# Patient Record
Sex: FEMALE | Race: ASIAN | NOT HISPANIC OR LATINO | Employment: OTHER | URBAN - METROPOLITAN AREA
[De-identification: names, ages, dates, MRNs, and addresses within clinical notes are randomized per-mention and may not be internally consistent; named-entity substitution may affect disease eponyms.]

---

## 2020-01-06 ENCOUNTER — OFFICE VISIT (OUTPATIENT)
Dept: URGENT CARE | Facility: URGENT CARE | Age: 85
End: 2020-01-06
Payer: COMMERCIAL

## 2020-01-06 ENCOUNTER — ANCILLARY PROCEDURE (OUTPATIENT)
Dept: GENERAL RADIOLOGY | Facility: CLINIC | Age: 85
End: 2020-01-06
Attending: PHYSICIAN ASSISTANT
Payer: COMMERCIAL

## 2020-01-06 VITALS
SYSTOLIC BLOOD PRESSURE: 110 MMHG | DIASTOLIC BLOOD PRESSURE: 60 MMHG | OXYGEN SATURATION: 99 % | TEMPERATURE: 97.9 F | HEART RATE: 59 BPM

## 2020-01-06 DIAGNOSIS — R07.81 RIB PAIN ON LEFT SIDE: ICD-10-CM

## 2020-01-06 DIAGNOSIS — R07.81 RIB PAIN ON LEFT SIDE: Primary | ICD-10-CM

## 2020-01-06 PROCEDURE — 99203 OFFICE O/P NEW LOW 30 MIN: CPT | Performed by: PHYSICIAN ASSISTANT

## 2020-01-06 PROCEDURE — 71111 X-RAY EXAM RIBS/CHEST4/> VWS: CPT

## 2020-01-07 NOTE — PROGRESS NOTES
Trip bathroom    SUBJECTIVE:  Chief Complaint   Patient presents with     Urgent Care     Fall     Patient tripped inside her home last night. Pain in left knee, ribs, and nose.                                                                                                     Kimberley Dennis is a 84 year old female presents with a chief complaint of left sided rib  pain, tenderness and hurts to take a deep breath  Denies SOB or chest pain . Tripped last night and fell on the left side.  Also hit her left knee but no pain and walking fine.  Did not hit head and no LOC .  The injury occurred 1 day ago.   The injury happened while at home. How: fall   Pain exacerbated by deep breath and  Twisting torso.  Relieved by rest.  She treated it initially with Tylenol. This is the first time this type of injury has occurred to this patient.     Past Medical History:   Diagnosis Date     Other and unspecified hyperlipidemia      Unspecified cardiovascular disease 1999    stent placed     Current Outpatient Medications   Medication Sig Dispense Refill     ASPIRIN 81 MG OR TABS 1 tab po QD (Once per day) 100 3     LIPITOR 10 MG OR TABS 1 tab PO QD (Once per day) 30 5     METOPROLOL TARTRATE 50 MG OR TABS 1/2 tab qd 60 5     dextromethorphan (DELSYM) 30 MG/5ML liquid Take 5 mLs (30 mg) by mouth 2 times daily (Patient not taking: Reported on 1/6/2020) 89 mL 0     IRON (FERROUS GLUCONATE) 325 MG OR TABS 1 TABLET DAILY 30 0     NIASPAN 500 MG OR TBCR 1 CAPSULE AT BEDTIME 30 0     Social History     Tobacco Use     Smoking status: Never Smoker     Smokeless tobacco: Never Used   Substance Use Topics     Alcohol use: No       ROS:  Review of systems negative except as stated above.    EXAM:   /60   Pulse 59   Temp 97.9  F (36.6  C)   SpO2 99%   Gen: healthy,alert,no distress  Extremity: left side of ribs with tenderness to palpation but no crepitus noted.  No deformity noted.  No bruising noted   There is not compromise to  the distal circulation.  Pulses are +2 and CRT is brisk  GENERAL APPEARANCE: healthy, alert and no distress  NECK: supple, non-tender to palpation, FROM   CHEST: clear to auscultation  CV: regular rate and rhythm  EXTREMITIES: peripheral pulses normal  SKIN: no suspicious lesions or rashes  NEURO: Normal strength and tone, sensory exam grossly normal, mentation intact and speech normal    X-RAY was done. No rib fracture per my read      assessment/plan:  (R07.81) Rib pain on left side  (primary encounter diagnosis)  Comment:   Plan: : XR Ribs & Chest Left G/E 3 Views          No fracture of ribs noted.  Likely contusion with exam otherwise normal and no red flag signs OTC med for sx relief and ice area.  Follow-up if SOB or chest pain

## 2020-01-07 NOTE — NURSING NOTE
Chief Complaint   Patient presents with     Urgent Care     Fall     Patient tripped inside her home last night. Pain in left knee, ribs, and nose.                                                                                                     S AMARILIS YATES

## 2020-01-13 ENCOUNTER — OFFICE VISIT (OUTPATIENT)
Dept: URGENT CARE | Facility: URGENT CARE | Age: 85
End: 2020-01-13
Payer: COMMERCIAL

## 2020-01-13 ENCOUNTER — ANCILLARY PROCEDURE (OUTPATIENT)
Dept: GENERAL RADIOLOGY | Facility: CLINIC | Age: 85
End: 2020-01-13
Attending: FAMILY MEDICINE
Payer: COMMERCIAL

## 2020-01-13 VITALS
SYSTOLIC BLOOD PRESSURE: 108 MMHG | DIASTOLIC BLOOD PRESSURE: 56 MMHG | WEIGHT: 153 LBS | TEMPERATURE: 98.1 F | HEART RATE: 60 BPM | BODY MASS INDEX: 28.91 KG/M2 | RESPIRATION RATE: 15 BRPM | OXYGEN SATURATION: 98 %
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
## 2020-01-13 PROCEDURE — 71101 X-RAY EXAM UNILAT RIBS/CHEST: CPT | Mod: LT

## 2020-01-13 PROCEDURE — 99214 OFFICE O/P EST MOD 30 MIN: CPT | Performed by: FAMILY MEDICINE

## 2020-01-13 RX ORDER — NABUMETONE 500 MG/1
TABLET, FILM COATED ORAL
Qty: 14 TABLET | Refills: 1 | Status: SHIPPED | OUTPATIENT
Start: 2020-01-13 | End: 2021-05-03

## 2020-01-13 NOTE — PROGRESS NOTES
SUBJECTIVE:   Kimberley Dennis is a 84 year old female presenting with a chief complaint of   Chief Complaint   Patient presents with     Urgent Care     Fall     painful lump on left side near rib cage- just noticed yesterday- Pt fell 8 days ago and had normal xrays- daughter thinks it may be related to the fall     She was seen a week ago and fell still continuing has some pain.  More on the left side ribs.  No shortness of breath pain when she gets up from sitting.    Previous x-ray showed no evidence of fractures.  She is an established patient of [facility].        Review of Systems   Respiratory:        Chest pain after fall happened a week ago negative x-rays 1 week ago   All other systems reviewed and are negative.      Past Medical History:   Diagnosis Date     Other and unspecified hyperlipidemia      Unspecified cardiovascular disease 1999    stent placed     Family History   Problem Relation Age of Onset     Family History Negative Unknown      Current Outpatient Medications   Medication Sig Dispense Refill     ASPIRIN 81 MG OR TABS 1 tab po QD (Once per day) 100 3     dextromethorphan (DELSYM) 30 MG/5ML liquid Take 5 mLs (30 mg) by mouth 2 times daily 89 mL 0     IRON (FERROUS GLUCONATE) 325 MG OR TABS 1 TABLET DAILY 30 0     LIPITOR 10 MG OR TABS 1 tab PO QD (Once per day) 30 5     METOPROLOL TARTRATE 50 MG OR TABS 1/2 tab qd 60 5     NIASPAN 500 MG OR TBCR 1 CAPSULE AT BEDTIME 30 0     Social History     Tobacco Use     Smoking status: Never Smoker     Smokeless tobacco: Never Used   Substance Use Topics     Alcohol use: No       OBJECTIVE  /56   Pulse 60   Temp 98.1  F (36.7  C) (Tympanic)   Resp 15   Wt 69.4 kg (153 lb)   SpO2 98%   BMI 28.91 kg/m      Physical Exam  Vitals signs and nursing note reviewed.   Constitutional:       Appearance: Normal appearance.   HENT:      Head: Normocephalic.      Left Ear: Tympanic membrane normal.      Nose: Nose normal.      Mouth/Throat:      Mouth:  Mucous membranes are moist.   Eyes:      Pupils: Pupils are equal, round, and reactive to light.   Neck:      Musculoskeletal: Normal range of motion.   Cardiovascular:      Rate and Rhythm: Normal rate.   Pulmonary:      Effort: Pulmonary effort is normal.      Breath sounds: Normal breath sounds.   Abdominal:      General: Abdomen is flat.   Musculoskeletal: Normal range of motion.      Comments: Pain to palpation left side approximately the line of the axilla bottom rib pain   Skin:     General: Skin is warm.   Neurological:      General: No focal deficit present.      Mental Status: She is alert and oriented to person, place, and time.   Psychiatric:         Mood and Affect: Mood normal.         Labs:  No results found for this or any previous visit (from the past 24 hour(s)).    X-Ray was done, my findings are: There is no obvious evidence of a fracture no evidence of pneumothorax    ASSESSMENT:      ICD-10-CM    1. Fall, subsequent encounter W19.XXXD XR Ribs & Chest Lt 3v        Medical Decision Making:    Differential Diagnosis:  1.  Pneumothorax, fracture, contusion    Serious Comorbid Conditions:  Adult:  None    PLAN:  1.  They may need to take the Naprosyn because I understand she is allergic to ibuprofen.  With ibuprofen she had swelling around her mouth not with Naprosyn.  Gave them Relafen and they will decide whether they should use that or not  2.  Tylenol 3 1 at night for 2 weeks  3.  Return to clinic in 2 weeks for follow-up evaluation re-auscultation of the lungs possibly re-x-ray although I doubt it if she still having significant pain I would do a CT scan of the chest if she has pain that is decreasing this is probably more than likely normal at this point    Binder to try to give her some comfort support while not making her highly more susceptible to pneumonia4.       Followup:    If not improving or if condition worsens, follow up with your Primary Care Provider

## 2020-02-27 ENCOUNTER — OFFICE VISIT (OUTPATIENT)
Dept: ORTHOPEDICS | Facility: CLINIC | Age: 85
End: 2020-02-27
Payer: COMMERCIAL

## 2020-02-27 ENCOUNTER — ANCILLARY PROCEDURE (OUTPATIENT)
Dept: GENERAL RADIOLOGY | Facility: CLINIC | Age: 85
End: 2020-02-27
Attending: ORTHOPAEDIC SURGERY
Payer: COMMERCIAL

## 2020-02-27 VITALS
WEIGHT: 156 LBS | BODY MASS INDEX: 29.45 KG/M2 | DIASTOLIC BLOOD PRESSURE: 74 MMHG | SYSTOLIC BLOOD PRESSURE: 142 MMHG | HEIGHT: 61 IN

## 2020-02-27 DIAGNOSIS — M79.645 BILATERAL THUMB PAIN: Primary | ICD-10-CM

## 2020-02-27 DIAGNOSIS — M79.645 BILATERAL THUMB PAIN: ICD-10-CM

## 2020-02-27 DIAGNOSIS — M18.11 PRIMARY OSTEOARTHRITIS OF FIRST CARPOMETACARPAL JOINT OF RIGHT HAND: ICD-10-CM

## 2020-02-27 DIAGNOSIS — M79.644 BILATERAL THUMB PAIN: ICD-10-CM

## 2020-02-27 DIAGNOSIS — M79.644 BILATERAL THUMB PAIN: Primary | ICD-10-CM

## 2020-02-27 PROCEDURE — 73130 X-RAY EXAM OF HAND: CPT | Mod: RT | Performed by: ORTHOPAEDIC SURGERY

## 2020-02-27 PROCEDURE — 99203 OFFICE O/P NEW LOW 30 MIN: CPT | Mod: 25 | Performed by: ORTHOPAEDIC SURGERY

## 2020-02-27 PROCEDURE — 20600 DRAIN/INJ JOINT/BURSA W/O US: CPT | Mod: RT | Performed by: ORTHOPAEDIC SURGERY

## 2020-02-27 RX ORDER — TESTOSTERONE CYPIONATE 200 MG/ML
1 INJECTION INTRAMUSCULAR
Status: DISCONTINUED | OUTPATIENT
Start: 2020-02-27 | End: 2020-07-02

## 2020-02-27 RX ORDER — LIDOCAINE HYDROCHLORIDE 10 MG/ML
1 INJECTION, SOLUTION INFILTRATION; PERINEURAL
Status: DISCONTINUED | OUTPATIENT
Start: 2020-02-27 | End: 2020-07-02

## 2020-02-27 RX ADMIN — TESTOSTERONE CYPIONATE 1 ML: 200 INJECTION INTRAMUSCULAR at 16:45

## 2020-02-27 RX ADMIN — LIDOCAINE HYDROCHLORIDE 1 ML: 10 INJECTION, SOLUTION INFILTRATION; PERINEURAL at 16:45

## 2020-02-27 ASSESSMENT — MIFFLIN-ST. JEOR: SCORE: 1092.05

## 2020-02-27 NOTE — PROGRESS NOTES
HISTORY OF PRESENT ILLNESS:    Kimberley Dennis is a 84 year old female who is seen as self referral for bilateral thumb pain. Pain has been present for approximately 1 week, with no trauma or injury noted. Patient is accompanied by her daughter, and son-in-law who interpret for her. She is right hand dominant.     Present symptoms: Pain located in the CMC joints of bilateral hands and into the thumbs at the metacarpophalangeal joints, and interphalangeal joints. She notes difficulties with gripping, grasping, and holding due to pain. She has increased pain with pressure to the thenar region of the palm. She denies numbness, tingling, and swelling to the thumbs.   Current pain level: 5/10, Worst pain level: 5/10  Treatments tried to this point: none  Orthopedic PMH: no previous upper extremity orthopedic injury     Past Medical History:   Diagnosis Date     Other and unspecified hyperlipidemia      Unspecified cardiovascular disease 1999    stent placed       No past surgical history on file.    Family History   Problem Relation Age of Onset     Family History Negative Unknown        Social History     Socioeconomic History     Marital status:      Spouse name: Not on file     Number of children: Not on file     Years of education: Not on file     Highest education level: Not on file   Occupational History     Not on file   Social Needs     Financial resource strain: Not on file     Food insecurity:     Worry: Not on file     Inability: Not on file     Transportation needs:     Medical: Not on file     Non-medical: Not on file   Tobacco Use     Smoking status: Never Smoker     Smokeless tobacco: Never Used   Substance and Sexual Activity     Alcohol use: No     Drug use: No     Sexual activity: Not Currently   Lifestyle     Physical activity:     Days per week: Not on file     Minutes per session: Not on file     Stress: Not on file   Relationships     Social connections:     Talks on phone: Not on file      Gets together: Not on file     Attends Gnosticist service: Not on file     Active member of club or organization: Not on file     Attends meetings of clubs or organizations: Not on file     Relationship status: Not on file     Intimate partner violence:     Fear of current or ex partner: Not on file     Emotionally abused: Not on file     Physically abused: Not on file     Forced sexual activity: Not on file   Other Topics Concern     Not on file   Social History Narrative     Not on file       Current Outpatient Medications   Medication Sig Dispense Refill     ASPIRIN 81 MG OR TABS 1 tab po QD (Once per day) 100 3     IRON (FERROUS GLUCONATE) 325 MG OR TABS 1 TABLET DAILY 30 0     LIPITOR 10 MG OR TABS 1 tab PO QD (Once per day) 30 5     METOPROLOL TARTRATE 50 MG OR TABS 1/2 tab qd 60 5     NIASPAN 500 MG OR TBCR 1 CAPSULE AT BEDTIME 30 0     dextromethorphan (DELSYM) 30 MG/5ML liquid Take 5 mLs (30 mg) by mouth 2 times daily (Patient not taking: Reported on 2/27/2020) 89 mL 0     nabumetone (RELAFEN) 500 MG tablet 1 po qhs (Patient not taking: Reported on 2/27/2020) 14 tablet 1       Allergies   Allergen Reactions     Ibuprofen        REVIEW OF SYSTEMS:  CONSTITUTIONAL:  NEGATIVE for fever, chills, change in weight  INTEGUMENTARY/SKIN:  NEGATIVE for worrisome rashes, moles or lesions  EYES:  NEGATIVE for vision changes or irritation  ENT/MOUTH:  NEGATIVE for ear, mouth and throat problems  RESP:  NEGATIVE for significant cough or SOB  BREAST:  NEGATIVE for masses, tenderness or discharge  CV:  Hypertension   GI:  NEGATIVE for nausea, abdominal pain, heartburn, or change in bowel habits  :  Negative   MUSCULOSKELETAL:  See HPI above  NEURO:  NEGATIVE for weakness, dizziness or paresthesias  ENDOCRINE:  NEGATIVE for temperature intolerance, skin/hair changes  HEME/ALLERGY/IMMUNE:  NEGATIVE for bleeding problems  PSYCHIATRIC:  NEGATIVE for changes in mood or affect      PHYSICAL EXAM:  BP (!) 142/74 (BP Location:  "Right arm, Patient Position: Chair, Cuff Size: Adult Regular)   Ht 1.537 m (5' 0.5\")   Wt 70.8 kg (156 lb)   BMI 29.97 kg/m    Body mass index is 29.97 kg/m .   GENERAL APPEARANCE: healthy, alert and no distress   HEENT: No apparent thyroid megaly. Clear sclera with normal ocular movement  RESPIRATORY: No labored breathing  SKIN: no suspicious lesions or rashes  NEURO: Normal strength and tone, mentation intact and speech normal  VASCULAR: Good pulses, and capillary refill   LYMPH: no lymphadenopathy   PSYCH:  mentation appears normal and affect normal/bright    MUSCULOSKELETAL:    Very healthy-appearing elderly woman who is not in acute distress  Overall no significant deformities other than prominence at the thumb CMC joints, bilateral  She has crepitus with a circumduction maneuver  Pain at the thumb CMC joints, bilateral with circumduction maneuver as well   strength is otherwise well maintained  Pinching strength is slightly weak, bilateral  No catching or locking with a finger movement  No significant deformities of other digits other than thumb CMC joints  Sensation is grossly intact  Sensation is intact       ASSESSMENT:  Advanced bilateral thumb CMC joint DJD, right slightly more symptomatic    PLAN:    We visualized the images of the MRI scan.  Findings are thoroughly explained to her and family members.  The treatment options of further observation, splinting, cortisone injection and surgical intervention were explained.  She did fairly well from previous cortisone injection to her knees in the past.  Even though she understands that cord injection may not provide adequate pain relief, she is interested in trying cortisone injection to the right thumb CMC joint at this point.  She tolerated the injection well.  If she notices improvement, we can consider cortisone injection to the left thumb CMC joint in a month or so.  What to expect from the cortisone injection, the fact that it may not improve " her pain at all as well as the instruction for not using the right hand vigorously for the next 3 to 4 days were emphasized.    Imaging Interpretation:   Advanced thumb CMC joints, bilateral      Hand / Upper Extremity Injection/Arthrocentesis: R thumb CMC  Date/Time: 2/27/2020 4:45 PM  Performed by: Kelby Cintron MD  Authorized by: Kelby Cintron MD     Indications:  Joint swelling  Needle Size:  27 G  Guidance: landmark    Approach:  Dorsal  Condition: osteoarthritis    Location:  Thumb  Site:  R thumb CMC    Medications:  1 mL dexamethasone 120 MG/30ML; 1 mL lidocaine 1 %  Outcome:  Tolerated well, no immediate complications  Procedure discussed: discussed risks, benefits, and alternatives    Consent Given by:  Patient  Timeout: timeout called immediately prior to procedure    Prep: patient was prepped and draped in usual sterile fashion            Kelby Cintron MD  Department of Orthopedic Surgery        Disclaimer: This note consists of symbols derived from keyboarding, dictation and/or voice recognition software. As a result, there may be errors in the script that have gone undetected. Please consider this when interpreting information found in this chart.

## 2020-02-27 NOTE — PATIENT INSTRUCTIONS
1. Bilateral thumb pain    2. Osteoarthritis of carpometacarpal (CMC) joint of right thumb      Steroid injection of the right hand was performed today in clinic    - Would not soak in a hot tub, bath or swimming pool for 48 hours  - Ok to shower  - Ice today and only do your normal amounts of activity  - The lidocaine (what is giving you pain relief right now) will likely stop working in 1-2 hours.  You will then have pain again, similar to before you received the injection. The corticosteroid will not start working until approximately 1-2 weeks from now.    In a small percentage of people, cortisone can cause flushing/redness in the face. This usually lasts for 1-3 days and resolves. Cool compress and Ibuprofen/Tylenol can help if this happens.  Can repeat the injection anytime after 4 months    Follow up for repeat injection when pain returns.

## 2020-02-27 NOTE — LETTER
2/27/2020         RE: Kimberley Dennis  [address] [address] [id number]        Dear Colleague,    Thank you for referring your patient, Kimberley Dennis, to the [facility]. Please see a copy of my visit note below.    HISTORY OF PRESENT ILLNESS:    Kimberley Dennis is a 84 year old female who is seen as self referral for bilateral thumb pain. Pain has been present for approximately 1 week, with no trauma or injury noted. Patient is accompanied by her daughter, and son-in-law who interpret for her. She is right hand dominant.     Present symptoms: Pain located in the CMC joints of bilateral hands and into the thumbs at the metacarpophalangeal joints, and interphalangeal joints. She notes difficulties with gripping, grasping, and holding due to pain. She has increased pain with pressure to the thenar region of the palm. She denies numbness, tingling, and swelling to the thumbs.   Current pain level: 5/10, Worst pain level: 5/10  Treatments tried to this point: none  Orthopedic PMH: no previous upper extremity orthopedic injury     Past Medical History:   Diagnosis Date     Other and unspecified hyperlipidemia      Unspecified cardiovascular disease 1999    stent placed       No past surgical history on file.    Family History   Problem Relation Age of Onset     Family History Negative Unknown        Social History     Socioeconomic History     Marital status:      Spouse name: Not on file     Number of children: Not on file     Years of education: Not on file     Highest education level: Not on file   Occupational History     Not on file   Social Needs     Financial resource strain: Not on file     Food insecurity:     Worry: Not on file     Inability: Not on file     Transportation needs:     Medical: Not on file     Non-medical: Not on file   Tobacco Use     Smoking status: Never Smoker     Smokeless tobacco: Never Used   Substance and Sexual Activity     Alcohol use: No      Drug use: No     Sexual activity: Not Currently   Lifestyle     Physical activity:     Days per week: Not on file     Minutes per session: Not on file     Stress: Not on file   Relationships     Social connections:     Talks on phone: Not on file     Gets together: Not on file     Attends Protestant service: Not on file     Active member of club or organization: Not on file     Attends meetings of clubs or organizations: Not on file     Relationship status: Not on file     Intimate partner violence:     Fear of current or ex partner: Not on file     Emotionally abused: Not on file     Physically abused: Not on file     Forced sexual activity: Not on file   Other Topics Concern     Not on file   Social History Narrative     Not on file       Current Outpatient Medications   Medication Sig Dispense Refill     ASPIRIN 81 MG OR TABS 1 tab po QD (Once per day) 100 3     IRON (FERROUS GLUCONATE) 325 MG OR TABS 1 TABLET DAILY 30 0     LIPITOR 10 MG OR TABS 1 tab PO QD (Once per day) 30 5     METOPROLOL TARTRATE 50 MG OR TABS 1/2 tab qd 60 5     NIASPAN 500 MG OR TBCR 1 CAPSULE AT BEDTIME 30 0     dextromethorphan (DELSYM) 30 MG/5ML liquid Take 5 mLs (30 mg) by mouth 2 times daily (Patient not taking: Reported on 2/27/2020) 89 mL 0     nabumetone (RELAFEN) 500 MG tablet 1 po qhs (Patient not taking: Reported on 2/27/2020) 14 tablet 1       Allergies   Allergen Reactions     Ibuprofen        REVIEW OF SYSTEMS:  CONSTITUTIONAL:  NEGATIVE for fever, chills, change in weight  INTEGUMENTARY/SKIN:  NEGATIVE for worrisome rashes, moles or lesions  EYES:  NEGATIVE for vision changes or irritation  ENT/MOUTH:  NEGATIVE for ear, mouth and throat problems  RESP:  NEGATIVE for significant cough or SOB  BREAST:  NEGATIVE for masses, tenderness or discharge  CV:  Hypertension   GI:  NEGATIVE for nausea, abdominal pain, heartburn, or change in bowel habits  :  Negative   MUSCULOSKELETAL:  See HPI above  NEURO:  NEGATIVE for weakness,  "dizziness or paresthesias  ENDOCRINE:  NEGATIVE for temperature intolerance, skin/hair changes  HEME/ALLERGY/IMMUNE:  NEGATIVE for bleeding problems  PSYCHIATRIC:  NEGATIVE for changes in mood or affect      PHYSICAL EXAM:  BP (!) 142/74 (BP Location: Right arm, Patient Position: Chair, Cuff Size: Adult Regular)   Ht 1.537 m (5' 0.5\")   Wt 70.8 kg (156 lb)   BMI 29.97 kg/m     Body mass index is 29.97 kg/m .   GENERAL APPEARANCE: healthy, alert and no distress   HEENT: No apparent thyroid megaly. Clear sclera with normal ocular movement  RESPIRATORY: No labored breathing  SKIN: no suspicious lesions or rashes  NEURO: Normal strength and tone, mentation intact and speech normal  VASCULAR: Good pulses, and capillary refill   LYMPH: no lymphadenopathy   PSYCH:  mentation appears normal and affect normal/bright    MUSCULOSKELETAL:    Very healthy-appearing elderly woman who is not in acute distress  Overall no significant deformities other than prominence at the thumb CMC joints, bilateral  She has crepitus with a circumduction maneuver  Pain at the thumb CMC joints, bilateral with circumduction maneuver as well   strength is otherwise well maintained  Pinching strength is slightly weak, bilateral  No catching or locking with a finger movement  No significant deformities of other digits other than thumb CMC joints  Sensation is grossly intact  Sensation is intact       ASSESSMENT:  Advanced bilateral thumb CMC joint DJD, right slightly more symptomatic    PLAN:    We visualized the images of the MRI scan.  Findings are thoroughly explained to her and family members.  The treatment options of further observation, splinting, cortisone injection and surgical intervention were explained.  She did fairly well from previous cortisone injection to her knees in the past.  Even though she understands that cord injection may not provide adequate pain relief, she is interested in trying cortisone injection to the right " thumb CMC joint at this point.  She tolerated the injection well.  If she notices improvement, we can consider cortisone injection to the left thumb CMC joint in a month or so.  What to expect from the cortisone injection, the fact that it may not improve her pain at all as well as the instruction for not using the right hand vigorously for the next 3 to 4 days were emphasized.    Imaging Interpretation:   Advanced thumb CMC joints, bilateral      Hand / Upper Extremity Injection/Arthrocentesis: R thumb CMC  Date/Time: 2/27/2020 4:45 PM  Performed by: Kelby Cintron MD  Authorized by: Kelby Cintron MD     Indications:  Joint swelling  Needle Size:  27 G  Guidance: landmark    Approach:  Dorsal  Condition: osteoarthritis    Location:  Thumb  Site:  R thumb CMC    Medications:  1 mL dexamethasone 120 MG/30ML; 1 mL lidocaine 1 %  Outcome:  Tolerated well, no immediate complications  Procedure discussed: discussed risks, benefits, and alternatives    Consent Given by:  Patient  Timeout: timeout called immediately prior to procedure    Prep: patient was prepped and draped in usual sterile fashion            Kelby Cintron MD  Department of Orthopedic Surgery        Disclaimer: This note consists of symbols derived from keyboarding, dictation and/or voice recognition software. As a result, there may be errors in the script that have gone undetected. Please consider this when interpreting information found in this chart.      Again, thank you for allowing me to participate in the care of your patient.        Sincerely,        Kelby Cintron MD

## 2020-03-07 ENCOUNTER — NURSE TRIAGE (OUTPATIENT)
Dept: NURSING | Facility: CLINIC | Age: 85
End: 2020-03-07

## 2020-03-08 NOTE — TELEPHONE ENCOUNTER
"    Additional Information    Negative: [1] Choking or struggling to breathe now AND [2] lasts > 60 seconds    Negative: Can't cough, speak, or make any noise now (i.e., stops breathing)    Negative: Has passed out or is limp.    Negative: Bluish (or gray) lips or face now    Negative: Sounds like a life-threatening emergency to the triager    Negative: [1] Recovered from choking AND [2] may have swallowed a FB (foreign body)    Negative: Shortness of breath after choking spell    Negative: [1] Patient cleared the FB spontaneously BUT [2] difficulty swallowing or gagging persist    Negative: Coughed up blood  (Exception:  blood-streaked sputum and once only)    Negative: [1] Patient cleared the FB spontaneously BUT [2] continues to have coughing, hoarseness, or wheezing > 30 minutes    Negative: Symptoms of FB stuck in esophagus (e.g., continued pain in throat or chest, FB sensation, gagging)    Negative: [1] Patient required Heimlich maneuver to remove solid FB AND [2] now has no symptoms    Negative: Coughing or other airway symptoms return    Negative: [1] Choking has occurred 3 or more times in the last year AND [2] the cause is not known    Recovered from choking    Answer Assessment - Initial Assessment Questions  1. SUBSTANCE: \"What did the patient choke on?\"       Salo jackson  2. SIZE: If the object was solid, ask: \"How big was it?\"       2 inches by 1/4 inch  3. ONSET: \"When did it begin?\" (In minutes)       15 minutes ago  4. RESPIRATORY DISTRESS: \"Describe the patient's breathing.\", \"Is he/she able to talk?\"      She can talk    Protocols used: CHOKING - INHALED FOREIGN BODY-A-AH      "

## 2020-07-01 ENCOUNTER — TELEPHONE (OUTPATIENT)
Dept: ORTHOPEDICS | Facility: CLINIC | Age: 85
End: 2020-07-01

## 2020-07-01 NOTE — TELEPHONE ENCOUNTER
No consent to communicate on file.   Informed Quinton Cintron would not be able to speak to him regarding his mother's condition without consent on file.  Writer recommended scheduling a Virtual Visit with his mother and he can attend appointment to ask any questions he may have. Quinton resides in [place], but scheduled a Telephone Visit for patient, and will coordinate with his sister who lives with patient for the appointment.     Quinton provided updated cell phone number to reach patient at for appointment to allow conference call.     Suzy Barker, ATC

## 2020-07-01 NOTE — TELEPHONE ENCOUNTER
Reason for call:  PERNELL from Quinton, RE: his Mom saw Dr Cintron about surgery for a thumb.   Quinton would like to speak with Dr Cintron about the pros and cons. Would like to know how he can do this.  Please call back.    Phone number to reach patient:  Other phone number:  [phone]*    Best Time:  any    Can we leave a detailed message on this number?  NO

## 2020-07-02 ENCOUNTER — VIRTUAL VISIT (OUTPATIENT)
Dept: ORTHOPEDICS | Facility: CLINIC | Age: 85
End: 2020-07-02

## 2020-07-02 VITALS — HEIGHT: 60 IN | BODY MASS INDEX: 31.41 KG/M2 | WEIGHT: 160 LBS

## 2020-07-02 DIAGNOSIS — M18.0 PRIMARY OSTEOARTHRITIS OF BOTH FIRST CARPOMETACARPAL JOINTS: Primary | ICD-10-CM

## 2020-07-02 PROCEDURE — 99213 OFFICE O/P EST LOW 20 MIN: CPT | Mod: 95 | Performed by: ORTHOPAEDIC SURGERY

## 2020-07-02 ASSESSMENT — MIFFLIN-ST. JEOR: SCORE: 1097.26

## 2020-07-02 NOTE — LETTER
"    7/2/2020         RE: Kimberley Dennis  [address] [facility] [address]        Dear Colleague,    Thank you for referring your patient, Kimberley Dennis, to the [facility] ORTHOPEDIC SURGERY. Please see a copy of my visit note below.    Kimberley Dennis is a 85 year old female who is being evaluated via a billable telephone visit.      The patient has been notified of following:     \"This telephone visit will be conducted via a call between you and your physician/provider. We have found that certain health care needs can be provided without the need for a physical exam.  This service lets us provide the care you need with a short phone conversation.  If a prescription is necessary we can send it directly to your pharmacy.  If lab work is needed we can place an order for that and you can then stop by our lab to have the test done at a later time.    Telephone visits are billed at different rates depending on your insurance coverage. During this emergency period, for some insurers they may be billed the same as an in-person visit.  Please reach out to your insurance provider with any questions.    If during the course of the call the physician/provider feels a telephone visit is not appropriate, you will not be charged for this service.\"    Patient has given verbal consent for Telephone visit?  Yes    What phone number would you like to be contacted at? [phone]    How would you like to obtain your AVS? Mail a copy     HISTORY OF PRESENT ILLNESS:    Kimberley Dennis is a 85 year old female who is seen in follow up for bilateral thumb pain.  Patient reports increased pain of the left > right, she is right hand dominant. Patient is accompanied by her daughter and son on today's call.  Patient was last evaluated on 2/27/20 and obtained a right thumb CMC joint injection with no relief .    She continues to use her hands with activities.   Current pain level: 6/10  Treatments tried to this point: " right thumb cmc corticosteroid injection, no other new treatments since last office visit.   Past Medical History: Unchanged from the visit of 2/27/20. Please refer to that note.    REVIEW OF SYSTEMS:  CONSTITUTIONAL:  NEGATIVE for fever, chills, change in weight  INTEGUMENTARY/SKIN:  NEGATIVE for worrisome rashes, moles or lesions  EYES:  NEGATIVE for vision changes or irritation  ENT/MOUTH:  NEGATIVE for ear, mouth and throat problems  RESP:  NEGATIVE for significant cough or SOB  BREAST:  NEGATIVE for masses, tenderness or discharge  CV:  Hypertension   GI:  NEGATIVE for nausea, abdominal pain, heartburn, or change in bowel habits  :  Negative   MUSCULOSKELETAL:  See HPI above  NEURO:  NEGATIVE for weakness, dizziness or paresthesias  ENDOCRINE:  NEGATIVE for temperature intolerance, skin/hair changes  HEME/ALLERGY/IMMUNE:  NEGATIVE for bleeding problems  PSYCHIATRIC:  NEGATIVE for changes in mood or affect    PHYSICAL EXAM:  Ht 1.524 m (5')   Wt 72.6 kg (160 lb)   BMI 31.25 kg/m    Body mass index is 31.25 kg/m .   GENERAL APPEARANCE: healthy, alert and no distress   SKIN: no suspicious lesions or rashes  NEURO: Normal strength and tone, mentation intact and speech normal  VASCULAR:  good pulses, and cappillary refill   LYMPH: no lymphadenopathy   PSYCH:  mentation appears normal and affect normal/bright    MSK:  Telephone visit only    IMAGING INTERPRETATION: None today     ASSESSMENT:  Bilateral thumb CMC joint arthritis, left more symptomatic    PLAN:  Along with the patient, her daughter and son were on the phone to go over the situation.  The cortisone injection that she was given was not very helpful.  She continues to have difficulty of doing things such as buttoning and eating with her hand.  We had a long discussion as to the details of the situation, surgical intervention in terms of the nature of the surgery and potential complications as well as overall healing.  We mentioned about 6 to 8 weeks  of recovery time and about 2 weeks of immobilization following the operation.  Small percentage of time that surgery is not successful for known as well as unknown causes.  All the questions were answered.  The family decided to think it through and get back to us with their decision.           Kelby Cintron MD  Dept. Orthopedic Surgery  [facility]       Disclaimer: This note consists of symbols derived from keyboarding, dictation and/or voice recognition software. As a result, there may be errors in the script that have gone undetected. Please consider this when interpreting information found in this chart.      Phone call duration: 14 minutes            Again, thank you for allowing me to participate in the care of your patient.        Sincerely,        Kelby Cintron MD

## 2020-07-02 NOTE — PROGRESS NOTES
"Kimberley Dennis is a 85 year old female who is being evaluated via a billable telephone visit.      The patient has been notified of following:     \"This telephone visit will be conducted via a call between you and your physician/provider. We have found that certain health care needs can be provided without the need for a physical exam.  This service lets us provide the care you need with a short phone conversation.  If a prescription is necessary we can send it directly to your pharmacy.  If lab work is needed we can place an order for that and you can then stop by our lab to have the test done at a later time.    Telephone visits are billed at different rates depending on your insurance coverage. During this emergency period, for some insurers they may be billed the same as an in-person visit.  Please reach out to your insurance provider with any questions.    If during the course of the call the physician/provider feels a telephone visit is not appropriate, you will not be charged for this service.\"    Patient has given verbal consent for Telephone visit?  Yes    What phone number would you like to be contacted at? [phone]    How would you like to obtain your AVS? Mail a copy     HISTORY OF PRESENT ILLNESS:    Kimberley Dennis is a 85 year old female who is seen in follow up for bilateral thumb pain.  Patient reports increased pain of the left > right, she is right hand dominant. Patient is accompanied by her daughter and son on today's call.  Patient was last evaluated on 2/27/20 and obtained a right thumb CMC joint injection with no relief .    She continues to use her hands with activities.   Current pain level: 6/10  Treatments tried to this point: right thumb cmc corticosteroid injection, no other new treatments since last office visit.   Past Medical History: Unchanged from the visit of 2/27/20. Please refer to that note.    REVIEW OF SYSTEMS:  CONSTITUTIONAL:  NEGATIVE for fever, chills, change " in weight  INTEGUMENTARY/SKIN:  NEGATIVE for worrisome rashes, moles or lesions  EYES:  NEGATIVE for vision changes or irritation  ENT/MOUTH:  NEGATIVE for ear, mouth and throat problems  RESP:  NEGATIVE for significant cough or SOB  BREAST:  NEGATIVE for masses, tenderness or discharge  CV:  Hypertension   GI:  NEGATIVE for nausea, abdominal pain, heartburn, or change in bowel habits  :  Negative   MUSCULOSKELETAL:  See HPI above  NEURO:  NEGATIVE for weakness, dizziness or paresthesias  ENDOCRINE:  NEGATIVE for temperature intolerance, skin/hair changes  HEME/ALLERGY/IMMUNE:  NEGATIVE for bleeding problems  PSYCHIATRIC:  NEGATIVE for changes in mood or affect    PHYSICAL EXAM:  Ht 1.524 m (5')   Wt 72.6 kg (160 lb)   BMI 31.25 kg/m    Body mass index is 31.25 kg/m .   GENERAL APPEARANCE: healthy, alert and no distress   SKIN: no suspicious lesions or rashes  NEURO: Normal strength and tone, mentation intact and speech normal  VASCULAR:  good pulses, and cappillary refill   LYMPH: no lymphadenopathy   PSYCH:  mentation appears normal and affect normal/bright    MSK:  Telephone visit only    IMAGING INTERPRETATION: None today     ASSESSMENT:  Bilateral thumb CMC joint arthritis, left more symptomatic    PLAN:  Along with the patient, her daughter and son were on the phone to go over the situation.  The cortisone injection that she was given was not very helpful.  She continues to have difficulty of doing things such as buttoning and eating with her hand.  We had a long discussion as to the details of the situation, surgical intervention in terms of the nature of the surgery and potential complications as well as overall healing.  We mentioned about 6 to 8 weeks of recovery time and about 2 weeks of immobilization following the operation.  Small percentage of time that surgery is not successful for known as well as unknown causes.  All the questions were answered.  The family decided to think it through and get  back to us with their decision.           Kelby Cintron MD  Dept. Orthopedic Surgery  [facility]       Disclaimer: This note consists of symbols derived from keyboarding, dictation and/or voice recognition software. As a result, there may be errors in the script that have gone undetected. Please consider this when interpreting information found in this chart.      Phone call duration: 14 minutes

## 2020-07-06 ENCOUNTER — TELEPHONE (OUTPATIENT)
Dept: PEDIATRICS | Facility: CLINIC | Age: 85
End: 2020-07-06

## 2020-07-06 NOTE — TELEPHONE ENCOUNTER
Patient was called in response to message from Dr. Garcia. Daughter answered and said she does not know if they want her seen now. She said they would call back if they want to make an appointment.

## 2020-07-10 ENCOUNTER — APPOINTMENT (OUTPATIENT)
Dept: CT IMAGING | Facility: CLINIC | Age: 85
End: 2020-07-10
Attending: EMERGENCY MEDICINE

## 2020-07-10 ENCOUNTER — HOSPITAL ENCOUNTER (EMERGENCY)
Facility: CLINIC | Age: 85
Discharge: HOME | End: 2020-07-10
Attending: EMERGENCY MEDICINE | Admitting: EMERGENCY MEDICINE

## 2020-07-10 VITALS
HEART RATE: 69 BPM | OXYGEN SATURATION: 100 % | SYSTOLIC BLOOD PRESSURE: 151 MMHG | DIASTOLIC BLOOD PRESSURE: 74 MMHG | TEMPERATURE: 95.8 F | RESPIRATION RATE: 18 BRPM
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
## 2020-07-10 DIAGNOSIS — I10 ELEVATED BLOOD PRESSURE READING WITH DIAGNOSIS OF HYPERTENSION: ICD-10-CM

## 2020-07-10 PROCEDURE — 96374 THER/PROPH/DIAG INJ IV PUSH: CPT

## 2020-07-10 PROCEDURE — 93005 ELECTROCARDIOGRAM TRACING: CPT

## 2020-07-10 PROCEDURE — 25000128 H RX IP 250 OP 636: Performed by: EMERGENCY MEDICINE

## 2020-07-10 PROCEDURE — 99284 EMERGENCY DEPT VISIT MOD MDM: CPT | Mod: 25

## 2020-07-10 PROCEDURE — 70450 CT HEAD/BRAIN W/O DYE: CPT

## 2020-07-10 RX ORDER — HYDRALAZINE HYDROCHLORIDE 20 MG/ML
5 INJECTION INTRAMUSCULAR; INTRAVENOUS ONCE
Status: COMPLETED | OUTPATIENT
Start: 2020-07-10 | End: 2020-07-10

## 2020-07-10 RX ADMIN — HYDRALAZINE HYDROCHLORIDE 5 MG: 20 INJECTION INTRAMUSCULAR; INTRAVENOUS at 19:20

## 2020-07-10 ASSESSMENT — ENCOUNTER SYMPTOMS
NECK PAIN: 0
DIZZINESS: 1
ABDOMINAL PAIN: 0
HEADACHES: 1
LIGHT-HEADEDNESS: 0

## 2020-07-10 NOTE — ED TRIAGE NOTES
Pt arrives to ED via EMS for fall. Pt was sitting on edge of bed when she said it felt like she was sliding. She fell to floor and became dizzy. Pt hit her nose on the dresser. Pt has no complaints of pain but is hypertensive (last /87), other vitals stable. . EKG normal. Pt takes aspirin, metoprolol, lipitor. Primary language Tamil. ABCs intact. Pt A&OX4.

## 2020-07-10 NOTE — ED PROVIDER NOTES
History     Chief Complaint:  Fall      The history is limited by a language barrier.      Kimberley Dennis is a 85 year old female with a history of hyperlipidemia and cardiovascular disease who presents to the emergency department via EMS for evaluation of a fall. Per EMS report, the patient, who lives at home with her granddaughter, was sitting on the edge of her bed when the bed started sliding, which caused her to fall to the floor. When she fell, she hit her nose on her dresser on the way down, and felt very dizzy when she tried getting up so family called EMS. When EMS arrived, there did not seem to be loss of consciousness and she was able to ambulate well. She was hypertensive at 197/87. She is not on blood thinners.     Here, the patient reports that she was sitting on her bed, about to stand up, when she fell over. She was asymptomatic before the fall, but became dizzy after she hit her head. She now has a mild headache but is otherwise asymptomatic. She denies lightheadedness, chest pain, abdominal pain, neck pain, or persisting dizziness.     Allergies:  Ibuprofen      Medications:    Aspirin 81 mg   Lipitor  Metoprolol    Past Medical History:    Hyperlipidemia  Cardiovascular disease     Past Surgical History:    History reviewed. No pertinent past surgical history.    Family History:    History reviewed. No pertinent family history.    Social History:  Tobacco Use: Never  Alcohol Use: No  PCP: Joanna Garcia  Marital Status:        Review of Systems   Cardiovascular: Negative for chest pain.   Gastrointestinal: Negative for abdominal pain.   Musculoskeletal: Negative for neck pain.   Neurological: Positive for dizziness (resolved) and headaches. Negative for light-headedness.   All other systems reviewed and are negative.      Physical Exam     Patient Vitals for the past 24 hrs:   BP Temp Temp src Pulse Heart Rate Resp SpO2   07/10/20 1945 (!) 151/74 -- -- 69 -- -- 100 %   07/10/20  1930 -- -- -- -- -- -- 99 %   07/10/20 1920 (!) 190/119 -- -- -- -- -- --   07/10/20 1845 -- -- -- -- -- -- 100 %   07/10/20 1819 (!) 192/78 95.8  F (35.4  C) Temporal 59 59 18 99 %     Physical Exam    General: Alert, no acute distress; nontoxic appearing  Neuro:  PERRL. Gait stable, no focal deficits; GCS 15; 5/5 BUE/BLE; SILT BUE/BLE  HEENT:  Moist mucous membranes. Conjunctiva normal. TMs clear bilaterally  CV:  RRR, no m/r/g, skin warm and well perfused  Pulm:  CTAB, no wheezes/ronchi/rales.  No acute distress, breathing comfortably  GI:  Soft, nontender, nondistended.  No rebound or guarding.  Normal bowel sounds  MSK:  Moving all extremities.  No focal areas of edema, erythema, or tenderness; no cervical midline tenderness  Skin:  WWP, no rashes, no lower extremity edema, skin color normal, no diaphoresis    Emergency Department Course   ECG:  @ 1908  Indication: Fall  Vent. Rate 63 bpm. IL interval 150 ms. QRS duration 74 ms. QT/QTc 418/427 ms. P-R-T axis 65 23 17.   Normal sinus rhythm. Normal ECG.   Read by Dr. Khan.    Imaging:  Head CT without contrast:  IMPRESSION:  1.  No acute intracranial process.  2.  Mild chronic small vessel ischemic disease and generalized brain parenchymal volume loss.  Report per radiology.     Interventions:  1920 Apresoline 5 mg IV    Emergency Department Course:  1810 Nursing notes and vitals reviewed. I performed an exam of the patient as documented above.     1830    I spoke with patient's granddaughter whom she lives with ([place] - [phone]) and updated her with the plan.    IV inserted. Medicine administered as documented above.     EKG was done, interpretation as above.    The patient was sent for a head CT while in the emergency department, findings above.     1935 I rechecked the patient and discussed the results of her workup thus far. She passed a road test.     1940 I called the patient's granddaughter and updated her on the findings and plan.     Findings  and plan explained to the Patient and family. Patient discharged home with instructions regarding supportive care, medications, and reasons to return. The importance of close follow-up was reviewed.     I personally reviewed and imaging results with the Patient and family and answered all related questions prior to discharge.     Impression & Plan    Medical Decision Making:  Kimberley Dennis is a 85 year old female who presents for evaluation of a fall.  This was clearly a mechanical fall, not syncope.  There were no prodromal symptoms and her exam is unremarkable with nonfocal neurologic exam - I doubt stroke, cardiac arrhythmia (her EKG shows no signs of dysrhythmia or acute ischemia) or other serious etiology.  I would not do workup for syncope, ACS, PE at this point.  She complained of dizziness and slight headache after the fall so a head CT was obtained. Fortunately, this was negative for any acute pathology. I had the ED tech ambulate the patient and she did well. Dizziness and headache had resolved.  With reasonable clinical certainty, I feel she is safe to discharge home.   Patient and family were instructed to follow up with her primary care in the upcoming days regarding her high blood pressure noted here today. One dose of hydralazine was given.  Reasons to return to the ER were discussed with the patient and her granddaughter over the phone - all of their questions were answered prior to discharge.     Diagnosis:    ICD-10-CM    1. Accidental fall from bed, initial encounter  W06.XXXA    2. Elevated blood pressure reading with diagnosis of hypertension  I10        Disposition:  Discharged to home with family    Scribe Disclosure:  I, Edison Williamson, am serving as a scribe on 7/10/2020 at 6:10 PM to personally document services performed by Dr. Khan, Escobar Israel MD based on my observations and the provider's statements to me.     Edison Williamson  7/10/2020   [facility] EMERGENCY  [facility], Escobar Israel MD  07/10/20 1951

## 2020-07-10 NOTE — ED NOTES
Bed: ED21  Expected date: 7/10/20  Expected time: 5:55 PM  Means of arrival:   Comments:  [id number]

## 2020-07-10 NOTE — ED AVS SNAPSHOT
[facility] Emergency Department  [address]  [facility] [address]  Phone:  [phone]  Fax:  [phone]                                    Kimberley Dennis   MRN: [id number]    Department:  [facility] Emergency Department   Date of Visit:  7/10/2020           After Visit Summary Signature Page    I have received my discharge instructions, and my questions have been answered. I have discussed any challenges I see with this plan with the nurse or doctor.    ..........................................................................................................................................  Patient/Patient Representative Signature      ..........................................................................................................................................  Patient Representative Print Name and Relationship to Patient    ..................................................               ................................................  Date                                   Time    ..........................................................................................................................................  Reviewed by Signature/Title    ...................................................              ..............................................  Date                                               Time          22EPIC Rev 08/18

## 2020-07-13 LAB — INTERPRETATION ECG - MUSE: NORMAL

## 2020-07-17 ENCOUNTER — OFFICE VISIT (OUTPATIENT)
Dept: INTERNAL MEDICINE | Facility: CLINIC | Age: 85
End: 2020-07-17

## 2020-07-17 VITALS
TEMPERATURE: 98.3 F | HEIGHT: 61 IN | WEIGHT: 160 LBS | DIASTOLIC BLOOD PRESSURE: 82 MMHG | RESPIRATION RATE: 20 BRPM | OXYGEN SATURATION: 100 % | HEART RATE: 72 BPM | SYSTOLIC BLOOD PRESSURE: 152 MMHG | BODY MASS INDEX: 30.21 KG/M2

## 2020-07-17 DIAGNOSIS — Z09 HOSPITAL DISCHARGE FOLLOW-UP: Primary | ICD-10-CM

## 2020-07-17 DIAGNOSIS — I10 ESSENTIAL HYPERTENSION: ICD-10-CM
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
## 2020-07-17 PROCEDURE — 99213 OFFICE O/P EST LOW 20 MIN: CPT | Performed by: INTERNAL MEDICINE

## 2020-07-17 ASSESSMENT — ENCOUNTER SYMPTOMS
FATIGUE: 0
SHORTNESS OF BREATH: 0
PALPITATIONS: 0
DIZZINESS: 1

## 2020-07-17 ASSESSMENT — MIFFLIN-ST. JEOR: SCORE: 1105.2

## 2020-07-17 NOTE — PATIENT INSTRUCTIONS
Check BP, heart rate and call in 4-5 days  Check where to send medications? [facility]  There is 10$ for 90 days prescription in both pharmacies without insurance.

## 2020-07-17 NOTE — PROGRESS NOTES
Joanna Dennis is a 85 year old female who presents to clinic today for the following health issues:    HPI     ED/UC Followup:    Facility:  [facility] ED  Date of visit: 07/10/2020  Reason for visit: Fall  Current Status: Improved     Patient had a fall when she tried to get up suddenly on 7/10/2020.  Blood pressure was elevated in the ER.  Labs are within normal limit.  EKG showed normal sinus rhythm with normal intervals.  No known coronary artery disease.  Had mild episode of dizziness and tried over-the-counter meclizine and that improved her symptoms.  Denies any other cardiac symptoms with blood pressures mildly elevated around 150 systolic.  Takes 25 mg of metoprolol.    Patient Active Problem List   Diagnosis     CARDIOVASCULAR SCREENING; LDL GOAL LESS THAN 130     History reviewed. No pertinent surgical history.    Social History     Tobacco Use     Smoking status: Never Smoker     Smokeless tobacco: Never Used   Substance Use Topics     Alcohol use: No     Family History   Problem Relation Age of Onset     Family History Negative Other          Current Outpatient Medications   Medication Sig Dispense Refill     ASPIRIN 81 MG OR TABS 1 tab po QD (Once per day) 100 3     IRON (FERROUS GLUCONATE) 325 MG OR TABS 1 TABLET DAILY 30 0     LIPITOR 10 MG OR TABS 1 tab PO QD (Once per day) 30 5     METOPROLOL TARTRATE 50 MG OR TABS 1/2 tab qd 60 5     NIASPAN 500 MG OR TBCR 1 CAPSULE AT BEDTIME 30 0     dextromethorphan (DELSYM) 30 MG/5ML liquid Take 5 mLs (30 mg) by mouth 2 times daily (Patient not taking: Reported on 2/27/2020) 89 mL 0     nabumetone (RELAFEN) 500 MG tablet 1 po qhs (Patient not taking: Reported on 2/27/2020) 14 tablet 1     Allergies   Allergen Reactions     Ibuprofen        Reviewed and updated as needed this visit by Provider  Allergies  Meds       Review of Systems   Constitutional: Negative for fatigue.   Respiratory: Negative for shortness of breath.   "  Cardiovascular: Negative for chest pain and palpitations.   Neurological: Positive for dizziness. Negative for syncope.          Objective    BP (!) 152/82 (BP Location: Right arm, Patient Position: Sitting, Cuff Size: Adult Regular)   Pulse 72   Temp 98.3  F (36.8  C) (Oral)   Resp 20   Ht 1.537 m (5' 0.5\")   Wt 72.6 kg (160 lb)   SpO2 100%   BMI 30.73 kg/m    Body mass index is 30.73 kg/m .  Physical Exam  Vitals signs reviewed.   Cardiovascular:      Rate and Rhythm: Normal rate and regular rhythm.      Heart sounds: No murmur.   Pulmonary:      Effort: Pulmonary effort is normal.      Breath sounds: Normal breath sounds. No wheezing.   Neurological:      General: No focal deficit present.      Mental Status: She is alert.   Psychiatric:         Mood and Affect: Mood normal.        Assessment & Plan     Kimberley was seen today for er f/u.    Diagnoses and all orders for this visit:    Hospital discharge follow-up    Essential hypertension    Fall, sequela    Advised the family to check blood pressure every day and call in 3 to 4 days to adjust the blood pressure medication.    Mikel Hussein MD  [facility]    "

## 2020-07-20 ENCOUNTER — TELEPHONE (OUTPATIENT)
Dept: INTERNAL MEDICINE | Facility: CLINIC | Age: 85
End: 2020-07-20

## 2020-07-20 DIAGNOSIS — I10 ESSENTIAL HYPERTENSION: Primary | ICD-10-CM

## 2020-07-20 DIAGNOSIS — E78.5 HYPERLIPIDEMIA LDL GOAL <130: ICD-10-CM

## 2020-07-20 RX ORDER — METOPROLOL SUCCINATE 50 MG/1
50 TABLET, EXTENDED RELEASE ORAL DAILY
Qty: 90 TABLET | Refills: 0 | Status: SHIPPED | OUTPATIENT
Start: 2020-07-20 | End: 2020-10-21

## 2020-07-20 RX ORDER — ATORVASTATIN CALCIUM 10 MG/1
10 TABLET, FILM COATED ORAL DAILY
Qty: 90 TABLET | Refills: 0 | Status: SHIPPED | OUTPATIENT
Start: 2020-07-20 | End: 2020-10-21

## 2020-07-20 NOTE — TELEPHONE ENCOUNTER
I am giving it is extended release so it will work for 24 hours.  Will sign for Lipitor.    Mikel Hussein MD

## 2020-07-20 NOTE — TELEPHONE ENCOUNTER
Patient calling with blood pressure readings  7/18/2020  8:45 am 168/86  9:00 pm  162/80    7/19/2020  10:45 am 154/85  7:40 pm    155/83    7/20/20  9:15 am 167/87    Patient uses [facility] in [facility] to call and lm [phone] or [phone]

## 2020-07-20 NOTE — TELEPHONE ENCOUNTER
"Please see message below.    Last office visit 7-17-20.  Per dictation from office visit, states patient takes \"Metoprolol 25mg\".    Please advise, thanks.  "

## 2020-07-20 NOTE — TELEPHONE ENCOUNTER
I asked her granddaughter to check heart rate also.  Please ask if they have how her heart rate has been doing.    Mikel Hussein MD

## 2020-07-20 NOTE — TELEPHONE ENCOUNTER
Spoke with daughter.    7/18 am heart rate was 62 and in the pm was 66  7/19 am was 65 and pm was 62  7/20 am was 70    Please advise, thanks.

## 2020-07-20 NOTE — TELEPHONE ENCOUNTER
Patient's daughter informed of provider's message below.    1.  States patient is currently taking Metoprolol 25mg BID.    2.  Also, daughter thought Lipitor 10mg daily was going to be sent to the pharmacy but has not yet.  Please e-scribe prescription (medication pended).    Please advise, thanks.

## 2020-07-20 NOTE — TELEPHONE ENCOUNTER
Increase metoprolol to 50mg daily has a new prescription to the pharmacy.  Advised to continue heart rate and blood pressure and call next week.    Mikel Hussein MD

## 2020-07-21 RX ORDER — METOPROLOL SUCCINATE 25 MG/1
25 TABLET, EXTENDED RELEASE ORAL DAILY
Qty: 90 TABLET | Refills: 0 | Status: SHIPPED | OUTPATIENT
Start: 2020-07-21 | End: 2020-10-21

## 2020-07-21 NOTE — TELEPHONE ENCOUNTER
I think I misunderstood.  If she was taking 25 mg metoprolol twice a day, she would need at least 75 mg extended release.  I will send another 25 mg extended release so she can take 50 and 25 mg together which will add up to 75 mg metoprolol extended release.  They can continue to check her heart rate and blood pressure and call in 1 week.    Mikel Hussein MD

## 2020-07-21 NOTE — TELEPHONE ENCOUNTER
Call received from daughter with question about Metoprolol. Daughter does not understand why the dose was changed from 25 mg twice daily to 50 mg extended release once daily. States she thought Dr. Hussein was going to increase the dose but feels this is the same dose only given once daily instead of twice daily. Please clarify.

## 2020-07-22 NOTE — TELEPHONE ENCOUNTER
Call to Dtr and advised. Answered all questions. Advised to sign up for MyChart or schedule VV if they want to discuss further with the provider directly. Advised that it is difficult for provider to call them directly during the day.

## 2020-07-22 NOTE — TELEPHONE ENCOUNTER
Daughter Marya calling to ask why medication was changed from METOPROLOL TARTRATE to  metoprolol succinate and she also wants to know if both doses should be taken at the same time. Call her back at [phone]

## 2020-07-29 ENCOUNTER — TELEPHONE (OUTPATIENT)
Dept: INTERNAL MEDICINE | Facility: CLINIC | Age: 85
End: 2020-07-29

## 2020-07-29 DIAGNOSIS — I10 ESSENTIAL HYPERTENSION: Primary | ICD-10-CM

## 2020-07-29 RX ORDER — AMLODIPINE BESYLATE 2.5 MG/1
2.5 TABLET ORAL DAILY
Qty: 30 TABLET | Refills: 0 | Status: SHIPPED | OUTPATIENT
Start: 2020-07-29 | End: 2020-08-26

## 2020-07-29 RX ORDER — AMLODIPINE BESYLATE 2.5 MG/1
2.5 TABLET ORAL DAILY
Qty: 30 TABLET | Refills: 0 | Status: SHIPPED | OUTPATIENT
Start: 2020-07-29 | End: 2020-07-29

## 2020-07-29 NOTE — TELEPHONE ENCOUNTER
Daughter calls wit some new JOHNSON readings since taking RX.      07/24 am 176/91 pulse 64  07/24 pm 154/80 pulse 58  07/25 am 172/84 pulse 66  07/25 pm 166/85  Pulse 69  07/26 am 161/80 pulse 61  07/26 pm 139/75 pulse 60  07/27 am 153/81 pulse 58  07/27 pm 168/87 pulse 59    Please advise.

## 2020-07-29 NOTE — TELEPHONE ENCOUNTER
BP is running high, will add amlodipine 2.5mg once daily along with metoprolol.  Side effect include possible ankle edema.   Sent for 30 days to pharmacy.   Call with 1 week BP numbers.    Mikel Hussein MD

## 2020-08-11 NOTE — TELEPHONE ENCOUNTER
Most of the blood pressure numbers looks good.  Continue to check it periodically.  Will for recheck in the morning when she wakes up or at any time if she feels any symptoms including headache, chest pain, shortness of breath, dizziness.    Mikel Hussein MD

## 2020-08-11 NOTE — TELEPHONE ENCOUNTER
Daughter is calling back to report blood pressure readings after adding amlodipine 2.5mg.    8/1/20 am 138/71 pulse was 63 ,  night 137/73 and pulse was 60    8/2/20 162/84 pulse was 62, night 139/73 and pulse was 62    8/3 140/75 pulse 62 and night 143/82 pulse 62    8/4/20 153/80 pulse was 63, night 136/73 pulse 61    8/5/20 126/73 pulse 58,  night 144/76 pulse 64    8/6/20  137/69 pulse 60, night 141/74 pulse 64    Daughter is asking if there is a certain time of the day the blood pressure should be checked.

## 2020-08-12 NOTE — TELEPHONE ENCOUNTER
Call to Dtr and advised. She states she gets headaches sometimes but not related to her BP.     She will call back if needed.

## 2020-08-25 DIAGNOSIS — I10 ESSENTIAL HYPERTENSION: ICD-10-CM

## 2020-08-26 ENCOUNTER — TELEPHONE (OUTPATIENT)
Dept: INTERNAL MEDICINE | Facility: CLINIC | Age: 85
End: 2020-08-26

## 2020-08-26 RX ORDER — AMLODIPINE BESYLATE 2.5 MG/1
2.5 TABLET ORAL DAILY
Qty: 30 TABLET | Refills: 0 | Status: SHIPPED | OUTPATIENT
Start: 2020-08-26 | End: 2020-09-28 | Stop reason: SINTOL

## 2020-08-26 NOTE — TELEPHONE ENCOUNTER
Daughter calls asking if any changes should be made to the dosage of Amlodipine and Metoprolol.  Daughter is also asking is she will continue on both.  Please address today if possible because she would like to  RX as requested.

## 2020-08-26 NOTE — TELEPHONE ENCOUNTER
As long BP is running less than 140/90 consistently, she can continue amlodipine and metoprolol.    Mikel Hussein MD

## 2020-08-26 NOTE — TELEPHONE ENCOUNTER
"  Last OV on 7/17/20    Requested Prescriptions   Pending Prescriptions Disp Refills     amLODIPine (NORVASC) 2.5 MG tablet 30 tablet 0     Sig: Take 1 tablet (2.5 mg) by mouth daily       Calcium Channel Blockers Protocol  Failed - 8/25/2020  3:50 PM        Failed - Blood pressure under 140/90 in past 12 months     BP Readings from Last 3 Encounters:   07/17/20 (!) 152/82   07/10/20 (!) 151/74   02/27/20 (!) 142/74                 Failed - Normal serum creatinine on file in past 12 months     No lab results found.    Ok to refill medication if creatinine is low          Passed - Recent (12 mo) or future (30 days) visit within the authorizing provider's specialty     Patient has had an office visit with the authorizing provider or a provider within the authorizing providers department within the previous 12 mos or has a future within next 30 days. See \"Patient Info\" tab in inbasket, or \"Choose Columns\" in Meds & Orders section of the refill encounter.              Passed - Medication is active on med list        Passed - Patient is age 18 or older        Passed - No active pregnancy on record        Passed - No positive pregnancy test in past 12 months             "

## 2020-08-26 NOTE — TELEPHONE ENCOUNTER
See message below.     Please advise.     BP Readings from Last 3 Encounters:   07/17/20 (!) 152/82   07/10/20 (!) 151/74   02/27/20 (!) 142/74

## 2020-09-26 ENCOUNTER — APPOINTMENT (OUTPATIENT)
Dept: CT IMAGING | Facility: CLINIC | Age: 85
End: 2020-09-26
Attending: EMERGENCY MEDICINE

## 2020-09-26 ENCOUNTER — OFFICE VISIT (OUTPATIENT)
Dept: URGENT CARE | Facility: URGENT CARE | Age: 85
End: 2020-09-26

## 2020-09-26 ENCOUNTER — HOSPITAL ENCOUNTER (EMERGENCY)
Facility: CLINIC | Age: 85
Discharge: HOME | End: 2020-09-27
Attending: EMERGENCY MEDICINE | Admitting: EMERGENCY MEDICINE

## 2020-09-26 VITALS
SYSTOLIC BLOOD PRESSURE: 130 MMHG | HEART RATE: 52 BPM | TEMPERATURE: 97.2 F | OXYGEN SATURATION: 100 % | DIASTOLIC BLOOD PRESSURE: 60 MMHG

## 2020-09-26 DIAGNOSIS — G44.209 ACUTE NON INTRACTABLE TENSION-TYPE HEADACHE: ICD-10-CM

## 2020-09-26 DIAGNOSIS — R91.8 PULMONARY NODULES: ICD-10-CM

## 2020-09-26 DIAGNOSIS — R51.9 TEMPORAL HEADACHE: Primary | ICD-10-CM

## 2020-09-26 LAB
ANION GAP SERPL CALCULATED.3IONS-SCNC: 11 MMOL/L (ref 3–14)
ANION GAP SERPL CALCULATED.3IONS-SCNC: 2 MMOL/L (ref 3–14)
BASOPHILS # BLD AUTO: 0.1 10E9/L (ref 0–0.2)
BASOPHILS NFR BLD AUTO: 0.6 %
BUN SERPL-MCNC: 15 MG/DL (ref 7–30)
BUN SERPL-MCNC: 15 MG/DL (ref 7–30)
CALCIUM SERPL-MCNC: 9 MG/DL (ref 8.5–10.1)
CALCIUM SERPL-MCNC: 9.8 MG/DL (ref 8.5–10.1)
CHLORIDE SERPL-SCNC: 104 MMOL/L (ref 94–109)
CHLORIDE SERPL-SCNC: 98 MMOL/L (ref 94–109)
CO2 SERPL-SCNC: 29 MMOL/L (ref 20–32)
CO2 SERPL-SCNC: 29 MMOL/L (ref 20–32)
CREAT SERPL-MCNC: 0.79 MG/DL (ref 0.52–1.04)
CREAT SERPL-MCNC: 1 MG/DL (ref 0.52–1.04)
CRP SERPL-MCNC: <2.9 MG/L (ref 0–8)
DIFFERENTIAL METHOD BLD: ABNORMAL
EOSINOPHIL # BLD AUTO: 0.2 10E9/L (ref 0–0.7)
EOSINOPHIL NFR BLD AUTO: 3 %
ERYTHROCYTE [DISTWIDTH] IN BLOOD BY AUTOMATED COUNT: 13.5 % (ref 10–15)
ERYTHROCYTE [DISTWIDTH] IN BLOOD BY AUTOMATED COUNT: 13.7 % (ref 10–15)
ERYTHROCYTE [SEDIMENTATION RATE] IN BLOOD BY WESTERGREN METHOD: 35 MM/H (ref 0–30)
ERYTHROCYTE [SEDIMENTATION RATE] IN BLOOD BY WESTERGREN METHOD: 42 MM/H (ref 0–30)
GFR SERPL CREATININE-BSD FRML MDRD: 50 ML/MIN/{1.73_M2}
GFR SERPL CREATININE-BSD FRML MDRD: 69 ML/MIN/{1.73_M2}
GLUCOSE SERPL-MCNC: 97 MG/DL (ref 70–99)
GLUCOSE SERPL-MCNC: 98 MG/DL (ref 70–99)
HCT VFR BLD AUTO: 40.2 % (ref 35–47)
HCT VFR BLD AUTO: 42 % (ref 35–47)
HGB BLD-MCNC: 13 G/DL (ref 11.7–15.7)
HGB BLD-MCNC: 13.2 G/DL (ref 11.7–15.7)
IMM GRANULOCYTES # BLD: 0 10E9/L (ref 0–0.4)
IMM GRANULOCYTES NFR BLD: 0.3 %
LYMPHOCYTES # BLD AUTO: 2.4 10E9/L (ref 0.8–5.3)
LYMPHOCYTES NFR BLD AUTO: 30.9 %
MCH RBC QN AUTO: 30.3 PG (ref 26.5–33)
MCH RBC QN AUTO: 30.6 PG (ref 26.5–33)
MCHC RBC AUTO-ENTMCNC: 31.4 G/DL (ref 31.5–36.5)
MCHC RBC AUTO-ENTMCNC: 32.3 G/DL (ref 31.5–36.5)
MCV RBC AUTO: 94 FL (ref 78–100)
MCV RBC AUTO: 97 FL (ref 78–100)
MONOCYTES # BLD AUTO: 0.9 10E9/L (ref 0–1.3)
MONOCYTES NFR BLD AUTO: 10.8 %
NEUTROPHILS # BLD AUTO: 4.3 10E9/L (ref 1.6–8.3)
NEUTROPHILS NFR BLD AUTO: 54.4 %
NRBC # BLD AUTO: 0 10*3/UL
NRBC BLD AUTO-RTO: 0 /100
PLATELET # BLD AUTO: 221 10E9/L (ref 150–450)
PLATELET # BLD AUTO: 226 10E9/L (ref 150–450)
POTASSIUM SERPL-SCNC: 4.7 MMOL/L (ref 3.4–5.3)
POTASSIUM SERPL-SCNC: 4.8 MMOL/L (ref 3.4–5.3)
RBC # BLD AUTO: 4.29 10E12/L (ref 3.8–5.2)
RBC # BLD AUTO: 4.32 10E12/L (ref 3.8–5.2)
SODIUM SERPL-SCNC: 135 MMOL/L (ref 133–144)
SODIUM SERPL-SCNC: 138 MMOL/L (ref 133–144)
WBC # BLD AUTO: 7.9 10E9/L (ref 4–11)
WBC # BLD AUTO: 7.9 10E9/L (ref 4–11)

## 2020-09-26 PROCEDURE — 25000128 H RX IP 250 OP 636: Performed by: EMERGENCY MEDICINE

## 2020-09-26 PROCEDURE — 70496 CT ANGIOGRAPHY HEAD: CPT

## 2020-09-26 PROCEDURE — 99285 EMERGENCY DEPT VISIT HI MDM: CPT | Mod: 25

## 2020-09-26 PROCEDURE — 25000132 ZZH RX MED GY IP 250 OP 250 PS 637: Performed by: EMERGENCY MEDICINE

## 2020-09-26 PROCEDURE — 80048 BASIC METABOLIC PNL TOTAL CA: CPT | Performed by: EMERGENCY MEDICINE

## 2020-09-26 PROCEDURE — 99214 OFFICE O/P EST MOD 30 MIN: CPT | Performed by: FAMILY MEDICINE

## 2020-09-26 PROCEDURE — 25000125 ZZHC RX 250: Performed by: EMERGENCY MEDICINE

## 2020-09-26 PROCEDURE — 96375 TX/PRO/DX INJ NEW DRUG ADDON: CPT

## 2020-09-26 PROCEDURE — 96376 TX/PRO/DX INJ SAME DRUG ADON: CPT

## 2020-09-26 PROCEDURE — 80048 BASIC METABOLIC PNL TOTAL CA: CPT | Performed by: FAMILY MEDICINE

## 2020-09-26 PROCEDURE — 96365 THER/PROPH/DIAG IV INF INIT: CPT | Mod: 59

## 2020-09-26 PROCEDURE — 86140 C-REACTIVE PROTEIN: CPT | Performed by: EMERGENCY MEDICINE

## 2020-09-26 PROCEDURE — 85027 COMPLETE CBC AUTOMATED: CPT | Performed by: FAMILY MEDICINE

## 2020-09-26 PROCEDURE — 36415 COLL VENOUS BLD VENIPUNCTURE: CPT | Performed by: FAMILY MEDICINE

## 2020-09-26 PROCEDURE — 70450 CT HEAD/BRAIN W/O DYE: CPT

## 2020-09-26 PROCEDURE — 96361 HYDRATE IV INFUSION ADD-ON: CPT

## 2020-09-26 PROCEDURE — 85652 RBC SED RATE AUTOMATED: CPT | Performed by: FAMILY MEDICINE

## 2020-09-26 PROCEDURE — 85025 COMPLETE CBC W/AUTO DIFF WBC: CPT | Performed by: EMERGENCY MEDICINE

## 2020-09-26 PROCEDURE — 85652 RBC SED RATE AUTOMATED: CPT | Performed by: EMERGENCY MEDICINE

## 2020-09-26 RX ORDER — ACETAMINOPHEN 325 MG/1
975 TABLET ORAL ONCE
Status: COMPLETED | OUTPATIENT
Start: 2020-09-26 | End: 2020-09-26

## 2020-09-26 RX ORDER — DIPHENHYDRAMINE HYDROCHLORIDE 50 MG/ML
25 INJECTION INTRAMUSCULAR; INTRAVENOUS ONCE
Status: DISCONTINUED | OUTPATIENT
Start: 2020-09-26 | End: 2020-09-27 | Stop reason: HOSPADM

## 2020-09-26 RX ORDER — IOPAMIDOL 755 MG/ML
500 INJECTION, SOLUTION INTRAVASCULAR ONCE
Status: COMPLETED | OUTPATIENT
Start: 2020-09-26 | End: 2020-09-26

## 2020-09-26 RX ORDER — METOCLOPRAMIDE HYDROCHLORIDE 5 MG/ML
5 INJECTION INTRAMUSCULAR; INTRAVENOUS ONCE
Status: DISCONTINUED | OUTPATIENT
Start: 2020-09-26 | End: 2020-09-27 | Stop reason: HOSPADM

## 2020-09-26 RX ADMIN — IOPAMIDOL 70 ML: 755 INJECTION, SOLUTION INTRAVENOUS at 22:32

## 2020-09-26 RX ADMIN — ACETAMINOPHEN 975 MG: 325 TABLET, FILM COATED ORAL at 23:27

## 2020-09-26 RX ADMIN — SODIUM CHLORIDE 80 ML: 9 INJECTION, SOLUTION INTRAVENOUS at 22:33

## 2020-09-26 NOTE — ED AVS SNAPSHOT
[facility] Emergency Department  [address]  [facility] [address]  Phone:  [phone]  Fax:  [phone]                                    Kimberley Dennis   MRN: [id number]    Department:  [facility] Emergency Department   Date of Visit:  9/26/2020           After Visit Summary Signature Page    I have received my discharge instructions, and my questions have been answered. I have discussed any challenges I see with this plan with the nurse or doctor.    ..........................................................................................................................................  Patient/Patient Representative Signature      ..........................................................................................................................................  Patient Representative Print Name and Relationship to Patient    ..................................................               ................................................  Date                                   Time    ..........................................................................................................................................  Reviewed by Signature/Title    ...................................................              ..............................................  Date                                               Time          22EPIC Rev 08/18

## 2020-09-26 NOTE — PROGRESS NOTES
Subjective     Kimberley Dennis is a 85 year old female who presents to clinic today for the following health issues:    HPI       Headache  Onset/Duration: 1 week  Description  Location: bilateral in the temporal area   Character: throbbing pain  Frequency:  Constant   Duration:  1 week  Wake with headaches: no  Able to do daily activities when headache present: YES  Intensity:  5/10  Progression of Symptoms:  same  Accompanying signs and symptoms:  Stiff neck: no  Neck or upper back pain: no  Sinus or URI symptoms no   Fever: no  Nausea or vomiting: no  Dizziness: no  Numbness/tingling: no  Weakness: no  Visual changes: none  History  Head trauma: no  Family history of migraines: no  Daily pain medication use: no  Previous tests for headaches: no  Neurologist evaluation: no  Precipitating or Alleviating factors (light/sound/sleep/caffeine): none   Therapies tried and outcome: Tylenol    Outcome - not effective        Review of Systems   Constitutional, HEENT, cardiovascular, pulmonary, GI, , musculoskeletal, neuro, skin, endocrine and psych systems are negative, except as otherwise noted.      Objective    /60   Pulse 52   Temp 97.2  F (36.2  C) (Tympanic)   SpO2 100%   There is no height or weight on file to calculate BMI.  Physical Exam   GENERAL: alert, no distress and frail  EYES: Eyes grossly normal to inspection, PERRL and conjunctivae and sclerae normal  HENT: mild temporal tenderness bilaterally, atraumatic, ear canals and TM's normal, nose and mouth without ulcers or lesions, oropharynx clear and oral mucous membranes moist,  NECK: no adenopathy, no asymmetry, masses, or scars and thyroid normal to palpation  RESP: lungs clear to auscultation - no rales, rhonchi or wheezes  CV: regular rate and rhythm, normal S1 S2, no S3 or S4, no murmur, click or rub, no peripheral edema and peripheral pulses strong  ABDOMEN: soft, nontender, no hepatosplenomegaly, no masses and bowel sounds normal  MS: no  gross musculoskeletal defects noted, no edema  NEURO: Normal strength and tone, sensory exam grossly normal, mentation intact, speech normal, cranial nerves 2-12 intact and DTR's normal and symmetric bilaterally   PSYCH: mentation appears normal, affect normal/bright    Results for orders placed or performed in visit on 09/26/20   CBC with platelets     Status: None   Result Value Ref Range    WBC 7.9 4.0 - 11.0 10e9/L    RBC Count 4.29 3.8 - 5.2 10e12/L    Hemoglobin 13.0 11.7 - 15.7 g/dL    Hematocrit 40.2 35.0 - 47.0 %    MCV 94 78 - 100 fl    MCH 30.3 26.5 - 33.0 pg    MCHC 32.3 31.5 - 36.5 g/dL    RDW 13.7 10.0 - 15.0 %    Platelet Count 226 150 - 450 10e9/L   Basic metabolic panel     Status: Abnormal   Result Value Ref Range    Sodium 138 133 - 144 mmol/L    Potassium 4.7 3.4 - 5.3 mmol/L    Chloride 98 94 - 109 mmol/L    Carbon Dioxide 29 20 - 32 mmol/L    Anion Gap 11 3 - 14 mmol/L    Glucose 98 70 - 99 mg/dL    Urea Nitrogen 15 7 - 30 mg/dL    Creatinine 1.00 0.52 - 1.04 mg/dL    GFR Estimate 50 (L) >60 mL/min/[1.73_m2]    GFR Estimate If Black 58 (L) >60 mL/min/[1.73_m2]    Calcium 9.8 8.5 - 10.1 mg/dL   ESR: Erythrocyte sedimentation rate     Status: Abnormal   Result Value Ref Range    Sed Rate 35 (H) 0 - 30 mm/h         Assessment & Plan     Temporal headache  --Differentials discussed in detail including but not limited to tension headache, temporal arteritis, intracranial pathology, glaucoma and metabolic etiology.  Physical exam unremarkable apart from mild temporal tenderness.  CBC and ESR came back unremarkable, BMP showed mild renal impairment.  Further management options discussed, shared decision made for ER transfer considering age and new onset temporal headache.  Patient/son in law understand and in agreement with above plan. All questions answered.   - Basic metabolic panel  - ESR: Erythrocyte sedimentation rate        Royal Hoskins MD  [facility]

## 2020-09-27 VITALS
OXYGEN SATURATION: 100 % | HEART RATE: 60 BPM | SYSTOLIC BLOOD PRESSURE: 136 MMHG | RESPIRATION RATE: 20 BRPM | DIASTOLIC BLOOD PRESSURE: 58 MMHG | TEMPERATURE: 98.5 F

## 2020-09-27 PROCEDURE — 25000128 H RX IP 250 OP 636: Performed by: EMERGENCY MEDICINE

## 2020-09-27 PROCEDURE — 25800030 ZZH RX IP 258 OP 636: Performed by: EMERGENCY MEDICINE

## 2020-09-27 RX ORDER — DEXAMETHASONE SODIUM PHOSPHATE 10 MG/ML
10 INJECTION, SOLUTION INTRAMUSCULAR; INTRAVENOUS ONCE
Status: COMPLETED | OUTPATIENT
Start: 2020-09-27 | End: 2020-09-27

## 2020-09-27 RX ORDER — SODIUM CHLORIDE 9 MG/ML
INJECTION, SOLUTION INTRAVENOUS CONTINUOUS
Status: DISCONTINUED | OUTPATIENT
Start: 2020-09-27 | End: 2020-09-27 | Stop reason: HOSPADM

## 2020-09-27 RX ORDER — MAGNESIUM SULFATE HEPTAHYDRATE 40 MG/ML
2 INJECTION, SOLUTION INTRAVENOUS ONCE
Status: COMPLETED | OUTPATIENT
Start: 2020-09-27 | End: 2020-09-27

## 2020-09-27 RX ORDER — DIPHENHYDRAMINE HYDROCHLORIDE 50 MG/ML
25 INJECTION INTRAMUSCULAR; INTRAVENOUS ONCE
Status: COMPLETED | OUTPATIENT
Start: 2020-09-27 | End: 2020-09-27

## 2020-09-27 RX ORDER — METOCLOPRAMIDE HYDROCHLORIDE 5 MG/ML
5 INJECTION INTRAMUSCULAR; INTRAVENOUS ONCE
Status: COMPLETED | OUTPATIENT
Start: 2020-09-27 | End: 2020-09-27

## 2020-09-27 RX ADMIN — METOCLOPRAMIDE HYDROCHLORIDE 5 MG: 5 INJECTION INTRAMUSCULAR; INTRAVENOUS at 00:40

## 2020-09-27 RX ADMIN — SODIUM CHLORIDE 1000 ML: 9 INJECTION, SOLUTION INTRAVENOUS at 00:36

## 2020-09-27 RX ADMIN — DIPHENHYDRAMINE HYDROCHLORIDE 25 MG: 50 INJECTION, SOLUTION INTRAMUSCULAR; INTRAVENOUS at 00:42

## 2020-09-27 RX ADMIN — DEXAMETHASONE SODIUM PHOSPHATE 10 MG: 10 INJECTION, SOLUTION INTRAMUSCULAR; INTRAVENOUS at 01:24

## 2020-09-27 RX ADMIN — MAGNESIUM SULFATE IN WATER 2 G: 40 INJECTION, SOLUTION INTRAVENOUS at 01:32

## 2020-09-27 NOTE — ED PROVIDER NOTES
History   Chief Complaint:  Headache    HPI   Kimberley Dennis is a 85 year old female with history of hyperlipidemia who presents from clinic for evaluation of a 5/10 in severity headache, located in her bilateral temporal area, that has been constant for the past week.  Patient reports that when it started she felt like the headache was 8 or 9 out of 10.  She is unclear how this started.  She does not recall if it was thunderclap or not.  She states it is improved over the course of the week to 5 or 6 out of 10.  She is taking Tylenol twice a day which has not helped the headache.  She sometimes will get a headache when she does not have a bowel movement but usually Tylenol takes it away.  She states she feels the pain by both of her temporal areas.  She denies any vision changes, weakness, numbness, tingling, chest pain, shortness of breath, fevers or other acute changes.  She has not had any recent fall or trauma.    Laboratory Results 9/26/2020  CBC: AWNL (WBC 7.9, HGB 13.0, )  BMP: GFR 58 (L) o/w WNL (Creatinine 1.00)   Erythrocyte sedimentation rate auto: 35 (H)    Allergies:  No Known Drug Allergies     Medications:   Norvasc  Aspirin 81 mg   Lipitor   Toprol  Relafen   Niaspan    Past Medical History:    Hyperlipidemia  Cardiovascular disease      Past Surgical History:    History reviewed. No pertinent past surgical history.     Family History:    The patient denies any relevant family medical history.     Social History:  The patient was accompanied to the ED by son in law.  Smoking Status: Never Smoker  Smokeless Tobacco: Never Used  Alcohol Use: No  Drug Use: No  PCP: Mikel Hussein     Review of Systems   All other systems reviewed and are negative.    Physical Exam     Patient Vitals for the past 24 hrs:   BP Temp Temp src Pulse Resp SpO2   09/27/20 0000 -- -- -- -- -- 100 %   09/26/20 2345 -- -- -- -- -- 100 %   09/26/20 2330 -- -- -- -- -- 99 %   09/26/20 2315 (!) 149/82 -- -- -- -- --    09/26/20 2145 (!) 158/90 -- -- 59 -- 98 %   09/26/20 2130 (!) 149/82 -- -- 61 -- 98 %   09/26/20 2115 -- -- -- -- -- 97 %   09/26/20 1931 (!) 153/96 98.5  F (36.9  C) Oral 61 20 99 %     Physical Exam  General: Resting on the bed.  Appears well.   Head: No obvious trauma to head.  Ears, Nose, Throat:  External ears normal.  Nose normal.  No pharyngeal erythema, swelling or exudate.  Midline uvula.  no tenderness to sinus percussion.  Non tender temporal artery area bilaterally.    Eyes:  Conjunctivae clear.  Pupils are equal, round, and reactive.    Neck: Normal range of motion.  Neck supple.  no nuchal rigidity   CV: Regular rate and rhythm.  No murmurs.      Respiratory: Effort normal and breath sounds normal.  No wheezing or crackles.   Gastrointestinal: Soft.  No distension. There is no tenderness.   Neuro: Alert. Moving all extremities appropriately.  Normal speech.  CN II-XII grossly intact, no pronator drift  Gross muscle strength intact of the proximal and distal bilateral upper and lower extremities.  Sensation intact to light touch in all 4 extremities.   Skin: Skin is warm and dry.  No rash noted.   .    Emergency Department Course   Imaging:  Radiology findings were communicated with the patient who voiced understanding of the findings.    CT Head w/o Contrast  IMPRESSION:  1.  No CT evidence for acute intracranial process.  2.  Brain atrophy and presumed chronic microvascular ischemic changes as above.  Reading per radiology.      CTA Head Neck with Contrast  IMPRESSION:      HEAD CTA:   1.  No stenosis, occlusion or aneurysm.     NECK CTA:  1.  No significant stenosis, occlusion or dissection.  2.  Scattered pulmonary nodules measuring up to 4 mm within the right upper lobe. Recommend nonemergent follow-up chest CT for complete assessment of the lungs and pulmonary nodules.  Reading per radiology.     Laboratory:  Laboratory findings were communicated with the patient and family who voiced  understanding of the findings.    CBC: AWNL (WBC 7.9, HGB 13.2, )  BMP: Anion gap 2 (L) o/w WNL (Creatinine 0.79)   CRP inflammation: <2.9    Erythrocyte sedimentation rate auto: 42 (H)    Interventions:  Medications   metoclopramide (REGLAN) injection 5 mg ( Intravenous Incomplete 9/26/20 2044)   diphenhydrAMINE (BENADRYL) injection 25 mg (25 mg Intravenous Incomplete 9/26/20 2044)   0.9% sodium chloride BOLUS (1,000 mLs Intravenous New Bag 9/27/20 0036)     Followed by   sodium chloride 0.9% infusion (has no administration in time range)   magnesium sulfate 2 g in water intermittent infusion (has no administration in time range)   dexamethasone PF (DECADRON) injection 10 mg (has no administration in time range)   acetaminophen (TYLENOL) tablet 975 mg (975 mg Oral Given 9/26/20 2327)   Sodium Chloride 0.9 % Bag 500mL for CT Scan Flush Use (80 mLs Intravenous Given 9/26/20 2233)   iopamidol (ISOVUE-370) solution 500 mL (70 mLs Intravenous Given 9/26/20 2232)   metoclopramide (REGLAN) injection 5 mg (5 mg Intravenous Given 9/27/20 0040)   diphenhydrAMINE (BENADRYL) injection 25 mg (25 mg Intravenous Given 9/27/20 0042)        Emergency Department Course:  Past medical records, nursing notes, and vitals reviewed.  The patient was sent for a CT Head w/o Contrast while in the emergency department, results above.    IV was inserted and blood was drawn for laboratory testing, results above.     (2010)   I performed an exam of the patient as documented above. History obtained from patient.     (0015)   I rechecked the patient and discussed results and plan of care.     Findings and plan explained to the Patient. Patient discharged home with instructions regarding supportive care, medications, and reasons to return. The importance of close follow-up was reviewed. I personally reviewed the laboratory and imaging results with the Patient and answered all related questions prior to discharge.     Impression & Plan    Medical Decision Makin-year-old female presents with headache.  She reports is bilateral temporal headache.  Vital signs are reassuring.  Broad differentials pursued include not limited to tension headache, migraine, subarachnoid, cranial hemorrhage, tumor, mass, dissection, temporal arteritis, meningitis or encephalitis, etc.  CBC shows no leukocytosis or anemia.  BMP shows no acute electrolyte, metabolic or renal dysfunction.  CRP is negative and ESR is only minimally elevated at 42.  I did consider a temporal arteritis but this seems to be very unlikely given patient endorses no significant vision changes, has bilateral temporalpressure although no reproducible temporal pain over the temporal artery, ESR is minimally elevated, CRP is negative.  Additionally we did check patient's vision and she does have a decline in her right versus her left eye but she notes no change from her baseline vision.  She has no one-sided tenderness over her temporal artery.  There is no significant history that would suggest temporal arteritis at this time.  Patient has no eye pain, symmetric reactive pupils, no indication of acute ocular process.  Given her age did obtain a head CT and CTA.  CT head shows no acute intra-hemorrhage, mass or infarct.  Clinically no evidence of acute stroke on examination.  No neck stiffness, no meningitis or encephalitis signs or symptoms, no meningismus, I do not suspect meningitis and do not think that LP is indicated.  With reassuring CTA I have low suspicion for aneurysm, stenosis or dissection.  Usually with reassuring CTA I do not think that LP would be indicated for possible subarachnoid.  CTA being negative this seems to be unlikely.  Additionally considered dural venous thrombosis but able to visualize cells on the CT scan, no evidence of acute thrombosis, no vision changes indicate dural venous thrombosis or additional studies at this time.  Attempted pain control with above  medications.  Patient clinically looks well.  I did discuss with her her pulmonary nodules and encouraged her to follow-up with her outpatient provider regarding this.  At this point plan to discharge with close follow-up with primary care doctor and neurology.  Pain control as indicated.  Patient signed out to my partner pending pain control.  Assuming her pain is controlled she will discharge home.      Diagnosis:    ICD-10-CM    1. Acute non intractable tension-type headache  G44.209      Disposition:  Discharged to home.    Discharge Medications:  New Prescriptions    No medications on file       Scribe Disclosure:  I, Jose Blandon, am serving as a scribe at 7:43 PM on 9/26/2020 to document services personally performed by Marilyn Atkins MD based on my observations and the provider's statements to me.  September 26, 2020   [facility] EMERGENCY DEPARTMENT        Marilyn Atkins MD  09/27/20 0124

## 2020-09-27 NOTE — DISCHARGE INSTRUCTIONS
Please follow up with your PCP in 1-2 days for a recheck.  Return if worsening headache, vision changes, fever >101, neck stiffness, intractable nausea or vomiting or other acute changes.      On your CT scan we did see pulmonary nodules which your primary care doctor should follow.  You likely will need a chest CT in the future.    Discharge Instructions  Headache    You were seen today for a headache. Headaches may be caused by many different things such as muscle tension, sinus inflammation, anxiety and stress, having too little sleep, too much alcohol, some medical conditions or injury. You may have a migraine, which is caused by changes in the blood vessels in your head.  At this time your provider does not find that your headache is a sign of anything dangerous or life-threatening.  However, sometimes the signs of serious illness do not show up right away.      Generally, every Emergency Department visit should have a follow-up clinic visit with either a primary or a specialty clinic/provider. Please follow-up as instructed by your emergency provider today.    Return to the Emergency Department if:  You get a new fever of 100.4 F or higher.  Your headache gets much worse.  You get a stiff neck with your headache.  You get a new headache that is significantly different or worse than headaches you have had before.  You are vomiting (throwing up) and cannot keep food or water down.  You have blurry or double vision or other problems with your eyes.  You have a new weakness on one side of your body.  You have difficulty with balance which is new.  You or your family thinks you are confused.  You have a seizure.    What can I do to help myself?  Pain medications - You may take a pain medication such as Tylenol  (acetaminophen), Advil , Motrin  (ibuprofen) or Aleve  (naproxen).  Take a pain reliever as soon as you notice symptoms.  Starting medications as soon as you start to have symptoms may lessen the amount of  pain you have.  Relaxing in a quiet, dark room may help.  Get enough sleep and eat meals regularly.  You may need to watch for certain foods or other things which may trigger your headaches.  Keeping a journal of your headaches and possible triggers may help you and your primary provider to identify things which you should avoid which may be causing your headaches.  If you were given a prescription for medicine here today, be sure to read all of the information (including the package insert) that comes with your prescription.  This will include important information about the medicine, its side effects, and any warnings that you need to know about.  The pharmacist who fills the prescription can provide more information and answer questions you may have about the medicine.  If you have questions or concerns that the pharmacist cannot address, please call or return to the Emergency Department.   Remember that you can always come back to the Emergency Department if you are not able to see your regular provider in the amount of time listed above, if you get any new symptoms, or if there is anything that worries you.    Discharge Instructions  Incidental Findings    An incidental finding is something unexpected that was found while you were being treated and is felt to not be related to the reason that you came to the Emergency Department.  While this finding is not an emergency, you need to follow up with your primary provider (or occasionally a specialist) to determine if anything should be done about it.    These findings can come from:  Checking your vital signs (example: high blood pressure).  Taking your history (example: unexplained weight loss).  The physical exam (example: a heart murmur).  Laboratory study (example: anemia or low blood count).  X-rays/ultrasound/CT or other imaging (example: an unexplained mass).    Generally, every Emergency Department visit should have a follow-up clinic visit with either a  primary or a specialty clinic/provider. Please follow-up as instructed by your emergency provider today.    Return to the Emergency Department if:  Your condition worsens.  You develop unexpected pain.  You now develop new symptoms or have new concerns.  If you were given a prescription for medicine here today, be sure to read all of the information (including the package insert) that comes with your prescription.  This will include important information about the medicine, its side effects, and any warnings that you need to know about.  The pharmacist who fills the prescription can provide more information and answer questions you may have about the medicine.  If you have questions or concerns that the pharmacist cannot address, please call or return to the Emergency Department.   Remember that you can always come back to the Emergency Department if you are not able to see your regular provider in the amount of time listed above, if you get any new symptoms, or if there is anything that worries you.

## 2020-09-27 NOTE — ED TRIAGE NOTES
Pt arrives with son in law with complaints of week long constant headache without a hx of headaches, she was brought to  and told to come to ED for CT scan and neurology consult per son in law. Pt denies NVD, dizziness, vision changes, fever. Primary language is Tamil, ABCs intact, A/O x4.

## 2020-09-27 NOTE — ED NOTES
Called pt's son-in-law requesting he come back to assist with translating. Pt speaks Tamil which is a language not in a language our translation services offer. Son-in-law agreed to come in around 2025 to help. MD/RN notified.

## 2020-09-28 ENCOUNTER — VIRTUAL VISIT (OUTPATIENT)
Dept: INTERNAL MEDICINE | Facility: CLINIC | Age: 85
End: 2020-09-28

## 2020-09-28 ENCOUNTER — TELEPHONE (OUTPATIENT)
Dept: INTERNAL MEDICINE | Facility: CLINIC | Age: 85
End: 2020-09-28

## 2020-09-28 DIAGNOSIS — I10 ESSENTIAL HYPERTENSION: Primary | ICD-10-CM

## 2020-09-28 DIAGNOSIS — R51.9 ACUTE NONINTRACTABLE HEADACHE, UNSPECIFIED HEADACHE TYPE: ICD-10-CM

## 2020-09-28 DIAGNOSIS — E87.1 LOW SODIUM LEVELS: ICD-10-CM

## 2020-09-28 PROCEDURE — 99214 OFFICE O/P EST MOD 30 MIN: CPT | Mod: 95 | Performed by: INTERNAL MEDICINE

## 2020-09-28 RX ORDER — HYDROCHLOROTHIAZIDE 12.5 MG/1
12.5 TABLET ORAL DAILY
Qty: 90 TABLET | Refills: 0 | Status: SHIPPED | OUTPATIENT
Start: 2020-09-28 | End: 2020-11-12 | Stop reason: SINTOL

## 2020-09-28 ASSESSMENT — ENCOUNTER SYMPTOMS
CHILLS: 0
FACIAL ASYMMETRY: 0
FEVER: 0
DIZZINESS: 0
HEADACHES: 1

## 2020-09-28 NOTE — PATIENT INSTRUCTIONS
Patient Education     Established High Blood Pressure    High blood pressure (hypertension) is a chronic disease. Often, healthcare providers don t know what causes it. But it can be caused by certain health conditions and medicines.  If you have high blood pressure, you may not have any symptoms. If you do have symptoms, they may include headache, dizziness, changes in your vision, chest pain, and shortness of breath. But even without symptoms, high blood pressure that s not treated raises your risk for heart attack, heart failure, and stroke. High blood pressure is a serious health risk and shouldn t be ignored.  Blood pressure measurements are given as 2 numbers. Systolic blood pressure is the upper number. This is the pressure when the heart contracts. Diastolic blood pressure is the lower number. This is the pressure when the heart relaxes between beats. You will see your blood pressure readings written together. For example, a person with a systolic pressure of 118 and a diastolic pressure of 78 will have 118/78 written in the medical record.  Blood pressure is categorized as normal, elevated, or stage 1 or stage 2 high blood pressure:    Normal blood pressure is systolic of less than 120 and diastolic of less than 80 (120/80)    Elevated blood pressure is systolic of 120 to 129 and diastolic less than 80    Stage 1 high blood pressure is systolic is 130 to 139 or diastolic between 80 to 89    Stage 2 high blood pressure is when systolic is 140 or higher or the diastolic is 90 or higher  Home care  If you have high blood pressure, follow these home care guidelines to help lower your blood pressure. If you are taking medicines for high blood pressure, these methods may reduce or end your need for medicines in the future.    Start a weight-loss program if you are overweight.    Cut back on how much salt you get in your diet. Here s how to do this:  ? Don t eat foods that have a lot of salt. These include olives,  pickles, smoked meats, and salted potato chips.  ? Don t add salt to your food at the table.  ? Use only small amounts of salt when cooking.    Start an exercise program. Talk with your healthcare provider about the type of exercise program that would be best for you. It doesn't have to be hard. Even brisk walking for 20 minutes 3 times a week is a good form of exercise.    Don t take medicines that stimulate the heart. This includes many over-the-counter cold and sinus decongestant pills and sprays, as well as diet pills. Check the warnings about high blood pressure on the label. Before buying any over-the-counter medicines or supplements, always ask the pharmacist about the product's potential interaction with your high blood pressure and your high blood pressure medicines.    Stimulants such as amphetamine or cocaine could be deadly for someone with high blood pressure. Never take these.    Limit how much caffeine you get in your diet. Switch to caffeine-free products.    Stop smoking. If you are a long-time smoker, this can be hard. Talk to your healthcare provider about medicines and nicotine replacement options to help you. Also, enroll in a stop-smoking program to make it more likely that you will quit for good.    Learn how to handle stress. This is an important part of any program to lower blood pressure. Learn about relaxation methods like meditation, yoga, or biofeedback.    If your provider prescribed medicines, take them exactly as directed. Missing doses may cause your blood pressure get out of control.    If you miss a dose or doses, check with your healthcare provider or pharmacist about what to do.    Consider buying an automatic blood pressure machine to check your blood pressure at home. Ask your provider for a recommendation. You can get one of these at most pharmacies.     The American Heart Association recommends the following guidelines for home blood pressure monitoring:    Don't smoke or  drink coffee for 30 minutes before taking your blood pressure.    Go to the bathroom before the test.    Relax for 5 minutes before taking the measurement.    Sit with your back supported (don't sit on a couch or soft chair); keep your feet on the floor uncrossed. Place your arm on a solid flat surface (like a table) with the upper part of the arm at heart level. Place the middle of the cuff directly above the bend of the elbow. Check the monitor's instruction manual for an illustration.    Take multiple readings. When you measure, take 2 to 3 readings one minute apart and record all of the results.    Take your blood pressure at the same time every day, or as your healthcare provider recommends.    Record the date, time, and blood pressure reading.    Take the record with you to your next medical appointment. If your blood pressure monitor has a built-in memory, simply take the monitor with you to your next appointment.    Call your provider if you have several high readings. Don't be frightened by a single high blood pressure reading, but if you get several high readings, check in with your healthcare provider.    Note: When blood pressure reaches a systolic (top number) of 180 or higher OR diastolic (bottom number) of 110 or higher, seek emergency medical treatment.  Follow-up care  You will need to see your healthcare provider regularly. This is to check your blood pressure and to make changes to your medicines. Make a follow-up appointment as directed. Bring the record of your home blood pressure readings to the appointment.  When to seek medical advice  Call your healthcare provider right away if any of these occur:    Blood pressure reaches a systolic (upper number) of 180 or higher OR a diastolic (bottom number) of 110 or higher    Chest pain or shortness of breath    Severe headache    Throbbing or rushing sound in the ears    Nosebleed    Sudden severe pain in your belly (abdomen)    Extreme drowsiness,  confusion, or fainting    Dizziness or spinning sensation (vertigo)    Weakness of an arm or leg or one side of the face    You have problems speaking or seeing   Date Last Reviewed: 12/1/2016 2000-2019 [employer]. [address] [place] [address], [place] [address]. All rights reserved. This information is not intended as a substitute for professional medical care. Always follow your healthcare professional's instructions.

## 2020-09-28 NOTE — PROGRESS NOTES
"Kimberley Dennis is a 85 year old female who is being evaluated via a billable video visit.      The patient has been notified of following:     \"This video visit will be conducted via a call between you and your physician/provider. We have found that certain health care needs can be provided without the need for an in-person physical exam.  This service lets us provide the care you need with a video conversation.  If a prescription is necessary we can send it directly to your pharmacy.  If lab work is needed we can place an order for that and you can then stop by our lab to have the test done at a later time.    Video visits are billed at different rates depending on your insurance coverage.  Please reach out to your insurance provider with any questions.    If during the course of the call the physician/provider feels a video visit is not appropriate, you will not be charged for this service.\"    Patient has given verbal consent for Video visit? Yes  How would you like to obtain your AVS? Mail a copy  If you are dropped from the video visit, the video invite should be resent to: Text to cell phone: [phone]  Will anyone else be joining your video visit? No    Subjective     Kimberley Dennis is a 85 year old female who presents today via video visit for the following health issues:    HPI    Video Start Time: 3:01 PM    She went to ER for headache. Known hypertensive.  BP was mildly elevated for 1 week. Took 2 tylenol/day and no relief.  ER she has stroke workup. CT head and CTA head and neck were negative for acute process, no stenosis, occlusion or aneurysm.  She received IV meds and headache improved.  Now she started having headache. Her daughter mentioned she has taken amlodipine in [place] and had side effects in the past.    Review of Systems   Constitutional: Negative for chills and fever.   Eyes: Negative for visual disturbance.   Neurological: Positive for headaches. Negative for dizziness, syncope " "and facial asymmetry.         Objective     Vitals:  No vitals were obtained today due to virtual visit.    Physical Exam   \"GENERAL: Healthy, alert and no distress\",\"EYES: Eyes grossly normal to inspection.  No discharge or erythema, or obvious scleral/conjunctival abnormalities.\",\"RESP: No audible wheeze, cough, or visible cyanosis.  No visible retractions or increased work of breathing.  \",\"SKIN: Visible skin clear. No significant rash, abnormal pigmentation or lesions.\",\"NEURO: Cranial nerves grossly intact.  Mentation and speech appropriate for age.\",\"PSYCH: Mentation appears normal, affect normal/bright, judgement and insight intact, normal speech and appearance well-groomed.\"    Assessment & Plan     Diagnoses and all orders for this visit:    Essential hypertension  -     hydrochlorothiazide (HYDRODIURIL) 12.5 MG tablet; Take 1 tablet (12.5 mg) by mouth daily  -     **Basic metabolic panel FUTURE anytime; Future    Acute nonintractable headache, unspecified headache type  -     **Basic metabolic panel FUTURE anytime; Future    Stop amlodipine  Start hydrochlorothiazide for BP along with metoprolol 75mg  BMP in 2 weeks.  Call with BP log in 1 week.  Take Excedrin for headache prn as she is allergic to NSAID'S.    Return if symptoms worsen or fail to improve.    Mikel Hussein MD  [facility]      Video-Visit Details    Type of service:  Video Visit    Video End Time: 3:23 PM    Originating Location (pt. Location): Home    Distant Location (provider location): HOME    Platform used for Video Visit: Benedict[employer]        "

## 2020-09-28 NOTE — TELEPHONE ENCOUNTER
Patient's daughter is calling because she doesn't understand why her mother had to start a new medication today. She wants to know why the metoprolol just couldn't have been increased.

## 2020-09-29 NOTE — TELEPHONE ENCOUNTER
Metoprolol can lower heart heart rate, already with previous BP/HR numbers her HR is  around 60's. If it lower than 50, she can have dizziness and could pass out. That's why metoprolol dose was not increased.    Mikel Hussein MD

## 2020-10-09 ENCOUNTER — TELEPHONE (OUTPATIENT)
Dept: INTERNAL MEDICINE | Facility: CLINIC | Age: 85
End: 2020-10-09

## 2020-10-09 DIAGNOSIS — R51.9 ACUTE NONINTRACTABLE HEADACHE, UNSPECIFIED HEADACHE TYPE: Primary | ICD-10-CM

## 2020-10-09 RX ORDER — CHLORZOXAZONE 500 MG/1
500 TABLET ORAL 4 TIMES DAILY PRN
Status: CANCELLED | OUTPATIENT
Start: 2020-10-09

## 2020-10-09 RX ORDER — CHLORZOXAZONE 500 MG/1
500 TABLET ORAL 2 TIMES DAILY PRN
Qty: 30 TABLET | Refills: 1 | Status: SHIPPED | OUTPATIENT
Start: 2020-10-09 | End: 2021-04-30

## 2020-10-09 NOTE — TELEPHONE ENCOUNTER
Call to pts daughter. She states her brother is a Doctor in [place]. Gave pt this mediation.   Chlorzoxazone Parafon 500 mg for muscle spasms. She states this helps and Mariam has this by prescription.     She is asking if Dr Hussein can prescribe.   No call back needed if OK

## 2020-10-09 NOTE — TELEPHONE ENCOUNTER
I would refer the patient to neurology for headache.  Referral placed.  Hip pain can happen with her age due to arthritic changes and weather changes.  Advise gentle stretching of the joints and she could take Tylenol or over-the-counter arthritis medicine for pain.    Blood pressure numbers looks good except for 1 reading.    Mikel Hussein MD

## 2020-10-09 NOTE — TELEPHONE ENCOUNTER
Patient's daughter is calling to report the blood pressure since the medication change.  10/1/20  141/73 pulse 61  10/2/20   161/86 p 66  10/3/20  132/71 p 71  10/4/20  126/69 p 82  10/5/20  155/77 p 66  10/6/20  140/74 p 68  10/7/20  127/73 p 65    daughter says her mom still complains of a headache and now also hip pain. She is wondering if her mom can get a rx for pain.

## 2020-10-15 DIAGNOSIS — R51.9 ACUTE NONINTRACTABLE HEADACHE, UNSPECIFIED HEADACHE TYPE: ICD-10-CM

## 2020-10-15 DIAGNOSIS — I10 ESSENTIAL HYPERTENSION: ICD-10-CM

## 2020-10-15 PROCEDURE — 80048 BASIC METABOLIC PNL TOTAL CA: CPT | Performed by: INTERNAL MEDICINE

## 2020-10-15 PROCEDURE — 36415 COLL VENOUS BLD VENIPUNCTURE: CPT | Performed by: INTERNAL MEDICINE

## 2020-10-16 LAB
ANION GAP SERPL CALCULATED.3IONS-SCNC: 7 MMOL/L (ref 3–14)
BUN SERPL-MCNC: 13 MG/DL (ref 7–30)
CALCIUM SERPL-MCNC: 9.2 MG/DL (ref 8.5–10.1)
CHLORIDE SERPL-SCNC: 96 MMOL/L (ref 94–109)
CO2 SERPL-SCNC: 26 MMOL/L (ref 20–32)
CREAT SERPL-MCNC: 0.76 MG/DL (ref 0.52–1.04)
GFR SERPL CREATININE-BSD FRML MDRD: 72 ML/MIN/{1.73_M2}
GLUCOSE SERPL-MCNC: 81 MG/DL (ref 70–99)
POTASSIUM SERPL-SCNC: 4.2 MMOL/L (ref 3.4–5.3)
SODIUM SERPL-SCNC: 129 MMOL/L (ref 133–144)

## 2020-10-19 ENCOUNTER — TELEPHONE (OUTPATIENT)
Dept: INTERNAL MEDICINE | Facility: CLINIC | Age: 85
End: 2020-10-19

## 2020-10-20 DIAGNOSIS — I10 ESSENTIAL HYPERTENSION: ICD-10-CM

## 2020-10-20 DIAGNOSIS — E78.5 HYPERLIPIDEMIA LDL GOAL <130: ICD-10-CM

## 2020-10-20 NOTE — TELEPHONE ENCOUNTER
Results are now under lab results tab.   Call to Dtr and advised. She agrees with this.       Mikel Hussein MD   10/19/2020  3:34 PM      Potassium looks good, sodium is in lower range, sometimes it can lower due to the hydrochlorothiazide. Will recheck in 2-3 weeks again to make sure its not lowering further.

## 2020-10-21 RX ORDER — METOPROLOL SUCCINATE 50 MG/1
50 TABLET, EXTENDED RELEASE ORAL DAILY
Qty: 90 TABLET | Refills: 1 | Status: SHIPPED | OUTPATIENT
Start: 2020-10-21 | End: 2021-04-20

## 2020-10-21 RX ORDER — METOPROLOL SUCCINATE 25 MG/1
25 TABLET, EXTENDED RELEASE ORAL DAILY
Qty: 90 TABLET | Refills: 1 | Status: SHIPPED | OUTPATIENT
Start: 2020-10-21 | End: 2021-04-20

## 2020-10-21 RX ORDER — ATORVASTATIN CALCIUM 10 MG/1
10 TABLET, FILM COATED ORAL DAILY
Qty: 90 TABLET | Refills: 1 | Status: SHIPPED | OUTPATIENT
Start: 2020-10-21 | End: 2021-04-20

## 2020-10-21 NOTE — TELEPHONE ENCOUNTER
"Pt out of medication.   Last VV on 9/28/20     Refill request for Metoprolol 50 mg and 25 mg and Atorvastatin     Requested Prescriptions   Pending Prescriptions Disp Refills     metoprolol succinate ER (TOPROL-XL) 25 MG 24 hr tablet 90 tablet 0     Sig: Take 1 tablet (25 mg) by mouth daily       Beta-Blockers Protocol Passed - 10/21/2020  9:24 AM        Passed - Blood pressure under 140/90 in past 12 months     BP Readings from Last 3 Encounters:   09/27/20 136/58   09/26/20 130/60   07/17/20 (!) 152/82                 Passed - Patient is age 6 or older        Passed - Recent (12 mo) or future (30 days) visit within the authorizing provider's specialty     Patient has had an office visit with the authorizing provider or a provider within the authorizing providers department within the previous 12 mos or has a future within next 30 days. See \"Patient Info\" tab in inbasket, or \"Choose Columns\" in Meds & Orders section of the refill encounter.              Passed - Medication is active on med list           metoprolol succinate ER (TOPROL-XL) 50 MG 24 hr tablet 90 tablet 0     Sig: Take 1 tablet (50 mg) by mouth daily       Beta-Blockers Protocol Passed - 10/21/2020  9:24 AM        Passed - Blood pressure under 140/90 in past 12 months     BP Readings from Last 3 Encounters:   09/27/20 136/58   09/26/20 130/60   07/17/20 (!) 152/82                 Passed - Patient is age 6 or older        Passed - Recent (12 mo) or future (30 days) visit within the authorizing provider's specialty     Patient has had an office visit with the authorizing provider or a provider within the authorizing providers department within the previous 12 mos or has a future within next 30 days. See \"Patient Info\" tab in inbasket, or \"Choose Columns\" in Meds & Orders section of the refill encounter.              Passed - Medication is active on med list           atorvastatin (LIPITOR) 10 MG tablet 90 tablet 0     Sig: Take 1 tablet (10 mg) by " "mouth daily       Statins Protocol Failed - 10/21/2020  9:24 AM        Failed - LDL on file in past 12 months     No lab results found.          Passed - No abnormal creatine kinase in past 12 months     No lab results found.             Passed - Recent (12 mo) or future (30 days) visit within the authorizing provider's specialty     Patient has had an office visit with the authorizing provider or a provider within the authorizing providers department within the previous 12 mos or has a future within next 30 days. See \"Patient Info\" tab in inbasket, or \"Choose Columns\" in Meds & Orders section of the refill encounter.              Passed - Medication is active on med list        Passed - Patient is age 18 or older        Passed - No active pregnancy on record        Passed - No positive pregnancy test in past 12 months             "

## 2020-11-04 ENCOUNTER — TELEPHONE (OUTPATIENT)
Dept: INTERNAL MEDICINE | Facility: CLINIC | Age: 85
End: 2020-11-04

## 2020-11-04 NOTE — TELEPHONE ENCOUNTER
Xanax is usually given for anxiety.  It should not be used for sleep especially at her age as it can put her at risk for fall, confusion.  Has she tried melatonin or Tylenol PM or Aleve PM at night to see if that helps with the sleep.    Mikel Hussein MD

## 2020-11-04 NOTE — TELEPHONE ENCOUNTER
Daughter is calling to request an rx for xanax to help with sleep. Daughter says her mom usually gets this when she is living in [place] but because of covid her mom has not been able to return to [place] at this time.

## 2020-11-11 DIAGNOSIS — I10 ESSENTIAL HYPERTENSION: ICD-10-CM

## 2020-11-11 DIAGNOSIS — E87.1 LOW SODIUM LEVELS: ICD-10-CM

## 2020-11-11 PROCEDURE — 80048 BASIC METABOLIC PNL TOTAL CA: CPT | Performed by: INTERNAL MEDICINE

## 2020-11-12 ENCOUNTER — VIRTUAL VISIT (OUTPATIENT)
Dept: INTERNAL MEDICINE | Facility: CLINIC | Age: 85
End: 2020-11-12
Payer: COMMERCIAL

## 2020-11-12 ENCOUNTER — TELEPHONE (OUTPATIENT)
Dept: INTERNAL MEDICINE | Facility: CLINIC | Age: 85
End: 2020-11-12

## 2020-11-12 DIAGNOSIS — I10 ESSENTIAL HYPERTENSION: Primary | ICD-10-CM

## 2020-11-12 DIAGNOSIS — G47.00 INSOMNIA, UNSPECIFIED TYPE: Primary | ICD-10-CM

## 2020-11-12 DIAGNOSIS — R73.9 HIGH BLOOD SUGAR: ICD-10-CM

## 2020-11-12 LAB
ANION GAP SERPL CALCULATED.3IONS-SCNC: 4 MMOL/L (ref 3–14)
BUN SERPL-MCNC: 17 MG/DL (ref 7–30)
CALCIUM SERPL-MCNC: 9.1 MG/DL (ref 8.5–10.1)
CHLORIDE SERPL-SCNC: 93 MMOL/L (ref 94–109)
CO2 SERPL-SCNC: 31 MMOL/L (ref 20–32)
CREAT SERPL-MCNC: 0.74 MG/DL (ref 0.52–1.04)
GFR SERPL CREATININE-BSD FRML MDRD: 74 ML/MIN/{1.73_M2}
GLUCOSE SERPL-MCNC: 130 MG/DL (ref 70–99)
POTASSIUM SERPL-SCNC: 4.3 MMOL/L (ref 3.4–5.3)
SODIUM SERPL-SCNC: 128 MMOL/L (ref 133–144)

## 2020-11-12 PROCEDURE — 99213 OFFICE O/P EST LOW 20 MIN: CPT | Mod: 95 | Performed by: INTERNAL MEDICINE

## 2020-11-12 RX ORDER — ALPRAZOLAM 0.5 MG
0.5 TABLET ORAL DAILY PRN
Qty: 90 TABLET | Refills: 0 | Status: SHIPPED | OUTPATIENT
Start: 2020-11-12 | End: 2021-03-17

## 2020-11-12 RX ORDER — LISINOPRIL 5 MG/1
5 TABLET ORAL DAILY
Qty: 90 TABLET | Refills: 0 | Status: SHIPPED | OUTPATIENT
Start: 2020-11-12 | End: 2021-02-08

## 2020-11-12 ASSESSMENT — ENCOUNTER SYMPTOMS
NAUSEA: 1
SLEEP DISTURBANCE: 1

## 2020-11-12 NOTE — TELEPHONE ENCOUNTER
Patient's daughter is calling because they forgot to tell Dr Hussein at their visit today about the blood pressure readings. One was 162/76 and another one 170/85.

## 2020-11-12 NOTE — PROGRESS NOTES
"Kimberley Dennis is a 85 year old female who is being evaluated via a billable video visit.      The patient has been notified of following:     \"This video visit will be conducted via a call between you and your physician/provider. We have found that certain health care needs can be provided without the need for an in-person physical exam.  This service lets us provide the care you need with a video conversation.  If a prescription is necessary we can send it directly to your pharmacy.  If lab work is needed we can place an order for that and you can then stop by our lab to have the test done at a later time.    Video visits are billed at different rates depending on your insurance coverage.  Please reach out to your insurance provider with any questions.    If during the course of the call the physician/provider feels a video visit is not appropriate, you will not be charged for this service.\"    Patient has given verbal consent for Video visit? Yes  How would you like to obtain your AVS? MyChart  If you are dropped from the video visit, the video invite should be resent to: Text to cell phone: [phone]  Will anyone else be joining your video visit? Yes: daughterMarya. How would they like to receive their invitation? Text to cell phone: [phone]    Subjective     Kimberley Dennis is a 85 year old female who presents today via video visit for the following health issues: fatigue and insomnia.    HPI      Video Start Time: 2:14 PM    She had trouble with sleep. She was on xanax 0.5mg.  Off xanax for 3 days as she ran out of prescription. Usually xanax helps with sleep. She tries to take 1/2 or 1/4 to save the script.  She took melatonin and that didn't help. She looks restless and uncomfortable      Review of Systems   Gastrointestinal: Positive for nausea.   Psychiatric/Behavioral: Positive for sleep disturbance.         Objective     Vitals:  No vitals were obtained today due to virtual " "visit.    Physical Exam   \"GENERAL: Healthy, alert and no distress\",\"EYES: Eyes grossly normal to inspection.  No discharge or erythema, or obvious scleral/conjunctival abnormalities.\",\"RESP: No audible wheeze, cough, or visible cyanosis.  No visible retractions or increased work of breathing.  \",\"SKIN: Visible skin clear. No significant rash, abnormal pigmentation or lesions.\",\"NEURO: Cranial nerves grossly intact.  Mentation and speech appropriate for age.\",\"PSYCH: Mentation appears normal, affect normal/bright, judgement and insight intact, normal speech and appearance well-groomed.\"    Assessment & Plan     Kimberley was seen today for fatigue and insomnia.    Diagnoses and all orders for this visit:    Insomnia, unspecified type  -     ALPRAZolam (XANAX) 0.5 MG tablet; Take 1 tablet (0.5 mg) by mouth daily as needed for anxiety or sleep    Discussed with daughter and patient about not using xanax regularly for sleep. Looks like she has been using it for more than 1 year and extremely anxious about not sleeping.  Xanax refilled. Advised to try half tab and take 1 if needed.    Mikel Hussein MD  [facility]      Video-Visit Details    Type of service:  Video Visit    Video End Time:2:30 PM    Originating Location (pt. Location): Home    Distant Location (provider location):  HOME    Platform used for Video Visit: Benedicto          "

## 2020-11-12 NOTE — TELEPHONE ENCOUNTER
Lets continue to monitor after staring sleeping pill. If its high even with xanax, please call and let me know.    Mikel Hussein MD

## 2020-11-13 NOTE — TELEPHONE ENCOUNTER
Call to Rupa and advised. She will see if her anxiety and BP is better once she calms down with the alprazolam.

## 2021-02-01 ENCOUNTER — TELEPHONE (OUTPATIENT)
Dept: INTERNAL MEDICINE | Facility: CLINIC | Age: 86
End: 2021-02-01

## 2021-02-01 NOTE — TELEPHONE ENCOUNTER
Pt called back to report different call back number. Please call [phone]. OK to leave a detailed message.

## 2021-02-01 NOTE — TELEPHONE ENCOUNTER
Patient wants to know if its safe for her to get the COVID vaccine when they have more to give. Please advise. Ok to call and anand [phone]

## 2021-02-01 NOTE — TELEPHONE ENCOUNTER
Left detailed message advising unless a patient has had a severe reaction to a vaccine in the past recommendation is for patients to receive the vaccine.

## 2021-02-08 DIAGNOSIS — I10 ESSENTIAL HYPERTENSION: ICD-10-CM

## 2021-02-08 RX ORDER — LISINOPRIL 5 MG/1
5 TABLET ORAL DAILY
Qty: 90 TABLET | Refills: 2 | Status: SHIPPED | OUTPATIENT
Start: 2021-02-08 | End: 2021-07-27

## 2021-02-08 NOTE — TELEPHONE ENCOUNTER
"Requested Prescriptions   Pending Prescriptions Disp Refills     lisinopril (ZESTRIL) 5 MG tablet 90 tablet 0     Sig: Take 1 tablet (5 mg) by mouth daily       ACE Inhibitors (Including Combos) Protocol Passed - 2/8/2021  2:13 PM        Passed - Blood pressure under 140/90 in past 12 months     BP Readings from Last 3 Encounters:   09/27/20 136/58   09/26/20 130/60   07/17/20 (!) 152/82                 Passed - Recent (12 mo) or future (30 days) visit within the authorizing provider's specialty     Patient has had an office visit with the authorizing provider or a provider within the authorizing providers department within the previous 12 mos or has a future within next 30 days. See \"Patient Info\" tab in inbasket, or \"Choose Columns\" in Meds & Orders section of the refill encounter.              Passed - Medication is active on med list        Passed - Patient is age 18 or older        Passed - No active pregnancy on record        Passed - Normal serum creatinine on file in past 12 months     Recent Labs   Lab Test 11/11/20  1624   CR 0.74       Ok to refill medication if creatinine is low          Passed - Normal serum potassium on file in past 12 months     Recent Labs   Lab Test 11/11/20  1624   POTASSIUM 4.3             Passed - No positive pregnancy test within past 12 months             "

## 2021-02-19 ENCOUNTER — IMMUNIZATION (OUTPATIENT)
Dept: NURSING | Facility: CLINIC | Age: 86
End: 2021-02-19
Payer: COMMERCIAL

## 2021-02-19 PROCEDURE — 0001A PR COVID VAC PFIZER DIL RECON 30 MCG/0.3 ML IM: CPT

## 2021-02-19 PROCEDURE — 91300 PR COVID VAC PFIZER DIL RECON 30 MCG/0.3 ML IM: CPT

## 2021-03-12 ENCOUNTER — IMMUNIZATION (OUTPATIENT)
Dept: NURSING | Facility: CLINIC | Age: 86
End: 2021-03-12
Attending: INTERNAL MEDICINE
Payer: COMMERCIAL

## 2021-03-12 PROCEDURE — 91300 PR COVID VAC PFIZER DIL RECON 30 MCG/0.3 ML IM: CPT

## 2021-03-12 PROCEDURE — 0002A PR COVID VAC PFIZER DIL RECON 30 MCG/0.3 ML IM: CPT

## 2021-03-17 DIAGNOSIS — G47.00 INSOMNIA, UNSPECIFIED TYPE: ICD-10-CM

## 2021-03-17 RX ORDER — ALPRAZOLAM 0.5 MG
0.5 TABLET ORAL
Qty: 90 TABLET | Refills: 0 | Status: SHIPPED | OUTPATIENT
Start: 2021-03-17 | End: 2021-07-22

## 2021-03-17 NOTE — TELEPHONE ENCOUNTER
Controlled Substance Refill Request for xanax  Problem List Complete:  No     PROVIDER TO CONSIDER COMPLETION OF PROBLEM LIST AND OVERVIEW/CONTROLLED SUBSTANCE AGREEMENT    Last Written Prescription Date:  11/12/20  Last Fill Quantity: 90,   # refills: 0    THE MOST RECENT OFFICE VISIT MUST BE WITHIN THE PAST 3 MONTHS. AT LEAST ONE FACE TO FACE VISIT MUST OCCUR EVERY 6 MONTHS. ADDITIONAL VISITS CAN BE VIRTUAL.  (THIS STATEMENT SHOULD BE DELETED.)    Last Office Visit with [facility] primary care provider: 11/12/20 virtual Asya    Future Office visit:     Controlled substance agreement:   Encounter-Level CSA:    There are no encounter-level csa.     Patient-Level CSA:    There are no patient-level csa.         Last Urine Drug Screen: No results found for: CDAUT, No results found for: COMDAT, No results found for: THC13, PCP13, COC13, MAMP13, OPI13, AMP13, BZO13, TCA13, MTD13, BAR13, OXY13, PPX13, BUP13     Processing:  Rx to be electronically transmitted to pharmacy by provider      [email or web address]       checked in past 3 months?  No, route to RN

## 2021-03-17 NOTE — TELEPHONE ENCOUNTER
Pending Prescriptions:                       Disp   Refills    ALPRAZolam (XANAX) 0.5 MG tablet           90 tab*0        Sig: Take 1 tablet (0.5 mg) by mouth daily as needed for           anxiety or sleep    Not reviewed - provider has not provided access to this RN

## 2021-03-18 RX ORDER — ALPRAZOLAM 0.5 MG
TABLET ORAL
Qty: 90 TABLET | Refills: 0 | OUTPATIENT
Start: 2021-03-18

## 2021-04-19 DIAGNOSIS — E78.5 HYPERLIPIDEMIA LDL GOAL <130: ICD-10-CM

## 2021-04-19 DIAGNOSIS — I10 ESSENTIAL HYPERTENSION: ICD-10-CM

## 2021-04-19 RX ORDER — METOPROLOL SUCCINATE 50 MG/1
50 TABLET, EXTENDED RELEASE ORAL DAILY
Qty: 90 TABLET | Refills: 1 | Status: CANCELLED | OUTPATIENT
Start: 2021-04-19

## 2021-04-19 RX ORDER — METOPROLOL SUCCINATE 25 MG/1
25 TABLET, EXTENDED RELEASE ORAL DAILY
Qty: 90 TABLET | Refills: 1 | Status: CANCELLED | OUTPATIENT
Start: 2021-04-19

## 2021-04-19 RX ORDER — ATORVASTATIN CALCIUM 10 MG/1
10 TABLET, FILM COATED ORAL DAILY
Qty: 90 TABLET | Refills: 1 | Status: CANCELLED | OUTPATIENT
Start: 2021-04-19

## 2021-04-30 ENCOUNTER — ANCILLARY PROCEDURE (OUTPATIENT)
Dept: GENERAL RADIOLOGY | Facility: CLINIC | Age: 86
End: 2021-04-30
Attending: INTERNAL MEDICINE
Payer: COMMERCIAL

## 2021-04-30 ENCOUNTER — OFFICE VISIT (OUTPATIENT)
Dept: INTERNAL MEDICINE | Facility: CLINIC | Age: 86
End: 2021-04-30
Payer: COMMERCIAL

## 2021-04-30 VITALS
TEMPERATURE: 98.2 F | BODY MASS INDEX: 30.02 KG/M2 | HEIGHT: 61 IN | SYSTOLIC BLOOD PRESSURE: 142 MMHG | RESPIRATION RATE: 14 BRPM | OXYGEN SATURATION: 100 % | HEART RATE: 88 BPM | WEIGHT: 159 LBS | DIASTOLIC BLOOD PRESSURE: 72 MMHG

## 2021-04-30 DIAGNOSIS — G89.29 CHRONIC MIDLINE LOW BACK PAIN WITHOUT SCIATICA: Primary | ICD-10-CM

## 2021-04-30 DIAGNOSIS — G89.29 CHRONIC MIDLINE LOW BACK PAIN WITHOUT SCIATICA: ICD-10-CM

## 2021-04-30 DIAGNOSIS — M54.50 CHRONIC MIDLINE LOW BACK PAIN WITHOUT SCIATICA: ICD-10-CM

## 2021-04-30 DIAGNOSIS — M54.50 CHRONIC MIDLINE LOW BACK PAIN WITHOUT SCIATICA: Primary | ICD-10-CM

## 2021-04-30 DIAGNOSIS — S32.010S COMPRESSION FRACTURE OF L1 VERTEBRA, SEQUELA: ICD-10-CM

## 2021-04-30 DIAGNOSIS — I10 ESSENTIAL HYPERTENSION: ICD-10-CM

## 2021-04-30 LAB
ALBUMIN SERPL-MCNC: 3.3 G/DL (ref 3.4–5)
ALP SERPL-CCNC: 70 U/L (ref 40–150)
ALT SERPL W P-5'-P-CCNC: 27 U/L (ref 0–50)
ANION GAP SERPL CALCULATED.3IONS-SCNC: 3 MMOL/L (ref 3–14)
AST SERPL W P-5'-P-CCNC: 27 U/L (ref 0–45)
BILIRUB SERPL-MCNC: 1.1 MG/DL (ref 0.2–1.3)
BUN SERPL-MCNC: 18 MG/DL (ref 7–30)
CALCIUM SERPL-MCNC: 9.3 MG/DL (ref 8.5–10.1)
CHLORIDE SERPL-SCNC: 102 MMOL/L (ref 94–109)
CO2 SERPL-SCNC: 30 MMOL/L (ref 20–32)
CREAT SERPL-MCNC: 0.77 MG/DL (ref 0.52–1.04)
GFR SERPL CREATININE-BSD FRML MDRD: 70 ML/MIN/{1.73_M2}
GLUCOSE SERPL-MCNC: 89 MG/DL (ref 70–99)
POTASSIUM SERPL-SCNC: 4.4 MMOL/L (ref 3.4–5.3)
PROT SERPL-MCNC: 7.8 G/DL (ref 6.8–8.8)
SODIUM SERPL-SCNC: 135 MMOL/L (ref 133–144)

## 2021-04-30 PROCEDURE — 36415 COLL VENOUS BLD VENIPUNCTURE: CPT | Performed by: INTERNAL MEDICINE

## 2021-04-30 PROCEDURE — 72100 X-RAY EXAM L-S SPINE 2/3 VWS: CPT | Performed by: RADIOLOGY

## 2021-04-30 PROCEDURE — 99213 OFFICE O/P EST LOW 20 MIN: CPT | Performed by: INTERNAL MEDICINE

## 2021-04-30 PROCEDURE — 80053 COMPREHEN METABOLIC PANEL: CPT | Performed by: INTERNAL MEDICINE

## 2021-04-30 RX ORDER — CYCLOBENZAPRINE HCL 5 MG
5 TABLET ORAL 2 TIMES DAILY PRN
Qty: 60 TABLET | Refills: 1 | Status: ON HOLD | OUTPATIENT
Start: 2021-04-30 | End: 2021-07-25

## 2021-04-30 ASSESSMENT — ENCOUNTER SYMPTOMS
NUMBNESS: 0
BACK PAIN: 1
PARESTHESIAS: 0

## 2021-04-30 ASSESSMENT — MIFFLIN-ST. JEOR: SCORE: 1095.66

## 2021-04-30 NOTE — PROGRESS NOTES
"    Assessment & Plan   Problem List Items Addressed This Visit     None      Visit Diagnoses     Chronic midline low back pain without sciatica    -  Primary    Relevant Medications    cyclobenzaprine (FLEXERIL) 5 MG tablet    diclofenac (VOLTAREN) 50 MG EC tablet    Other Relevant Orders    [facility] REFERR[facility]    XR Lumbar Spine 2/3 Views    Essential hypertension             Will do x-ray of the lower back and give diclofenac and Flexeril.  Side effects of medicine discussed.  Advised to call if she develops side effects diclofenac.  Also physical therapy referral placed for low back pain.  We will do labs to check sodium and kidney function again for hypertension.    No follow-ups on file.    Mikel Hussein MD  [facility] TIGRE Dennis is a 86 year old who presents for the following health issues  accompanied by her son-in-law: back pain    HPI   C/o chronic back pain for long time. Gradually worsening for 1 month. She has pain in mid back and has difficulty bending and getting up from bed. Denies radiation of pain, numbness, tingling.  Patient has side effects to ibuprofen including swelling of the lips.  Does not have side effects to other anti-inflammatory.    Known hypertensive on metoprolol and lisinopril.  Used to be on hydrochlorothiazide which was discontinued due to hyponatremia.  Blood pressure looks good today.  Due for labs.    Review of Systems   Musculoskeletal: Positive for back pain.   Neurological: Negative for numbness and paresthesias.          Objective    BP (!) 142/72   Pulse 88   Temp 98.2  F (36.8  C) (Oral)   Resp 14   Ht 1.537 m (5' 0.5\")   Wt 72.1 kg (159 lb)   SpO2 100%   Breastfeeding No   BMI 30.54 kg/m    Body mass index is 30.54 kg/m .  Physical Exam  Vitals signs reviewed.   Constitutional:       Appearance: She is obese.   Cardiovascular:      Rate and Rhythm: Normal rate.      Heart sounds: No murmur.   Pulmonary:      Effort: " Pulmonary effort is normal.      Breath sounds: No wheezing.   Musculoskeletal:      Comments: Low back: No tenderness on palpating spinal or paraspinal region.  Discomfort noted around L3-L4 region in the spine and left lower back region.  Patient could not sit in the exam table so SLR was not done.

## 2021-04-30 NOTE — PATIENT INSTRUCTIONS
Patient Education     Relieving Back Pain  Back pain is a common problem. You can strain back muscles by lifting too much weight or just by moving the wrong way. Back strain can be uncomfortable, even painful. And it can take weeks or months to improve. To help yourself feel better and prevent future back strains, try these tips.  Important: Don't give aspirin to children or teens without first discussing it with your child's healthcare provider.  Ice    Ice reduces muscle pain and swelling. It helps most during the first 24 to 48 hours after an injury.    Wrap an ice pack or a bag of frozen peas in a thin towel. Never put ice directly on your skin.    Place the ice where your back hurts the most.    Don t ice for more than 20 minutes at a time.    You can use ice several times a day.  Medicines  Over-the-counter pain relievers include acetaminophen and anti-inflammatory medicines, which includes aspirin, naproxen, or ibuprofen. They can help ease discomfort. Some also reduce swelling.    Tell your healthcare provider about any medicines you are already taking.    Take medicines only as directed.  Manipulation and massage  Having manipulation by an osteopathic doctor or chiropractor may be helpful. Getting a massage also may help.   Heat  After the first 48 hours, heat can relax sore muscles and improve blood flow.    Try a warm bath or shower. Or use a heating pad set on low. To prevent a burn, keep a cloth between you and the heating pad.    Don t use a heating pad for more than 15 minutes at a time. Never sleep on a heating pad.  [employer] last reviewed this educational content on 6/1/2018 2000-2021 [employer]. All rights reserved. This information is not intended as a substitute for professional medical care. Always follow your healthcare professional's instructions.

## 2021-04-30 NOTE — LETTER
May 3, 2021      Jeannie Chu  [address]  [place] [address]        Dear Jeannie,    Sodium improved. Stable kidney function       Sincerely,      Mikel Hussein MD        Results for orders placed or performed in visit on 04/30/21   XR Lumbar Spine 2/3 Views     Status: None    Narrative    LUMBAR SPINE TWO - THREE VIEWS  4/30/2021 3:53 PM     HISTORY: Chronic midline low back pain without sciatica.    COMPARISON: None.       Impression    IMPRESSION:   1. Age-indeterminate anterior compression deformity of the L1  vertebral body. Acute fracture is not excluded. This may be better  assessed with CT or MRI.  2. Indeterminate lesion in the L2 vertebral body. This may represent a  venous vascular malformation, however other less benign etiologies are  not excluded. Again, this would be better assessed with CT or MRI.  3. Marked degenerative endplate changes and loss of disc height at  L2-L3. Mild to moderate degenerative endplate changes and loss of disc  height at the other levels.  4. Deformity of the left ischium favored to be due to chronic  fracture. Clinical correlation is recommended.    GURJIT LONDON MD   Results for orders placed or performed in visit on 04/30/21   **Comprehensive metabolic panel FUTURE anytime     Status: Abnormal   Result Value Ref Range    Sodium 135 133 - 144 mmol/L    Potassium 4.4 3.4 - 5.3 mmol/L    Chloride 102 94 - 109 mmol/L    Carbon Dioxide 30 20 - 32 mmol/L    Anion Gap 3 3 - 14 mmol/L    Glucose 89 70 - 99 mg/dL    Urea Nitrogen 18 7 - 30 mg/dL    Creatinine 0.77 0.52 - 1.04 mg/dL    GFR Estimate 70 >60 mL/min/[1.73_m2]    GFR Estimate If Black 82 >60 mL/min/[1.73_m2]    Calcium 9.3 8.5 - 10.1 mg/dL    Bilirubin Total 1.1 0.2 - 1.3 mg/dL    Albumin 3.3 (L) 3.4 - 5.0 g/dL    Protein Total 7.8 6.8 - 8.8 g/dL    Alkaline Phosphatase 70 40 - 150 U/L    ALT 27 0 - 50 U/L    AST 27 0 - 45 U/L

## 2021-05-03 ENCOUNTER — TELEPHONE (OUTPATIENT)
Dept: INTERNAL MEDICINE | Facility: CLINIC | Age: 86
End: 2021-05-03

## 2021-05-03 NOTE — TELEPHONE ENCOUNTER
Xochilt from [facility] calling stating patient has an allergy to Ibuprofen and patient was prescribed diclofenac, which is contraindicated with an allergy to ibuprofen.    Patient told pharmacy that her throat closes with ibuprofen.  Allergy in patient chart is not specifying any severity.  Please advise, thanks.

## 2021-05-26 ENCOUNTER — OFFICE VISIT (OUTPATIENT)
Dept: INTERNAL MEDICINE | Facility: CLINIC | Age: 86
End: 2021-05-26
Payer: COMMERCIAL

## 2021-05-26 VITALS
TEMPERATURE: 97.9 F | OXYGEN SATURATION: 98 % | RESPIRATION RATE: 14 BRPM | HEIGHT: 60 IN | WEIGHT: 159 LBS | DIASTOLIC BLOOD PRESSURE: 70 MMHG | HEART RATE: 84 BPM | BODY MASS INDEX: 31.22 KG/M2 | SYSTOLIC BLOOD PRESSURE: 132 MMHG

## 2021-05-26 DIAGNOSIS — S32.010S COMPRESSION FRACTURE OF L1 VERTEBRA, SEQUELA: ICD-10-CM

## 2021-05-26 DIAGNOSIS — I10 ESSENTIAL HYPERTENSION: ICD-10-CM

## 2021-05-26 DIAGNOSIS — Z13.6 ENCOUNTER FOR LIPID SCREENING FOR CARDIOVASCULAR DISEASE: Primary | ICD-10-CM

## 2021-05-26 DIAGNOSIS — Z13.220 ENCOUNTER FOR LIPID SCREENING FOR CARDIOVASCULAR DISEASE: Primary | ICD-10-CM

## 2021-05-26 DIAGNOSIS — E03.9 ACQUIRED HYPOTHYROIDISM: ICD-10-CM

## 2021-05-26 DIAGNOSIS — I25.10 CORONARY ARTERY DISEASE INVOLVING NATIVE CORONARY ARTERY OF NATIVE HEART WITHOUT ANGINA PECTORIS: ICD-10-CM

## 2021-05-26 LAB
BASOPHILS # BLD AUTO: 0 10E9/L (ref 0–0.2)
BASOPHILS NFR BLD AUTO: 0.3 %
CHOLEST SERPL-MCNC: 135 MG/DL
DIFFERENTIAL METHOD BLD: NORMAL
EOSINOPHIL # BLD AUTO: 0.3 10E9/L (ref 0–0.7)
EOSINOPHIL NFR BLD AUTO: 3 %
ERYTHROCYTE [DISTWIDTH] IN BLOOD BY AUTOMATED COUNT: 13.7 % (ref 10–15)
HCT VFR BLD AUTO: 40.3 % (ref 35–47)
HDLC SERPL-MCNC: 64 MG/DL
HGB BLD-MCNC: 12.9 G/DL (ref 11.7–15.7)
LDLC SERPL CALC-MCNC: 49 MG/DL
LYMPHOCYTES # BLD AUTO: 2.7 10E9/L (ref 0.8–5.3)
LYMPHOCYTES NFR BLD AUTO: 27.5 %
MCH RBC QN AUTO: 30 PG (ref 26.5–33)
MCHC RBC AUTO-ENTMCNC: 32 G/DL (ref 31.5–36.5)
MCV RBC AUTO: 94 FL (ref 78–100)
MONOCYTES # BLD AUTO: 1.3 10E9/L (ref 0–1.3)
MONOCYTES NFR BLD AUTO: 12.9 %
NEUTROPHILS # BLD AUTO: 5.6 10E9/L (ref 1.6–8.3)
NEUTROPHILS NFR BLD AUTO: 56.3 %
NONHDLC SERPL-MCNC: 71 MG/DL
PLATELET # BLD AUTO: 237 10E9/L (ref 150–450)
RBC # BLD AUTO: 4.3 10E12/L (ref 3.8–5.2)
T4 FREE SERPL-MCNC: 0.99 NG/DL (ref 0.76–1.46)
TRIGL SERPL-MCNC: 112 MG/DL
TSH SERPL DL<=0.005 MIU/L-ACNC: 12.04 MU/L (ref 0.4–4)
WBC # BLD AUTO: 10 10E9/L (ref 4–11)

## 2021-05-26 PROCEDURE — 85025 COMPLETE CBC W/AUTO DIFF WBC: CPT | Performed by: INTERNAL MEDICINE

## 2021-05-26 PROCEDURE — 84443 ASSAY THYROID STIM HORMONE: CPT | Performed by: INTERNAL MEDICINE

## 2021-05-26 PROCEDURE — 84439 ASSAY OF FREE THYROXINE: CPT | Performed by: INTERNAL MEDICINE

## 2021-05-26 PROCEDURE — 99214 OFFICE O/P EST MOD 30 MIN: CPT | Performed by: INTERNAL MEDICINE

## 2021-05-26 PROCEDURE — 80061 LIPID PANEL: CPT | Performed by: INTERNAL MEDICINE

## 2021-05-26 PROCEDURE — 36415 COLL VENOUS BLD VENIPUNCTURE: CPT | Performed by: INTERNAL MEDICINE

## 2021-05-26 ASSESSMENT — ACTIVITIES OF DAILY LIVING (ADL)
CURRENT_FUNCTION: TRANSPORTATION REQUIRES ASSISTANCE
CURRENT_FUNCTION: HOUSEWORK REQUIRES ASSISTANCE
CURRENT_FUNCTION: SHOPPING REQUIRES ASSISTANCE
CURRENT_FUNCTION: BATHING REQUIRES ASSISTANCE
CURRENT_FUNCTION: LAUNDRY REQUIRES ASSISTANCE
CURRENT_FUNCTION: PREPARING MEALS REQUIRES ASSISTANCE

## 2021-05-26 ASSESSMENT — ENCOUNTER SYMPTOMS
DYSURIA: 0
HEARTBURN: 0
DIARRHEA: 0
NERVOUS/ANXIOUS: 0
HEMATURIA: 0
SHORTNESS OF BREATH: 0
COUGH: 0
HEADACHES: 0
DIZZINESS: 0
EYE PAIN: 0
PARESTHESIAS: 0
NAUSEA: 0
CHILLS: 0
SORE THROAT: 0
ARTHRALGIAS: 0
FREQUENCY: 0
HEMATOCHEZIA: 0
ABDOMINAL PAIN: 0
MYALGIAS: 1
CONSTIPATION: 0
BREAST MASS: 0
FEVER: 0
JOINT SWELLING: 0
PALPITATIONS: 0

## 2021-05-26 ASSESSMENT — MIFFLIN-ST. JEOR: SCORE: 1087.72

## 2021-05-26 NOTE — PROGRESS NOTES
Assessment & Plan   Problem List Items Addressed This Visit        Circulatory    Coronary artery disease involving native coronary artery of native heart without angina pectoris    Essential hypertension    Relevant Orders    TSH with free T4 reflex (Completed)    Lipid panel reflex to direct LDL Fasting (Completed)    CBC with platelets and differential (Completed)       Musculoskeletal and Integumentary    Compression fracture of L1 vertebra, sequela      Other Visit Diagnoses     Encounter for lipid screening for cardiovascular disease    -  Primary    Relevant Orders    Lipid panel reflex to direct LDL Fasting (Completed)         Advised to MRI hold off physical therapy till MRI is done.  Explained about cardiology referral if she needs a stress test as patient is asymptomatic currently.  Labs ordered.    No follow-ups on file.    Mikel Hussein MD  [facility] TIGRE Dennis is a 86 year old who presents for the following health issues  accompanied by her granddaughter: facial discoloration and granddaughter is asking if the patient should have another stress test. Her sister passed away 2 weeks ago.    HPI   She has h/o CAD, s/p stent in 1999. She has stress test in 2014 and was negative.  Currently denies chest pain, shortness with, palpitation.  She had LBP and advised MRI based on x ray finding.  Patient family didn't see the letter and MRI is not done.   She did mention about a fall in 2 years ago. Unsure if its fracture.  Interested in doing MRI.  Discussed about x-ray findings with the patient.  Also had some hyperpigmentation of the face following eating outside in the past.  Its not getting better.  Denies worsening of the hyperpigmentation, itching.    Review of Systems   Constitutional: Negative for chills and fever.   HENT: Positive for hearing loss. Negative for congestion, ear pain and sore throat.    Eyes: Negative for pain and visual disturbance.    Respiratory: Negative for cough and shortness of breath.    Cardiovascular: Negative for chest pain, palpitations and peripheral edema.   Gastrointestinal: Negative for abdominal pain, constipation, diarrhea, heartburn, hematochezia and nausea.   Breasts:  Negative for tenderness, breast mass and discharge.   Genitourinary: Negative for dysuria, frequency, genital sores, hematuria, pelvic pain, urgency, vaginal bleeding and vaginal discharge.   Musculoskeletal: Positive for myalgias. Negative for arthralgias and joint swelling.   Skin: Negative for rash.   Neurological: Negative for dizziness, headaches and paresthesias.   Psychiatric/Behavioral: Negative for mood changes. The patient is not nervous/anxious.           Objective    /70   Pulse 84   Temp 97.9  F (36.6  C) (Oral)   Resp 14   Ht 1.524 m (5')   Wt 72.1 kg (159 lb)   LMP  (LMP Unknown)   SpO2 98%   Breastfeeding No   BMI 31.05 kg/m    Body mass index is 31.05 kg/m .  Physical Exam  Vitals signs reviewed.   HENT:      Head: Normocephalic and atraumatic.      Comments: Hyperpigmentation noted in the cheek area.    Cardiovascular:      Rate and Rhythm: Normal rate and regular rhythm.      Heart sounds: No murmur.   Pulmonary:      Effort: Pulmonary effort is normal.      Breath sounds: Normal breath sounds. No wheezing.   Musculoskeletal:      Comments: Patient had noted is on the spinal region.  Mild discomfort noted in the paraspinal region around L2 the L3  area.   Neurological:      Mental Status: She is alert.   Psychiatric:         Mood and Affect: Mood normal.

## 2021-05-26 NOTE — PATIENT INSTRUCTIONS
Mri ordered, call [phone] for Mri appointment  Please let me know if she needs cardiology referral for stress test.        
normal...

## 2021-05-26 NOTE — LETTER
May 27, 2021      Jeannie Chu  [address] [address]        Dear Jeannie,      Cholesterol, hemoglobin are within acceptable limits.  Thyroid is elevated.  Would benefit with thyroid medication.     25 mcg levothyroxine sent to the pharmacy.  Repeat TSH in 6 weeks.     Sincerely,      Mikel Hussein MD        Results for orders placed or performed in visit on 05/26/21   TSH with free T4 reflex     Status: Abnormal   Result Value Ref Range    TSH 12.04 (H) 0.40 - 4.00 mU/L   Lipid panel reflex to direct LDL Fasting     Status: None   Result Value Ref Range    Cholesterol 135 <200 mg/dL    Triglycerides 112 <150 mg/dL    HDL Cholesterol 64 >49 mg/dL    LDL Cholesterol Calculated 49 <100 mg/dL    Non HDL Cholesterol 71 <130 mg/dL   CBC with platelets and differential     Status: None   Result Value Ref Range    WBC 10.0 4.0 - 11.0 10e9/L    RBC Count 4.30 3.8 - 5.2 10e12/L    Hemoglobin 12.9 11.7 - 15.7 g/dL    Hematocrit 40.3 35.0 - 47.0 %    MCV 94 78 - 100 fl    MCH 30.0 26.5 - 33.0 pg    MCHC 32.0 31.5 - 36.5 g/dL    RDW 13.7 10.0 - 15.0 %    Platelet Count 237 150 - 450 10e9/L    % Neutrophils 56.3 %    % Lymphocytes 27.5 %    % Monocytes 12.9 %    % Eosinophils 3.0 %    % Basophils 0.3 %    Absolute Neutrophil 5.6 1.6 - 8.3 10e9/L    Absolute Lymphocytes 2.7 0.8 - 5.3 10e9/L    Absolute Monocytes 1.3 0.0 - 1.3 10e9/L    Absolute Eosinophils 0.3 0.0 - 0.7 10e9/L    Absolute Basophils 0.0 0.0 - 0.2 10e9/L    Diff Method Automated Method    T4 free     Status: None   Result Value Ref Range    T4 Free 0.99 0.76 - 1.46 ng/dL

## 2021-05-27 PROBLEM — E03.9 ACQUIRED HYPOTHYROIDISM: Status: ACTIVE | Noted: 2021-05-27

## 2021-05-27 RX ORDER — LEVOTHYROXINE SODIUM 25 UG/1
25 TABLET ORAL DAILY
Qty: 30 TABLET | Refills: 1 | Status: SHIPPED | OUTPATIENT
Start: 2021-05-27 | End: 2021-06-02

## 2021-06-01 ENCOUNTER — TELEPHONE (OUTPATIENT)
Dept: INTERNAL MEDICINE | Facility: CLINIC | Age: 86
End: 2021-06-01

## 2021-06-01 DIAGNOSIS — E03.9 ACQUIRED HYPOTHYROIDISM: ICD-10-CM

## 2021-06-01 NOTE — TELEPHONE ENCOUNTER
Patient calling  levothyroxine (SYNTHROID/LEVOTHROID) 25 MCG was sent to the wrong pharmacy. Please send to [facility] in [place]

## 2021-06-02 RX ORDER — LEVOTHYROXINE SODIUM 25 UG/1
25 TABLET ORAL DAILY
Qty: 30 TABLET | Refills: 1 | Status: SHIPPED | OUTPATIENT
Start: 2021-06-02 | End: 2021-07-27

## 2021-06-05 ENCOUNTER — HEALTH MAINTENANCE LETTER (OUTPATIENT)
Age: 86
End: 2021-06-05

## 2021-06-18 ENCOUNTER — HOSPITAL ENCOUNTER (OUTPATIENT)
Dept: MRI IMAGING | Facility: CLINIC | Age: 86
Discharge: HOME | End: 2021-06-18
Attending: INTERNAL MEDICINE | Admitting: INTERNAL MEDICINE
Payer: COMMERCIAL

## 2021-06-18 DIAGNOSIS — S32.010S COMPRESSION FRACTURE OF L1 VERTEBRA, SEQUELA: ICD-10-CM

## 2021-06-18 PROCEDURE — 72148 MRI LUMBAR SPINE W/O DYE: CPT

## 2021-06-21 DIAGNOSIS — S32.010S COMPRESSION FRACTURE OF L1 VERTEBRA, SEQUELA: ICD-10-CM

## 2021-06-21 DIAGNOSIS — M48.061 SPINAL STENOSIS OF LUMBAR REGION, UNSPECIFIED WHETHER NEUROGENIC CLAUDICATION PRESENT: Primary | ICD-10-CM

## 2021-06-28 ENCOUNTER — OFFICE VISIT (OUTPATIENT)
Dept: NEUROSURGERY | Facility: CLINIC | Age: 86
End: 2021-06-28
Attending: INTERNAL MEDICINE
Payer: COMMERCIAL

## 2021-06-28 VITALS
TEMPERATURE: 97.8 F | SYSTOLIC BLOOD PRESSURE: 166 MMHG | BODY MASS INDEX: 31.22 KG/M2 | WEIGHT: 159 LBS | DIASTOLIC BLOOD PRESSURE: 79 MMHG | HEART RATE: 62 BPM | OXYGEN SATURATION: 100 % | HEIGHT: 60 IN

## 2021-06-28 DIAGNOSIS — G44.201 ACUTE INTRACTABLE TENSION-TYPE HEADACHE: Primary | ICD-10-CM

## 2021-06-28 DIAGNOSIS — S32.010S COMPRESSION FRACTURE OF L1 VERTEBRA, SEQUELA: ICD-10-CM

## 2021-06-28 DIAGNOSIS — M48.061 SPINAL STENOSIS OF LUMBAR REGION, UNSPECIFIED WHETHER NEUROGENIC CLAUDICATION PRESENT: ICD-10-CM

## 2021-06-28 PROCEDURE — G0463 HOSPITAL OUTPT CLINIC VISIT: HCPCS

## 2021-06-28 PROCEDURE — 99203 OFFICE O/P NEW LOW 30 MIN: CPT | Performed by: PHYSICIAN ASSISTANT

## 2021-06-28 ASSESSMENT — PAIN SCALES - GENERAL: PAINLEVEL: SEVERE PAIN (7)

## 2021-06-28 ASSESSMENT — MIFFLIN-ST. JEOR: SCORE: 1087.72

## 2021-06-28 NOTE — PATIENT INSTRUCTIONS
-Call and schedule head CT   -Call our office for to review results     Call [facility] radiology scheduling for your procedure:  For scheduling in the [place] ([place], [facility], and [place]) call [phone] or [phone]      For scheduling at [facility] ([facility], [facility] and [facility]), call [phone] or [phone]      For scheduling in the South ([facility], [facility]) call [phone]  or  [phone]    -----------------------------------------------------------------------------------------    -Physical Therapy referral placed. They will contact you to schedule appt  -Pain management referral for comprehensive services  -Please call our office with persistent or worsening symptoms     Mary JOHNSON [facility]  [address] [address]    Tel [phone]  Pager [phone]

## 2021-06-28 NOTE — PROGRESS NOTES
NEUROSURGERY CLINIC CONSULT NOTE     DATE OF VISIT: 6/28/2021     SUBJECTIVE:     Jeannie Chu is a pleasant 86 year old female who presents to the clinic today for consultation on low back pain. Pertinent medical history consists of a fall in September, 2020 with ongoing back pain since.   Today, she reports a 9-month history of symptoms. She describes constant, aching pain that initiates in the low lumbar region and has no radiation. This pain is not accompanied by paresthesia, numbness and/or perceived weakness in the same distribution. Prolonged walking, standing and bending aggravate the symptoms, while alleviation is obtained by sitting down or resting. Fall in September, 2020 was when pain started. There are no bowel or bladder changes. She denies saddle anesthesia. She denies changes in gait, instability, or falling episodes.  She has not participated in conservative therapies.    She also complained of headache x 5 days. Headache is new. Headache located frontal and bitemporal. New glasses 1-2 months ago. Headache is not associated with vision changes, weakness, paresthesias, vomiting, increased lethargy. Grandson admits that she has never had a headache like this in her entire life.       Current Outpatient Medications:      ASPIRIN 81 MG OR TABS, 1 tab po QD (Once per day), Disp: 100, Rfl: 3     atorvastatin (LIPITOR) 10 MG tablet, Take 1 tablet by mouth once daily, Disp: 90 tablet, Rfl: 0     IRON (FERROUS GLUCONATE) 325 MG OR TABS, 1 TABLET DAILY, Disp: 30, Rfl: 0     levothyroxine (SYNTHROID/LEVOTHROID) 25 MCG tablet, Take 1 tablet (25 mcg) by mouth daily, Disp: 30 tablet, Rfl: 1     LIPITOR 10 MG OR TABS, 1 tab PO QD (Once per day), Disp: 30, Rfl: 5     lisinopril (ZESTRIL) 5 MG tablet, Take 1 tablet (5 mg) by mouth daily, Disp: 90 tablet, Rfl: 2     metoprolol succinate ER (TOPROL-XL) 25 MG 24 hr tablet, Take 1 tablet by mouth once daily, Disp: 90 tablet, Rfl: 0     metoprolol succinate ER  (TOPROL-XL) 50 MG 24 hr tablet, Take 1 tablet by mouth once daily, Disp: 90 tablet, Rfl: 0     NIASPAN 500 MG OR TBCR, 1 CAPSULE AT BEDTIME, Disp: 30, Rfl: 0     cyclobenzaprine (FLEXERIL) 5 MG tablet, Take 1 tablet (5 mg) by mouth 2 times daily as needed for muscle spasms (Patient not taking: Reported on 5/26/2021), Disp: 60 tablet, Rfl: 1     Allergies   Allergen Reactions     Ibuprofen         Past Medical History:   Diagnosis Date     Acquired hypothyroidism 5/27/2021     Coronary artery disease involving native coronary artery of native heart without angina pectoris 5/26/2021     Other and unspecified hyperlipidemia      Unspecified cardiovascular disease 1999    stent placed        ROS: 10 point review of symptoms are negative other than the symptoms noted above in the HPI.     Family History has been reviewed with the patient, there are no pertinent findings to presenting concern.     No past surgical history on file.     Social History     Tobacco Use     Smoking status: Never Smoker     Smokeless tobacco: Never Used   Substance Use Topics     Alcohol use: No     Drug use: No        OBJECTIVE:   BP (!) 166/79 (BP Location: Right arm, Patient Position: Sitting, Cuff Size: Adult Large)   Pulse 62   Temp 97.8  F (36.6  C) (Oral)   Ht 5' (1.524 m)   Wt 159 lb (72.1 kg)   LMP  (LMP Unknown)   SpO2 100%   BMI 31.05 kg/m     Body mass index is 31.05 kg/m .     Imaging:   Lumbar Spine MRI reviewed myself as well as with patient and grandson and explained in detail.     Full radiological report in chart. Imaging was reviewed with with patient today.     Exam:   CN II-XII grossly intact, alert and appropriate with conversation and following commands.   Gait is non-antalgic.    UE muscle strength  Right  Left    Deltoid  5/5  5/5    Biceps  5/5  5/5    Triceps  5/5  5/5    Hand intrinsics  5/5  5/5    Hand grasp  5/5  5/5    Zheng signs  neg  neg      Lumbar spine is tender to palpation L2-5 spinous processes  and paraspinal  Intact sensation throughout lower extremities.     LE muscle strength  Right  Left    Iliopsoas (hip flexion)  5/5  5/5    Quad (knee extension)  5/5  5/5    Hamstring (knee flexion)  5/5  5/5    Gastrocnemius (PF)  5/5  5/5    Tibialis Ant. (DF)  5/5  5/5    EHL  5/5  5/5      Negative for clonus.   Calves are soft and non-tender bilaterally.     ASSESSMENT/PLAN:     Jeannie Chu is a pleasant 86 year old female who presents to the clinic today for consultation on low back pain. Pertinent medical history consists of a fall in September, 2020 with ongoing back pain since. On exam, she is noted to have appropriate strength and sensaion. She has not attempted conservative management.    Reviewed imaging in detail with patient and grandson. Patients main complaint is low back pain. She has no active symptoms of radiculopathy, weakness. No surgical intervention indicated at this time.    Based on her physical exam, imaging review, and past treatments, we feel that it would be in her best interest to try a conservative approach by participating in a physical therapy program as well as comprehensive services through pain management for possible injections.     In terms of new frontal and bitemporal headache, I have placed an order for a head CT to be obtained. I will call patient regarding these results once CT is complete. Advised to call to schedule this head CT and present to Emergency Room sooner should headache worsen or new symptoms arise.     PT and Pain Management referrals placed. These teams will contact patient to schedule appointments.     In the event that patient's symptoms worsen or change we would like to see her sooner. We also discussed signs of a worsening problem that she should seek being evaluated.        Respectfully,     Mary Lee PA-C  [facility]  [address]  [address] [place] [id number]    Tel [phone]  Pager  [phone]    Exam, imaging, and plan reviewed by Williams Romeo PA-C who was in agreement with plans as well.

## 2021-06-28 NOTE — LETTER
6/28/2021         RE: Jeannie Chu  [address]  [place] [address]        Dear Colleague,    Thank you for referring your patient, Jeannie Chu, to the [facility] NEUROSURGERY [facility]INIC [place]. Please see a copy of my visit note below.    NEUROSURGERY CLINIC CONSULT NOTE     DATE OF VISIT: 6/28/2021     SUBJECTIVE:     Jeannie Chu is a pleasant 86 year old female who presents to the clinic today for consultation on low back pain. Pertinent medical history consists of a fall in September, 2020 with ongoing back pain since.   Today, she reports a 9-month history of symptoms. She describes constant, aching pain that initiates in the low lumbar region and has no radiation. This pain is not accompanied by paresthesia, numbness and/or perceived weakness in the same distribution. Prolonged walking, standing and bending aggravate the symptoms, while alleviation is obtained by sitting down or resting. Fall in September, 2020 was when pain started. There are no bowel or bladder changes. She denies saddle anesthesia. She denies changes in gait, instability, or falling episodes.  She has not participated in conservative therapies.    She also complained of headache x 5 days. Headache is new. Headache located frontal and bitemporal. New glasses 1-2 months ago. Headache is not associated with vision changes, weakness, paresthesias, vomiting, increased lethargy. Grandson admits that she has never had a headache like this in her entire life.       Current Outpatient Medications:      ASPIRIN 81 MG OR TABS, 1 tab po QD (Once per day), Disp: 100, Rfl: 3     atorvastatin (LIPITOR) 10 MG tablet, Take 1 tablet by mouth once daily, Disp: 90 tablet, Rfl: 0     IRON (FERROUS GLUCONATE) 325 MG OR TABS, 1 TABLET DAILY, Disp: 30, Rfl: 0     levothyroxine (SYNTHROID/LEVOTHROID) 25 MCG tablet, Take 1 tablet (25 mcg) by mouth daily, Disp: 30 tablet, Rfl: 1     LIPITOR 10 MG OR TABS, 1 tab PO QD (Once  per day), Disp: 30, Rfl: 5     lisinopril (ZESTRIL) 5 MG tablet, Take 1 tablet (5 mg) by mouth daily, Disp: 90 tablet, Rfl: 2     metoprolol succinate ER (TOPROL-XL) 25 MG 24 hr tablet, Take 1 tablet by mouth once daily, Disp: 90 tablet, Rfl: 0     metoprolol succinate ER (TOPROL-XL) 50 MG 24 hr tablet, Take 1 tablet by mouth once daily, Disp: 90 tablet, Rfl: 0     NIASPAN 500 MG OR TBCR, 1 CAPSULE AT BEDTIME, Disp: 30, Rfl: 0     cyclobenzaprine (FLEXERIL) 5 MG tablet, Take 1 tablet (5 mg) by mouth 2 times daily as needed for muscle spasms (Patient not taking: Reported on 5/26/2021), Disp: 60 tablet, Rfl: 1     Allergies   Allergen Reactions     Ibuprofen         Past Medical History:   Diagnosis Date     Acquired hypothyroidism 5/27/2021     Coronary artery disease involving native coronary artery of native heart without angina pectoris 5/26/2021     Other and unspecified hyperlipidemia      Unspecified cardiovascular disease 1999    stent placed        ROS: 10 point review of symptoms are negative other than the symptoms noted above in the HPI.     Family History has been reviewed with the patient, there are no pertinent findings to presenting concern.     No past surgical history on file.     Social History     Tobacco Use     Smoking status: Never Smoker     Smokeless tobacco: Never Used   Substance Use Topics     Alcohol use: No     Drug use: No        OBJECTIVE:   BP (!) 166/79 (BP Location: Right arm, Patient Position: Sitting, Cuff Size: Adult Large)   Pulse 62   Temp 97.8  F (36.6  C) (Oral)   Ht 5' (1.524 m)   Wt 159 lb (72.1 kg)   LMP  (LMP Unknown)   SpO2 100%   BMI 31.05 kg/m     Body mass index is 31.05 kg/m .     Imaging:   Lumbar Spine MRI reviewed myself as well as with patient and grandson and explained in detail.     Full radiological report in chart. Imaging was reviewed with with patient today.     Exam:   CN II-XII grossly intact, alert and appropriate with conversation and following  commands.   Gait is non-antalgic.    UE muscle strength  Right  Left    Deltoid  5/5  5/5    Biceps  5/5  5/5    Triceps  5/5  5/5    Hand intrinsics  5/5  5/5    Hand grasp  5/5  5/5    Zheng signs  neg  neg      Lumbar spine is tender to palpation L2-5 spinous processes and paraspinal  Intact sensation throughout lower extremities.     LE muscle strength  Right  Left    Iliopsoas (hip flexion)  5/5  5/5    Quad (knee extension)  5/5  5/5    Hamstring (knee flexion)  5/5  5/5    Gastrocnemius (PF)  5/5  5/5    Tibialis Ant. (DF)  5/5  5/5    EHL  5/5  5/5      Negative for clonus.   Calves are soft and non-tender bilaterally.     ASSESSMENT/PLAN:     Jeannie Chu is a pleasant 86 year old female who presents to the clinic today for consultation on low back pain. Pertinent medical history consists of a fall in September, 2020 with ongoing back pain since. On exam, she is noted to have appropriate strength and sensaion. She has not attempted conservative management.    Reviewed imaging in detail with patient and grandson. Patients main complaint is low back pain. She has no active symptoms of radiculopathy, weakness. No surgical intervention indicated at this time.    Based on her physical exam, imaging review, and past treatments, we feel that it would be in her best interest to try a conservative approach by participating in a physical therapy program as well as comprehensive services through pain management for possible injections.     In terms of new frontal and bitemporal headache, I have placed an order for a head CT to be obtained. I will call patient regarding these results once CT is complete. Advised to call to schedule this head CT and present to Emergency Room sooner should headache worsen or new symptoms arise.     PT and Pain Management referrals placed. These teams will contact patient to schedule appointments.     In the event that patient's symptoms worsen or change we would like to see her  sooner. We also discussed signs of a worsening problem that she should seek being evaluated.        Respectfully,     Mary Lee PA-C  [facility] Neurosurgery  [address] [address]    Tel [phone]  Pager [phone]    Exam, imaging, and plan reviewed by Williams Romeo PA-C who was in agreement with plans as well.         Again, thank you for allowing me to participate in the care of your patient.        Sincerely,        Williams Guillen PA-C

## 2021-06-28 NOTE — NURSING NOTE
Jeannie Chu is a 86 year old female who presents for:  Chief Complaint   Patient presents with     Neurologic Problem     Lumbar spinal stenosis, L1 compression fracture        Initial Vitals:  BP (!) 166/79 (BP Location: Right arm, Patient Position: Sitting, Cuff Size: Adult Large)   Pulse 62   Temp 97.8  F (36.6  C) (Oral)   Ht 5' (1.524 m)   Wt 159 lb (72.1 kg)   LMP  (LMP Unknown)   SpO2 100%   BMI 31.05 kg/m   Estimated body mass index is 31.05 kg/m  as calculated from the following:    Height as of this encounter: 5' (1.524 m).    Weight as of this encounter: 159 lb (72.1 kg).. Body surface area is 1.75 meters squared. BP completed using cuff size: large  Severe Pain (7)    Nursing Comments: see chief complaint    Justen Red, CMA

## 2021-07-13 ENCOUNTER — HOSPITAL ENCOUNTER (OUTPATIENT)
Dept: CT IMAGING | Facility: CLINIC | Age: 86
Discharge: HOME | End: 2021-07-13
Attending: PHYSICIAN ASSISTANT | Admitting: PHYSICIAN ASSISTANT
Payer: COMMERCIAL

## 2021-07-13 DIAGNOSIS — G44.201 ACUTE INTRACTABLE TENSION-TYPE HEADACHE: ICD-10-CM

## 2021-07-13 PROCEDURE — 70450 CT HEAD/BRAIN W/O DYE: CPT

## 2021-07-14 ENCOUNTER — TELEPHONE (OUTPATIENT)
Dept: NEUROSURGERY | Facility: CLINIC | Age: 86
End: 2021-07-14

## 2021-07-14 NOTE — TELEPHONE ENCOUNTER
Per Mary Lee, PA head CT is negative for any intracranial abnormalities. Called pt and reviewed results.

## 2021-07-16 ENCOUNTER — TELEPHONE (OUTPATIENT)
Dept: NEUROSURGERY | Facility: CLINIC | Age: 86
End: 2021-07-16

## 2021-07-16 ENCOUNTER — THERAPY VISIT (OUTPATIENT)
Dept: PHYSICAL THERAPY | Facility: CLINIC | Age: 86
End: 2021-07-16
Payer: COMMERCIAL

## 2021-07-16 DIAGNOSIS — M48.061 SPINAL STENOSIS OF LUMBAR REGION, UNSPECIFIED WHETHER NEUROGENIC CLAUDICATION PRESENT: ICD-10-CM

## 2021-07-16 DIAGNOSIS — S32.010S COMPRESSION FRACTURE OF L1 VERTEBRA, SEQUELA: ICD-10-CM

## 2021-07-16 DIAGNOSIS — M54.50 LEFT-SIDED LOW BACK PAIN WITHOUT SCIATICA: Primary | ICD-10-CM

## 2021-07-16 PROCEDURE — 97110 THERAPEUTIC EXERCISES: CPT | Mod: GP | Performed by: PHYSICAL THERAPIST

## 2021-07-16 PROCEDURE — 97162 PT EVAL MOD COMPLEX 30 MIN: CPT | Mod: GP | Performed by: PHYSICAL THERAPIST

## 2021-07-16 NOTE — PROGRESS NOTES
Physical Therapy Initial Evaluation  Subjective:  The history is provided by the patient. No  was used.   Patient Health History  Jeannie Chu being seen for L low back and hip pain.     Problem began: 5/1/2021.   Problem occurred: Pt fell sometime over last winter and at that time she had an MRI which showed a compression fracture along with degenerative chagnes of her lumbar spine.  Pt had pain for a long time but it really worsened since the fall.    Pain is reported as 7/10 on pain scale.  General health as reported by patient is good.  Pertinent medical history includes: high blood pressure and overweight.                Current occupation is none.                     Therapist Generated HPI Evaluation  Problem details: L side pain, not on the R.  .         Type of problem:  Lumbar.          Patient reports pain:  SI joint left and lumbar spine left.  Pain is described as aching and is constant.  Pain radiates to:  No radiation. Pain is the same all the time.  Since onset symptoms are unchanged.  Exacerbated by: sitting too long, standing.  Relieved by: massage/cream.  Special tests included:  MRI.    Barriers include:  None as reported by patient.                        Objective:    Gait:    Gait Type:  Antalgic               Physical Exam      [facility] Lumbar Evaluation    Posture:  Sitting: poor  Standing: poor  Lordosis: Reduced  Lateral Shift: no  Correction of Posture: no effect    Movement Loss:  Flexion (Flex): min  Extension (EXT): major  Side Glide R (SG R): mod  Side Glide L (SG L): mod  Test Movements:    EIS:   Repeat EIS: During: produces  After: no worse  Mechanical Response: no effect            Conclusion: other  Principle of Treatment:          Other: x testing extension first                                       ROS    Assessment/Plan:    Patient is a 86 year old female with lumbar complaints.    Patient has the following significant findings with corresponding  treatment plan.                Diagnosis 1:  L low back and hip pain      Pain -  hot/cold therapy, manual therapy, self management, education, directional preference exercise and home program  Decreased ROM/flexibility - manual therapy and therapeutic exercise  Decreased strength - therapeutic exercise and therapeutic activities  Impaired gait - gait training  Impaired muscle performance - neuro re-education  Decreased function - therapeutic activities  Impaired posture - neuro re-education    Therapy Evaluation Codes:   1) History comprised of:   Personal factors that impact the plan of care:      Age, Language and Time since onset of symptoms.    Comorbidity factors that impact the plan of care are:      compression fracture, chronic.     Medications impacting care: None.  2) Examination of Body Systems comprised of:   Body structures and functions that impact the plan of care:      Hip and Lumbar spine.   Activity limitations that impact the plan of care are:      Bending, Sitting, Squatting/kneeling, Stairs, Standing and Walking.  3) Clinical presentation characteristics are:   Evolving/Changing.  4) Decision-Making    Moderate complexity using standardized patient assessment instrument and/or measureable assessment of functional outcome.  Cumulative Therapy Evaluation is: Moderate complexity.    Previous and current functional limitations:  (See Goal Flow Sheet for this information)    Short term and Long term goals: (See Goal Flow Sheet for this information)     Communication ability:  Patient appears to be able to clearly communicate and understand verbal and written communication and follow directions correctly.  Treatment Explanation - The following has been discussed with the patient:   RX ordered/plan of care  Anticipated outcomes  Possible risks and side effects  This patient would benefit from PT intervention to resume normal activities.   Rehab potential is fair.    Frequency:  1 X week, once  daily  Duration:  for 6 weeks  Discharge Plan:  Achieve all LTG.  Independent in home treatment program.  Reach maximal therapeutic benefit.    Please refer to the daily flowsheet for treatment today, total treatment time and time spent performing 1:1 timed codes.

## 2021-07-19 ENCOUNTER — TELEPHONE (OUTPATIENT)
Dept: NEUROSURGERY | Facility: CLINIC | Age: 86
End: 2021-07-19

## 2021-07-19 ENCOUNTER — TELEPHONE (OUTPATIENT)
Dept: PALLIATIVE MEDICINE | Facility: CLINIC | Age: 86
End: 2021-07-19

## 2021-07-19 DIAGNOSIS — E78.5 HYPERLIPIDEMIA LDL GOAL <130: ICD-10-CM

## 2021-07-19 DIAGNOSIS — I10 ESSENTIAL HYPERTENSION: ICD-10-CM

## 2021-07-19 DIAGNOSIS — G47.00 INSOMNIA, UNSPECIFIED TYPE: ICD-10-CM

## 2021-07-19 NOTE — TELEPHONE ENCOUNTER
Reviewed Mary Lee PA-C, recommendations and pain management referral. Daughter given # to call for appointment.

## 2021-07-19 NOTE — TELEPHONE ENCOUNTER
Attempted to reach out to patient's daughter at telephone number provided, no answer. Left voice message for patient to call clinic back to further discuss.     Also informed family that they may want to reach out to pain management as ordered by Mary Lee PA-C since head CT is negative for any intracranial abnormalities.

## 2021-07-19 NOTE — TELEPHONE ENCOUNTER

## 2021-07-19 NOTE — LETTER
July 19, 2021    Jeannie Chu  [address]  [place] [address]    Dear Federica Dennis to the [facility] at the [facility]. We are located at [address], [address], [place] [address]. Your appointment has been scheduled on Wednesday July 28, 2021 at 3:30 PM with Mary Ann Khan MD .    At your first visit, you will meet your team of caregivers who will help you to develop pain management strategies that will last a lifetime. You will meet with our support staff to review your insurance information and collect your co-payment if required by your insurance company. You will meet with a medical pain specialist and care coordinator who will assess your pain and develop a plan of care for your successful pain rehabilitation. You should expect to spend approximately 1 hour at your first visit with us. Usually, patients work with us for a period of 6-12 months, and eventually return to their primary doctor once their pain management has stabilized.      To help us make your visit go as smoothly as possible, please bring the following items with you on your visit:     Completed Pain Questionnaire enclosed in this packet.  If you do not bring the completed questionnaire, we may have to reschedule your appointment.    List of any medicines that you are currently taking or have been prescribed    Pertinent NON-[place] medical information such as medical records or tests results (X-rays, or laboratory tests)    Your health insurance card    Financial resources to cover your co-payment or balance due at the time of service (cash, personal check, Visa, and MasterCard are acceptable methods of payment)     Due to the high demand for new patient evaluations, you must notify the scheduling department 48 hours (2 days) in advance if you are not able to keep this appointment.  Failure to  do so could affect your ability to reschedule with our clinic. Please do not assume that you will receive any prescription medications at your first visit.    Please call [phone] with any questions regarding your appointment. We look forward to meeting you and working to address your health care needs.     Sincerely,    [facility]

## 2021-07-19 NOTE — TELEPHONE ENCOUNTER
Reason for call: Pt's daughter Marya called to see if her mother can get anything for the headaches she has been experiencing due to the pain from her back. Please call her back to discuss options for treatment at [phone]. Thank you.

## 2021-07-20 RX ORDER — ATORVASTATIN CALCIUM 10 MG/1
TABLET, FILM COATED ORAL
Qty: 90 TABLET | Refills: 2 | Status: SHIPPED | OUTPATIENT
Start: 2021-07-20 | End: 2021-11-29

## 2021-07-20 RX ORDER — METOPROLOL SUCCINATE 50 MG/1
TABLET, EXTENDED RELEASE ORAL
Qty: 90 TABLET | Refills: 0 | Status: ON HOLD | OUTPATIENT
Start: 2021-07-20 | End: 2021-07-26

## 2021-07-20 RX ORDER — METOPROLOL SUCCINATE 25 MG/1
TABLET, EXTENDED RELEASE ORAL
Qty: 90 TABLET | Refills: 0 | Status: ON HOLD | OUTPATIENT
Start: 2021-07-20 | End: 2021-07-26

## 2021-07-20 NOTE — TELEPHONE ENCOUNTER
Atorvastatin:   Prescription approved per [facility] Refill Protocol.     Alprazolam:  Routing refill request to provider for review/approval because:  Drug not on the [facility] refill protocol   Drug not active on patient's medication list    Metoprolol Succinate 25 mg and 50 mg:  Routing refill request to provider for review/approval because:  BP elevated    Routed to covering provider - Dr. Patterson, to review.

## 2021-07-20 NOTE — TELEPHONE ENCOUNTER
It appears the alprazolam was accidentally discontinued.  It does not appear that a controlled substance agreement was ever completed.  Generally we are not going to use this medication on an 86-year-old.  I will do 1 refill, patient will need to establish with a new provider, review the risks of this medication with a new provider and discuss alternatives versus continuing and do a controlled substance agreement.

## 2021-07-21 ENCOUNTER — TELEPHONE (OUTPATIENT)
Dept: INTERNAL MEDICINE | Facility: CLINIC | Age: 86
End: 2021-07-21

## 2021-07-21 NOTE — TELEPHONE ENCOUNTER
Called and son in law answered the phone and stated his wife is taking care of this and she will call the clinic back.  Please schedule her for next available for Dr. Hilario.

## 2021-07-21 NOTE — TELEPHONE ENCOUNTER
Reason for call:  Other   Patient called regarding (reason for call): call back  Additional comments: Patient would like to be seen by Dr. Hilario. Patient was referred by Dr. Mikel Hussein       Phone number to reach patient:  Home number on file [phone] (home)    Best Time:  anytime    Can we leave a detailed message on this number?  YES    Travel screening: Not Applicable

## 2021-07-21 NOTE — TELEPHONE ENCOUNTER
Patients daughter Marya calling. Message given. Please fill 1 month. Patient will book appt with Dr Hilario

## 2021-07-22 RX ORDER — ALPRAZOLAM 0.5 MG
TABLET ORAL
Qty: 30 TABLET | Refills: 0 | Status: SHIPPED | OUTPATIENT
Start: 2021-07-22 | End: 2021-07-27

## 2021-07-23 NOTE — TELEPHONE ENCOUNTER
Next 5 appointments (look out 90 days)    Jul 27, 2021  1:00 PM  Office Visit with Diana Hilario MD  [facility] ([facility] ) [address]  [facility] [id number] [phone]

## 2021-07-24 ENCOUNTER — HOSPITAL ENCOUNTER (INPATIENT)
Facility: CLINIC | Age: 86
LOS: 2 days | Discharge: HOME | End: 2021-07-26
Attending: EMERGENCY MEDICINE | Admitting: INTERNAL MEDICINE
Payer: COMMERCIAL

## 2021-07-24 ENCOUNTER — APPOINTMENT (OUTPATIENT)
Dept: GENERAL RADIOLOGY | Facility: CLINIC | Age: 86
End: 2021-07-24
Attending: EMERGENCY MEDICINE
Payer: COMMERCIAL

## 2021-07-24 DIAGNOSIS — I48.0 PAROXYSMAL ATRIAL FIBRILLATION (H): Primary | ICD-10-CM

## 2021-07-24 DIAGNOSIS — I48.92 ATRIAL FLUTTER WITH RAPID VENTRICULAR RESPONSE (H): ICD-10-CM

## 2021-07-24 LAB
ALBUMIN SERPL-MCNC: 3.2 G/DL (ref 3.4–5)
ALP SERPL-CCNC: 64 U/L (ref 40–150)
ALT SERPL W P-5'-P-CCNC: 21 U/L (ref 0–50)
ANION GAP SERPL CALCULATED.3IONS-SCNC: 3 MMOL/L (ref 3–14)
AST SERPL W P-5'-P-CCNC: 23 U/L (ref 0–45)
BASOPHILS # BLD AUTO: 0.1 10E3/UL (ref 0–0.2)
BASOPHILS NFR BLD AUTO: 1 %
BILIRUB SERPL-MCNC: 0.6 MG/DL (ref 0.2–1.3)
BUN SERPL-MCNC: 20 MG/DL (ref 7–30)
CALCIUM SERPL-MCNC: 9.5 MG/DL (ref 8.5–10.1)
CHLORIDE BLD-SCNC: 102 MMOL/L (ref 94–109)
CO2 SERPL-SCNC: 27 MMOL/L (ref 20–32)
CREAT BLD-MCNC: 0.8 MG/DL (ref 0.5–1)
CREAT SERPL-MCNC: 0.79 MG/DL (ref 0.52–1.04)
EOSINOPHIL # BLD AUTO: 0.2 10E3/UL (ref 0–0.7)
EOSINOPHIL NFR BLD AUTO: 2 %
ERYTHROCYTE [DISTWIDTH] IN BLOOD BY AUTOMATED COUNT: 13.4 % (ref 10–15)
GFR SERPL CREATININE-BSD FRML MDRD: 68 ML/MIN/1.73M2
GFR SERPL CREATININE-BSD FRML MDRD: >60 ML/MIN/1.73M2
GLUCOSE BLD-MCNC: 97 MG/DL (ref 70–99)
HCT VFR BLD AUTO: 42.8 % (ref 35–47)
HGB BLD-MCNC: 13.6 G/DL (ref 11.7–15.7)
IMM GRANULOCYTES # BLD: 0 10E3/UL
IMM GRANULOCYTES NFR BLD: 0 %
LYMPHOCYTES # BLD AUTO: 2.9 10E3/UL (ref 0.8–5.3)
LYMPHOCYTES NFR BLD AUTO: 26 %
MCH RBC QN AUTO: 30.7 PG (ref 26.5–33)
MCHC RBC AUTO-ENTMCNC: 31.8 G/DL (ref 31.5–36.5)
MCV RBC AUTO: 97 FL (ref 78–100)
MONOCYTES # BLD AUTO: 1.4 10E3/UL (ref 0–1.3)
MONOCYTES NFR BLD AUTO: 12 %
NEUTROPHILS # BLD AUTO: 6.9 10E3/UL (ref 1.6–8.3)
NEUTROPHILS NFR BLD AUTO: 59 %
NRBC # BLD AUTO: 0 10E3/UL
NRBC BLD AUTO-RTO: 0 /100
PLATELET # BLD AUTO: 227 10E3/UL (ref 150–450)
POTASSIUM BLD-SCNC: 4.7 MMOL/L (ref 3.4–5.3)
PROT SERPL-MCNC: 7.7 G/DL (ref 6.8–8.8)
RBC # BLD AUTO: 4.43 10E6/UL (ref 3.8–5.2)
SARS-COV-2 RNA RESP QL NAA+PROBE: NEGATIVE
SODIUM SERPL-SCNC: 132 MMOL/L (ref 133–144)
T4 FREE SERPL-MCNC: 0.92 NG/DL (ref 0.76–1.46)
TROPONIN I SERPL-MCNC: 0.02 UG/L (ref 0–0.04)
TSH SERPL DL<=0.005 MIU/L-ACNC: 6.34 MU/L (ref 0.4–4)
WBC # BLD AUTO: 11.5 10E3/UL (ref 4–11)

## 2021-07-24 PROCEDURE — 82565 ASSAY OF CREATININE: CPT

## 2021-07-24 PROCEDURE — 96376 TX/PRO/DX INJ SAME DRUG ADON: CPT

## 2021-07-24 PROCEDURE — 250N000009 HC RX 250: Performed by: EMERGENCY MEDICINE

## 2021-07-24 PROCEDURE — 80053 COMPREHEN METABOLIC PANEL: CPT | Performed by: EMERGENCY MEDICINE

## 2021-07-24 PROCEDURE — 99223 1ST HOSP IP/OBS HIGH 75: CPT | Mod: AI | Performed by: INTERNAL MEDICINE

## 2021-07-24 PROCEDURE — 71046 X-RAY EXAM CHEST 2 VIEWS: CPT

## 2021-07-24 PROCEDURE — 84439 ASSAY OF FREE THYROXINE: CPT | Performed by: EMERGENCY MEDICINE

## 2021-07-24 PROCEDURE — 84484 ASSAY OF TROPONIN QUANT: CPT | Performed by: EMERGENCY MEDICINE

## 2021-07-24 PROCEDURE — 84443 ASSAY THYROID STIM HORMONE: CPT | Performed by: EMERGENCY MEDICINE

## 2021-07-24 PROCEDURE — 93005 ELECTROCARDIOGRAM TRACING: CPT

## 2021-07-24 PROCEDURE — 120N000013 HC R&B IMCU

## 2021-07-24 PROCEDURE — 96365 THER/PROPH/DIAG IV INF INIT: CPT

## 2021-07-24 PROCEDURE — 96366 THER/PROPH/DIAG IV INF ADDON: CPT

## 2021-07-24 PROCEDURE — 99285 EMERGENCY DEPT VISIT HI MDM: CPT | Mod: 25

## 2021-07-24 PROCEDURE — 87635 SARS-COV-2 COVID-19 AMP PRB: CPT | Performed by: EMERGENCY MEDICINE

## 2021-07-24 PROCEDURE — 85025 COMPLETE CBC W/AUTO DIFF WBC: CPT | Performed by: EMERGENCY MEDICINE

## 2021-07-24 PROCEDURE — C9803 HOPD COVID-19 SPEC COLLECT: HCPCS

## 2021-07-24 PROCEDURE — 36415 COLL VENOUS BLD VENIPUNCTURE: CPT | Performed by: EMERGENCY MEDICINE

## 2021-07-24 RX ORDER — DILTIAZEM HYDROCHLORIDE 5 MG/ML
10 INJECTION INTRAVENOUS ONCE
Status: COMPLETED | OUTPATIENT
Start: 2021-07-24 | End: 2021-07-24

## 2021-07-24 RX ORDER — DILTIAZEM HCL IN NACL,ISO-OSM 125 MG/125
5-15 PLASTIC BAG, INJECTION (ML) INTRAVENOUS CONTINUOUS
Status: DISCONTINUED | OUTPATIENT
Start: 2021-07-24 | End: 2021-07-25

## 2021-07-24 RX ADMIN — DILTIAZEM HYDROCHLORIDE 10 MG: 5 INJECTION, SOLUTION INTRAVENOUS at 22:08

## 2021-07-24 RX ADMIN — Medication 10 MG/HR: at 23:25

## 2021-07-24 RX ADMIN — Medication 5 MG/HR: at 22:34

## 2021-07-24 ASSESSMENT — ENCOUNTER SYMPTOMS
VOMITING: 0
COUGH: 0
WEAKNESS: 1
SHORTNESS OF BREATH: 1
MYALGIAS: 0
DIARRHEA: 0
FEVER: 0
FATIGUE: 1
NAUSEA: 0
PALPITATIONS: 0

## 2021-07-25 ENCOUNTER — APPOINTMENT (OUTPATIENT)
Dept: CARDIOLOGY | Facility: CLINIC | Age: 86
End: 2021-07-25
Attending: INTERNAL MEDICINE
Payer: COMMERCIAL

## 2021-07-25 LAB
ANION GAP SERPL CALCULATED.3IONS-SCNC: 3 MMOL/L (ref 3–14)
BUN SERPL-MCNC: 20 MG/DL (ref 7–30)
CALCIUM SERPL-MCNC: 8.8 MG/DL (ref 8.5–10.1)
CHLORIDE BLD-SCNC: 104 MMOL/L (ref 94–109)
CO2 SERPL-SCNC: 29 MMOL/L (ref 20–32)
CREAT SERPL-MCNC: 0.8 MG/DL (ref 0.52–1.04)
ERYTHROCYTE [DISTWIDTH] IN BLOOD BY AUTOMATED COUNT: 13.6 % (ref 10–15)
GFR SERPL CREATININE-BSD FRML MDRD: 67 ML/MIN/1.73M2
GLUCOSE BLD-MCNC: 85 MG/DL (ref 70–99)
GLUCOSE BLDC GLUCOMTR-MCNC: 105 MG/DL (ref 70–99)
HCT VFR BLD AUTO: 41.3 % (ref 35–47)
HGB BLD-MCNC: 13.4 G/DL (ref 11.7–15.7)
LVEF ECHO: NORMAL
MCH RBC QN AUTO: 30.9 PG (ref 26.5–33)
MCHC RBC AUTO-ENTMCNC: 32.4 G/DL (ref 31.5–36.5)
MCV RBC AUTO: 95 FL (ref 78–100)
PLATELET # BLD AUTO: 212 10E3/UL (ref 150–450)
POTASSIUM BLD-SCNC: 4.1 MMOL/L (ref 3.4–5.3)
RBC # BLD AUTO: 4.34 10E6/UL (ref 3.8–5.2)
SODIUM SERPL-SCNC: 136 MMOL/L (ref 133–144)
WBC # BLD AUTO: 10.1 10E3/UL (ref 4–11)

## 2021-07-25 PROCEDURE — 255N000002 HC RX 255 OP 636: Performed by: INTERNAL MEDICINE

## 2021-07-25 PROCEDURE — 85027 COMPLETE CBC AUTOMATED: CPT | Performed by: INTERNAL MEDICINE

## 2021-07-25 PROCEDURE — 250N000013 HC RX MED GY IP 250 OP 250 PS 637: Performed by: INTERNAL MEDICINE

## 2021-07-25 PROCEDURE — 93306 TTE W/DOPPLER COMPLETE: CPT | Mod: 26 | Performed by: INTERNAL MEDICINE

## 2021-07-25 PROCEDURE — 36415 COLL VENOUS BLD VENIPUNCTURE: CPT | Performed by: INTERNAL MEDICINE

## 2021-07-25 PROCEDURE — 120N000013 HC R&B IMCU

## 2021-07-25 PROCEDURE — 99233 SBSQ HOSP IP/OBS HIGH 50: CPT | Performed by: INTERNAL MEDICINE

## 2021-07-25 PROCEDURE — 99222 1ST HOSP IP/OBS MODERATE 55: CPT | Mod: 25 | Performed by: INTERNAL MEDICINE

## 2021-07-25 PROCEDURE — 999N000208 ECHOCARDIOGRAM COMPLETE

## 2021-07-25 PROCEDURE — 80048 BASIC METABOLIC PNL TOTAL CA: CPT | Performed by: INTERNAL MEDICINE

## 2021-07-25 PROCEDURE — 120N000001 HC R&B MED SURG/OB

## 2021-07-25 RX ORDER — LIDOCAINE 40 MG/G
CREAM TOPICAL
Status: DISCONTINUED | OUTPATIENT
Start: 2021-07-25 | End: 2021-07-25

## 2021-07-25 RX ORDER — LEVOTHYROXINE SODIUM 25 UG/1
25 TABLET ORAL DAILY
Status: DISCONTINUED | OUTPATIENT
Start: 2021-07-25 | End: 2021-07-26 | Stop reason: HOSPADM

## 2021-07-25 RX ORDER — LISINOPRIL 5 MG/1
5 TABLET ORAL DAILY
Status: DISCONTINUED | OUTPATIENT
Start: 2021-07-25 | End: 2021-07-26 | Stop reason: HOSPADM

## 2021-07-25 RX ORDER — LIDOCAINE 40 MG/G
CREAM TOPICAL
Status: DISCONTINUED | OUTPATIENT
Start: 2021-07-25 | End: 2021-07-26 | Stop reason: HOSPADM

## 2021-07-25 RX ORDER — ATORVASTATIN CALCIUM 10 MG/1
10 TABLET, FILM COATED ORAL DAILY
Status: DISCONTINUED | OUTPATIENT
Start: 2021-07-25 | End: 2021-07-26 | Stop reason: HOSPADM

## 2021-07-25 RX ORDER — AMOXICILLIN 250 MG
1 CAPSULE ORAL 2 TIMES DAILY PRN
Status: DISCONTINUED | OUTPATIENT
Start: 2021-07-25 | End: 2021-07-26 | Stop reason: HOSPADM

## 2021-07-25 RX ORDER — DILTIAZEM HYDROCHLORIDE 120 MG/1
120 CAPSULE, COATED, EXTENDED RELEASE ORAL DAILY
Status: DISCONTINUED | OUTPATIENT
Start: 2021-07-25 | End: 2021-07-25

## 2021-07-25 RX ORDER — AMOXICILLIN 250 MG
2 CAPSULE ORAL 2 TIMES DAILY PRN
Status: DISCONTINUED | OUTPATIENT
Start: 2021-07-25 | End: 2021-07-26 | Stop reason: HOSPADM

## 2021-07-25 RX ORDER — NITROGLYCERIN 0.4 MG/1
0.4 TABLET SUBLINGUAL EVERY 5 MIN PRN
Status: DISCONTINUED | OUTPATIENT
Start: 2021-07-25 | End: 2021-07-26 | Stop reason: HOSPADM

## 2021-07-25 RX ORDER — ONDANSETRON 4 MG/1
4 TABLET, ORALLY DISINTEGRATING ORAL EVERY 6 HOURS PRN
Status: DISCONTINUED | OUTPATIENT
Start: 2021-07-25 | End: 2021-07-26 | Stop reason: HOSPADM

## 2021-07-25 RX ORDER — DILTIAZEM HYDROCHLORIDE 120 MG/1
120 CAPSULE, COATED, EXTENDED RELEASE ORAL DAILY
Status: DISCONTINUED | OUTPATIENT
Start: 2021-07-25 | End: 2021-07-26 | Stop reason: HOSPADM

## 2021-07-25 RX ORDER — ONDANSETRON 2 MG/ML
4 INJECTION INTRAMUSCULAR; INTRAVENOUS EVERY 6 HOURS PRN
Status: DISCONTINUED | OUTPATIENT
Start: 2021-07-25 | End: 2021-07-26 | Stop reason: HOSPADM

## 2021-07-25 RX ORDER — DILTIAZEM HCL IN NACL,ISO-OSM 125 MG/125
5-15 PLASTIC BAG, INJECTION (ML) INTRAVENOUS CONTINUOUS
Status: DISCONTINUED | OUTPATIENT
Start: 2021-07-25 | End: 2021-07-25

## 2021-07-25 RX ADMIN — ATORVASTATIN CALCIUM 10 MG: 10 TABLET, FILM COATED ORAL at 09:46

## 2021-07-25 RX ADMIN — HUMAN ALBUMIN MICROSPHERES AND PERFLUTREN 3 ML: 10; .22 INJECTION, SOLUTION INTRAVENOUS at 08:54

## 2021-07-25 RX ADMIN — LEVOTHYROXINE SODIUM 25 MCG: 0.03 TABLET ORAL at 09:47

## 2021-07-25 RX ADMIN — APIXABAN 5 MG: 5 TABLET, FILM COATED ORAL at 20:33

## 2021-07-25 RX ADMIN — LISINOPRIL 5 MG: 5 TABLET ORAL at 11:58

## 2021-07-25 RX ADMIN — Medication 1 MG: at 00:45

## 2021-07-25 RX ADMIN — Medication 1 MG: at 21:17

## 2021-07-25 RX ADMIN — DILTIAZEM HYDROCHLORIDE 120 MG: 120 CAPSULE, COATED, EXTENDED RELEASE ORAL at 15:00

## 2021-07-25 ASSESSMENT — ACTIVITIES OF DAILY LIVING (ADL)
ADLS_ACUITY_SCORE: 14
ADLS_ACUITY_SCORE: 15
ADLS_ACUITY_SCORE: 14
ADLS_ACUITY_SCORE: 14

## 2021-07-25 ASSESSMENT — MIFFLIN-ST. JEOR: SCORE: 1097.24

## 2021-07-25 NOTE — ED NOTES
[facility]  ED Nurse Handoff Report    Jeannie Chu is a 86 year old female   ED Chief complaint: Palpitations  . ED Diagnosis:   Final diagnoses:   Atrial flutter with rapid ventricular response (H)     Allergies:   Allergies   Allergen Reactions     Ibuprofen        Code Status: Full Code  Activity level - Baseline/Home:  Independent. Activity Level - Current:   Independent. Lift room needed: No. Bariatric: No   Needed: Yes   Isolation: No. Infection: Not Applicable.     Vital Signs:   Vitals:    07/24/21 2130 07/24/21 2145 07/24/21 2200 07/24/21 2215   BP: (!) 163/108 (!) 134/114  (!) 139/119   Pulse: (!) 144 (!) 135 (!) 141 113   Resp: 20 20 22 29   Temp:       TempSrc:       SpO2: 100% 100% 100% 100%       Cardiac Rhythm:  ,   Cardiac  Cardiac Rhythm: Atrial fibrillation  Pain level:    Patient confused: No. Patient Falls Risk: No.   Elimination Status: Has voided   Patient Report - Initial Complaint: Palpitations. Focused Assessment: HPI   Jeannie Chu is a 86 year old female with a history of hypothyroidism, CAD, hypertension, and hyperlipidemia who presents to the ED for evaluation of fatigue and weakness. Per daughter,      Daughter and son in law, 1700 went into room, patient felt weak and fatigued, lied down downstairs, gave her some sugar and vitamins, 1930 checked BP and BS, no shortness of breath, only now from walking to the ED, no chest pain, no fever, cough, bleeding, NVD, no palpitations, no regular BP check, no myalgia or swelling, no hx of atrial fibrillation, stent in heart 22 years ago    Tests Performed: EKG, blood work, chest x-ray. Abnormal Results:   Labs Ordered and Resulted from Time of ED Arrival Up to the Time of Departure from the ED   COMPREHENSIVE METABOLIC PANEL - Abnormal; Notable for the following components:       Result Value    Sodium 132 (*)     Albumin 3.2 (*)     All other components within normal limits   CBC WITH PLATELETS AND  DIFFERENTIAL - Abnormal; Notable for the following components:    WBC Count 11.5 (*)     Absolute Monocytes 1.4 (*)     All other components within normal limits   TSH WITH FREE T4 REFLEX - Abnormal; Notable for the following components:    TSH 6.34 (*)     All other components within normal limits   TROPONIN I - Normal   ISTAT CREATININE POCT - Normal   T4 FREE - Normal   SARS-COV2 (COVID-19) VIRUS RT-PCR   ISTAT CREATININE NURSING POCT   COVID-19 VIRUS (CORONAVIRUS) BY PCR    Narrative:     The following orders were created for panel order Asymptomatic COVID-19 Virus (Coronavirus) by PCR Nasopharyngeal.  Procedure                               Abnormality         Status                     ---------                               -----------         ------                     SARS-COV2 (COVID-19) Vir...[[id number]]                                                   Please view results for these tests on the individual orders.   CBC WITH PLATELETS & DIFFERENTIAL    Narrative:     The following orders were created for panel order CBC + differential.  Procedure                               Abnormality         Status                     ---------                               -----------         ------                     CBC with platelets and d...[[id number]]  Abnormal            Final result                 Please view results for these tests on the individual orders.     Chest XR,  PA & LAT   Final Result   IMPRESSION: Heart is magnified on this AP view, likely at upper limits of normal in size. Pulmonary vessels are normal. Heavy atherosclerotic calcifications of aortic arch unchanged. Lungs clear.      .   Treatments provided: see emar  Family Comments: family at bedside, very supportive, patients daughter has been translating  OBS brochure/video discussed/provided to patient:  N/A  ED Medications:   Medications   diltiazem (CARDIZEM) 125 mg in sodium chloride 0.7 % 125 mL infusion (5 mg/hr Intravenous New Bag  7/24/21 2234)   diltiazem (CARDIZEM) injection 10 mg (10 mg Intravenous Given 7/24/21 2208)     Drips infusing:  Yes  For the majority of the shift, the patient's behavior Green. Interventions performed were none.    Sepsis treatment initiated: No     Patient tested for COVID 19 prior to admission: YES    ED Nurse Name/Phone Number: Saulo Flor RN,   10:53 PM    RECEIVING UNIT ED HANDOFF REVIEW    Above ED Nurse Handoff Report was reviewed: Yes  Reviewed by: Sada Sal RN on July 24, 2021 at 11:21 PM

## 2021-07-25 NOTE — PROGRESS NOTES
[facility]  Hospitalist Progress Note  Ari Ramos MD, MD 07/25/2021  (Text Page)  Reason for Stay (Diagnosis): A. fib with RVR         Assessment and Plan:      Summary of Stay: Jeannie Chu is a 86 year old female with known history of CAD, hypertension on maintenance metoprolol and lisinopril, dyslipidemia, hypothyroidism admitted on 7/24/2021 with increasing fatigue, generalized weakness and found with the following:    Problem List:   1. A. fib with RVR  2. Hypertension  3. Dyslipidemia  4. Hypothyroidism  5. Hyponatremia-resolve  6. History of CAD    Continue inpatient care.  Remained at risk for clinical deterioration.  Earlier was receiving diltiazem infusion.  -She converted to NSR, earlier also had a asymptomatic 4-second cardiac pause  -Pending echocardiogram  -On home metoprolol and ACE inhibitors for home regimen  -We will await further recommendations and orders from cardiology service  -Reiterated and discussed with the patient and family regarding her [id number].  -They like to further discuss options with cardiology service as well regarding long-term anticoagulation  -Home regimen of levothyroxine, statins were resumed  -On low-dose aspirin at baseline for history of CAD      DVT Prophylaxis: Pneumatic Compression Devices, will await further decisions regarding oral anticoagulation for stroke prophylaxis with history of A. fib with RVRFull Code  Code Status: Full Code  Discharge Dispo: Hopeful for home discharge  Estimated Disch Date / # of Days until Disch: Anticipating at least another 24 to 36 hours of inpatient care        Interval History (Subjective):      I assume service care today.  Seen and examined.  Chart reviewed.  Case discussed with nursing service.  Multiple family members present at bedside and provided with updates.  I found our patient laying comfortably in bed and tolerating her breakfast tray.  She was off diltiazem drip.  She is feeling much  better with no complaints of shortness of breath, chest pain, palpitations.  No reported dizziness or lightheadedness.  Earlier she converted to normal sinus rhythm but also exhibited a 4.9-second pause.  Stable hemodynamics and demonstrated no hypotension.  Currently afebrile.  Not requiring any oxygen support.              Physical Exam:      Last Vital Signs:  BP (!) 161/78 (BP Location: Right arm)   Pulse 58   Temp 97.7  F (36.5  C) (Oral)   Resp 20   Ht 1.524 m (5')   Wt 73.1 kg (161 lb 1.6 oz)   LMP  (LMP Unknown)   SpO2 100%   BMI 31.46 kg/m      No intake/output data recorded.  Wt Readings from Last 1 Encounters:   07/25/21 73.1 kg (161 lb 1.6 oz)     Vitals:    07/25/21 0007   Weight: 73.1 kg (161 lb 1.6 oz)       Constitutional: Awake, alert, cooperative, no apparent distress   Respiratory: Clear to auscultation bilaterally, no crackles or wheezing   Cardiovascular: Regular rate and rhythm, normal S1 and S2, and no JVD, trace nonpitting pedal edema   Abdomen: Normal bowel sounds, soft, non-distended, non-tender   Skin: No rashes, no cyanosis, dry to touch   Neuro: Alert and oriented x3, no weakness, spontaneous and coherent speech   Extremities: No edema, normal range of motion   Other(s): Euthymic mood, not agitated       All other systems: Negative          Medications:      All current medications were reviewed with changes reflected in problem list.         Data:      All new lab and imaging data was reviewed.   Labs:  No results for input(s): CULT in the last 168 hours.  Recent Labs   Lab 07/25/21  0714 07/24/21  2138   WBC 10.1 11.5*   HGB 13.4 13.6   HCT 41.3 42.8   MCV 95 97    227     Recent Labs   Lab 07/25/21  0714 07/25/21  0025 07/24/21 2138     --  132*   POTASSIUM 4.1  --  4.7   CHLORIDE 104  --  102   CO2 29  --  27   ANIONGAP 3  --  3   GLC 85 105* 97   BUN 20  --  20   CR 0.80  --  0.79  0.8   GFRESTIMATED 67  --  >60  68   LYLE 8.8  --  9.5   PROTTOTAL  --   --   7.7   ALBUMIN  --   --  3.2*   BILITOTAL  --   --  0.6   ALKPHOS  --   --  64   AST  --   --  23   ALT  --   --  21     No results for input(s): SED, CRP in the last 168 hours.  Recent Labs   Lab 21  0714 21  0025 21   GLC 85 105* 97     No results for input(s): INR in the last 168 hours.  Recent Labs   Lab 21   TROPONIN 0.018     No results for input(s): COLOR, APPEARANCE, URINEGLC, URINEBILI, URINEKETONE, SG, UBLD, URINEPH, PROTEIN, UROBILINOGEN, NITRITE, LEUKEST, RBCU, WBCU in the last 168 hours.  No results for input(s): URINEKETONE in the last 168 hours.    Invalid input(s):  KETONEURINE   Imaging:   Results for orders placed or performed during the hospital encounter of 21   Chest XR,  PA & LAT    Narrative    EXAM: XR CHEST 2 VW  LOCATION: [facility]  DATE/TIME: 2021 10:32 PM    INDICATION: new a-fib  COMPARISON: 2020      Impression    IMPRESSION: Heart is magnified on this AP view, likely at upper limits of normal in size. Pulmonary vessels are normal. Heavy atherosclerotic calcifications of aortic arch unchanged. Lungs clear.   Echocardiogram Complete     Value    LVEF  55-60%    Narrative    [id number]  [id number]  [id number]  [id number]^INGRIS^SAVANNA     [facility]  Echocardiography Laboratory  [address] [place] [address]     Name: CORA WAY  MRN: [id number]  : 1935  Study Date: 2021 08:30 AM  Age: 86 yrs  Gender: Female  Patient Location: [facility]  Reason For Study: Atrial Fibrillation  Ordering Physician: SAVANNA AMADO  Referring Physician: Mikel Hussein MD  Performed By: Nancy Cruz RDCS     BSA: 1.7 m2  Height: 60 in  Weight: 161 lb  HR: 93  BP: 126/66 mmHg  ______________________________________________________________________________  Procedure  Complete Portable Echo Adult. [employer] (NDC #[id number]) given  intravenously.  ______________________________________________________________________________  Interpretation Summary     Technically challenging study due to patient body habitus, even with the use  of contrast imaging.     Left ventricular systolic function is normal. The visual ejection fraction is  55-60%.  Endocardial borders are not optimally visualized, however no clear wall motion  abnormalities are seen.  Right ventricular systolic function is borderline reduced.  No significant valvular abnormalities on limited visualization.     There are no prior studies available for comparison.  ______________________________________________________________________________  Left Ventricle  The left ventricle is normal in size. There is normal left ventricular wall  thickness. Left ventricular systolic function is normal. The visual ejection  fraction is 55-60%. Diastolic function not assessed due to arrhythmia.  Regional wall motion abnormalities cannot be excluded due to limited  visualization.     Right Ventricle  The right ventricle is grossly normal size. The right ventricular systolic  function is borderline reduced.     Atria  The left atrium is moderately dilated. The right atrium is mildly dilated.     Mitral Valve  There is moderate mitral annular calcification.     Tricuspid Valve  The tricuspid valve is not well visualized, but is grossly normal. There is  trace tricuspid regurgitation. Right ventricular systolic pressure could not  be approximated due to inadequate tricuspid regurgitation.     Aortic Valve  The aortic valve is not well visualized. Valve morphology is not well  visualized, however on Doppler interrogation it is probably functioning  normally.     Pulmonic Valve  The pulmonic valve is not well visualized. The pulmonic valve is not well  seen, but is grossly normal.     Vessels  The aortic root is normal size. Normal size ascending aorta. The inferior vena  cava was normal in size with  preserved respiratory variability.     Pericardium  There is no pericardial effusion.     Rhythm  The rhythm was atrial fibrillation.  ______________________________________________________________________________  MMode/2D Measurements & Calculations  IVSd: 0.98 cm     LVIDd: 3.2 cm  LVIDs: 1.3 cm  LVPWd: 1.0 cm  FS: 58.5 %  LV mass(C)d: 91.0 grams  LV mass(C)dI: 53.4 grams/m2  Ao root diam: 2.6 cm  LA dimension: 3.6 cm  asc Aorta Diam: 2.8 cm  LA/Ao: 1.4  LVOT diam: 1.7 cm  LVOT area: 2.3 cm2  LA Volume (BP): 71.0 ml  LA Volume Index (BP): 41.8 ml/m2  RWT: 0.65     Doppler Measurements & Calculations  MV E max danielle: 105.8 cm/sec  MV max P.7 mmHg  MV mean PG: 3.3 mmHg  MV V2 VTI: 16.1 cm  MV P1/2t max danielle: 113.1 cm/sec  MV P1/2t: 29.0 msec  MVA(P1/2t): 7.6 cm2  MV dec slope: 1143 cm/sec2  MV dec time: 0.17 sec  AI P1/2t: 349.0 msec  E/E' avg: 15.4  Lateral E/e': 13.3  Medial E/e': 17.4     ______________________________________________________________________________  Report approved by: Radha Fang 2021 10:01 AM

## 2021-07-25 NOTE — H&P
[facility]    History and Physical  Hospitalist       Date of Admission:  7/24/2021    Assessment & Plan   Jeannie Chu is a 86 year old female who presents with fatigue, mild generalized weakness.    This is a 86-year-old lady who was brought into the emergency room by her daughter and son-in-law who were here in the emergency room.  They also translated her history for me as patient is mainly Tamil speaking.    Her past medical history is significant for coronary artery disease (stent placement about 20 years ago), hypertension, hypothyroidism, dyslipidemia. She has no previous history of atrial fibrillation.    In the afternoon, she notified her daughter that she was feeling rather weak and fatigued.  At around 7pm the daughter checked her blood pressure and heart rate.  At that time, her heart rate was elevated in the range of 120.  Blood pressure was normal.  For that reason they brought her to the emergency room.    Upon further questioning, she admits that she has been feeling rather weak and fatigued since this morning.  She denies any palpitations.  Denies any chest pain or shortness of breath.  In the emergency room she walked to the bathroom and did not have any trouble walking.  No dizziness/chest pain/shortness of breath with that much of activity.    EKG revealed atrial fibrillation with RVR.  She was given 10 mg diltiazem bolus followed by diltiazem infusion.  Currently her blood pressure is stable.  Heart rate is still anywhere between 90-1 20 atrial fibrillation.  Otherwise she denies any symptoms.  Medicine team was consulted for admission and further care.    Problem List:    Atrial fibrillation with rapid ventricular response.  -New onset of A. fib.  -Admission to medicine service.  [facility] care.  -Diltiazem infusion for rate control.  -Echocardiogram in the morning.  Cardiology consultation.  -Her [id number] score was calculated to be 5 so she will benefit from  anticoagulation.  I briefly discussed the options of anticoagulation with the family.  They are looking forward to have further discussion with the cardiologist in the morning.  -Pharmacy liaison consult to discuss coverage for apixaban and rivaroxaban.    Hypertension  -Hold antihypertensives (metoprolol XL and lisinopril) for now to make room for diltiazem.    Dyslipidemia  -Continue atorvastatin.  Her last LDL was 49 and HDL of 64 in May.    Hypothyroidism  -Continue levothyroxine.  TSH 6.34 which was 12 in May when she was started on levothyroxine.    Mild hyponatremia  -Sodium usually runs a little bit on the lower side.  Monitor.    History of coronary artery disease  -Per family, last stress test was about 4 to 5 years ago and was normal.  No ischemic symptoms.  On low-dose aspirin at baseline.    Plan discussed at length with the patient and patient's family.      DVT Prophylaxis: Pneumatic Compression Devices  Code Status: Full Code    Félix Carrion MD    Primary Care Physician   Mikel Hussein    Chief Complaint   Fatigue       History of Present Illness   Jeannie Chu is a 86 year old female presented with fatigue       Past Medical History    I have reviewed this patient's medical history and updated it with pertinent information if needed.   Past Medical History:   Diagnosis Date     Acquired hypothyroidism 5/27/2021     Coronary artery disease involving native coronary artery of native heart without angina pectoris 5/26/2021     Other and unspecified hyperlipidemia      Unspecified cardiovascular disease 1999    stent placed       Past Surgical History   I have reviewed this patient's surgical history and updated it with pertinent information if needed.  No past surgical history on file.    Prior to Admission Medications   Prior to Admission Medications   Prescriptions Last Dose Informant Patient Reported? Taking?   ALPRAZolam (XANAX) 0.5 MG tablet   No No   Sig: TAKE 1 TABLET BY MOUTH NIGHTLY  AS NEEDED FOR ANXIETY OR  SLEEP   ASPIRIN 81 MG OR TABS   Yes No   Si tab po QD (Once per day)   IRON (FERROUS GLUCONATE) 325 MG OR TABS   Yes No   Si TABLET DAILY   LIPITOR 10 MG OR TABS   Yes No   Si tab PO QD (Once per day)   NIASPAN 500 MG OR TBCR   Yes No   Si CAPSULE AT BEDTIME   atorvastatin (LIPITOR) 10 MG tablet   No No   Sig: Take 1 tablet by mouth once daily   cyclobenzaprine (FLEXERIL) 5 MG tablet   No No   Sig: Take 1 tablet (5 mg) by mouth 2 times daily as needed for muscle spasms   Patient not taking: Reported on 2021   levothyroxine (SYNTHROID/LEVOTHROID) 25 MCG tablet   No No   Sig: Take 1 tablet (25 mcg) by mouth daily   lisinopril (ZESTRIL) 5 MG tablet   No No   Sig: Take 1 tablet (5 mg) by mouth daily   metoprolol succinate ER (TOPROL-XL) 25 MG 24 hr tablet   No No   Sig: TAKE 1 TABLET BY MOUTH ONCE DAILY WITH 50 MG TABLET   metoprolol succinate ER (TOPROL-XL) 50 MG 24 hr tablet   No No   Sig: TAKE 1 TABLET BY MOUTH ONCE DAILY WITH 25 MG TABLET      Facility-Administered Medications: None     Allergies   Allergies   Allergen Reactions     Ibuprofen        Social History   I have reviewed this patient's social history and updated it with pertinent information if needed. Jeannie Chu  reports that she has never smoked. She has never used smokeless tobacco. She reports that she does not drink alcohol and does not use drugs.    Family History   I have reviewed this patient's family history and updated it with pertinent information if needed.   Family History   Problem Relation Age of Onset     Family History Negative Other        Review of Systems   The 10 point Review of Systems is negative other than noted in the HPI or here.     Physical Exam   Temp: 97.5  F (36.4  C) Temp src: Temporal BP: (!) 139/119 Pulse: 113   Resp: 29 SpO2: 100 %      Vital Signs with Ranges  Temp:  [97.5  F (36.4  C)] 97.5  F (36.4  C)  Pulse:  [113-144] 113  Resp:  [16-29] 29  BP:  (134-163)/(108-136) 139/119  SpO2:  [99 %-100 %] 100 %  0 lbs 0 oz    Constitutional: Awake, alert, cooperative, no apparent distress.  Eyes: Conjunctiva and pupils examined and normal.  HEENT: Moist mucous membranes, normal dentition.  Respiratory: Clear to auscultation bilaterally, no crackles or wheezing.  Cardiovascular: Irregular rate and rhythm, normal S1 and S2, and no murmur noted.  GI: Soft, non-distended, non-tender, normal bowel sounds.  Skin: No rashes, no cyanosis, trace edema.  Musculoskeletal: No joint swelling, erythema or tenderness.  Neurologic: Cranial nerves 2-12 intact, normal strength and sensation.  Psychiatric: Alert, oriented to person, place and time, no obvious anxiety or depression.    Data     Recent Labs   Lab 07/24/21  2138   WBC 11.5*   HGB 13.6   MCV 97      *   POTASSIUM 4.7   CHLORIDE 102   CO2 27   BUN 20   CR 0.79  0.8   ANIONGAP 3   LYLE 9.5   GLC 97   ALBUMIN 3.2*   PROTTOTAL 7.7   BILITOTAL 0.6   ALKPHOS 64   ALT 21   AST 23   TROPONIN 0.018       Recent Results (from the past 24 hour(s))   Chest XR,  PA & LAT    Narrative    EXAM: XR CHEST 2 VW  LOCATION: [facility]  DATE/TIME: 7/24/2021 10:32 PM    INDICATION: new a-fib  COMPARISON: 1/13/2020      Impression    IMPRESSION: Heart is magnified on this AP view, likely at upper limits of normal in size. Pulmonary vessels are normal. Heavy atherosclerotic calcifications of aortic arch unchanged. Lungs clear.

## 2021-07-25 NOTE — PROVIDER NOTIFICATION
Notified by tele tech that pt. Had a 2.3 sec. Pause. Pt. Not symptomatic with pause. Pt. On Dilt gtt at 10mg/hr, rate decreased to 5mg/hr. Tele: A.fib with HR 80's to 110's. MD paged regarding pause of 2.3 sec.

## 2021-07-25 NOTE — PLAN OF CARE
Pt. A&Ox4, [place], Tamil speaking but able to speak some English. Tele: A.Fib HR 70's to 100's, pt. Continues on diltiazem drip at 5mg/hr. Pause up to to 2.7 sec. Plan for Echo in AM and Cardiology consult. Pt. Denies any other needs at this time. Will continue with POC.

## 2021-07-25 NOTE — CONSULTS
Cardiology Consultation:    Jeannie Chu MRN#: [id number]   YOB: 1935 Age: 86 year old     Date of Admission: 7/24/2021  Consult indication: atrial fibrillation          Assessment and Plan / Recommendations:      # Paroxysmal atrial fibrillation, [id number], converted spontaneously to normal sinus rhythm 7/25/2021.  New diagnosis, duration unknown chronicity unknown.  Asymptomatic.  # HTN. BP elevated.   # HL.  # CAD s/p PCI (remote history, proximal LAD 20 years ago).  Patient denies symptoms concerning for angina.  # Overweight/obesity    TTE 7/25/2021 demonstrated normal biventricular function, no significant valvular abnormalities.    -Will change metoprolol to diltiazem 120 mg daily  -Resume lisinopril 5 mg daily  -Resume atorvastatin 10 mg daily  -Discussed anticoagulation for the primary prevention of stroke, risk/benefits discussed at length.  Pharmacy liaison consult placed.  Recommend starting on apixaban, however if another alternative DOAC is more affordable, would recommend going with the more affordable option, after discussion with the family and patient.  -Hold home aspirin since starting DOAC  -Can discharge home today from a cardiac perspective, would recommend cardiac monitor (Holter monitor 48 hours)  -Follow-up with cardiology MEREDITH in 2 weeks, and with me in 4 to 6 weeks, or sooner as needed    Thank you for allowing our team to participate in the care of Jeannie Chu. Please do not hesitate to page me with any questions or concerns.    Germán Shin MD, [facility]  Cardiology  Text Page   July 25, 2021    Voice recognition software utilized. Although reviewed after completion, some word and grammatical errors may be present.    Total time spent on this encounter: 45 minutes, providing care in this encounter including, but not limited to, reviewing prior medical records, laboratory data, imaging studies, diagnostic studies, procedure notes, formulating  an assessment and plan, recommendations.         History of Present Illness:     I had the opportunity to see patient Jeannie Chu at [facility] for a cardiology consultation.     As you know, patient is a pleasant 86-year-old female with a past medical history significant for hypertension, hyperlipidemia, remote history of CAD status post PCI (approximately 20 years ago), who presents for further evaluation and management of fatigue and weakness, found to be in atrial fibrillation with RVR.    Patient does not speak English, however her daughter is present, the patient declined a licensed .  The daughter and son-in-law served as interpreters.    Patient had been in her usual state of health until recently when she began feeling weak and fatigued.  Her daughter checked a blood pressure last night and heart rate is elevated to the 120 bpm range.  In the emergency department initial blood pressure was 141/100 18 mmHg, heart rate 132 bpm.  An ECG was done which demonstrated atrial fibrillation at around 137 bpm.  Initial labs are notable for potassium 4.7, creatinine 0.79, troponin normal, TSH 6.34 but with normal free T4 0.92.  WBC 11.5.    She was started on a diltiazem GTT, and admitted to General Medicine for further evaluation and management.    Earlier this morning she had a 4.7-second pause after which she spontaneously converted to normal sinus rhythm.  She is asymptomatic of this episode.    She denies any exertional chest pain or chest discomfort, no worsening decline in exertional capacity recently.  She does have several years of intermittent chest wall discomfort, worse with palpation of her left breast, however this discomfort is not exertional in nature.    TTE 7/25/2021 demonstrated normal biventricular function, no significant valvular abnormalities.         Past Medical History:   I have reviewed this patient's past medical history    Past Medical History:   Diagnosis Date     Acquired  hypothyroidism 5/27/2021     Coronary artery disease involving native coronary artery of native heart without angina pectoris 5/26/2021     Other and unspecified hyperlipidemia      Unspecified cardiovascular disease 1999    stent placed             Past Surgical History:   I have reviewed this patient's past surgical history   No past surgical history on file.          Social History:   I have reviewed this patient's social history  Social History     Tobacco Use     Smoking status: Never Smoker     Smokeless tobacco: Never Used   Substance Use Topics     Alcohol use: No             Family History:   I have reviewed this patient's family history  Family History   Problem Relation Age of Onset     Family History Negative Other              Allergies:   I have reviewed this patient's allergy history  Allergies   Allergen Reactions     Ibuprofen              Medications reviewed:   Prior to admission medications:  Prior to Admission medications    Medication Sig Start Date End Date Taking? Authorizing Provider   ALPRAZolam (XANAX) 0.5 MG tablet TAKE 1 TABLET BY MOUTH NIGHTLY AS NEEDED FOR ANXIETY OR  SLEEP 7/22/21   Ailyn Patterson MD   ASPIRIN 81 MG OR TABS 1 tab po QD (Once per day) 1/16/06   Joanna Garcia MD   atorvastatin (LIPITOR) 10 MG tablet Take 1 tablet by mouth once daily 7/20/21   Mikel Hussein MD   cyclobenzaprine (FLEXERIL) 5 MG tablet Take 1 tablet (5 mg) by mouth 2 times daily as needed for muscle spasms  Patient not taking: Reported on 5/26/2021 4/30/21   Mikel Hussein MD   IRON (FERROUS GLUCONATE) 325 MG OR TABS 1 TABLET DAILY 1/16/06   Joanna Garcia MD   levothyroxine (SYNTHROID/LEVOTHROID) 25 MCG tablet Take 1 tablet (25 mcg) by mouth daily 6/2/21   Mikel Hussein MD   LIPITOR 10 MG OR TABS 1 tab PO QD (Once per day) 1/16/06   Joanna Garcia MD   lisinopril (ZESTRIL) 5 MG tablet Take 1 tablet (5 mg) by mouth daily 2/8/21   Mikel Hussein MD   metoprolol succinate  ER (TOPROL-XL) 25 MG 24 hr tablet TAKE 1 TABLET BY MOUTH ONCE DAILY WITH 50 MG TABLET 7/20/21   Ailyn Patterson MD   metoprolol succinate ER (TOPROL-XL) 50 MG 24 hr tablet TAKE 1 TABLET BY MOUTH ONCE DAILY WITH 25 MG TABLET 7/20/21   Ailyn Patterson MD   NIASPAN 500 MG OR TBCR 1 CAPSULE AT BEDTIME 1/16/06   Joanna Garcia MD      Current medications:  Current Facility-Administered Medications Ordered in Epic   Medication Dose Route Frequency Last Rate Last Admin     atorvastatin (LIPITOR) tablet 10 mg  10 mg Oral Daily   10 mg at 07/25/21 0946     diltiazem ER COATED BEADS (CARDIZEM CD/CARTIA XT) 24 hr capsule 120 mg  120 mg Oral Daily         levothyroxine (SYNTHROID/LEVOTHROID) tablet 25 mcg  25 mcg Oral Daily   25 mcg at 07/25/21 0947     lidocaine (LMX4) cream   Topical Q1H PRN         lidocaine 1 % 0.1-1 mL  0.1-1 mL Other Q1H PRN         lisinopril (ZESTRIL) tablet 5 mg  5 mg Oral Daily         melatonin tablet 1 mg  1 mg Oral At Bedtime PRN   1 mg at 07/25/21 0045     nitroGLYcerin (NITROSTAT) sublingual tablet 0.4 mg  0.4 mg Sublingual Q5 Min PRN         ondansetron (ZOFRAN-ODT) ODT tab 4 mg  4 mg Oral Q6H PRN        Or     ondansetron (ZOFRAN) injection 4 mg  4 mg Intravenous Q6H PRN         senna-docusate (SENOKOT-S/PERICOLACE) 8.6-50 MG per tablet 1 tablet  1 tablet Oral BID PRN        Or     senna-docusate (SENOKOT-S/PERICOLACE) 8.6-50 MG per tablet 2 tablet  2 tablet Oral BID PRN         sodium chloride (PF) 0.9% PF flush 3 mL  3 mL Intracatheter Q8H         sodium chloride (PF) 0.9% PF flush 3 mL  3 mL Intracatheter q1 min prn         sodium chloride (PF) 0.9% PF flush 3 mL  3 mL Intracatheter Q8H         sodium chloride (PF) 0.9% PF flush 3 mL  3 mL Intracatheter q1 min prn         No current [facility]-ordered outpatient medications on file.             Review of Systems:   A complete review of systems was performed and was negative except as mentioned in the HPI.          Physical Exam:   Vital signs  were personally reviewed:  Temperatures:  Current - Temp: 97.6  F (36.4  C); Max - Temp  Av.7  F (36.5  C)  Min: 97.5  F (36.4  C)  Max: 98.3  F (36.8  C)  Respiration range: Resp  Av.3  Min: 16  Max: 30  Pulse range: Pulse  Av.9  Min: 74  Max: 144  Blood pressure range: Systolic (24hrs), Av , Min:113 , Max:163   ; Diastolic (24hrs), Av, Min:66, Max:136    Pulse oximetry range: SpO2  Av.7 %  Min: 98 %  Max: 100 %  No intake or output data in the 24 hours ending 21 0808  161 lbs 1.6 oz  Body mass index is 31.46 kg/m .   Body surface area is 1.76 meters squared.    Constitutional: appears stated age, in no apparent distress, appears to be well nourished  Eyes: sclera anicteric, conjunctiva normal, no lesions on eyelids or lashes  ENT: normocephalic, without obvious abnormality, atraumatic, external ears without lesions,   Pulmonary: clear to auscultation bilaterally, no wheezes, no rales, no increased work of breathing  Cardiovascular: JVP normal, regular rate, regular rhythm, distant heart sounds but no obvious murmurs, no lower extremity edema  Gastrointestinal: abdominal exam benign, non-tender, no rigidity, no guarding  Neurologic: awake, alert, face symmetrical, moves all extremities  Skin: no abnormal rashes or lesions on limited exam, nails normal without discoloration or clubbing, no jaundice  Psychiatric: affect is normal, answers questions appropriately, oriented to self and place         Laboratory tests:   Laboratory tests personally reviewed:   CMP  Recent Labs   Lab 21  0714 21  0025 21  2138     --  132*   POTASSIUM 4.1  --  4.7   CHLORIDE 104  --  102   CO2 29  --  27   ANIONGAP 3  --  3   GLC 85 105* 97   BUN 20  --  20   CR 0.80  --  0.79  0.8   GFRESTIMATED 67  --  >60  68   LYLE 8.8  --  9.5   PROTTOTAL  --   --  7.7   ALBUMIN  --   --  3.2*   BILITOTAL  --   --  0.6   ALKPHOS  --   --  64   AST  --   --  23   ALT  --   --  21      CBC  Recent Labs   Lab 07/25/21  0714 07/24/21  2138   WBC 10.1 11.5*   RBC 4.34 4.43   HGB 13.4 13.6   HCT 41.3 42.8   MCV 95 97   MCH 30.9 30.7   MCHC 32.4 31.8   RDW 13.6 13.4    227     INRNo lab results found in last 7 days.  Lab Results   Component Value Date    TROPONIN 0.018 07/24/2021     Recent Labs   Lab Test 05/26/21  1129   CHOL 135   HDL 64   LDL 49   TRIG 112     No results found for: A1C  TSH   Date Value Ref Range Status   07/24/2021 6.34 (H) 0.40 - 4.00 mU/L Final   05/26/2021 12.04 (H) 0.40 - 4.00 mU/L Final

## 2021-07-25 NOTE — PHARMACY-ADMISSION MEDICATION HISTORY
Admission medication history interview status for this patient is complete. See [facility] admission navigator for allergy information, prior to admission medications and immunization status.     Medication history interview done, indicate source(s): Patient and one of the patient's family members who assisted in translation  Medication history resources (including written lists, pill bottles, clinic record): Adele and Care Everywhere  Pharmacy: [facility] in [place]    Changes made to PTA medication list:  Added: None  Changed: None  Reported as Not Taking: None  Removed: cyclobenzaprine and niaspan *    Actions taken by pharmacist (provider contacted, etc):None     Additional medication history information:None    Medication reconciliation/reorder completed by provider prior to medication history?  Yes     Prior to Admission medications    Medication Sig Last Dose Taking? Auth Provider   ALPRAZolam (XANAX) 0.5 MG tablet TAKE 1 TABLET BY MOUTH NIGHTLY AS NEEDED FOR ANXIETY OR  SLEEP Past Week at Unknown time Yes Ailyn Patterson MD   ASPIRIN 81 MG OR TABS 1 tab po QD (Once per day) 7/24/2021 at AM Yes Joanna Garcia MD   atorvastatin (LIPITOR) 10 MG tablet Take 1 tablet by mouth once daily 7/24/2021 at AM Yes Mikel Hussein MD   IRON (FERROUS GLUCONATE) 325 MG OR TABS Take 1 tablet by mouth daily  7/24/2021 at AM Yes Joanna Garcia MD   levothyroxine (SYNTHROID/LEVOTHROID) 25 MCG tablet Take 1 tablet (25 mcg) by mouth daily 7/24/2021 at AM Yes Mikel Hussein MD   lisinopril (ZESTRIL) 5 MG tablet Take 1 tablet (5 mg) by mouth daily 7/24/2021 at AM Yes Mikel Hussein MD   metoprolol succinate ER (TOPROL-XL) 25 MG 24 hr tablet TAKE 1 TABLET BY MOUTH ONCE DAILY WITH 50 MG TABLET 7/24/2021 at AM Yes Ailyn Patterson MD   metoprolol succinate ER (TOPROL-XL) 50 MG 24 hr tablet TAKE 1 TABLET BY MOUTH ONCE DAILY WITH 25 MG TABLET 7/24/2021 at AM Yes Ailyn Patterson MD

## 2021-07-25 NOTE — ED TRIAGE NOTES
SOB and fatigue tonight, symptoms started around 1735.  HR of 128 at home.  Pulse irregular in triage.

## 2021-07-25 NOTE — ED PROVIDER NOTES
History   Chief Complaint:  Fatigue     HPI   Jeannie Chu is a 86 year old female with a history of hypothyroidism, CAD, hypertension, and hyperlipidemia who presents to the ED for evaluation of shortness of breath, fatigue, and generalized weakness. Per daughter, the patient was resting in her room when she called her in around 1700 due to feeling fatigued and weak. Daughter subsequently helped her downstairs where she was given some vitamins/sugar and continued to rest. Around 1930, her vitals were checked. She was found to be tachycardic with a heart rate of 128, prompting the ED visit. While walking from her car to the ED, she also became short of breath. She otherwise denies fever, cough, chest pain, palpitations, nausea/emesis, diarrhea, myalgias, or leg swelling. Daughter states that she does not regularly check the patient's blood pressure at home. She does not have a history of atrial fibrillation but did have a stent placed about 22 years ago.     Review of Systems   Constitutional: Positive for fatigue. Negative for fever.   Respiratory: Positive for shortness of breath. Negative for cough.    Cardiovascular: Negative for chest pain, palpitations and leg swelling.   Gastrointestinal: Negative for diarrhea, nausea and vomiting.   Musculoskeletal: Negative for myalgias.   Neurological: Positive for weakness.   All other systems reviewed and are negative.    Allergies:  Ibuprofen    Medications:  Alprazolam   Aspirin 81 MG   Atorvastatin   Levothyroxine   Lipitor   Lisinopril   Metoprolol succinate ER   Niaspan     Past Medical History:    Acquired hypothyroidism  Hyperthyroidism  CAD  Angina pectoris   Hyperlipidemia   Hypertension     Past Surgical History:    Cardiac stent placement    Social History:  The patient presented with daughter and son-in-law.    Physical Exam     Patient Vitals for the past 24 hrs:   BP Temp Temp src Pulse Resp SpO2   07/24/21 2300 124/83 -- -- 94 30 100 %   07/24/21  2215 (!) 139/119 -- -- 113 29 100 %   07/24/21 2200 -- -- -- (!) 141 22 100 %   07/24/21 2145 (!) 134/114 -- -- (!) 135 20 100 %   07/24/21 2130 (!) 163/108 -- -- (!) 144 20 100 %   07/24/21 2115 (!) 149/136 -- -- -- -- --   07/24/21 2113 (!) 141/118 97.5  F (36.4  C) Temporal (!) 132 16 99 %       Physical Exam  Gen: alert  HEENT: PERRL, oropharynx clear  Neck: normal ROM  CV:  2+ distal pulses in all 4 extremities, tachycardic, irregularly irregular rhythm  Pulm: breath sounds equal, lungs clear  Abd: Soft, nontender  Back: no evidence of injury  MSK: no lower extremity edema, no calf tenderness  Skin: no rash  Neuro: alert, appropriate conversation and interaction      Emergency Department Course   ECG  ECG taken at 2125, ECG read at 2136  Atrial flutter with variable AV block   Abnormal ECG    Rate 137 bpm. VA interval * ms. QRS duration 64 ms. QT/QTc 294/443 ms. P-R-T axes * 21 57.     Imaging:  Chest XR, PA & LAT:  Heart is magnified on this AP view, likely at upper limits of normal in size. Pulmonary vessels are normal. Heavy atherosclerotic calcifications of aortic arch unchanged. Lungs clear.      Laboratory:  CMP:  (L), albumin 3.2 (L), o/w WNL (Creatinine 0.79)       CBC: WBC 11.5 (H), HGB 13.6,      Troponin (Collected 2138): 0.018    Creatinine POCT: Creatinine 0.8, GFR estimate >60    TSH with free T4 reflex: 6.34 (H)     T4 free: 0.92    SARS-COV2 (COVID-19) Virus RT-PCR: negative    Asymptomatic COVID19 Virus PCR by nasopharyngeal swab: negative    Emergency Department Course:    Reviewed:  2140 I reviewed nursing notes, vitals, past medical history.    Assessments:  2145 I obtained history and examined the patient as noted above.   2228 I rechecked the patient and discussed results/plan of care.    Consults:   2226 I spoke with Dr. Carrion of the hospitalist service regarding the patient's presentation, findings, and plan of care.    Interventions:  2208 Diltiazem 10 mg IV  2234 Diltiazem  5 mg/hr IV   2325 Diltiazem 10 mg/her IV    Disposition:  The patient was admitted to the hospital under the care of Dr. Carrion.     Impression & Plan   Medical Decision Making:  Jeannie Chu is a 86 year old female who presents for shortness of breath and generalized weakness Evaluation today showed atrial flutter. No clear provocative process as she has no infectious symptoms. The patient's rate was well-controlled with diltiazem. No overt evidence of ischemia. The patient's thyroid test did show elevated TSH however normal for T4. Otherwise mild hyponatremia but no other obvious provacative process. The patient will require admission for rate control per radiology consultation. Discussed with Dr. Carrion will admit to cardiology bed.     Critical Care Time: none    Covid-19  Jeannie Chu was evaluated during a global COVID-19 pandemic, which necessitated consideration that the patient might be at risk for infection with the SARS-CoV-2 virus that causes COVID-19.   Applicable protocols for evaluation were followed during the patient's care.   COVID-19 was considered as part of the patient's evaluation. The plan for testing is:  a test was obtained during this visit.    Diagnosis:    ICD-10-CM    1. Atrial flutter with rapid ventricular response (H)  I48.92        Scribe Disclosure:  I, Anne Lake, am serving as a scribe at 9:45 PM on 7/24/2021 to document services personally performed by Danna Dozier MD based on my observations and the provider's statements to me.            Danna Dozier MD  07/25/21 0151

## 2021-07-25 NOTE — PLAN OF CARE
10:05 Pt had a 4.9 second pause this morning, then converted to SR/SB.  Rates in the upper 50's.  Hospital MD and cardiologist updated.    12:55 Text page sent to MD per pt/family request to inquire about discharge plan.      13:00  MD said the plan is to transition to oral medication this afternoon, monitor overnight.  Discharge tomorrow with event monitor.    14:25 Text page sent to MD: Family requesting to change diet to regular.   14:30  MD gave order to change diet to regular.    14:45 No c/o pain.  Alert and oriented.  Knows some English, but is Tamil-speaking.  Daughter at bedside.  Ambulates with assist of 1, gait belt and walker.  Lungs clear.  Tele:  Was a-fib rvr/cvr early then converted to SR/SB later in the morning.  Regular diet.  Good appetite. Voiding without difficulty.  PIV saline locked.  Cardiology following. Echo done today.  EF 55-60%. Discharge home tomorrow with event monitor.

## 2021-07-26 ENCOUNTER — APPOINTMENT (OUTPATIENT)
Dept: CARDIOLOGY | Facility: CLINIC | Age: 86
End: 2021-07-26
Attending: INTERNAL MEDICINE
Payer: COMMERCIAL

## 2021-07-26 VITALS
OXYGEN SATURATION: 100 % | SYSTOLIC BLOOD PRESSURE: 123 MMHG | TEMPERATURE: 97.9 F | HEART RATE: 67 BPM | RESPIRATION RATE: 18 BRPM | DIASTOLIC BLOOD PRESSURE: 94 MMHG | HEIGHT: 60 IN | BODY MASS INDEX: 31.63 KG/M2 | WEIGHT: 161.1 LBS

## 2021-07-26 LAB
ATRIAL RATE - MUSE: 312 BPM
DIASTOLIC BLOOD PRESSURE - MUSE: NORMAL MMHG
INTERPRETATION ECG - MUSE: NORMAL
P AXIS - MUSE: NORMAL DEGREES
PR INTERVAL - MUSE: NORMAL MS
QRS DURATION - MUSE: 64 MS
QT - MUSE: 294 MS
QTC - MUSE: 443 MS
R AXIS - MUSE: 21 DEGREES
SYSTOLIC BLOOD PRESSURE - MUSE: NORMAL MMHG
T AXIS - MUSE: 57 DEGREES
VENTRICULAR RATE- MUSE: 137 BPM

## 2021-07-26 PROCEDURE — 250N000013 HC RX MED GY IP 250 OP 250 PS 637: Performed by: INTERNAL MEDICINE

## 2021-07-26 PROCEDURE — 93225 XTRNL ECG REC<48 HRS REC: CPT

## 2021-07-26 PROCEDURE — 93227 XTRNL ECG REC<48 HR R&I: CPT | Performed by: INTERNAL MEDICINE

## 2021-07-26 PROCEDURE — 99239 HOSP IP/OBS DSCHRG MGMT >30: CPT | Performed by: INTERNAL MEDICINE

## 2021-07-26 RX ORDER — ACETAMINOPHEN 325 MG/1
650 TABLET ORAL EVERY 6 HOURS PRN
Status: DISCONTINUED | OUTPATIENT
Start: 2021-07-26 | End: 2021-07-26 | Stop reason: HOSPADM

## 2021-07-26 RX ORDER — DILTIAZEM HYDROCHLORIDE 120 MG/1
120 CAPSULE, COATED, EXTENDED RELEASE ORAL DAILY
Qty: 30 CAPSULE | Refills: 0 | Status: SHIPPED | OUTPATIENT
Start: 2021-07-27 | End: 2021-08-25

## 2021-07-26 RX ADMIN — ACETAMINOPHEN 650 MG: 325 TABLET, FILM COATED ORAL at 03:38

## 2021-07-26 RX ADMIN — DILTIAZEM HYDROCHLORIDE 120 MG: 120 CAPSULE, COATED, EXTENDED RELEASE ORAL at 08:34

## 2021-07-26 RX ADMIN — APIXABAN 5 MG: 5 TABLET, FILM COATED ORAL at 08:32

## 2021-07-26 RX ADMIN — LISINOPRIL 5 MG: 5 TABLET ORAL at 08:35

## 2021-07-26 RX ADMIN — LEVOTHYROXINE SODIUM 25 MCG: 0.03 TABLET ORAL at 08:34

## 2021-07-26 RX ADMIN — ATORVASTATIN CALCIUM 10 MG: 10 TABLET, FILM COATED ORAL at 08:34

## 2021-07-26 ASSESSMENT — ACTIVITIES OF DAILY LIVING (ADL)
ADLS_ACUITY_SCORE: 16

## 2021-07-26 NOTE — PROGRESS NOTES
Pt is in stable condition, vss, no co pain/cp/sob.  Pt dc home today.  All dc education reviewed with pt/family in regards to: diet, activity, safety, s/s to report, medications and rx, follow up appointments/care, holter monitor, etc.  Questions were answered and pt/family verbalized understanding,  ipad at bedside.  All belongings are packed up to be sent with pt, will dc via wc by staff to main entrance, private transport by son-in-law once pt is finished eating lunch (per her request).

## 2021-07-26 NOTE — PLAN OF CARE
Tele: SR  Vitals: /57 (BP Location: Left arm)   Pulse 66   Temp 97.5  F (36.4  C) (Oral)   Resp 16   Ht 1.524 m (5')   Wt 73.1 kg (161 lb 1.6 oz)   LMP  (LMP Unknown)   SpO2 100%   BMI 31.46 kg/m    Pain: Reports some pain in feet, PRN tylenol ordered and given   Neuro: A&Ox4  Cardiac: Denies CP. HR remains regular. EF 55-60%.  Resp: LS clear. Denies SOB.  GI/: Voiding  Skin: Intact  Mobility: Up w/ x1 assist - refusing walker overnight  Plan: Plan to discharge w/ holter monitor today.

## 2021-07-26 NOTE — DISCHARGE INSTRUCTIONS
Follow-up with cardiology MEREDITH in 2 weeks, and with Dr Shin in 4 to 6 weeks, or sooner as needed.

## 2021-07-26 NOTE — PHARMACY - DISCHARGE MEDICATION RECONCILIATION AND EDUCATION
Jeannie Chu     1935      female    [id number]                                [id number]    Allergy: Ibuprofen    RX: Discharge Medication Consult by Pharmacist  EDUCATION:   Discharge Medication List   Jeannie Chu   Home Medication Instructions DOROTHY:[id number]    Printed on:07/26/21 1314   Medication Information                      ALPRAZolam (XANAX) 0.5 MG tablet  TAKE 1 TABLET BY MOUTH NIGHTLY AS NEEDED FOR ANXIETY OR  SLEEP             atorvastatin (LIPITOR) 10 MG tablet  Take 1 tablet by mouth once daily             diltiazem ER COATED BEADS (CARDIZEM CD/CARTIA XT) 120 MG 24 hr capsule  Take 1 capsule (120 mg) by mouth daily             IRON (FERROUS GLUCONATE) 325 MG OR TABS  Take 1 tablet by mouth daily              levothyroxine (SYNTHROID/LEVOTHROID) 25 MCG tablet  Take 1 tablet (25 mcg) by mouth daily             lisinopril (ZESTRIL) 5 MG tablet  Take 1 tablet (5 mg) by mouth daily             rivaroxaban ANTICOAGULANT (XARELTO) 20 MG TABS tablet  Take 1 tablet (20 mg) by mouth daily (with dinner)                 Patient was informed to STOP taking the following HOME medications:   Aspirin, metoprolol    Patient was informed to start taking the following NEW medications:   Diltiazem, rivaroxaban    Patient was educated on the following for each discharge medication:  Rationale for therapy  Duration of treatment  Dosing and or monitoring drug levels  Common side effects  Importance of compliance  Drug/food interactions  Missed doses  Self monitoring parameters    OUTCOMES:    Patient unable to express complete understanding  Patient's family member (son-in-law) verbalized understanding    Left written materials and instructions (Clinical notes from EPIC) - Guide to Rivaroxaban Therapy.  IMPORTANT FOLLOW UP NOTES:   Follow-up with cardiology in 2 weeks and with Dr Shin in 4 to 6 weeks, or sooner as needed.

## 2021-07-26 NOTE — CONSULTS
Patient has [facility] Medical Assistance.    Xarelto:  $25/mo.   Eliquis:  NOT COVERED.     Johntoven (warfarin): $3.70/mo.      -GEORGE Horne, Pharmacy Technician/Liaison, Discharge Pharmacy, [phone]

## 2021-07-26 NOTE — DISCHARGE SUMMARY
[facility]  Discharge Summary  Name: Jeannie Chu    MRN: [id number]  YOB: 1935    Age: 86 year old  Date of Discharge:  7/26/2021  Date of Admission: 7/24/2021  Primary Care Provider: Mikel Hsusein  Discharge Physician:  Ari Ramos MD  Discharging Service:  Hospitalist      Discharge Diagnosis:  Atrial fibrillation with RVR  Paroxysmal A. Fib  Hypertension  Prior CAD with remote PCI  Dyslipidemia  Obesity with BMI of 31       Other Diagnosis:  Past Medical History:   Diagnosis Date     Acquired hypothyroidism 5/27/2021     Coronary artery disease involving native coronary artery of native heart without angina pectoris 5/26/2021     Other and unspecified hyperlipidemia      Unspecified cardiovascular disease 1999    stent placed          Discharge Disposition:  Discharged to home     Allergies:  Allergies   Allergen Reactions     Ibuprofen         Discharge Medications:   Current Discharge Medication List      START taking these medications    Details   diltiazem ER COATED BEADS (CARDIZEM CD/CARTIA XT) 120 MG 24 hr capsule Take 1 capsule (120 mg) by mouth daily  Qty: 30 capsule, Refills: 0    Associated Diagnoses: Paroxysmal atrial fibrillation (H)      rivaroxaban ANTICOAGULANT (XARELTO) 20 MG TABS tablet Take 1 tablet (20 mg) by mouth daily (with dinner)  Qty: 30 tablet, Refills: 0    Associated Diagnoses: Paroxysmal atrial fibrillation (H)         CONTINUE these medications which have NOT CHANGED    Details   ALPRAZolam (XANAX) 0.5 MG tablet TAKE 1 TABLET BY MOUTH NIGHTLY AS NEEDED FOR ANXIETY OR  SLEEP  Qty: 30 tablet, Refills: 0    Associated Diagnoses: Insomnia, unspecified type      atorvastatin (LIPITOR) 10 MG tablet Take 1 tablet by mouth once daily  Qty: 90 tablet, Refills: 2    Associated Diagnoses: Hyperlipidemia LDL goal <130      IRON (FERROUS GLUCONATE) 325 MG OR TABS Take 1 tablet by mouth daily   Qty: 30, Refills: 0      levothyroxine (SYNTHROID/LEVOTHROID)  25 MCG tablet Take 1 tablet (25 mcg) by mouth daily  Qty: 30 tablet, Refills: 1    Associated Diagnoses: Acquired hypothyroidism      lisinopril (ZESTRIL) 5 MG tablet Take 1 tablet (5 mg) by mouth daily  Qty: 90 tablet, Refills: 2    Associated Diagnoses: Essential hypertension         STOP taking these medications       ASPIRIN 81 MG OR TABS Comments:   Reason for Stopping:         metoprolol succinate ER (TOPROL-XL) 25 MG 24 hr tablet Comments:   Reason for Stopping:         metoprolol succinate ER (TOPROL-XL) 50 MG 24 hr tablet Comments:   Reason for Stopping: Switch to diltiazem               Condition on Discharge:  Discharge condition: Stable   Discharge vitals: Blood pressure (!) 151/78, pulse 68, temperature 97.8  F (36.6  C), temperature source Oral, resp. rate 16, height 1.524 m (5'), weight 73.1 kg (161 lb 1.6 oz), SpO2 100 %, not currently breastfeeding.   Code status on discharge: Full Code     History of Present Illness:  See detailed admission note for full details.        Significant Physical Exam Findings Day of Discharge:  HEENT; Atraumatic, normocephalic, pinkish conjuctiva, pupils bilateral reactive   Skin: warm and moist, no rashes  Lungs: equal chest expansion, clear to auscultation, no wheezes, no stridor, no crackles,   Heart: normal rate, normal rhythm, no rubs or gallops.   Abdomen: normal bowel sounds, no tenderness, no peritoneal signs, no guarding  Extremities: no deformities, no edema   Neuro; follow commands, alert and oriented x3, spontaneous speech, coherent, moves all extremities spontaneously  Psych; no hallucination, euthymic mood, not agitated        Procedures other than Imaging:  None     Imaging:  Results for orders placed or performed during the hospital encounter of 07/24/21   Chest XR,  PA & LAT    Narrative    EXAM: XR CHEST 2 VW  LOCATION: [facility]  DATE/TIME: 7/24/2021 10:32 PM    INDICATION: new a-fib  COMPARISON: 1/13/2020      Impression     IMPRESSION: Heart is magnified on this AP view, likely at upper limits of normal in size. Pulmonary vessels are normal. Heavy atherosclerotic calcifications of aortic arch unchanged. Lungs clear.   Echocardiogram Complete     Value    LVEF  55-60%    [facility]    [id number]  [id number]  [id number]^INGRIS^SAVANNA     [facility]  Echocardiography Laboratory  [address] [place] [address]     Name: CORA WAY  MRN: [id number]  : 1935  Study Date: 2021 08:30 AM  Age: 86 yrs  Gender: Female  Patient Location: [facility]  Reason For Study: Atrial Fibrillation  Ordering Physician: SAVANNA AMADO  Referring Physician: Mikel Hussein MD  Performed By: Nancy Cruz RDCS     BSA: 1.7 m2  Height: 60 in  Weight: 161 lb  HR: 93  BP: 126/66 mmHg  ______________________________________________________________________________  Procedure  Complete Portable Echo Adult. Optison (NDC #[id number]) given intravenously.  ______________________________________________________________________________  Interpretation Summary     Technically challenging study due to patient body habitus, even with the use  of contrast imaging.     Left ventricular systolic function is normal. The visual ejection fraction is  55-60%.  Endocardial borders are not optimally visualized, however no clear wall motion  abnormalities are seen.  Right ventricular systolic function is borderline reduced.  No significant valvular abnormalities on limited visualization.     There are no prior studies available for comparison.  ______________________________________________________________________________  Left Ventricle  The left ventricle is normal in size. There is normal left ventricular wall  thickness. Left ventricular systolic function is normal. The visual ejection  fraction is 55-60%. Diastolic function not assessed due to arrhythmia.  Regional wall motion abnormalities cannot be excluded due to  limited  visualization.     Right Ventricle  The right ventricle is grossly normal size. The right ventricular systolic  function is borderline reduced.     Atria  The left atrium is moderately dilated. The right atrium is mildly dilated.     Mitral Valve  There is moderate mitral annular calcification.     Tricuspid Valve  The tricuspid valve is not well visualized, but is grossly normal. There is  trace tricuspid regurgitation. Right ventricular systolic pressure could not  be approximated due to inadequate tricuspid regurgitation.     Aortic Valve  The aortic valve is not well visualized. Valve morphology is not well  visualized, however on Doppler interrogation it is probably functioning  normally.     Pulmonic Valve  The pulmonic valve is not well visualized. The pulmonic valve is not well  seen, but is grossly normal.     Vessels  The aortic root is normal size. Normal size ascending aorta. The inferior vena  cava was normal in size with preserved respiratory variability.     Pericardium  There is no pericardial effusion.     Rhythm  The rhythm was atrial fibrillation.  ______________________________________________________________________________  MMode/2D Measurements & Calculations  IVSd: 0.98 cm     LVIDd: 3.2 cm  LVIDs: 1.3 cm  LVPWd: 1.0 cm  FS: 58.5 %  LV mass(C)d: 91.0 grams  LV mass(C)dI: 53.4 grams/m2  Ao root diam: 2.6 cm  LA dimension: 3.6 cm  asc Aorta Diam: 2.8 cm  LA/Ao: 1.4  LVOT diam: 1.7 cm  LVOT area: 2.3 cm2  LA Volume (BP): 71.0 ml  LA Volume Index (BP): 41.8 ml/m2  RWT: 0.65     Doppler Measurements & Calculations  MV E max danielle: 105.8 cm/sec  MV max P.7 mmHg  MV mean PG: 3.3 mmHg  MV V2 VTI: 16.1 cm  MV P1/2t max danielle: 113.1 cm/sec  MV P1/2t: 29.0 msec  MVA(P1/2t): 7.6 cm2  MV dec slope: 1143 cm/sec2  MV dec time: 0.17 sec  AI P1/2t: 349.0 msec  E/E' avg: 15.4  Lateral E/e': 13.3  Medial E/e': 17.4      ______________________________________________________________________________  Report approved by: Radha Fang 07/25/2021 10:01 AM                Consultations:  Consultation during this admission received from cardiology.     Recent Lab Results:  Recent Labs   Lab 07/25/21 0714 07/24/21 2138   WBC 10.1 11.5*   HGB 13.4 13.6   HCT 41.3 42.8   MCV 95 97    227     No results for input(s): CULT in the last 168 hours.  Recent Labs   Lab 07/25/21 0714 07/25/21 0025 07/24/21 2138     --  132*   POTASSIUM 4.1  --  4.7   CHLORIDE 104  --  102   CO2 29  --  27   ANIONGAP 3  --  3   GLC 85 105* 97   BUN 20  --  20   CR 0.80  --  0.79  0.8   GFRESTIMATED 67  --  >60  68   LYLE 8.8  --  9.5   PROTTOTAL  --   --  7.7   ALBUMIN  --   --  3.2*   BILITOTAL  --   --  0.6   ALKPHOS  --   --  64   AST  --   --  23   ALT  --   --  21     Recent Labs   Lab 07/25/21 0714 07/25/21 0025 07/24/21 2138   GLC 85 105* 97     No results for input(s): LACT in the last 168 hours.  Recent Labs   Lab 07/24/21 2138   TROPONIN 0.018     No results for input(s): COLOR, APPEARANCE, URINEGLC, URINEBILI, URINEKETONE, SG, UBLD, URINEPH, PROTEIN, UROBILINOGEN, NITRITE, LEUKEST, RBCU, WBCU in the last 168 hours.       Pending Results:    Unresulted Labs Ordered in the Past 30 Days of this Admission     No orders found from 6/24/2021 to 7/25/2021.           Discharge Instructions and Follow-Up:   Discharge diet: Orders Placed This Encounter      Regular Diet Adult      Diet     Discharge activity: Activity as tolerated   Discharge follow-up: 1-2 weeks with PCP  Follow-up with cardiology as scheduled  You are going home with Holter monitoring  Monitor for any bleeding tendencies as you are recently started on long-term anticoagulation with NOAC for stroke prophylaxis given history of paroxysmal atrial fibrillation   Outpatient therapy: None    Other instructions: None      Hospital Course:  No significant reported events  overnight.  No further recurrence of A. fib with RVR.  She converted to NSR earlier.  Initially was requiring IMC hospitalization with initiation of diltiazem continuous infusion.  Subsequently and successfully converted back to oral negative inotropic agents currently tolerating diltiazem which was introduced in substitution of her home regimen of metoprolol.  Her regular aspirin was discontinued and initiated with NOAC currently on Xarelto.  Appreciate pharmacy liaison evaluation as the is the cheapest option for NOAC.  Earlier Eliquis was discontinued as the is not covered by her insurance.  I appreciate input from cardiology service who provided optimization of her care here.  Recommended to go home with cardiac monitoring which orders were in place.  We will proceed with planned home discharge today.     Total time spent in face to face contact with the patient and coordinating discharge was:  > 30 Minutes.

## 2021-07-27 ENCOUNTER — OFFICE VISIT (OUTPATIENT)
Dept: INTERNAL MEDICINE | Facility: CLINIC | Age: 86
End: 2021-07-27
Payer: COMMERCIAL

## 2021-07-27 ENCOUNTER — TELEPHONE (OUTPATIENT)
Dept: INTERNAL MEDICINE | Facility: CLINIC | Age: 86
End: 2021-07-27

## 2021-07-27 ENCOUNTER — PATIENT OUTREACH (OUTPATIENT)
Dept: CARE COORDINATION | Facility: CLINIC | Age: 86
End: 2021-07-27

## 2021-07-27 ENCOUNTER — TELEPHONE (OUTPATIENT)
Dept: CARDIOLOGY | Facility: CLINIC | Age: 86
End: 2021-07-27

## 2021-07-27 VITALS
OXYGEN SATURATION: 94 % | RESPIRATION RATE: 14 BRPM | WEIGHT: 160.5 LBS | BODY MASS INDEX: 31.51 KG/M2 | HEIGHT: 60 IN | DIASTOLIC BLOOD PRESSURE: 88 MMHG | HEART RATE: 95 BPM | TEMPERATURE: 98.2 F | SYSTOLIC BLOOD PRESSURE: 132 MMHG

## 2021-07-27 DIAGNOSIS — I10 ESSENTIAL HYPERTENSION: Primary | ICD-10-CM

## 2021-07-27 DIAGNOSIS — Z71.89 OTHER SPECIFIED COUNSELING: ICD-10-CM

## 2021-07-27 DIAGNOSIS — I48.0 PAROXYSMAL ATRIAL FIBRILLATION (H): ICD-10-CM

## 2021-07-27 DIAGNOSIS — E03.9 ACQUIRED HYPOTHYROIDISM: ICD-10-CM

## 2021-07-27 DIAGNOSIS — I25.10 CORONARY ARTERY DISEASE INVOLVING NATIVE CORONARY ARTERY OF NATIVE HEART WITHOUT ANGINA PECTORIS: ICD-10-CM

## 2021-07-27 PROCEDURE — 99495 TRANSJ CARE MGMT MOD F2F 14D: CPT | Performed by: INTERNAL MEDICINE

## 2021-07-27 RX ORDER — LEVOTHYROXINE SODIUM 25 UG/1
25 TABLET ORAL DAILY
Qty: 60 TABLET | Refills: 0 | Status: SHIPPED | OUTPATIENT
Start: 2021-07-27 | End: 2021-08-30

## 2021-07-27 RX ORDER — LOSARTAN POTASSIUM 25 MG/1
25 TABLET ORAL DAILY
Qty: 90 TABLET | Refills: 0 | Status: SHIPPED | OUTPATIENT
Start: 2021-07-27 | End: 2021-08-11

## 2021-07-27 ASSESSMENT — ENCOUNTER SYMPTOMS
MYALGIAS: 1
EYE PAIN: 0
HEMATURIA: 0
NERVOUS/ANXIOUS: 0
COUGH: 1
NAUSEA: 0
DIZZINESS: 0
SORE THROAT: 0
HEMATOCHEZIA: 0
HEARTBURN: 0
WEAKNESS: 0
ARTHRALGIAS: 0
PARESTHESIAS: 0
SHORTNESS OF BREATH: 0
PALPITATIONS: 0
DYSURIA: 0
BREAST MASS: 0
FEVER: 0
ABDOMINAL PAIN: 0
HEADACHES: 1
CONSTIPATION: 0
FREQUENCY: 0
CHILLS: 0
DIARRHEA: 0
JOINT SWELLING: 0

## 2021-07-27 ASSESSMENT — ACTIVITIES OF DAILY LIVING (ADL)
CURRENT_FUNCTION: PREPARING MEALS REQUIRES ASSISTANCE
CURRENT_FUNCTION: TRANSPORTATION REQUIRES ASSISTANCE
CURRENT_FUNCTION: BATHING REQUIRES ASSISTANCE
CURRENT_FUNCTION: HOUSEWORK REQUIRES ASSISTANCE
CURRENT_FUNCTION: SHOPPING REQUIRES ASSISTANCE

## 2021-07-27 ASSESSMENT — MIFFLIN-ST. JEOR: SCORE: 1094.52

## 2021-07-27 NOTE — NURSING NOTE
/88   Pulse 95   Temp 98.2  F (36.8  C) (Oral)   Resp 14   Ht 1.524 m (5')   Wt 72.8 kg (160 lb 8 oz)   LMP  (LMP Unknown)   SpO2 94%   BMI 31.35 kg/m    Patient in for hospital follow up.  Nohemy Slois, AMARILIS

## 2021-07-27 NOTE — PROGRESS NOTES
Clinic Care Coordination Contact    Background: Care Coordination referral placed from [facility] discharge report for reason of patient meeting criteria for a TCM outreach call by [facility] team.    Assessment: Upon chart review, [facility] Team member will cancel/close the referral for TCM outreach due to reason below:     Patient has a follow up appointment with an appropriate provider today for hospital discharge.     Plan: Care Coordination referral for TCM outreach canceled.    Janny Berman MA  [facility], [facility]

## 2021-07-27 NOTE — TELEPHONE ENCOUNTER
Patient was evaluated by cardiology while inpatient for new onset of PAF-spontaneously converted to NSR. PMH: HTN, HLD, obesity, CAD with PJ to pLAD 20 yrs ago. TTE 7/25/2021 demonstrated normal biventricular function, no significant valvular abnormalities. PTA Metoprolol changed to Cardizem. Pt also started on Xarelto and PTA ASA discontinued. Discharged wearing a 48 hr Holter Monitor. Writer attempted to call patient to discuss any post hospital d/c questions she may have, review medication changes, and confirm f/u appts, but she is not home and phone answered by her son in law, where pt resides. He denied any questions regarding new medications or changes to PTA medications. Pt reportedly has not had any SOB, chest pain, or light headedness. RN confirmed with son in law that patient needs to be scheduled for a cardiology MEREDITH and cardiologist OV's as ordered and recommended. Scheduling phone number provided. Advised to call clinic with any cardiac related questions or concerns prior to this meredith't and he verbalized understanding and agreed with plan. MIRELA Mosqueda RN.

## 2021-07-27 NOTE — PROGRESS NOTES
"    Assessment & Plan     (I10) Essential hypertension  (primary encounter diagnosis)  Plan:  On lisinopril , has dry cough , advised to stop lisinopril and will change to losartan (COZAAR) 25 MG tablet as directed. explained clearly about the medication,insructions and side effects. F/u in 3 months,       Explained diff causes of cough including PND, GERD,Meds, lisinopril changed to losartan , monitor.        (I48.0) Paroxysmal atrial fibrillation (H)  Plan: currently regular HR, has Holter monitor on , also on xarelto, seeing cardiologist soon .     (E03.9) Acquired hypothyroidism  Plan:recent TSH has improved but still little high , T4 normal, will continue  levothyroxine (SYNTHROID/LEVOTHROID) 25 MCG tablet at this time and rpt TSh/T4 in 3 months,          (I25.10) Coronary artery disease involving native coronary artery of native heart without angina pectoris  Plan: on aspirin, lipitor, making appt with cardiologist       Review of the result(s) of each unique test - reviewed lab results from 07/25/21  Prescription drug management        BMI:   Estimated body mass index is 31.35 kg/m  as calculated from the following:    Height as of this encounter: 1.524 m (5').    Weight as of this encounter: 72.8 kg (160 lb 8 oz).   Weight management plan: Discussed healthy diet and exercise guidelines      Return in about 3 months (around 10/27/2021).    Diana Hilario MD  [facility] CenterCHRISTOPHER Dennis is a 86 year old who presents for the following health issues  accompanied by her Grand daughter :    Healthy Habits:     In general, how would you rate your overall health?  Good    Frequency of exercise:  None    Do you usually eat at least 4 servings of fruit and vegetables a day, include whole grains    & fiber and avoid regularly eating high fat or \"junk\" foods?  Yes    Taking medications regularly:  Yes    Medication side effects:  None    Ability to successfully perform " activities of daily living:  Transportation requires assistance, shopping requires assistance, preparing meals requires assistance, housework requires assistance and bathing requires assistance    Home Safety:  Lack of grab bars in the bathroom    Hearing Impairment:  Need to ask people to speak up or repeat themselves    In the past 6 months, have you been bothered by leaking of urine?  No    In general, how would you rate your overall mental or emotional health?  Good      PHQ-2 Total Score: 0    Additional concerns today:  Yes         Hospital Follow-up Visit:    Hospital/Nursing Home/IP Rehab Facility: [facility]  Date of Admission: 7/24/21  Date of Discharge: 7/26/21  Reason(s) for Admission: Atrial fibrillation with RVR, Paroxysmal A. Fib, Hypertension  Prior CAD with remote PCI      Was your hospitalization related to COVID-19? No   Problems taking medications regularly:  None  Medication changes since discharge: Cardizem, Xarelto  Problems adhering to non-medication therapy:  None    Summary of hospitalization:  [facility] discharge summary reviewed  Diagnostic Tests/Treatments reviewed.  Follow up needed: with cardiologist   Other Healthcare Providers Involved in Patient s Care:         Specialist appointment - cardiology   Update since discharge: stable.   Post Discharge Medication Reconciliation: discharge medications reconciled and changed, per note/orders.  Plan of care communicated with patient and family        Brief hospital summary   Patient is a  86-year-old female with a past medical history significant for hypertension, hyperlipidemia, remote history of CAD status post PCI (approximately 20 years ago), who presented to ER for  evaluation and management of fatigue and weakness, found to be in atrial fibrillation with RVR.   An ECG was done which demonstrated atrial fibrillation at around 137 bpm.  Initial labs are notable for potassium 4.7, creatinine 0.79,  troponin normal, TSH 6.34 but with normal free T4 0.92.  WBC 11.5. She was started on a diltiazem GTT, and admitted to General Medicine for further evaluation and management.  During hospitalization patient had  a 4.7-second pause after which she spontaneously converted to normal sinus rhythm.    Patient was discharged on diltiazem, Xarelto and also discharged on Holter monitor. Metoprolol was discontinued in hospital .    Patient c/o cough since 3 months, mainly dry cough, feels like tickle in the throat, no fever/chills, no sputum production.  Patient has been on lisinopril .     Pt is seeing pain clinic tomorrow for back issues     Hypertension Follow-up      Do you check your blood pressure regularly outside of the clinic? Yes     Are you following a low salt diet? Yes    Are your blood pressures ever more than 140 on the top number (systolic) OR more   than 90 on the bottom number (diastolic), for example 140/90? No    Hypothyroidism Follow-up      Since last visit, patient describes the following symptoms: Weight stable, no hair loss, no skin changes, no constipation, no loose stools, last T4 normal and TSH 6.34, improved from before( was 12.04)  , on levoxyl 25 mcg daily      Vascular Disease-H/o CAD- on lipitor    How often do you take nitroglycerin? Never    Do you take an aspirin every day? Yes         Past Medical History:   Diagnosis Date     Acquired hypothyroidism 5/27/2021     Coronary artery disease involving native coronary artery of native heart without angina pectoris 5/26/2021     Other and unspecified hyperlipidemia      Unspecified cardiovascular disease 1999    stent placed       Current Outpatient Medications   Medication Sig Dispense Refill     atorvastatin (LIPITOR) 10 MG tablet Take 1 tablet by mouth once daily 90 tablet 2     diltiazem ER COATED BEADS (CARDIZEM CD/CARTIA XT) 120 MG 24 hr capsule Take 1 capsule (120 mg) by mouth daily 30 capsule 0     IRON (FERROUS GLUCONATE) 325 MG OR  TABS Take 1 tablet by mouth daily  30 0     levothyroxine (SYNTHROID/LEVOTHROID) 25 MCG tablet Take 1 tablet (25 mcg) by mouth daily 60 tablet 0     losartan (COZAAR) 25 MG tablet Take 1 tablet (25 mg) by mouth daily 90 tablet 0     rivaroxaban ANTICOAGULANT (XARELTO) 20 MG TABS tablet Take 1 tablet (20 mg) by mouth daily (with dinner) 30 tablet 0       Review of Systems   Constitutional: Negative for chills and fever.   HENT: Negative for congestion, ear pain, hearing loss and sore throat.    Eyes: Negative for pain and visual disturbance.   Respiratory: Positive for cough. Negative for shortness of breath.    Cardiovascular: Negative for chest pain, palpitations and peripheral edema.   Gastrointestinal: Negative for abdominal pain, constipation, diarrhea, heartburn, hematochezia and nausea.   Breasts:  Negative for tenderness, breast mass and discharge.   Genitourinary: Negative for dysuria, frequency, genital sores, hematuria, pelvic pain, urgency, vaginal bleeding and vaginal discharge.   Musculoskeletal: Positive for myalgias. Negative for arthralgias and joint swelling.   Skin: Negative for rash.   Neurological: Negative for dizziness, weakness and paresthesias.   Psychiatric/Behavioral: Negative for mood changes. The patient is not nervous/anxious.       CONSTITUTIONAL: NEGATIVE for fever, chills, change in weight  RESP: NEGATIVE for significant cough or SOB  CV: NEGATIVE for chest pain, palpitations or peripheral edema  GI: NEGATIVE for nausea, abdominal pain, heartburn, or change in bowel habits  MUSCULOSKELETAL: NEGATIVE for significant arthralgias or myalgia  NEURO: NEGATIVE for weakness, dizziness or paresthesias  PSYCHIATRIC: NEGATIVE for changes in mood or affect      Objective    /88   Pulse 95   Temp 98.2  F (36.8  C) (Oral)   Resp 14   Ht 1.524 m (5')   Wt 72.8 kg (160 lb 8 oz)   LMP  (LMP Unknown)   SpO2 94%   BMI 31.35 kg/m    Body mass index is 31.35 kg/m .  Physical Exam   GENERAL:    alert and no distress  RESP: lungs clear to auscultation - no rales, rhonchi or wheezes  CV: regular rate and rhythm at this time ,    MS:   no edema, no calf tenderness

## 2021-07-27 NOTE — TELEPHONE ENCOUNTER
IP F/U    Date: 7/26/21  Diagnosis: a-fib  Is patient active in care coordination? No  Was patient in TCU? No    Next 5 appointments (look out 90 days)    Jul 27, 2021  1:00 PM  PHYSICAL with Diana Hilario MD  [facility] ([facility] [address] [place]  [facility] [id number] [phone]        Pt has appt today.

## 2021-07-28 ENCOUNTER — OFFICE VISIT (OUTPATIENT)
Dept: PALLIATIVE MEDICINE | Facility: CLINIC | Age: 86
End: 2021-07-28
Payer: COMMERCIAL

## 2021-07-28 ENCOUNTER — MYC MEDICAL ADVICE (OUTPATIENT)
Dept: INTERNAL MEDICINE | Facility: CLINIC | Age: 86
End: 2021-07-28

## 2021-07-28 ENCOUNTER — MYC MEDICAL ADVICE (OUTPATIENT)
Dept: PALLIATIVE MEDICINE | Facility: CLINIC | Age: 86
End: 2021-07-28

## 2021-07-28 VITALS — DIASTOLIC BLOOD PRESSURE: 83 MMHG | OXYGEN SATURATION: 98 % | SYSTOLIC BLOOD PRESSURE: 141 MMHG | HEART RATE: 86 BPM

## 2021-07-28 DIAGNOSIS — M47.816 SPONDYLOSIS OF LUMBAR REGION WITHOUT MYELOPATHY OR RADICULOPATHY: Primary | ICD-10-CM

## 2021-07-28 DIAGNOSIS — K14.6 TONGUE SORE: Primary | ICD-10-CM

## 2021-07-28 DIAGNOSIS — S32.010S COMPRESSION FRACTURE OF L1 VERTEBRA, SEQUELA: ICD-10-CM

## 2021-07-28 DIAGNOSIS — M48.061 SPINAL STENOSIS OF LUMBAR REGION, UNSPECIFIED WHETHER NEUROGENIC CLAUDICATION PRESENT: ICD-10-CM

## 2021-07-28 LAB
ATRIAL RATE - MUSE: 57 BPM
DIASTOLIC BLOOD PRESSURE - MUSE: NORMAL MMHG
INTERPRETATION ECG - MUSE: NORMAL
P AXIS - MUSE: 106 DEGREES
PR INTERVAL - MUSE: 154 MS
QRS DURATION - MUSE: 72 MS
QT - MUSE: 450 MS
QTC - MUSE: 438 MS
R AXIS - MUSE: 160 DEGREES
SYSTOLIC BLOOD PRESSURE - MUSE: NORMAL MMHG
T AXIS - MUSE: 133 DEGREES
VENTRICULAR RATE- MUSE: 57 BPM

## 2021-07-28 PROCEDURE — 99205 OFFICE O/P NEW HI 60 MIN: CPT | Performed by: PHYSICAL MEDICINE & REHABILITATION

## 2021-07-28 ASSESSMENT — ENCOUNTER SYMPTOMS
JOINT SWELLING: 0
HEARTBURN: 0
PARESTHESIAS: 0
DIZZINESS: 0
CONSTIPATION: 0
ARTHRALGIAS: 0
NERVOUS/ANXIOUS: 0
FREQUENCY: 0
NAUSEA: 0
BREAST MASS: 0
MYALGIAS: 1
ABDOMINAL PAIN: 0
CHILLS: 0
COUGH: 1
PALPITATIONS: 0
WEAKNESS: 0
DIARRHEA: 0
FEVER: 0
HEMATOCHEZIA: 0
SHORTNESS OF BREATH: 0
HEMATURIA: 0
EYE PAIN: 0
SORE THROAT: 0
DYSURIA: 0

## 2021-07-28 ASSESSMENT — ACTIVITIES OF DAILY LIVING (ADL)
CURRENT_FUNCTION: BATHING REQUIRES ASSISTANCE
CURRENT_FUNCTION: PREPARING MEALS REQUIRES ASSISTANCE
CURRENT_FUNCTION: TRANSPORTATION REQUIRES ASSISTANCE
CURRENT_FUNCTION: HOUSEWORK REQUIRES ASSISTANCE
CURRENT_FUNCTION: SHOPPING REQUIRES ASSISTANCE

## 2021-07-28 ASSESSMENT — PAIN SCALES - GENERAL: PAINLEVEL: SEVERE PAIN (6)

## 2021-07-28 NOTE — PATIENT INSTRUCTIONS
1. Recommend trying physical therapy. Let me know if you want to try water therapy or therapy through our pain clinic.   2. You can try a TENS unit to see if it provides some benefit. You can get this online. TENS 7000 Digital TENS Unit With Accessories   3. You can try some other over the counter topical medications in addition to what you have tried. You can try voltaren 1% gel, arnica gel. Icy/hot, etc.   4. Try ice or heat over the painful area to see if it provides some benefit.   5. Consider addition of Cymbalta for chronic musculoskeletal pain. Will discuss at next visit. We can also discuss potential injections options at that visit if needed.   6. Follow up with me in 4-6 weeks.     Mary Ann Khan MD  [facility]     ----------------------------------------------------------------  Clinic Number:  [phone]     Call with any questions about your care and for scheduling assistance.     Calls are returned Monday through Friday between 8 AM and 4:30 PM. We usually get back to you within 2 business days depending on the issue/request.    If we are prescribing your medications:    For opioid medication refills, call the clinic or send a [employer] message 7 days in advance.  Please include:    Name of requested medication    Name of the pharmacy.    For non-opioid medications, call your pharmacy directly to request a refill. Please allow 3-4 days to be processed.     Per [place] State Law:    All controlled substance prescriptions must be filled within 30 days of being written.      For those controlled substances allowing refills, pickup must occur within 30 days of last fill.      We believe regular attendance is key to your success in our program!      Any time you are unable to keep your appointment we ask that you call us at least 24 hours in advance to cancel.This will allow us to offer the appointment time to another patient.     Multiple missed appointments may lead to dismissal from the  clinic.

## 2021-07-28 NOTE — PROGRESS NOTES
[facility] Consultation    Date of visit: 7/28/2021    Assessment:  Jeannie Chu is a 86 year old female with a past medical history significant for back pain, CAD, HTN, paroxysmal atrial fibrillation who presents with complaints of:    1. Chronic back pain: Pain is located in left low back and is axial in nature. Etiology likely multifactorial secondary to lumbar spondylosis with facet mediated pain and overlying myofascial pain. She has severe central canal stenosis but no radiation of symptoms into the lower extremities.     Plan:  The following recommendations were given to the patient. Diagnosis, treatment options, risks, benefits, and alternatives were discussed, and all questions were answered. The patient expressed understanding of the plan for management.     I am recommending a multidisciplinary treatment plan to help this patient better manage her pain.  This includes:     1. Therapies:  Recommend restarting PT. Discussed both pool therapy and chronic pain PT. She will consider both options and let me know which one she would like to do.   2. Clinical Health Pain Psychologist: Therapy can help reduce physical and psychosocial triggers or reinforcers of pain by adapting thoughts, feelings and behaviors to reduce symptoms and increase quality of life.  Evidence indicates that the practice of relaxation, meditation, and mindfulness techniques can significantly affect pain levels and overall well being. Not at this time.   3. Self Care Recommendations:   1. Discussed using a TENS unit to see if it provides benefit.  2. Try heat over the painful area   4. Diagnostic Studies: None  5. Medication Management:    1. Try OTC topical medications   2.  Consider Cymbalta for chronic MSK pain. Will discuss at next visit.   6. Further procedures recommended: None at this time.   7. Follow up: 4-6 weeks    Reason for consultation:    Primary Care Provider is  Mikel Hussein.  Pain medications are being prescribed by NA.    Please see the [facility] health questionnaire which the patient completed and reviewed with me in detail.    Jeannie Chu is a 86 year old female with a history of back pain, CAD, HTN, paroxysmal atrial fibrillation who I was asked to see in consultation by Mary Lee  for evaluation of low back pain.    Review of [place] Prescription Monitoring Program (): Today I have also reviewed the patient's history of controlled substance use, as provided by [place] licensed pharmacies and prescriber dispensers.     Review of Electronic Chart: Today I have also reviewed available medical information in the patient's medical record at [facility] ([facility] including relevant provider notes, laboratory work, and imaging.     Jeannie Chu has not been seen at a pain clinic in the past.      Chief Complaint:    Chief Complaint   Patient presents with     Pain       Pain history:  Jeannie Chu is a 86 year old female who presents for initial evaluation of chief pain complaint of low back pain. Today, she reports a 9-month history of symptoms. She describes constant, aching pain that initiates in the low lumbar region and has no radiation. This pain is not accompanied by paresthesia, numbness and/or perceived weakness in the same distribution. Prolonged walking, standing and bending aggravate the symptoms, while alleviation is obtained by sitting down or resting. She had a falll in September, 2020 was when pain started. There are no bowel or bladder changes. She denies saddle anesthesia. She denies changes in gait, instability, or falling episodes.  She has not participated in conservative therapies.    She saw NSG. No surgical intervetnion. Started PT.     Red Flags: The patient denies bowel or bladder incontinence, parasthesias, weakness, saddle anesthesia, unintentional weight loss, or fever/chills/sweats. No weakness,  no troubel with bowel or bladder    Medications:       Current pain medications:  Bengay - [facility]  Salon pas - [facility]  Tylenol - [facility] (unsure of dosing)         Other relevant medications:  Xarelto    Current calculated MME: 0         Previous pain medications:  Aleve - [facility]    Past Pain Treatments:  PT: Yes - went to 1 visit. Too painful to continue exercises.   Pain psychologist: No  Relaxation techniques/biofeedback: No  TENs Unit: No  Injections: No  - right knee injection - H  Self-care:   No  Surgeries related to pain: No  Alternative Therapies:    Chiropractic: No    Acupuncture: No  Other: none    Diagnostic tests:  MRI of Lumbar Spine was completed on 6/18/2021 was reviewed with the patient and shows:    FINDINGS:   Nomenclature is based on 5 lumbar type vertebral bodies.      L1 vertebral body mild chronic compression deformity. L1 superior endplate prominent Schmorl's node with adjacent bone marrow edema could reflect acute Schmorl's node. Left L5 vertebral body superior endplate mild chronic compression deformity. Remaining   vertebral heights are maintained.     L2 vertebral body demonstrates a large osseous hemangioma. No extraosseous extension. Scattered small osseous hemangiomas are present. No concerning bone marrow lesions.     Normal distal spinal cord and cauda equina with conus medullaris at L2-L3. Sacral spinal canal demonstrates several small cysts. Unremarkable visualized bony pelvis.     Mild lumbar dextroscoliosis. Multilevel degenerative disc disease manifested by disc desiccation, Schmorl's nodes, and endplate osteophytosis. L2-L3 mild degenerative type I Modic changes. L3-L4, L4-L5, and L5-S1 mild degenerative type II Modic changes.   Multilevel facet arthropathy. Multiple prominent facet joint effusions. Some of the spinous processes demonstrate mild irregularity could suggest mild Baastrup's disease. No interspinous bursitis.     T12-L1 mild grade 1 anterolisthesis measuring 2 mm. L2-L3 mild  grade 1 retrolisthesis measuring 2 mm. L4-L5 slight grade 1 anterolisthesis measuring 1 mm.     T12-L1: Mild bulge with bilateral foraminal extension. Small superimposed posterior disc extrusion extending mildly above and below the disc margin.   No significant thecal sac stenosis.  No significant left foraminal stenosis.  No significant right   foraminal stenosis.     L1-L2: Bulge with bilateral foraminal extension. Central posterior disc extrusion extending mildly above and below the disc margin.   Mild thecal sac stenosis.  No significant left foraminal stenosis.  No significant right foraminal stenosis.     L2-L3: Bulge with bilateral foraminal extension. Small superimposed posterior disc extrusion extending mildly above and below the disc margin.   Mild to moderate thecal sac stenosis.  Moderate left foraminal stenosis.  Mild to moderate right foraminal   stenosis.      L3-L4: Bulge with bilateral foraminal extension. Small superimposed posterior disc extrusion extending mildly above and below the disc margin. Facet hypertrophy.   Severe thecal sac stenosis measuring 6 mm AP dimension.  Mild to moderate left foraminal   stenosis.  Mild right foraminal stenosis.     L4-L5: Bulge with bilateral foraminal extension. Small superimposed posterior disc extrusion extending mildly above and below the disc margin. Facet hypertrophy.   Severe thecal sac stenosis measuring 6 mm AP dimension.  Mild to moderate left foraminal   stenosis.  Mild right foraminal stenosis.     L5-S1: Bulge with bilateral extension. Facet hypertrophy.   No significant thecal sac stenosis.  No significant left foraminal stenosis.  No significant right foraminal stenosis.       ADDITIONAL FINDINGS:  Left kidney demonstrates several small cysts. Right kidney subcentimeter small cysts. No specific follow-up recommended.                                                                      IMPRESSION:  1.  L1 vertebral body mild chronic compression  deformity.   2.  L1 superior endplate prominent Schmorl's node with adjacent bone marrow edema could reflect acute Schmorl's node.   3.  Left L5 vertebral body superior endplate mild chronic compression deformity.    4.  L2 vertebral body demonstrates a large osseous hemangioma. No extraosseous extension.  5.  Multilevel spondylosis described above.  6.  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  7.  No severe high-grade neural foraminal stenosis.    EMG/Testing:  NA    Labs:   Creatinine 0.80  LFTs wnl    CSA and UDS due - NA    Past Medical History:  Past Medical History:   Diagnosis Date     Acquired hypothyroidism 5/27/2021     Coronary artery disease involving native coronary artery of native heart without angina pectoris 5/26/2021     Other and unspecified hyperlipidemia      Unspecified cardiovascular disease 1999    stent placed       Past Surgical History:  No past surgical history on file.    Medications:  Current Outpatient Medications   Medication Sig Dispense Refill     atorvastatin (LIPITOR) 10 MG tablet Take 1 tablet by mouth once daily 90 tablet 2     diltiazem ER COATED BEADS (CARDIZEM CD/CARTIA XT) 120 MG 24 hr capsule Take 1 capsule (120 mg) by mouth daily 30 capsule 0     IRON (FERROUS GLUCONATE) 325 MG OR TABS Take 1 tablet by mouth daily  30 0     levothyroxine (SYNTHROID/LEVOTHROID) 25 MCG tablet Take 1 tablet (25 mcg) by mouth daily 60 tablet 0     losartan (COZAAR) 25 MG tablet Take 1 tablet (25 mg) by mouth daily 90 tablet 0     rivaroxaban ANTICOAGULANT (XARELTO) 20 MG TABS tablet Take 1 tablet (20 mg) by mouth daily (with dinner) 30 tablet 0     Allergies:     Allergies   Allergen Reactions     Ibuprofen        Family history:  Family History   Problem Relation Age of Onset     Family History Negative Other      Social History:  Home situation: Lives in [facility]. Came here in summer 2019 and due to pandemic and is now a permanent resident.   Occupation/Schooling:   Tobacco  use: none  Drug use: none  Alcohol use: none    Physical Exam:  Vitals:    07/28/21 1532   BP: (!) 141/83   Pulse: 86   SpO2: 98%     Exam:  Constitutional: Well developed, well nourished, appears stated age.  HEENT: Head atraumatic, normocephalic. Eyes without conjunctival injection or jaundice. Neck supple. No obvious neck masses.  Respiratory: Breathing unlabored on room air   Skin: No rash, lesions of exposed skin.   Psychiatric/mental status: Alert, without lethargy or stupor. Speech fluent. Appropriate affect. Mood normal. Able to follow commands without difficulty.     Musculoskeletal exam:  Gait/Station/Posture: Stands with forward flexed posture. Gait slow.   Normal bulk and tone.      Lumbar spine:  Range of motion with mild restriction in flexion and extension. There is some increased pain with lumbar extension/rotation to the left.   Myofascial tenderness:  Left lumbar paraspinals    Neurologic exam:  CN:  Cranial nerves 2-12 are grossly intact  Motor Strength:  5/5 symmetric LE strength    Reflexes:     Patella L4:  R:  2/4 L: 2/4   Achilles S1:  R:  1/4 L: 1/4    Sensory:  (lower extremities):   Light touch: normal    Allodynia: absent    Hyperalgesia: absent     Mary Ann Khan MD  [facility]       BILLING TIME DOCUMENTATION:   The total TIME spent on this patient on the date of the encounter/appointment was 60 minutes.      TOTAL TIME includes:   Time spent preparing to see the patient (reviewing records and tests)  Time spent face to face (or over the phone) with the patient   Time spent documenting clinical information in Epic

## 2021-07-29 NOTE — TELEPHONE ENCOUNTER
RN please sign the Rx. No pharmacy was chosen  If the tongue lesion persists she needs to se ENT

## 2021-07-29 NOTE — TELEPHONE ENCOUNTER
Routing to provider for aquatic therapy orders. Once completed please route back     Mychart below from pt    Recommend trying physical therapy. Let me know if you want to try water therapy or therapy through our pain clinic.

## 2021-07-29 NOTE — TELEPHONE ENCOUNTER
Order placed for aquatic therapy. Will fax external referral to [facility] in [place].    Will route encounter to [facility] pool to fax referral.     Mary Ann Khan MD  [facility]

## 2021-07-30 NOTE — TELEPHONE ENCOUNTER
varinode message sent to patient:  Good  Morning Dr Erin Dennis did review your [employer] message. She did place orders for aquatic therapy.  The orders have been sent to [facility] in [place].  Please give them a call to set up your appointments.  Have a great wonderful day.    Take Care,    Teresa Prado RN  Care Coordinator  [facility]

## 2021-07-30 NOTE — TELEPHONE ENCOUNTER
Orders faxed to [facility].    Naty Manzano [facility]   Pain Management [place]

## 2021-08-04 DIAGNOSIS — E03.9 ACQUIRED HYPOTHYROIDISM: ICD-10-CM

## 2021-08-05 RX ORDER — LEVOTHYROXINE SODIUM 25 UG/1
TABLET ORAL
Qty: 30 TABLET | Refills: 0 | OUTPATIENT
Start: 2021-08-05

## 2021-08-11 ENCOUNTER — OFFICE VISIT (OUTPATIENT)
Dept: CARDIOLOGY | Facility: CLINIC | Age: 86
End: 2021-08-11
Payer: COMMERCIAL

## 2021-08-11 VITALS
DIASTOLIC BLOOD PRESSURE: 85 MMHG | WEIGHT: 160 LBS | BODY MASS INDEX: 31.41 KG/M2 | HEART RATE: 80 BPM | SYSTOLIC BLOOD PRESSURE: 139 MMHG | HEIGHT: 60 IN

## 2021-08-11 DIAGNOSIS — I10 ESSENTIAL HYPERTENSION: ICD-10-CM

## 2021-08-11 DIAGNOSIS — I48.0 PAROXYSMAL ATRIAL FIBRILLATION (H): ICD-10-CM

## 2021-08-11 PROCEDURE — 99203 OFFICE O/P NEW LOW 30 MIN: CPT | Performed by: NURSE PRACTITIONER

## 2021-08-11 RX ORDER — TRIAMCINOLONE ACETONIDE 0.1 %
PASTE (GRAM) DENTAL 2 TIMES DAILY
Qty: 5 G | Refills: 0 | Status: SHIPPED | OUTPATIENT
Start: 2021-08-11 | End: 2021-08-30

## 2021-08-11 RX ORDER — LOSARTAN POTASSIUM 50 MG/1
50 TABLET ORAL DAILY
Qty: 90 TABLET | Refills: 3 | Status: SHIPPED | OUTPATIENT
Start: 2021-08-11 | End: 2021-11-29

## 2021-08-11 RX ORDER — NIACIN 500 MG
TABLET ORAL
COMMUNITY
End: 2023-07-13

## 2021-08-11 ASSESSMENT — MIFFLIN-ST. JEOR: SCORE: 1092.26

## 2021-08-11 NOTE — PATIENT INSTRUCTIONS
Thank you for your visit with the [facility] today.    Today's plan:   1. Increase the dose of losartan from 25 mg to 50 mg once daily for better blood pressure control.  2. Continue the remainder of your current medications without changes.  3. ExecMobilea Mobile, Fitbit, or an Apple Watch have good heart rate monitoring options. The Apple Watch and HealthLok Mobile device can also do a small EKG reading to sense if the rhythm is irregular or back in atrial fibrillation.  4. Follow-up as planned with Dr. Shin in about 1 month. Let's recheck labs before the visit to keep an eye on your kidney function and electrolytes.    If you have questions or concerns, please do not hesitate to call my nurse, Nini, at [phone].     Scheduling phone number: [phone]    It was a pleasure seeing you today!     LORA Rubio, CNP  Nurse Practitioner  [facility]  August 11, 2021  ________________________________________________________

## 2021-08-11 NOTE — PROGRESS NOTES
"  Cardiology Clinic Progress Note    Service Date: August 11, 2021    Primary Cardiologist: Dr. Shin      Reason for Visit: Hospital follow up    HPI:   I had the pleasure of meeting Ms. Jeannie Chu in the clinic today. She is a very pleasant 86 year old female with a past medical history notable for hypertension, hyperlipidemia, remote history of CAD status post PCI (approximately 20 years ago). She recently presented to [facility] on 7/24/21 for further evaluation and management of fatigue and weakness and was found to be in atrial fibrillation with RVR. She was seen by Dr. Shin in Cardiology consultation.  Dr. Shin recommended changing metoprolol to diltiazem 120 mg daily.  She was resumed on lisinopril 5 mg daily as well as atorvastatin 10 mg daily.  She was started on anticoagulation with Xarelto 20 mg daily and her prior to admission aspirin was discontinued since she was starting on the DOAC.    A 48-hour Holter monitor was recommended at discharge to evaluate for recurrence of atrial fibrillation and adequacy of rate control.  The Holter monitor demonstrated predominantly sinus rhythm with occasional PACs and no recurrent episodes of A.Fib.    Today, she presents to the clinic accompanied by her granddaughter in follow up of of her recent hospitalization. The patient's primary language is Tamil, which [place] a dialect [place] in [place]. The patient's granddaughter helped interpret today.  The patient tells me that she has felt generally well since she has been home in the hospital.  Her presenting symptoms with atrial fibrillation were fatigue and general malaise or feeling \"off.\"  This has not recurred.  She has not had issues with chest pain, palpitations, shortness of breath, or exertional symptoms.      Ms. Chu tells me that she would like to try to lose some weight but she has struggled with chronic back pain that has limited her mobility and ability to get " regular exercise.  The granddaughter notes that they have been looking into getting a home blood pressure monitor and are considering buying a wearable monitor so that the patient can check her heart rates and rhythm since she seems to be minimally symptomatic with onset of atrial fibrillation. We discussed several options for monitoring this including the HunterOn mobile or newer generation Apple watch for EKG monitoring versus something like a fit bit for heart rate monitoring.    ASSESSMENT AND PLAN:  1. Paroxysmal atrial fibrillation  - [id number] score of 5.  Anticoagulated on Xarelto 20 mg once daily with dinner.  She has been tolerating this well without concerns for significant bleeding, hematuria, or dark stools.  - Converted spontaneously to normal sinus rhythm 7/25/2021 during her hospital stay. New diagnosis at that time with duration unknown chronicity unclear, but seems to be paroxysmal in nature. She was minimally symptomatic with the episode.  - 48-hour Holter monitor and follow-up demonstrated predominantly sinus rhythm with no recurrence of A.Fib.  - TTE during her recent hospital stay on 7/25/2021 showed normal LV systolic function with EF 55-60%.    2.  Hypertension  - Blood pressure remains mildly elevated in the clinic today.  She was recently switched from low-dose lisinopril to losartan 25 mg daily due to concern for chronic dry cough as a potential side effect of ACE inhibitor.  - Recommend she increase the dose of losartan from 25 mg to 50 mg once daily for better blood pressure control.  We will repeat a BMP in about 2 to 4 weeks.    3.  Hyperlipidemia  - Treated on atorvastatin 10 mg daily with an LDL cholesterol 49 in May 2021.    4.  Coronary artery disease status post PCI of the proximal LAD remotely around 20 years ago  - Patient denies symptoms concerning for angina.    4. Obesity, BMI 31    Thank you for the opportunity to participate in this pleasant patient's care. She will see   Ho in follow up in about 1 month as previously planned with a repeat BMP beforehand. We would be happy to see her sooner if needed for any concerns in the meantime.     25 total minutes was spent today including chart review, precharting, history and exam, post visit documentation, and reviewing studies as outlined above.     LORA Rubio, CNP   Nurse Practitioner  [facility]  Pager: [phone]  [facility]  (8am - 5pm, M-F)    Orders this Visit:  No orders of the defined types were placed in this encounter.    Orders Placed This Encounter   Medications     niacin 500 MG tablet     Sig: Take by mouth daily (with breakfast)     UNABLE TO FIND     Sig: areds   Vitamins for eyes 2 a day     There are no discontinued medications.  Encounter Diagnosis   Name Primary?     Paroxysmal atrial fibrillation (H)        CURRENT MEDICATIONS:  Current Outpatient Medications   Medication Sig Dispense Refill     atorvastatin (LIPITOR) 10 MG tablet Take 1 tablet by mouth once daily 90 tablet 2     diltiazem ER COATED BEADS (CARDIZEM CD/CARTIA XT) 120 MG 24 hr capsule Take 1 capsule (120 mg) by mouth daily 30 capsule 0     IRON (FERROUS GLUCONATE) 325 MG OR TABS Take 1 tablet by mouth daily  30 0     levothyroxine (SYNTHROID/LEVOTHROID) 25 MCG tablet Take 1 tablet (25 mcg) by mouth daily 60 tablet 0     losartan (COZAAR) 25 MG tablet Take 1 tablet (25 mg) by mouth daily 90 tablet 0     niacin 500 MG tablet Take by mouth daily (with breakfast)       rivaroxaban ANTICOAGULANT (XARELTO) 20 MG TABS tablet Take 1 tablet (20 mg) by mouth daily (with dinner) 30 tablet 0     UNABLE TO FIND areds   Vitamins for eyes 2 a day       triamcinolone (KENALOG) 0.1 % paste Take by mouth 2 times daily (Patient not taking: Reported on 8/11/2021) 5 g 0     ALLERGIES  Allergies   Allergen Reactions     Ibuprofen      PAST MEDICAL, SURGICAL, FAMILY HISTORY:  History was reviewed and updated as needed, see medical record.    SOCIAL  HISTORY:  Social History     Socioeconomic History     Marital status:      Spouse name: Not on file     Number of children: Not on file     Years of education: Not on file     Highest education level: Not on file   Occupational History     Not on file   Tobacco Use     Smoking status: Never Smoker     Smokeless tobacco: Never Used   Substance and Sexual Activity     Alcohol use: No     Drug use: No     Sexual activity: Not Currently   Other Topics Concern     Not on file   Social History Narrative     Not on file     Social Determinants of Health     Financial Resource Strain:      Difficulty of Paying Living Expenses:    Food Insecurity:      Worried About Running Out of Food in the Last Year:      Ran Out of Food in the Last Year:    Transportation Needs:      Lack of Transportation (Medical):      Lack of Transportation (Non-Medical):    Physical Activity:      Days of Exercise per Week:      Minutes of Exercise per Session:    Stress:      Feeling of Stress :    Social Connections:      Frequency of Communication with Friends and Family:      Frequency of Social Gatherings with Friends and Family:      Attends [employer] Services:      Active Member of Clubs or Organizations:      Attends Club or Organization Meetings:      Marital Status:    Intimate Partner Violence:      Fear of Current or Ex-Partner:      Emotionally Abused:      Physically Abused:      Sexually Abused:      Review of Systems:  Skin:  Negative     Eyes:  Positive for glasses  ENT:  Negative    Respiratory:  Positive for cough  Cardiovascular:  Negative    Gastroenterology: Negative    Genitourinary:  not assessed    Musculoskeletal:  Positive for back pain  Neurologic:  Positive for headaches  Psychiatric:  Positive for sleep disturbances  Heme/Lymph/Imm:  Positive for allergies  Endocrine:  Positive for thyroid disorder     Physical Exam:  Vitals: /85   Pulse 80   Ht 1.524 m (5')   Wt 72.6 kg (160 lb)   LMP  (LMP Unknown)    BMI 31.25 kg/m     Wt Readings from Last 4 Encounters:   08/11/21 72.6 kg (160 lb)   07/27/21 72.8 kg (160 lb 8 oz)   07/25/21 73.1 kg (161 lb 1.6 oz)   06/28/21 72.1 kg (159 lb)     CONSTITUTIONAL: Appears her stated age, well nourished, and in no acute distress.  HEENT: Pupils equal, round. Sclerae nonicteric.    NECK: Supple, no masses or JVD appreciated.   C/V: Regular rate and rhythm, normal S1 and S2, no S3 or S4, no murmur, rub or gallop.   RESP: Respirations are unlabored. Lungs are clear to auscultation bilaterally without wheezing, rales, or rhonchi.  EXTREM: No clubbing, cyanosis, or lower extremity edema bilaterally.   NEURO: Alert and oriented, cooperative. Gait steady. No gross focal deficits.   PSYCH: Affect appropriate. Mentation normal. Responds to questions appropriately.  SKIN: Warm and dry. No apparent rashes or bruising.     Recent Lab Results:  LIPID RESULTS:  Lab Results   Component Value Date    CHOL 135 05/26/2021    HDL 64 05/26/2021    LDL 49 05/26/2021    TRIG 112 05/26/2021     LIVER ENZYME RESULTS:  Lab Results   Component Value Date    AST 23 07/24/2021    AST 27 04/30/2021    ALT 21 07/24/2021    ALT 27 04/30/2021     CBC RESULTS:  Lab Results   Component Value Date    WBC 10.1 07/25/2021    WBC 10.0 05/26/2021    RBC 4.34 07/25/2021    RBC 4.30 05/26/2021    HGB 13.4 07/25/2021    HGB 12.9 05/26/2021    HCT 41.3 07/25/2021    HCT 40.3 05/26/2021    MCV 95 07/25/2021    MCV 94 05/26/2021    MCH 30.9 07/25/2021    MCH 30.0 05/26/2021    MCHC 32.4 07/25/2021    MCHC 32.0 05/26/2021    RDW 13.6 07/25/2021    RDW 13.7 05/26/2021     07/25/2021     05/26/2021     BMP RESULTS:  Lab Results   Component Value Date     07/25/2021     04/30/2021    POTASSIUM 4.1 07/25/2021    POTASSIUM 4.4 04/30/2021    CHLORIDE 104 07/25/2021    CHLORIDE 102 04/30/2021    CO2 29 07/25/2021    CO2 30 04/30/2021    ANIONGAP 3 07/25/2021    ANIONGAP 3 04/30/2021    GLC 85 07/25/2021     GLC 89 04/30/2021    BUN 20 07/25/2021    BUN 18 04/30/2021    CR 0.80 07/25/2021    CR 0.77 04/30/2021    GFRESTIMATED 67 07/25/2021    GFRESTIMATED >60 07/24/2021    GFRESTIMATED 70 04/30/2021    GFRESTBLACK 82 04/30/2021    LYLE 8.8 07/25/2021    LYLE 9.3 04/30/2021      A1C RESULTS:  No results found for: A1C  INR RESULTS:  No results found for: INR    CC  Germán Shin MD  [id number], [address]  [place] [address]    This note was completed in part using Dragon voice recognition software. Although reviewed after completion, some word and grammatical errors may occur.

## 2021-08-11 NOTE — TELEPHONE ENCOUNTER
Kenalog paste rx signed and patient advised of need to see ENT if tongue lesion persists.  DANIELLA Fang R.N.

## 2021-08-11 NOTE — LETTER
"8/11/2021    Diana Hilario MD  [address] [facility] [address]    RE: Jeannie Chu       Dear Colleague,    I had the pleasure of seeing Jeannie Chu in the [facility].      Cardiology Clinic Progress Note    Service Date: August 11, 2021    Primary Cardiologist: Dr. Shin      Reason for Visit: Hospital follow up    HPI:   I had the pleasure of meeting Ms. Jeannie Chu in the clinic today. She is a very pleasant 86 year old female with a past medical history notable for hypertension, hyperlipidemia, remote history of CAD status post PCI (approximately 20 years ago). She recently presented to [facility] on 7/24/21 for further evaluation and management of fatigue and weakness and was found to be in atrial fibrillation with RVR. She was seen by Dr. Shin in Cardiology consultation.  Dr. Shin recommended changing metoprolol to diltiazem 120 mg daily.  She was resumed on lisinopril 5 mg daily as well as atorvastatin 10 mg daily.  She was started on anticoagulation with Xarelto 20 mg daily and her prior to admission aspirin was discontinued since she was starting on the DOAC.    A 48-hour Holter monitor was recommended at discharge to evaluate for recurrence of atrial fibrillation and adequacy of rate control.  The Holter monitor demonstrated predominantly sinus rhythm with occasional PACs and no recurrent episodes of A.Fib.    Today, she presents to the clinic accompanied by her granddaughter in follow up of of her recent hospitalization. The patient's primary language is Tamil, which [place]. The patient's granddaughter helped interpret today.  The patient tells me that she has felt generally well since she has been home in the hospital.  Her presenting symptoms with atrial fibrillation were fatigue and general malaise or feeling \"off.\"  This has not recurred.  She has " not had issues with chest pain, palpitations, shortness of breath, or exertional symptoms.      Ms. Chu tells me that she would like to try to lose some weight but she has struggled with chronic back pain that has limited her mobility and ability to get regular exercise.  The granddaughter notes that they have been looking into getting a home blood pressure monitor and are considering buying a wearable monitor so that the patient can check her heart rates and rhythm since she seems to be minimally symptomatic with onset of atrial fibrillation. We discussed several options for monitoring this including the HackSurfer mobile or newer generation Apple watch for EKG monitoring versus something like a fit bit for heart rate monitoring.    ASSESSMENT AND PLAN:  1. Paroxysmal atrial fibrillation  - [id number] score of 5.  Anticoagulated on Xarelto 20 mg once daily with dinner.  She has been tolerating this well without concerns for significant bleeding, hematuria, or dark stools.  - Converted spontaneously to normal sinus rhythm 7/25/2021 during her hospital stay. New diagnosis at that time with duration unknown chronicity unclear, but seems to be paroxysmal in nature. She was minimally symptomatic with the episode.  - 48-hour Holter monitor and follow-up demonstrated predominantly sinus rhythm with no recurrence of A.Fib.  - TTE during her recent hospital stay on 7/25/2021 showed normal LV systolic function with EF 55-60%.    2.  Hypertension  - Blood pressure remains mildly elevated in the clinic today.  She was recently switched from low-dose lisinopril to losartan 25 mg daily due to concern for chronic dry cough as a potential side effect of ACE inhibitor.  - Recommend she increase the dose of losartan from 25 mg to 50 mg once daily for better blood pressure control.  We will repeat a BMP in about 2 to 4 weeks.    3.  Hyperlipidemia  - Treated on atorvastatin 10 mg daily with an LDL cholesterol 49 in May 2021.    4.   Coronary artery disease status post PCI of the proximal LAD remotely around 20 years ago  - Patient denies symptoms concerning for angina.    4. Obesity, BMI 31    Thank you for the opportunity to participate in this pleasant patient's care. She will see Dr. Shin in follow up in about 1 month as previously planned with a repeat BMP beforehand. We would be happy to see her sooner if needed for any concerns in the meantime.     25 total minutes was spent today including chart review, precharting, history and exam, post visit documentation, and reviewing studies as outlined above.     LORA Rubio, CNP   Nurse Practitioner  [facility]  Pager: [phone]  Text Page  (8am - 5pm, M-F)    Orders this Visit:  No orders of the defined types were placed in this encounter.    Orders Placed This Encounter   Medications     niacin 500 MG tablet     Sig: Take by mouth daily (with breakfast)     UNABLE TO FIND     Sig: areds   Vitamins for eyes 2 a day     There are no discontinued medications.  Encounter Diagnosis   Name Primary?     Paroxysmal atrial fibrillation (H)        CURRENT MEDICATIONS:  Current Outpatient Medications   Medication Sig Dispense Refill     atorvastatin (LIPITOR) 10 MG tablet Take 1 tablet by mouth once daily 90 tablet 2     diltiazem ER COATED BEADS (CARDIZEM CD/CARTIA XT) 120 MG 24 hr capsule Take 1 capsule (120 mg) by mouth daily 30 capsule 0     IRON (FERROUS GLUCONATE) 325 MG OR TABS Take 1 tablet by mouth daily  30 0     levothyroxine (SYNTHROID/LEVOTHROID) 25 MCG tablet Take 1 tablet (25 mcg) by mouth daily 60 tablet 0     losartan (COZAAR) 25 MG tablet Take 1 tablet (25 mg) by mouth daily 90 tablet 0     niacin 500 MG tablet Take by mouth daily (with breakfast)       rivaroxaban ANTICOAGULANT (XARELTO) 20 MG TABS tablet Take 1 tablet (20 mg) by mouth daily (with dinner) 30 tablet 0     UNABLE TO FIND areds   Vitamins for eyes 2 a day       triamcinolone (KENALOG) 0.1 %  paste Take by mouth 2 times daily (Patient not taking: Reported on 8/11/2021) 5 g 0     ALLERGIES  Allergies   Allergen Reactions     Ibuprofen      PAST MEDICAL, SURGICAL, FAMILY HISTORY:  History was reviewed and updated as needed, see medical record.    SOCIAL HISTORY:  Social History     Socioeconomic History     Marital status:      Spouse name: Not on file     Number of children: Not on file     Years of education: Not on file     Highest education level: Not on file   Occupational History     Not on file   Tobacco Use     Smoking status: Never Smoker     Smokeless tobacco: Never Used   Substance and Sexual Activity     Alcohol use: No     Drug use: No     Sexual activity: Not Currently   Other Topics Concern     Not on file   Social History Narrative     Not on file     Social Determinants of Health     Financial Resource Strain:      Difficulty of Paying Living Expenses:    Food Insecurity:      Worried About Running Out of Food in the Last Year:      Ran Out of Food in the Last Year:    Transportation Needs:      Lack of Transportation (Medical):      Lack of Transportation (Non-Medical):    Physical Activity:      Days of Exercise per Week:      Minutes of Exercise per Session:    Stress:      Feeling of Stress :    Social Connections:      Frequency of Communication with Friends and Family:      Frequency of Social Gatherings with Friends and Family:      Attends Yarsani Services:      Active Member of Clubs or Organizations:      Attends Club or Organization Meetings:      Marital Status:    Intimate Partner Violence:      Fear of Current or Ex-Partner:      Emotionally Abused:      Physically Abused:      Sexually Abused:      Review of Systems:  Skin:  Negative     Eyes:  Positive for glasses  ENT:  Negative    Respiratory:  Positive for cough  Cardiovascular:  Negative    Gastroenterology: Negative    Genitourinary:  not assessed    Musculoskeletal:  Positive for back pain  Neurologic:   Positive for headaches  Psychiatric:  Positive for sleep disturbances  Heme/Lymph/Imm:  Positive for allergies  Endocrine:  Positive for thyroid disorder     Physical Exam:  Vitals: /85   Pulse 80   Ht 1.524 m (5')   Wt 72.6 kg (160 lb)   LMP  (LMP Unknown)   BMI 31.25 kg/m     Wt Readings from Last 4 Encounters:   08/11/21 72.6 kg (160 lb)   07/27/21 72.8 kg (160 lb 8 oz)   07/25/21 73.1 kg (161 lb 1.6 oz)   06/28/21 72.1 kg (159 lb)     CONSTITUTIONAL: Appears her stated age, well nourished, and in no acute distress.  HEENT: Pupils equal, round. Sclerae nonicteric.    NECK: Supple, no masses or JVD appreciated.   C/V: Regular rate and rhythm, normal S1 and S2, no S3 or S4, no murmur, rub or gallop.   RESP: Respirations are unlabored. Lungs are clear to auscultation bilaterally without wheezing, rales, or rhonchi.  EXTREM: No clubbing, cyanosis, or lower extremity edema bilaterally.   NEURO: Alert and oriented, cooperative. Gait steady. No gross focal deficits.   PSYCH: Affect appropriate. Mentation normal. Responds to questions appropriately.  SKIN: Warm and dry. No apparent rashes or bruising.     Recent Lab Results:  LIPID RESULTS:  Lab Results   Component Value Date    CHOL 135 05/26/2021    HDL 64 05/26/2021    LDL 49 05/26/2021    TRIG 112 05/26/2021     LIVER ENZYME RESULTS:  Lab Results   Component Value Date    AST 23 07/24/2021    AST 27 04/30/2021    ALT 21 07/24/2021    ALT 27 04/30/2021     CBC RESULTS:  Lab Results   Component Value Date    WBC 10.1 07/25/2021    WBC 10.0 05/26/2021    RBC 4.34 07/25/2021    RBC 4.30 05/26/2021    HGB 13.4 07/25/2021    HGB 12.9 05/26/2021    HCT 41.3 07/25/2021    HCT 40.3 05/26/2021    MCV 95 07/25/2021    MCV 94 05/26/2021    MCH 30.9 07/25/2021    MCH 30.0 05/26/2021    MCHC 32.4 07/25/2021    MCHC 32.0 05/26/2021    RDW 13.6 07/25/2021    RDW 13.7 05/26/2021     07/25/2021     05/26/2021     BMP RESULTS:  Lab Results   Component Value  Date     07/25/2021     04/30/2021    POTASSIUM 4.1 07/25/2021    POTASSIUM 4.4 04/30/2021    CHLORIDE 104 07/25/2021    CHLORIDE 102 04/30/2021    CO2 29 07/25/2021    CO2 30 04/30/2021    ANIONGAP 3 07/25/2021    ANIONGAP 3 04/30/2021    GLC 85 07/25/2021    GLC 89 04/30/2021    BUN 20 07/25/2021    BUN 18 04/30/2021    CR 0.80 07/25/2021    CR 0.77 04/30/2021    GFRESTIMATED 67 07/25/2021    GFRESTIMATED >60 07/24/2021    GFRESTIMATED 70 04/30/2021    GFRESTBLACK 82 04/30/2021    LYLE 8.8 07/25/2021    LYLE 9.3 04/30/2021      A1C RESULTS:  No results found for: A1C  INR RESULTS:  No results found for: INR    CC  Germán Shin MD  [address]  [place] [address]    This note was completed in part using Dragon voice recognition software. Although reviewed after completion, some word and grammatical errors may occur.      Thank you for allowing me to participate in the care of your patient.      Sincerely,     Gabriel Pool NP     [facility]  cc:   Germán Shin MD  [address]  [place] [address]

## 2021-08-24 DIAGNOSIS — I48.0 PAROXYSMAL ATRIAL FIBRILLATION (H): ICD-10-CM

## 2021-08-24 NOTE — TELEPHONE ENCOUNTER
"Requested Prescriptions   Pending Prescriptions Disp Refills     diltiazem ER COATED BEADS (CARDIZEM CD/CARTIA XT) 120 MG 24 hr capsule  Last Written Prescription Date:  07/27/21  Last Fill Quantity: 30,  # refills: 0   Last office visit: 7/27/2021 with prescribing provider:  07/27/21   Future Office Visit:   Next 5 appointments (look out 90 days)    Sep 15, 2021  1:45 PM  Return Visit with Germán Shin MD  [facility] [place] ([facility] ) [address] [place] [address]  [address]  [facility] [address]  [phone]                30 capsule 0     Sig: Take 1 capsule (120 mg) by mouth daily       Calcium Channel Blockers Protocol  Passed - 8/24/2021  8:43 AM        Passed - Blood pressure under 140/90 in past 12 months     BP Readings from Last 3 Encounters:   08/11/21 139/85   07/28/21 (!) 141/83   07/27/21 132/88                 Passed - Normal ALT in past 12 months     Recent Labs   Lab Test 07/24/21  2138   ALT 21             Passed - Recent (12 mo) or future (30 days) visit within the authorizing provider's specialty     Patient has had an office visit with the authorizing provider or a provider within the authorizing providers department within the previous 12 mos or has a future within next 30 days. See \"Patient Info\" tab in inbasket, or \"Choose Columns\" in Meds & Orders section of the refill encounter.              Passed - Medication is active on med list        Passed - Patient is age 18 or older        Passed - No active pregnancy on record        Passed - Normal serum creatinine on file in past 12 months     Recent Labs   Lab Test 07/25/21  0714   CR 0.80       Ok to refill medication if creatinine is low          Passed - No positive pregnancy test in past 12 months           rivaroxaban ANTICOAGULANT (XARELTO) 20 MG TABS tablet  Last Written Prescription Date:  07/26/21  Last Fill Quantity: 30,  # refills: 0   Last office visit: 7/27/2021 with prescribing provider:  " 07/27/21   Future Office Visit:   Next 5 appointments (look out 90 days)    Sep 15, 2021  1:45 PM  Return Visit with Germán Shin MD  [facility] [place] ([facility] ) [address] [place] [address]  [address]  [place] [address]  [phone]                30 tablet 0     Sig: Take 1 tablet (20 mg) by mouth daily (with dinner)       Direct Oral Anticoagulant Agents Failed - 8/24/2021  8:43 AM        Failed - Creatinine Clearance greater than 50 ml/min on file in past 3 mos     No lab results found.          Passed - Normal Platelets on file in past 12 months     Recent Labs   Lab Test 07/25/21  0714                  Passed - Medication is active on med list        Passed - Serum creatinine less than or equal to 1.4 on file in past 3 mos     Recent Labs   Lab Test 07/25/21  0714   CR 0.80       Ok to refill medication if creatinine is low          Passed - Patient is 18 years of age or older        Passed - No active pregnancy on record        Passed - No positive pregnancy test within past 12 months        Passed - Recent (6 mo) or future (30 days) visit within the authorizing provider's specialty

## 2021-08-25 RX ORDER — DILTIAZEM HYDROCHLORIDE 120 MG/1
120 CAPSULE, COATED, EXTENDED RELEASE ORAL DAILY
Qty: 90 CAPSULE | Refills: 0 | Status: SHIPPED | OUTPATIENT
Start: 2021-08-25 | End: 2021-11-29

## 2021-08-25 NOTE — TELEPHONE ENCOUNTER
Pending Prescriptions:                       Disp   Refills    rivaroxaban ANTICOAGULANT (XARELTO) 20 MG *30 tab*0        Sig: Take 1 tablet (20 mg) by mouth daily (with dinner)    Signed Prescriptions:                        Disp   Refills    diltiazem ER COATED BEADS (CARDIZEM CD/CAR*90 cap*0        Sig: Take 1 capsule (120 mg) by mouth daily  Authorizing Provider: ANTWAN RAMIREZ  Routing refill request to provider for review/approval because:  Fails protocol

## 2021-08-25 NOTE — TELEPHONE ENCOUNTER
Pending Prescriptions:                       Disp   Refills    diltiazem ER COATED BEADS (CARDIZEM CD/CAR*30 cap*0        Sig: Take 1 capsule (120 mg) by mouth daily    rivaroxaban ANTICOAGULANT (XARELTO) 20 MG *30 tab*0        Sig: Take 1 tablet (20 mg) by mouth daily (with dinner)    Routing refill request to provider for review/approval because:  Fails protocol    Last ov 7/26/2021

## 2021-08-26 DIAGNOSIS — I48.0 PAROXYSMAL ATRIAL FIBRILLATION (H): ICD-10-CM

## 2021-08-26 NOTE — TELEPHONE ENCOUNTER
Medication Refilled: Xarelto   Last office visit: 7/26/2021 with Juan Carlos Pool CNP   Last Labs/EKG: NA   Next office visit: 9/15/2021 with Dr. Shin   Pharmacy sent to: Georges Tyler RN

## 2021-08-27 DIAGNOSIS — K14.6 TONGUE SORE: ICD-10-CM

## 2021-08-27 DIAGNOSIS — E03.9 ACQUIRED HYPOTHYROIDISM: ICD-10-CM

## 2021-08-27 NOTE — LETTER
[place] [address]  [facility] [address]  Phone: [phone]        August 31, 2021      Jeannie Chu                                                                                                                                [address] [facility] [address]            Dear Ms. Chu,    We are concerned about your health care.  We recently provided you with a medication refill for Levothyroxine.    We ask that you come to your clinic for non-fasting thyroid labs in October 2021 for medication monitoring.        Your prescription: Has been refilled for 2 months so you may have time for the above noted follow-up.      Thank you,      Internal Medicine Staff

## 2021-08-27 NOTE — TELEPHONE ENCOUNTER
Patient's daughter Marya calls back stating pharmacy told her Alprazolam was cancelled by Dr Hilario on 7/27/21 and she is wanting to know why. Call her back to advise at [phone] (home)

## 2021-08-30 RX ORDER — LEVOTHYROXINE SODIUM 25 UG/1
25 TABLET ORAL DAILY
Qty: 60 TABLET | Refills: 0 | Status: SHIPPED | OUTPATIENT
Start: 2021-08-30 | End: 2021-11-05

## 2021-08-30 RX ORDER — TRIAMCINOLONE ACETONIDE 0.1 %
PASTE (GRAM) DENTAL 2 TIMES DAILY
Qty: 5 G | Refills: 0 | Status: ON HOLD | OUTPATIENT
Start: 2021-08-30 | End: 2022-02-15

## 2021-08-30 NOTE — TELEPHONE ENCOUNTER
Patient's daughter calling again, she states she needs to  medications (3) today due to transportation issues.

## 2021-08-30 NOTE — TELEPHONE ENCOUNTER
Routing refill request to provider for review/approval because:  Drug not on the [facility] refill protocol   Labs out of range:    TSH   Date Value Ref Range Status   07/24/2021 6.34 (H) 0.40 - 4.00 mU/L Final   05/26/2021 12.04 (H) 0.40 - 4.00 mU/L Final     See message below regarding alprazolam    Mary Anders RN, BSN

## 2021-08-31 DIAGNOSIS — G47.00 INSOMNIA, UNSPECIFIED TYPE: ICD-10-CM

## 2021-08-31 RX ORDER — ALPRAZOLAM 0.5 MG
TABLET ORAL
Qty: 30 TABLET | Refills: 0 | Status: CANCELLED | OUTPATIENT
Start: 2021-08-31

## 2021-08-31 NOTE — TELEPHONE ENCOUNTER
Daughter Marya called in regarding restarting alprazolam. Per chart it was discontinued 7/27/21 'patient not taking'   Per Marya, patient needs for sleep. They have tried melatonin report 'it does not work' and patient is having difficulty sleeping.     Routing high priority -daughter was under the impression that this request was being resolved with another issue. Daughter's transportation for picking up prescription will not be available for several weeks after today. 08/31/21    Pended orders for details and provider signature if appropriate.      Carolina John RN on 8/31/2021 at 3:46 PM

## 2021-08-31 NOTE — TELEPHONE ENCOUNTER
This medication is a benzodiazepine and should not be used in the elderly for insomnia,has  risk of falls/confusion in the elderly.  And also this medication has a risk of addiction.  If she wants something for sleep we can do prescription trazodone.  Please inform patient

## 2021-09-01 ENCOUNTER — MYC MEDICAL ADVICE (OUTPATIENT)
Dept: INTERNAL MEDICINE | Facility: CLINIC | Age: 86
End: 2021-09-01

## 2021-09-01 DIAGNOSIS — G47.00 INSOMNIA, UNSPECIFIED TYPE: Primary | ICD-10-CM

## 2021-09-01 NOTE — TELEPHONE ENCOUNTER
Spoke with Marya and she has to check her brother first and get back to us if they decide to try that medication.    Mary Anders RN, BSN

## 2021-09-07 NOTE — TELEPHONE ENCOUNTER
Patient's daughter calls back, they would like to have the patient take trazodone. Please send RX to [facility] pharmacy in [place] today if possible.

## 2021-09-08 RX ORDER — TRAZODONE HYDROCHLORIDE 50 MG/1
25-50 TABLET, FILM COATED ORAL
Qty: 60 TABLET | Refills: 0 | Status: ON HOLD | OUTPATIENT
Start: 2021-09-08 | End: 2022-02-15

## 2021-09-25 ENCOUNTER — HEALTH MAINTENANCE LETTER (OUTPATIENT)
Age: 86
End: 2021-09-25

## 2021-10-05 ENCOUNTER — TELEPHONE (OUTPATIENT)
Dept: CARDIOLOGY | Facility: CLINIC | Age: 86
End: 2021-10-05

## 2021-10-05 NOTE — TELEPHONE ENCOUNTER
----- Message from Batsheva Bianchi sent at 10/1/2021  8:52 AM CDT -----  Regarding: ARCHIE Snyder,    This pt daughter and son called wants pt to get in with Ho he is n/a at this time - the daughter was upset no avail would like to talk to Ho to get pt in.    Base on our conversation daughter did not intel if pt has symptoms - just said they do not want MEREDITH and only wants to see Ho only soonest please call the Cell # to contact with pt kids.    Thank you,  Chetan   _______________________________________    Call placed to pt's daughter ( Shivani) to review request for sooner appointment with Dr. Shin and review if pt having sxs. No answer - LVM / ACB 10/05/21 12:00 PM     Call placed to pt's daughter Marya - no answer / LVM / ACB 10/06/21 12:12 PM     Call back received from Marya pt's daughter. Per report mother is stable and not expressing any sxs. Per the report the daughter was instructed to follow up with Dr. Shin 1 month post [facility] and there are no available openings until December. Pt's daughter voiced concerns over waiting this long to be seen as her meds were adjusted at the time of the last OV. Will review with scheduling to seek options. Pt placed on Dr. Shin wait list as well.   GEORGE Tyler RN, BSN. 10/07/21 9:59 AM

## 2021-10-12 PROBLEM — M54.50 LEFT-SIDED LOW BACK PAIN WITHOUT SCIATICA: Status: RESOLVED | Noted: 2021-07-16 | Resolved: 2021-10-12

## 2021-10-25 ENCOUNTER — LAB (OUTPATIENT)
Dept: LAB | Facility: CLINIC | Age: 86
End: 2021-10-25
Payer: COMMERCIAL

## 2021-10-25 DIAGNOSIS — E03.9 ACQUIRED HYPOTHYROIDISM: ICD-10-CM

## 2021-10-25 PROCEDURE — 36415 COLL VENOUS BLD VENIPUNCTURE: CPT

## 2021-10-25 PROCEDURE — 84443 ASSAY THYROID STIM HORMONE: CPT

## 2021-10-25 PROCEDURE — 84439 ASSAY OF FREE THYROXINE: CPT

## 2021-10-26 LAB
T4 FREE SERPL-MCNC: 1.03 NG/DL (ref 0.76–1.46)
TSH SERPL DL<=0.005 MIU/L-ACNC: 5.88 MU/L (ref 0.4–4)

## 2021-10-28 ENCOUNTER — TELEPHONE (OUTPATIENT)
Dept: PALLIATIVE MEDICINE | Facility: CLINIC | Age: 86
End: 2021-10-28
Payer: COMMERCIAL

## 2021-10-28 NOTE — TELEPHONE ENCOUNTER
Called pt. Called phone, rang with no answer and no voicemail     She is due for follow up as last seen July, was to follow up in 4-6 weeks at that time.       Nohemy BARBOUR, RN Care Coordinator  [facility]  Pain [facility]

## 2021-10-28 NOTE — TELEPHONE ENCOUNTER
Patient called and stated she is almost done with pool therapy. She is wondering what are the next steps          Myesha Bryant    [facility]

## 2021-11-02 NOTE — TELEPHONE ENCOUNTER
Called-no answer, no VM    Nohemy GALLON, RN Care Coordinator  [facility]  Pain [facility]

## 2021-11-04 NOTE — TELEPHONE ENCOUNTER
Called and was advised that patient is not available until tomorrow evening. Advised we are closed at 430 and to please let pt know her provider office was returning her call    Nohemy BARBOUR, RN Care Coordinator  [facility]  Pain [facility]

## 2021-11-27 DIAGNOSIS — I48.0 PAROXYSMAL ATRIAL FIBRILLATION (H): ICD-10-CM

## 2021-11-29 ENCOUNTER — TELEPHONE (OUTPATIENT)
Dept: INTERNAL MEDICINE | Facility: CLINIC | Age: 86
End: 2021-11-29

## 2021-11-29 ENCOUNTER — OFFICE VISIT (OUTPATIENT)
Dept: CARDIOLOGY | Facility: CLINIC | Age: 86
End: 2021-11-29
Payer: COMMERCIAL

## 2021-11-29 VITALS
HEIGHT: 60 IN | SYSTOLIC BLOOD PRESSURE: 166 MMHG | WEIGHT: 159.9 LBS | OXYGEN SATURATION: 97 % | DIASTOLIC BLOOD PRESSURE: 80 MMHG | BODY MASS INDEX: 31.39 KG/M2 | HEART RATE: 64 BPM

## 2021-11-29 DIAGNOSIS — I48.0 PAROXYSMAL ATRIAL FIBRILLATION (H): ICD-10-CM

## 2021-11-29 DIAGNOSIS — E78.5 HYPERLIPIDEMIA LDL GOAL <130: ICD-10-CM

## 2021-11-29 DIAGNOSIS — I10 ESSENTIAL HYPERTENSION: ICD-10-CM

## 2021-11-29 PROCEDURE — 99214 OFFICE O/P EST MOD 30 MIN: CPT | Performed by: INTERNAL MEDICINE

## 2021-11-29 RX ORDER — DILTIAZEM HYDROCHLORIDE 120 MG/1
120 CAPSULE, COATED, EXTENDED RELEASE ORAL DAILY
Qty: 90 CAPSULE | Refills: 3 | Status: ON HOLD | OUTPATIENT
Start: 2021-11-29 | End: 2022-03-02

## 2021-11-29 RX ORDER — ATORVASTATIN CALCIUM 10 MG/1
10 TABLET, FILM COATED ORAL DAILY
Qty: 90 TABLET | Refills: 3 | Status: SHIPPED | OUTPATIENT
Start: 2021-11-29 | End: 2022-11-10

## 2021-11-29 RX ORDER — DILTIAZEM HYDROCHLORIDE 120 MG/1
CAPSULE, COATED, EXTENDED RELEASE ORAL
Qty: 90 CAPSULE | Refills: 1 | Status: SHIPPED | OUTPATIENT
Start: 2021-11-29 | End: 2021-11-29

## 2021-11-29 RX ORDER — LOSARTAN POTASSIUM 50 MG/1
50 TABLET ORAL DAILY
Qty: 90 TABLET | Refills: 3 | Status: ON HOLD | OUTPATIENT
Start: 2021-11-29 | End: 2022-02-16

## 2021-11-29 ASSESSMENT — MIFFLIN-ST. JEOR: SCORE: 1091.8

## 2021-11-29 NOTE — LETTER
11/29/2021    Diana Hilario MD  [address] [facility] [address]    RE: Jeannie Chu       Dear Colleague,    I had the pleasure of seeing Jeannie Chu in the [facility].      Cardiology Clinic Progress Note:    November 29, 2021   Patient Name: Jeannie Chu  Patient MRN: [id number]     Consult indication: atrial fibrillation    HPI:    I had the opportunity to see patient Jeannie Chu in cardiology clinic for a follow up visit. Patient is followed by our colleague Diana Hilario MD with Primary Care.     As you know, patient is a pleasant 86-year-old female with a past medical history significant for hypertension, hyperlipidemia, remote history of CAD status post PCI (approximately 20 years ago), recently diagnosed atrial fibrillation, who presents for routine follow up.     I had initially met patient in hospital for a consultation on 7/25/2021.  Patient had been experiencing fatigue and weakness, a blood pressure check at home demonstrated an elevated heart rate in the 120 bpm range.  Patient was brought to the ED, and was found to be in atrial fibrillation with RVR.  Labs at that time were notable for normal troponin.  TTE 7/25/2021 demonstrated normal biventricular function, no significant valvular abnormalities.  Patient was started on a diltiazem gtt., and converted spontaneously to normal sinus rhythm.  She was started on rivaroxaban, oral diltiazem, and discharged home.    Since then, she was seen by my colleague Juan Carlos Pool NP, at that time blood pressure is elevated, and so was recommended to increase losartan 25 mg daily to 50 mg daily, however this was not done.    Overall, patient reports that she has been doing well.  She denies any chest pain, chest pressure, abnormal shortness of breath.  Holter monitor 7/26/2021 demonstrated normal sinus rhythm, no significant bradycardia, less than 1%  PACs, less than 1% PVCs, longest run of paroxysmal SVT was 10 beats at 137 bpm, fastest run was 4 beats at 182 bpm, asymptomatic.    Patient continues to do well, denies any palpitations, dizziness, lightheadedness, chest pain, chest pressure.  She is accompanied today by her son.  Patient plans to return home to [facility], in about 6 months or so.    Assessment and Plan/Recommendations:    # Paroxysmal atrial fibrillation, [id number], converted spontaneously to normal sinus rhythm 7/25/2021.  Asymptomatic.  # HTN. BP elevated.  /80 mmHg.  # HL.  # CAD s/p PCI (remote history, proximal LAD 20 years ago).  Patient denies symptoms concerning for angina.  # Overweight/obesity    Overall patient is in stable cardiovascular health without symptoms concerning for symptomatic paroxysmal SVT, recurrent atrial fibrillation, angina, or heart failure.    -Increase losartan 25 mg daily to 50 mg daily  -Continue atorvastatin 10 mg nightly, has been on niacin reasonable to continue this  -Continue diltiazem 120 mg daily  -Continue rivaroxaban  -Repeat Holter monitor in 1 to 2 months  -Follow-up prior to returning home to [facility] per patient request    Thank you for allowing our team to participate in the care of Jeannie Chu.  Please do not hesitate to call or page me with any questions or concerns.    Sincerely,     Germán Shin MD, [facility]  Cardiology  Text Page   November 29, 2021    Voice recognition software utilized. Although reviewed after completion, some word and grammatical errors may be present.    Total time spent on this encounter: 35 minutes, providing care in this encounter including, but not limited to, reviewing prior medical records, laboratory data, imaging studies, diagnostic studies, procedure notes, formulating an assessment and plan, recommendations.    Past Medical History:     Past Medical History:   Diagnosis Date     Acquired hypothyroidism 5/27/2021     Coronary  artery disease involving native coronary artery of native heart without angina pectoris 5/26/2021     Other and unspecified hyperlipidemia      Unspecified cardiovascular disease 1999    stent placed        Past Surgical History:   History reviewed. No pertinent surgical history.    Medications (outpatient):  Current Outpatient Medications   Medication Sig Dispense Refill     atorvastatin (LIPITOR) 10 MG tablet Take 1 tablet by mouth once daily 90 tablet 2     diltiazem ER COATED BEADS (CARDIZEM CD/CARTIA XT) 120 MG 24 hr capsule Take 1 capsule by mouth once daily 90 capsule 1     IRON (FERROUS GLUCONATE) 325 MG OR TABS Take 1 tablet by mouth daily  30 0     levothyroxine (SYNTHROID/LEVOTHROID) 25 MCG tablet 1 tab daily and take 1 extra tab once a week. 68 tablet 0     losartan (COZAAR) 50 MG tablet Take 1 tablet (50 mg) by mouth daily (Patient taking differently: Take 25 mg by mouth daily ) 90 tablet 3     Multiple Vitamins-Minerals (ICAPS AREDS FORMULA PO)        niacin 500 MG tablet Take by mouth daily (with breakfast)       rivaroxaban ANTICOAGULANT (XARELTO) 20 MG TABS tablet Take 1 tablet (20 mg) by mouth daily (with dinner) 90 tablet 3     traZODone (DESYREL) 50 MG tablet Take 0.5-1 tablets (25-50 mg) by mouth nightly as needed for sleep 60 tablet 0     triamcinolone (KENALOG) 0.1 % paste Take by mouth 2 times daily 5 g 0       Allergies:  Allergies   Allergen Reactions     Ibuprofen        Social History:   History   Drug Use No      History   Smoking Status     Never Smoker   Smokeless Tobacco     Never Used     Social History    Substance and Sexual Activity      Alcohol use: No       Family History:  Family History   Problem Relation Age of Onset     Family History Negative Other        Review of Systems:   A complete review of systems was negative except as mentioned in the History of Present Illness.     Objective & Physical Exam:  BP (!) 166/80 (BP Location: Right arm, Patient Position: Sitting, Cuff  Size: Adult Large)   Pulse 64   Ht 1.524 m (5')   Wt 72.5 kg (159 lb 14.4 oz)   LMP  (LMP Unknown)   SpO2 97%   Breastfeeding No   BMI 31.23 kg/m    Wt Readings from Last 2 Encounters:   11/29/21 72.5 kg (159 lb 14.4 oz)   08/11/21 72.6 kg (160 lb)     Body mass index is 31.23 kg/m .   Body surface area is 1.75 meters squared.    Constitutional: appears stated age, in no apparent distress, appears to be well nourished  Eyes: sclera anicteric, conjunctiva normal  ENT: normocephalic, without obvious abnormality, atraumatic  Pulmonary: clear to auscultation bilaterally, no wheezes, no rales, no increased work of breathing  Cardiovascular: JVP normal, regular rate, regular rhythm, normal S1 and S2, no S3, S4, no murmur appreciated, no lower extremity edema  Gastrointestinal: abdominal exam benign  Neurologic: awake, alert, face symmetrical, moves all extremities  Skin: no jaundice  Psychiatric: affect is normal, answers questions appropriately, oriented to self and place    Data reviewed:  Lab Results   Component Value Date    WBC 10.1 07/25/2021    WBC 10.0 05/26/2021    RBC 4.34 07/25/2021    RBC 4.30 05/26/2021    HGB 13.4 07/25/2021    HGB 12.9 05/26/2021    HCT 41.3 07/25/2021    HCT 40.3 05/26/2021    MCV 95 07/25/2021    MCV 94 05/26/2021    MCH 30.9 07/25/2021    MCH 30.0 05/26/2021    MCHC 32.4 07/25/2021    MCHC 32.0 05/26/2021    RDW 13.6 07/25/2021    RDW 13.7 05/26/2021     07/25/2021     05/26/2021     Sodium   Date Value Ref Range Status   07/25/2021 136 133 - 144 mmol/L Final   04/30/2021 135 133 - 144 mmol/L Final     Potassium   Date Value Ref Range Status   07/25/2021 4.1 3.4 - 5.3 mmol/L Final   04/30/2021 4.4 3.4 - 5.3 mmol/L Final     Chloride   Date Value Ref Range Status   07/25/2021 104 94 - 109 mmol/L Final   04/30/2021 102 94 - 109 mmol/L Final     Carbon Dioxide   Date Value Ref Range Status   04/30/2021 30 20 - 32 mmol/L Final     Carbon Dioxide (CO2)   Date Value Ref  Range Status   07/25/2021 29 20 - 32 mmol/L Final     Anion Gap   Date Value Ref Range Status   07/25/2021 3 3 - 14 mmol/L Final   04/30/2021 3 3 - 14 mmol/L Final     Glucose   Date Value Ref Range Status   07/25/2021 85 70 - 99 mg/dL Final   04/30/2021 89 70 - 99 mg/dL Final     Urea Nitrogen   Date Value Ref Range Status   07/25/2021 20 7 - 30 mg/dL Final   04/30/2021 18 7 - 30 mg/dL Final     Creatinine   Date Value Ref Range Status   07/25/2021 0.80 0.52 - 1.04 mg/dL Final   04/30/2021 0.77 0.52 - 1.04 mg/dL Final     GFR Estimate   Date Value Ref Range Status   07/25/2021 67 >60 mL/min/1.73m2 Final     Comment:     As of July 11, 2021, eGFR is calculated by the CKD-EPI creatinine equation, without race adjustment. eGFR can be influenced by muscle mass, exercise, and diet. The reported eGFR is an estimation only and is only applicable if the renal function is stable.   04/30/2021 70 >60 mL/min/[1.73_m2] Final     Comment:     Non  GFR Calc  Starting 12/18/2018, serum creatinine based estimated GFR (eGFR) will be   calculated using the Chronic Kidney Disease Epidemiology Collaboration   (CKD-EPI) equation.       GFR, ESTIMATED POCT   Date Value Ref Range Status   07/24/2021 >60 >60 mL/min/1.73m2 Final     Calcium   Date Value Ref Range Status   07/25/2021 8.8 8.5 - 10.1 mg/dL Final   04/30/2021 9.3 8.5 - 10.1 mg/dL Final     Bilirubin Total   Date Value Ref Range Status   07/24/2021 0.6 0.2 - 1.3 mg/dL Final   04/30/2021 1.1 0.2 - 1.3 mg/dL Final     Alkaline Phosphatase   Date Value Ref Range Status   07/24/2021 64 40 - 150 U/L Final   04/30/2021 70 40 - 150 U/L Final     ALT   Date Value Ref Range Status   07/24/2021 21 0 - 50 U/L Final   04/30/2021 27 0 - 50 U/L Final     AST   Date Value Ref Range Status   07/24/2021 23 0 - 45 U/L Final   04/30/2021 27 0 - 45 U/L Final     Recent Labs   Lab Test 05/26/21  1129   CHOL 135   HDL 64   LDL 49   TRIG 112      No results found for: A1C     Recent  Results (from the past 4320 hour(s))   Echocardiogram Complete   Result Value    LVEF  55-60%    [facility]    [id number]  [facility]  [id number]  [id number]^INGRIS^SAVANNA     [facility]  Echocardiography Laboratory  [address] [place] [address]     Name: CORA WAY  MRN: [id number]  : 1935  Study Date: 2021 08:30 AM  Age: 86 yrs  Gender: Female  Patient Location: [facility]  Reason For Study: Atrial Fibrillation  Ordering Physician: SAVANNA AMADO  Referring Physician: Mikel Hussein MD  Performed By: Nancy Cruz RDCS     BSA: 1.7 m2  Height: 60 in  Weight: 161 lb  HR: 93  BP: 126/66 mmHg  ______________________________________________________________________________  Procedure  Complete Portable Echo Adult. [employer] (NDC #1693-6429) given intravenously.  ______________________________________________________________________________  Interpretation Summary     Technically challenging study due to patient body habitus, even with the use  of contrast imaging.     Left ventricular systolic function is normal. The visual ejection fraction is  55-60%.  Endocardial borders are not optimally visualized, however no clear wall motion  abnormalities are seen.  Right ventricular systolic function is borderline reduced.  No significant valvular abnormalities on limited visualization.     There are no prior studies available for comparison.  ______________________________________________________________________________  Left Ventricle  The left ventricle is normal in size. There is normal left ventricular wall  thickness. Left ventricular systolic function is normal. The visual ejection  fraction is 55-60%. Diastolic function not assessed due to arrhythmia.  Regional wall motion abnormalities cannot be excluded due to limited  visualization.     Right Ventricle  The right ventricle is grossly normal size. The right ventricular systolic  function is borderline reduced.     Atria  The left  atrium is moderately dilated. The right atrium is mildly dilated.     Mitral Valve  There is moderate mitral annular calcification.     Tricuspid Valve  The tricuspid valve is not well visualized, but is grossly normal. There is  trace tricuspid regurgitation. Right ventricular systolic pressure could not  be approximated due to inadequate tricuspid regurgitation.     Aortic Valve  The aortic valve is not well visualized. Valve morphology is not well  visualized, however on Doppler interrogation it is probably functioning  normally.     Pulmonic Valve  The pulmonic valve is not well visualized. The pulmonic valve is not well  seen, but is grossly normal.     Vessels  The aortic root is normal size. Normal size ascending aorta. The inferior vena  cava was normal in size with preserved respiratory variability.     Pericardium  There is no pericardial effusion.     Rhythm  The rhythm was atrial fibrillation.  ______________________________________________________________________________  MMode/2D Measurements & Calculations  IVSd: 0.98 cm     LVIDd: 3.2 cm  LVIDs: 1.3 cm  LVPWd: 1.0 cm  FS: 58.5 %  LV mass(C)d: 91.0 grams  LV mass(C)dI: 53.4 grams/m2  Ao root diam: 2.6 cm  LA dimension: 3.6 cm  asc Aorta Diam: 2.8 cm  LA/Ao: 1.4  LVOT diam: 1.7 cm  LVOT area: 2.3 cm2  LA Volume (BP): 71.0 ml  LA Volume Index (BP): 41.8 ml/m2  RWT: 0.65     Doppler Measurements & Calculations  MV E max danielle: 105.8 cm/sec  MV max P.7 mmHg  MV mean PG: 3.3 mmHg  MV V2 VTI: 16.1 cm  MV P1/2t max danielle: 113.1 cm/sec  MV P1/2t: 29.0 msec  MVA(P1/2t): 7.6 cm2  MV dec slope: 1143 cm/sec2  MV dec time: 0.17 sec  AI P1/2t: 349.0 msec  E/E' avg: 15.4  Lateral E/e': 13.3  Medial E/e': 17.4     ______________________________________________________________________________  Report approved by: Radha Fang 2021 10:01            Germán Shin MD   [facility]

## 2021-11-29 NOTE — PATIENT INSTRUCTIONS
November 29, 2021    Thank you for allowing our Cardiology team to participate in your care.     Please note the following changes to your heart treatment plan:     Medication changes:   - increase losartan 25 mg daily to 50 mg daily   - check BP once a day, at around 4-5 pm, goal BP approximately 120/70 mmHg    Tests to be done:  - repeat Holter monitor   - FASTING cholesterol labs in March    Follow up:  - Follow up in March, or sooner as needed.      Please contact our team at [phone] or [phone] for any questions or concerns.   For scheduling, please call [phone].  If you are having a medical emergency, please call [phone].     Sincerely,    Germán Shin MD, FACC  Cardiology    [facility] and [facility] - [facility] [place]

## 2021-11-29 NOTE — TELEPHONE ENCOUNTER
Diltiazem prescription approved per [facility] Refill Protocol. DANIELLA Fang R.N.

## 2021-11-29 NOTE — PROGRESS NOTES
Cardiology Clinic Progress Note:    November 29, 2021   Patient Name: Jeannie Chu  Patient MRN: [id number]     Consult indication: atrial fibrillation    HPI:    I had the opportunity to see patient Jeannie Chu in cardiology clinic for a follow up visit. Patient is followed by our colleague Diana Hilario MD with Primary Care.     As you know, patient is a pleasant 86-year-old female with a past medical history significant for hypertension, hyperlipidemia, remote history of CAD status post PCI (approximately 20 years ago), recently diagnosed atrial fibrillation, who presents for routine follow up.     I had initially met patient in hospital for a consultation on 7/25/2021.  Patient had been experiencing fatigue and weakness, a blood pressure check at home demonstrated an elevated heart rate in the 120 bpm range.  Patient was brought to the ED, and was found to be in atrial fibrillation with RVR.  Labs at that time were notable for normal troponin.  TTE 7/25/2021 demonstrated normal biventricular function, no significant valvular abnormalities.  Patient was started on a diltiazem gtt., and converted spontaneously to normal sinus rhythm.  She was started on rivaroxaban, oral diltiazem, and discharged home.    Since then, she was seen by my colleague Juan Carlos Pool NP, at that time blood pressure is elevated, and so was recommended to increase losartan 25 mg daily to 50 mg daily, however this was not done.    Overall, patient reports that she has been doing well.  She denies any chest pain, chest pressure, abnormal shortness of breath.  Holter monitor 7/26/2021 demonstrated normal sinus rhythm, no significant bradycardia, less than 1% PACs, less than 1% PVCs, longest run of paroxysmal SVT was 10 beats at 137 bpm, fastest run was 4 beats at 182 bpm, asymptomatic.    Patient continues to do well, denies any palpitations, dizziness, lightheadedness, chest pain, chest pressure.  She is accompanied  today by her son.  Patient plans to return home to [facility], [facility], in about 6 months or so.    Assessment and Plan/Recommendations:    # Paroxysmal atrial fibrillation, [id number], converted spontaneously to normal sinus rhythm 7/25/2021.  Asymptomatic.  # HTN. BP elevated.  /80 mmHg.  # HL.  # CAD s/p PCI (remote history, proximal LAD 20 years ago).  Patient denies symptoms concerning for angina.  # Overweight/obesity    Overall patient is in stable cardiovascular health without symptoms concerning for symptomatic paroxysmal SVT, recurrent atrial fibrillation, angina, or heart failure.    -Increase losartan 25 mg daily to 50 mg daily  -Continue atorvastatin 10 mg nightly, has been on niacin reasonable to continue this  -Continue diltiazem 120 mg daily  -Continue rivaroxaban  -Repeat Holter monitor in 1 to 2 months  -Follow-up prior to returning home to [facility] per patient request    Thank you for allowing our team to participate in the care of Jeannie Chu.  Please do not hesitate to call or page me with any questions or concerns.    Sincerely,     Germán Shin MD, [facility]  Cardiology  Text Page   November 29, 2021    Voice recognition software utilized. Although reviewed after completion, some word and grammatical errors may be present.    Total time spent on this encounter: 35 minutes, providing care in this encounter including, but not limited to, reviewing prior medical records, laboratory data, imaging studies, diagnostic studies, procedure notes, formulating an assessment and plan, recommendations.    Past Medical History:     Past Medical History:   Diagnosis Date     Acquired hypothyroidism 5/27/2021     Coronary artery disease involving native coronary artery of native heart without angina pectoris 5/26/2021     Other and unspecified hyperlipidemia      Unspecified cardiovascular disease 1999    stent placed        Past Surgical History:   History reviewed. No pertinent  surgical history.    Medications (outpatient):  Current Outpatient Medications   Medication Sig Dispense Refill     atorvastatin (LIPITOR) 10 MG tablet Take 1 tablet by mouth once daily 90 tablet 2     diltiazem ER COATED BEADS (CARDIZEM CD/CARTIA XT) 120 MG 24 hr capsule Take 1 capsule by mouth once daily 90 capsule 1     IRON (FERROUS GLUCONATE) 325 MG OR TABS Take 1 tablet by mouth daily  30 0     levothyroxine (SYNTHROID/LEVOTHROID) 25 MCG tablet 1 tab daily and take 1 extra tab once a week. 68 tablet 0     losartan (COZAAR) 50 MG tablet Take 1 tablet (50 mg) by mouth daily (Patient taking differently: Take 25 mg by mouth daily ) 90 tablet 3     Multiple Vitamins-Minerals (ICAPS AREDS FORMULA PO)        niacin 500 MG tablet Take by mouth daily (with breakfast)       rivaroxaban ANTICOAGULANT (XARELTO) 20 MG TABS tablet Take 1 tablet (20 mg) by mouth daily (with dinner) 90 tablet 3     traZODone (DESYREL) 50 MG tablet Take 0.5-1 tablets (25-50 mg) by mouth nightly as needed for sleep 60 tablet 0     triamcinolone (KENALOG) 0.1 % paste Take by mouth 2 times daily 5 g 0       Allergies:  Allergies   Allergen Reactions     Ibuprofen        Social History:   History   Drug Use No      History   Smoking Status     Never Smoker   Smokeless Tobacco     Never Used     Social History    Substance and Sexual Activity      Alcohol use: No       Family History:  Family History   Problem Relation Age of Onset     Family History Negative Other        Review of Systems:   A complete review of systems was negative except as mentioned in the History of Present Illness.     Objective & Physical Exam:  BP (!) 166/80 (BP Location: Right arm, Patient Position: Sitting, Cuff Size: Adult Large)   Pulse 64   Ht 1.524 m (5')   Wt 72.5 kg (159 lb 14.4 oz)   LMP  (LMP Unknown)   SpO2 97%   Breastfeeding No   BMI 31.23 kg/m    Wt Readings from Last 2 Encounters:   11/29/21 72.5 kg (159 lb 14.4 oz)   08/11/21 72.6 kg (160 lb)     Body  mass index is 31.23 kg/m .   Body surface area is 1.75 meters squared.    Constitutional: appears stated age, in no apparent distress, appears to be well nourished  Eyes: sclera anicteric, conjunctiva normal  ENT: normocephalic, without obvious abnormality, atraumatic  Pulmonary: clear to auscultation bilaterally, no wheezes, no rales, no increased work of breathing  Cardiovascular: JVP normal, regular rate, regular rhythm, normal S1 and S2, no S3, S4, no murmur appreciated, no lower extremity edema  Gastrointestinal: abdominal exam benign  Neurologic: awake, alert, face symmetrical, moves all extremities  Skin: no jaundice  Psychiatric: affect is normal, answers questions appropriately, oriented to self and place    Data reviewed:  Lab Results   Component Value Date    WBC 10.1 07/25/2021    WBC 10.0 05/26/2021    RBC 4.34 07/25/2021    RBC 4.30 05/26/2021    HGB 13.4 07/25/2021    HGB 12.9 05/26/2021    HCT 41.3 07/25/2021    HCT 40.3 05/26/2021    MCV 95 07/25/2021    MCV 94 05/26/2021    MCH 30.9 07/25/2021    MCH 30.0 05/26/2021    MCHC 32.4 07/25/2021    MCHC 32.0 05/26/2021    RDW 13.6 07/25/2021    RDW 13.7 05/26/2021     07/25/2021     05/26/2021     Sodium   Date Value Ref Range Status   07/25/2021 136 133 - 144 mmol/L Final   04/30/2021 135 133 - 144 mmol/L Final     Potassium   Date Value Ref Range Status   07/25/2021 4.1 3.4 - 5.3 mmol/L Final   04/30/2021 4.4 3.4 - 5.3 mmol/L Final     Chloride   Date Value Ref Range Status   07/25/2021 104 94 - 109 mmol/L Final   04/30/2021 102 94 - 109 mmol/L Final     Carbon Dioxide   Date Value Ref Range Status   04/30/2021 30 20 - 32 mmol/L Final     Carbon Dioxide (CO2)   Date Value Ref Range Status   07/25/2021 29 20 - 32 mmol/L Final     Anion Gap   Date Value Ref Range Status   07/25/2021 3 3 - 14 mmol/L Final   04/30/2021 3 3 - 14 mmol/L Final     Glucose   Date Value Ref Range Status   07/25/2021 85 70 - 99 mg/dL Final   04/30/2021 89 70 - 99  mg/dL Final     Urea Nitrogen   Date Value Ref Range Status   07/25/2021 20 7 - 30 mg/dL Final   04/30/2021 18 7 - 30 mg/dL Final     Creatinine   Date Value Ref Range Status   07/25/2021 0.80 0.52 - 1.04 mg/dL Final   04/30/2021 0.77 0.52 - 1.04 mg/dL Final     GFR Estimate   Date Value Ref Range Status   07/25/2021 67 >60 mL/min/1.73m2 Final     Comment:     As of July 11, 2021, eGFR is calculated by the CKD-EPI creatinine equation, without race adjustment. eGFR can be influenced by muscle mass, exercise, and diet. The reported eGFR is an estimation only and is only applicable if the renal function is stable.   04/30/2021 70 >60 mL/min/[1.73_m2] Final     Comment:     Non  GFR Calc  Starting 12/18/2018, serum creatinine based estimated GFR (eGFR) will be   calculated using the Chronic Kidney Disease Epidemiology Collaboration   (CKD-EPI) equation.       GFR, ESTIMATED POCT   Date Value Ref Range Status   07/24/2021 >60 >60 mL/min/1.73m2 Final     Calcium   Date Value Ref Range Status   07/25/2021 8.8 8.5 - 10.1 mg/dL Final   04/30/2021 9.3 8.5 - 10.1 mg/dL Final     Bilirubin Total   Date Value Ref Range Status   07/24/2021 0.6 0.2 - 1.3 mg/dL Final   04/30/2021 1.1 0.2 - 1.3 mg/dL Final     Alkaline Phosphatase   Date Value Ref Range Status   07/24/2021 64 40 - 150 U/L Final   04/30/2021 70 40 - 150 U/L Final     ALT   Date Value Ref Range Status   07/24/2021 21 0 - 50 U/L Final   04/30/2021 27 0 - 50 U/L Final     AST   Date Value Ref Range Status   07/24/2021 23 0 - 45 U/L Final   04/30/2021 27 0 - 45 U/L Final     Recent Labs   Lab Test 05/26/21  1129   CHOL 135   HDL 64   LDL 49   TRIG 112      No results found for: A1C     Recent Results (from the past 4320 hour(s))   Echocardiogram Complete   Result Value    LVEF  55-60%    Narrative    [id number]  [id number]  [id number]  [id number]^INGRIS^SAVANNA     [facility]  Echocardiography Laboratory  [address] [place] [address]      Name: CORA WAY  MRN: [id number]  : 1935  Study Date: 2021 08:30 AM  Age: 86 yrs  Gender: Female  Patient Location: [facility]  Reason For Study: Atrial Fibrillation  Ordering Physician: SAVANNA AMADO  Referring Physician: Mikel Hussein MD  Performed By: Nancy Cruz RDCS     BSA: 1.7 m2  Height: 60 in  Weight: 161 lb  HR: 93  BP: 126/66 mmHg  ______________________________________________________________________________  Procedure  Complete Portable Echo Adult. Optison (NDC #0415-4123) given intravenously.  ______________________________________________________________________________  Interpretation Summary     Technically challenging study due to patient body habitus, even with the use  of contrast imaging.     Left ventricular systolic function is normal. The visual ejection fraction is  55-60%.  Endocardial borders are not optimally visualized, however no clear wall motion  abnormalities are seen.  Right ventricular systolic function is borderline reduced.  No significant valvular abnormalities on limited visualization.     There are no prior studies available for comparison.  ______________________________________________________________________________  Left Ventricle  The left ventricle is normal in size. There is normal left ventricular wall  thickness. Left ventricular systolic function is normal. The visual ejection  fraction is 55-60%. Diastolic function not assessed due to arrhythmia.  Regional wall motion abnormalities cannot be excluded due to limited  visualization.     Right Ventricle  The right ventricle is grossly normal size. The right ventricular systolic  function is borderline reduced.     Atria  The left atrium is moderately dilated. The right atrium is mildly dilated.     Mitral Valve  There is moderate mitral annular calcification.     Tricuspid Valve  The tricuspid valve is not well visualized, but is grossly normal. There is  trace tricuspid regurgitation.  Right ventricular systolic pressure could not  be approximated due to inadequate tricuspid regurgitation.     Aortic Valve  The aortic valve is not well visualized. Valve morphology is not well  visualized, however on Doppler interrogation it is probably functioning  normally.     Pulmonic Valve  The pulmonic valve is not well visualized. The pulmonic valve is not well  seen, but is grossly normal.     Vessels  The aortic root is normal size. Normal size ascending aorta. The inferior vena  cava was normal in size with preserved respiratory variability.     Pericardium  There is no pericardial effusion.     Rhythm  The rhythm was atrial fibrillation.  ______________________________________________________________________________  MMode/2D Measurements & Calculations  IVSd: 0.98 cm     LVIDd: 3.2 cm  LVIDs: 1.3 cm  LVPWd: 1.0 cm  FS: 58.5 %  LV mass(C)d: 91.0 grams  LV mass(C)dI: 53.4 grams/m2  Ao root diam: 2.6 cm  LA dimension: 3.6 cm  asc Aorta Diam: 2.8 cm  LA/Ao: 1.4  LVOT diam: 1.7 cm  LVOT area: 2.3 cm2  LA Volume (BP): 71.0 ml  LA Volume Index (BP): 41.8 ml/m2  RWT: 0.65     Doppler Measurements & Calculations  MV E max danielle: 105.8 cm/sec  MV max P.7 mmHg  MV mean PG: 3.3 mmHg  MV V2 VTI: 16.1 cm  MV P1/2t max danielle: 113.1 cm/sec  MV P1/2t: 29.0 msec  MVA(P1/2t): 7.6 cm2  MV dec slope: 1143 cm/sec2  MV dec time: 0.17 sec  AI P1/2t: 349.0 msec  E/E' avg: 15.4  Lateral E/e': 13.3  Medial E/e': 17.4     ______________________________________________________________________________  Report approved by: Radha Fang 2021 10:01 AM

## 2021-11-29 NOTE — TELEPHONE ENCOUNTER
Patient totally out, did not take today.  Please fill as soon as possible to the [facility] in [place]

## 2021-12-08 DIAGNOSIS — E03.9 ACQUIRED HYPOTHYROIDISM: ICD-10-CM

## 2021-12-08 NOTE — LETTER
[facility] [address], [address]  [place], [place]  [address]                                            TEL:[phone]  FAX:[phone]      Jeannie Chu  [address] [facility] [address]      December 29, 2021    Dear Jeannie,    We are concerned about your health care.  We recently provided you with a medication refill.  Many medications require routine follow-up with your provider.      At this time we ask that: You schedule a routine office visit and labs with your physician to follow your thyroid    Your prescription: Has been refilled for 1 month so you may have time for the above noted follow-up.      Thank you,      Lance Hilario M.D.

## 2021-12-08 NOTE — LETTER
[place] [address], [address]  [place] [address]  [phone]        December 24, 2021    Jeannie Chu  [facility] [facility] [address]            Dear Ms. Jeannie Chu:  We have received a refill request for your Levoxyl, however, you were advised to schedule a lab appointment so we know what dosage is best for you. Please schedule a lab appointment then we will refill your medication. If you have any further questions or problems, please contact our office.    Sincerely,        Lance Hilario M.D.

## 2021-12-13 NOTE — TELEPHONE ENCOUNTER
Last TSH was abnormal and patient was advised to repeat TSH in 8 weeks no appointment made, please advise lab only appointment and will refill Levoxyl after the lab results

## 2021-12-13 NOTE — TELEPHONE ENCOUNTER
Routing refill request to provider for review/approval because:  Labs out of range:  TSH  TSH   Date Value Ref Range Status   10/25/2021 5.88 (H) 0.40 - 4.00 mU/L Final   05/26/2021 12.04 (H) 0.40 - 4.00 mU/L Final       Nohemy Lau RN  [facility]

## 2021-12-27 RX ORDER — LEVOTHYROXINE SODIUM 25 UG/1
TABLET ORAL
Qty: 30 TABLET | Refills: 0 | Status: SHIPPED | OUTPATIENT
Start: 2021-12-27 | End: 2022-02-21

## 2021-12-27 NOTE — TELEPHONE ENCOUNTER
Routing refill request to provider for review/approval because:  Unable to reach patient.  Letter mailed.  Can this medication be denied and encounter closed?  Please advise, thanks.    Pending Prescriptions:                       Disp   Refills    levothyroxine (SYNTHROID/LEVOTHROID) 25 MC*60 tab*0        Sig: Take 1 tablet by mouth once daily

## 2022-02-09 DIAGNOSIS — E03.9 ACQUIRED HYPOTHYROIDISM: ICD-10-CM

## 2022-02-09 NOTE — TELEPHONE ENCOUNTER
Pending Prescriptions:                       Disp   Refills    levothyroxine (SYNTHROID/LEVOTHROID) 25 MC*30 tab*0        Sig: Take 1 tablet by mouth once daily    Routing refill request to provider for review/approval because:  Myesha given x1 and patient did not follow up, please advise  Labs out of range:  TSH      Jeny BERKOWITZ RN   [facility]

## 2022-02-14 ENCOUNTER — HOSPITAL ENCOUNTER (OUTPATIENT)
Facility: CLINIC | Age: 87
Setting detail: OBSERVATION
Discharge: HOME | End: 2022-02-16
Attending: EMERGENCY MEDICINE | Admitting: INTERNAL MEDICINE
Payer: COMMERCIAL

## 2022-02-14 DIAGNOSIS — I48.92 ATRIAL FLUTTER WITH RAPID VENTRICULAR RESPONSE (H): Primary | ICD-10-CM

## 2022-02-14 DIAGNOSIS — I48.92 ATRIAL FLUTTER, UNSPECIFIED TYPE (H): ICD-10-CM

## 2022-02-14 LAB
ANION GAP SERPL CALCULATED.3IONS-SCNC: 4 MMOL/L (ref 3–14)
BASOPHILS # BLD AUTO: 0.1 10E3/UL (ref 0–0.2)
BASOPHILS NFR BLD AUTO: 0 %
BUN SERPL-MCNC: 25 MG/DL (ref 7–30)
CALCIUM SERPL-MCNC: 9.3 MG/DL (ref 8.5–10.1)
CHLORIDE BLD-SCNC: 103 MMOL/L (ref 94–109)
CO2 SERPL-SCNC: 28 MMOL/L (ref 20–32)
CREAT SERPL-MCNC: 0.96 MG/DL (ref 0.52–1.04)
EOSINOPHIL # BLD AUTO: 0.2 10E3/UL (ref 0–0.7)
EOSINOPHIL NFR BLD AUTO: 2 %
ERYTHROCYTE [DISTWIDTH] IN BLOOD BY AUTOMATED COUNT: 13.6 % (ref 10–15)
GFR SERPL CREATININE-BSD FRML MDRD: 58 ML/MIN/1.73M2
GLUCOSE BLD-MCNC: 127 MG/DL (ref 70–99)
HCT VFR BLD AUTO: 42.6 % (ref 35–47)
HGB BLD-MCNC: 13.6 G/DL (ref 11.7–15.7)
HOLD SPECIMEN: NORMAL
HOLD SPECIMEN: NORMAL
IMM GRANULOCYTES # BLD: 0 10E3/UL
IMM GRANULOCYTES NFR BLD: 0 %
LYMPHOCYTES # BLD AUTO: 2.3 10E3/UL (ref 0.8–5.3)
LYMPHOCYTES NFR BLD AUTO: 20 %
MCH RBC QN AUTO: 31.1 PG (ref 26.5–33)
MCHC RBC AUTO-ENTMCNC: 31.9 G/DL (ref 31.5–36.5)
MCV RBC AUTO: 97 FL (ref 78–100)
MONOCYTES # BLD AUTO: 1.2 10E3/UL (ref 0–1.3)
MONOCYTES NFR BLD AUTO: 11 %
NEUTROPHILS # BLD AUTO: 7.9 10E3/UL (ref 1.6–8.3)
NEUTROPHILS NFR BLD AUTO: 67 %
NRBC # BLD AUTO: 0 10E3/UL
NRBC BLD AUTO-RTO: 0 /100
PLATELET # BLD AUTO: 250 10E3/UL (ref 150–450)
POTASSIUM BLD-SCNC: 4.3 MMOL/L (ref 3.4–5.3)
RBC # BLD AUTO: 4.38 10E6/UL (ref 3.8–5.2)
SARS-COV-2 RNA RESP QL NAA+PROBE: NEGATIVE
SODIUM SERPL-SCNC: 135 MMOL/L (ref 133–144)
TROPONIN I SERPL HS-MCNC: 18 NG/L
WBC # BLD AUTO: 11.7 10E3/UL (ref 4–11)

## 2022-02-14 PROCEDURE — 87635 SARS-COV-2 COVID-19 AMP PRB: CPT | Performed by: EMERGENCY MEDICINE

## 2022-02-14 PROCEDURE — G0378 HOSPITAL OBSERVATION PER HR: HCPCS

## 2022-02-14 PROCEDURE — 36415 COLL VENOUS BLD VENIPUNCTURE: CPT | Performed by: EMERGENCY MEDICINE

## 2022-02-14 PROCEDURE — 250N000009 HC RX 250: Performed by: EMERGENCY MEDICINE

## 2022-02-14 PROCEDURE — 82310 ASSAY OF CALCIUM: CPT | Performed by: EMERGENCY MEDICINE

## 2022-02-14 PROCEDURE — C9803 HOPD COVID-19 SPEC COLLECT: HCPCS

## 2022-02-14 PROCEDURE — 93005 ELECTROCARDIOGRAM TRACING: CPT

## 2022-02-14 PROCEDURE — 99220 PR INITIAL OBSERVATION CARE,LEVEL III: CPT | Mod: AI | Performed by: INTERNAL MEDICINE

## 2022-02-14 PROCEDURE — 84484 ASSAY OF TROPONIN QUANT: CPT | Performed by: EMERGENCY MEDICINE

## 2022-02-14 PROCEDURE — 96365 THER/PROPH/DIAG IV INF INIT: CPT

## 2022-02-14 PROCEDURE — 96374 THER/PROPH/DIAG INJ IV PUSH: CPT

## 2022-02-14 PROCEDURE — 96376 TX/PRO/DX INJ SAME DRUG ADON: CPT

## 2022-02-14 PROCEDURE — 85041 AUTOMATED RBC COUNT: CPT | Performed by: EMERGENCY MEDICINE

## 2022-02-14 PROCEDURE — 99285 EMERGENCY DEPT VISIT HI MDM: CPT | Mod: 25

## 2022-02-14 RX ORDER — DILTIAZEM HYDROCHLORIDE 5 MG/ML
25 INJECTION INTRAVENOUS ONCE
Status: COMPLETED | OUTPATIENT
Start: 2022-02-14 | End: 2022-02-14

## 2022-02-14 RX ORDER — DILTIAZEM HCL IN NACL,ISO-OSM 125 MG/125
5-15 PLASTIC BAG, INJECTION (ML) INTRAVENOUS CONTINUOUS
Status: DISCONTINUED | OUTPATIENT
Start: 2022-02-14 | End: 2022-02-15

## 2022-02-14 RX ADMIN — Medication 5 MG/HR: at 22:39

## 2022-02-14 RX ADMIN — DILTIAZEM HYDROCHLORIDE 25 MG: 5 INJECTION, SOLUTION INTRAVENOUS at 21:06

## 2022-02-14 ASSESSMENT — ENCOUNTER SYMPTOMS
FATIGUE: 1
PALPITATIONS: 0
WEAKNESS: 1

## 2022-02-14 NOTE — TELEPHONE ENCOUNTER
Notified patient's son-in-law, Mario, per consent.  He will relay message and have patient schedule lab appointment.

## 2022-02-15 LAB
ALBUMIN UR-MCNC: NEGATIVE MG/DL
ANION GAP SERPL CALCULATED.3IONS-SCNC: 7 MMOL/L (ref 3–14)
APPEARANCE UR: CLEAR
ATRIAL RATE - MUSE: 322 BPM
BILIRUB UR QL STRIP: NEGATIVE
BUN SERPL-MCNC: 19 MG/DL (ref 7–30)
CALCIUM SERPL-MCNC: 9 MG/DL (ref 8.5–10.1)
CHLORIDE BLD-SCNC: 108 MMOL/L (ref 94–109)
CO2 SERPL-SCNC: 22 MMOL/L (ref 20–32)
COLOR UR AUTO: NORMAL
CREAT SERPL-MCNC: 0.8 MG/DL (ref 0.52–1.04)
DIASTOLIC BLOOD PRESSURE - MUSE: NORMAL MMHG
ERYTHROCYTE [DISTWIDTH] IN BLOOD BY AUTOMATED COUNT: 13.8 % (ref 10–15)
GFR SERPL CREATININE-BSD FRML MDRD: 72 ML/MIN/1.73M2
GLUCOSE BLD-MCNC: 92 MG/DL (ref 70–99)
GLUCOSE UR STRIP-MCNC: NEGATIVE MG/DL
HCT VFR BLD AUTO: 40.9 % (ref 35–47)
HGB BLD-MCNC: 13 G/DL (ref 11.7–15.7)
HGB UR QL STRIP: NEGATIVE
INTERPRETATION ECG - MUSE: NORMAL
KETONES UR STRIP-MCNC: NEGATIVE MG/DL
LEUKOCYTE ESTERASE UR QL STRIP: NEGATIVE
MAGNESIUM SERPL-MCNC: 1.9 MG/DL (ref 1.6–2.3)
MCH RBC QN AUTO: 30.8 PG (ref 26.5–33)
MCHC RBC AUTO-ENTMCNC: 31.8 G/DL (ref 31.5–36.5)
MCV RBC AUTO: 97 FL (ref 78–100)
NITRATE UR QL: NEGATIVE
P AXIS - MUSE: NORMAL DEGREES
PH UR STRIP: 6.5 [PH] (ref 5–7)
PLATELET # BLD AUTO: 231 10E3/UL (ref 150–450)
POTASSIUM BLD-SCNC: 3.8 MMOL/L (ref 3.4–5.3)
PR INTERVAL - MUSE: NORMAL MS
QRS DURATION - MUSE: 66 MS
QT - MUSE: 280 MS
QTC - MUSE: 438 MS
R AXIS - MUSE: 22 DEGREES
RBC # BLD AUTO: 4.22 10E6/UL (ref 3.8–5.2)
RBC URINE: <1 /HPF
SODIUM SERPL-SCNC: 137 MMOL/L (ref 133–144)
SP GR UR STRIP: 1.01 (ref 1–1.03)
SYSTOLIC BLOOD PRESSURE - MUSE: NORMAL MMHG
T AXIS - MUSE: 69 DEGREES
T4 FREE SERPL-MCNC: 1.26 NG/DL (ref 0.76–1.46)
TSH SERPL DL<=0.005 MIU/L-ACNC: 6.06 MU/L (ref 0.4–4)
UROBILINOGEN UR STRIP-MCNC: NORMAL MG/DL
VENTRICULAR RATE- MUSE: 147 BPM
WBC # BLD AUTO: 10.3 10E3/UL (ref 4–11)
WBC URINE: 1 /HPF

## 2022-02-15 PROCEDURE — 80048 BASIC METABOLIC PNL TOTAL CA: CPT | Performed by: INTERNAL MEDICINE

## 2022-02-15 PROCEDURE — 96366 THER/PROPH/DIAG IV INF ADDON: CPT

## 2022-02-15 PROCEDURE — 36415 COLL VENOUS BLD VENIPUNCTURE: CPT | Performed by: INTERNAL MEDICINE

## 2022-02-15 PROCEDURE — 250N000009 HC RX 250: Performed by: INTERNAL MEDICINE

## 2022-02-15 PROCEDURE — 83735 ASSAY OF MAGNESIUM: CPT | Performed by: INTERNAL MEDICINE

## 2022-02-15 PROCEDURE — 250N000013 HC RX MED GY IP 250 OP 250 PS 637: Performed by: INTERNAL MEDICINE

## 2022-02-15 PROCEDURE — 84443 ASSAY THYROID STIM HORMONE: CPT | Performed by: INTERNAL MEDICINE

## 2022-02-15 PROCEDURE — 99232 SBSQ HOSP IP/OBS MODERATE 35: CPT | Performed by: INTERNAL MEDICINE

## 2022-02-15 PROCEDURE — 258N000003 HC RX IP 258 OP 636: Performed by: INTERNAL MEDICINE

## 2022-02-15 PROCEDURE — G0378 HOSPITAL OBSERVATION PER HR: HCPCS

## 2022-02-15 PROCEDURE — 85014 HEMATOCRIT: CPT | Performed by: INTERNAL MEDICINE

## 2022-02-15 PROCEDURE — 81001 URINALYSIS AUTO W/SCOPE: CPT | Performed by: INTERNAL MEDICINE

## 2022-02-15 PROCEDURE — 96361 HYDRATE IV INFUSION ADD-ON: CPT

## 2022-02-15 PROCEDURE — 84439 ASSAY OF FREE THYROXINE: CPT | Performed by: INTERNAL MEDICINE

## 2022-02-15 PROCEDURE — 99204 OFFICE O/P NEW MOD 45 MIN: CPT | Mod: 25 | Performed by: INTERNAL MEDICINE

## 2022-02-15 RX ORDER — LEVOTHYROXINE SODIUM 25 UG/1
25 TABLET ORAL EVERY EVENING
Status: DISCONTINUED | OUTPATIENT
Start: 2022-02-15 | End: 2022-02-16 | Stop reason: HOSPADM

## 2022-02-15 RX ORDER — ONDANSETRON 4 MG/1
4 TABLET, ORALLY DISINTEGRATING ORAL EVERY 6 HOURS PRN
Status: DISCONTINUED | OUTPATIENT
Start: 2022-02-15 | End: 2022-02-16 | Stop reason: HOSPADM

## 2022-02-15 RX ORDER — PREDNISOLONE ACETATE 10 MG/ML
1 SUSPENSION/ DROPS OPHTHALMIC
COMMUNITY
End: 2022-02-27

## 2022-02-15 RX ORDER — SODIUM CHLORIDE, SODIUM LACTATE, POTASSIUM CHLORIDE, CALCIUM CHLORIDE 600; 310; 30; 20 MG/100ML; MG/100ML; MG/100ML; MG/100ML
INJECTION, SOLUTION INTRAVENOUS CONTINUOUS
Status: DISCONTINUED | OUTPATIENT
Start: 2022-02-15 | End: 2022-02-15

## 2022-02-15 RX ORDER — PREDNISOLONE ACETATE 10 MG/ML
1 SUSPENSION/ DROPS OPHTHALMIC 3 TIMES DAILY
Status: DISCONTINUED | OUTPATIENT
Start: 2022-02-15 | End: 2022-02-16 | Stop reason: HOSPADM

## 2022-02-15 RX ORDER — ONDANSETRON 2 MG/ML
4 INJECTION INTRAMUSCULAR; INTRAVENOUS EVERY 6 HOURS PRN
Status: DISCONTINUED | OUTPATIENT
Start: 2022-02-15 | End: 2022-02-16 | Stop reason: HOSPADM

## 2022-02-15 RX ORDER — SODIUM CHLORIDE 9 MG/ML
INJECTION, SOLUTION INTRAVENOUS CONTINUOUS
Status: DISCONTINUED | OUTPATIENT
Start: 2022-02-15 | End: 2022-02-15

## 2022-02-15 RX ORDER — DILTIAZEM HYDROCHLORIDE 120 MG/1
120 CAPSULE, COATED, EXTENDED RELEASE ORAL DAILY
Status: DISCONTINUED | OUTPATIENT
Start: 2022-02-15 | End: 2022-02-16 | Stop reason: HOSPADM

## 2022-02-15 RX ORDER — NITROGLYCERIN 0.4 MG/1
0.4 TABLET SUBLINGUAL EVERY 5 MIN PRN
Status: DISCONTINUED | OUTPATIENT
Start: 2022-02-15 | End: 2022-02-16 | Stop reason: HOSPADM

## 2022-02-15 RX ORDER — ATORVASTATIN CALCIUM 10 MG/1
10 TABLET, FILM COATED ORAL DAILY
Status: DISCONTINUED | OUTPATIENT
Start: 2022-02-15 | End: 2022-02-16 | Stop reason: HOSPADM

## 2022-02-15 RX ORDER — ACETAMINOPHEN 325 MG/1
650 TABLET ORAL EVERY 6 HOURS PRN
Status: DISCONTINUED | OUTPATIENT
Start: 2022-02-15 | End: 2022-02-16 | Stop reason: HOSPADM

## 2022-02-15 RX ORDER — ACETAMINOPHEN 650 MG/1
650 SUPPOSITORY RECTAL EVERY 6 HOURS PRN
Status: DISCONTINUED | OUTPATIENT
Start: 2022-02-15 | End: 2022-02-16 | Stop reason: HOSPADM

## 2022-02-15 RX ORDER — BISACODYL 10 MG
10 SUPPOSITORY, RECTAL RECTAL DAILY PRN
Status: DISCONTINUED | OUTPATIENT
Start: 2022-02-15 | End: 2022-02-16 | Stop reason: HOSPADM

## 2022-02-15 RX ORDER — LIDOCAINE 40 MG/G
CREAM TOPICAL
Status: DISCONTINUED | OUTPATIENT
Start: 2022-02-15 | End: 2022-02-16 | Stop reason: HOSPADM

## 2022-02-15 RX ORDER — AMOXICILLIN 250 MG
2 CAPSULE ORAL 2 TIMES DAILY PRN
Status: DISCONTINUED | OUTPATIENT
Start: 2022-02-15 | End: 2022-02-16 | Stop reason: HOSPADM

## 2022-02-15 RX ORDER — AMOXICILLIN 250 MG
1 CAPSULE ORAL 2 TIMES DAILY PRN
Status: DISCONTINUED | OUTPATIENT
Start: 2022-02-15 | End: 2022-02-16 | Stop reason: HOSPADM

## 2022-02-15 RX ADMIN — Medication 15 MG/HR: at 09:02

## 2022-02-15 RX ADMIN — ACETAMINOPHEN 650 MG: 325 TABLET, FILM COATED ORAL at 23:59

## 2022-02-15 RX ADMIN — DILTIAZEM HYDROCHLORIDE 120 MG: 120 CAPSULE, COATED, EXTENDED RELEASE ORAL at 12:23

## 2022-02-15 RX ADMIN — DIGOXIN 62.5 MCG: 125 TABLET ORAL at 14:34

## 2022-02-15 RX ADMIN — Medication 1 MG: at 20:33

## 2022-02-15 RX ADMIN — ATORVASTATIN CALCIUM 10 MG: 10 TABLET, FILM COATED ORAL at 12:23

## 2022-02-15 RX ADMIN — LEVOTHYROXINE SODIUM 25 MCG: 0.03 TABLET ORAL at 20:23

## 2022-02-15 RX ADMIN — RIVAROXABAN 20 MG: 20 TABLET, FILM COATED ORAL at 16:58

## 2022-02-15 RX ADMIN — ACETAMINOPHEN 650 MG: 325 TABLET, FILM COATED ORAL at 14:54

## 2022-02-15 RX ADMIN — PREDNISOLONE ACETATE 1 DROP: 10 SUSPENSION/ DROPS OPHTHALMIC at 17:00

## 2022-02-15 RX ADMIN — SODIUM CHLORIDE: 9 INJECTION, SOLUTION INTRAVENOUS at 02:49

## 2022-02-15 RX ADMIN — PREDNISOLONE ACETATE 1 DROP: 10 SUSPENSION/ DROPS OPHTHALMIC at 12:23

## 2022-02-15 RX ADMIN — Medication 10 MG/HR: at 10:20

## 2022-02-15 ASSESSMENT — MIFFLIN-ST. JEOR: SCORE: 1104.95

## 2022-02-15 NOTE — PHARMACY-ADMISSION MEDICATION HISTORY
Admission medication history interview status for this patient is complete. See [facility] admission navigator for allergy information, prior to admission medications and immunization status.     Medication history interview done, indicate source(s): Patient and Family ( pt's daughter-Marya is taking care of pt's home medications)  Medication history resources (including written lists, pill bottles, clinic record):[employer]  Pharmacy: [facility] [address] [place] [address]    Changes made to PTA medication list:  Added: Prednisolone 1% eye suspension.  Changed: N/A  Reported as Not Taking: trazodone and triamcinolone 0.1% paste.  Removed:  trazodone and triamcinolone 0.1% paste.    Actions taken by pharmacist (provider contacted, etc): Med Dispense Reports and Clinic Note     Additional medication history information: For the prednisolone 1% eye suspension: Pt's family (Mario: son-in-law) indicated that pt's doctor instruct pt to instill 1 drop into the right eye TID for 2 days, BID for 2 days, once daily for 2 days. Pt is currently on TID regimen. Pt only able to took 1 drop into the right eye once on Feb. 14th. 2022 prior to admission.     Medication reconciliation/reorder completed by provider prior to medication history?  Y   (Y/N)       Prior to Admission medications    Medication Sig Last Dose Taking? Auth Provider   atorvastatin (LIPITOR) 10 MG tablet Take 1 tablet (10 mg) by mouth daily 2/14/2022 at am Yes Germán Shin MD   diltiazem ER COATED BEADS (CARDIZEM CD/CARTIA XT) 120 MG 24 hr capsule Take 1 capsule (120 mg) by mouth daily 2/14/2022 at am Yes Germán Shin MD   IRON (FERROUS GLUCONATE) 325 MG OR TABS Take 1 tablet by mouth daily  2/14/2022 at am Yes Joanna Garcia MD   levothyroxine (SYNTHROID/LEVOTHROID) 25 MCG tablet Take 1 tablet by mouth once daily 2/14/2022 at pm Yes Diana Hilario MD   losartan (COZAAR) 50 MG tablet Take 1 tablet (50 mg) by mouth daily 2/14/2022 at am Yes Germán Shin MD    Multiple Vitamins-Minerals (ICAPS AREDS FORMULA PO)  2/14/2022 at pm Yes Reported, Patient   niacin 500 MG tablet Take by mouth daily (with breakfast) 2/14/2022 at pm Yes Reported, Patient   prednisoLONE acetate (PRED FORTE) 1 % ophthalmic suspension Place 1 drop into the right eye Pt's family reports that the doctor want her to instill 1 drop into the right eye 3 times daily for 2 days, 2 time daily for 2 days, once daily for 2 days. Currently on TID regimen. 2/14/2022 at am (only able to take it once yesterday) Yes Unknown, Entered By History   rivaroxaban ANTICOAGULANT (XARELTO) 20 MG TABS tablet Take 1 tablet (20 mg) by mouth daily (with dinner) 2/14/2022 at pm Yes Germán Shin MD

## 2022-02-15 NOTE — PLAN OF CARE
Pt A/O x 4, VSS, pt denies pain, headache, dizziness, N/V & SOB. Pt up SBA w/gait belt. Lung sounds clear, RA. Tele: Afib, CVR (frequent pauses overnight). Cards consulted. U/A negative. EKG completed. Will continue with plan of care.    PRIMARY DIAGNOSIS: GENERALIZED WEAKNESS    OUTPATIENT/OBSERVATION GOALS TO BE MET BEFORE DISCHARGE  1. Orthostatic performed: Yes:                    Sitting Orthostatic BP: 115/56         Standing Orthostatic BP: 112/62     2. Tolerating PO medications: Yes    3. Return to near baseline physical activity: Yes    4. Cleared for discharge by consultants (if involved): Yes    Discharge Planner Nurse   Safe discharge environment identified: Yes  Barriers to discharge: No       Entered by: Michela Vanessa 02/15/2022 12:37 PM     Please review provider order for any additional goals.   Nurse to notify provider when observation goals have been met and patient is ready for discharge.

## 2022-02-15 NOTE — PLAN OF CARE
[facility] - Pt A/O x 4, VSS, pt denies pain, headache, dizziness, N/V & SOB. Pt up SBA w/gait belt. Lung sounds clear, RA. Tele: Afib, CVR (frequent pauses overnight). Dilt gtt @ 15 mL/hr. Cards consulted. Will continue with plan of care.    Primary language is Tomal.    PRIMARY DIAGNOSIS: GENERALIZED WEAKNESS    OUTPATIENT/OBSERVATION GOALS TO BE MET BEFORE DISCHARGE  1. Orthostatic performed: No    2. Tolerating PO medications: Yes    3. Return to near baseline physical activity: No    4. Cleared for discharge by consultants (if involved): No    Discharge Planner Nurse   Safe discharge environment identified: Yes  Barriers to discharge: No       Entered by: Michela Vanessa 02/15/2022 10:14 AM     Please review provider order for any additional goals.   Nurse to notify provider when observation goals have been met and patient is ready for discharge.

## 2022-02-15 NOTE — H&P
[facility]  History and Physical   Hospitalist Service    Shahbaz Ryan MD    Jeannie Chu MRN# [id number]   YOB: 1935 Age: 86 year old      Date of Admission:  2/14/2022           Assessment and Plan:   Summary:  Jeannie Chu is an 86-year-old female with history of coronary artery disease with previous stenting, dyslipidemia, paroxysmal atrial fibrillation, chronic anticoagulation with warfarin, hypertension, hypothyroidism, and previous L1 compression fracture.  She was brought to the [facility] emergency department today (2/14/22) by her family for evaluation of fatigue, weakness, and fast heart rate.  They have been checking her blood pressure and heart rate daily for the last couple of weeks.  Her heart rate was 111 today.  She felt fatigued and weak so they brought her to the emergency department.  She denied chest pain or palpitations.  Emergency department evaluation showed atrial flutter with heart rates of 1 20-1 54.  Other vital signs were unremarkable.  Laboratory evaluation showed normal troponin of 18.  White blood cells were 11.7 and CBC was otherwise unremarkable.  Basic metabolic panel was unremarkable.  EKG showed atrial flutter with rate of 147 and variable rate.  Most recent echo was on 7/5/2021 and showed ejection fraction 55 to 60%.  Cardioversion was offered in the emergency department but the patient and family declined.  I was asked to admit her to observation with atrial flutter with rapid ventricular response.    Problem list:    Atrial flutter with rapid ventricular response  History of paroxysmal atrial fibrillation  Chronic anticoagulation on Xarelto  -Admit to observation  -Monitor on telemetry  -Continue diltiazem drip that was started in the emergency department, titrate to rate under 110  -Consult cardiology  -N.p.o. for consideration of cardioversion  -Lactated Ringer's at 75 mL/h  -AM basic metabolic panel and TSH  reflex  -Continue prior to admission Xarelto  -Hold prior to admission oral diltiazem for now    Coronary artery disease  Hypertension  Hypercholesterolemia  Hypothyroidism  -Assess prior to admission medications once reconciled    CODE STATUS: Full code  DVT prophylaxis: Prior to admission Xarelto  Disposition: Admit to observation on telemetry           Code Status:   Full Code         Primary Care Physician:   Diana Hilario [phone]         Chief Complaint:   Weakness and vomiting; rapid heart rate    History is obtained from patient, Dr. Scherer, and the medical record         History of Present Illness:   Jeannie Chu is an 86-year-old female with history of coronary artery disease with previous stenting, dyslipidemia, paroxysmal atrial fibrillation, chronic anticoagulation with warfarin, hypertension, hypothyroidism, and previous L1 compression fracture.  She was brought to the [facility] emergency department today (2/14/22) by her family for evaluation of fatigue, weakness, and fast heart rate.  They have been checking her blood pressure and heart rate daily for the last couple of weeks.  Her heart rate was 111 today.  She felt fatigued and weak so they brought her to the emergency department.  She denied chest pain or palpitations.  Emergency department evaluation showed atrial flutter with heart rates of 1 20-1 54.  Other vital signs were unremarkable.  Laboratory evaluation showed normal troponin of 18.  White blood cells were 11.7 and CBC was otherwise unremarkable.  Basic metabolic panel was unremarkable.  EKG showed atrial flutter with rate of 147 and variable rate.  Most recent echo was on 7/5/2021 and showed ejection fraction 55 to 60%.  Cardioversion was offered in the emergency department but the patient and family declined.  I was asked to admit her to observation with atrial flutter with rapid ventricular response.             Past Medical History:     Patient  Active Problem List   Diagnosis     CARDIOVASCULAR SCREENING; LDL GOAL LESS THAN 130     Coronary artery disease involving native coronary artery of native heart without angina pectoris     Essential hypertension     Compression fracture of L1 vertebra, sequela     Acquired hypothyroidism     Atrial flutter with rapid ventricular response (H)     Paroxysmal atrial fibrillation (H)     Atrial flutter, unspecified type (H)      Past Medical History:   Diagnosis Date     Acquired hypothyroidism 5/27/2021     Coronary artery disease involving native coronary artery of native heart without angina pectoris 5/26/2021     Other and unspecified hyperlipidemia      Unspecified cardiovascular disease 1999    stent placed             Past Surgical History:   No past surgical history on file.         Home Medications:     Prior to Admission medications    Medication Sig Last Dose Taking? Auth Provider   atorvastatin (LIPITOR) 10 MG tablet Take 1 tablet (10 mg) by mouth daily   Germán Shin MD   diltiazem ER COATED BEADS (CARDIZEM CD/CARTIA XT) 120 MG 24 hr capsule Take 1 capsule (120 mg) by mouth daily   Germán Shin MD   IRON (FERROUS GLUCONATE) 325 MG OR TABS Take 1 tablet by mouth daily    Joanna Garcia MD   levothyroxine (SYNTHROID/LEVOTHROID) 25 MCG tablet Take 1 tablet by mouth once daily   Diana Hilario MD   losartan (COZAAR) 50 MG tablet Take 1 tablet (50 mg) by mouth daily   Germán Shin MD   Multiple Vitamins-Minerals (ICAPS AREDS FORMULA PO)    Reported, Patient   niacin 500 MG tablet Take by mouth daily (with breakfast)   Reported, Patient   rivaroxaban ANTICOAGULANT (XARELTO) 20 MG TABS tablet Take 1 tablet (20 mg) by mouth daily (with dinner)   Germán Shin MD   traZODone (DESYREL) 50 MG tablet Take 0.5-1 tablets (25-50 mg) by mouth nightly as needed for sleep   Diana Hilario MD   triamcinolone (KENALOG) 0.1 % paste Take by mouth 2 times daily   Diana Hilario MD             Allergies:     Allergies   Allergen Reactions     Ibuprofen             Social History:     Social History     Tobacco Use     Smoking status: Never Smoker     Smokeless tobacco: Never Used   Substance Use Topics     Alcohol use: No             Family History:   Patient denied significant family medical history           Review of Systems:   The 10 point Review of Systems is negative other than as noted in the HPI.           Physical Exam:   Blood pressure 107/63, pulse (!) 130, temperature 97.8  F (36.6  C), resp. rate 17, SpO2 100 %, not currently breastfeeding.  0 lbs 0 oz      GENERAL: Pleasant and cooperative. No acute distress.  EYES: Pupils equal and round. No scleral erythema or icterus.  ENT: External ears are normal without deformity. Posterior oropharynx is without erythem, swelling, or exudate.  NECK: Supple. No masses or swelling. No tenderness. Thyroid is normal without mass or tenderness.  CHEST: Clear to auscultation. Normal breath sounds. No retractions.   CV: Rapid rate and irregular rhythm. No JVD. Pulses normal.  ABDOMEN: Bowel sounds present. No tenderness. No masses or hernia.  EXTREMETIES: No clubbing, cyanosis, or ischemia.  SKIN: Warm and dry to touch. No wounds or rashes.  NEUROLOGIC: Strength and sensation are normal. Deep tendon reflexes are normal. Cranial nerves are normal.             Data:   All new lab and imaging data was reviewed.     Results for orders placed or performed during the hospital encounter of 02/14/22 (from the past 24 hour(s))   EKG 12-lead, tracing only   Result Value Ref Range    Systolic Blood Pressure  mmHg    Diastolic Blood Pressure  mmHg    Ventricular Rate 147 BPM    Atrial Rate 322 BPM    MS Interval  ms    QRS Duration 66 ms     ms    QTc 438 ms    P Axis  degrees    R AXIS 22 degrees    T Axis 69 degrees    Interpretation ECG       Atrial flutter with variable A-V block  Abnormal ECG  When compared with ECG of 25-JUL-2021 10:23,  Atrial flutter has  replaced Sinus rhythm  Vent. rate has increased BY  90 BPM  QRS axis Shifted left     [place] Draw    Narrative    The following orders were created for panel order [place] Draw.  Procedure                               Abnormality         Status                     ---------                               -----------         ------                     Extra Blue Top Tube[[id number]]                              Final result               Extra Red Top Tube[[id number]]                               Final result                 Please view results for these tests on the individual orders.   CBC + differential    Narrative    The following orders were created for panel order CBC + differential.  Procedure                               Abnormality         Status                     ---------                               -----------         ------                     CBC with platelets and d...[[id number]]  Abnormal            Final result                 Please view results for these tests on the individual orders.   Basic metabolic panel (BMP)   Result Value Ref Range    Sodium 135 133 - 144 mmol/L    Potassium 4.3 3.4 - 5.3 mmol/L    Chloride 103 94 - 109 mmol/L    Carbon Dioxide (CO2) 28 20 - 32 mmol/L    Anion Gap 4 3 - 14 mmol/L    Urea Nitrogen 25 7 - 30 mg/dL    Creatinine 0.96 0.52 - 1.04 mg/dL    Calcium 9.3 8.5 - 10.1 mg/dL    Glucose 127 (H) 70 - 99 mg/dL    GFR Estimate 58 (L) >60 mL/min/1.73m2   Troponin I (now)   Result Value Ref Range    Troponin I High Sensitivity 18 <54 ng/L   Extra Blue Top Tube   Result Value Ref Range    Hold Specimen JIC    Extra Red Top Tube   Result Value Ref Range    Hold Specimen JIC    CBC with platelets and differential   Result Value Ref Range    WBC Count 11.7 (H) 4.0 - 11.0 10e3/uL    RBC Count 4.38 3.80 - 5.20 10e6/uL    Hemoglobin 13.6 11.7 - 15.7 g/dL    Hematocrit 42.6 35.0 - 47.0 %    MCV 97 78 - 100 fL    MCH 31.1 26.5 - 33.0 pg    MCHC 31.9 31.5 - 36.5 g/dL    RDW 13.6  10.0 - 15.0 %    Platelet Count 250 150 - 450 10e3/uL    % Neutrophils 67 %    % Lymphocytes 20 %    % Monocytes 11 %    % Eosinophils 2 %    % Basophils 0 %    % Immature Granulocytes 0 %    NRBCs per 100 WBC 0 <1 /100    Absolute Neutrophils 7.9 1.6 - 8.3 10e3/uL    Absolute Lymphocytes 2.3 0.8 - 5.3 10e3/uL    Absolute Monocytes 1.2 0.0 - 1.3 10e3/uL    Absolute Eosinophils 0.2 0.0 - 0.7 10e3/uL    Absolute Basophils 0.1 0.0 - 0.2 10e3/uL    Absolute Immature Granulocytes 0.0 <=0.4 10e3/uL    Absolute NRBCs 0.0 10e3/uL   Asymptomatic COVID-19 Virus (Coronavirus) by PCR Nasopharyngeal    Specimen: Nasopharyngeal; Swab   Result Value Ref Range    SARS CoV2 PCR Negative Negative    Narrative    Testing was performed using the denzel  SARS-CoV-2 & Influenza A/B Assay on the denzel  [employer]  System.  This test should be ordered for the detection of SARS-COV-2 in individuals who meet SARS-CoV-2 clinical and/or epidemiological criteria. Test performance is unknown in asymptomatic patients.  This test is for in vitro diagnostic use under the FDA EUA for laboratories certified under CLIA to perform moderate and/or high complexity testing. This test has not been FDA cleared or approved.  A negative test does not rule out the presence of PCR inhibitors in the specimen or target RNA in concentration below the limit of detection for the assay. The possibility of a false negative should be considered if the patient's recent exposure or clinical presentation suggests COVID-19.  [facility] are certified under the Clinical Laboratory Improvement Amendments of 1988 (CLIA-88) as qualified to perform moderate and/or high complexity laboratory testing.

## 2022-02-15 NOTE — PLAN OF CARE
IMC status. VSS on RA. Tele A fib/flutter CVR, HR 70s-100s. Pt A/O x 4, able to make needs known. Up assist of 1 w/ GB. PIV in left arm, dilt gtt infusing @ 15 mL/hr. NS infusing @ 75 mL/hr. Denies pain, CP, N/V. NPO @ 0000 for cards consult. Will continue with plan of care.

## 2022-02-15 NOTE — CONSULTS
[facility]    Cardiology Consultation     Date of Admission:  2/14/2022    Primary Care Physician   Diana Hilario     Consult Date: 02/15/2022    REASON FOR CONSULTATION:  Atrial fibrillation with rapid ventricular response.    REFERRING PHYSICIAN:  Hospitalist service.    IMPRESSION:    1.  Paroxysmal atrial fibrillation with rapid ventricular response, [id number] score of 5 and chronically on Xarelto.  2.  Remote history of coronary artery disease with LAD stenting.  Preserved left ventricular systolic function.  3.  Hypothyroidism, on thyroxine replacement therapy.  Mildly elevated TSH.  4.  Hypertension.  5.  Dyslipidemia.    Brief episodes of rapid ventricular response are noted in this lady.  I suspect her atrial fibrillation may have changed from paroxysmal to persistent, as this would be a natural progression with age.  However, she denies palpitations and she is not aware of the rapid heart rates.  This is not entirely surprising, as these episodes tend to be short lasting.    Currently, heart rate is 51, and I think we can stop the diltiazem drip.  Her blood pressure is also on the low side as well.  As such, I would be hesitant to increase the dose of her oral diltiazem at home.  I would like to recommend a low dose of digoxin, especially as these heart rate episode increases occurred at rest.    HISTORY OF PRESENT ILLNESS:  This is a very pleasant 86-year-old lady from [place] who speaks Tamil.  Her English is very adequate and I am able to obtain a history from her.  I also reviewed excellent medical records for my colleague, Dr. Germán Shin.  This lady was admitted briefly last year with atrial fibrillation, fast ventricular response.  She is chronically on Xarelto and slow release diltiazem at 120 mg daily.  She monitors her heart rate closely and she tells me that she was brought in because heart rate was 101.  I asked her several times whether she felt the heart  rate and she denies it.  She also denies chest pain, shortness of breath, palpitations or heart failure symptoms.  She also has no dizzy spells or syncopal episodes.    I did not detect any atrial flutter on rhythm monitoring, but review of her monitoring strips does show episodes of rapid ventricular response with heart rates in the 150s.  However, these episodes are short lasting.      Past Medical History   I have reviewed this patient's medical history and updated it with pertinent information if needed.   Past Medical History:   Diagnosis Date     Acquired hypothyroidism 5/27/2021     Coronary artery disease involving native coronary artery of native heart without angina pectoris 5/26/2021     Other and unspecified hyperlipidemia      Unspecified cardiovascular disease 1999    stent placed       Past Surgical History   I have reviewed this patient's surgical history and updated it with pertinent information if needed.  No past surgical history on file.    Prior to Admission Medications   Prior to Admission Medications   Prescriptions Last Dose Informant Patient Reported? Taking?   IRON (FERROUS GLUCONATE) 325 MG OR TABS 2/14/2022 at am  Yes Yes   Sig: Take 1 tablet by mouth daily    Multiple Vitamins-Minerals (ICAPS AREDS FORMULA PO) 2/14/2022 at pm  Yes Yes   atorvastatin (LIPITOR) 10 MG tablet 2/14/2022 at am  No Yes   Sig: Take 1 tablet (10 mg) by mouth daily   diltiazem ER COATED BEADS (CARDIZEM CD/CARTIA XT) 120 MG 24 hr capsule 2/14/2022 at am  No Yes   Sig: Take 1 capsule (120 mg) by mouth daily   levothyroxine (SYNTHROID/LEVOTHROID) 25 MCG tablet 2/14/2022 at pm  No Yes   Sig: Take 1 tablet by mouth once daily   losartan (COZAAR) 50 MG tablet 2/14/2022 at am  No Yes   Sig: Take 1 tablet (50 mg) by mouth daily   niacin 500 MG tablet 2/14/2022 at pm  Yes Yes   Sig: Take by mouth daily (with breakfast)   prednisoLONE acetate (PRED FORTE) 1 % ophthalmic suspension 2/14/2022 at am (only able to take it once  yesterday)  Yes Yes   Sig: Place 1 drop into the right eye Pt's family reports that the doctor want her to instill 1 drop into the right eye 3 times daily for 2 days, 2 time daily for 2 days, once daily for 2 days. Currently on TID regimen.   rivaroxaban ANTICOAGULANT (XARELTO) 20 MG TABS tablet 2/14/2022 at pm  No Yes   Sig: Take 1 tablet (20 mg) by mouth daily (with dinner)      Facility-Administered Medications: None     Allergies   Allergies   Allergen Reactions     Ibuprofen        Social History   I have reviewed this patient's social history and updated it with pertinent information if needed. Jeannie Chu  reports that she has never smoked. She has never used smokeless tobacco. She reports that she does not drink alcohol and does not use drugs.    Family History   I have reviewed this patient's family history and updated it with pertinent information if needed.   Family History   Problem Relation Age of Onset     Family History Negative Other        Review of Systems   The 10 point Review of Systems is negative other than noted in the HPI or here.     Physical Exam   Temp: 97.6  F (36.4  C) Temp src: Oral BP: 102/44 Pulse: 58   Resp: 16 SpO2: 100 % O2 Device: None (Room air)    Vital Signs with Ranges  Temp:  [97.4  F (36.3  C)-98  F (36.7  C)] 97.6  F (36.4  C)  Pulse:  [] 58  Resp:  [12-21] 16  BP: ()/(44-97) 102/44  SpO2:  [98 %-100 %] 100 %  155 lbs 12.8 oz      GENERAL:  A pleasant lady, in absolutely no distress.  She looks younger than her stated age.    VITAL SIGNS:  Heart rate currently is 58. Blood pressure 102/44.  She is afebrile.  HEENT:  Unremarkable.  Mucous membranes appear normal.  SKIN:  She has no clubbing, no peripheral central cyanosis.    NECK:  No raised BELKYS and no thyromegaly.  CARDIAC:  Cardiac apex is not palpable.  Heart sounds are unremarkable.  CHEST:  Symmetrical expansion without the use of accessory muscles.  LUNGS:  Breath sounds are normal.  ABDOMEN:  Soft  and nontender.  No hepatosplenomegaly.  The abdominal aorta is not palpable.  EXTREMITIES:  She has no significant peripheral edema.  Foot pulses are preserved.  CENTRAL NERVOUS SYSTEM:  Grossly intact.  PSYCHIATRIC:  Mood appears normal.    LABORATORY INVESTIGATIONS:  CBC and electrolytes within normal limits.  TSH elevated at 6.06.  Normal free T4.    LABORATORY DATA:  EKG shows atrial fibrillation.  Earlier EKG shows atrial fibrillation with rapid ventricular response.  I do not think the rhythm is atrial flutter.      Data   Results for orders placed or performed during the hospital encounter of 02/14/22 (from the past 24 hour(s))   EKG 12-lead, tracing only   Result Value Ref Range    Systolic Blood Pressure  mmHg    Diastolic Blood Pressure  mmHg    Ventricular Rate 147 BPM    Atrial Rate 322 BPM    KS Interval  ms    QRS Duration 66 ms     ms    QTc 438 ms    P Axis  degrees    R AXIS 22 degrees    T Axis 69 degrees    Interpretation ECG       Atrial flutter with variable A-V block  Abnormal ECG  When compared with ECG of 25-JUL-2021 10:23,  Atrial flutter has replaced Sinus rhythm  Vent. rate has increased BY  90 BPM  QRS axis Shifted left  Confirmed by - EMERGENCY ROOM, PHYSICIAN ([id number]),  SUZIE PAUL ([id number]) on 2/15/2022 7:45:27 AM     Sharps Draw    Narrative    The following orders were created for panel order [facility]ps Draw.  Procedure                               Abnormality         Status                     ---------                               -----------         ------                     Extra Blue Top Tube[[id number]]                              Final result               Extra Red Top Tube[[id number]]                               Final result                 Please view results for these tests on the individual orders.   CBC + differential    Narrative    The following orders were created for panel order CBC + differential.  Procedure                               Abnormality          Status                     ---------                               -----------         ------                     CBC with platelets and d...[[id number]]  Abnormal            Final result                 Please view results for these tests on the individual orders.   Basic metabolic panel (BMP)   Result Value Ref Range    Sodium 135 133 - 144 mmol/L    Potassium 4.3 3.4 - 5.3 mmol/L    Chloride 103 94 - 109 mmol/L    Carbon Dioxide (CO2) 28 20 - 32 mmol/L    Anion Gap 4 3 - 14 mmol/L    Urea Nitrogen 25 7 - 30 mg/dL    Creatinine 0.96 0.52 - 1.04 mg/dL    Calcium 9.3 8.5 - 10.1 mg/dL    Glucose 127 (H) 70 - 99 mg/dL    GFR Estimate 58 (L) >60 mL/min/1.73m2   Troponin I (now)   Result Value Ref Range    Troponin I High Sensitivity 18 <54 ng/L   Extra Blue Top Tube   Result Value Ref Range    Hold Specimen JIC    Extra Red Top Tube   Result Value Ref Range    Hold Specimen JIC    CBC with platelets and differential   Result Value Ref Range    WBC Count 11.7 (H) 4.0 - 11.0 10e3/uL    RBC Count 4.38 3.80 - 5.20 10e6/uL    Hemoglobin 13.6 11.7 - 15.7 g/dL    Hematocrit 42.6 35.0 - 47.0 %    MCV 97 78 - 100 fL    MCH 31.1 26.5 - 33.0 pg    MCHC 31.9 31.5 - 36.5 g/dL    RDW 13.6 10.0 - 15.0 %    Platelet Count 250 150 - 450 10e3/uL    % Neutrophils 67 %    % Lymphocytes 20 %    % Monocytes 11 %    % Eosinophils 2 %    % Basophils 0 %    % Immature Granulocytes 0 %    NRBCs per 100 WBC 0 <1 /100    Absolute Neutrophils 7.9 1.6 - 8.3 10e3/uL    Absolute Lymphocytes 2.3 0.8 - 5.3 10e3/uL    Absolute Monocytes 1.2 0.0 - 1.3 10e3/uL    Absolute Eosinophils 0.2 0.0 - 0.7 10e3/uL    Absolute Basophils 0.1 0.0 - 0.2 10e3/uL    Absolute Immature Granulocytes 0.0 <=0.4 10e3/uL    Absolute NRBCs 0.0 10e3/uL   Asymptomatic COVID-19 Virus (Coronavirus) by PCR Nasopharyngeal    Specimen: Nasopharyngeal; Swab   Result Value Ref Range    SARS CoV2 PCR Negative Negative    Narrative    Testing was performed using the denzel  SARS-CoV-2 &  Influenza A/B Assay on the denzel  [facility]  System.  This test should be ordered for the detection of SARS-COV-2 in individuals who meet SARS-CoV-2 clinical and/or epidemiological criteria. Test performance is unknown in asymptomatic patients.  This test is for in vitro diagnostic use under the FDA EUA for laboratories certified under CLIA to perform moderate and/or high complexity testing. This test has not been FDA cleared or approved.  A negative test does not rule out the presence of PCR inhibitors in the specimen or target RNA in concentration below the limit of detection for the assay. The possibility of a false negative should be considered if the patient's recent exposure or clinical presentation suggests COVID-19.  [facility] are certified under the Clinical Laboratory Improvement Amendments of 1988 (CLIA-88) as qualified to perform moderate and/or high complexity laboratory testing.   TSH with free T4 reflex   Result Value Ref Range    TSH 6.06 (H) 0.40 - 4.00 mU/L   T4 free   Result Value Ref Range    Free T4 1.26 0.76 - 1.46 ng/dL   CBC with platelets   Result Value Ref Range    WBC Count 10.3 4.0 - 11.0 10e3/uL    RBC Count 4.22 3.80 - 5.20 10e6/uL    Hemoglobin 13.0 11.7 - 15.7 g/dL    Hematocrit 40.9 35.0 - 47.0 %    MCV 97 78 - 100 fL    MCH 30.8 26.5 - 33.0 pg    MCHC 31.8 31.5 - 36.5 g/dL    RDW 13.8 10.0 - 15.0 %    Platelet Count 231 150 - 450 10e3/uL   Basic metabolic panel   Result Value Ref Range    Sodium 137 133 - 144 mmol/L    Potassium 3.8 3.4 - 5.3 mmol/L    Chloride 108 94 - 109 mmol/L    Carbon Dioxide (CO2) 22 20 - 32 mmol/L    Anion Gap 7 3 - 14 mmol/L    Urea Nitrogen 19 7 - 30 mg/dL    Creatinine 0.80 0.52 - 1.04 mg/dL    Calcium 9.0 8.5 - 10.1 mg/dL    Glucose 92 70 - 99 mg/dL    GFR Estimate 72 >60 mL/min/1.73m2   Magnesium   Result Value Ref Range    Magnesium 1.9 1.6 - 2.3 mg/dL   UA with Microscopic reflex to Culture    Specimen: Urine, Midstream   Result Value  Ref Range    Color Urine Light Yellow Colorless, Straw, Light Yellow, Yellow    Appearance Urine Clear Clear    Glucose Urine Negative Negative mg/dL    Bilirubin Urine Negative Negative    Ketones Urine Negative Negative mg/dL    Specific Gravity Urine 1.013 1.003 - 1.035    Blood Urine Negative Negative    pH Urine 6.5 5.0 - 7.0    Protein Albumin Urine Negative Negative mg/dL    Urobilinogen Urine Normal Normal, 2.0 mg/dL    Nitrite Urine Negative Negative    Leukocyte Esterase Urine Negative Negative    RBC Urine <1 <=2 /HPF    WBC Urine 1 <=5 /HPF    Narrative    Urine Culture not indicated           Faizan Ackerman MD, [facility]        D: 02/15/2022   T: 02/15/2022   MT: RADHA    Name:     CORA WAY  MRN:      [id number]        Account:      [id number]   :      1935           Consult Date: 02/15/2022     Document: [id number]

## 2022-02-15 NOTE — PROVIDER NOTIFICATION
MD paged: Pt on dilt gtt. Could we get IMC orders? Thanks!    MD paged: Pt has orders for LR infusion w/ dilt gtt. Only has 1 PIV, dilt already infusing. Multiple attempts to get 2nd IV. Could we change the fluids to NS or something else compatible w/ dilt? Thanks!

## 2022-02-15 NOTE — ED NOTES
During a home bp check, daughter noted varying pulse rates.  Pt denies chest pain or sob.  C/o recent increase in fatigue and weakness.

## 2022-02-15 NOTE — ED NOTES
[facility]  ED Nurse Handoff Report    Jeannie Chu is a 86 year old female   ED Chief complaint: Palpitations  . ED Diagnosis:   Final diagnoses:   Atrial flutter, unspecified type (H)     Allergies:   Allergies   Allergen Reactions     Ibuprofen        Code Status: Full Code  Activity level - Baseline/Home:  Independent. Activity Level - Current:   Stand by Assist. Lift room needed: No. Bariatric: No   Needed: No   Isolation: No. Infection: Not Applicable.     Vital Signs:   Vitals:    02/14/22 2222 02/14/22 2245 02/14/22 2300 02/14/22 2315   BP: 109/87 (!) 131/93 106/87 (!) 119/94   Pulse: (!) 129 (!) 168 (!) 141 (!) 130   Resp: 19 17 18 20   Temp:       SpO2: 100% 100% 100% 100%       Cardiac Rhythm:  ,      Pain level:    Patient confused: No. Patient Falls Risk: No.   Elimination Status: Has voided   Patient Report - Initial Complaint: tachycardia, weakness, fatigue. Focused Assessment: ekg showing atrial fib with RVR.   Tests Performed: labs, ekg. Abnormal Results:   Labs Ordered and Resulted from Time of ED Arrival to Time of ED Departure   BASIC METABOLIC PANEL - Abnormal       Result Value    Sodium 135      Potassium 4.3      Chloride 103      Carbon Dioxide (CO2) 28      Anion Gap 4      Urea Nitrogen 25      Creatinine 0.96      Calcium 9.3      Glucose 127 (*)     GFR Estimate 58 (*)    CBC WITH PLATELETS AND DIFFERENTIAL - Abnormal    WBC Count 11.7 (*)     RBC Count 4.38      Hemoglobin 13.6      Hematocrit 42.6      MCV 97      MCH 31.1      MCHC 31.9      RDW 13.6      Platelet Count 250      % Neutrophils 67      % Lymphocytes 20      % Monocytes 11      % Eosinophils 2      % Basophils 0      % Immature Granulocytes 0      NRBCs per 100 WBC 0      Absolute Neutrophils 7.9      Absolute Lymphocytes 2.3      Absolute Monocytes 1.2      Absolute Eosinophils 0.2      Absolute Basophils 0.1      Absolute Immature Granulocytes 0.0      Absolute NRBCs 0.0     TROPONIN I -  Normal    Troponin I High Sensitivity 18     COVID-19 VIRUS (CORONAVIRUS) BY PCR - Normal    SARS CoV2 PCR Negative       .   Treatments provided: ekg, labs, diltiazem gtt  Family Comments: FAMILY AND PT ADAMENT THAT PT  NOT BE CARDIOVERTED... multiple calls and concern  OBS brochure/video discussed/provided to patient:  N/A  ED Medications:   Medications   diltiazem (CARDIZEM) 125 mg in sodium chloride 0.7 % 125 mL infusion (5 mg/hr Intravenous New Bag 2/14/22 2239)   diltiazem (CARDIZEM) injection 25 mg (25 mg Intravenous Given 2/14/22 2106)     Drips infusing:  Yes, diltiazem  For the majority of the shift, the patient's behavior Green. Interventions performed were .    Sepsis treatment initiated: No     Patient tested for COVID 19 prior to admission: NO    ED Nurse Name/Phone Number: Beth Siddiqui RN,   11:30 PM    RECEIVING UNIT ED HANDOFF REVIEW    Above ED Nurse Handoff Report was reviewed: Yes  Reviewed by: Stephany Hogan RN on February 14, 2022 at 11:56 PM

## 2022-02-15 NOTE — ED NOTES
Remaining 10 mg diltiazem IVP given.  Pt tolerated well.  Monitor remains with atrial fib with rate 120's-130's.  Continues to deny chest pain or sob.  Daughter at bedside.

## 2022-02-15 NOTE — PROGRESS NOTES
[facility]  Hospitalist Progress Note  Hood Dowd MD 02/15/22    Reason for Stay (Diagnosis): afib w/ rvr         Assessment and Plan:      Summary of Stay: Jeannie Chu is a 86 year old female with history of CAD s/p remote LAD stent, paroxysmal A. fib on Xarelto, HTN, HLD, and hypothyroidism who was admitted on 2/14/2022 after presenting with vague symptoms of fatigue and noted to have tachycardia with heart rates just above 100 at home.  She presented with initial EKG that showed possible atrial flutter, although on reflection this is more like A. fib.  Intermittent tachycardia up to the 140s in the ER so she was placed on IV diltiazem drip and admitted.  Initial lab work including CBC and BMP unremarkable.  TSH slightly elevated, but free T4 normal.  Repeat EKG shows A. fib with actual bradycardia on diltiazem which was discontinued by cardiology the morning due to soft blood pressure.  Restarted on her oral diltiazem and cardiology recommended low-dose oral digoxin due to soft blood pressure which has been started.  Monitor on telemetry overnight.    Problem List/Assessment and Plan:   Paroxysmal afib with RVR: Presenting with A. fib with RVR intermittent burst heart rate up to 140-160.  She is on diltiazem  mg daily and when previously discharged on Holter monitor she remained in NSR last year.  [id number], she is on Xarelto and has been taking this.  Coming in with 10 days of fatigue that may be secondary to being in A. fib more as her other work-up has been negative for other etiology.  Her TSH is slightly elevated, but free T4 is at goal on her current levothyroxine replacement.  There is no sign of infection.  Electrolytes are normal.  Initial concern that she may be in atrial flutter based on EKG, but on my review looks more like A. fib.  Repeating EKG now that her heart is better controlled on diltiazem drip and this also looks like A. fib.  -Cardiology consulted, given  soft blood pressure on lower dose diltiazem, low-dose digoxin was recommended.  I started 62.5 mg daily for now  -Diltiazem drip turned off, she had bradycardia on higher doses and hypotension.  I am restarting her 120 mg your diltiazem now  -Actually converted to NSR late morning, continue telemetry overnight and monitor effect of new digoxin  -Replace potassium and magnesium if low    Fatigue, improved: Reports may be 10 days of intermittent fatigue.  She says her family was more concerned about this, but she says she is fine.  She denies any fevers, chills, cough, nausea, vomiting, diarrhea, abdominal pain, dysuria.  Denies any recent infections.  She is noted to be in A. fib with intermittent RVR so possibly her fatigue could be from loss of atrial kick or tachycardia.  She says she feels completely fine now that she is in NSR.  She does not have any fevers here.  She initially had slightly elevated WBC 11.7, that is now 10.3 without intervention.  Urinalysis was obtained that shows 1 WBC and no sign of infection.  COVID-19 PCR was negative.  No respiratory symptoms so no chest x-ray obtained.  Her electrolytes are within normal limits.  Her TSH is slightly elevated, but free T4 is at goal on her current levothyroxine replacement.    Hypothyroidism: Continue 25 mcg levothyroxine daily.  TSH slightly elevated, but free T4 at goal.    CAD, HTN, HLD: Remote history LAD stent.  Preserved EF on most recent echocardiogram.  Blood pressure soft on diltiazem infusion.  -Continue her atorvastatin 10 mg daily, p.o. diltiazem, Xarelto    DVT Prophylaxis: Xarelto  Code Status: Full Code  FEN: Cardiac diet, stop IV fluids  Discharge Dispo: Anticipate discharge home tomorrow if rate controlled with new digoxin/p.o. diltiazem    Entire encounter completed with assistance of professional  via iPad due to language barrier    I updated her son in law by phone today with patient's permission, daughter was still at  "work.        Interval History (Subjective):      Assumed care today.  Soft blood pressure and slight bradycardia on high diltiazem dose infusion.  This was stopped today by cardiology.  I have restarted her oral diltiazem.  They have recommended starting low-dose digoxin due to softer blood pressure.  She says she feels fine and denies any fatigue.  Denies any cough, shortness of breath, fevers, chills, nausea, vomit, diarrhea, abdominal pain, chest pain, palpitations, shortness of breath.  Converted to NSR on telemetry late morning.                    Physical Exam:      Last Vital Signs:  /44 (BP Location: Right arm)   Pulse 58   Temp 97.6  F (36.4  C) (Oral)   Resp 16   Ht 1.575 m (5' 2\")   Wt 70.7 kg (155 lb 12.8 oz)   LMP  (LMP Unknown)   SpO2 100%   BMI 28.50 kg/m        Intake/Output Summary (Last 24 hours) at 2/15/2022 1533  Last data filed at 2/15/2022 1217  Gross per 24 hour   Intake 400 ml   Output 950 ml   Net -550 ml       Constitutional: Awake, NAD   Eyes: sclera white   HEENT: atraumatic, MMM  Respiratory: no respiratory distress, lungs cta bilaterally, no crackles or wheeze  Cardiovascular: RRR, no appreciable murmur  GI: non-tender, not distended, bowel sounds present  Skin: no rash or lesions, acyanotic  Musculoskeletal/extremities: atraumatic, no major deformities. No edema  Neurologic: Alert, answering questions appropriately , moves all extremities  Psychiatric: calm, cooperative          Medications:      All current medications were reviewed with changes reflected in problem list.         Data:      All new lab and imaging data was reviewed.   Labs:  Recent Labs   Lab 02/15/22  0819 02/14/22  1958    135   POTASSIUM 3.8 4.3   CHLORIDE 108 103   CO2 22 28   ANIONGAP 7 4   GLC 92 127*   BUN 19 25   CR 0.80 0.96   GFRESTIMATED 72 58*   LYLE 9.0 9.3     Recent Labs   Lab 02/15/22  0819 02/14/22  1958   WBC 10.3 11.7*   HGB 13.0 13.6   HCT 40.9 42.6   MCV 97 97   PLT " 231 250     Recent Labs   Lab 02/15/22  0708   TSH 6.06*   Free T4 1.26    Recent Labs   Lab 02/15/22  0921   COLOR Light Yellow   APPEARANCE Clear   URINEGLC Negative   URINEBILI Negative   URINEKETONE Negative   SG 1.013   UBLD Negative   URINEPH 6.5   PROTEIN Negative   NITRITE Negative   LEUKEST Negative   RBCU <1   WBCU 1      Imaging:   None obtained      Hood Dowd MD

## 2022-02-15 NOTE — ED PROVIDER NOTES
History   Chief Complaint:  Palpitations       The history is provided by a relative.      Jeannie Chu is a 86 year old female on Xarelto with history of paroxysmal atrial fibrillation who presents with fatigue and weakness. The patients daughter provides that the patient had been admitted around a year ago for atrial fibrillation and was started on Xarelto. They had been told by their Cardiologist to monitor her blood pressure daily for an upcoming procedure. Today, the patient had been feeling increasingly fatigued and generally weak. Her blood pressure was normal but they found her heart rate to be 111. When they rechecked, it was 101, prompting them to come to the ED. No history of blood clots. The patient did not complain of any chest pain or palpitations today.  No leg swelling.  No recent fever.  She denies any other symptoms at this time.    Review of Systems   Constitutional: Positive for fatigue.   Cardiovascular: Negative for chest pain and palpitations.   Neurological: Positive for weakness.   All other systems reviewed and are negative.      Allergies:  Ibuprofen    Medications:  Lipitor  Cardizem CD/Cartia XT  Ferrous gluconate  Levothyroxine  Cozaar  Xarelto  Desyrel    Past Medical History:     Hypothyroidism  CAD  Hyperlipidemia  Hypertension  Compression fracture of L1 vertebra  Atrial flutter with RVR  Paroxysmal atrial fibrillation      Past Surgical History:    The patient denies past surgical history.      Family History:    The patient denies past family history.     Social History:  Patient came from home.  Patient is accompanied in the ED.    Physical Exam     Patient Vitals for the past 24 hrs:   BP Temp Pulse Resp SpO2   02/14/22 2222 109/87 -- (!) 129 19 100 %   02/14/22 2200 132/87 -- 114 15 99 %   02/14/22 2145 121/85 -- (!) 130 15 100 %   02/14/22 2130 (!) 107/91 -- 114 18 100 %   02/14/22 2115 95/52 -- (!) 124 18 100 %   02/14/22 2100 103/87 -- (!) 133 18 100 %   02/14/22 2045  114/84 -- (!) 154 19 100 %   02/14/22 2030 113/77 -- (!) 146 16 100 %   02/14/22 2015 114/83 -- (!) 135 18 100 %   02/14/22 2004 112/78 -- (!) 156 18 99 %   02/14/22 2000 112/78 -- (!) 156 21 --   02/14/22 1937 (!) 122/97 97.8  F (36.6  C) (!) 121 18 100 %       Physical Exam  General: Well appearing, nontoxic. Resting comfortably  Head:  Scalp, face, and head appear normal  Eyes:  Pupils are equal, round    Conjunctivae non-injected and sclerae white  ENT:    The external nose is normal    Pinnae are normal  Neck:  Normal range of motion    There is no rigidity noted    Trachea is in the midline  CV:  Rate tachycardic, rhythm irregularly irregular     Normal S1/S2, no S3/S4    No murmur or rub. Radial pulses 2+ bilaterally.  Resp:  Lungs are clear and equal bilaterally  There is no tachypnea    No increased work of breathing    No rales, wheezing, or rhonchi  GI:  Abdomen is soft, no rigidity or guarding    No distension, or mass    No tenderness or rebound tenderness   MS:  Normal muscular tone    Symmetric motor strength    No lower extremity edema. No calf swelling or tenderness.   Skin:  No rash or acute skin lesions noted  Neuro: Awake and alert  Speech is normal and fluent  Moves all extremities spontaneously  Psych:  Normal affect. Appropriate interactions.      Emergency Department Course     ECG  ECG obtained at 1949, ECG read at 1958  Atrial flutter with variable AV block  Abnormal ECG   Rate 147 bpm. NE interval * ms. QRS duration 66 ms. QT/QTc 280/438 ms. P-R-T axes * 22 69.     Laboratory:    Labs Ordered and Resulted from Time of ED Arrival to Time of ED Departure   BASIC METABOLIC PANEL - Abnormal       Result Value    Sodium 135      Potassium 4.3      Chloride 103      Carbon Dioxide (CO2) 28      Anion Gap 4      Urea Nitrogen 25      Creatinine 0.96      Calcium 9.3      Glucose 127 (*)     GFR Estimate 58 (*)    CBC WITH PLATELETS AND DIFFERENTIAL - Abnormal    WBC Count 11.7 (*)     RBC Count  4.38      Hemoglobin 13.6      Hematocrit 42.6      MCV 97      MCH 31.1      MCHC 31.9      RDW 13.6      Platelet Count 250      % Neutrophils 67      % Lymphocytes 20      % Monocytes 11      % Eosinophils 2      % Basophils 0      % Immature Granulocytes 0      NRBCs per 100 WBC 0      Absolute Neutrophils 7.9      Absolute Lymphocytes 2.3      Absolute Monocytes 1.2      Absolute Eosinophils 0.2      Absolute Basophils 0.1      Absolute Immature Granulocytes 0.0      Absolute NRBCs 0.0     TROPONIN I - Normal    Troponin I High Sensitivity 18     COVID-19 VIRUS (CORONAVIRUS) BY PCR      Emergency Department Course:       Reviewed:  I reviewed nursing notes, vitals, past medical history and Care Everywhere    Assessments/Consults:    ED Course as of 02/14/22 2242 Mon Feb 14, 2022 2012 I obtained history and examined the patient as noted above.   2209 I consulted with Dr. Ryan of the hospitalist service and discussed patient admission. They accepted care of the patient.     Interventions:  2106 Cardizem 25 mg IV    Disposition:  The patient was admitted to the hospital under the care of Dr. Ryan.     Impression & Plan       Medical Decision Making:  Jeannie Chu is a 86 year old female with a history of paroxysmal atrial fibrillation who presents with recurrent episode of tachycardia, fatigue and lightheadedness.  On my evaluation she is well-appearing, hemodynamically stable and afebrile.  EKG reveals atrial flutter or coarse atrial fibrillation with variable conduction and fast ventricular response.  No ischemic symptoms to suggest acute coronary syndrome.  CBC and BMP are otherwise reassuring.  Troponin is normal.  I discussed treatment options with the patient and her family including cardioversion as she is anticoagulated on Xarelto versus treatment with diltiazem.  The patient and the patient's family wished to defer cardioversion until input from cardiology could be obtained.  At this point  they wish to pursue medical management.  Patient was treated with diltiazem bolus followed by infusion with improvement in her rates.  The patient remained otherwise hemodynamically stable.  Given recurrent A. fib/flutter with rapid ventricular response patient be admitted to the hospital for ongoing monitoring evaluation and treatment.  The case was discussed with the hospitalist and the patient was admitted in stable condition.    Diagnosis:    ICD-10-CM    1. Atrial flutter, unspecified type (H)  I48.92        Scribe Disclosure:  Adriel ALMANZA, am serving as a scribe at 7:52 PM on 2/14/2022 to document services personally performed by Darnell Scherer MD based on my observations and the provider's statements to me.        Darnell Scherer MD  02/14/22 [id number]

## 2022-02-15 NOTE — PLAN OF CARE
"PRIMARY DIAGNOSIS: AFIB RVR  OUTPATIENT/OBSERVATION GOALS TO BE MET BEFORE DISCHARGE:  ADLs back to baseline: Yes    Activity and level of assistance: Up with standby assistance.    Pain status: Improved with use of alternative comfort measures i.e.: ice    Return to near baseline physical activity: Yes     Discharge Planner Nurse   Safe discharge environment identified: Yes  Barriers to discharge: Yes       Entered by: Diamante Cintron 02/15/2022 5:58 PM     Please review provider order for any additional goals.   Nurse to notify provider when observation goals have been met and patient is ready for discharge.    /62 (BP Location: Right arm)   Pulse 64   Temp 98  F (36.7  C) (Oral)   Resp 16   Ht 1.575 m (5' 2\")   Wt 70.7 kg (155 lb 12.8 oz)   LMP  (LMP Unknown)   SpO2 100%   BMI 28.50 kg/m      NEURO:A/Ox4, Tamil speaking, pt understands and speaks some english  PAIN:C/O right shoulder pain relieved with ice   TELE:SR  IV:SL LA  RESPIRATORY:LS- clear, denies SOB  GI:+BS, denies nausea  :Voids appropriately  SKIN:Intact  ACTIVITY:Ax1 and gait belt  DIET:Cardiac   PLAN: Continue PO Diltiazem and PO Digoxin, discharge plan possibly tomorrow, continue POC.   "

## 2022-02-15 NOTE — PROGRESS NOTES
Care Management Discharge Note    Discharge Date: 02/16/2022       Discharge Disposition:  home    Additional Information:  Pt identified as a Service Bundle #3. No needs or assessment needed at this time. Please consult CM/SW  if discharge needs should arise.    [facility] ordered to follow for needs post discharge.            Crys Helton RN BSN CM  Inpatient Care Coordination  [facility]  [phone]

## 2022-02-15 NOTE — ED TRIAGE NOTES
Pt arrives with family due to tachycardia found on monitor. Recent Afib intermittent. Pt pulse difficult to read in triage. Irregular rhythm per palpation. Pt denies sx at this time. ABC in tact. A/OX4

## 2022-02-16 ENCOUNTER — APPOINTMENT (OUTPATIENT)
Dept: CARDIOLOGY | Facility: CLINIC | Age: 87
End: 2022-02-16
Attending: NURSE PRACTITIONER
Payer: COMMERCIAL

## 2022-02-16 VITALS
OXYGEN SATURATION: 100 % | BODY MASS INDEX: 29.21 KG/M2 | SYSTOLIC BLOOD PRESSURE: 146 MMHG | DIASTOLIC BLOOD PRESSURE: 62 MMHG | RESPIRATION RATE: 18 BRPM | WEIGHT: 158.7 LBS | TEMPERATURE: 98.1 F | HEART RATE: 66 BPM | HEIGHT: 62 IN

## 2022-02-16 LAB
ANION GAP SERPL CALCULATED.3IONS-SCNC: 4 MMOL/L (ref 3–14)
ATRIAL RATE - MUSE: 89 BPM
BUN SERPL-MCNC: 13 MG/DL (ref 7–30)
CALCIUM SERPL-MCNC: 8.5 MG/DL (ref 8.5–10.1)
CHLORIDE BLD-SCNC: 102 MMOL/L (ref 94–109)
CO2 SERPL-SCNC: 28 MMOL/L (ref 20–32)
CREAT SERPL-MCNC: 0.76 MG/DL (ref 0.52–1.04)
DIASTOLIC BLOOD PRESSURE - MUSE: NORMAL MMHG
ERYTHROCYTE [DISTWIDTH] IN BLOOD BY AUTOMATED COUNT: 13.4 % (ref 10–15)
GFR SERPL CREATININE-BSD FRML MDRD: 76 ML/MIN/1.73M2
GLUCOSE BLD-MCNC: 79 MG/DL (ref 70–99)
HCT VFR BLD AUTO: 36.7 % (ref 35–47)
HGB BLD-MCNC: 11.7 G/DL (ref 11.7–15.7)
INTERPRETATION ECG - MUSE: NORMAL
MAGNESIUM SERPL-MCNC: 1.9 MG/DL (ref 1.6–2.3)
MCH RBC QN AUTO: 31 PG (ref 26.5–33)
MCHC RBC AUTO-ENTMCNC: 31.9 G/DL (ref 31.5–36.5)
MCV RBC AUTO: 97 FL (ref 78–100)
P AXIS - MUSE: NORMAL DEGREES
PLATELET # BLD AUTO: 219 10E3/UL (ref 150–450)
POTASSIUM BLD-SCNC: 4.3 MMOL/L (ref 3.4–5.3)
PR INTERVAL - MUSE: NORMAL MS
QRS DURATION - MUSE: 72 MS
QT - MUSE: 406 MS
QTC - MUSE: 425 MS
R AXIS - MUSE: 29 DEGREES
RBC # BLD AUTO: 3.77 10E6/UL (ref 3.8–5.2)
SODIUM SERPL-SCNC: 134 MMOL/L (ref 133–144)
SYSTOLIC BLOOD PRESSURE - MUSE: NORMAL MMHG
T AXIS - MUSE: 48 DEGREES
VENTRICULAR RATE- MUSE: 66 BPM
WBC # BLD AUTO: 7.3 10E3/UL (ref 4–11)

## 2022-02-16 PROCEDURE — 99217 PR OBSERVATION CARE DISCHARGE: CPT | Performed by: INTERNAL MEDICINE

## 2022-02-16 PROCEDURE — 80048 BASIC METABOLIC PNL TOTAL CA: CPT | Performed by: INTERNAL MEDICINE

## 2022-02-16 PROCEDURE — 85027 COMPLETE CBC AUTOMATED: CPT | Performed by: INTERNAL MEDICINE

## 2022-02-16 PROCEDURE — G0378 HOSPITAL OBSERVATION PER HR: HCPCS

## 2022-02-16 PROCEDURE — 36415 COLL VENOUS BLD VENIPUNCTURE: CPT | Performed by: INTERNAL MEDICINE

## 2022-02-16 PROCEDURE — 83735 ASSAY OF MAGNESIUM: CPT | Performed by: INTERNAL MEDICINE

## 2022-02-16 PROCEDURE — 93227 XTRNL ECG REC<48 HR R&I: CPT | Performed by: INTERNAL MEDICINE

## 2022-02-16 PROCEDURE — 93225 XTRNL ECG REC<48 HRS REC: CPT

## 2022-02-16 PROCEDURE — 250N000013 HC RX MED GY IP 250 OP 250 PS 637: Performed by: INTERNAL MEDICINE

## 2022-02-16 PROCEDURE — 99214 OFFICE O/P EST MOD 30 MIN: CPT | Mod: 25 | Performed by: INTERNAL MEDICINE

## 2022-02-16 RX ORDER — NITROGLYCERIN 0.4 MG/1
TABLET SUBLINGUAL
Qty: 30 TABLET | Refills: 0 | Status: SHIPPED | OUTPATIENT
Start: 2022-02-16 | End: 2023-07-05

## 2022-02-16 RX ORDER — DIGOXIN 0.06 MG/1
62.5 TABLET ORAL DAILY
Qty: 30 TABLET | Refills: 0 | Status: SHIPPED | OUTPATIENT
Start: 2022-02-17 | End: 2022-02-18

## 2022-02-16 RX ADMIN — PREDNISOLONE ACETATE 1 DROP: 10 SUSPENSION/ DROPS OPHTHALMIC at 08:41

## 2022-02-16 RX ADMIN — DIGOXIN 62.5 MCG: 125 TABLET ORAL at 08:41

## 2022-02-16 RX ADMIN — DILTIAZEM HYDROCHLORIDE 120 MG: 120 CAPSULE, COATED, EXTENDED RELEASE ORAL at 08:41

## 2022-02-16 RX ADMIN — ATORVASTATIN CALCIUM 10 MG: 10 TABLET, FILM COATED ORAL at 08:41

## 2022-02-16 ASSESSMENT — MIFFLIN-ST. JEOR: SCORE: 1118.11

## 2022-02-16 NOTE — PROGRESS NOTES
"Cardiology Progress Note  Elina Berman APRN, CNP     Follows with Dr Shin       Assessment and Plan:     Admit (2/14/22) with fatigue/weakness/elevated heart rates.   Evidence of rapid Atrial Fibrillation.   Declined cardioversion in ED    PMH:  Coronary artery disease, paroxysmal atrial fibrillation, hyperlipidemia, hypertension, hypothyroidism    Recurrent Paroxysmal Atrial Fibrillation w/ RVR  -first noted last year, likely having more persistent episodes  -spontaneous conversion to SR, remains in SR overnight  -intolerant to higher doses of Diltiazem (bradycardia)  Digoxin started   -chronic Xarelto  (CHADS2-Vasc score: 5)  -Home today Holter to be placed on at discharge  -follow up as planned with Dr Shin (3/16/22) and labs    Coronary Artery Disease:  -s/p LAD stenting > 20 yrs ago  -NL troponin / no CP / no ST changes--> doubt ACS  -LVEF 55% (7/2021)    Hypertension:  -borderline elevated  -previously was on Losartan, but not listed on admit?    Hyperlipidemia:  -on statin               Interval History:     Tamil speaking, understanding most English   Denies CP, palpitations, orthopnea.  C/o back pain (prior compression fracture)                Medications:       atorvastatin  10 mg Oral Daily     digoxin  62.5 mcg Oral Daily     diltiazem ER COATED BEADS  120 mg Oral Daily     levothyroxine  25 mcg Oral QPM     prednisoLONE acetate  1 drop Right Eye TID     rivaroxaban ANTICOAGULANT  20 mg Oral Daily with supper     sodium chloride (PF)  3 mL Intracatheter Q8H            Physical Exam:   Blood pressure (!) 146/62, pulse 66, temperature 98.1  F (36.7  C), temperature source Oral, resp. rate 18, height 1.575 m (5' 2\"), weight 72 kg (158 lb 11.2 oz), SpO2 100 %, not currently breastfeeding.  Wt Readings from Last 3 Encounters:   02/16/22 72 kg (158 lb 11.2 oz)   11/29/21 72.5 kg (159 lb 14.4 oz)   08/11/21 72.6 kg (160 lb)     I/O last 3 completed shifts:  In: 883 [P.O.:880; I.V.:3]  Out: 350 [Urine:350]    CONST:  " Alert and oriented  NAD  LUNGS:  CTA bilaterally  CARDIO:  RRR, S1, S2 no murmurs  ABD:  Soft  +BS obese  EXT:  No edema  1+ DP bilaterally           Data:   TELE:  SR    CBC  Recent Labs   Lab 02/16/22  0714 02/15/22  0819   WBC 7.3 10.3   HGB 11.7 13.0    231       BMP  Recent Labs   Lab 02/16/22  0714 02/15/22  0819 02/14/22 1958    137 135   POTASSIUM 4.3 3.8 4.3   CHLORIDE 102 108 103   LYLE 8.5 9.0 9.3   CO2 28 22 28   BUN 13 19 25   CR 0.76 0.80 0.96   GLC 79 92 127*     Recent Labs   Lab Test 05/26/21  1129   CHOL 135   HDL 64   LDL 49   TRIG 112       TROP  Lab Results   Component Value Date    TROPONIN 0.018 07/24/2021       BNP  No results for input(s): NTBNPI, NTBNP in the last 168 hours.

## 2022-02-16 NOTE — PLAN OF CARE
"BP (!) 140/72 (BP Location: Right arm, Patient Position: Sitting)   Pulse 76   Temp 97.5  F (36.4  C) (Oral)   Resp 18   Ht 1.575 m (5' 2\")   Wt 70.7 kg (155 lb 12.8 oz)   LMP  (LMP Unknown)   SpO2 100%   BMI 28.50 kg/m     PRIMARY DIAGNOSIS: \"GENERIC\" NURSING  OUTPATIENT/OBSERVATION GOALS TO BE MET BEFORE DISCHARGE:  ADLs back to baseline: Yes    Activity and level of assistance: Up with standby assistance.    Pain status: Improved with use of alternative comfort measures i.e.: heat and positioning    Return to near baseline physical activity: Yes     Discharge Planner Nurse   Safe discharge environment identified: Yes  Barriers to discharge: No       Entered by: Kristie Santana 02/16/2022 1:55 AM     Please review provider order for any additional goals.   Nurse to notify provider when observation goals have been met and patient is ready for discharge.  "

## 2022-02-16 NOTE — DISCHARGE SUMMARY
[facility]    Discharge Summary  Hospitalist    Date of Admission:  2/14/2022  Date of Discharge:  2/16/2022  Discharging Provider: Ese Avendano MD    Discharge Diagnoses   A. fib with RVR    History of Present Illness   Review admission history and physical.    Hospital Course   Jeannie Chu was admitted on 2/14/2022.  The following problems were addressed during her hospitalization:    Active Problems:    Atrial flutter, unspecified type (H)    Summary of Stay: Jeannie Chu is a 86 year old female with history of CAD s/p remote LAD stent, paroxysmal A. fib on Xarelto, HTN, HLD, and hypothyroidism who was admitted on 2/14/2022 after presenting with vague symptoms of fatigue and noted to have tachycardia with heart rates just above 100 at home.  She presented with initial EKG that showed possible atrial flutter, although on reflection this is more like A. fib.  Intermittent tachycardia up to the 140s in the ER so she was placed on IV diltiazem drip and admitted.  Initial lab work including CBC and BMP unremarkable.  TSH slightly elevated, but free T4 normal.  Repeat EKG shows A. fib with actual bradycardia on diltiazem which was discontinued by cardiology the morning due to soft blood pressure.  Restarted on her oral diltiazem and cardiology recommended low-dose oral digoxin due to soft blood pressure which has been started.    Monitored on telemetry overnight, patient to go home on Holter.     Problem List/Assessment and Plan:   Paroxysmal afib with RVR: Presenting with A. fib with RVR intermittent burst heart rate up to 140-160.  She is on diltiazem  mg daily and when previously discharged on Holter monitor she remained in NSR last year.  [id number] 5, she is on Xarelto and has been taking this.  Coming in with 10 days of fatigue that may be secondary to being in A. fib more as her other work-up has been negative for other etiology.  Her TSH is slightly elevated, but  free T4 is at goal on her current levothyroxine replacement.  There is no sign of infection.  Electrolytes are normal.  Initial concern that she may be in atrial flutter based on EKG, but on my review looks more like A. fib.  Repeating EKG now that her heart is better controlled on diltiazem drip and this also looks like A. fib.Cardiology consulted, given soft blood pressure on lower dose diltiazem, low-dose digoxin was recommended.  started 62.5 mg daily.   restarted 120 mg your diltiazem now, discharge on Holter. converted to NSR prior discharge .   Fatigue, improved: Reports may be 10 days of intermittent fatigue.  She says her family was more concerned about this, but she says she is fine.  She denies any fevers, chills, cough, nausea, vomiting, diarrhea, abdominal pain, dysuria.  Denies any recent infections.  She is noted to be in A. fib with intermittent RVR so possibly her fatigue could be from loss of atrial kick or tachycardia.  She says she feels completely fine now that she is in NSR.  She does not have any fevers here.  She initially had slightly elevated WBC 11.7, that is now 10.3 without intervention.  Urinalysis was obtained that shows 1 WBC and no sign of infection.  COVID-19 PCR was negative.  No respiratory symptoms so no chest x-ray obtained.  Her electrolytes are within normal limits.  Her TSH is slightly elevated, but free T4 is at goal on her current levothyroxine replacement.     Hypothyroidism: Continue 25 mcg levothyroxine daily.  TSH slightly elevated, but free T4 at goal.     CAD, HTN, HLD: Remote history LAD stent.  Preserved EF on most recent echocardiogram.   Continue her atorvastatin 10 mg daily, p.o. diltiazem, Xarelto.      Ese Avendano MD    Significant Results and Procedures       Pending Results     Unresulted Labs Ordered in the Past 30 Days of this Admission     No orders found from 1/15/2022 to 2/15/2022.          Code Status   Full Code       Primary Care Physician    Diana Hilario    Physical Exam   Temp: 98.1  F (36.7  C) Temp src: Oral BP: (!) 146/62 Pulse: 66   Resp: 18 SpO2: 100 % O2 Device: None (Room air)    Vitals:    02/15/22 0038 02/16/22 0436   Weight: 70.7 kg (155 lb 12.8 oz) 72 kg (158 lb 11.2 oz)     Vital Signs with Ranges  Temp:  [97.5  F (36.4  C)-98.7  F (37.1  C)] 98.1  F (36.7  C)  Pulse:  [58-76] 66  Resp:  [16-18] 18  BP: (102-146)/(44-72) 146/62  SpO2:  [98 %-100 %] 100 %  I/O last 3 completed shifts:  In: 883 [P.O.:880; I.V.:3]  Out: 350 [Urine:350]    The patient was examined on the day of discharge.    Discharge Disposition   Discharged to home  Condition at discharge: Stable    Consultations This Hospital Stay   CARDIOLOGY IP CONSULT    Time Spent on this Encounter     I, Ese Avendano MD, personally saw the patient today and spent greater than 30 minutes discharging this patient.  Discharge Orders      Care Coordination Referral      Reason for your hospital stay    Atrial fibrillation  with rapid ventricular rate     Follow-up and recommended labs and tests     Follow up with primary care provider, Diana Hilario, within 7 days for hospital follow- up.  The following labs/tests are recommended: basic metabolic panel in 1 week to evaluate renal function .     Activity    Your activity upon discharge: activity as tolerated     Discharge Instructions    Follow up with your cardiologist in 2-3 weeks, use nitroglycerin for chest pain but it can cause low blood pressures, after the third dose if no improvement of symptoms please call 911 or come to emergency room asap.     Diet    Follow this diet upon discharge: Orders Placed This Encounter      Low Saturated Fat Na <2400 mg     Discharge Medications   Current Discharge Medication List      START taking these medications    Details   digoxin (LANOXIN) 62.5 MCG tablet Take 1 tablet (62.5 mcg) by mouth daily  Qty: 30 tablet, Refills: 0    Associated Diagnoses: Atrial flutter with  rapid ventricular response (H)      nitroGLYcerin (NITROSTAT) 0.4 MG sublingual tablet For chest pain place 1 tablet under the tongue every 5 minutes for 3 doses. If symptoms persist 5 minutes after 1st dose call 911.  Qty: 30 tablet, Refills: 0    Associated Diagnoses: Atrial flutter with rapid ventricular response (H)         CONTINUE these medications which have NOT CHANGED    Details   atorvastatin (LIPITOR) 10 MG tablet Take 1 tablet (10 mg) by mouth daily  Qty: 90 tablet, Refills: 3    Associated Diagnoses: Hyperlipidemia LDL goal <130      diltiazem ER COATED BEADS (CARDIZEM CD/CARTIA XT) 120 MG 24 hr capsule Take 1 capsule (120 mg) by mouth daily  Qty: 90 capsule, Refills: 3    Associated Diagnoses: Paroxysmal atrial fibrillation (H)      IRON (FERROUS GLUCONATE) 325 MG OR TABS Take 1 tablet by mouth daily   Qty: 30, Refills: 0      levothyroxine (SYNTHROID/LEVOTHROID) 25 MCG tablet Take 1 tablet by mouth once daily  Qty: 30 tablet, Refills: 0    Comments: Needs appointment and labs for further refills  Associated Diagnoses: Acquired hypothyroidism      Multiple Vitamins-Minerals (ICAPS AREDS FORMULA PO)       niacin 500 MG tablet Take by mouth daily (with breakfast)      prednisoLONE acetate (PRED FORTE) 1 % ophthalmic suspension Place 1 drop into the right eye Pt's family reports that the doctor want her to instill 1 drop into the right eye 3 times daily for 2 days, 2 time daily for 2 days, once daily for 2 days. Currently on TID regimen.      rivaroxaban ANTICOAGULANT (XARELTO) 20 MG TABS tablet Take 1 tablet (20 mg) by mouth daily (with dinner)  Qty: 90 tablet, Refills: 3    Associated Diagnoses: Paroxysmal atrial fibrillation (H)         STOP taking these medications       losartan (COZAAR) 50 MG tablet Comments:   Reason for Stopping:             Allergies   Allergies   Allergen Reactions     Ibuprofen      Data   Most Recent 3 CBC's:Recent Labs   Lab Test 02/16/22  0714 02/15/22  0819 02/14/22  1958    WBC 7.3 10.3 11.7*   HGB 11.7 13.0 13.6   MCV 97 97 97    231 250      Most Recent 3 BMP's:  Recent Labs   Lab Test 22  0714 02/15/22  0819 228    137 135   POTASSIUM 4.3 3.8 4.3   CHLORIDE 102 108 103   CO2 28 22 28   BUN 13 19 25   CR 0.76 0.80 0.96   ANIONGAP 4 7 4   LYLE 8.5 9.0 9.3   GLC 79 92 127*     Most Recent 2 LFT's:  Recent Labs   Lab Test 21  1600   AST 23 27   ALT 21 27   ALKPHOS 64 70   BILITOTAL 0.6 1.1     Most Recent INR's and Anticoagulation Dosing History:  Anticoagulation Dose History    There is no flowsheet data to display.       Most Recent 3 Troponin's:  Recent Labs   Lab Test 21   TROPONIN 0.018     Most Recent Cholesterol Panel:  Recent Labs   Lab Test 21  1129   CHOL 135   LDL 49   HDL 64   TRIG 112     Most Recent 6 Bacteria Isolates From Any Culture (See EPIC Reports for Culture Details):No lab results found.  Most Recent TSH, T4 and A1c Labs:  Recent Labs   Lab Test 02/15/22  0708   TSH 6.06*   T4 1.26     Results for orders placed or performed during the hospital encounter of 21   Chest XR,  PA & LAT    Narrative    EXAM: XR CHEST 2 VW  LOCATION: [facility]  DATE/TIME: 2021 10:32 PM    INDICATION: new a-fib  COMPARISON: 2020      Impression    IMPRESSION: Heart is magnified on this AP view, likely at upper limits of normal in size. Pulmonary vessels are normal. Heavy atherosclerotic calcifications of aortic arch unchanged. Lungs clear.   Echocardiogram Complete     Value    LVEF  55-60%    Narrative    [id number]  [id number]  [id number]  [id number]^INGRIS^[facility]  Echocardiography Laboratory  [address] [place] [address]     Name: CORA WAY  MRN: [id number]  : 1935  Study Date: 2021 08:30 AM  Age: 86 yrs  Gender: Female  Patient [facility]  Reason For Study: Atrial Fibrillation  Ordering Physician: INGRIS  SAVANNA  Referring Physician: Mikel Hussein MD  Performed By: Nancy Cruz [facility]     BSA: 1.7 m2  Height: 60 in  Weight: 161 lb  HR: 93  BP: 126/66 mmHg  ______________________________________________________________________________  Procedure  Complete Portable Echo Adult. Optison (NDC #[id number]) given intravenously.  ______________________________________________________________________________  Interpretation Summary     Technically challenging study due to patient body habitus, even with the use  of contrast imaging.     Left ventricular systolic function is normal. The visual ejection fraction is  55-60%.  Endocardial borders are not optimally visualized, however no clear wall motion  abnormalities are seen.  Right ventricular systolic function is borderline reduced.  No significant valvular abnormalities on limited visualization.     There are no prior studies available for comparison.  ______________________________________________________________________________  Left Ventricle  The left ventricle is normal in size. There is normal left ventricular wall  thickness. Left ventricular systolic function is normal. The visual ejection  fraction is 55-60%. Diastolic function not assessed due to arrhythmia.  Regional wall motion abnormalities cannot be excluded due to limited  visualization.     Right Ventricle  The right ventricle is grossly normal size. The right ventricular systolic  function is borderline reduced.     Atria  The left atrium is moderately dilated. The right atrium is mildly dilated.     Mitral Valve  There is moderate mitral annular calcification.     Tricuspid Valve  The tricuspid valve is not well visualized, but is grossly normal. There is  trace tricuspid regurgitation. Right ventricular systolic pressure could not  be approximated due to inadequate tricuspid regurgitation.     Aortic Valve  The aortic valve is not well visualized. Valve morphology is not well  visualized, however on  Doppler interrogation it is probably functioning  normally.     Pulmonic Valve  The pulmonic valve is not well visualized. The pulmonic valve is not well  seen, but is grossly normal.     Vessels  The aortic root is normal size. Normal size ascending aorta. The inferior vena  cava was normal in size with preserved respiratory variability.     Pericardium  There is no pericardial effusion.     Rhythm  The rhythm was atrial fibrillation.  ______________________________________________________________________________  MMode/2D Measurements & Calculations  IVSd: 0.98 cm     LVIDd: 3.2 cm  LVIDs: 1.3 cm  LVPWd: 1.0 cm  FS: 58.5 %  LV mass(C)d: 91.0 grams  LV mass(C)dI: 53.4 grams/m2  Ao root diam: 2.6 cm  LA dimension: 3.6 cm  asc Aorta Diam: 2.8 cm  LA/Ao: 1.4  LVOT diam: 1.7 cm  LVOT area: 2.3 cm2  LA Volume (BP): 71.0 ml  LA Volume Index (BP): 41.8 ml/m2  RWT: 0.65     Doppler Measurements & Calculations  MV E max danielle: 105.8 cm/sec  MV max P.7 mmHg  MV mean PG: 3.3 mmHg  MV V2 VTI: 16.1 cm  MV P1/2t max danielle: 113.1 cm/sec  MV P1/2t: 29.0 msec  MVA(P1/2t): 7.6 cm2  MV dec slope: 1143 cm/sec2  MV dec time: 0.17 sec  AI P1/2t: 349.0 msec  E/E' avg: 15.4  Lateral E/e': 13.3  Medial E/e': 17.4     ______________________________________________________________________________  Report approved by: Radha Fang 2021 10:01 AM

## 2022-02-16 NOTE — PLAN OF CARE
Discharged patient home at 1200. Son in law to provide transport. AVS, education, and medications reviewed with patient. AVS sent home for family to review. IV removed, belongings packed. Telemetry removed and returned to hub. Patient escorted to front entrance via wheelchair.

## 2022-02-16 NOTE — PLAN OF CARE
"PRIMARY DIAGNOSIS: \"GENERIC\" NURSING  OUTPATIENT/OBSERVATION GOALS TO BE MET BEFORE DISCHARGE:  1. ADLs back to baseline: Yes    2. Activity and level of assistance: Up with standby assistance.    3. Pain status: Improved-controlled with oral pain medications.    4. Return to near baseline physical activity: Yes     Discharge Planner Nurse   Safe discharge environment identified: Yes  Barriers to discharge: No       Entered by: Kristie Santana 02/16/2022 6:41 AM     Please review provider order for any additional goals.   Nurse to notify provider when observation goals have been met and patient is ready for discharge.     A/O. VSS. SBA. RA, LS clear. Tele NSR. On oral diltz and digoxin. Patient c/o R shoulder pain, prn tylenol given with relief. Patient slept well remaining of the night and reported no CP/SOB. Plan to discharge this morning. Safety bed alarm on and call light within reach.     BP (!) 141/60 (BP Location: Right arm, Patient Position: Left side)   Pulse 73   Temp 98.1  F (36.7  C) (Oral)   Resp 18   Ht 1.575 m (5' 2\")   Wt 72 kg (158 lb 11.2 oz)   LMP  (LMP Unknown)   SpO2 99%   BMI 29.03 kg/m                       "

## 2022-02-16 NOTE — PLAN OF CARE
"PRIMARY DIAGNOSIS: Afib RVR   OUTPATIENT/OBSERVATION GOALS TO BE MET BEFORE DISCHARGE:  ADLs back to baseline: Yes    Activity and level of assistance: Up with standby assistance.    Pain status: Improved with use of alternative comfort measures i.e.: ice and heat    Return to near baseline physical activity: Yes     Discharge Planner Nurse   Safe discharge environment identified: Yes  Barriers to discharge: Yes       Entered by: Diamante Cintron 02/15/2022 9:54 PM     Please review provider order for any additional goals.   Nurse to notify provider when observation goals have been met and patient is ready for discharge.    /63 (BP Location: Right arm, Patient Position: Supine)   Pulse 64   Temp 98.7  F (37.1  C) (Oral)   Resp 16   Ht 1.575 m (5' 2\")   Wt 70.7 kg (155 lb 12.8 oz)   LMP  (LMP Unknown)   SpO2 100%   BMI 28.50 kg/m    NEURO:A/Ox4, Tamil speaking, pt understands and speaks some english  PAIN:C/O right shoulder pain relieved with ice and heat  TELE:SR  IV:SL LA  RESPIRATORY:LS- clear, denies SOB  GI:+BS, denies nausea, small watery incontinent BM this evening  :Voids appropriately  SKIN:Intact  ACTIVITY:Ax1 and gait belt  DIET:Cardiac   PLAN: Continue PO Diltiazem and PO Digoxin, discharge plan possibly tomorrow, continue POC.   "

## 2022-02-17 ENCOUNTER — PATIENT OUTREACH (OUTPATIENT)
Dept: CARE COORDINATION | Facility: CLINIC | Age: 87
End: 2022-02-17
Payer: COMMERCIAL

## 2022-02-17 ENCOUNTER — PATIENT OUTREACH (OUTPATIENT)
Dept: NURSING | Facility: CLINIC | Age: 87
End: 2022-02-17
Payer: COMMERCIAL

## 2022-02-17 ENCOUNTER — TELEPHONE (OUTPATIENT)
Dept: CARDIOLOGY | Facility: CLINIC | Age: 87
End: 2022-02-17

## 2022-02-17 DIAGNOSIS — I48.0 PAROXYSMAL ATRIAL FIBRILLATION (H): ICD-10-CM

## 2022-02-17 DIAGNOSIS — I48.92 ATRIAL FLUTTER WITH RAPID VENTRICULAR RESPONSE (H): Primary | ICD-10-CM

## 2022-02-17 NOTE — TELEPHONE ENCOUNTER
Calls received form call center:           Call placed to pt's daughter Marya to review recent questions / concerns:   Per daughter (CTC on file) pt was recently hospitalized and she has some questions:   Pt previously scheduled to have 2 week heart monitor placed 3/2/2022. Pt has monitor in place which was placed 2/15/2022 inpatient but is only ordered for 3 days. Pt's daughter wondering f this will be sufficient or if they should still plan to have monitor placed 3/2/2022.     - Pt was discharged with prescription for lanoxin 62.5mg daily. Per the pharmacy the medication is not covered by insurance and is costly. Additionally the medication would need to be cut in half and the tablets are too small to accomodate this dose. Family and pharmacy are both requesting an alternate medication.     Hospital admit 2/14/22 - 2/816/22 for atrial fib / flutter RVR  - consulted by Dr. Ackerman 2/15: pt to return to PTA diltiazem without titration as HR in sinus 50's. BP soft during admission with plan to add low dose digoxin. Plan to place Holter at discharge to assess burden / rates. Plan to follow up with Dr. Shin 3/16/22 as previously scheduled.     - additional concern regarding losartan - pt on losartan 50mg daily prior to admission. This was stopped at discharge. Pt's daughter unclear if this should be restarted.     Routing to provider for review. Pt was scheduled next available [facility] at [place] for discharge follow up as she has several concerns but next opening not until 3/3/2022. Will route to primary provider to review recent concerns regarding inability to afford digoxin and recommendations for repeat monitor.   GEORGE Tyler RN, BSN.

## 2022-02-17 NOTE — TELEPHONE ENCOUNTER
[facility] Call Center    Phone Message    May a detailed message be left on voicemail: yes     Reason for Call: Medication Question or concern regarding medication   Prescription Clarification  Name of Medication: lanoxin 62.5 MCG was prescribed by Dr Ese Avendano     Pharmacy: [id number] - [place], [place] - [address]     What on the order needs clarification? This medication is discontinued and the pharmacist cannot order it and it is not covered under INS and it very costly.  Pharmacist spoke to Hospital and they suggested for the pt to cut them in half and pharmacist said they are very small and not able to be cut in half             Action Taken: Message routed to:  [facility] cardiology    Travel Screening: Not Applicable

## 2022-02-17 NOTE — LETTER
[facility] CARE COORDINATION  [facility]  February 17, 2022    Jeannie Chu  [address] [address]      Dear Jeannie,    I am a clinic community health worker who works with Diana Hilario MD at the [facility]. I wanted to introduce myself and provide you with my contact information for you to be able to call me with any questions or concerns. Below is a description of clinic care coordination and how I can further assist you.      The clinic care coordination team is made up of a registered nurse,  and community health worker who understand the health care system. The goal of clinic care coordination is to help you manage your health and improve access to the health care system in the most efficient manner. The team can assist you in meeting your health care goals by providing education, coordinating services, strengthening the communication among your providers and supporting you with any resource needs.    Please feel free to contact me at [phone] with any questions or concerns. We are focused on providing you with the highest-quality healthcare experience possible and that all starts with you.     Sincerely,     BREE Godinez. Public Health  Community Health Worker  [facility]  Clinic Care Coordination  [phone]

## 2022-02-17 NOTE — TELEPHONE ENCOUNTER
[facility] Call Center    Phone Message    May a detailed message be left on voicemail: yes     Reason for Call: Other: Pt daughter Marya called to discuss Holtor monitor for her Mother. Please call her at [phone]. Thank-you     Action Taken: Message routed to:  [facility] Cardiology Adult [facility]    Travel Screening: Not Applicable

## 2022-02-17 NOTE — PROGRESS NOTES
Clinic Care Coordination Contact  [facility]       Clinical Data: Care Coordinator Outreach  Outreach attempted x 1. Briefly spoke with the patient's son in-law, however now was not a good time to talk. Requested a call back later this afternoon.     Plan: Care Coordinator will try to reach patient again this afternoon 2/17/2022 per family request.       BREE Godinez. Public Health  Community Health Worker  [facility]  Clinic Care Coordination  [phone]

## 2022-02-17 NOTE — PROGRESS NOTES
Clinic Care Coordination Contact  [facility]: Post-Discharge Note  SITUATION                                                      Admission:    Admission Date: 02/14/22   Reason for Admission: Weakness and vomiting; rapid heart rate  Discharge:   Discharge Date: 02/16/22  Discharge Diagnosis: A. fib with RVR    BACKGROUND                                                      Per hospital discharge summary and inpatient provider notes:  Jeannie Chu is an 86-year-old female with history of coronary artery disease with previous stenting, dyslipidemia, paroxysmal atrial fibrillation, chronic anticoagulation with warfarin, hypertension, hypothyroidism, and previous L1 compression fracture.  She was brought to the [facility] emergency department today (2/14/22) by her family for evaluation of fatigue, weakness, and fast heart rate.  They have been checking her blood pressure and heart rate daily for the last couple of weeks.  Her heart rate was 111 today.  She felt fatigued and weak so they brought her to the emergency department.  She denied chest pain or palpitations.  Emergency department evaluation showed atrial flutter with heart rates of 1 20-1 54.  Other vital signs were unremarkable.  Laboratory evaluation showed normal troponin of 18.  White blood cells were 11.7 and CBC was otherwise unremarkable.  Basic metabolic panel was unremarkable.  EKG showed atrial flutter with rate of 147 and variable rate.  Most recent echo was on 7/5/2021 and showed ejection fraction 55 to 60%.  Cardioversion was offered in the emergency department but the patient and family declined.  I was asked to admit her to observation with atrial flutter with rapid ventricular response.    ASSESSMENT      Enrollment  Primary Care Care Coordination Status: Declined    Discharge Assessment  How are your symptoms? (Red Flag symptoms escalate to triage hotline per guidelines): Improved  Do you feel your condition is stable  enough to be safe at home until your provider visit?: Yes  Does the patient have their discharge instructions? : Yes  Does the patient have questions regarding their discharge instructions? : No  Were you started on any new medications or were there changes to any of your previous medications? : Yes  Does the patient have all of their medications?: Yes  Do you have questions regarding any of your medications? : No  Do you have all of your needed medical supplies or equipment (DME)?  (i.e. oxygen tank, CPAP, cane, etc.): Yes  Discharge follow-up appointment scheduled within 14 calendar days? : No  Is patient agreeable to assistance with scheduling? : Yes (CHW transfered patient's daughter to scheduling after the phone call.)              Care Management   Community Health Worker Initial Outreach    CHW Initial Information Gathering:  Referral Source: IP Handoff  Preferred Hospital: [facility]  [phone]  Current living arrangement:: I live in a private home with family  Community Resources: None  Informal Support system:: Children  No PCP office visit in Past Year: No  Transportation means:: Family, Regular car       Patient accepts CC: No, Patient's daughter declined Care Coordination. Patient will be sent Care Coordination introduction letter for future reference.   2-17, CHW:    CHW was able to connect with patient's daughter (marya, CTC).    Marya stated that they assist the patient with everything and that the patient lives with her and her .     CHW encouraged them to reach out with any questions or concerns in the future; Marya agreed.     CHW transferred Marya to scheduling line to make ED follow-up appointment.     PLAN                                                      Outpatient Plan:     Future Appointments   Date Time Provider Department Center   3/2/2022  2:00 PM CC HOLTER PROC RM [facility]   3/16/2022 10:45 AM EA LAB EAL[facility] EA   3/16/2022 11:45 AM Germán Shin MD  [facility]   3/23/2022  2:30 PM Diana Hilario MD [facility]         For any urgent concerns, please contact our 24 hour nurse triage line: [phone] ([phone])         BREE Godinez. Public Health  Community Health Worker  [facility]  Clinic Care Coordination  [phone]

## 2022-02-18 RX ORDER — DIGOXIN 125 MCG
125 TABLET ORAL DAILY
Qty: 30 TABLET | Refills: 0 | Status: ON HOLD | OUTPATIENT
Start: 2022-02-18 | End: 2022-03-02

## 2022-02-18 RX ORDER — DIGOXIN 0.06 MG/1
62.5 TABLET ORAL DAILY
Qty: 30 TABLET | Refills: 0 | Status: SHIPPED | OUTPATIENT
Start: 2022-02-18 | End: 2022-02-18 | Stop reason: ALTCHOICE

## 2022-02-18 RX ORDER — DIGOXIN 125 MCG
125 TABLET ORAL DAILY
Qty: 30 TABLET | Refills: 0 | Status: SHIPPED | OUTPATIENT
Start: 2022-02-18 | End: 2022-02-18

## 2022-02-18 NOTE — TELEPHONE ENCOUNTER
Call reviewed from pt;s daughter Marya regarding digoxin 6.25 mcg tablets. Per daughter report they were not discharged with a supply of the medication. Pt has not taken the medication since 2/16/2022 day of discharge.     Call placed to [facility] pharmacy to review - per pharmacist the 6.25mcg dose is no longer available and there is none in stock at surrounding facilities or from the supplier. Per pharmacist 12.5mcg tablets are available but they are too small to cut in half. Dose is not available. Dose change requested by pharmacy.     Prior message regarding the Holter monitor addressed by Dr. Shin:   Germán Shin MD  You; Gabriel Pool, NP 2 hours ago (10:00 AM)     AH    Unfortunate that she experienced that. In the past, she has had issues with hypertension, and her Holter monitor from 7/2021 showed avg HR 78 bpm.     However when she was inpatient, Dr Ackerman noted lower BPs and lower HRs. We'll need to see what her avg HR is on the HOlter monitor which she is currently wearing before recommending changes. Also please have her check her BPs once a day at around 4pm and keep a log.     When she sees Juan Carlos in a week or so, if avg HR is normal, and BPs allow, recommend stopping the digoxin due to cost, and increasing diltiazem 120mg daily to 180mg. Otherwise if BPs are soft, then recommend switching to metoprolol succinate 50mg daily and uptitrating as needed, since metoprolol would have a lower effect on BP. Would recommend a repeat 48h Holter 1 week after these changes. Thanks!    Message text        Routing call to Dr. Ackerman to review digoxin dose and/or need for continuation as pt has not taken in 2 days, Dr. Shin unavailable for review. Pt is returning current holter monitor today. Will await results of current holter monitor to advise on need for repeat monitor placement.   GEORGE Tyler RN, BSN.

## 2022-02-18 NOTE — TELEPHONE ENCOUNTER
Ip, Faizan Braswell MD  You 13 minutes ago (1:15 PM)     BI    Use 125 mcg daily instead.  Thanks.    Message text        Call placed to pt's daughter zaki to review recommendation. BP check while on phone 134/77, HR 76. Family will assist with continued monitoring and will call if SBP <100 - on call number provided. Orders placed per provider.   GEORGE Tyler RN, BSN. 02/18/22 1:31 PM

## 2022-02-21 RX ORDER — LEVOTHYROXINE SODIUM 25 UG/1
TABLET ORAL
Qty: 34 TABLET | Refills: 1 | Status: ON HOLD | OUTPATIENT
Start: 2022-02-21 | End: 2022-03-02

## 2022-02-22 NOTE — TELEPHONE ENCOUNTER
Patient's daughter called back. She said her mom has been taking the extra pill once a week already but she is not sure for how long. She will check with her mom and call us back to let us know.

## 2022-02-22 NOTE — TELEPHONE ENCOUNTER
Attempted to  Call home # no answer no vice mail .ORTIZ Okeefe LPN  Left message on mobil # to call back ORTIZ Okeefe LPN

## 2022-02-22 NOTE — TELEPHONE ENCOUNTER
Abnormal TSH ( thyroid under treated) . continue levoxyl  25 mcg 1 tab daily and take 1 extra tab once a week, rpt TSH in 6 weeks.

## 2022-02-27 ENCOUNTER — HOSPITAL ENCOUNTER (INPATIENT)
Facility: CLINIC | Age: 87
LOS: 3 days | Discharge: HOME | End: 2022-03-02
Attending: EMERGENCY MEDICINE | Admitting: INTERNAL MEDICINE
Payer: COMMERCIAL

## 2022-02-27 DIAGNOSIS — R11.0 NAUSEA: ICD-10-CM

## 2022-02-27 DIAGNOSIS — I48.92 ATRIAL FLUTTER WITH RAPID VENTRICULAR RESPONSE (H): ICD-10-CM

## 2022-02-27 DIAGNOSIS — E03.9 ACQUIRED HYPOTHYROIDISM: Primary | ICD-10-CM

## 2022-02-27 DIAGNOSIS — I48.91 RAPID ATRIAL FIBRILLATION (H): ICD-10-CM

## 2022-02-27 DIAGNOSIS — I10 ESSENTIAL HYPERTENSION: ICD-10-CM

## 2022-02-27 LAB
ALBUMIN UR-MCNC: NEGATIVE MG/DL
ANION GAP SERPL CALCULATED.3IONS-SCNC: 6 MMOL/L (ref 3–14)
APPEARANCE UR: CLEAR
BASOPHILS # BLD AUTO: 0 10E3/UL (ref 0–0.2)
BASOPHILS NFR BLD AUTO: 0 %
BILIRUB UR QL STRIP: NEGATIVE
BUN SERPL-MCNC: 22 MG/DL (ref 7–30)
CALCIUM SERPL-MCNC: 8.9 MG/DL (ref 8.5–10.1)
CHLORIDE BLD-SCNC: 104 MMOL/L (ref 94–109)
CO2 SERPL-SCNC: 26 MMOL/L (ref 20–32)
COLOR UR AUTO: ABNORMAL
CREAT SERPL-MCNC: 0.82 MG/DL (ref 0.52–1.04)
EOSINOPHIL # BLD AUTO: 0.1 10E3/UL (ref 0–0.7)
EOSINOPHIL NFR BLD AUTO: 1 %
ERYTHROCYTE [DISTWIDTH] IN BLOOD BY AUTOMATED COUNT: 13.2 % (ref 10–15)
GFR SERPL CREATININE-BSD FRML MDRD: 70 ML/MIN/1.73M2
GLUCOSE BLD-MCNC: 95 MG/DL (ref 70–99)
GLUCOSE UR STRIP-MCNC: NEGATIVE MG/DL
HCT VFR BLD AUTO: 41 % (ref 35–47)
HGB BLD-MCNC: 13.1 G/DL (ref 11.7–15.7)
HGB UR QL STRIP: NEGATIVE
HOLD SPECIMEN: NORMAL
IMM GRANULOCYTES # BLD: 0 10E3/UL
IMM GRANULOCYTES NFR BLD: 0 %
KETONES UR STRIP-MCNC: NEGATIVE MG/DL
LEUKOCYTE ESTERASE UR QL STRIP: NEGATIVE
LYMPHOCYTES # BLD AUTO: 2.3 10E3/UL (ref 0.8–5.3)
LYMPHOCYTES NFR BLD AUTO: 20 %
MCH RBC QN AUTO: 31.1 PG (ref 26.5–33)
MCHC RBC AUTO-ENTMCNC: 32 G/DL (ref 31.5–36.5)
MCV RBC AUTO: 97 FL (ref 78–100)
MONOCYTES # BLD AUTO: 1.3 10E3/UL (ref 0–1.3)
MONOCYTES NFR BLD AUTO: 12 %
MUCOUS THREADS #/AREA URNS LPF: PRESENT /LPF
NEUTROPHILS # BLD AUTO: 7.4 10E3/UL (ref 1.6–8.3)
NEUTROPHILS NFR BLD AUTO: 67 %
NITRATE UR QL: NEGATIVE
NRBC # BLD AUTO: 0 10E3/UL
NRBC BLD AUTO-RTO: 0 /100
PH UR STRIP: 6.5 [PH] (ref 5–7)
PLATELET # BLD AUTO: 243 10E3/UL (ref 150–450)
POTASSIUM BLD-SCNC: 4.5 MMOL/L (ref 3.4–5.3)
RBC # BLD AUTO: 4.21 10E6/UL (ref 3.8–5.2)
RBC URINE: <1 /HPF
SARS-COV-2 RNA RESP QL NAA+PROBE: NEGATIVE
SODIUM SERPL-SCNC: 136 MMOL/L (ref 133–144)
SP GR UR STRIP: 1.01 (ref 1–1.03)
SQUAMOUS EPITHELIAL: <1 /HPF
UROBILINOGEN UR STRIP-MCNC: NORMAL MG/DL
WBC # BLD AUTO: 11.2 10E3/UL (ref 4–11)
WBC URINE: <1 /HPF

## 2022-02-27 PROCEDURE — 96365 THER/PROPH/DIAG IV INF INIT: CPT

## 2022-02-27 PROCEDURE — 200N000001 HC R&B ICU

## 2022-02-27 PROCEDURE — 96361 HYDRATE IV INFUSION ADD-ON: CPT

## 2022-02-27 PROCEDURE — 85025 COMPLETE CBC W/AUTO DIFF WBC: CPT | Performed by: EMERGENCY MEDICINE

## 2022-02-27 PROCEDURE — 250N000009 HC RX 250: Performed by: EMERGENCY MEDICINE

## 2022-02-27 PROCEDURE — C9803 HOPD COVID-19 SPEC COLLECT: HCPCS

## 2022-02-27 PROCEDURE — 36415 COLL VENOUS BLD VENIPUNCTURE: CPT | Performed by: EMERGENCY MEDICINE

## 2022-02-27 PROCEDURE — 96366 THER/PROPH/DIAG IV INF ADDON: CPT

## 2022-02-27 PROCEDURE — 93005 ELECTROCARDIOGRAM TRACING: CPT

## 2022-02-27 PROCEDURE — 99285 EMERGENCY DEPT VISIT HI MDM: CPT | Mod: 25

## 2022-02-27 PROCEDURE — 250N000013 HC RX MED GY IP 250 OP 250 PS 637: Performed by: INTERNAL MEDICINE

## 2022-02-27 PROCEDURE — 258N000003 HC RX IP 258 OP 636: Performed by: EMERGENCY MEDICINE

## 2022-02-27 PROCEDURE — 81001 URINALYSIS AUTO W/SCOPE: CPT | Performed by: EMERGENCY MEDICINE

## 2022-02-27 PROCEDURE — 99223 1ST HOSP IP/OBS HIGH 75: CPT | Mod: AI | Performed by: INTERNAL MEDICINE

## 2022-02-27 PROCEDURE — 80048 BASIC METABOLIC PNL TOTAL CA: CPT | Performed by: EMERGENCY MEDICINE

## 2022-02-27 PROCEDURE — 87635 SARS-COV-2 COVID-19 AMP PRB: CPT | Performed by: EMERGENCY MEDICINE

## 2022-02-27 RX ORDER — DOXYCYCLINE 100 MG/1
100 CAPSULE ORAL 2 TIMES DAILY
Status: ON HOLD | COMMUNITY
Start: 2022-02-18 | End: 2022-03-05

## 2022-02-27 RX ORDER — LIDOCAINE 40 MG/G
CREAM TOPICAL
Status: DISCONTINUED | OUTPATIENT
Start: 2022-02-27 | End: 2022-03-01

## 2022-02-27 RX ORDER — DILTIAZEM HYDROCHLORIDE 5 MG/ML
10 INJECTION INTRAVENOUS ONCE
Status: DISCONTINUED | OUTPATIENT
Start: 2022-02-27 | End: 2022-02-27

## 2022-02-27 RX ORDER — LIDOCAINE 40 MG/G
CREAM TOPICAL
Status: DISCONTINUED | OUTPATIENT
Start: 2022-02-27 | End: 2022-03-02 | Stop reason: HOSPADM

## 2022-02-27 RX ORDER — DILTIAZEM HCL IN NACL,ISO-OSM 125 MG/125
5-15 PLASTIC BAG, INJECTION (ML) INTRAVENOUS CONTINUOUS
Status: DISCONTINUED | OUTPATIENT
Start: 2022-02-27 | End: 2022-02-27

## 2022-02-27 RX ORDER — ONDANSETRON 2 MG/ML
4 INJECTION INTRAMUSCULAR; INTRAVENOUS EVERY 6 HOURS PRN
Status: DISCONTINUED | OUTPATIENT
Start: 2022-02-27 | End: 2022-03-02 | Stop reason: HOSPADM

## 2022-02-27 RX ORDER — ONDANSETRON 4 MG/1
4 TABLET, ORALLY DISINTEGRATING ORAL EVERY 6 HOURS PRN
Status: DISCONTINUED | OUTPATIENT
Start: 2022-02-27 | End: 2022-03-02 | Stop reason: HOSPADM

## 2022-02-27 RX ORDER — ACETAMINOPHEN 650 MG/1
650 SUPPOSITORY RECTAL EVERY 6 HOURS PRN
Status: DISCONTINUED | OUTPATIENT
Start: 2022-02-27 | End: 2022-03-02 | Stop reason: HOSPADM

## 2022-02-27 RX ORDER — LEVOTHYROXINE SODIUM 25 UG/1
25 TABLET ORAL DAILY
Status: DISCONTINUED | OUTPATIENT
Start: 2022-02-28 | End: 2022-03-02 | Stop reason: HOSPADM

## 2022-02-27 RX ORDER — NITROGLYCERIN 0.4 MG/1
0.4 TABLET SUBLINGUAL EVERY 5 MIN PRN
Status: DISCONTINUED | OUTPATIENT
Start: 2022-02-27 | End: 2022-03-01

## 2022-02-27 RX ORDER — ATORVASTATIN CALCIUM 10 MG/1
10 TABLET, FILM COATED ORAL DAILY
Status: DISCONTINUED | OUTPATIENT
Start: 2022-02-27 | End: 2022-03-02 | Stop reason: HOSPADM

## 2022-02-27 RX ORDER — POLYETHYLENE GLYCOL 3350 17 G/17G
17 POWDER, FOR SOLUTION ORAL DAILY PRN
Status: DISCONTINUED | OUTPATIENT
Start: 2022-02-27 | End: 2022-03-02 | Stop reason: HOSPADM

## 2022-02-27 RX ORDER — AMOXICILLIN 250 MG
1 CAPSULE ORAL 2 TIMES DAILY PRN
Status: DISCONTINUED | OUTPATIENT
Start: 2022-02-27 | End: 2022-03-02 | Stop reason: HOSPADM

## 2022-02-27 RX ORDER — AMOXICILLIN 250 MG
2 CAPSULE ORAL 2 TIMES DAILY PRN
Status: DISCONTINUED | OUTPATIENT
Start: 2022-02-27 | End: 2022-03-02 | Stop reason: HOSPADM

## 2022-02-27 RX ORDER — ACETAMINOPHEN 325 MG/1
650 TABLET ORAL EVERY 6 HOURS PRN
Status: DISCONTINUED | OUTPATIENT
Start: 2022-02-27 | End: 2022-03-02 | Stop reason: HOSPADM

## 2022-02-27 RX ORDER — BIOTIN 10 MG
2 TABLET ORAL DAILY
COMMUNITY
End: 2022-03-11

## 2022-02-27 RX ADMIN — SODIUM CHLORIDE 500 ML: 9 INJECTION, SOLUTION INTRAVENOUS at 12:25

## 2022-02-27 RX ADMIN — DILTIAZEM HYDROCHLORIDE 5 MG/HR: 5 INJECTION INTRAVENOUS at 13:05

## 2022-02-27 RX ADMIN — DILTIAZEM HYDROCHLORIDE 10 MG/HR: 5 INJECTION INTRAVENOUS at 18:54

## 2022-02-27 RX ADMIN — Medication 1 MG: at 22:28

## 2022-02-27 RX ADMIN — RIVAROXABAN 20 MG: 20 TABLET, FILM COATED ORAL at 19:29

## 2022-02-27 ASSESSMENT — ACTIVITIES OF DAILY LIVING (ADL)
ADLS_ACUITY_SCORE: 5
VISION_MANAGEMENT: AT BEDSIDE
ADLS_ACUITY_SCORE: 7
TOILETING_ISSUES: NO
CONCENTRATING,_REMEMBERING_OR_MAKING_DECISIONS_DIFFICULTY: NO
ADLS_ACUITY_SCORE: 12
WALKING_OR_CLIMBING_STAIRS_DIFFICULTY: NO
ADLS_ACUITY_SCORE: 12
DOING_ERRANDS_INDEPENDENTLY_DIFFICULTY: NO
ADLS_ACUITY_SCORE: 7
ADLS_ACUITY_SCORE: 7
DRESSING/BATHING_DIFFICULTY: NO
ADLS_ACUITY_SCORE: 7
WEAR_GLASSES_OR_BLIND: YES
ADLS_ACUITY_SCORE: 12
DIFFICULTY_EATING/SWALLOWING: NO
ADLS_ACUITY_SCORE: 12
FALL_HISTORY_WITHIN_LAST_SIX_MONTHS: NO
ADLS_ACUITY_SCORE: 12

## 2022-02-27 ASSESSMENT — ENCOUNTER SYMPTOMS
FATIGUE: 1
DIZZINESS: 1
VOMITING: 0
WEAKNESS: 1
DIARRHEA: 0
FEVER: 0
COUGH: 0

## 2022-02-27 NOTE — ED TRIAGE NOTES
Patient presents to the ED reporting feeling dizzy and weak this morning since getting up around 0800. Family reports recent hospital admission with many new changes to her medications.

## 2022-02-27 NOTE — PHARMACY-ADMISSION MEDICATION HISTORY
Admission medication history interview status for this patient is complete. See [facility] admission navigator for allergy information, prior to admission medications and immunization status.     Medication history interview done, indicate source(s): Patient's daughter, Marya [phone]  Medication history resources (including written lists, pill bottles, clinic record): medication list, care everywhere  Pharmacy: [facility]    Changes made to PTA medication list:  Added: doxycycline, multivitamin  Changed: added directions for AREDS Icaps, added day of week for additional levothyroxine dose (Wednesday)  Reported as Not Taking: nothing  Removed: prednisolone    Actions taken by pharmacist (provider contacted, etc):None     Additional medication history information: Patient's daughter Marya reported that the patient finished her prednisolone prescription. She started doxycycline on 2/18 and took 1 dose today.     Medication reconciliation/reorder completed by provider prior to medication history?  Y   (Y/N)     Prior to Admission medications    Medication Sig Last Dose Taking? Auth Provider   atorvastatin (LIPITOR) 10 MG tablet Take 1 tablet (10 mg) by mouth daily 2/26/2022 at pm Yes Gerámn Shin MD   digoxin (LANOXIN) 125 MCG tablet Take 1 tablet (125 mcg) by mouth daily 2/27/2022 at am Yes Faizan Ackerman MD   diltiazem ER COATED BEADS (CARDIZEM CD/CARTIA XT) 120 MG 24 hr capsule Take 1 capsule (120 mg) by mouth daily 2/27/2022 at am Yes Germán Shin MD   doxycycline hyclate (VIBRAMYCIN) 100 MG capsule Take 100 mg by mouth 2 times daily For 14 days 2/27/2022 at 1x Yes Unknown, Entered By History   IRON (FERROUS GLUCONATE) 325 MG OR TABS Take 1 tablet by mouth daily  2/27/2022 at am Yes Joanna Garcia MD   levothyroxine (SYNTHROID/LEVOTHROID) 25 MCG tablet Take 1 tablet by mouth once daily and take one extra tab once a week,  Patient taking differently: Take 1 tablet by mouth once daily and take one extra tab  once a week (Wednesdays = 50 mcg) 2/27/2022 at am Yes Diana Hilario MD   Multiple Vitamins-Minerals (ICAPS AREDS FORMULA PO) Take 2 tablets by mouth daily  2/27/2022 at am Yes Reported, Patient   Multiple Vitamins-Minerals (MULTIVITAMIN ADULT) CHEW Take 2 chew tab by mouth daily 2/27/2022 at am Yes Unknown, Entered By History   niacin 500 MG tablet Take by mouth daily (with breakfast) 2/27/2022 at am Yes Reported, Patient   nitroGLYcerin (NITROSTAT) 0.4 MG sublingual tablet For chest pain place 1 tablet under the tongue every 5 minutes for 3 doses. If symptoms persist 5 minutes after 1st dose call 911.  Yes Ese Avendano MD   rivaroxaban ANTICOAGULANT (XARELTO) 20 MG TABS tablet Take 1 tablet (20 mg) by mouth daily (with dinner) 2/26/2022 at pm Yes Germán Shin MD

## 2022-02-27 NOTE — ED NOTES
[facility]  ED Nurse Handoff Report    Kimberley Dennis is a 86 year old female   ED Chief complaint: Dizziness  . ED Diagnosis:   Final diagnoses:   Rapid atrial fibrillation (H)     Allergies:   Allergies   Allergen Reactions     Ibuprofen        Code Status: Full Code  Activity level - Baseline/Home:  Independent. Activity Level - Current:   Assist X 1. Lift room needed: No. Bariatric: No   Needed: No   Isolation: Yes. Infection: Not Applicable.     Vital Signs:   Vitals:    02/27/22 1215 02/27/22 1245 02/27/22 1300 02/27/22 1315   BP: 93/77 94/65 111/88 123/80   Pulse: (!) 133 115  (!) 133   Resp: 18 15 27 20   Temp:       SpO2: 100% 100%  100%       Cardiac Rhythm:  ,   Cardiac  Cardiac Rhythm: Atrial fibrillation  Pain level:    Patient confused: No. Patient Falls Risk: Yes.   Elimination Status: Has voided   Patient Report - Initial Complaint: Dizziness . Focused Assessment:  Kimberley Dennis is a 86 year old female with history of Xarelto who presents with dizziness. Patient was recently admitted to the hospital 2/15/22 with atrial flutter, and was discharged 2/16/22 started on Lanoxin and Nitrostat, discontinued on Cozaar, and all other medications unchanged. In the time since, the patient has been taking these medications as prescribed. She returns to the ED today because she woke up this morning feeling weak, fatigued, and dizzy. She took her blood pressure and heart rate at home, which appeared, for the most part, normal. Patient denies any chest pain. Per daughter, no fever, cough, vomiting, or diarrhea.      Review of Systems   Constitutional: Positive for fatigue. Negative for fever.   Respiratory: Negative for cough.    Cardiovascular: Negative for chest pain.   Gastrointestinal: Negative for diarrhea and vomiting.   Neurological: Positive for dizziness and weakness.   All other systems reviewed and are negative.  Tests Performed: EKG, Labs, Imaging . Abnormal Results:    Labs Ordered and Resulted from Time of ED Arrival to Time of ED Departure   ROUTINE UA WITH MICROSCOPIC REFLEX TO CULTURE - Abnormal       Result Value    Color Urine Straw      Appearance Urine Clear      Glucose Urine Negative      Bilirubin Urine Negative      Ketones Urine Negative      Specific Gravity Urine 1.007      Blood Urine Negative      pH Urine 6.5      Protein Albumin Urine Negative      Urobilinogen Urine Normal      Nitrite Urine Negative      Leukocyte Esterase Urine Negative      Mucus Urine Present (*)     RBC Urine <1      WBC Urine <1      Squamous Epithelials Urine <1     CBC WITH PLATELETS AND DIFFERENTIAL - Abnormal    WBC Count 11.2 (*)     RBC Count 4.21      Hemoglobin 13.1      Hematocrit 41.0      MCV 97      MCH 31.1      MCHC 32.0      RDW 13.2      Platelet Count 243      % Neutrophils 67      % Lymphocytes 20      % Monocytes 12      % Eosinophils 1      % Basophils 0      % Immature Granulocytes 0      NRBCs per 100 WBC 0      Absolute Neutrophils 7.4      Absolute Lymphocytes 2.3      Absolute Monocytes 1.3      Absolute Eosinophils 0.1      Absolute Basophils 0.0      Absolute Immature Granulocytes 0.0      Absolute NRBCs 0.0     BASIC METABOLIC PANEL     No orders to display     .   Treatments provided: See MAR   Family Comments: N/A  OBS brochure/video discussed/provided to patient:  No  ED Medications:   Medications   diltiazem (CARDIZEM) 125 mg in sodium chloride 0.9 % 125 mL infusion (5 mg/hr Intravenous New Bag 2/27/22 1305)   0.9% sodium chloride BOLUS (500 mLs Intravenous New Bag 2/27/22 1225)     Drips infusing:  Yes  For the majority of the shift, the patient's behavior Green. Interventions performed were N/A.    Sepsis treatment initiated: No     Patient tested for COVID 19 prior to admission: YES    ED Nurse Name/Phone Number: Amira Guerrero RN,   1:25 PM    RECEIVING UNIT ED HANDOFF REVIEW    Above ED Nurse Handoff Report was reviewed: Yes  Reviewed by: Laura  GREER Will on February 27, 2022 at 6:17 PM

## 2022-02-27 NOTE — ED PROVIDER NOTES
History     Chief Complaint:  Dizziness    The history is provided by the patient and a relative (daughter).      Kimberley Dennis is a 86 year old female with history of Xarelto who presents with dizziness. Patient was recently admitted to the hospital 2/15/22 with atrial flutter, and was discharged 2/16/22 started on Lanoxin and Nitrostat, discontinued on Cozaar, and all other medications unchanged. In the time since, the patient has been taking these medications as prescribed. She returns to the ED today because she woke up this morning feeling weak, fatigued, and dizzy. She took her blood pressure and heart rate at home, which appeared, for the most part, normal. Patient denies any chest pain. Per daughter, no fever, cough, vomiting, or diarrhea.     Review of Systems   Constitutional: Positive for fatigue. Negative for fever.   Respiratory: Negative for cough.    Cardiovascular: Negative for chest pain.   Gastrointestinal: Negative for diarrhea and vomiting.   Neurological: Positive for dizziness and weakness.   All other systems reviewed and are negative.    Allergies:  Ibuprofen    Medications:    Lipitor  Lanoxin  Cardizem  Ferrous gluconate  Synthroid  Nitrostat  Xarelto     Past Medical History:    Acquired hypothyroidism  CAD  Hyperlipidemia  Atrial flutter  Atrial fibrillation with RVR  Hypertension     Social History:  The patient presents to the ED with her daughter.     Physical Exam     Patient Vitals for the past 24 hrs:   BP Temp Pulse Resp SpO2   02/27/22 1315 123/80 -- (!) 133 20 100 %   02/27/22 1300 111/88 -- -- 27 --   02/27/22 1245 94/65 -- 115 15 100 %   02/27/22 1215 93/77 -- (!) 133 18 100 %   02/27/22 1205 100/86 -- (!) 131 -- 100 %   02/27/22 1200 (!) 129/97 -- (!) 137 -- 100 %   02/27/22 1144 132/84 98.1  F (36.7  C) 104 18 100 %       Physical Exam  Gen: well appearing, in no acute distress. Lying in bed comfortably.   Oral : Mucous membranes moist,   Nose: No rhinorhea  Ears:  External near normal, without drainage  Eyes: periorbital tissues and sclera normal   Neck: supple, no abnormal swelling  Lungs:  no resp distress, speaks full sentences  CV: Tachycardic, irregularly irregular.   Abd: soft, nontender, nondistended, no rebound/guarding  Ext: no lower extremity edema  Skin: warm, dry, well perfused, no rashes/bruising/lesions on exposed skin  Neuro: alert, no gross motor or sensory deficits,   Psych: pleasant mood, normal affect    Emergency Department Course   ECG:  ECG taken at 1156, ECG read at 1204  Atrial fibrillation with rapid ventricular response. Abnormal ECG.    Rate 134 bpm. SD interval * ms. QRS duration 70 ms. QT/QTc 302/450 ms. P-R-T axes * 28 71.     Laboratory:  Labs Ordered and Resulted from Time of ED Arrival to Time of ED Departure   ROUTINE UA WITH MICROSCOPIC REFLEX TO CULTURE - Abnormal       Result Value    Color Urine Straw      Appearance Urine Clear      Glucose Urine Negative      Bilirubin Urine Negative      Ketones Urine Negative      Specific Gravity Urine 1.007      Blood Urine Negative      pH Urine 6.5      Protein Albumin Urine Negative      Urobilinogen Urine Normal      Nitrite Urine Negative      Leukocyte Esterase Urine Negative      Mucus Urine Present (*)     RBC Urine <1      WBC Urine <1      Squamous Epithelials Urine <1     CBC WITH PLATELETS AND DIFFERENTIAL - Abnormal    WBC Count 11.2 (*)     RBC Count 4.21      Hemoglobin 13.1      Hematocrit 41.0      MCV 97      MCH 31.1      MCHC 32.0      RDW 13.2      Platelet Count 243      % Neutrophils 67      % Lymphocytes 20      % Monocytes 12      % Eosinophils 1      % Basophils 0      % Immature Granulocytes 0      NRBCs per 100 WBC 0      Absolute Neutrophils 7.4      Absolute Lymphocytes 2.3      Absolute Monocytes 1.3      Absolute Eosinophils 0.1      Absolute Basophils 0.0      Absolute Immature Granulocytes 0.0      Absolute NRBCs 0.0     BASIC METABOLIC PANEL     Emergency  Department Course:       Reviewed:  I reviewed nursing notes, vitals, past history and care everywhere    Assessments:  1205 I obtained history and examined the patient as noted above.   1210 I rechecked the patient and discussed cardioversion. Family still prefers to defer this treatment for the time being.   1315 I rechecked the patient and explained findings. Prepared for admission.      Consults:   1309 I spoke with Dr. Rachel Hernandez, hospitalist, regarding the patient, who agreed to admit the patient.        Interventions:  1225  mL IV  1305 Cardizem 5 mg/hr IV      Disposition:  The patient was admitted to the hospital under the care of Dr. Hernandez.    Impression & Plan         Medical Decision Making:  Kimberley Dennis is an 86 year old female who presents to the ED with rapid atrial fibrillation. She woke up this morning feeling weak, dizzy, and light headed. She was previously evaluated in the hospital only about a week ago, and was started on digoxin to help keep her in normal sinus rhythm. She was discharged from the hospital in normal sinus rhythm, but probably went back into atrial fibrillation overnight or earlier this morning. She denies any fevers or systemic signs of infection. In discussing management options with the patient and her family, the family really does not want her to be cardioverted. They have some concerns with putting her to sleep given her advanced age, and so we will be more conservative. Her blood pressure is kind of soft, but I think she can tolerate a small fluid bolus, and we will start some diltiazem infusion. We will plan on going low and slow with the infusion, until we get some better rate control. She did tolerate that previous admission well, and I believe did spontaneously cardiovert while on it. I contacted the hospitalist, who is in agreement for inpatient management.     Critical Care time:  none    Diagnosis:    ICD-10-CM    1. Rapid atrial fibrillation (H)   I48.91      Scribe Disclosure:  I, Waylon Monroe, am serving as a scribe at 11:58 AM on 2/27/2022 to document services personally performed by Rfuino Ahumada MD based on my observations and the provider's statements to me.      Rufino Ahumada MD  02/27/22 8090

## 2022-02-27 NOTE — H&P
"[facility]    History and Physical - Hospitalist Service       Date of Admission:  2/27/2022    Assessment & Plan      Kimberley Dennis is a 86 year old female with longstanding history of persistent A. fib maintained on DOAC, diltiazem and digoxin recently, CAD with prior remote LAD stenting, hypertension, dyslipidemia, hypothyroidism and was recently discharged in the hospital 2 weeks prior to this presentation for similar issue of atrial fibrillation with RVR and was admitted on 2/27/2022.     Problem list:    1.  Recurrent atrial fibrillation with RVR  2.  History of hypertension  3.  CAD with prior LAD stenting  4.  Hypothyroidism  5.  Dyslipidemia    Admit as inpatient.  At risk for clinical deterioration she will be requiring close monitoring and care under cardiac telemetry monitoring bed.  Continue diltiazem infusion with parameters ordered.  Resume her digoxin and oral diltiazem.  Titrate and discontinue infusion as able.  I requested a reconsult from cardiology service.  She was seen on her last hospitalization and her medications was optimized at that time.  Family is also inquiring about possibility of referral to EP specialist regarding recurrent issue of A. fib with RVR and limitations with additional pharmacotherapy given issue with soft blood pressure levels  Continue her DOAC  Resume her hypothyroid medications.  On statins.  May have regular diet.     Diet:  Regular  DVT Prophylaxis: DOAC  Ramos Catheter: Not present  Central Lines: None  Cardiac Monitoring: None  Code Status:  Full code as expressed by the patient and discussed by family as well.    Clinically Significant Risk Factors Present on Admission              # Coagulation Defect: home medication list includes an anticoagulant medication    # Overweight: Estimated body mass index is 29.03 kg/m  as calculated from the following:    Height as of 2/15/22: 1.575 m (5' 2\").    Weight as of 2/16/22: 72 kg (158 lb 11.2 " oz).      Disposition Plan   Expected Discharge:  anticipating at least 2 inpatient days  Anticipated discharge location:   Likely home     The patient's care was discussed with the Patient and Patient's Family.    Ari Ramos MD, MD  Hospitalist Service  [facility]  Securely message with the Vocera Web Console (learn more here)  Text page via [facility] Paging/Directory         ______________________________________________________________________    Chief Complaint   Dizziness, weakness, elevated heart rate at home    History is obtained from the patient review of electronic medical records and discussion with emergency room physician    History of Present Illness   Kimberley Dennis is a 86 year old female with history of chronic atrial fibrillation and was recently discharged from the hospital 10 days ago for A. fib with RVR and was subsequently optimized with her medications and was placed on digoxin and continued on her diltiazem.  Notable limitations with soft blood pressure levels at that time.  She responded well during the hospitalization and was on her usual state of health at home until earlier this morning she started to complains of dizziness with accompanying elevated heart rate at home that prompted seeking medical attention in the emergency room today.  Accompanying symptoms were dizziness, increasing generalized weakness but no complaints of fevers, chills, nausea, vomiting, bleeding tendencies, diarrhea, abdominal pain, hematuria, urinary symptomatology, fall event, syncope, seizure-like activity nor mental status changes.  Upon presentation she was found with atrial fibrillation with RVR, soft blood pressures and started on diltiazem infusion.  Ms. Dennis mentioned that she also took her morning medications earlier today.  Currently she is tolerating diltiazem infusion with decreasing RVR and maintaining soft blood pressure levels.          Review of Systems    The 10  point Review of Systems is negative other than noted in the HPI or here.     Past Medical History    I have reviewed this patient's medical history and updated it with pertinent information if needed.   Past Medical History:   Diagnosis Date     Acquired hypothyroidism 5/27/2021     Coronary artery disease involving native coronary artery of native heart without angina pectoris 5/26/2021     Other and unspecified hyperlipidemia      Unspecified cardiovascular disease 1999    stent placed       Past Surgical History   I have reviewed this patient's surgical history and updated it with pertinent information if needed.  No past surgical history on file.    Social History   I have reviewed this patient's social history and updated it with pertinent information if needed.  Social History     Tobacco Use     Smoking status: Never Smoker     Smokeless tobacco: Never Used   Substance Use Topics     Alcohol use: No     Drug use: No       Family History   I have reviewed this patient's family history and updated it with pertinent information if needed.  Family History   Problem Relation Age of Onset     Family History Negative Other        Prior to Admission Medications   Prior to Admission Medications   Prescriptions Last Dose Informant Patient Reported? Taking?   IRON (FERROUS GLUCONATE) 325 MG OR TABS 2/27/2022 at am  Yes Yes   Sig: Take 1 tablet by mouth daily    Multiple Vitamins-Minerals (ICAPS AREDS FORMULA PO) 2/27/2022 at am  Yes Yes   Sig: Take 2 tablets by mouth daily    Multiple Vitamins-Minerals (MULTIVITAMIN ADULT) CHEW 2/27/2022 at am  Yes Yes   Sig: Take 2 chew tab by mouth daily   atorvastatin (LIPITOR) 10 MG tablet 2/26/2022 at pm  No Yes   Sig: Take 1 tablet (10 mg) by mouth daily   digoxin (LANOXIN) 125 MCG tablet 2/27/2022 at am  No Yes   Sig: Take 1 tablet (125 mcg) by mouth daily   diltiazem ER COATED BEADS (CARDIZEM CD/CARTIA XT) 120 MG 24 hr capsule 2/27/2022 at am  No Yes   Sig: Take 1 capsule (120  mg) by mouth daily   doxycycline hyclate (VIBRAMYCIN) 100 MG capsule 2/27/2022 at 1x  Yes Yes   Sig: Take 100 mg by mouth 2 times daily For 14 days   levothyroxine (SYNTHROID/LEVOTHROID) 25 MCG tablet 2/27/2022 at am  No Yes   Sig: Take 1 tablet by mouth once daily and take one extra tab once a week,   Patient taking differently: Take 1 tablet by mouth once daily and take one extra tab once a week (Wednesdays = 50 mcg)   niacin 500 MG tablet 2/27/2022 at am  Yes Yes   Sig: Take by mouth daily (with breakfast)   nitroGLYcerin (NITROSTAT) 0.4 MG sublingual tablet   No Yes   Sig: For chest pain place 1 tablet under the tongue every 5 minutes for 3 doses. If symptoms persist 5 minutes after 1st dose call 911.   rivaroxaban ANTICOAGULANT (XARELTO) 20 MG TABS tablet 2/26/2022 at pm  No Yes   Sig: Take 1 tablet (20 mg) by mouth daily (with dinner)      Facility-Administered Medications: None     Allergies   Allergies   Allergen Reactions     Ibuprofen        Physical Exam   Vital Signs: Temp: 98.1  F (36.7  C)   BP: (!) 127/91 Pulse: 94   Resp: 18 SpO2: 100 % O2 Device: None (Room air)    Weight: 0 lbs 0 oz    HEENT; Atraumatic, normocephalic, pinkish conjuctiva, pupils bilateral reactive   Skin: warm and moist, no rashes  Lymphatics: no cervical or axillary lymphandenopathy  Lungs: equal chest expansion, clear to auscultation, no wheezes, no stridor, no crackles,   Heart: Irregularly irregular rhythm, tachycardic rate 108 beats per minute, no pedal edema, no JVD,  Abdomen: normal bowel sounds, no tenderness, no peritoneal signs, no guarding  Extremities: no deformities, no edema   Neuro; follow commands, alert and oriented x3, spontaneous speech, coherent, moves all extremities spontaneously  Psych; no hallucination, euthymic mood, not agitated        Data   Data reviewed today: I reviewed all medications, new labs and imaging results over the last 24 hours. I personally reviewed the EKG tracing showing Atrial  fibrillation with RVR.    Recent Labs   Lab 02/27/22  1314 02/27/22  1205   WBC  --  11.2*   HGB  --  13.1   MCV  --  97   PLT  --  243     --    POTASSIUM 4.5  --    CHLORIDE 104  --    CO2 26  --    BUN 22  --    CR 0.82  --    ANIONGAP 6  --    LYLE 8.9  --    GLC 95  --      Most Recent 3 CBC's:Recent Labs   Lab Test 02/27/22  1205 02/16/22  0714 02/15/22  0819   WBC 11.2* 7.3 10.3   HGB 13.1 11.7 13.0   MCV 97 97 97    219 231     Most Recent 3 BMP's:Recent Labs   Lab Test 02/27/22  1314 02/16/22  0714 02/15/22  0819    134 137   POTASSIUM 4.5 4.3 3.8   CHLORIDE 104 102 108   CO2 26 28 22   BUN 22 13 19   CR 0.82 0.76 0.80   ANIONGAP 6 4 7   LYLE 8.9 8.5 9.0   GLC 95 79 92     Most Recent 3 INR's:No lab results found.  Most Recent 3 BNP's:No lab results found.  Most Recent D-dimer:No lab results found.  Most Recent TSH and T4:Recent Labs   Lab Test 02/15/22  0708   TSH 6.06*   T4 1.26     Most Recent 6 glucoses:Recent Labs   Lab Test 02/27/22  1314 02/16/22  0714 02/15/22  0819 02/14/22  1958 07/25/21  0714 07/25/21  0025   GLC 95 79 92 127* 85 105*     Most Recent Urinalysis:Recent Labs   Lab Test 02/27/22  1305   COLOR Straw   APPEARANCE Clear   URINEGLC Negative   URINEBILI Negative   URINEKETONE Negative   SG 1.007   UBLD Negative   URINEPH 6.5   PROTEIN Negative   NITRITE Negative   LEUKEST Negative   RBCU <1   WBCU <1     Most Recent ESR & CRP:Recent Labs   Lab Test 09/26/20  2040   SED 42*   CRP <2.9     No results found for this or any previous visit (from the past 24 hour(s)).

## 2022-02-28 ENCOUNTER — APPOINTMENT (OUTPATIENT)
Dept: GENERAL RADIOLOGY | Facility: CLINIC | Age: 87
End: 2022-02-28
Attending: INTERNAL MEDICINE
Payer: COMMERCIAL

## 2022-02-28 LAB
ALBUMIN SERPL-MCNC: 2.6 G/DL (ref 3.4–5)
ALP SERPL-CCNC: 57 U/L (ref 40–150)
ALT SERPL W P-5'-P-CCNC: 18 U/L (ref 0–50)
ANION GAP SERPL CALCULATED.3IONS-SCNC: 3 MMOL/L (ref 3–14)
AST SERPL W P-5'-P-CCNC: 25 U/L (ref 0–45)
ATRIAL RATE - MUSE: 100 BPM
BILIRUB DIRECT SERPL-MCNC: 0.2 MG/DL (ref 0–0.2)
BILIRUB SERPL-MCNC: 0.9 MG/DL (ref 0.2–1.3)
BUN SERPL-MCNC: 19 MG/DL (ref 7–30)
CALCIUM SERPL-MCNC: 8.3 MG/DL (ref 8.5–10.1)
CHLORIDE BLD-SCNC: 108 MMOL/L (ref 94–109)
CO2 SERPL-SCNC: 29 MMOL/L (ref 20–32)
CREAT SERPL-MCNC: 0.88 MG/DL (ref 0.52–1.04)
DIASTOLIC BLOOD PRESSURE - MUSE: NORMAL MMHG
GFR SERPL CREATININE-BSD FRML MDRD: 64 ML/MIN/1.73M2
GLUCOSE BLD-MCNC: 78 MG/DL (ref 70–99)
INTERPRETATION ECG - MUSE: NORMAL
MAGNESIUM SERPL-MCNC: 2 MG/DL (ref 1.6–2.3)
P AXIS - MUSE: NORMAL DEGREES
POTASSIUM BLD-SCNC: 4.3 MMOL/L (ref 3.4–5.3)
PR INTERVAL - MUSE: NORMAL MS
PROT SERPL-MCNC: 7.1 G/DL (ref 6.8–8.8)
QRS DURATION - MUSE: 70 MS
QT - MUSE: 302 MS
QTC - MUSE: 450 MS
R AXIS - MUSE: 28 DEGREES
SODIUM SERPL-SCNC: 140 MMOL/L (ref 133–144)
SYSTOLIC BLOOD PRESSURE - MUSE: NORMAL MMHG
T AXIS - MUSE: 71 DEGREES
T4 FREE SERPL-MCNC: 1.19 NG/DL (ref 0.76–1.46)
TSH SERPL DL<=0.005 MIU/L-ACNC: 9.61 MU/L (ref 0.4–4)
VENTRICULAR RATE- MUSE: 134 BPM

## 2022-02-28 PROCEDURE — 36415 COLL VENOUS BLD VENIPUNCTURE: CPT | Performed by: INTERNAL MEDICINE

## 2022-02-28 PROCEDURE — 250N000013 HC RX MED GY IP 250 OP 250 PS 637: Performed by: INTERNAL MEDICINE

## 2022-02-28 PROCEDURE — 80053 COMPREHEN METABOLIC PANEL: CPT | Performed by: INTERNAL MEDICINE

## 2022-02-28 PROCEDURE — 250N000011 HC RX IP 250 OP 636: Performed by: INTERNAL MEDICINE

## 2022-02-28 PROCEDURE — 84443 ASSAY THYROID STIM HORMONE: CPT | Performed by: INTERNAL MEDICINE

## 2022-02-28 PROCEDURE — 99233 SBSQ HOSP IP/OBS HIGH 50: CPT | Performed by: INTERNAL MEDICINE

## 2022-02-28 PROCEDURE — 99222 1ST HOSP IP/OBS MODERATE 55: CPT | Performed by: INTERNAL MEDICINE

## 2022-02-28 PROCEDURE — 258N000003 HC RX IP 258 OP 636: Performed by: INTERNAL MEDICINE

## 2022-02-28 PROCEDURE — 83735 ASSAY OF MAGNESIUM: CPT | Performed by: STUDENT IN AN ORGANIZED HEALTH CARE EDUCATION/TRAINING PROGRAM

## 2022-02-28 PROCEDURE — 84439 ASSAY OF FREE THYROXINE: CPT | Performed by: INTERNAL MEDICINE

## 2022-02-28 PROCEDURE — 200N000001 HC R&B ICU

## 2022-02-28 PROCEDURE — 82248 BILIRUBIN DIRECT: CPT | Performed by: INTERNAL MEDICINE

## 2022-02-28 PROCEDURE — 71045 X-RAY EXAM CHEST 1 VIEW: CPT

## 2022-02-28 RX ORDER — DILTIAZEM HYDROCHLORIDE 120 MG/1
120 CAPSULE, COATED, EXTENDED RELEASE ORAL DAILY
Status: DISCONTINUED | OUTPATIENT
Start: 2022-02-28 | End: 2022-02-28

## 2022-02-28 RX ORDER — MULTIPLE VITAMINS W/ MINERALS TAB 9MG-400MCG
1 TAB ORAL DAILY
Status: DISCONTINUED | OUTPATIENT
Start: 2022-02-28 | End: 2022-03-02 | Stop reason: HOSPADM

## 2022-02-28 RX ORDER — DILTIAZEM HYDROCHLORIDE 30 MG/1
60 TABLET, FILM COATED ORAL ONCE
Status: COMPLETED | OUTPATIENT
Start: 2022-02-28 | End: 2022-02-28

## 2022-02-28 RX ORDER — HYDROXYZINE HYDROCHLORIDE 25 MG/1
25 TABLET, FILM COATED ORAL 3 TIMES DAILY PRN
Status: DISCONTINUED | OUTPATIENT
Start: 2022-02-28 | End: 2022-03-02 | Stop reason: HOSPADM

## 2022-02-28 RX ORDER — LIDOCAINE 40 MG/G
CREAM TOPICAL
Status: DISCONTINUED | OUTPATIENT
Start: 2022-02-28 | End: 2022-03-01

## 2022-02-28 RX ORDER — DILTIAZEM HYDROCHLORIDE 180 MG/1
180 CAPSULE, COATED, EXTENDED RELEASE ORAL DAILY
Status: DISCONTINUED | OUTPATIENT
Start: 2022-03-01 | End: 2022-03-02 | Stop reason: HOSPADM

## 2022-02-28 RX ORDER — NIACIN 500 MG
500 TABLET ORAL
Status: DISCONTINUED | OUTPATIENT
Start: 2022-02-28 | End: 2022-03-02 | Stop reason: HOSPADM

## 2022-02-28 RX ORDER — DOXYCYCLINE 100 MG/1
100 CAPSULE ORAL 2 TIMES DAILY
Status: DISCONTINUED | OUTPATIENT
Start: 2022-02-28 | End: 2022-03-02 | Stop reason: HOSPADM

## 2022-02-28 RX ORDER — HYDRALAZINE HYDROCHLORIDE 20 MG/ML
5 INJECTION INTRAMUSCULAR; INTRAVENOUS EVERY 4 HOURS PRN
Status: DISCONTINUED | OUTPATIENT
Start: 2022-02-28 | End: 2022-03-02 | Stop reason: HOSPADM

## 2022-02-28 RX ADMIN — HYDRALAZINE HYDROCHLORIDE 5 MG: 20 INJECTION INTRAMUSCULAR; INTRAVENOUS at 23:54

## 2022-02-28 RX ADMIN — DILTIAZEM HYDROCHLORIDE 60 MG: 30 TABLET, FILM COATED ORAL at 16:31

## 2022-02-28 RX ADMIN — DOXYCYCLINE HYCLATE 100 MG: 100 CAPSULE ORAL at 11:45

## 2022-02-28 RX ADMIN — DILTIAZEM HYDROCHLORIDE 120 MG: 120 CAPSULE, COATED, EXTENDED RELEASE ORAL at 10:39

## 2022-02-28 RX ADMIN — DOXYCYCLINE HYCLATE 100 MG: 100 CAPSULE ORAL at 20:28

## 2022-02-28 RX ADMIN — RIVAROXABAN 20 MG: 20 TABLET, FILM COATED ORAL at 16:31

## 2022-02-28 RX ADMIN — LEVOTHYROXINE SODIUM 25 MCG: 0.03 TABLET ORAL at 10:39

## 2022-02-28 RX ADMIN — Medication 500 MG: at 13:10

## 2022-02-28 RX ADMIN — AMIODARONE HYDROCHLORIDE 1 MG/MIN: 50 INJECTION, SOLUTION INTRAVENOUS at 09:21

## 2022-02-28 RX ADMIN — MULTIPLE VITAMINS W/ MINERALS TAB 1 TABLET: TAB at 11:45

## 2022-02-28 RX ADMIN — AMIODARONE HYDROCHLORIDE 150 MG: 1.5 INJECTION, SOLUTION INTRAVENOUS at 08:56

## 2022-02-28 RX ADMIN — ATORVASTATIN CALCIUM 10 MG: 10 TABLET, FILM COATED ORAL at 20:28

## 2022-02-28 ASSESSMENT — ACTIVITIES OF DAILY LIVING (ADL)
ADLS_ACUITY_SCORE: 7
ADLS_ACUITY_SCORE: 9
ADLS_ACUITY_SCORE: 7
ADLS_ACUITY_SCORE: 9
ADLS_ACUITY_SCORE: 7
ADLS_ACUITY_SCORE: 9

## 2022-02-28 NOTE — PROVIDER NOTIFICATION
MD notified: pt is requesting to take doxycycline tablet she has for her eyes? Looks like it is on her PTA list, can we reorder for her? Thanks!  Couple of different PTA meds ordered for patient.     MD notified: Pt c/o generalized itching about 1 hr after I gave her Niacin. I know she takes this at home too but she thinks the itching is from some medicine we are giving her. We tried lotion which helped a little but she is wondering about maybe benedryl or something b/c the itching is still there. Thanks!  Atarax ordered for patient.

## 2022-02-28 NOTE — PROGRESS NOTES
Cardiology consult dictated.  87-year-old patient with recurrent symptomatic paroxysmal atrial fibrillation.  Rate control strategy has failed.  Starting patient on intravenous amiodarone for 24 hours to load her and then switching to oral loading protocol.  We will stop intravenous diltiazem and restart oral diltiazem.  We will stop digoxin and continue with Xarelto.  Discussed plan with patient and with patient's daughter and son-in-law who agree with plan.  Will require baseline chest x-ray, baseline thyroid function tests, baseline hepatic function and will require on discharge baseline pulmonary function test which should be repeated in 3 months time as well as the chest x-ray being repeated in 3 months time.  Thyroid function test and liver function test will also needed to be repeated in 3 months time.  We will follow.

## 2022-02-28 NOTE — CONSULTS
"CLINICAL NUTRITION SERVICES  -  ASSESSMENT NOTE      RECOMMENDATIONS FOR MD/PROVIDER TO ORDER:   None     Recommendations Ordered by Registered Dietitian (RD):   Gelatein Plus for patient to try     Future/Additional Recommendations:   Adjust supplement pending PO intake/patient preference     Malnutrition:   Unable to determine, needs NFPE         REASON FOR ASSESSMENT  Kimberley Dennis is a 87 year old female seen by Registered Dietitian for Provider Order - family concerns for malnutrition    Patient presents with h/o HTN, CAD, hypothyroidism, DLD      NUTRITION HISTORY  - Information obtained from patient and chart  - Patient is on a Regular diet at home  - Typical food/fluid intake in [place] was fruit and 1 cup of milk, in the  milk and biscuits for breakfast, patient usually has cereal for lunch around  2-3pm and then eats rice, veggies, and sometimes chicken for supper.  - Diet history patient stated she is eating about the same amount of food as she did in [place] which isn't very much  - Supplements none. Patient stated she had not been taking the supplements listed on her discharge orders from last time.    NKFA      CURRENT NUTRITION ORDERS  Diet Order:     Regular       Current Intake/Tolerance:  Patient is eating 100% of meals per nursing records and received 1157kcals and 27g protein so far today.      NUTRITION FOCUSED PHYSICAL ASSESSMENT FOR DIAGNOSING MALNUTRITION)  No:  patient deferred exam for today                Observed:    No nutrition-related physical findings observed    Obtained from Chart/Interdisciplinary Team:  Dry skin noted    ANTHROPOMETRICS  Height: 5' 2\"  Weight: 156 lbs 14.4 oz  Body mass index is 28.7 kg/m .  Weight Status:  Overweight BMI 25-29.9  IBW: 50.1kg  % IBW: 142%  Weight History:   Wt Readings from Last 30 Encounters:   02/28/22 71.2 kg (156 lb 14.4 oz)   02/16/22 72 kg (158 lb 11.2 oz)   11/29/21 72.5 kg (159 lb 14.4 oz)   08/11/21 72.6 kg (160 lb)   07/27/21 72.8 kg " (160 lb 8 oz)   07/25/21 73.1 kg (161 lb 1.6 oz)   06/28/21 72.1 kg (159 lb)   05/26/21 72.1 kg (159 lb)   04/30/21 72.1 kg (159 lb)   07/17/20 72.6 kg (160 lb)   07/02/20 72.6 kg (160 lb)   02/27/20 70.8 kg (156 lb)   01/13/20 69.4 kg (153 lb)   05/28/15 73 kg (161 lb)   05/20/15 73.9 kg (163 lb)   02/01/06 66.2 kg (146 lb)   01/18/06 67.1 kg (148 lb)   01/16/06 67.1 kg (148 lb)     UBW: 158.8lbs (64kg) stated per patient     stated goal weight 65kg: patient has been intentionally trying to lose weight    LABS  Labs reviewed: alb 2.6    MEDICATIONS  Medications reviewed: vibramycin, synthroid/levothroid, thera-vit-m, niacin       ASSESSED NUTRITION NEEDS PER APPROVED PRACTICE GUIDELINES:    Dosing Weight 71.2 kg (156 lb 14.4 oz) and ABW 55.4kg    Estimated Energy Needs: 1333 kcals (Telephone St Jeor and activity factor 1.2)  Justification: maintenance  Estimated Protein Needs: 55-66 grams protein (1-1.2 g pro/Kg)  Justification: preservation of lean body mass  Estimated Fluid Needs: 6625-8981  mL (25-30 mL/kg)  Justification: maintenance    MALNUTRITION:  % Weight Loss:  Weight loss does not meet criteria for malnutrition as it has been intentional  % Intake:  No decreased intake noted  Subcutaneous Fat Loss:  Unable to assess  Muscle Loss:  Unable to assess  Fluid Retention:  Unable to assess    Malnutrition Diagnosis: Unable to determine due to needs NFPE    NUTRITION DIAGNOSIS:  Inadequate protein intake related to food choices as evidenced by eating 68% estimated protein needs      NUTRITION INTERVENTIONS  Recommendations / Nutrition Prescription  Encourage intake of protein containing foods  .      Implementation  Nutrition education: Per Provider order if indicated   Medical Food Supplement  .      Nutrition Goals  Meet estimated protein needs  .      MONITORING AND EVALUATION:  Progress towards goals will be monitored and evaluated per protocol and Practice Guidelines, Diet Order, Food intake, Liquid meal  replacement or supplement, Weight, Biochemical data and Nutrition-focused physical findings

## 2022-02-28 NOTE — PROVIDER NOTIFICATION
ARCHIE paged to admitting that patient had x3 2.1 sec pauses within 2 min. Patient asymptomatic.     Laura BUTTERFIELD Will on 2/27/2022 at 11:03 PM

## 2022-02-28 NOTE — PROGRESS NOTES
Cross Cover    Patient has also had three 2.1 second pauses, asymptomatic.  Had been on Diltiazem drip earlier for RVR but this was discontinued when he converted to NSR and had significant pause.  Now just on his PTA Digoxin.  Continue to monitor.    Jeny Tran MD

## 2022-02-28 NOTE — PLAN OF CARE
Goal Outcome Evaluation:    Overall Patient Progress: no change    VSS ex pt HTN and bradycardic. A&Ox4. Understands english. Denies pain. LS clear. Tele SB. No CP or SOB reported. Transitioned to amiodarone drip today, plan for PO tomorrow. HR stable (60s), in SR. Up A1. Fair appetite today. Nutrition consulted and added supplements. Pt c/o itching today (MD updated). Frequent lotion applied to back/legs. Cardiology following. Discharge TBD.

## 2022-02-28 NOTE — PROVIDER NOTIFICATION
Web based page at 2100 to hospitalist: Patient converted to NSR but also had 6.2 second pause, HR did dip into 40s and tele tech states appears to be junctional rhythm, /64. Dilt gtt stopped, please advise.

## 2022-02-28 NOTE — PLAN OF CARE
Temp: 98.6  F (37  C) Temp src: Oral BP: 106/64 Pulse: (!) 47   Resp: 18 SpO2: 98 % O2 Device: None (Room air)      Patient A&Ox4, no complaints of pain. Patient admitted for afib RVR, patient had recent admission for same thing earlier this month. Came up from ED on 10mg/hr cardizem gtt. Patient had 2.0 sec pause with no symptoms, followed by a 6.2 sec pause at appox 2100 then convert to what appeared to be SR/SB with junctional beats patient symtpomatic. Dilt gtt stopped. MD notified, ECG obtained (see results-look at waveform, interpretation appears incorrect). HR running in the 50s. Patient currently resting in bed. Being closely monitored on tele. Will continue to monitor.     Laura Day on 2/27/2022 at 10:12 PM

## 2022-02-28 NOTE — PROGRESS NOTES
Patient alert and oriented. Denies pain. Lung sounds clear. Bowel sounds audible. Voiding well. Tele: SB/SR. /66 (BP Location: Right arm)   Pulse 62   Temp 98.7  F (37.1  C) (Oral)   Resp 18   LMP  (LMP Unknown)   SpO2 100%   Will continue to monitor per plan of care.

## 2022-02-28 NOTE — CONSULTS
Consult Date: 02/28/2022    REFERRING PHYSICIAN:  Hospitalist Service.    INDICATION FOR CARDIAC CONSULTATION:  Recurrent symptomatic paroxysmal atrial fibrillation.    Dear Doctor:     It is my pleasure to see your patient, Kimberley Dennis, who is an 87-year-old patient previously seen by my partner, Dr. Germán Shin and Dr. Faizan Ackerman most recently.  This is a patient with a past history of paroxysmal symptomatic atrial fibrillation.  Her main symptoms were fatigue and weakness when she goes into atrial fibrillation.  The patient was discharged very recently only on 02/16/2022, which was 12 days ago.  A rate control strategy was employed in this patient where she was discharged on digoxin and long-acting diltiazem 120 mg per day.  She was also anticoagulated with rivaroxaban to prevent stroke.  The patient now presents with similar findings of fatigue, tiredness.  She was so weak that she could not brush her teeth.  She called her son in [facility], who told her to go to the Emergency Room immediately.  Her 12-lead electrocardiogram on arrival to the Emergency Room revealed that the patient was in atrial fibrillation with rapid ventricular response with a ventricular rate of 134 beats per minute.  The patient was started on intravenous diltiazem drip, and then she spontaneously converted back to sinus rhythm.  She remains in sinus rhythm now with a heart rate in the 60s.  So clearly, the rate control strategy has not worked in this patient.  She feels well at present.  Her blood pressure is well controlled, although when she came into the emergency room with a more rapid heartbeat, her heart rate at one stage was 89 systolic, indicating some hemodynamic compromise when she is going this fast.  Her systolic BP has now risen into the 130s  in sinus rhythm.  She did not complain of any chest pains or chest pressure associated with this.  Her laboratory investigations show that her potassium was normal, magnesium was not  checked, but we do know 12 days ago that her magnesium was normal, and we also note that her T4 was normal, though her TSH was borderline raised at 6.06, with upper limits of normal being 4.00.    PAST MEDICAL HISTORY:  Paroxysmal symptomatic atrial fibrillation, prior history of coronary artery disease with stenting of the left anterior descending artery.  Hypothyroidism.  Hyperlipidemia.    PAST SURGICAL HISTORY:  No surgical history noted in the past.    FAMILY HISTORY:  No family history of atrial fibrillation charted.    SOCIAL HISTORY:  She is visiting from [place], her daughter lives here.  She does not smoke and she does not drink alcohol.    MEDICATIONS PRIOR TO ADMISSION:  1.  Atorvastatin 10 mg per day.  2.  Diltiazem long-acting 120 mg per day.  3.  Iron 1 tablet by mouth per day.  4.  Levothyroxine 25 mcg per day.  5.  Losartan 50 mg by mouth per day.  6.  Niacin 500 mg with breakfast.  7.  Rivaroxaban 20 mg per day.  8.  Trazodone 0.5-1 mg by mouth at night for sleep.   9.  Kenalog 0.1% paste two times daily.    ALLERGIES:  SHE IS INTOLERANT TO IBUPROFEN.    REVIEW OF SYSTEMS:    CONSTITUTIONAL:  Tiredness.  EYES:  Negative.  ENT:  Negative.  CARDIOVASCULAR:  As above.  RESPIRATORY:  Nil.  GASTROINTESTINAL:  Nil.  GENITOURINARY:  Nil.  MUSCULOSKELETAL:  Nil.  NEUROLOGICAL:  Nil.  PSYCHIATRIC:  Nil.  ENDOCRINE:  Treated hypothyroidism.   HEMATOLOGIC/LYMPHATIC:  Nil.   ALLERGY/IMMUNOLOGY:  As above.    PHYSICAL EXAMINATION:    GENERAL:  She is a very pleasant patient who is in no apparent distress.  VITAL SIGNS:  Her blood pressure is 132/66, her pulse is 62 beats per minute, sinus rhythm on the telemetry monitor.  HEAD, EYES, NECK, NOSE AND THROAT:  Unremarkable.  Her jugular venous pulse does not appear to be raised.  Her carotids are normal with no bruits.  [place] beat was not displaced.   HEART:  Heart sounds 1 and 2, normal.  No significant murmurs noted.  CHEST:  Clear to percussion and auscultation  with no added sounds.  ABDOMEN:  Unremarkable with no organomegaly.  No masses or bruits.  EXTREMITIES:  Pedal pulses are 2+.  No peripheral edema noted.  NEUROLOGIC:  She moves all four limbs appropriately with no obvious sensory or motor loss, and she is fully orientated to time, place and person with a pleasant affect.    LABORATORY INVESTIGATIONS:  Sodium is 136, potassium is 4.5, BUN is 22, creatinine is 0.82, GFR 70, glucose is 95.  White cell count 11.2, hemoglobin 13.1, platelet count 243.    Prior echocardiogram was performed last summer after initial presentation, and this showed that the ejection fraction was 55-60%.  The left atrium was moderately dilated with mildly dilated right atrium, moderate mitral annular calcification noted.  Trace tricuspid regurgitation.    IMPRESSION:  1.  Recurrent symptomatic paroxysmal atrial fibrillation.  It is clear that a rate-controlling strategy is not working fot this patient.  She presented with atrial fibrillation with rapid ventricular response on diltiazem and digoxin.  Fortunately, the patient has returned to sinus rhythm.  I think it will be important at this stage to maintain sinus rhythm.  Factors to be taken into account are this patient has a history of coronary artery disease with prior stenting of the left anterior descending artery.  2.  Normotensive but her blood pressure is beginning to rise, and she normally is on antihypertensive medications such as losartan and diltiazem.    PLAN:  I think it will be important to try to maintain sinus rhythm since rate controlling medications have been unsuccessful and higher doses of controlling medications in the past could not be used because of her blood pressure, and indeed when she came into the emergency room in rapid atrial fibrillation, systolic blood pressures were recorded in the high 80s at one stage.  In this patient who is elderly and who has a history of coronary artery disease, I believe that  amiodarone is the medication that would probably keep her in sinus rhythm best and be safest overall; however, this medication can be associated with significant side effects, though at the doses used in atrial fibrillation, which is the 200 mg dose, the incidence of side effects were in the single digits usually around the 5-6% randall for this medication.  I did discuss the use of this medication with the patient, but I also called her daughter and her Son-in-law was also on the call.  I did explain that this medication can be associated with lung, liver and thyroid toxicities, and that is important that the thyroid and liver function be checked twice a year, using blood testing and that lung function should be checked once a year using pulmonary function testing, and chest x-ray should also be performed once a year to assess for lung toxicity.  The patient will need a baseline pulmonary function test on discharge as well.  The patient will also need a baseline chest x-ray before discharge.  We will check a baseline hepatic function and baseline thyroid function also today.  We will load the patient for 24 hours with intravenous amiodarone to try and get the levels up and then switch her to oral amiodarone tomorrow, which will be 400 mg twice a day for eight days, then 400 mg per day for three weeks, then 200 mg per day.  In three months' time, she should get repeat pulmonary function tests, thyroid function tests, liver function tests and chest x-ray.  Thereafter, then it will be the twice a year thyroid and liver function tests and once a year pulmonary function tests and chest x-ray.  She should also have eye tests once a year, although the effects of amiodarone are usually very infrequent and usually not serious.  Very rarely, an amiodarone-induced retinopathy can occur.    It has been my pleasure to be involved the care of this nice patient.    This was a 60-minute visit, of which greater than 50% was involved  in counseling and education.    Sandro Berman MD, FACC        D: 2022   T: 2022   MT: OMEGA    Name:     RAYNA SHEPHERD  MRN:      [id number]        Account:      [id number]   :      1935           Consult Date: 2022     Document: [id number]

## 2022-02-28 NOTE — PROGRESS NOTES
[facility]    Medicine Progress Note - Hospitalist Service    Date of Admission:  2/27/2022    Assessment & Plan               Kimberley Dennis is a 86 year old female with longstanding history of persistent A. fib maintained on DOAC, diltiazem and digoxin recently, CAD with prior remote LAD stenting, hypertension, dyslipidemia, hypothyroidism and was recently discharged in the hospital 2 weeks prior to this presentation for similar issue of atrial fibrillation with RVR and was admitted on 2/27/2022.     Problem list:    1.  Recurrent atrial fibrillation with RVR  2.  History of hypertension  3.  CAD with prior LAD stenting  4.  Hypothyroidism  5.  Dyslipidemia    Continue inpatient care.  She remain at risk for clinical deterioration and will be requiring close monitoring and care under [facility] status  -Appreciate prompt input from cardiology service.  Diltiazem infusion discontinued.  Resume back home oral regimen of diltiazem and started on amiodarone  -Cardiology had an extensive discussion with patient and family regarding indication for amiodarone, known benefits and its side effects and the need for close monitoring even at outpatient  -Baseline chest x-ray ordered earlier.  Hepatic panel, pulmonary function tests can be done upon discharge.  -Digoxin discontinued  -Continued on DOAC  -I resume patient's home regimen of doxycycline as per her request.  Resume her hypothyroid medications.  On statins.  May have regular diet.       Diet: Combination Diet Regular Diet Adult    DVT Prophylaxis: DOAC  Ramos Catheter: Not present  Central Lines: None  Cardiac Monitoring: ACTIVE order. Indication: Tachyarrhythmias, acute (48 hours)  Code Status: Full Code      Disposition Plan   Expected Discharge: 03/01/2022     Anticipated discharge location:  Awaiting care coordination huddle  Delays:            The patient's care was discussed with the Bedside Nurse and Patient.    Ari Ramos MD,  "MD  Hospitalist Service  [facility]  Securely message with the [employer] Web Console (learn more here)  Text page via [employer] Paging/Directory         Clinically Significant Risk Factors Present on Admission              # Coagulation Defect: home medication list includes an anticoagulant medication    # Overweight: Estimated body mass index is 28.7 kg/m  as calculated from the following:    Height as of this encounter: 1.575 m (5' 2\").    Weight as of this encounter: 71.2 kg (156 lb 14.4 oz).      ______________________________________________________________________    Interval History   Continuing care today.  Seen and examined.  Chart reviewed.  Case discussed with nursing service.  She is feeling better with no complaints of any palpitations.  Tolerated the her oral diet mid 100% intake and no reported nausea or vomiting or abdominal pain.  Not requiring any oxygen support.  Afebrile.  Stable hemodynamics.  Converted back to NSR.    Data reviewed today: I reviewed all medications, new labs and imaging results over the last 24 hours. I personally reviewed the EKG tracing showing NSR.    Physical Exam   Vital Signs: Temp: 97.2  F (36.2  C) Temp src: Oral BP: (!) 161/74 Pulse: 69   Resp: 18 SpO2: 98 % O2 Device: None (Room air)    Weight: 156 lbs 14.4 oz  HEENT; Atraumatic, normocephalic, pinkish conjuctiva, pupils bilateral reactive   Skin: warm and moist, no rashes  Lymphatics: no cervical or axillary lymphandenopathy  Lungs: equal chest expansion, clear to auscultation, no wheezes, no stridor, no crackles,   Heart: normal rate, normal rhythm, no rubs or gallops.   Abdomen: normal bowel sounds, no tenderness, no peritoneal signs, no guarding  Extremities: no deformities, no edema   Neuro; follow commands, alert and oriented x3, spontaneous speech, coherent, moves all extremities spontaneously  Psych; no hallucination, euthymic mood, not agitated        Data   Recent Labs   Lab 02/28/22  0846 " 02/28/22  0639 02/27/22  1314 02/27/22  1205   WBC  --   --   --  11.2*   HGB  --   --   --  13.1   MCV  --   --   --  97   PLT  --   --   --  243   NA  --  140 136  --    POTASSIUM  --  4.3 4.5  --    CHLORIDE  --  108 104  --    CO2  --  29 26  --    BUN  --  19 22  --    CR  --  0.88 0.82  --    ANIONGAP  --  3 6  --    LYLE  --  8.3* 8.9  --    GLC  --  78 95  --    ALBUMIN 2.6*  --   --   --    PROTTOTAL 7.1  --   --   --    BILITOTAL 0.9  --   --   --    ALKPHOS 57  --   --   --    ALT 18  --   --   --    AST 25  --   --   --      Recent Results (from the past 24 hour(s))   XR Chest Port 1 View    Narrative    CHEST ONE VIEW  2/28/2022 9:48 AM     HISTORY: Baseline prior to amiodarone initiation.    COMPARISON: 7/24/2021.       Impression    IMPRESSION: No significant fibrotic changes. No infiltrates.      Medications     amiodarone 1 mg/min (02/28/22 0921)     amiodarone       - MEDICATION INSTRUCTIONS -         atorvastatin  10 mg Oral Daily     diltiazem ER COATED BEADS  120 mg Oral Daily     doxycycline hyclate  100 mg Oral BID     levothyroxine  25 mcg Oral Daily     multivitamin w/minerals  1 tablet Oral Daily     niacin  500 mg Oral Daily with breakfast     rivaroxaban ANTICOAGULANT  20 mg Oral Daily with supper     sodium chloride (PF)  3 mL Intracatheter Q8H     sodium chloride (PF)  3 mL Intracatheter Q8H     sodium chloride (PF)  3 mL Intracatheter Q8H

## 2022-03-01 ENCOUNTER — EXTERNAL ORDER RESULTS (OUTPATIENT)
Dept: INTERNAL MEDICINE | Facility: CLINIC | Age: 87
End: 2022-03-01
Payer: COMMERCIAL

## 2022-03-01 ENCOUNTER — TELEPHONE (OUTPATIENT)
Dept: INTERNAL MEDICINE | Facility: CLINIC | Age: 87
End: 2022-03-01
Payer: COMMERCIAL

## 2022-03-01 LAB
ANION GAP SERPL CALCULATED.3IONS-SCNC: 6 MMOL/L (ref 3–14)
ATRIAL RATE - MUSE: 110 BPM
BUN SERPL-MCNC: 12 MG/DL (ref 7–30)
CALCIUM SERPL-MCNC: 8.3 MG/DL (ref 8.5–10.1)
CHLORIDE BLD-SCNC: 105 MMOL/L (ref 94–109)
CO2 SERPL-SCNC: 26 MMOL/L (ref 20–32)
CREAT SERPL-MCNC: 0.76 MG/DL (ref 0.52–1.04)
DIASTOLIC BLOOD PRESSURE - MUSE: NORMAL MMHG
GFR SERPL CREATININE-BSD FRML MDRD: 76 ML/MIN/1.73M2
GLUCOSE BLD-MCNC: 92 MG/DL (ref 70–99)
INTERPRETATION ECG - MUSE: NORMAL
MAGNESIUM SERPL-MCNC: 1.9 MG/DL (ref 1.6–2.3)
P AXIS - MUSE: 61 DEGREES
POTASSIUM BLD-SCNC: 3.7 MMOL/L (ref 3.4–5.3)
PR INTERVAL - MUSE: 100 MS
QRS DURATION - MUSE: 70 MS
QT - MUSE: 394 MS
QTC - MUSE: 369 MS
R AXIS - MUSE: 44 DEGREES
SODIUM SERPL-SCNC: 137 MMOL/L (ref 133–144)
SYSTOLIC BLOOD PRESSURE - MUSE: NORMAL MMHG
T AXIS - MUSE: 49 DEGREES
VENTRICULAR RATE- MUSE: 53 BPM

## 2022-03-01 PROCEDURE — 250N000011 HC RX IP 250 OP 636: Performed by: INTERNAL MEDICINE

## 2022-03-01 PROCEDURE — 83735 ASSAY OF MAGNESIUM: CPT | Performed by: INTERNAL MEDICINE

## 2022-03-01 PROCEDURE — 250N000013 HC RX MED GY IP 250 OP 250 PS 637: Performed by: NURSE PRACTITIONER

## 2022-03-01 PROCEDURE — 99232 SBSQ HOSP IP/OBS MODERATE 35: CPT | Performed by: INTERNAL MEDICINE

## 2022-03-01 PROCEDURE — 250N000013 HC RX MED GY IP 250 OP 250 PS 637: Performed by: INTERNAL MEDICINE

## 2022-03-01 PROCEDURE — 200N000001 HC R&B ICU

## 2022-03-01 PROCEDURE — 36415 COLL VENOUS BLD VENIPUNCTURE: CPT | Performed by: INTERNAL MEDICINE

## 2022-03-01 PROCEDURE — 80048 BASIC METABOLIC PNL TOTAL CA: CPT | Performed by: INTERNAL MEDICINE

## 2022-03-01 PROCEDURE — 258N000003 HC RX IP 258 OP 636: Performed by: INTERNAL MEDICINE

## 2022-03-01 RX ORDER — AMIODARONE HYDROCHLORIDE 200 MG/1
400 TABLET ORAL DAILY
Status: DISCONTINUED | OUTPATIENT
Start: 2022-03-09 | End: 2022-03-02 | Stop reason: HOSPADM

## 2022-03-01 RX ORDER — LOSARTAN POTASSIUM 25 MG/1
25 TABLET ORAL DAILY
Status: DISCONTINUED | OUTPATIENT
Start: 2022-03-01 | End: 2022-03-02 | Stop reason: HOSPADM

## 2022-03-01 RX ORDER — AMIODARONE HYDROCHLORIDE 200 MG/1
400 TABLET ORAL 2 TIMES DAILY
Status: DISCONTINUED | OUTPATIENT
Start: 2022-03-01 | End: 2022-03-02 | Stop reason: HOSPADM

## 2022-03-01 RX ORDER — AMIODARONE HYDROCHLORIDE 200 MG/1
200 TABLET ORAL DAILY
Status: DISCONTINUED | OUTPATIENT
Start: 2022-03-30 | End: 2022-03-02 | Stop reason: HOSPADM

## 2022-03-01 RX ADMIN — Medication 500 MG: at 09:25

## 2022-03-01 RX ADMIN — MULTIPLE VITAMINS W/ MINERALS TAB 1 TABLET: TAB at 09:25

## 2022-03-01 RX ADMIN — DOXYCYCLINE HYCLATE 100 MG: 100 CAPSULE ORAL at 09:25

## 2022-03-01 RX ADMIN — AMIODARONE HYDROCHLORIDE 0.5 MG/MIN: 50 INJECTION, SOLUTION INTRAVENOUS at 06:56

## 2022-03-01 RX ADMIN — AMIODARONE HYDROCHLORIDE 400 MG: 200 TABLET ORAL at 20:38

## 2022-03-01 RX ADMIN — ONDANSETRON 4 MG: 4 TABLET, ORALLY DISINTEGRATING ORAL at 22:35

## 2022-03-01 RX ADMIN — LOSARTAN POTASSIUM 25 MG: 25 TABLET, FILM COATED ORAL at 10:47

## 2022-03-01 RX ADMIN — AMIODARONE HYDROCHLORIDE 400 MG: 200 TABLET ORAL at 10:47

## 2022-03-01 RX ADMIN — Medication 1 MG: at 22:35

## 2022-03-01 RX ADMIN — ACETAMINOPHEN 650 MG: 325 TABLET, FILM COATED ORAL at 02:06

## 2022-03-01 RX ADMIN — LEVOTHYROXINE SODIUM 25 MCG: 0.03 TABLET ORAL at 09:25

## 2022-03-01 RX ADMIN — RIVAROXABAN 20 MG: 20 TABLET, FILM COATED ORAL at 17:45

## 2022-03-01 RX ADMIN — ATORVASTATIN CALCIUM 10 MG: 10 TABLET, FILM COATED ORAL at 20:38

## 2022-03-01 RX ADMIN — DILTIAZEM HYDROCHLORIDE 180 MG: 180 CAPSULE, COATED, EXTENDED RELEASE ORAL at 09:25

## 2022-03-01 RX ADMIN — DOXYCYCLINE HYCLATE 100 MG: 100 CAPSULE ORAL at 20:38

## 2022-03-01 ASSESSMENT — ACTIVITIES OF DAILY LIVING (ADL)
ADLS_ACUITY_SCORE: 9
ADLS_ACUITY_SCORE: 11
ADLS_ACUITY_SCORE: 9
ADLS_ACUITY_SCORE: 9
ADLS_ACUITY_SCORE: 11
ADLS_ACUITY_SCORE: 9
ADLS_ACUITY_SCORE: 9
ADLS_ACUITY_SCORE: 11
ADLS_ACUITY_SCORE: 9
ADLS_ACUITY_SCORE: 9
ADLS_ACUITY_SCORE: 11
ADLS_ACUITY_SCORE: 11
ADLS_ACUITY_SCORE: 9
ADLS_ACUITY_SCORE: 11
ADLS_ACUITY_SCORE: 9
ADLS_ACUITY_SCORE: 11
ADLS_ACUITY_SCORE: 11
ADLS_ACUITY_SCORE: 9
ADLS_ACUITY_SCORE: 11
ADLS_ACUITY_SCORE: 11
ADLS_ACUITY_SCORE: 9

## 2022-03-01 NOTE — PROGRESS NOTES
[facility]    Medicine Progress Note - Hospitalist Service    Date of Admission:  2/27/2022    Assessment & Plan               Kimberley Dennis is a 86 year old female with longstanding history of persistent A. fib maintained on DOAC, diltiazem and digoxin recently, CAD with prior remote LAD stenting, hypertension, dyslipidemia, hypothyroidism and was recently discharged in the hospital 2 weeks prior to this presentation for similar issue of atrial fibrillation with RVR and was admitted on 2/27/2022.     Problem list:    1.  Recurrent atrial fibrillation with RVR  2.  History of hypertension  3.  CAD with prior LAD stenting  4.  Hypothyroidism  5.  Dyslipidemia    Continue inpatient care.  She remain at risk for clinical deterioration and will be requiring close monitoring and care under cardiac telemetry monitoring  Discontinue IMC status once off amiodarone infusion  -Amiodarone be converted to oral route  -Now with elevated blood pressure levels.  On diltiazem and resume home regimen of losartan    -Cardiology had an extensive discussion with patient and family regarding indication for amiodarone, known benefits and its side effects and the need for close monitoring even at outpatient  -Baseline chest x-ray ordered earlier.  Hepatic panel, pulmonary function tests can be done upon discharge.  -Digoxin discontinued  -Continued on DOAC  -I resume patient's home regimen of doxycycline as per her request.  Resume her hypothyroid medications.  On statins.  May have regular diet.       Diet: Combination Diet Regular Diet Adult  Snacks/Supplements Adult: Gelatein Plus; With Meals    DVT Prophylaxis: DOAC  Ramos Catheter: Not present  Central Lines: None  Cardiac Monitoring: ACTIVE order. Indication: Tachyarrhythmias, acute (48 hours)  Code Status: Full Code      Disposition Plan   Expected Discharge: 03/02/2022     Anticipated discharge location:  Awaiting care coordination huddle  Delays:      "       The patient's care was discussed with the Bedside Nurse and Patient.    Ari Ramos MD, MD  Hospitalist Service  [facility]  Securely message with the [employer] Web Console (learn more here)  Text page via [employer] Paging/Directory         Clinically Significant Risk Factors Present on Admission              # Overweight: Estimated body mass index is 28.7 kg/m  as calculated from the following:    Height as of this encounter: 1.575 m (5' 2\").    Weight as of this encounter: 71.2 kg (156 lb 14.4 oz).      ______________________________________________________________________    Interval History   Continuing care today.  Seen and examined.  Chart reviewed.  Case discussed with nursing service.    As always Ms. Dennis remained in good spirits.  She denies any ongoing chest pain, palpitations nor lightheadedness.  Some pruritus on her scalp area and shoulder area but no rashes.  No bleeding tendencies.  No mental status changes.    .  Not requiring any oxygen support.  Afebrile.  Stable hemodynamics.  Converted back to NSR.    Data reviewed today: I reviewed all medications, new labs and imaging results over the last 24 hours. I personally reviewed the EKG tracing showing NSR.    Physical Exam   Vital Signs: Temp: 98  F (36.7  C) Temp src: Oral BP: (!) 157/63 Pulse: 64   Resp: 18 SpO2: 100 % O2 Device: None (Room air)    Weight: 156 lbs 14.4 oz  HEENT; Atraumatic, normocephalic, pinkish conjuctiva, pupils bilateral reactive   Skin: warm and moist, no rashes  Lymphatics: no cervical or axillary lymphandenopathy  Lungs: equal chest expansion, clear to auscultation, no wheezes, no stridor, no crackles,   Heart: normal rate, normal rhythm, no rubs or gallops.   Abdomen: normal bowel sounds, no tenderness, no peritoneal signs, no guarding  Extremities: no deformities, no edema   Neuro; follow commands, alert and oriented x3, spontaneous speech, coherent, moves all extremities " spontaneously  Psych; no hallucination, euthymic mood, not agitated        Data   Recent Labs   Lab 03/01/22  0656 02/28/22  0846 02/28/22  0639 02/27/22  1314 02/27/22  1205   WBC  --   --   --   --  11.2*   HGB  --   --   --   --  13.1   MCV  --   --   --   --  97   PLT  --   --   --   --  243     --  140 136  --    POTASSIUM 3.7  --  4.3 4.5  --    CHLORIDE 105  --  108 104  --    CO2 26  --  29 26  --    BUN 12  --  19 22  --    CR 0.76  --  0.88 0.82  --    ANIONGAP 6  --  3 6  --    LYLE 8.3*  --  8.3* 8.9  --    GLC 92  --  78 95  --    ALBUMIN  --  2.6*  --   --   --    PROTTOTAL  --  7.1  --   --   --    BILITOTAL  --  0.9  --   --   --    ALKPHOS  --  57  --   --   --    ALT  --  18  --   --   --    AST  --  25  --   --   --      No results found for this or any previous visit (from the past 24 hour(s)).  Medications     - MEDICATION INSTRUCTIONS -         [START ON 3/30/2022] amiodarone  200 mg Oral Daily     amiodarone  400 mg Oral BID     [START ON 3/9/2022] amiodarone  400 mg Oral Daily     atorvastatin  10 mg Oral Daily     diltiazem ER COATED BEADS  180 mg Oral Daily     doxycycline hyclate  100 mg Oral BID     levothyroxine  25 mcg Oral Daily     losartan  25 mg Oral Daily     multivitamin w/minerals  1 tablet Oral Daily     niacin  500 mg Oral Daily with breakfast     rivaroxaban ANTICOAGULANT  20 mg Oral Daily with supper     sodium chloride (PF)  3 mL Intracatheter Q8H     sodium chloride (PF)  3 mL Intracatheter Q8H     sodium chloride (PF)  3 mL Intracatheter Q8H

## 2022-03-01 NOTE — PROGRESS NOTES
"Cardiology Progress Note  Elina Berman APRN, CNP     Follows with Dr. Shin, Juan Carlos Pool NP       Assessment and Plan:     Admit (2/27/2/2) with weakness, dizziness  Evidence of rapid Atrial Fibrillation.  Recent hospitalization for similar situation and was treated for rate control at discharge last week.    PMH:  Paroxysmal Atrial Fibrillation, coronary artery disease, hypertension, hyperlipidemia, hypothyroidism, obesity     Symptomatic, Recurrent Paroxysmal Atrial Fibrillation with Rapid Rates  -recurrent issue  -s/p spontaneous conversion to SR yesterday, remains in SR overnight  -rhythm control strategy:  Amiodarone started.  Will plan to transition to PO Amiodarone 400 mg BID for 8 days, then 400 mg daily for 3 wks, then 200 mg daily thereafter  -chronic Xarelto  (CHADS2-Vasc score: 5)  -follow up planned with Dr Shin (3/16/22)  Will need follow up PFT    Hypertension:  -BP still elevated  -Diltiazem ER 180mg only  Was previously on Losartan  -will add back Losartan 25 mg  Repeat BMP in 2 wks    Coronary Artery Disease:  -s/p LAD stenting > 20 yrs ago  -no CP  -LVEF 55 (7/2021)    Hyperlipidemia:  -on low dose statin  -LDL 49    Hypothyroidism:  -TSH up to 9  -levothyroxine dose to be adjusted    The total time for the visit today was >35 minutes which includes patient visit, reviewing of records, discussion, and placing of orders of the coordination of cardiovascular care as described.  The level of medical decision making during this visit was of moderate/high complexity.  Thank you for allowing me to participate in their care.               Interval History:     Tamil speaking, Exam done with daughter on phone who assists with translation  Denies any CP, SOB, palpitations  Has yet to walk santos  Feels \"cold\"                Medications:       atorvastatin  10 mg Oral Daily     diltiazem ER COATED BEADS  180 mg Oral Daily     doxycycline hyclate  100 mg Oral BID     levothyroxine  25 mcg Oral Daily     multivitamin " "w/minerals  1 tablet Oral Daily     niacin  500 mg Oral Daily with breakfast     rivaroxaban ANTICOAGULANT  20 mg Oral Daily with supper     sodium chloride (PF)  3 mL Intracatheter Q8H     sodium chloride (PF)  3 mL Intracatheter Q8H     sodium chloride (PF)  3 mL Intracatheter Q8H            Physical Exam:   Blood pressure (!) 161/74, pulse 71, temperature 97.4  F (36.3  C), temperature source Oral, resp. rate 18, height 1.575 m (5' 2\"), weight 71.2 kg (156 lb 14.4 oz), SpO2 100 %, not currently breastfeeding.  Wt Readings from Last 3 Encounters:   02/28/22 71.2 kg (156 lb 14.4 oz)   02/16/22 72 kg (158 lb 11.2 oz)   11/29/21 72.5 kg (159 lb 14.4 oz)     I/O last 3 completed shifts:  In: 690 [P.O.:690]  Out: 450 [Urine:450]    CONST:  Alert and oriented  NAD  LUNGS:  CTA bilaterally  CARDIO:  RRR, S1, S2  ABD:  Obese  +BS  EXT:  No edema  1+ DP bilaterally           Data:   TELE:  SR    CBC  Recent Labs   Lab 02/27/22  1205   WBC 11.2*   HGB 13.1          BMP  Recent Labs   Lab 03/01/22  0656 02/28/22  0639 02/27/22  1314    140 136   POTASSIUM 3.7 4.3 4.5   CHLORIDE 105 108 104   LYLE 8.3* 8.3* 8.9   CO2 26 29 26   BUN 12 19 22   CR 0.76 0.88 0.82   GLC 92 78 95     Recent Labs   Lab Test 05/26/21  1129   CHOL 135   HDL 64   LDL 49   TRIG 112       TROP  Lab Results   Component Value Date    TROPONIN 0.018 07/24/2021       BNP  No results for input(s): NTBNPI, NTBNP in the last 168 hours.        "

## 2022-03-01 NOTE — TELEPHONE ENCOUNTER
Pt follows up with cardiologist and has appointment with the cardiologist,  forwarded the Holter monitor results to cardiology

## 2022-03-02 VITALS
WEIGHT: 157.8 LBS | OXYGEN SATURATION: 100 % | HEART RATE: 67 BPM | HEIGHT: 62 IN | TEMPERATURE: 97.9 F | RESPIRATION RATE: 20 BRPM | BODY MASS INDEX: 29.04 KG/M2 | DIASTOLIC BLOOD PRESSURE: 57 MMHG | SYSTOLIC BLOOD PRESSURE: 131 MMHG

## 2022-03-02 PROCEDURE — 250N000011 HC RX IP 250 OP 636: Performed by: INTERNAL MEDICINE

## 2022-03-02 PROCEDURE — 99239 HOSP IP/OBS DSCHRG MGMT >30: CPT | Performed by: INTERNAL MEDICINE

## 2022-03-02 PROCEDURE — 250N000013 HC RX MED GY IP 250 OP 250 PS 637: Performed by: INTERNAL MEDICINE

## 2022-03-02 PROCEDURE — 250N000013 HC RX MED GY IP 250 OP 250 PS 637: Performed by: NURSE PRACTITIONER

## 2022-03-02 RX ORDER — DILTIAZEM HYDROCHLORIDE 180 MG/1
180 CAPSULE, COATED, EXTENDED RELEASE ORAL DAILY
Qty: 30 CAPSULE | Refills: 0 | Status: ON HOLD | OUTPATIENT
Start: 2022-03-02 | End: 2022-03-05

## 2022-03-02 RX ORDER — PROCHLORPERAZINE MALEATE 5 MG
5 TABLET ORAL EVERY 6 HOURS PRN
Status: DISCONTINUED | OUTPATIENT
Start: 2022-03-02 | End: 2022-03-02 | Stop reason: HOSPADM

## 2022-03-02 RX ORDER — PROCHLORPERAZINE 25 MG
12.5 SUPPOSITORY, RECTAL RECTAL EVERY 12 HOURS PRN
Status: DISCONTINUED | OUTPATIENT
Start: 2022-03-02 | End: 2022-03-02 | Stop reason: HOSPADM

## 2022-03-02 RX ORDER — ONDANSETRON 4 MG/1
4 TABLET, ORALLY DISINTEGRATING ORAL EVERY 8 HOURS PRN
Qty: 15 TABLET | Refills: 0 | Status: SHIPPED | OUTPATIENT
Start: 2022-03-02 | End: 2023-04-03

## 2022-03-02 RX ORDER — LOSARTAN POTASSIUM 25 MG/1
25 TABLET ORAL DAILY
Qty: 30 TABLET | Refills: 0 | Status: ON HOLD | OUTPATIENT
Start: 2022-03-02 | End: 2022-03-05

## 2022-03-02 RX ORDER — AMIODARONE HYDROCHLORIDE 400 MG/1
400 TABLET ORAL DAILY
Qty: 21 TABLET | Refills: 0 | Status: ON HOLD | OUTPATIENT
Start: 2022-03-09 | End: 2022-03-05

## 2022-03-02 RX ORDER — AMIODARONE HYDROCHLORIDE 400 MG/1
400 TABLET ORAL 2 TIMES DAILY
Qty: 14 TABLET | Refills: 0 | Status: ON HOLD | OUTPATIENT
Start: 2022-03-02 | End: 2022-03-05

## 2022-03-02 RX ORDER — AMIODARONE HYDROCHLORIDE 200 MG/1
200 TABLET ORAL DAILY
Qty: 30 TABLET | Refills: 0 | Status: SHIPPED | OUTPATIENT
Start: 2022-03-30 | End: 2022-06-07

## 2022-03-02 RX ORDER — LEVOTHYROXINE SODIUM 25 UG/1
37.5 TABLET ORAL DAILY
Qty: 60 TABLET | Refills: 0 | Status: SHIPPED | OUTPATIENT
Start: 2022-03-02 | End: 2022-03-03

## 2022-03-02 RX ADMIN — MULTIPLE VITAMINS W/ MINERALS TAB 1 TABLET: TAB at 08:41

## 2022-03-02 RX ADMIN — DILTIAZEM HYDROCHLORIDE 180 MG: 180 CAPSULE, COATED, EXTENDED RELEASE ORAL at 08:41

## 2022-03-02 RX ADMIN — Medication 500 MG: at 08:41

## 2022-03-02 RX ADMIN — DOXYCYCLINE HYCLATE 100 MG: 100 CAPSULE ORAL at 08:41

## 2022-03-02 RX ADMIN — ONDANSETRON 4 MG: 4 TABLET, ORALLY DISINTEGRATING ORAL at 08:59

## 2022-03-02 RX ADMIN — LOSARTAN POTASSIUM 25 MG: 25 TABLET, FILM COATED ORAL at 08:41

## 2022-03-02 RX ADMIN — LEVOTHYROXINE SODIUM 25 MCG: 0.03 TABLET ORAL at 08:41

## 2022-03-02 RX ADMIN — AMIODARONE HYDROCHLORIDE 400 MG: 200 TABLET ORAL at 08:40

## 2022-03-02 RX ADMIN — PROCHLORPERAZINE EDISYLATE 5 MG: 5 INJECTION, SOLUTION INTRAMUSCULAR; INTRAVENOUS at 01:28

## 2022-03-02 ASSESSMENT — ACTIVITIES OF DAILY LIVING (ADL)
ADLS_ACUITY_SCORE: 9
ADLS_ACUITY_SCORE: 11
ADLS_ACUITY_SCORE: 9
ADLS_ACUITY_SCORE: 11
ADLS_ACUITY_SCORE: 11
ADLS_ACUITY_SCORE: 9
ADLS_ACUITY_SCORE: 11
ADLS_ACUITY_SCORE: 9
ADLS_ACUITY_SCORE: 11

## 2022-03-02 NOTE — PROVIDER NOTIFICATION
Pt nauseated, had zofran ODT at 2235. Admitting pager text paged with request for more nausea medication. Will await orders.     0130) orders received and carried out for compazine.

## 2022-03-02 NOTE — PLAN OF CARE
Goal Outcome Evaluation: Progressing    Plan of Care Reviewed With: patient, daughter     Assumed care [id number]: Pt A&Ox4, VSS, RA, LS CTA.  Tele SR, getting PO amio and PO dilt.  Regular diet, a1 (refuses gait belt). Requests all 4 bed rails be up.  Cards following. Should discharge tomorrow.  Continue plan of care.

## 2022-03-02 NOTE — DISCHARGE SUMMARY
[facility]  Hospitalist Discharge Summary      Date of Admission:  2/27/2022  Date of Discharge:  3/2/2022  Discharging Provider: Ari Ramos MD, MD  Discharge Service: Hospitalist Service    Discharge Diagnoses     1.  Recurrent atrial fibrillation with RVR  2.  History of hypertension  3.  CAD with prior LAD stenting  4.  Hypothyroidism  5.  Dyslipidemia       Follow-ups Needed After Discharge   Follow-up Appointments     Follow-up and recommended labs and tests       Follow up with primary care provider, Diana Hilario, within 7   days to evaluate medication change, to evaluate treatment change, and for   hospital follow- up.  The following labs/tests are recommended: You will   need baseline pulmonary function tests as you are initiated on amiodarone   treatment  Follow up TSH in the next 6-8 weeks at your Levothyroxine dose was   increased to 37.5 mcg  You need to follow-up with cardiology as scheduled.             Unresulted Labs Ordered in the Past 30 Days of this Admission     No orders found from 1/28/2022 to 2/28/2022.          Discharge Disposition   Discharged to home  Condition at discharge: Stable      Hospital Course          Ms Dennis is doing well as she converted back to NSR with no further recurrence of A. fib with RVR.  Currently tolerating amiodarone medication.  She was loaded with amiodarone infusion and subsequently converted to tapering dose of amiodarone as ordered and planned by cardiology service.  She will be requiring a baseline pulmonary function tests upon discharge given current amiodarone use.  Her TSH was elevated but has a normal free T4.  I adjusted her levothyroxine to 37.5 mcg and needs to be followed up as outpatient with repeat TFTs.  Use of amiodarone with its of no benefits, side effects were discussed in detail by cardiology to patient and several family members during early part of her hospitalization.  Her digoxin was  discontinued and her diltiazem was resumed but was increasing dosing due to increasing blood pressure levels.  Previously she was on losartan in the past that was discontinued before for soft blood pressure levels and since her blood pressure is on an increasing trend this was resumed at 25 mg daily and currently tolerating it well and will be discharged going home with losartan together with her amiodarone and diltiazem.  Currently on DOAC of which she is tolerating well and will be continued upon discharge.      I will refer you to excerpts of prior progress notes as listed below for other details of her hospital stay.     Kimberley Dennis is a 86 year old female with longstanding history of persistent A. fib maintained on DOAC, diltiazem and digoxin recently, CAD with prior remote LAD stenting, hypertension, dyslipidemia, hypothyroidism and was recently discharged in the hospital 2 weeks prior to this presentation for similar issue of atrial fibrillation with RVR and was admitted on 2/27/2022.     Problem list:    1.  Recurrent atrial fibrillation with RVR  2.  History of hypertension  3.  CAD with prior LAD stenting  4.  Hypothyroidism  5.  Dyslipidemia    Continue inpatient care.  She remain at risk for clinical deterioration and will be requiring close monitoring and care under cardiac telemetry monitoring  Discontinue IMC status once off amiodarone infusion  -Amiodarone be converted to oral route  -Now with elevated blood pressure levels.  On diltiazem and resume home regimen of losartan    -Cardiology had an extensive discussion with patient and family regarding indication for amiodarone, known benefits and its side effects and the need for close monitoring even at outpatient  -Baseline chest x-ray ordered earlier.  Hepatic panel, pulmonary function tests can be done upon discharge.  -Digoxin discontinued  -Continued on DOAC  -I resume patient's home regimen of doxycycline as per her request.  Resume her  hypothyroid medications.  On statins.  May have regular diet.      Consultations This Hospital Stay   CARDIOLOGY IP CONSULT  NUTRITION SERVICES ADULT IP CONSULT    Code Status   Full Code    Time Spent on this Encounter   I, Ari Ramos MD, MD, personally saw the patient today and spent greater than 30 minutes discharging this patient.       Ari Ramos MD, MD  [place] [address] MEDICAL SURGICAL  [address] [facility] [address]  Phone: [phone]  Fax: [phone]  ______________________________________________________________________    Physical Exam   Vital Signs: Temp: 97.9  F (36.6  C) Temp src: Oral BP: 131/57 Pulse: 67   Resp: 20 SpO2: 100 % O2 Device: None (Room air)    Weight: 157 lbs 12.8 oz  HEENT; Atraumatic, normocephalic, pinkish conjuctiva, pupils bilateral reactive   Skin: warm and moist, no rashes  Lymphatics: no cervical or axillary lymphandenopathy  Lungs: equal chest expansion, clear to auscultation, no wheezes, no stridor, no crackles,   Heart: normal rate, normal rhythm, no rubs or gallops.   Abdomen: normal bowel sounds, no tenderness, no peritoneal signs, no guarding  Extremities: no deformities, no edema   Neuro; follow commands, alert and oriented x3, spontaneous speech, coherent, moves all extremities spontaneously  Psych; no hallucination, euthymic mood, not agitated           Primary Care Physician   Diana Hilario    Discharge Orders      Primary Care - Care Coordination Referral      Reason for your hospital stay    You are admitted in the hospital for recurrent atrial fibrillation with RVR     Follow-up and recommended labs and tests     Follow up with primary care provider, Diana Hilario, within 7 days to evaluate medication change, to evaluate treatment change, and for hospital follow- up.  The following labs/tests are recommended: You will need baseline pulmonary function tests as you are initiated on amiodarone  treatment  Follow up TSH in the next 6-8 weeks at your Levothyroxine dose was increased to 37.5 mcg  You need to follow-up with cardiology as scheduled.     Activity    Your activity upon discharge: activity as tolerated     Full Code     Diet    Follow this diet upon discharge: Orders Placed This Encounter      Snacks/Supplements Adult: Gelatein Plus; With Meals      Combination Diet Regular Diet Adult       Significant Results and Procedures   Most Recent 3 CBC's:Recent Labs   Lab Test 02/27/22  1205 02/16/22  0714 02/15/22  0819   WBC 11.2* 7.3 10.3   HGB 13.1 11.7 13.0   MCV 97 97 97    219 231     Most Recent 3 BMP's:Recent Labs   Lab Test 03/01/22  0656 02/28/22  0639 02/27/22  1314    140 136   POTASSIUM 3.7 4.3 4.5   CHLORIDE 105 108 104   CO2 26 29 26   BUN 12 19 22   CR 0.76 0.88 0.82   ANIONGAP 6 3 6   LYLE 8.3* 8.3* 8.9   GLC 92 78 95     Most Recent 3 Troponin's:Recent Labs   Lab Test 07/24/21  2138   TROPONIN 0.018     Most Recent 3 BNP's:No lab results found.  Most Recent D-dimer:No lab results found.  Most Recent TSH and T4:Recent Labs   Lab Test 02/28/22  0846   TSH 9.61*   T4 1.19     Most Recent ABG:No lab results found.  Most Recent ESR & CRP:Recent Labs   Lab Test 09/26/20  2040   SED 42*   CRP <2.9     Most Recent Anemia Panel:Recent Labs   Lab Test 02/27/22  1205   WBC 11.2*   HGB 13.1   HCT 41.0   MCV 97      ,   Results for orders placed or performed during the hospital encounter of 02/27/22   XR Chest Port 1 View    Narrative    CHEST ONE VIEW  2/28/2022 9:48 AM     HISTORY: Baseline prior to amiodarone initiation.    COMPARISON: 7/24/2021.       Impression    IMPRESSION: No significant fibrotic changes. No infiltrates.     ALBERT DUNN MD         SYSTEM ID:  [id number]       Discharge Medications   Current Discharge Medication List      START taking these medications    Details   !! amiodarone (PACERONE) 200 MG tablet Take 1 tablet (200 mg) by mouth daily  Qty: 30  tablet, Refills: 0    Associated Diagnoses: Rapid atrial fibrillation (H)      !! amiodarone (PACERONE) 400 MG tablet Take 1 tablet (400 mg) by mouth daily for 21 days  Qty: 21 tablet, Refills: 0    Associated Diagnoses: Rapid atrial fibrillation (H)      !! amiodarone (PACERONE) 400 MG tablet Take 1 tablet (400 mg) by mouth 2 times daily for 7 days  Qty: 14 tablet, Refills: 0    Associated Diagnoses: Rapid atrial fibrillation (H)      losartan (COZAAR) 25 MG tablet Take 1 tablet (25 mg) by mouth daily  Qty: 30 tablet, Refills: 0    Associated Diagnoses: Essential hypertension      ondansetron (ZOFRAN-ODT) 4 MG ODT tab Take 1 tablet (4 mg) by mouth every 8 hours as needed for nausea or vomiting  Qty: 15 tablet, Refills: 0    Associated Diagnoses: Nausea       !! - Potential duplicate medications found. Please discuss with provider.      CONTINUE these medications which have CHANGED    Details   diltiazem ER COATED BEADS (CARDIZEM CD/CARTIA XT) 180 MG 24 hr capsule Take 1 capsule (180 mg) by mouth daily  Qty: 30 capsule, Refills: 0    Associated Diagnoses: Rapid atrial fibrillation (H)      levothyroxine (SYNTHROID/LEVOTHROID) 25 MCG tablet Take 1.5 tablets (37.5 mcg) by mouth daily  Qty: 60 tablet, Refills: 0    Associated Diagnoses: Acquired hypothyroidism         CONTINUE these medications which have NOT CHANGED    Details   atorvastatin (LIPITOR) 10 MG tablet Take 1 tablet (10 mg) by mouth daily  Qty: 90 tablet, Refills: 3    Associated Diagnoses: Hyperlipidemia LDL goal <130      doxycycline hyclate (VIBRAMYCIN) 100 MG capsule Take 100 mg by mouth 2 times daily For 14 days      IRON (FERROUS GLUCONATE) 325 MG OR TABS Take 1 tablet by mouth daily   Qty: 30, Refills: 0      Multiple Vitamins-Minerals (ICAPS AREDS FORMULA PO) Take 2 tablets by mouth daily       Multiple Vitamins-Minerals (MULTIVITAMIN ADULT) CHEW Take 2 chew tab by mouth daily      niacin 500 MG tablet Take by mouth daily (with breakfast)       nitroGLYcerin (NITROSTAT) 0.4 MG sublingual tablet For chest pain place 1 tablet under the tongue every 5 minutes for 3 doses. If symptoms persist 5 minutes after 1st dose call 911.  Qty: 30 tablet, Refills: 0    Associated Diagnoses: Atrial flutter with rapid ventricular response (H)      rivaroxaban ANTICOAGULANT (XARELTO) 20 MG TABS tablet Take 1 tablet (20 mg) by mouth daily (with dinner)  Qty: 90 tablet, Refills: 3    Associated Diagnoses: Paroxysmal atrial fibrillation (H)         STOP taking these medications       digoxin (LANOXIN) 125 MCG tablet Comments:   Reason for Stopping:             Allergies   Allergies   Allergen Reactions     Ibuprofen

## 2022-03-02 NOTE — PROGRESS NOTES
Care Management Discharge Note    Discharge Date: 03/02/2022     Additional Information:  Pt has a [facility] listed and is identified as a Service Bundle #3. Per chart review, no needs or assessment needed at this time. Please consult CM/SW if discharge needs should arise.    Quita Gardiner RN BSN   Inpatient Care Coordination  [facility]   Phone [phone]

## 2022-03-02 NOTE — PLAN OF CARE
Goal Outcome Evaluation:  Pt is alert and oriented, up with standby assist in the room. Pt voiding without difficulty. Tele reading SR. Pt hopes to discharge to home today.

## 2022-03-02 NOTE — PLAN OF CARE
Pt now on po amiodarone, losartan, dilt, xarelto.  Gtt dc'd this AM. BPs are normalizing.  Pt is voiding, had BM-appearing normally x3 today.  Eating small amounts at each meal.  Up with SBA.  Likely discharge tomorrow.    Goal Outcome Evaluation: Progressing, reviewed with family and patient.

## 2022-03-03 ENCOUNTER — HOSPITAL ENCOUNTER (OUTPATIENT)
Facility: CLINIC | Age: 87
Setting detail: OBSERVATION
Discharge: HOME | End: 2022-03-05
Attending: EMERGENCY MEDICINE | Admitting: EMERGENCY MEDICINE
Payer: COMMERCIAL

## 2022-03-03 ENCOUNTER — PATIENT OUTREACH (OUTPATIENT)
Dept: CARE COORDINATION | Facility: CLINIC | Age: 87
End: 2022-03-03

## 2022-03-03 DIAGNOSIS — I10 ESSENTIAL HYPERTENSION: ICD-10-CM

## 2022-03-03 DIAGNOSIS — R00.1 SYMPTOMATIC BRADYCARDIA: ICD-10-CM

## 2022-03-03 DIAGNOSIS — L29.9 ITCHING: Primary | ICD-10-CM

## 2022-03-03 LAB
ANION GAP SERPL CALCULATED.3IONS-SCNC: 7 MMOL/L (ref 3–14)
BASOPHILS # BLD AUTO: 0.1 10E3/UL (ref 0–0.2)
BASOPHILS NFR BLD AUTO: 1 %
BUN SERPL-MCNC: 19 MG/DL (ref 7–30)
CALCIUM SERPL-MCNC: 9.2 MG/DL (ref 8.5–10.1)
CHLORIDE BLD-SCNC: 97 MMOL/L (ref 94–109)
CO2 SERPL-SCNC: 25 MMOL/L (ref 20–32)
CREAT SERPL-MCNC: 1.05 MG/DL (ref 0.52–1.04)
EOSINOPHIL # BLD AUTO: 0.1 10E3/UL (ref 0–0.7)
EOSINOPHIL NFR BLD AUTO: 1 %
ERYTHROCYTE [DISTWIDTH] IN BLOOD BY AUTOMATED COUNT: 13.6 % (ref 10–15)
GFR SERPL CREATININE-BSD FRML MDRD: 51 ML/MIN/1.73M2
GLUCOSE BLD-MCNC: 118 MG/DL (ref 70–99)
HCT VFR BLD AUTO: 36.7 % (ref 35–47)
HGB BLD-MCNC: 11.7 G/DL (ref 11.7–15.7)
HOLD SPECIMEN: NORMAL
HOLD SPECIMEN: NORMAL
IMM GRANULOCYTES # BLD: 0.1 10E3/UL
IMM GRANULOCYTES NFR BLD: 1 %
LYMPHOCYTES # BLD AUTO: 2.4 10E3/UL (ref 0.8–5.3)
LYMPHOCYTES NFR BLD AUTO: 23 %
MCH RBC QN AUTO: 30.9 PG (ref 26.5–33)
MCHC RBC AUTO-ENTMCNC: 31.9 G/DL (ref 31.5–36.5)
MCV RBC AUTO: 97 FL (ref 78–100)
MONOCYTES # BLD AUTO: 1.1 10E3/UL (ref 0–1.3)
MONOCYTES NFR BLD AUTO: 10 %
NEUTROPHILS # BLD AUTO: 7 10E3/UL (ref 1.6–8.3)
NEUTROPHILS NFR BLD AUTO: 64 %
NRBC # BLD AUTO: 0 10E3/UL
NRBC BLD AUTO-RTO: 0 /100
PLATELET # BLD AUTO: 239 10E3/UL (ref 150–450)
POTASSIUM BLD-SCNC: 4.3 MMOL/L (ref 3.4–5.3)
RBC # BLD AUTO: 3.79 10E6/UL (ref 3.8–5.2)
SODIUM SERPL-SCNC: 129 MMOL/L (ref 133–144)
TROPONIN I SERPL HS-MCNC: 16 NG/L
WBC # BLD AUTO: 10.8 10E3/UL (ref 4–11)

## 2022-03-03 PROCEDURE — 85025 COMPLETE CBC W/AUTO DIFF WBC: CPT | Performed by: EMERGENCY MEDICINE

## 2022-03-03 PROCEDURE — 36415 COLL VENOUS BLD VENIPUNCTURE: CPT | Performed by: EMERGENCY MEDICINE

## 2022-03-03 PROCEDURE — 84484 ASSAY OF TROPONIN QUANT: CPT | Performed by: EMERGENCY MEDICINE

## 2022-03-03 PROCEDURE — G0378 HOSPITAL OBSERVATION PER HR: HCPCS

## 2022-03-03 PROCEDURE — 250N000013 HC RX MED GY IP 250 OP 250 PS 637: Performed by: PHYSICIAN ASSISTANT

## 2022-03-03 PROCEDURE — 99285 EMERGENCY DEPT VISIT HI MDM: CPT | Mod: 25

## 2022-03-03 PROCEDURE — 99220 PR INITIAL OBSERVATION CARE,LEVEL III: CPT | Performed by: PHYSICIAN ASSISTANT

## 2022-03-03 PROCEDURE — 258N000003 HC RX IP 258 OP 636: Performed by: PHYSICIAN ASSISTANT

## 2022-03-03 PROCEDURE — 80048 BASIC METABOLIC PNL TOTAL CA: CPT | Performed by: EMERGENCY MEDICINE

## 2022-03-03 PROCEDURE — 93005 ELECTROCARDIOGRAM TRACING: CPT

## 2022-03-03 RX ORDER — PROCHLORPERAZINE MALEATE 5 MG
5 TABLET ORAL EVERY 6 HOURS PRN
Status: DISCONTINUED | OUTPATIENT
Start: 2022-03-03 | End: 2022-03-05 | Stop reason: HOSPADM

## 2022-03-03 RX ORDER — DOXYCYCLINE 100 MG/1
100 CAPSULE ORAL 2 TIMES DAILY
Status: DISCONTINUED | OUTPATIENT
Start: 2022-03-03 | End: 2022-03-05 | Stop reason: HOSPADM

## 2022-03-03 RX ORDER — PROCHLORPERAZINE 25 MG
12.5 SUPPOSITORY, RECTAL RECTAL EVERY 12 HOURS PRN
Status: DISCONTINUED | OUTPATIENT
Start: 2022-03-03 | End: 2022-03-05 | Stop reason: HOSPADM

## 2022-03-03 RX ORDER — ONDANSETRON 4 MG/1
4 TABLET, ORALLY DISINTEGRATING ORAL EVERY 8 HOURS PRN
Status: DISCONTINUED | OUTPATIENT
Start: 2022-03-03 | End: 2022-03-05 | Stop reason: HOSPADM

## 2022-03-03 RX ORDER — NIACIN 250 MG
500 TABLET ORAL
Status: DISCONTINUED | OUTPATIENT
Start: 2022-03-04 | End: 2022-03-05 | Stop reason: HOSPADM

## 2022-03-03 RX ORDER — LEVOTHYROXINE SODIUM 25 UG/1
50 TABLET ORAL
Status: ON HOLD | COMMUNITY
End: 2022-03-05

## 2022-03-03 RX ORDER — ONDANSETRON 2 MG/ML
4 INJECTION INTRAMUSCULAR; INTRAVENOUS EVERY 6 HOURS PRN
Status: DISCONTINUED | OUTPATIENT
Start: 2022-03-03 | End: 2022-03-05 | Stop reason: HOSPADM

## 2022-03-03 RX ORDER — AMOXICILLIN 250 MG
2 CAPSULE ORAL 2 TIMES DAILY PRN
Status: DISCONTINUED | OUTPATIENT
Start: 2022-03-03 | End: 2022-03-05 | Stop reason: HOSPADM

## 2022-03-03 RX ORDER — LEVOTHYROXINE SODIUM 25 UG/1
25 TABLET ORAL
Status: ON HOLD | COMMUNITY
End: 2022-03-05

## 2022-03-03 RX ORDER — LEVOTHYROXINE SODIUM 25 UG/1
25 TABLET ORAL
Status: DISCONTINUED | OUTPATIENT
Start: 2022-03-04 | End: 2022-03-05 | Stop reason: HOSPADM

## 2022-03-03 RX ORDER — AMOXICILLIN 250 MG
1 CAPSULE ORAL 2 TIMES DAILY PRN
Status: DISCONTINUED | OUTPATIENT
Start: 2022-03-03 | End: 2022-03-05 | Stop reason: HOSPADM

## 2022-03-03 RX ORDER — LOSARTAN POTASSIUM 25 MG/1
25 TABLET ORAL DAILY
Status: DISCONTINUED | OUTPATIENT
Start: 2022-03-04 | End: 2022-03-04

## 2022-03-03 RX ORDER — ONDANSETRON 4 MG/1
4 TABLET, ORALLY DISINTEGRATING ORAL EVERY 6 HOURS PRN
Status: DISCONTINUED | OUTPATIENT
Start: 2022-03-03 | End: 2022-03-05 | Stop reason: HOSPADM

## 2022-03-03 RX ORDER — DILTIAZEM HYDROCHLORIDE 180 MG/1
180 CAPSULE, COATED, EXTENDED RELEASE ORAL DAILY
Status: DISCONTINUED | OUTPATIENT
Start: 2022-03-04 | End: 2022-03-04

## 2022-03-03 RX ADMIN — DOXYCYCLINE HYCLATE 100 MG: 100 CAPSULE ORAL at 23:27

## 2022-03-03 RX ADMIN — RIVAROXABAN 20 MG: 20 TABLET, FILM COATED ORAL at 23:27

## 2022-03-03 RX ADMIN — SODIUM CHLORIDE, POTASSIUM CHLORIDE, SODIUM LACTATE AND CALCIUM CHLORIDE 1000 ML: 600; 310; 30; 20 INJECTION, SOLUTION INTRAVENOUS at 23:27

## 2022-03-03 ASSESSMENT — ENCOUNTER SYMPTOMS
FEVER: 0
SHORTNESS OF BREATH: 0

## 2022-03-03 NOTE — PROGRESS NOTES
Clinic Care Coordination Contact  [facility]/Voicemail       Clinical Data: Care Coordinator Outreach  Outreach attempted x 1.  Left message on patient's voicemail with call back information and requested return call.    Plan: Care Coordinator will try to reach patient again in 1-2 business days.    BREE Godinez. Public Health  Community Health Worker  [place] [facility]  Clinic Care Coordination  [phone]

## 2022-03-04 LAB
ATRIAL RATE - MUSE: 46 BPM
DIASTOLIC BLOOD PRESSURE - MUSE: NORMAL MMHG
ERYTHROCYTE [DISTWIDTH] IN BLOOD BY AUTOMATED COUNT: 13.5 % (ref 10–15)
HCT VFR BLD AUTO: 32.9 % (ref 35–47)
HGB BLD-MCNC: 10.8 G/DL (ref 11.7–15.7)
INTERPRETATION ECG - MUSE: NORMAL
MAGNESIUM SERPL-MCNC: 1.9 MG/DL (ref 1.6–2.3)
MCH RBC QN AUTO: 31 PG (ref 26.5–33)
MCHC RBC AUTO-ENTMCNC: 32.8 G/DL (ref 31.5–36.5)
MCV RBC AUTO: 95 FL (ref 78–100)
P AXIS - MUSE: 53 DEGREES
PLATELET # BLD AUTO: 206 10E3/UL (ref 150–450)
POTASSIUM BLD-SCNC: 4.2 MMOL/L (ref 3.4–5.3)
PR INTERVAL - MUSE: 90 MS
QRS DURATION - MUSE: 72 MS
QT - MUSE: 450 MS
QTC - MUSE: 393 MS
R AXIS - MUSE: 18 DEGREES
RBC # BLD AUTO: 3.48 10E6/UL (ref 3.8–5.2)
SYSTOLIC BLOOD PRESSURE - MUSE: NORMAL MMHG
T AXIS - MUSE: 30 DEGREES
VENTRICULAR RATE- MUSE: 46 BPM
WBC # BLD AUTO: 8.7 10E3/UL (ref 4–11)

## 2022-03-04 PROCEDURE — 250N000013 HC RX MED GY IP 250 OP 250 PS 637: Performed by: INTERNAL MEDICINE

## 2022-03-04 PROCEDURE — 85027 COMPLETE CBC AUTOMATED: CPT | Performed by: PHYSICIAN ASSISTANT

## 2022-03-04 PROCEDURE — 84132 ASSAY OF SERUM POTASSIUM: CPT | Performed by: HOSPITALIST

## 2022-03-04 PROCEDURE — 250N000011 HC RX IP 250 OP 636: Performed by: PHYSICIAN ASSISTANT

## 2022-03-04 PROCEDURE — 99214 OFFICE O/P EST MOD 30 MIN: CPT | Performed by: INTERNAL MEDICINE

## 2022-03-04 PROCEDURE — 36415 COLL VENOUS BLD VENIPUNCTURE: CPT | Performed by: HOSPITALIST

## 2022-03-04 PROCEDURE — 99225 PR SUBSEQUENT OBSERVATION CARE,LEVEL II: CPT | Performed by: PHYSICIAN ASSISTANT

## 2022-03-04 PROCEDURE — 83735 ASSAY OF MAGNESIUM: CPT | Performed by: HOSPITALIST

## 2022-03-04 PROCEDURE — 250N000013 HC RX MED GY IP 250 OP 250 PS 637: Performed by: PHYSICIAN ASSISTANT

## 2022-03-04 PROCEDURE — 250N000013 HC RX MED GY IP 250 OP 250 PS 637: Performed by: HOSPITALIST

## 2022-03-04 PROCEDURE — G0378 HOSPITAL OBSERVATION PER HR: HCPCS

## 2022-03-04 PROCEDURE — 36415 COLL VENOUS BLD VENIPUNCTURE: CPT | Performed by: PHYSICIAN ASSISTANT

## 2022-03-04 PROCEDURE — 250N000013 HC RX MED GY IP 250 OP 250 PS 637

## 2022-03-04 RX ORDER — LOSARTAN POTASSIUM 50 MG/1
50 TABLET ORAL DAILY
Status: DISCONTINUED | OUTPATIENT
Start: 2022-03-04 | End: 2022-03-05 | Stop reason: HOSPADM

## 2022-03-04 RX ORDER — HYDROXYZINE HYDROCHLORIDE 25 MG/1
25 TABLET, FILM COATED ORAL EVERY 6 HOURS PRN
Status: DISCONTINUED | OUTPATIENT
Start: 2022-03-04 | End: 2022-03-05 | Stop reason: HOSPADM

## 2022-03-04 RX ORDER — AMIODARONE HYDROCHLORIDE 200 MG/1
200 TABLET ORAL DAILY
Status: DISCONTINUED | OUTPATIENT
Start: 2022-03-04 | End: 2022-03-05 | Stop reason: HOSPADM

## 2022-03-04 RX ADMIN — Medication 1 MG: at 22:59

## 2022-03-04 RX ADMIN — DOXYCYCLINE HYCLATE 100 MG: 100 CAPSULE ORAL at 10:02

## 2022-03-04 RX ADMIN — LOSARTAN POTASSIUM 50 MG: 50 TABLET, FILM COATED ORAL at 10:02

## 2022-03-04 RX ADMIN — HYDROXYZINE HYDROCHLORIDE 25 MG: 25 TABLET, FILM COATED ORAL at 10:49

## 2022-03-04 RX ADMIN — AMIODARONE HYDROCHLORIDE 200 MG: 200 TABLET ORAL at 10:02

## 2022-03-04 RX ADMIN — RIVAROXABAN 20 MG: 20 TABLET, FILM COATED ORAL at 18:18

## 2022-03-04 RX ADMIN — Medication 500 MG: at 10:02

## 2022-03-04 RX ADMIN — LEVOTHYROXINE SODIUM 25 MCG: 0.03 TABLET ORAL at 10:09

## 2022-03-04 RX ADMIN — DOXYCYCLINE HYCLATE 100 MG: 100 CAPSULE ORAL at 20:08

## 2022-03-04 RX ADMIN — ONDANSETRON 4 MG: 4 TABLET, ORALLY DISINTEGRATING ORAL at 10:13

## 2022-03-04 NOTE — PROGRESS NOTES
Clinic Care Coordination Contact    Upon chart review, patient was re-admitted on 3/3/2022. A new referral to primary care coordination may be placed upon discharge. No further CC outreaches at this time.     Shawnee Rust RN Care Coordinator  [facility]  Email: [email or web address]  Phone: [phone]

## 2022-03-04 NOTE — CONSULTS
Consult Date: 03/04/2022    REFERRING PHYSICIAN:  Hospitalist Service.    INDICATION FOR CARDIAC CONSULTATION:  Symptomatic bradycardia.    Dear Doctor:     It is my pleasure to see your patient, Jeannie Chu, in consultation.  This patient was seen by me approximately 4 days ago in consultation.  This patient with a history of recurrent symptomatic paroxysmal atrial fibrillation.  When I saw her at that time, the patient had spontaneously converted back to sinus rhythm.  She was given intravenous loading dose over 24 hours of amiodarone and was then discharged on 400 mg twice a day loading protocol with amiodarone for 8 days, then 400 mg per day for 3 weeks, then 200 mg per day.  She was also given diltiazem long-acting 120 mg per day for rate control should she go back into atrial fibrillation.  She did relatively well, but then yesterday she became markedly fatigued and tired, such that she could not really walk and had to use a wheelchair.  It was noted that her heart rate was 47 beats per minute.  Typically, heart rates in the 40s would not cause a marked degree of weakness and fatigue.  The amiodarone medication was stopped and the patient was then placed on diltiazem 180 mg per day.  Her heart rate has risen from 42, now is in the 66 range, and her apical rate this morning was actually 76 beats per minute.  Her blood pressure has risen to 145/52.  She feels somewhat better this morning.    PAST MEDICAL HISTORY:     1.  Paroxysmal atrial fibrillation as described above.  2.  Prior history of stenting of the left anterior descending artery.  3.  Hypothyroidism.  4.  Hyperlipidemia.    PAST SURGICAL HISTORY:  No surgical history in the past.    FAMILY HISTORY:  There is no history of atrial fibrillation noted.    SOCIAL HISTORY:  She is visiting from [place].  Her daughter lives here in the [place] area.  She does not smoke or drink.    MEDICATIONS:  1.  Diltiazem 180 mg per day.  2.  Doxycycline 100 mg  per day.  3.  Levothyroxine 25 mg orally 6 times weekly.  4.  Losartan 25 mg per day.  5.  Niacin 500 mg per day.  6.  Rivaroxaban 20 mg per day.    ALLERGIES:  IBUPROFEN.    REVIEW OF SYSTEMS:    CONSTITUTIONAL:  Tiredness.  EYES:  Negative.  ENT:  Negative.  CARDIOVASCULAR:  As above.  RESPIRATORY:  Nil.  GASTROINTESTINAL:  Nil.  GENITOURINARY:  Nil.  MUSCULOSKELETAL:  Nil.   NEUROLOGICAL:  Nil.  PSYCHIATRIC:  Nil.  ENDOCRINE:  Nil.   HEMATOLOGIC LYMPHATIC:  Nil.   ALLERGY IMMUNOLOGY:  Nil.    PHYSICAL EXAMINATION:    GENERAL:  She is a pleasant patient in no apparent distress.  VITAL SIGNS:  Her blood pressure this morning is 145/52, her pulse is 66 beats per minute, sinus rhythm, though when I examine the patient, she had a heart rate of 78 beats per minute, regular in rhythm and volume consistent with sinus rhythm.  Temperature is 97.5, O2 sats are 99%.   HEAD, EYES, NECK, NOSE AND THROAT:  Unremarkable.  She has normal facial symmetry.  Her pupils are equal.  Her dentition is good.  Jugular venous pulse is not raised.    HEART:  Her carotids are normal with no bruits.  Her apex beat is not displaced.  Heart sounds 1 and 2 normal with no murmurs.  CHEST:  Clear to percussion and auscultation with no added sounds.  ABDOMEN:  Unremarkable with no organomegaly, no masses or bruits.  EXTREMITIES:  Pedal pulses are 2+.  No peripheral edema.  NEUROLOGIC:  She moves all 4 limbs appropriately with no obvious sensory motor loss and she is fully orientated to time, place and person with a pleasant affect.    LABORATORY INVESTIGATIONS:  Sodium is 129, potassium is 4.3, BUN is 19, creatinine is 1.05, troponin normal.  White cell count 10.8, hemoglobin is 11.7, platelet count is 239.     DIAGNOSTIC DATA:  A 12-lead electrocardiogram actually shows junctional rhythm with a ventricular rate of 46 beats per minute.  QTc is 393.  The computer called with a short WA interval in sinus rhythm which is not correct.    IMPRESSION:   Fatigue and tiredness.  The most likely cause for this is her heart rate being in the mid 40s, although typically people would not be that fatigued.    Possibly in this patient, the loading protocol, which is actually lower than typical being 400 twice a day for 8 days, may have been too much for her, and she might be quite sensitive to this medication.  The combination of the diltiazem and the amiodarone may have slowed her heart rate down significantly.    PLAN:  We will reintroduce the amiodarone, which is a very good drug to maintain her in sinus rhythm, at a much lower dose, and we will just continue on 200 mg per day, and hopefully, at this lower dose that she will not develop bradycardia nor recurrence of atrial fibrillation.  I will stop the diltiazem altogether.  The losartan medication can be adjusted to improve her blood pressure.  The patient does have a followup visit with our clinic and we can reintroduce a lower dose of Cardizem or even beta blockers as necessary or possibly amiodarone alone will be sufficient.    I would observe the patient overnight on the reintroduction of amiodarone at low dose to ensure that she has no recurrence of her atrial fibrillation to make sure that she in particular does not have recurrence of bradycardia.  Occasionally, some patients just cannot tolerate amiodarone and it is not the ideal medication for them.    Sandro Berman MD, [facility]        D: 2022   T: 2022   MT: OMEGA    Name:     CORA WAY  MRN:      [id number]        Account:      [id number]   :      1935           Consult Date: 2022     Document: [id number]

## 2022-03-04 NOTE — PHARMACY-ADMISSION MEDICATION HISTORY
Admission medication history interview status for this patient is complete. See [facility] admission navigator for allergy information, prior to admission medications and immunization status.     Medication history interview done, indicate source(s): Family, daughter Marya Malhotra [phone]  Medication history resources (including written lists, pill bottles, clinic record):EPIC    Changes made to PTA medication list:  Added: none  Changed: Levothyroxine  Removed: none    Medication reconciliation/reorder completed by provider prior to medication history?  N     Prior to Admission medications    Medication Sig Last Dose Taking? Auth Provider   amiodarone (PACERONE) 400 MG tablet Take 1 tablet (400 mg) by mouth 2 times daily for 7 days 3/3/2022 at am Yes Ari Ramos MD   atorvastatin (LIPITOR) 10 MG tablet Take 1 tablet (10 mg) by mouth daily 3/3/2022 at am Yes Germán Shin MD   diltiazem ER COATED BEADS (CARDIZEM CD/CARTIA XT) 180 MG 24 hr capsule Take 1 capsule (180 mg) by mouth daily 3/3/2022 at am Yes Ari Ramos MD   doxycycline hyclate (VIBRAMYCIN) 100 MG capsule Take 100 mg by mouth 2 times daily For 14 days 3/3/2022 at am Yes Unknown, Entered By History   IRON (FERROUS GLUCONATE) 325 MG OR TABS Take 1 tablet by mouth daily  3/3/2022 at Unknown time Yes Joanna Garcia MD   levothyroxine (SYNTHROID/LEVOTHROID) 25 MCG tablet Take 50 mcg by mouth every 7 days Wednesdays 3/2/2022 at am Yes Unknown, Entered By History   levothyroxine (SYNTHROID/LEVOTHROID) 25 MCG tablet Take 25 mcg by mouth six times a week 3/3/2022 at am Yes Unknown, Entered By History   losartan (COZAAR) 25 MG tablet Take 1 tablet (25 mg) by mouth daily 3/3/2022 at Unknown time Yes Ari Ramos MD   Multiple Vitamins-Minerals (ICAPS AREDS FORMULA PO) Take 2 tablets by mouth daily  3/3/2022 at Unknown time Yes Reported, Patient   Multiple Vitamins-Minerals (MULTIVITAMIN ADULT) CHEW Take 2 chew tab by mouth daily 3/3/2022  at Unknown time Yes Unknown, Entered By History   niacin 500 MG tablet Take by mouth daily (with breakfast) 3/3/2022 at Unknown time Yes Reported, Patient   nitroGLYcerin (NITROSTAT) 0.4 MG sublingual tablet For chest pain place 1 tablet under the tongue every 5 minutes for 3 doses. If symptoms persist 5 minutes after 1st dose call 911.  Yes Ese Avendano MD   ondansetron (ZOFRAN-ODT) 4 MG ODT tab Take 1 tablet (4 mg) by mouth every 8 hours as needed for nausea or vomiting  Yes Ari Ramos MD   rivaroxaban ANTICOAGULANT (XARELTO) 20 MG TABS tablet Take 1 tablet (20 mg) by mouth daily (with dinner) 3/2/2022 at dinner Yes Germán Shin MD   amiodarone (PACERONE) 200 MG tablet Take 1 tablet (200 mg) by mouth daily   Ari Ramos MD   amiodarone (PACERONE) 400 MG tablet Take 1 tablet (400 mg) by mouth daily for 21 days   Ari Ramos MD

## 2022-03-04 NOTE — PLAN OF CARE
Goal Outcome Evaluation:              Addendum              Goal Outcome Evaluation:     1. Ruled out acute coronary syndrome (negative or stable Troponin):  Yes  2. Pain Status: Pain free.  3. Appropriate provocative testing performed: N/A  - Stress Test Procedure: NA  - Interpretation of cardiac rhythm per telemetry tech: SR     4. Cleared by Consultants (if applicable):No- followed by cardiology  5. Return to near baseline physical activity: Yes  Discharge Planner Nurse   Safe discharge environment identified: Yes  Barriers to discharge: No       Entered by: Tiffanie Dwyer 03/04/2022 0830     Please review provider order for any additional goals.   Nurse to notify provider when observation goals have been met and patient is ready for discharge.  Pt is a/o, up SBA. VSS. Cardiology following. Appetite good. Voiding.Gave Atarax  for episode itchiness.   Plan to monitor overnight. Discharge home tomorrow with family.

## 2022-03-04 NOTE — PLAN OF CARE
Goal Outcome Evaluation:    PRIMARY DIAGNOSIS: CHEST PAIN  OUTPATIENT/OBSERVATION GOALS TO BE MET BEFORE DISCHARGE:  1. Ruled out acute coronary syndrome (negative or stable Troponin):  Yes  2. Pain Status: Pain free.  3. Appropriate provocative testing performed: N/A  - Stress Test Procedure: NA  - Interpretation of cardiac rhythm per telemetry tech: SR    4. Cleared by Consultants (if applicable):No- followed by cardiology  5. Return to near baseline physical activity: Yes  Discharge Planner Nurse   Safe discharge environment identified: Yes  Barriers to discharge: No       Entered by: Tiffanie Dwyer 03/04/2022 0830     Please review provider order for any additional goals.   Nurse to notify provider when observation goals have been met and patient is ready for discharge.  Pt is a/o, up SBA. VSS. Cardiology following- they decreased doses of medications.Appetite good. Voiding.  Plan to monitor overnight. Discharge home tomorrow with family.

## 2022-03-04 NOTE — PROGRESS NOTES
Cardiology consult dictated.  87-year-old patient with a history of recurrent paroxysmal rapid atrial fibrillation who was discharged approximately 2 days ago on loading oral protocol of amiodarone and on diltiazem 120 mg/day long-acting who then yesterday developed marked fatigue and tiredness.  Heart rate was in the mid 40s.  EKG: Sinus rhythm but in fact the CT interval was varying and the P wave was actually coalescing with the QRS of this in fact was a junctional rhythm with a narrow QRS.  The amiodarone was stopped and the diltiazem was continued.  We do know that even when the patient goes into atrial fibrillation that the diltiazem does not control the heart rate.  I will restart the amiodarone at low-dose 200 mg/day.  I will not restart the diltiazem and if the blood pressure goes up the losartan can be increased.  I would keep the patient overnight for 24 hours to ensure that the low-dose amiodarone does not cause undue bradycardia.  Hopefully this low-dose amiodarone will keep her in sinus rhythm.  We will follow

## 2022-03-04 NOTE — ED TRIAGE NOTES
Family states pt seen and admitted at this facility within past 14 days for Atrial Fibrillation with RVR. Pt was placed on amiodarone during that stay and told to continue amiodarone until follow up with cardiology. Today pt c/o lightheadedness and family noted heart rate of 47 pta. ABCs intact GCS 15

## 2022-03-04 NOTE — PLAN OF CARE
Goal Outcome Evaluation:    1. Ruled out acute coronary syndrome (negative or stable Troponin):  Yes  2. Pain Status: Pain free.  3. Appropriate provocative testing performed: N/A  - Stress Test Procedure: NA  - Interpretation of cardiac rhythm per telemetry tech: SR    4. Cleared by Consultants (if applicable):No- followed by cardiology  5. Return to near baseline physical activity: Yes  Discharge Planner Nurse   Safe discharge environment identified: Yes  Barriers to discharge: No       Entered by: Tiffanie Dwyer 03/04/2022 0830     Please review provider order for any additional goals.   Nurse to notify provider when observation goals have been met and patient is ready for discharge.  Pt is a/o, up SBA. VSS. Cardiology following. Appetite good. Voiding.Gave Zofran for episode nausea.   Plan to monitor overnight. Discharge home tomorrow with family.

## 2022-03-04 NOTE — PROGRESS NOTES
Care Management Discharge Note    Discharge Date: 03/05/2022       Additional Information:  Patient has [facility] PCP. SW will order [facility] if flagged.     CARA Alvarado, SW  , ED Care Management     Amanda Cevallos

## 2022-03-04 NOTE — PROGRESS NOTES
[facility]    Medicine Progress Note - Hospitalist Service    Date of Admission:  3/3/2022    Assessment & Plan          Jeannie Chu is a 87 year old female with a PMHx significant for CAD, HTN, HLP, hypothyroidism, and PAF with recent admission for afib with RVR and started on amiodarone, who was admitted on 3/3/2022 with symptomatic bradycardia. She was previously managed on diltiazem and digoxin. She was admitted on 2/27 for afib with RVR and started on IV amiodarone. She converted to SR and was transitioned to oral amiodarone with taper plan. She was discharged on 3/2 then presented to PCP on 3/3 with symptomatic bradycardia.    #Symptomatic bradycardia-likely drug-induced given amiodarone with concomitant diltiazem  -amiodarone held on admission, Dilt continued  -Monitor on telemetry, HRs improved  -Cardiology consulted, recommending to stop diltiazem and start low dose Amiodarone.   -will monitor response overnight, continue tele     #Atrial fibrillation with RVR-again currently sinus  -Cardiology felt she was in a junctional rhythm on presentation  -low dose Amiodarone started per cardiology  -Continue Xarelto for anticoagulation     #Hypertension  -diltiazem stopped per above  -Losartan increased to 50 mg     #Hyponatremia-possibly due to decreased p.o. intake, and contributing to her weakness  -Clinically did look a little dry, improved  -received 500 ml of normal saline overnight  -recheck labs in AM     #Hyperlipidemia-continue statin  #Chalazion-continue PTA doxycycline     #Hypothyroidism-continue Synthroid.       Diet: Regular Diet Adult    DVT Prophylaxis: DOAC  Ramos Catheter: Not present  Central Lines: None  Cardiac Monitoring: None  Code Status: Full Code      Disposition Plan   Expected Discharge: 03/05/2022     Anticipated discharge location:  Awaiting care coordination huddle  Delays:     monitor tele with med change        The patient's care was discussed with the  "Attending Physician, Dr. Mcdaniel, Bedside Nurse, Patient and Cardiology Consultant.    Tiffany Almedia PA-C  Hospitalist Service  [facility]  Securely message with the [employer] Web Console (learn more here)  Text page via [employer] Paging/Directory         Clinically Significant Risk Factors Present on Admission         # Hyponatremia: Na = 129 mmol/L (Ref range: 133 - 144 mmol/L) on admission, will monitor as appropriate      # Coagulation Defect: home medication list includes an anticoagulant medication    # Overweight: Estimated body mass index is 28.86 kg/m  as calculated from the following:    Height as of 2/28/22: 1.575 m (5' 2\").    Weight as of 3/2/22: 71.6 kg (157 lb 12.8 oz).      ______________________________________________________________________    Interval History   Pt notes some nausea but denies chest pain, SOB and palpitations.     Data reviewed today: I reviewed all medications, new labs and imaging results over the last 24 hours. I personally reviewed no images or EKG's today.    Physical Exam   Vital Signs: Temp: 97.5  F (36.4  C) Temp src: Oral BP: (!) 145/52 Pulse: 66   Resp: 16 SpO2: 99 % O2 Device: Nasal cannula    Weight: 0 lbs 0 oz    GENERAL:  Comfortable.  PSYCH: pleasant, oriented, No acute distress.  HEENT:  Atraumatic, normocephalic. Normal conjunctiva, normal hearing, and oropharynx is normal.  NECK:  Supple, no neck vein distention  HEART:  Normal S1, S2 with no murmur, no pericardial rub, gallops or S3 or S4.  LUNGS:  Clear to auscultation, normal Respiratory effort. No wheezing, rales or ronchi.  GI:  Soft, normal bowel sounds. Non-tender, non distended.   EXTREMITIES:  No pedal edema, +2 pulses bilateral and equal.  SKIN:  Dry to touch, No rash, wound or ulcerations.  NEUROLOGIC:  Grossly intact    Data   Recent Labs   Lab 03/04/22 0521 03/03/22 2003 03/01/22  0656 02/28/22  0846 02/28/22  0639 02/27/22  1314 02/27/22  1205   WBC 8.7 10.8  --   --   --   --  " 11.2*   HGB 10.8* 11.7  --   --   --   --  13.1   MCV 95 97  --   --   --   --  97    239  --   --   --   --  243   NA  --  129* 137  --  140   < >  --    POTASSIUM  --  4.3 3.7  --  4.3   < >  --    CHLORIDE  --  97 105  --  108   < >  --    CO2  --  25 26  --  29   < >  --    BUN  --  19 12  --  19   < >  --    CR  --  1.05* 0.76  --  0.88   < >  --    ANIONGAP  --  7 6  --  3   < >  --    LYLE  --  9.2 8.3*  --  8.3*   < >  --    GLC  --  118* 92  --  78   < >  --    ALBUMIN  --   --   --  2.6*  --   --   --    PROTTOTAL  --   --   --  7.1  --   --   --    BILITOTAL  --   --   --  0.9  --   --   --    ALKPHOS  --   --   --  57  --   --   --    ALT  --   --   --  18  --   --   --    AST  --   --   --  25  --   --   --     < > = values in this interval not displayed.     No results found for this or any previous visit (from the past 24 hour(s)).  Medications       amiodarone  200 mg Oral Daily     doxycycline hyclate  100 mg Oral BID     levothyroxine  25 mcg Oral Once per day on Mon Tue Wed Thu Fri Sat     losartan  50 mg Oral Daily     niacin  500 mg Oral Daily with breakfast     rivaroxaban ANTICOAGULANT  20 mg Oral Daily with supper

## 2022-03-04 NOTE — PROGRESS NOTES
PRIMARY DIAGNOSIS: SYMPTOMATIC BRADYCARDIA  OUTPATIENT/OBSERVATION GOALS TO BE MET BEFORE DISCHARGE:  1. ADLs back to baseline: Yes    2. Activity and level of assistance: Up with standby assistance.    3. Pain status: Pain free.    4. Return to near baseline physical activity: Yes     Discharge Planner Nurse   Safe discharge environment identified: Yes  Barriers to discharge: Yes       Entered by: Nohemy Draper 03/04/2022 3:35 AM     Patient arrived at ED following a follow up visit with her primary physician after a 3 day stay here over the weekend and was lightheaded and dizzy, family checked heart rate which was in the 40's and brought her in for evaluation, patient is Aox4, SBA in her room, VS WNL, tolerating regular diet, up voiding during the night, IV LR infusing, tele monitor on and she's SR in the 60's, cardiology will be consulted in the AM about medication adjustments, no pain noted, sleeping comfortably, will continue to monitor and provide cares.     Please review provider order for any additional goals.   Nurse to notify provider when observation goals have been met and patient is ready for discharge.

## 2022-03-04 NOTE — ED NOTES
[facility]  ED Nurse Handoff Report    Jeannie Chu is a 87 year old female   ED Chief complaint: Bradycardia  . ED Diagnosis:   Final diagnoses:   Symptomatic bradycardia     Allergies:   Allergies   Allergen Reactions     Ibuprofen        Code Status: Full Code  Activity level - Baseline/Home:  Independent. Activity Level - Current:   Assist X 1. Lift room needed: No. Bariatric: No   Needed: No   Isolation: No. Infection: Not Applicable.     Vital Signs:   Vitals:    03/03/22 1947 03/03/22 2004 03/03/22 2006   BP: 137/62 136/64    Pulse: (!) 48 (!) 46 (!) 45   Resp: 20  15   Temp: 98  F (36.7  C)     TempSrc: Oral     SpO2: 99%         Cardiac Rhythm:  ,      Pain level:    Patient confused: No. Patient Falls Risk: Yes.   Elimination Status: Has voided   Patient Report - Initial Complaint: bradycardia. Focused Assessment: Jeannie Chu is a 87 year old female who presents with with daughter for increased weakness, lightheadedness and difficulty walking. Patient was recently admitted for atrial fibrillation with rapid response. While she was in the hospital she had amiodarone started. She was discharged yesterday felt good last evening. However today when she went for a follow-up appointment with her primary care provider she felt so weak and lightheaded that she could not walk and had to be put in a wheelchair. This prompted family to check her heart rate and blood pressure and identified her heart rate to be in the mid 40s. No other new pain symptoms no fevers no new symptoms since discharge from the hospital..      20:46 Respiratory JJ       Details:   Respiratory - Respiratory WDL: WDL          20:46 Cardiac JJ      Details:   Cardiac - Cardiac WDL: .WDL except (recent d/c from [facility] for Afib RVR. Denies CP, weakness complaints and fatigue)          Tests Performed: labs . Abnormal Results:   Labs Ordered and Resulted from Time of ED Arrival to Time of ED Departure   BASIC  METABOLIC PANEL - Abnormal       Result Value    Sodium 129 (*)     Potassium 4.3      Chloride 97      Carbon Dioxide (CO2) 25      Anion Gap 7      Urea Nitrogen 19      Creatinine 1.05 (*)     Calcium 9.2      Glucose 118 (*)     GFR Estimate 51 (*)    CBC WITH PLATELETS AND DIFFERENTIAL - Abnormal    WBC Count 10.8      RBC Count 3.79 (*)     Hemoglobin 11.7      Hematocrit 36.7      MCV 97      MCH 30.9      MCHC 31.9      RDW 13.6      Platelet Count 239      % Neutrophils 64      % Lymphocytes 23      % Monocytes 10      % Eosinophils 1      % Basophils 1      % Immature Granulocytes 1      NRBCs per 100 WBC 0      Absolute Neutrophils 7.0      Absolute Lymphocytes 2.4      Absolute Monocytes 1.1      Absolute Eosinophils 0.1      Absolute Basophils 0.1      Absolute Immature Granulocytes 0.1      Absolute NRBCs 0.0     TROPONIN I - Normal    Troponin I High Sensitivity 16       .   Treatments provided: frequent vs  Family Comments: dtr at bedside  OBS brochure/video discussed/provided to patient:  No  ED Medications: Medications - No data to display  Drips infusing:  No  For the majority of the shift, the patient's behavior Green. Interventions performed were n/a.    Sepsis treatment initiated: No     Patient tested for COVID 19 prior to admission: YES    ED Nurse Name/Phone Number: Kathya Velazquez RN,   8:47 PM  RECEIVING UNIT ED HANDOFF REVIEW    Above ED Nurse Handoff Report was reviewed: Yes  Reviewed by: Nohemy Draper RN on March 3, 2022 at 10:16 PM

## 2022-03-04 NOTE — H&P
[facility]  Admission History and Physical Examination    NAME: Jeannie Chu   : 1935   MRN: [id number]     Date of Admission:  3/3/2022    Assessment & Plan   Jeannie Chu is a 87 year old female who just discharged from [facility] yesterday after being admitted for recurrent A. fib with RVR in the setting of hypertension, coronary disease hypothyroidism and dyslipidemia.  During her most recent admission on , she was placed on IV amiodarone and ultimately was transitioned to oral taper dosing.  She had converted back to sinus rhythm with good vital signs and discharged home yesterday with amiodarone 400 mg twice daily for 7 days (taper), 25 mg of losartan, and 180 of diltiazem.  Today she went to see her PCP and she was feeling so weak and lightheaded that she couldn't even stand up.  When they checked her vitals, she was noted to be bradycardic in the mid 40s and was instructed come into the emergency room.  Work-up emergency room has been fairly unremarkable. She does have mild hyponatremia with a sodium 129. Otherwise blood sugar okay and CBC unremarkable.  EKG shows sinus bradycardia with heart rate in the mid 40s. As she was still somewhat symptomatic and dizzy, I was asked to admit her to the hospital for observation.    #Symptomatic bradycardia-likely drug-induced given amiodarone with concomitant diltiazem.  -Currently her heart rate has already improved into the mid 60s.  -We will hold her amiodarone for this evening  -Monitor on telemetry  -We will touch base with cardiology tomorrow and see if they want to bring her to 400 mg daily versus 200 mg twice daily dosing of the amiodarone and versus also consideration for decreasing the dose of diltiazem    #Atrial fibrillation with RVR-again currently sinus  -Evening amiodarone dose on hold for today and for tomorrow a.m. till discussion with cardiology  -Heart rates already now in the 60s, blood pressure is  somewhat elevated, okay to resume diltiazem tomorrow morning if within heart rate and blood pressure are within parameters  -Continue Xarelto for anticoagulation    #Hypertension  -Blood pressure slightly elevated, she already took her diltiazem and losartan for the day  -We will resume both medication with parameters    #Hyponatremia-possibly due to decreased p.o. intake, and contributing to her weakness  -Clinically did look a little dry  -We will give her 500 cc of normal saline overnight    #Hyperlipidemia-resume statin  #Chalazion-resume PTA doxycycline    #Hypothyroidism-resume Synthroid.    Awaiting formal pharmacy reconciliation to resume home medications.     DVT Prophylaxis: Low Risk/Ambulatory with no VTE prophylaxis indicated  Code Status: Full Code  COVID PCR TESTING STATUS: Pending, Asymptomatic. No precautions.   Family updated with plan of care: dtr at bedside.    Expected Discharge:  1 day   Anticipated discharge location:  Awaiting care coordination huddle  Delays:     none       FRANCISCO MosleyC    Primary Care Physician   Diana Hilario    Chief Complaint   Weakness dizziness    History is obtained from the patient    Discussed with Dr. Mena in the ED, full chart review including lab work, imaging, and vital signs were reviewed.     History of Present Illness   Jeannie Chu is a 87 year old female who just discharged from [facility] yesterday after being admitted for recurrent A. fib with RVR in the setting of hypertension, coronary disease hypothyroidism and dyslipidemia.  During her most recent admission on 2/27, she was placed on IV amiodarone and ultimately was transitioned to oral taper dosing.  She had converted back to sinus rhythm with good vital signs and discharged home yesterday with amiodarone 400 mg twice daily for 7 days (taper), 25 mg of losartan, and 180 of diltiazem.  Today she went to see her PCP and she was feeling so weak and  lightheaded that she couldn't even stand up.  When they checked her vitals, she was noted to be bradycardic in the mid 40s and was instructed come into the emergency room.  Work-up emergency room has been fairly unremarkable. She does have mild hyponatremia with a sodium 129. Otherwise blood sugar okay and CBC unremarkable.  EKG shows sinus bradycardia with heart rate in the mid 40s. As she was still somewhat symptomatic and dizzy, I was asked to admit her to the hospital for observation.      Past Medical History    I have reviewed this patient's medical history and updated it with pertinent information if needed.   Past Medical History:   Diagnosis Date     Acquired hypothyroidism 5/27/2021     Coronary artery disease involving native coronary artery of native heart without angina pectoris 5/26/2021     Other and unspecified hyperlipidemia      Unspecified cardiovascular disease 1999    stent placed       Past Surgical History   I have reviewed this patient's surgical history and updated it with pertinent information if needed.  No past surgical history on file.    Prior to Admission Medications   Prior to Admission Medications   Prescriptions Last Dose Informant Patient Reported? Taking?   IRON (FERROUS GLUCONATE) 325 MG OR TABS   Yes No   Sig: Take 1 tablet by mouth daily    Multiple Vitamins-Minerals (ICAPS AREDS FORMULA PO)   Yes No   Sig: Take 2 tablets by mouth daily    Multiple Vitamins-Minerals (MULTIVITAMIN ADULT) CHEW   Yes No   Sig: Take 2 chew tab by mouth daily   amiodarone (PACERONE) 200 MG tablet   No No   Sig: Take 1 tablet (200 mg) by mouth daily   amiodarone (PACERONE) 400 MG tablet   No No   Sig: Take 1 tablet (400 mg) by mouth daily for 21 days   amiodarone (PACERONE) 400 MG tablet   No No   Sig: Take 1 tablet (400 mg) by mouth 2 times daily for 7 days   atorvastatin (LIPITOR) 10 MG tablet   No No   Sig: Take 1 tablet (10 mg) by mouth daily   diltiazem ER COATED BEADS (CARDIZEM CD/CARTIA XT)  180 MG 24 hr capsule   No No   Sig: Take 1 capsule (180 mg) by mouth daily   doxycycline hyclate (VIBRAMYCIN) 100 MG capsule   Yes No   Sig: Take 100 mg by mouth 2 times daily For 14 days   levothyroxine (SYNTHROID/LEVOTHROID) 25 MCG tablet   No No   Sig: Take 1.5 tablets (37.5 mcg) by mouth daily   losartan (COZAAR) 25 MG tablet   No No   Sig: Take 1 tablet (25 mg) by mouth daily   niacin 500 MG tablet   Yes No   Sig: Take by mouth daily (with breakfast)   nitroGLYcerin (NITROSTAT) 0.4 MG sublingual tablet   No No   Sig: For chest pain place 1 tablet under the tongue every 5 minutes for 3 doses. If symptoms persist 5 minutes after 1st dose call 911.   ondansetron (ZOFRAN-ODT) 4 MG ODT tab   No No   Sig: Take 1 tablet (4 mg) by mouth every 8 hours as needed for nausea or vomiting   rivaroxaban ANTICOAGULANT (XARELTO) 20 MG TABS tablet   No No   Sig: Take 1 tablet (20 mg) by mouth daily (with dinner)      Facility-Administered Medications: None     Allergies   Allergies   Allergen Reactions     Ibuprofen        Social History   I have reviewed this patient's social history and updated it with pertinent information if needed. Jeannie Chu  reports that she has never smoked. She has never used smokeless tobacco. She reports that she does not drink alcohol and does not use drugs.    Family History   I have reviewed this patient's family history and updated it with pertinent information if needed.   Family History   Problem Relation Age of Onset     Family History Negative Other        Review of Systems   The 10 point Review of Systems is negative other than noted in the HPI or here.     Physical Exam   Temp: 98  F (36.7  C) Temp src: Oral BP: (!) 156/80 Pulse: 68   Resp: 15 SpO2: 99 %      Vital Signs with Ranges  Temp:  [98  F (36.7  C)] 98  F (36.7  C)  Pulse:  [42-68] 68  Resp:  [12-23] 15  BP: (136-156)/(62-80) 156/80  SpO2:  [99 %] 99 %  0 lbs 0 oz    Constitutional: Awake, alert,  no apparent distress.  Eyes:  Conjunctiva and pupils examined and normal.  HEENT: Moist mucous membranes, normal dentition.  Respiratory: Clear to auscultation bilaterally, no crackles or wheezing.  Cardiovascular: Regular rate and rhythm, normal S1 and S2, and no murmur noted.  GI: Soft, non-distended, non-tender, bowel sounds present. No rebound tenderness or guarding.  Lymph/Hematologic: No anterior cervical or supraclavicular adenopathy.  Skin: No rashes, no cyanosis, no edema.  Musculoskeletal: No deformities noted.  No erythema or tenderness. Moving all extremities.  Neurologic: No focal deficits noted. Speech is clear. Coordination and strength grossly normal.   Psychiatric: Appropriate affect.    Data   Data reviewed today:      EKG: Sinus   Imaging: No results found for this or any previous visit (from the past 24 hour(s)).    Recent Labs   Lab 03/03/22 2003 03/01/22  0656 02/28/22  0846 02/28/22  0639 02/27/22  1314 02/27/22  1205   WBC 10.8  --   --   --   --  11.2*   HGB 11.7  --   --   --   --  13.1   MCV 97  --   --   --   --  97     --   --   --   --  243   * 137  --  140   < >  --    POTASSIUM 4.3 3.7  --  4.3   < >  --    CHLORIDE 97 105  --  108   < >  --    CO2 25 26  --  29   < >  --    BUN 19 12  --  19   < >  --    CR 1.05* 0.76  --  0.88   < >  --    ANIONGAP 7 6  --  3   < >  --    LYLE 9.2 8.3*  --  8.3*   < >  --    * 92  --  78   < >  --    ALBUMIN  --   --  2.6*  --   --   --    PROTTOTAL  --   --  7.1  --   --   --    BILITOTAL  --   --  0.9  --   --   --    ALKPHOS  --   --  57  --   --   --    ALT  --   --  18  --   --   --    AST  --   --  25  --   --   --     < > = values in this interval not displayed.           Ana Lilia Hernandez PA-C  [facility] Medicine  March 3, 2022  Securely message with the [employer] Web Console (learn more here)  Text page via [employer] Paging/Directory

## 2022-03-04 NOTE — ED PROVIDER NOTES
History     Chief Complaint:    Bradycardia       HPI   Jeannie Chu is a 87 year old female who presents with with daughter for increased weakness, lightheadedness and difficulty walking. Patient was recently admitted for atrial fibrillation with rapid response. While she was in the hospital she had amiodarone started. She was discharged yesterday felt good last evening. However today when she went for a follow-up appointment with her primary care provider she felt so weak and lightheaded that she could not walk and had to be put in a wheelchair. This prompted family to check her heart rate and blood pressure and identified her heart rate to be in the mid 40s. No other new pain symptoms no fevers no new symptoms since discharge from the hospital..    Allergies:  Ibuprofen     Medications:    [START ON 3/30/2022] amiodarone (PACERONE) 200 MG tablet  [START ON 3/9/2022] amiodarone (PACERONE) 400 MG tablet  amiodarone (PACERONE) 400 MG tablet  atorvastatin (LIPITOR) 10 MG tablet  diltiazem ER COATED BEADS (CARDIZEM CD/CARTIA XT) 180 MG 24 hr capsule  doxycycline hyclate (VIBRAMYCIN) 100 MG capsule  IRON (FERROUS GLUCONATE) 325 MG OR TABS  levothyroxine (SYNTHROID/LEVOTHROID) 25 MCG tablet  losartan (COZAAR) 25 MG tablet  Multiple Vitamins-Minerals (ICAPS AREDS FORMULA PO)  Multiple Vitamins-Minerals (MULTIVITAMIN ADULT) CHEW  niacin 500 MG tablet  nitroGLYcerin (NITROSTAT) 0.4 MG sublingual tablet  ondansetron (ZOFRAN-ODT) 4 MG ODT tab  rivaroxaban ANTICOAGULANT (XARELTO) 20 MG TABS tablet        Past Medical History:    Past Medical History:   Diagnosis Date     Acquired hypothyroidism 5/27/2021     Coronary artery disease involving native coronary artery of native heart without angina pectoris 5/26/2021     Other and unspecified hyperlipidemia      Unspecified cardiovascular disease 1999       Patient Active Problem List    Diagnosis Date Noted     Rapid atrial fibrillation (H) 02/27/2022     Priority: Medium      Atrial flutter, unspecified type (H) 02/14/2022     Priority: Medium     Paroxysmal atrial fibrillation (H) 07/27/2021     Priority: Medium     Atrial flutter with rapid ventricular response (H) 07/24/2021     Priority: Medium     Acquired hypothyroidism 05/27/2021     Priority: Medium     Coronary artery disease involving native coronary artery of native heart without angina pectoris 05/26/2021     Priority: Medium     Essential hypertension 05/26/2021     Priority: Medium     Compression fracture of L1 vertebra, sequela 05/26/2021     Priority: Medium     CARDIOVASCULAR SCREENING; LDL GOAL LESS THAN 130 02/10/2010     Priority: Medium        Past Surgical History:    No past surgical history on file.     Family History:    family history includes Family History Negative in an other family member.    Social History:   reports that she has never smoked. She has never used smokeless tobacco. She reports that she does not drink alcohol and does not use drugs.    PCP: Diana Hilario     Review of Systems   Constitutional: Negative for fever.   Respiratory: Negative for shortness of breath.    All other systems reviewed and are negative.        Physical Exam     Patient Vitals for the past 24 hrs:   BP Temp Temp src Pulse Resp SpO2   03/03/22 2006 -- -- -- (!) 45 15 --   03/03/22 2004 136/64 -- -- (!) 46 -- --   03/03/22 1947 137/62 98  F (36.7  C) Oral (!) 48 20 99 %        Physical Exam    Emergency Department Course     ECG   Sinus bradycardia rate 46, no ST segment changes or T inversions concerning for acute ischemia    Imaging:    No orders to display        Laboratory:    Labs Ordered and Resulted from Time of ED Arrival to Time of ED Departure   BASIC METABOLIC PANEL - Abnormal       Result Value    Sodium 129 (*)     Potassium 4.3      Chloride 97      Carbon Dioxide (CO2)        Anion Gap        Urea Nitrogen        Creatinine        Calcium        Glucose        GFR Estimate       CBC WITH  PLATELETS AND DIFFERENTIAL - Abnormal    WBC Count 10.8      RBC Count 3.79 (*)     Hemoglobin 11.7      Hematocrit 36.7      MCV 97      MCH 30.9      MCHC 31.9      RDW 13.6      Platelet Count 239      % Neutrophils 64      % Lymphocytes 23      % Monocytes 10      % Eosinophils 1      % Basophils 1      % Immature Granulocytes 1      NRBCs per 100 WBC 0      Absolute Neutrophils 7.0      Absolute Lymphocytes 2.4      Absolute Monocytes 1.1      Absolute Eosinophils 0.1      Absolute Basophils 0.1      Absolute Immature Granulocytes 0.1      Absolute NRBCs 0.0     TROPONIN I        Procedures:    Interventions:    Medications - No data to display     Emergency Department Course:  Past medical records, nursing notes, and vitals reviewed.  I performed an exam of the patient and obtained history, as documented above.    I rechecked the patient. Findings and plan explained to the Patient and daughter.     Impression & Plan          CMS Diagnoses:       Medical Decision Making:  Patient presents to ED with symptomatic bradycardia. Likely induced by the amiodarone and diltiazem she is currently taking to help control her atrial fibrillation. She has not fallen or injured herself, no fevers or new symptoms except lightheadedness and weakness while walking today. We will asked the hospitalist for inpatient management given her symptoms.      Diagnosis:    ICD-10-CM    1. Symptomatic bradycardia  R00.1         Discharge Medications:  New Prescriptions    No medications on file        3/3/2022   Rufino Ahumada,*        Rufino Ahumada MD  03/03/22 2028

## 2022-03-04 NOTE — PROGRESS NOTES
ROOM # 220    Living Situation (if not independent, order SW consult): Home with adult daughter and son in law  Facility name:  : [facility] [phone] (daughter)        Mario [phone] (son in law)    Activity level at baseline: independent   Activity level on admit: SBA    Who will be transporting you at discharge: tanya Martin in law    Patient registered to observation; given Patient Bill of Rights; given the opportunity to ask questions about observation status and their plan of care.  Patient has been oriented to the observation room, bathroom and call light is in place.    Discussed discharge goals and expectations with patient/family.

## 2022-03-04 NOTE — PROGRESS NOTES
PRIMARY DIAGNOSIS: SYMPTOMATIC BRADYCARDIA  OUTPATIENT/OBSERVATION GOALS TO BE MET BEFORE DISCHARGE:  1. ADLs back to baseline: Yes    2. Activity and level of assistance: Up with standby assistance.    3. Pain status: Pain free.    4. Return to near baseline physical activity: Yes     Discharge Planner Nurse   Safe discharge environment identified: Yes  Barriers to discharge: Yes       Entered by: Nohemy Draper 03/04/2022 12:23 AM     Patient arrived at ED following a follow up visit with her primary physician after a 3 day stay here over the weekend and was lightheaded and dizzy, family checked heart rate which was in the 40's and brought her in for evaluation, patient is Aox4, SBA in her room, IV LR infusing, cardiology will be consulted in the AM about medication adjustments, sleeping comfortably, will continue to monitor and provide cares.     Please review provider order for any additional goals.   Nurse to notify provider when observation goals have been met and patient is ready for discharge.

## 2022-03-05 VITALS
HEART RATE: 67 BPM | OXYGEN SATURATION: 99 % | TEMPERATURE: 97.1 F | DIASTOLIC BLOOD PRESSURE: 79 MMHG | RESPIRATION RATE: 16 BRPM | SYSTOLIC BLOOD PRESSURE: 163 MMHG

## 2022-03-05 LAB
ANION GAP SERPL CALCULATED.3IONS-SCNC: 4 MMOL/L (ref 3–14)
BASOPHILS # BLD AUTO: 0 10E3/UL (ref 0–0.2)
BASOPHILS NFR BLD AUTO: 0 %
BUN SERPL-MCNC: 15 MG/DL (ref 7–30)
CALCIUM SERPL-MCNC: 8.7 MG/DL (ref 8.5–10.1)
CHLORIDE BLD-SCNC: 103 MMOL/L (ref 94–109)
CO2 SERPL-SCNC: 28 MMOL/L (ref 20–32)
CREAT SERPL-MCNC: 0.85 MG/DL (ref 0.52–1.04)
EOSINOPHIL # BLD AUTO: 0.2 10E3/UL (ref 0–0.7)
EOSINOPHIL NFR BLD AUTO: 2 %
ERYTHROCYTE [DISTWIDTH] IN BLOOD BY AUTOMATED COUNT: 13.6 % (ref 10–15)
GFR SERPL CREATININE-BSD FRML MDRD: 66 ML/MIN/1.73M2
GLUCOSE BLD-MCNC: 80 MG/DL (ref 70–99)
HCT VFR BLD AUTO: 31.9 % (ref 35–47)
HGB BLD-MCNC: 10.4 G/DL (ref 11.7–15.7)
IMM GRANULOCYTES # BLD: 0 10E3/UL
IMM GRANULOCYTES NFR BLD: 0 %
LYMPHOCYTES # BLD AUTO: 2.5 10E3/UL (ref 0.8–5.3)
LYMPHOCYTES NFR BLD AUTO: 29 %
MCH RBC QN AUTO: 31 PG (ref 26.5–33)
MCHC RBC AUTO-ENTMCNC: 32.6 G/DL (ref 31.5–36.5)
MCV RBC AUTO: 95 FL (ref 78–100)
MONOCYTES # BLD AUTO: 1 10E3/UL (ref 0–1.3)
MONOCYTES NFR BLD AUTO: 11 %
NEUTROPHILS # BLD AUTO: 4.8 10E3/UL (ref 1.6–8.3)
NEUTROPHILS NFR BLD AUTO: 58 %
NRBC # BLD AUTO: 0 10E3/UL
NRBC BLD AUTO-RTO: 0 /100
PLATELET # BLD AUTO: 206 10E3/UL (ref 150–450)
POTASSIUM BLD-SCNC: 3.8 MMOL/L (ref 3.4–5.3)
RBC # BLD AUTO: 3.36 10E6/UL (ref 3.8–5.2)
SODIUM SERPL-SCNC: 135 MMOL/L (ref 133–144)
WBC # BLD AUTO: 8.5 10E3/UL (ref 4–11)

## 2022-03-05 PROCEDURE — 250N000013 HC RX MED GY IP 250 OP 250 PS 637: Performed by: PHYSICIAN ASSISTANT

## 2022-03-05 PROCEDURE — 250N000013 HC RX MED GY IP 250 OP 250 PS 637: Performed by: HOSPITALIST

## 2022-03-05 PROCEDURE — G0378 HOSPITAL OBSERVATION PER HR: HCPCS

## 2022-03-05 PROCEDURE — 36415 COLL VENOUS BLD VENIPUNCTURE: CPT | Performed by: PHYSICIAN ASSISTANT

## 2022-03-05 PROCEDURE — 80048 BASIC METABOLIC PNL TOTAL CA: CPT | Performed by: PHYSICIAN ASSISTANT

## 2022-03-05 PROCEDURE — 99217 PR OBSERVATION CARE DISCHARGE: CPT | Performed by: PHYSICIAN ASSISTANT

## 2022-03-05 PROCEDURE — 85025 COMPLETE CBC W/AUTO DIFF WBC: CPT | Performed by: PHYSICIAN ASSISTANT

## 2022-03-05 PROCEDURE — 250N000013 HC RX MED GY IP 250 OP 250 PS 637: Performed by: INTERNAL MEDICINE

## 2022-03-05 RX ORDER — HYDROXYZINE HYDROCHLORIDE 25 MG/1
25 TABLET, FILM COATED ORAL EVERY 6 HOURS PRN
Qty: 20 TABLET | Refills: 0 | Status: SHIPPED | OUTPATIENT
Start: 2022-03-05 | End: 2022-03-11

## 2022-03-05 RX ORDER — LEVOTHYROXINE SODIUM 25 UG/1
37.5 TABLET ORAL DAILY
Qty: 45 TABLET | Refills: 0 | COMMUNITY
Start: 2022-03-05 | End: 2022-04-29

## 2022-03-05 RX ORDER — LOSARTAN POTASSIUM 50 MG/1
50 TABLET ORAL DAILY
Qty: 30 TABLET | Refills: 0 | Status: SHIPPED | OUTPATIENT
Start: 2022-03-05 | End: 2022-03-12

## 2022-03-05 RX ADMIN — Medication 500 MG: at 07:42

## 2022-03-05 RX ADMIN — DOXYCYCLINE HYCLATE 100 MG: 100 CAPSULE ORAL at 07:42

## 2022-03-05 RX ADMIN — AMIODARONE HYDROCHLORIDE 200 MG: 200 TABLET ORAL at 07:42

## 2022-03-05 RX ADMIN — HYDROXYZINE HYDROCHLORIDE 25 MG: 25 TABLET, FILM COATED ORAL at 08:37

## 2022-03-05 RX ADMIN — LEVOTHYROXINE SODIUM 25 MCG: 0.03 TABLET ORAL at 07:42

## 2022-03-05 RX ADMIN — LOSARTAN POTASSIUM 50 MG: 50 TABLET, FILM COATED ORAL at 07:42

## 2022-03-05 NOTE — PLAN OF CARE
PRIMARY DIAGNOSIS: Symptomatic bradycardia  OUTPATIENT/OBSERVATION GOALS TO BE MET BEFORE DISCHARGE:  1. ADLs back to baseline: Yes    2. Activity and level of assistance: Up with standby assistance.    3. Pain status: Pain free.    4. Return to near baseline physical activity: Yes     Discharge Planner Nurse   Safe discharge environment identified: Yes  Barriers to discharge: Yes       Entered by: Devante Harris 03/05/2022 4:33 AM    BP (!) 143/70 (BP Location: Left arm)   Pulse 75   Temp 97.8  F (36.6  C) (Oral)   Resp 18   LMP  (LMP Unknown)   SpO2 95%   Pt is alert and oriented x4. Pt denies pain or discomfort. No acute distress noted. VSS. Pt is on Tele monitoring sinus rhythm 72. Pt has no IV access. pt is SBA. Pt was given Melatonin for sleep. pt is sleeping in bed and call light within reach. Will continue to monitor and assess pt.     Please review provider order for any additional goals.   Nurse to notify provider when observation goals have been met and patient is ready for discharge.

## 2022-03-05 NOTE — PLAN OF CARE
PRIMARY DIAGNOSIS: Symptomatic bradycardia  OUTPATIENT/OBSERVATION GOALS TO BE MET BEFORE DISCHARGE:  1. ADLs back to baseline: Yes    2. Activity and level of assistance: Up with standby assistance.    3. Pain status: Pain free.    4. Return to near baseline physical activity: Yes     Discharge Planner Nurse   Safe discharge environment identified: Yes  Barriers to discharge: Yes       Entered by: Amilcar Lang 03/05/2022 12:33 PM    BP (!) 149/69 (BP Location: Right arm)   Pulse 66   Temp 97.8  F (36.6  C) (Oral)   Resp 16   LMP  (LMP Unknown)   SpO2 100%   Pt denies dizziness or lightheadedness. Reported itching about 10 minutes after taking morning medication. Explained to her none of the medications are new. Pt provided lotion, and hydroxyzine administered. Pt reports itching improved.     TELE - SR per tele tech     Please review provider order for any additional goals.   Nurse to notify provider when observation goals have been met and patient is ready for discharge.

## 2022-03-05 NOTE — PLAN OF CARE
Patient's After Visit Summary was reviewed with patient and/or daughter.   Patient verbalized understanding of After Visit Summary, recommended follow up and was given an opportunity to ask questions.   Discharge medications sent home with patient/family: Not applicable   Discharged with other: daughter    OBSERVATION patient END time: 3341

## 2022-03-05 NOTE — PLAN OF CARE
PRIMARY DIAGNOSIS: Symptomatic bradycardia  OUTPATIENT/OBSERVATION GOALS TO BE MET BEFORE DISCHARGE:  1. ADLs back to baseline: Yes    2. Activity and level of assistance: Up with standby assistance.    3. Pain status: Pain free.    4. Return to near baseline physical activity: Yes     Discharge Planner Nurse   Safe discharge environment identified: Yes  Barriers to discharge: No       Entered by: Devante Harris 03/04/2022 9:21 PM    /60 (BP Location: Left arm)   Pulse 74   Temp 97.9  F (36.6  C) (Oral)   Resp 18   LMP  (LMP Unknown)   SpO2 99%   Pt is alert and oriented x3. Pt denies pain or discomfort. VSS. No acute distress noted. Pt is SBA in transfer. Pt denies nausea or vomiting. Pt is on Tele monitoring (sinus rhythm 69). Pt is sleeping in bed. Bed in lowest position and call light is within reach. Will continue to monitor and assess pt.   Please review provider order for any additional goals.   Nurse to notify provider when observation goals have been met and patient is ready for discharge.

## 2022-03-05 NOTE — PLAN OF CARE
PRIMARY DIAGNOSIS: Symptomatic bradycardia  OUTPATIENT/OBSERVATION GOALS TO BE MET BEFORE DISCHARGE:  1. ADLs back to baseline: Yes    2. Activity and level of assistance: Up with standby assistance.    3. Pain status: Pain free.    4. Return to near baseline physical activity: Yes     Discharge Planner Nurse   Safe discharge environment identified: Yes  Barriers to discharge: Yes       Entered by: Amilcar Lang 03/05/2022 9:41 AM    BP (!) 163/76 (BP Location: Left arm)   Pulse 71   Temp 97.7  F (36.5  C) (Oral)   Resp 20   LMP  (LMP Unknown)   SpO2 95%   Pt denies dizziness or lightheadedness, but reports she feels off because of sleeping medication (she received melatonin 1 mg PO). Reported itching about 10 minutes after taking morning medication. Explained to her none of the medications are new. Pt provided lotion, and hydroxyzine administered.     TELE - SR per tele tech     Please review provider order for any additional goals.   Nurse to notify provider when observation goals have been met and patient is ready for discharge.

## 2022-03-05 NOTE — DISCHARGE SUMMARY
[facility] Outpatient / Observation Unit  Discharge Summary        Jeannie Chu MRN# [id number]   YOB: 1935 Age: 87 year old     Date of Admission:  3/3/2022  Date of Discharge:  3/5/2022  Admitting Physician:  No admitting provider for patient encounter.  Discharge Physician: Marilyn Marsh PA-C, WILLIAM  Discharging Service: Hospitalist      Primary Provider: Diana Hilario  Primary Care Physician Phone Number: [phone]         Primary Discharge Diagnoses:    Jeannie Chu is a 87 year old female with a PMHx significant for CAD, HTN, HLP, hypothyroidism, and PAF with recent admission 2/27-3/2/22 for afib with RVR with multiple med changes (including starting amiodarone and losartan, diltiazem was adjusted and digoxin was stopped), who was admitted on 3/3/2022 with symptomatic bradycardia. She was admitted on 2/27 for afib with RVR and started on IV amiodarone. She converted to SR and was transitioned to oral amiodarone with taper plan. She was discharged on 3/2 then presented to PCP on 3/3 with symptomatic bradycardia (weak and LH). Her pulse was noted to be in the mid 40s so she was sent to the ER and admitted for further care. Cardiology was consulted and recommended she stop the diltiazem and the amiodarone be decreased to 200mg daily. Losartan was increased to give better BP control. The patient was monitored on telemetry with no further sustained episodes of bradycardia. Patient was discharged home with plans for further outpatient follow up.        #Symptomatic bradycardia-likely drug-induced given amiodarone with concomitant diltiazem  - improved with medication adjustment   -  amiodarone 200mg daily  - diltiazem stopped  - outpatient Cardiology follow up      #Atrial fibrillation with RVR-again currently sinus  -Cardiology felt she was in a junctional rhythm on presentation  -low dose Amiodarone started per cardiology  -Continue Xarelto for  anticoagulation     #Hypertension  -diltiazem stopped per above  -Losartan increased to 50 mg. PCP to monitor as OP and titrate as needed     #Hyponatremia-possibly due to decreased p.o. intake, and contributing to her weakness  -Clinically did look a little dry, improved  -received 500 ml of normal saline overnight  -resolved      #Hyperlipidemia-continue statin  #Chalazion-no further need for doxycycline. Hot pack, follow up with PCP.      #Hypothyroidism-levothyroxine needs to be increased to 37.5mg daily (this was done last admission but does not appear the patient did this at home). Recheck TSH in 6-8 weeks.        Secondary Discharge Diagnoses:     Past Medical History:   Diagnosis Date     Acquired hypothyroidism 5/27/2021     Coronary artery disease involving native coronary artery of native heart without angina pectoris 5/26/2021     Other and unspecified hyperlipidemia      Unspecified cardiovascular disease 1999    stent placed            Code Status:      Full Code        Brief Hospital Summary:        Reason for your hospital stay      You were admitted for bradycardia (or a slow heart rate). This was   thought to be due to the medications that were recently added to treat   your atrial fibrillation with a fast heart rate. Cardiology was consulted   and stopped your Cardizem, decreased your amiodarone and increased   losartan. You were then monitored overnight and your heart rate remained   stable.             Please refer to initial admission history and physical for further details.   Briefly, Jeannie Chu was admitted on 3/3/2022 for concerns of bradycardia.   Initial work up in the ED did not reveal evidence of unstable angina or acute coronary ischemia. Pt was registered to the Observation Unit for further evaluation.   Cardiology was consulted and medications were adjusted. Patient was monitored on telemetry with improvement of her VR. Labs were reviewed and significant results addressed. On  the day of discharge, pt was pain free, with no complaints of pain. Medications were reviewed and adjustments made as necessary. Pt is instructed to follow up as below.           Significant Lab During Hospitalization:      No results for input(s): PH, PHV, PO2, PO2V, SAT, PCO2, PCO2V, HCO3, HCO3V in the last 168 hours.  Recent Labs   Lab 03/05/22  0534 03/04/22  0521 03/03/22 2003   WBC 8.5 8.7 10.8   HGB 10.4* 10.8* 11.7   HCT 31.9* 32.9* 36.7   MCV 95 95 97    206 239          Lab Results   Component Value Date     03/05/2022     03/03/2022     03/01/2022     04/30/2021     11/11/2020     10/15/2020    Lab Results   Component Value Date    CHLORIDE 103 03/05/2022    CHLORIDE 97 03/03/2022    CHLORIDE 105 03/01/2022    CHLORIDE 102 04/30/2021    CHLORIDE 93 11/11/2020    CHLORIDE 96 10/15/2020    Lab Results   Component Value Date    BUN 15 03/05/2022    BUN 19 03/03/2022    BUN 12 03/01/2022    BUN 18 04/30/2021    BUN 17 11/11/2020    BUN 13 10/15/2020      Lab Results   Component Value Date    POTASSIUM 3.8 03/05/2022    POTASSIUM 4.2 03/04/2022    POTASSIUM 4.3 03/03/2022    POTASSIUM 4.4 04/30/2021    POTASSIUM 4.3 11/11/2020    POTASSIUM 4.2 10/15/2020    Lab Results   Component Value Date    CO2 28 03/05/2022    CO2 25 03/03/2022    CO2 26 03/01/2022    CO2 30 04/30/2021    CO2 31 11/11/2020    CO2 26 10/15/2020    Lab Results   Component Value Date    CR 0.85 03/05/2022    CR 1.05 03/03/2022    CR 0.76 03/01/2022    CR 0.77 04/30/2021    CR 0.74 11/11/2020    CR 0.76 10/15/2020        Recent Labs   Lab 03/05/22  0534 03/04/22  1914 03/03/22 2003 03/01/22  0656 02/28/22  0846 02/28/22  0639     --  129* 137  --  140   POTASSIUM 3.8 4.2 4.3 3.7  --  4.3   CHLORIDE 103  --  97 105  --  108   CO2 28  --  25 26  --  29   ANIONGAP 4  --  7 6  --  3   GLC 80  --  118* 92  --  78   BUN 15  --  19 12  --  19   CR 0.85  --  1.05* 0.76  --  0.88   GFRESTIMATED 66  --   51* 76  --  64   LYLE 8.7  --  9.2 8.3*  --  8.3*   MAG  --  1.9  --  1.9  --  2.0   PROTTOTAL  --   --   --   --  7.1  --    ALBUMIN  --   --   --   --  2.6*  --    BILITOTAL  --   --   --   --  0.9  --    ALKPHOS  --   --   --   --  57  --    AST  --   --   --   --  25  --    ALT  --   --   --   --  18  --      No results for input(s): NTBNPI, NTBNP in the last 168 hours.  No results for input(s): DD in the last 168 hours.  No results for input(s): SED, CRP in the last 168 hours.  GFR Estimate   Date Value Ref Range Status   03/05/2022 66 >60 mL/min/1.73m2 Final     Comment:     Effective December 21, 2021 eGFRcr in adults is calculated using the 2021 CKD-EPI creatinine equation which includes age and gender ( et al., NEJM, DOI: 10.1056/URLMus5861762)   03/03/2022 51 (L) >60 mL/min/1.73m2 Final     Comment:     Effective December 21, 2021 eGFRcr in adults is calculated using the 2021 CKD-EPI creatinine equation which includes age and gender ( et al., NEJM, DOI: 10.1056/DPTGnd9643751)   03/01/2022 76 >60 mL/min/1.73m2 Final     Comment:     Effective December 21, 2021 eGFRcr in adults is calculated using the 2021 CKD-EPI creatinine equation which includes age and gender ( et al., NEJM, DOI: 10.1056/RCYOgu8944920)   04/30/2021 70 >60 mL/min/[1.73_m2] Final     Comment:     Non  GFR Calc  Starting 12/18/2018, serum creatinine based estimated GFR (eGFR) will be   calculated using the Chronic Kidney Disease Epidemiology Collaboration   (CKD-EPI) equation.     11/11/2020 74 >60 mL/min/[1.73_m2] Final     Comment:     Non  GFR Calc  Starting 12/18/2018, serum creatinine based estimated GFR (eGFR) will be   calculated using the Chronic Kidney Disease Epidemiology Collaboration   (CKD-EPI) equation.     10/15/2020 72 >60 mL/min/[1.73_m2] Final     Comment:     Non  GFR Calc  Starting 12/18/2018, serum creatinine based estimated GFR (eGFR) will be   calculated  using the Chronic Kidney Disease Epidemiology Collaboration   (CKD-EPI) equation.       GFR, ESTIMATED POCT   Date Value Ref Range Status   07/24/2021 >60 >60 mL/min/1.73m2 Final     GFR Estimate If Black   Date Value Ref Range Status   04/30/2021 82 >60 mL/min/[1.73_m2] Final     Comment:      GFR Calc  Starting 12/18/2018, serum creatinine based estimated GFR (eGFR) will be   calculated using the Chronic Kidney Disease Epidemiology Collaboration   (CKD-EPI) equation.     11/11/2020 86 >60 mL/min/[1.73_m2] Final     Comment:      GFR Calc  Starting 12/18/2018, serum creatinine based estimated GFR (eGFR) will be   calculated using the Chronic Kidney Disease Epidemiology Collaboration   (CKD-EPI) equation.     10/15/2020 83 >60 mL/min/[1.73_m2] Final     Comment:      GFR Calc  Starting 12/18/2018, serum creatinine based estimated GFR (eGFR) will be   calculated using the Chronic Kidney Disease Epidemiology Collaboration   (CKD-EPI) equation.       Recent Labs   Lab 02/28/22  0846   AST 25   ALT 18   ALKPHOS 57   BILITOTAL 0.9     No results for input(s): INR in the last 168 hours.  No results for input(s): LACT in the last 168 hours.  No results for input(s): LIPASE in the last 168 hours.  Recent Labs   Lab 02/28/22  0846   TSH 9.61*     Recent Labs   Lab 03/03/22 2003   TROPONINIS 16     Recent Labs   Lab 02/27/22  1305   COLOR Straw   APPEARANCE Clear   URINEGLC Negative   URINEBILI Negative   URINEKETONE Negative   SG 1.007   UBLD Negative   URINEPH 6.5   PROTEIN Negative   NITRITE Negative   LEUKEST Negative   RBCU <1   WBCU <1                Significant Imaging During Hospitalization:      Results for orders placed or performed during the hospital encounter of 02/27/22   XR Chest Port 1 View    Narrative    CHEST ONE VIEW  2/28/2022 9:48 AM     HISTORY: Baseline prior to amiodarone initiation.    COMPARISON: 7/24/2021.       Impression    IMPRESSION: No  significant fibrotic changes. No infiltrates.     ALBERT DUNN MD         SYSTEM ID:  [id number]              Pending Results:        Unresulted Labs Ordered in the Past 30 Days of this Admission     No orders found from 2/1/2022 to 3/4/2022.              Consultations This Hospital Stay:      Consultation during this admission received from cardiology         Discharge Instructions and Follow-Up:      Follow-up Appointments     Follow-up and recommended labs and tests       1. Follow up with Cardiology this week for recheck  2. Follow up with your family doctor within 1-2 weeks to discuss your   recent admission and for recheck  3. Medication changes;  -- make sure you are taking levothyroxine 37.5mg daily. Recheck your labs   in 6-8 weeks.  -- STOP diltiazem  -- Take ONLY 200mg of amiodarone  -- START losartan 50mg daily  All other medications remained the same           Pt instructed to follow up with PCP in 7 days and with Consultant if indicated.   Follow-up Labs TSH in 6-8 weeks        Discharge Disposition:      Discharged to home         Discharge Medications:        Current Discharge Medication List      CONTINUE these medications which have CHANGED    Details   levothyroxine (SYNTHROID/LEVOTHROID) 25 MCG tablet Take 1.5 tablets (37.5 mcg) by mouth daily Wednesdays  Qty: 45 tablet, Refills: 0      losartan (COZAAR) 50 MG tablet Take 1 tablet (50 mg) by mouth daily  Qty: 30 tablet, Refills: 0    Associated Diagnoses: Essential hypertension         CONTINUE these medications which have NOT CHANGED    Details   atorvastatin (LIPITOR) 10 MG tablet Take 1 tablet (10 mg) by mouth daily  Qty: 90 tablet, Refills: 3    Associated Diagnoses: Hyperlipidemia LDL goal <130      IRON (FERROUS GLUCONATE) 325 MG OR TABS Take 1 tablet by mouth daily   Qty: 30, Refills: 0      Multiple Vitamins-Minerals (ICAPS AREDS FORMULA PO) Take 2 tablets by mouth daily       Multiple Vitamins-Minerals (MULTIVITAMIN ADULT) CHEW Take 2  chew tab by mouth daily      niacin 500 MG tablet Take by mouth daily (with breakfast)      nitroGLYcerin (NITROSTAT) 0.4 MG sublingual tablet For chest pain place 1 tablet under the tongue every 5 minutes for 3 doses. If symptoms persist 5 minutes after 1st dose call 911.  Qty: 30 tablet, Refills: 0    Associated Diagnoses: Atrial flutter with rapid ventricular response (H)      ondansetron (ZOFRAN-ODT) 4 MG ODT tab Take 1 tablet (4 mg) by mouth every 8 hours as needed for nausea or vomiting  Qty: 15 tablet, Refills: 0    Associated Diagnoses: Nausea      rivaroxaban ANTICOAGULANT (XARELTO) 20 MG TABS tablet Take 1 tablet (20 mg) by mouth daily (with dinner)  Qty: 90 tablet, Refills: 3    Associated Diagnoses: Paroxysmal atrial fibrillation (H)      amiodarone (PACERONE) 200 MG tablet Take 1 tablet (200 mg) by mouth daily  Qty: 30 tablet, Refills: 0    Associated Diagnoses: Rapid atrial fibrillation (H)         STOP taking these medications       diltiazem ER COATED BEADS (CARDIZEM CD/CARTIA XT) 180 MG 24 hr capsule Comments:   Reason for Stopping:         doxycycline hyclate (VIBRAMYCIN) 100 MG capsule Comments:   Reason for Stopping:                   Allergies:         Allergies   Allergen Reactions     Ibuprofen            Condition and Physical on Discharge:      Discharge condition: Stable   Vitals: Blood pressure (!) 149/69, pulse 66, temperature 97.8  F (36.6  C), temperature source Oral, resp. rate 16, SpO2 100 %, not currently breastfeeding.  0 lbs 0 oz      GENERAL:  Comfortable.  PSYCH: pleasant, oriented, No acute distress.  HEENT:  PERRLA. Normal conjunctiva, normal hearing, nasal mucosa and Oropharynx are normal.  NECK:  Supple, no neck vein distention, adenopathy or bruits, normal thyroid.  HEART:  Normal S1, S2 with no murmur, no pericardial rub, gallops or S3 or S4.  LUNGS:  Clear to auscultation, normal Respiratory effort. No wheezing, rales or ronchi.  ABDOMEN:  Soft, no hepatosplenomegaly,  normal bowel sounds. Non-tender, non distended.   EXTREMITIES:  No pedal edema, +2 pulses bilateral and equal.  SKIN:  Dry to touch, No rash, wound or ulcerations.  NEUROLOGIC:  CN 2-12 intact, BL 5/5 symmetric upper and lower extremity strength, sensation is intact with no focal deficits.

## 2022-03-05 NOTE — PLAN OF CARE
PRIMARY DIAGNOSIS: Symptomatic bradycardia  OUTPATIENT/OBSERVATION GOALS TO BE MET BEFORE DISCHARGE:  1. ADLs back to baseline: Yes    2. Activity and level of assistance: Up with standby assistance.    3. Pain status: Pain free.    4. Return to near baseline physical activity: Yes     Discharge Planner Nurse   Safe discharge environment identified: Yes  Barriers to discharge: No       Entered by: Devante Harris 03/05/2022 12:25 AM    /60 (BP Location: Left arm)   Pulse 74   Temp 97.9  F (36.6  C) (Oral)   Resp 18   LMP  (LMP Unknown)   SpO2 99%     Please review provider order for any additional goals.   Nurse to notify provider when observation goals have been met and patient is ready for discharge.

## 2022-03-07 ENCOUNTER — TELEPHONE (OUTPATIENT)
Dept: INTERNAL MEDICINE | Facility: CLINIC | Age: 87
End: 2022-03-07

## 2022-03-07 ENCOUNTER — PATIENT OUTREACH (OUTPATIENT)
Dept: NURSING | Facility: CLINIC | Age: 87
End: 2022-03-07
Payer: COMMERCIAL

## 2022-03-07 NOTE — TELEPHONE ENCOUNTER
Please review and advise.     Consent to commincate7/17/2020     patient's daughter is calling Marya [phone]    1. Discharge from the hospital this past Saturday March 5h    2. TSH 9.61 2/28/22    3. Doctors told her to take 37.5mcg of levothyxine  In the hospital due to her TSH level.    in the hospital she had shaky hands and legs.      4. baseline dosage of Synthroid was 25 mcg daily except 50mcg once  Week on Wednesday.      5. patient is Currently  taking her old dosage until she hears back from Dr. Hilario and tells her it is  Ok to increase.    Other concern Constipation  Lower abdominal  cramping (lower)-please advise   recommend Senna or colace take as directed  Drink plenty of water    Follow up appointment 3.23     Per daughter/Marya   Call her   Adalid he is a caretaker of Jeannie [phone] with recomendations for levothyroxine dosage and appointment.   Appointments in Next Year    Mar 16, 2022 10:45 AM  LAB with EA LAB  [facility] ([facility] ) [phone]   Mar 16, 2022 11:45 AM  Return Visit with Germán Shin MD  [facility] ([facility] ) [phone]   Mar 23, 2022  2:30 PM  (Arrive by 2:15 PM)  ED/Hospital Follow Up with Diana Hilario MD  [facility] ([facility] ) [phone]        Naomi MASON RN   [facility] Triage

## 2022-03-07 NOTE — TELEPHONE ENCOUNTER
Please advise patient I can see patient on 03/11/2022 at 2 PM for hospital follow-up and we can discuss medications , advise  to cancel 3/23/2022 appointment

## 2022-03-07 NOTE — PROGRESS NOTES
Clinic Care Coordination Contact  [facility]: Post-Discharge Note  SITUATION                                                      Admission:    Admission Date: 03/03/22   Reason for Admission: weakness and dizziness  Discharge:   Discharge Date: 03/05/22  Discharge Diagnosis: Symptomatic bradycardia-likely drug-induced given amiodarone with concomitant diltiazem    BACKGROUND                                                      Per hospital discharge summary and inpatient provider notes:  Jeannie Chu is a 87 year old female who just discharged from [facility] yesterday after being admitted for recurrent A. fib with RVR in the setting of hypertension, coronary disease hypothyroidism and dyslipidemia.  During her most recent admission on 2/27, she was placed on IV amiodarone and ultimately was transitioned to oral taper dosing.  She had converted back to sinus rhythm with good vital signs and discharged home yesterday with amiodarone 400 mg twice daily for 7 days (taper), 25 mg of losartan, and 180 of diltiazem.  Today she went to see her PCP and she was feeling so weak and lightheaded that she couldn't even stand up.  When they checked her vitals, she was noted to be bradycardic in the mid 40s and was instructed come into the emergency room.  Work-up emergency room has been fairly unremarkable. She does have mild hyponatremia with a sodium 129. Otherwise blood sugar okay and CBC unremarkable.  EKG shows sinus bradycardia with heart rate in the mid 40s. As she was still somewhat symptomatic and dizzy, I was asked to admit her to the hospital for observation.    ASSESSMENT      Enrollment  Primary Care Care Coordination Status: Potential    Discharge Assessment  How are your symptoms? (Red Flag symptoms escalate to triage hotline per guidelines): Improved  Do you feel your condition is stable enough to be safe at home until your provider visit?: Yes  Does the patient have their discharge  instructions? : Yes  Does the patient have questions regarding their discharge instructions? : No  Were you started on any new medications or were there changes to any of your previous medications? : Yes  Does the patient have all of their medications?: Yes  Do you have questions regarding any of your medications? : Yes (see comment) (The Care Team is notified, reaching out to patient's son in-law today.)  Do you have all of your needed medical supplies or equipment (DME)?  (i.e. oxygen tank, CPAP, cane, etc.): Yes  Discharge follow-up appointment scheduled within 14 calendar days? : Yes  Discharge Follow Up Appointment Date: 03/23/22  Discharge Follow Up Appointment Scheduled with?: Primary Care Provider              Care Management   Community Health Worker Initial Outreach    CHW Initial Information Gathering:  Referral Source: IP Handoff  Preferred Hospital: [facility]  [phone]  Current living arrangement:: I live in a private home with family  Community Resources: None  Informal Support system:: Children  No PCP office visit in Past Year: No  Transportation means:: Family, Regular car       Patient accepts CC: Yes. Patient scheduled for assessment with CC SW on 3/8/2022 at 11:00 AM. Patient noted desire to discuss Care Coordination.     3-7, CHW:    CHW spoke with Peterson CUI, patient's son in-law. Peterson is also the primary care giver. CHW introduced self and care coordination.    Peterson had questions regarding constipation, CHW informed that the patient's care team will reach out regarding this concern; this has been brought to their attention by Marya, patient's daughter.    Peterson is interested in learning more about [place] Choices and how to become the patient's PCA.    Peterson also is interested in learning about what resources there are available (county benefits, etc.).    CHW inquired if they had any other questions or concerns, however Peterson did not.       PLAN                                                       Outpatient Plan:     Future Appointments   Date Time Provider Department Center   3/8/2022 11:00 AM [facility] [place]   3/16/2022 10:45 AM [facility]   3/16/2022 11:45 AM Germán Shin MD [facility]   3/23/2022  2:30 PM Diana Hilario MD [facility]         For any urgent concerns, please contact our 24 hour nurse triage line: [phone] ([phone])         BREE Godinez. Public Health  Community Health Worker  [facility]  Clinic Care Coordination  [phone]

## 2022-03-08 ENCOUNTER — PATIENT OUTREACH (OUTPATIENT)
Dept: NURSING | Facility: CLINIC | Age: 87
End: 2022-03-08
Payer: COMMERCIAL

## 2022-03-08 DIAGNOSIS — R00.1 SYMPTOMATIC BRADYCARDIA: ICD-10-CM

## 2022-03-08 ASSESSMENT — ACTIVITIES OF DAILY LIVING (ADL): DEPENDENT_IADLS:: TRANSPORTATION

## 2022-03-08 NOTE — LETTER
[facility] CARE COORDINATION  [address]  [facility] [facility] [address]  March 8, 2022    Jeannie Chu  [address]  [place] [address]      Dear Jeannie,    I am a clinic care coordinator who works with Diana Hilario MD at the [facility]. I wanted to thank you for spending the time to talk with me.  Below is a description of clinic care coordination and how I can further assist you.      The clinic care coordination team is made up of a registered nurse,  and community health worker who understand the health care system. The goal of clinic care coordination is to help you manage your health and improve access to the health care system in the most efficient manner. The team can assist you in meeting your health care goals by providing education, coordinating services, strengthening the communication among your providers and supporting you with any resource needs.    As we discussed, I've included the information below for the [facility] Living Services Assessment:    -[place] Choices Assessment/Community Living Services Assessment:   The Community Living Services unit can help you get started on finding solutions that will help you or a loved one live as independently as possible.   Contact [facility] Living Services by email at [email or web address]  or phone at [phone] to schedule a time for an assessment. A  and/or public health nurse will come to your home to gather information about your health and how you are managing at home.  The visit usually takes about two hours. You will receive information about long-term care options and programs. There is no charge for the visit.  Aging & Disability Services [facility]      Please feel free to contact me at [phone] with any questions or concerns. We are focused on providing you with the highest-quality healthcare experience possible and that all starts with you.  Message routed to provider as pt requested personal call back        Sincerely,     Darby Berman [facility] Care Coordinator  Ph. [phone]  [email or web address]

## 2022-03-08 NOTE — TELEPHONE ENCOUNTER
Patient daughter called back. March 23 appointment was cancelled and new appointment was made for 3- per Dr Hilario

## 2022-03-08 NOTE — PROGRESS NOTES
Clinic Care Coordination Contact    Clinic Care Coordination Contact  OUTREACH    Referral Information:  Referral Source: IP Handoff  Primary Diagnosis: Psychosocial    Chief Complaint   Patient presents with     Clinic Care Coordination - Initial     Resource navigation      Universal Utilization: Reviewed on this date.   Difficulty keeping appointments: No  Compliance Concerns: No  No-Show Concerns: No  No PCP office visit in Past Year: No  Utilization    Hospital Admissions  4             ED Visits  4             No Show Count (past year)  2                Current as of: 3/7/2022  1:12 PM            Clinical Concerns:  Current Medical Concerns:  Patient was hospitalized at [facility] from 3/3/2022 to 3/5/2022 with Symptomatic bradycardia-likely drug-induced given amiodarone with concomitant diltiazem.  Current Behavioral Concerns: None noted at this time.     Education Provided to patient: Role of MARTHA CC and reason for outreach.    Pain  Pain : Yes  Type: Chronic (>3mo)  Health Maintenance Reviewed: Due/Overdue   Clinical Pathway: None    Medication Management:  Medication review status: Medications reviewed and no changes reported per patient.          Functional Status:  Dependent IADLs: Transportation  Bed or wheelchair confined: No  Mobility Status: Independent w/Device    Living Situation:  Current living arrangement: I live in a private home with family    Lifestyle & Psychosocial Needs:    Social Determinants of Health     Tobacco Use: Low Risk      Smoking Tobacco Use: Never Smoker     Smokeless Tobacco Use: Never Used   Alcohol Use: Not on file   Financial Resource Strain: Not on file   Food Insecurity: Not on file   Transportation Needs: Not on file   Physical Activity: Not on file   Stress: Not on file   Social Connections: Not on file   Intimate Partner Violence: Not on file   Depression: Not at risk     PHQ-2 Score: 0   Housing Stability: Not on file        Transportation means:  Family, Regular car     Mental health DX: No  Mental health management concern : No  Chemical Dependency Status: No Current Concerns  Informal Support system: Children      Call placed to Pt's son-in-law, Peterson. Peterson reports that he acts as Pt's primary caregiver, as he is unemployed and his wife is a [employer] banker.    Peterson is wondering what kind of services are available and Pt might be eligible for. He reports that he is not needing the income from being Pt's PCA, but that he is wondering if Pt could be approved.     We reviewed the availability of the [facility] Services Assessment and determining eligibility for a waiver. We reviewed that PCA services are typically covered through a waiver and are then provided through a family member or hired PCA service agency. Peterson is agreeable to SW CC sending the Introduction to CC letter with CLS assessment information.     Resources and Interventions:  Current Resources:      Community Resources: None  Supplies Currently Used at Home: None  Equipment Currently Used at Home: none  Employment Status: retired     Advance Care Plan/Directive  Advanced Care Plans/Directives on file: No  Advanced Care Plan/Directive Status: Considering Options    Referrals Placed: [facility]     Patient/Caregiver understanding: Pt reports understanding and denies any additional questions or concerns at this times. SW CC engaged in AIDET communication during encounter.       Future Appointments              In 1 week [facility] [place]    In 1 week Germán Shin MD [facility]    In 2 weeks Diana Hilario MD [place], [place]          Plan: Introduction to CC letter and CLS assessment information sent to Patient via a letter in the mail. Peterson declines CC enrollment. No further outreaches will be made at this time unless a new referral is made or a change in the  patient's status occurs. Patient was provided with this writer's contact information and encouraged to call with any questions or concerns.      GUSTAVO Daniels  Clinic Care Coordinator  Ph. [phone]  [email or web address]

## 2022-03-09 ENCOUNTER — TELEPHONE (OUTPATIENT)
Dept: CARDIOLOGY | Facility: CLINIC | Age: 87
End: 2022-03-09
Payer: COMMERCIAL

## 2022-03-09 NOTE — TELEPHONE ENCOUNTER
Patient was evaluated by cardiology while inpatient for symptomatic bradycardia with HR down to the 40's with weakness and lightheadedness noted at PMD's office day after discharge on 3/3/22. PMH: CAD, HTN, HLP, hypothyroidism, and PAF with recent admission 2/27-3/2/22 for afib with RVR-converted to NSR with multiple med changes (including starting amiodarone and losartan, diltiazem was adjusted and digoxin was stopped). Cardiology consulted and recommended to discontinue Diltiazem and decrease PTA Amiodarone to 200 mg daily. Losartan was increased to give better BP control. No further bradycardic episodes this admission. Called patient to discuss any post hospital d/c questions she may have, review medication changes, and confirm f/u appts. Phone was answered by son in law and put call on speaker phone with pt. Son in law denied any questions regarding new medications or changes to PTA medications. Patient denied any SOB, chest pain, or light headedness. Pt does c/o upset stomach. Had been struggling with constipation, but had normal BM today. Decreased appetite. Fluids and food encouraged as tolerated. Encouraged to call PMD for any further GI concerns. RN confirmed with patient that she has labs scheduled on 3/16/22 at 1045 followed with an OV at 1115 scheduled with Dr. Shin at our [place] office. Both verbalized understanding and agreed with plan. MIRELA Mosqueda RN.

## 2022-03-11 ENCOUNTER — OFFICE VISIT (OUTPATIENT)
Dept: INTERNAL MEDICINE | Facility: CLINIC | Age: 87
End: 2022-03-11
Payer: COMMERCIAL

## 2022-03-11 VITALS
TEMPERATURE: 96.9 F | DIASTOLIC BLOOD PRESSURE: 76 MMHG | WEIGHT: 158 LBS | SYSTOLIC BLOOD PRESSURE: 131 MMHG | HEART RATE: 83 BPM | BODY MASS INDEX: 29.08 KG/M2 | OXYGEN SATURATION: 100 % | HEIGHT: 62 IN | RESPIRATION RATE: 11 BRPM

## 2022-03-11 DIAGNOSIS — I48.0 PAROXYSMAL ATRIAL FIBRILLATION (H): ICD-10-CM

## 2022-03-11 DIAGNOSIS — I25.10 CORONARY ARTERY DISEASE INVOLVING NATIVE CORONARY ARTERY OF NATIVE HEART WITHOUT ANGINA PECTORIS: ICD-10-CM

## 2022-03-11 DIAGNOSIS — I10 ESSENTIAL HYPERTENSION: Primary | ICD-10-CM

## 2022-03-11 DIAGNOSIS — E78.5 HYPERLIPIDEMIA LDL GOAL <100: ICD-10-CM

## 2022-03-11 DIAGNOSIS — D64.9 ANEMIA, UNSPECIFIED TYPE: ICD-10-CM

## 2022-03-11 DIAGNOSIS — E03.9 ACQUIRED HYPOTHYROIDISM: ICD-10-CM

## 2022-03-11 LAB
ERYTHROCYTE [DISTWIDTH] IN BLOOD BY AUTOMATED COUNT: 14 % (ref 10–15)
HCT VFR BLD AUTO: 35.8 % (ref 35–47)
HGB BLD-MCNC: 11.6 G/DL (ref 11.7–15.7)
MCH RBC QN AUTO: 30.9 PG (ref 26.5–33)
MCHC RBC AUTO-ENTMCNC: 32.4 G/DL (ref 31.5–36.5)
MCV RBC AUTO: 96 FL (ref 78–100)
PLATELET # BLD AUTO: 242 10E3/UL (ref 150–450)
RBC # BLD AUTO: 3.75 10E6/UL (ref 3.8–5.2)
WBC # BLD AUTO: 9.5 10E3/UL (ref 4–11)

## 2022-03-11 PROCEDURE — 84460 ALANINE AMINO (ALT) (SGPT): CPT | Performed by: INTERNAL MEDICINE

## 2022-03-11 PROCEDURE — 85027 COMPLETE CBC AUTOMATED: CPT | Performed by: INTERNAL MEDICINE

## 2022-03-11 PROCEDURE — 99495 TRANSJ CARE MGMT MOD F2F 14D: CPT | Performed by: INTERNAL MEDICINE

## 2022-03-11 PROCEDURE — 80061 LIPID PANEL: CPT | Performed by: INTERNAL MEDICINE

## 2022-03-11 PROCEDURE — 84439 ASSAY OF FREE THYROXINE: CPT | Performed by: INTERNAL MEDICINE

## 2022-03-11 PROCEDURE — 84443 ASSAY THYROID STIM HORMONE: CPT | Performed by: INTERNAL MEDICINE

## 2022-03-11 PROCEDURE — 36415 COLL VENOUS BLD VENIPUNCTURE: CPT | Performed by: INTERNAL MEDICINE

## 2022-03-11 NOTE — NURSING NOTE
"Pulse 83   Temp 96.9  F (36.1  C) (Axillary)   Resp 11   Ht 1.575 m (5' 2\")   Wt 71.7 kg (158 lb)   LMP  (LMP Unknown)   SpO2 100%   BMI 28.90 kg/m    Patient in for hospital follow up.  "

## 2022-03-11 NOTE — PROGRESS NOTES
"  Assessment & Plan     (I10) Essential hypertension  (primary encounter diagnosis)  Comment: BP stable  Plan: losartan was increased to 50 mg in during hospitalization but pt has been taking losartan 25 mg daily and home BP have been normal, and bP stable today,continue losartan 25 mg at this time check Basic metabolic panel           (I48.0) Paroxysmal atrial fibrillation (H)  Plan: on amiodarone 200 mg daily, also on Xarelto.check Basic metabolic panel            (D64.9) Anemia, unspecified type  Plan: CBC with platelets- Hgb today 11.6, has improved since before, continue to monitor. On iron supplement once daily.      (E03.9) Acquired hypothyroidism  Plan: on levothyroxine 25 mcg daily for 6 days and 50 mcg on day 7, check TSH with free T4 reflex, T4 free            (I25.10) Coronary artery disease involving native coronary artery of native heart without angina pectoris  Plan: follows up with cardiologist     (E78.5) Hyperlipidemia LDL goal <100  Plan: check lipid panel, on lipitor 10 mg daily       Review of the result(s) of each unique test -  TSH, CBC  Prescription drug management        BMI:   Estimated body mass index is 28.9 kg/m  as calculated from the following:    Height as of this encounter: 1.575 m (5' 2\").    Weight as of this encounter: 71.7 kg (158 lb).       Return in about 4 months (around 7/11/2022).    Diana Hilario MD  [facility] TIGRE Dennis is a 87 year old who presents for the following health issues  accompanied by her Son in law .    HPI     Post Discharge Outreach 3/7/2022   Admission Date 3/3/2022   Reason for Admission weakness and dizziness   Discharge Date 3/5/2022   Discharge Diagnosis Symptomatic bradycardia-likely drug-induced given amiodarone with concomitant diltiazem   How are your symptoms? (Red Flag symptoms escalate to triage hotline per guidelines) Improved   Do you feel your condition is stable enough to be safe at home " until your provider visit? Yes   Does the patient have their discharge instructions?  Yes   Does the patient have questions regarding their discharge instructions?  No   Were you started on any new medications or were there changes to any of your previous medications?  Yes   Does the patient have all of their medications? Yes   Do you have questions regarding any of your medications?  Yes (see comment)   Do you have all of your needed medical supplies or equipment (DME)?  (i.e. oxygen tank, CPAP, cane, etc.) Yes   Discharge follow-up appointment scheduled within 14 calendar days?  Yes   Discharge Follow Up Appointment Date 3/23/2022   Discharge Follow Up Appointment Scheduled with? Primary Care Provider     Hospital Follow-up Visit:    Hospital/Nursing Home/IP Rehab Facility: [facility]  Date of Admission: 3/3/22  Date of Discharge: 3/5/22  Reason(s) for Admission: Symptomatic bradycardia-likely drug-induced given amiodarone with concomitant diltiazem   Atrial fibrillation with RVR-again currently sinus  Hyponatremia, hypothyroidism, hypertension      Was your hospitalization related to COVID-19? No   Problems taking medications regularly:  None  Medication changes since discharge: None  Problems adhering to non-medication therapy:  None    Summary of hospitalization:  [facility] discharge summary reviewed  Diagnostic Tests/Treatments reviewed.  Follow up needed: CBC  Other Healthcare Providers Involved in Patient s Care:         Specialist appointment - Cardiologist  Update since discharge: improved.   Post Discharge Medication Reconciliation: discharge medications reconciled, continue medications without change.  Plan of care communicated with patient and family          Jeannie Chu is a 87 year old female with a PMHx significant for CAD, HTN, HLP, hypothyroidism, and PAF with recent admission 2/27-3/2/22 for afib with RVR with multiple med changes (including starting  amiodarone and losartan, diltiazem was adjusted and digoxin was stopped), who was admitted on 3/3/2022 with symptomatic bradycardia. She was admitted on 2/27 for afib with RVR and started on IV amiodarone. She converted to SR and was transitioned to oral amiodarone with taper plan. She was discharged on 3/2 then presented  on 3/3 with symptomatic bradycardia (weak and LH). Her pulse was noted to be in the mid 40s so she was sent to the ER and admitted for further care. Cardiology was consulted and recommended she stop the diltiazem and the amiodarone be decreased to 200mg daily. Losartan was increased to give better BP control. The patient was monitored on telemetry with no further sustained episodes of bradycardia.      Symptomatic bradycardia-likely drug-induced given amiodarone with concomitant diltiazem-  improved with medication adjustment , on  amiodarone 200mg daily. - diltiazem stopped, outpatient Cardiology follow up      Atrial fibrillation with RVR-again currently sinus  -low dose Amiodarone started per cardiology  -Continue Xarelto for anticoagulation     Hypertension--diltiazem stopped per above ,-Losartan was increased to 50 mg.but patient has been taking 25 mg daily and her home blood pressures are normal     Hyponatremia- -resolved     Hypothyroidism-on levothyroxine 25 mcg daily for 6 days and 50 mcg on day 7, last TSH 9.61 and pt was advised to increase levoxyl to 37.5 mcg daily durign hospitalization but pt did not do it.      Past Medical History:   Diagnosis Date     Acquired hypothyroidism 5/27/2021     Coronary artery disease involving native coronary artery of native heart without angina pectoris 5/26/2021     Other and unspecified hyperlipidemia      Unspecified cardiovascular disease 1999    stent placed         Current Outpatient Medications   Medication Sig Dispense Refill     [START ON 3/30/2022] amiodarone (PACERONE) 200 MG tablet Take 1 tablet (200 mg) by mouth daily 30 tablet 0      "atorvastatin (LIPITOR) 10 MG tablet Take 1 tablet (10 mg) by mouth daily 90 tablet 3     IRON (FERROUS GLUCONATE) 325 MG OR TABS Take 1 tablet by mouth daily  30 0     levothyroxine (SYNTHROID/LEVOTHROID) 25 MCG tablet Take 1.5 tablets (37.5 mcg) by mouth daily Wednesdays 45 tablet 0     losartan (COZAAR) 50 MG tablet Take 1 tablet (50 mg) by mouth daily (Patient taking differently: Take 25 mg by mouth daily 25mg daily) 30 tablet 0     Multiple Vitamins-Minerals (ICAPS AREDS FORMULA PO) Take 2 tablets by mouth daily        niacin 500 MG tablet Take by mouth daily (with breakfast)       nitroGLYcerin (NITROSTAT) 0.4 MG sublingual tablet For chest pain place 1 tablet under the tongue every 5 minutes for 3 doses. If symptoms persist 5 minutes after 1st dose call 911. 30 tablet 0     ondansetron (ZOFRAN-ODT) 4 MG ODT tab Take 1 tablet (4 mg) by mouth every 8 hours as needed for nausea or vomiting 15 tablet 0     rivaroxaban ANTICOAGULANT (XARELTO) 20 MG TABS tablet Take 1 tablet (20 mg) by mouth daily (with dinner) 90 tablet 3       Review of Systems   CONSTITUTIONAL: NEGATIVE for fever, chills, change in weight  ENT/MOUTH: NEGATIVE for ear, mouth and throat problems  RESP: NEGATIVE for significant cough or SOB  CV: NEGATIVE for chest pain, palpitations or peripheral edema  NEURO: NEGATIVE for weakness, dizziness or paresthesias  PSYCHIATRIC: NEGATIVE for changes in mood or affect      Objective    /76   Pulse 83   Temp 96.9  F (36.1  C) (Axillary)   Resp 11   Ht 1.575 m (5' 2\")   Wt 71.7 kg (158 lb)   LMP  (LMP Unknown)   SpO2 100%   BMI 28.90 kg/m    Body mass index is 28.9 kg/m .  Physical Exam   GENERAL: healthy, alert and no distress  NECK: no adenopathy, no asymmetry, masses, or scars and thyroid normal to palpation  RESP: lungs clear to auscultation - no rales, rhonchi or wheezes  CV: regular rate and rhythm,    MS: Trace LE  Edema, no calf tenderness  PSYCH: mentation appears normal, affect " normal/bright

## 2022-03-12 PROBLEM — D64.9 ANEMIA, UNSPECIFIED TYPE: Status: ACTIVE | Noted: 2022-03-12

## 2022-03-12 PROBLEM — E78.5 HYPERLIPIDEMIA LDL GOAL <100: Status: ACTIVE | Noted: 2022-03-12

## 2022-03-12 LAB
ALT SERPL W P-5'-P-CCNC: 24 U/L (ref 0–50)
CHOLEST SERPL-MCNC: 132 MG/DL
FASTING STATUS PATIENT QL REPORTED: NO
HDLC SERPL-MCNC: 81 MG/DL
LDLC SERPL CALC-MCNC: 24 MG/DL
NONHDLC SERPL-MCNC: 51 MG/DL
T4 FREE SERPL-MCNC: 1.5 NG/DL (ref 0.76–1.46)
TRIGL SERPL-MCNC: 136 MG/DL
TSH SERPL DL<=0.005 MIU/L-ACNC: 7.62 MU/L (ref 0.4–4)

## 2022-03-12 RX ORDER — LOSARTAN POTASSIUM 25 MG/1
25 TABLET ORAL DAILY
Qty: 90 TABLET | Refills: 0 | Status: SHIPPED | OUTPATIENT
Start: 2022-03-12 | End: 2022-03-16

## 2022-03-16 ENCOUNTER — TELEPHONE (OUTPATIENT)
Dept: INTERNAL MEDICINE | Facility: CLINIC | Age: 87
End: 2022-03-16

## 2022-03-16 ENCOUNTER — OFFICE VISIT (OUTPATIENT)
Dept: CARDIOLOGY | Facility: CLINIC | Age: 87
End: 2022-03-16
Payer: COMMERCIAL

## 2022-03-16 ENCOUNTER — LAB (OUTPATIENT)
Dept: LAB | Facility: CLINIC | Age: 87
End: 2022-03-16
Payer: COMMERCIAL

## 2022-03-16 VITALS
DIASTOLIC BLOOD PRESSURE: 78 MMHG | HEART RATE: 79 BPM | SYSTOLIC BLOOD PRESSURE: 142 MMHG | HEIGHT: 62 IN | BODY MASS INDEX: 29.17 KG/M2 | OXYGEN SATURATION: 100 % | WEIGHT: 158.5 LBS

## 2022-03-16 DIAGNOSIS — I10 ESSENTIAL HYPERTENSION: ICD-10-CM

## 2022-03-16 DIAGNOSIS — E78.5 HYPERLIPIDEMIA LDL GOAL <130: ICD-10-CM

## 2022-03-16 DIAGNOSIS — I48.0 PAROXYSMAL ATRIAL FIBRILLATION (H): ICD-10-CM

## 2022-03-16 DIAGNOSIS — R06.09 DYSPNEA ON EXERTION: Primary | ICD-10-CM

## 2022-03-16 PROCEDURE — 36415 COLL VENOUS BLD VENIPUNCTURE: CPT

## 2022-03-16 PROCEDURE — 99214 OFFICE O/P EST MOD 30 MIN: CPT | Performed by: INTERNAL MEDICINE

## 2022-03-16 PROCEDURE — 80048 BASIC METABOLIC PNL TOTAL CA: CPT

## 2022-03-16 RX ORDER — LOSARTAN POTASSIUM 25 MG/1
37.5 TABLET ORAL DAILY
Qty: 135 TABLET | Refills: 3 | Status: SHIPPED | OUTPATIENT
Start: 2022-03-16 | End: 2022-05-17 | Stop reason: DRUGHIGH

## 2022-03-16 NOTE — PATIENT INSTRUCTIONS
March 16, 2022    Thank you for allowing our Cardiology team to participate in your care.     Please note the following changes to your heart treatment plan:     Medication changes:   - increase losartan 25mg daily to 37.5mg daily   - goal BP <140/90 mmHg    - will check with Dr. Hilario regarding dosing of levothyroxine (thyroid supplement)    Tests to be done:  - TTE (heart ultrasound)  - thyroid testing per Dr. Hilario    Follow up:  - Follow up in 3 months, or sooner as needed.      Please contact our team at [phone] or [phone] for any questions or concerns.   For scheduling, please call [phone].  If you are having a medical emergency, please call [phone].     Sincerely,    Germán Shin MD, [facility]  Cardiology    [facility]

## 2022-03-16 NOTE — PROGRESS NOTES
Cardiology Clinic Progress Note:    March 16, 2022   Patient Name: Jeannie Chu  Patient MRN: [id number]     Consult indication: atrial fibrillation     HPI:    I had the opportunity to see patient Jeannie Chu in cardiology clinic for a follow up visit. Patient is followed by our colleague Diana Hilario MD with Primary Care.     As you know, patient is a pleasant 87-year-old female with a past medical history significant for hypertension, hyperlipidemia, remote history of CAD status post PCI (approximately 20 years ago), paroxysmal atrial fibrillation, who presents for routine follow up.      I had initially met patient in hospital for a consultation on 7/25/2021.  Patient had been experiencing fatigue and weakness, a blood pressure check at home demonstrated an elevated heart rate in the 120 bpm range.  Patient was brought to the ED, and was found to be in atrial fibrillation with RVR.  Labs at that time were notable for normal troponin.  TTE 7/25/2021 demonstrated normal biventricular function, no significant valvular abnormalities.  Patient was started on a diltiazem gtt., and converted spontaneously to normal sinus rhythm.  She was started on rivaroxaban, oral diltiazem, and discharged home.  Follow-up Holter monitor 7/26/2021 demonstrated normal sinus rhythm, no significant bradycardia, less than 1% PACs, less than 1% PVCs, longest run of paroxysmal SVT was 10 beats at 137 bpm, fastest run was 4 beats at 182 bpm, asymptomatic.     I had last seen her in clinic on 11/29/2021.  At that time she had been doing reasonably well, and we increase losartan to 50 mg daily, and had planned for reassessment with a Holter monitor prior to her returning home to [facility].  Unfortunately, on 2/14/2022, she presented to the ED with palpitations.  She was seen in consultation by my colleague Dr. Ackerman for paroxysmal atrial fibrillation with RVR, and she was discharged with low-dose digoxin, in addition to  her prior regimen of diltiazem.  She is continued on rivaroxaban.  Unfortunately, she presented to the ED again on 2/27/2022 with dizziness, and was seen in consultation by my colleague Dr. Leon.  Clinical presentation was consistent with recurrent symptomatic paroxysmal atrial fibrillation.  As such, she was started on amiodarone in favor of a change to a rhythm control strategy.  Unfortunately, she presented again on 3/3/2022 with symptomatic bradycardia.  My colleague Dr. Leon was still on the consult service, and amiodarone dosage was decreased to 200 mg daily.    Since then, patient reports that she has been doing reasonably well.  No recurrence of palpitations or dizziness.  She did have an episode of dyspnea on exertion when walking around a store yesterday, however it seems that symptoms have resolved.  She denies any associated palpitations or chest discomfort.  She was recently seen by her PCP Dr. Hilario and lipids were obtained which were very favorable with an LDL of 24, thyroid function testing was also done, patient has baseline hypothyroidism on Synthroid, TSH was elevated at 7.62, and free T4 was also mildly elevated at 1.5.    Patient brings with her a log of blood pressures from home, and blood pressures seem to range from the 150s to 160s over 70s to 80s mmHg.    Patient is visiting family, lives in [place], she is accompanied today by her son-in-law, I did speak with her son who is a dentist (speaker phone).    Assessment and Plan/Recommendations:    # Paroxysmal atrial fibrillation, [id number] 5, on rivaroxaban.  Remains in normal sinus rhythm on low-dose amiodarone (bradycardia at higher dosing).  # Hypothyroidism, free T4 mildly elevated since starting amiodarone.   # HTN. BP elevated.    # HL.  # CAD s/p PCI (remote history, proximal LAD 20 years ago).  Patient denies symptoms concerning for angina.    -TTE for assessment of dyspnea on exertion  -Will increase losartan 25 mg  daily to 37.5 mg daily  -For now, reasonable to continue amiodarone, will discuss with cardiology EP regarding long-term suitability given hypothyroidism and now issues with elevated free T4.  Will discuss dosing of levothyroxine with PCP Dr. Hilario.  -Continue rivaroxaban  -Continue atorvastatin  -Patient lives in [place] and will follow up with our team prior to returning home    ===ADDENDUM:  Discussed thyroid and amiodarone with Dr. Hilario who had already discussed this with Endocrinology, plan is for close monitoring and continuing current regimen, greatly appreciate assistance/collaboration. Also discussed with cardiology EP colleague, no contraindication for amiodarone as long as thyroid function is monitored closely, which is in keeping with current plan.     Thank you for allowing our team to participate in the care of Jeannie Chu.  Please do not hesitate to call or page me with any questions or concerns.    Sincerely,     Germán Shin MD, [facility]  Cardiology  Text Page   March 16, 2022    Voice recognition software utilized.     Total time spent on this encounter: 35 minutes, providing care in this encounter including, but not limited to, reviewing prior medical records, laboratory data, imaging studies, diagnostic studies, procedure notes, formulating an assessment and plan, recommendations, discussion and counseling with patient face to face, dictation.    Past Medical History:     Past Medical History:   Diagnosis Date     Acquired hypothyroidism 5/27/2021     Coronary artery disease involving native coronary artery of native heart without angina pectoris 5/26/2021     Other and unspecified hyperlipidemia      Unspecified cardiovascular disease 1999    stent placed        Past Surgical History:   History reviewed. No pertinent surgical history.    Medications (outpatient):  Current Outpatient Medications   Medication Sig Dispense Refill     [START ON 3/30/2022] amiodarone  "(PACERONE) 200 MG tablet Take 1 tablet (200 mg) by mouth daily 30 tablet 0     atorvastatin (LIPITOR) 10 MG tablet Take 1 tablet (10 mg) by mouth daily 90 tablet 3     IRON (FERROUS GLUCONATE) 325 MG OR TABS Take 1 tablet by mouth daily  30 0     levothyroxine (SYNTHROID/LEVOTHROID) 25 MCG tablet Take 1.5 tablets (37.5 mcg) by mouth daily Wednesdays 45 tablet 0     losartan (COZAAR) 25 MG tablet Take 1 tablet (25 mg) by mouth daily 90 tablet 0     Multiple Vitamins-Minerals (ICAPS AREDS FORMULA PO) Take 2 tablets by mouth daily        niacin 500 MG tablet Take by mouth daily (with breakfast)       nitroGLYcerin (NITROSTAT) 0.4 MG sublingual tablet For chest pain place 1 tablet under the tongue every 5 minutes for 3 doses. If symptoms persist 5 minutes after 1st dose call 911. 30 tablet 0     ondansetron (ZOFRAN-ODT) 4 MG ODT tab Take 1 tablet (4 mg) by mouth every 8 hours as needed for nausea or vomiting 15 tablet 0     rivaroxaban ANTICOAGULANT (XARELTO) 20 MG TABS tablet Take 1 tablet (20 mg) by mouth daily (with dinner) 90 tablet 3       Allergies:  Allergies   Allergen Reactions     Ibuprofen        Social History:   History   Drug Use No      History   Smoking Status     Never Smoker   Smokeless Tobacco     Never Used     Social History    Substance and Sexual Activity      Alcohol use: No       Family History:  Family History   Problem Relation Age of Onset     Family History Negative Other        Review of Systems:   A complete review of systems was negative except as mentioned in the History of Present Illness.     Objective & Physical Exam:  BP (!) 142/78   Pulse 79   Ht 1.575 m (5' 2\")   Wt 71.9 kg (158 lb 8 oz)   LMP  (LMP Unknown)   SpO2 100%   BMI 28.99 kg/m    Wt Readings from Last 2 Encounters:   03/16/22 71.9 kg (158 lb 8 oz)   03/11/22 71.7 kg (158 lb)     Body mass index is 28.99 kg/m .   Body surface area is 1.77 meters squared.    Constitutional: appears stated age, in no apparent distress, " appears to be well nourished  Head: normocephalic, atraumatic  Neck: supple, trachea midline, no bruit bilaterally   Pulmonary: clear to auscultation bilaterally, no wheezes, no rales, no increased work of breathing  Cardiovascular: JVP normal, regular rate, regular rhythm, normal S1 and S2, no S3, S4, no murmur appreciated, no lower extremity edema  Gastrointestinal: no guarding, non-rigid   Neurologic: awake, alert, moves all extremities  Skin: no jaundice, warm on limited exam  Psychiatric: affect is normal, answers questions appropriately, oriented to self and place    Data reviewed:  Lab Results   Component Value Date    WBC 9.5 03/11/2022    WBC 10.0 05/26/2021    RBC 3.75 (L) 03/11/2022    RBC 4.30 05/26/2021    HGB 11.6 (L) 03/11/2022    HGB 12.9 05/26/2021    HCT 35.8 03/11/2022    HCT 40.3 05/26/2021    MCV 96 03/11/2022    MCV 94 05/26/2021    MCH 30.9 03/11/2022    MCH 30.0 05/26/2021    MCHC 32.4 03/11/2022    MCHC 32.0 05/26/2021    RDW 14.0 03/11/2022    RDW 13.7 05/26/2021     03/11/2022     05/26/2021     Sodium   Date Value Ref Range Status   03/05/2022 135 133 - 144 mmol/L Final   04/30/2021 135 133 - 144 mmol/L Final     Potassium   Date Value Ref Range Status   03/05/2022 3.8 3.4 - 5.3 mmol/L Final   04/30/2021 4.4 3.4 - 5.3 mmol/L Final     Chloride   Date Value Ref Range Status   03/05/2022 103 94 - 109 mmol/L Final   04/30/2021 102 94 - 109 mmol/L Final     Carbon Dioxide   Date Value Ref Range Status   04/30/2021 30 20 - 32 mmol/L Final     Carbon Dioxide (CO2)   Date Value Ref Range Status   03/05/2022 28 20 - 32 mmol/L Final     Anion Gap   Date Value Ref Range Status   03/05/2022 4 3 - 14 mmol/L Final   04/30/2021 3 3 - 14 mmol/L Final     Glucose   Date Value Ref Range Status   03/05/2022 80 70 - 99 mg/dL Final   04/30/2021 89 70 - 99 mg/dL Final     Urea Nitrogen   Date Value Ref Range Status   03/05/2022 15 7 - 30 mg/dL Final   04/30/2021 18 7 - 30 mg/dL Final      Creatinine   Date Value Ref Range Status   03/05/2022 0.85 0.52 - 1.04 mg/dL Final   04/30/2021 0.77 0.52 - 1.04 mg/dL Final     GFR Estimate   Date Value Ref Range Status   03/05/2022 66 >60 mL/min/1.73m2 Final     Comment:     Effective December 21, 2021 eGFRcr in adults is calculated using the 2021 CKD-EPI creatinine equation which includes age and gender (Donavon et al., [place], DOI: 10.1056/FHVDul6593496)   04/30/2021 70 >60 mL/min/[1.73_m2] Final     Comment:     Non  GFR Calc  Starting 12/18/2018, serum creatinine based estimated GFR (eGFR) will be   calculated using the Chronic Kidney Disease Epidemiology Collaboration   (CKD-EPI) equation.       GFR, ESTIMATED POCT   Date Value Ref Range Status   07/24/2021 >60 >60 mL/min/1.73m2 Final     Calcium   Date Value Ref Range Status   03/05/2022 8.7 8.5 - 10.1 mg/dL Final   04/30/2021 9.3 8.5 - 10.1 mg/dL Final     Bilirubin Total   Date Value Ref Range Status   02/28/2022 0.9 0.2 - 1.3 mg/dL Final   04/30/2021 1.1 0.2 - 1.3 mg/dL Final     Alkaline Phosphatase   Date Value Ref Range Status   02/28/2022 57 40 - 150 U/L Final   04/30/2021 70 40 - 150 U/L Final     ALT   Date Value Ref Range Status   03/11/2022 24 0 - 50 U/L Final   04/30/2021 27 0 - 50 U/L Final     AST   Date Value Ref Range Status   02/28/2022 25 0 - 45 U/L Final   04/30/2021 27 0 - 45 U/L Final     Recent Labs   Lab Test 03/11/22  1505 05/26/21  1129   CHOL 132 135   HDL 81 64   LDL 24 49   TRIG 136 112      No results found for: A1C     No results found for this or any previous visit (from the past 4320 hour(s)).

## 2022-03-16 NOTE — LETTER
3/16/2022    Diana Hilario MD  [address] [facility] [place] [address]    RE: Jeannie Chu       Dear Colleague,     I had the pleasure of seeing Jeannie Chu in the [facility].      Cardiology Clinic Progress Note:    March 16, 2022   Patient Name: Jeannie Chu  Patient MRN: [id number]     Consult indication: atrial fibrillation     HPI:    I had the opportunity to see patient Jeannie Chu in cardiology clinic for a follow up visit. Patient is followed by our colleague Diana Hilario MD with Primary Care.     As you know, patient is a pleasant 87-year-old female with a past medical history significant for hypertension, hyperlipidemia, remote history of CAD status post PCI (approximately 20 years ago), paroxysmal atrial fibrillation, who presents for routine follow up.      I had initially met patient in hospital for a consultation on 7/25/2021.  Patient had been experiencing fatigue and weakness, a blood pressure check at home demonstrated an elevated heart rate in the 120 bpm range.  Patient was brought to the ED, and was found to be in atrial fibrillation with RVR.  Labs at that time were notable for normal troponin.  TTE 7/25/2021 demonstrated normal biventricular function, no significant valvular abnormalities.  Patient was started on a diltiazem gtt., and converted spontaneously to normal sinus rhythm.  She was started on rivaroxaban, oral diltiazem, and discharged home.  Follow-up Holter monitor 7/26/2021 demonstrated normal sinus rhythm, no significant bradycardia, less than 1% PACs, less than 1% PVCs, longest run of paroxysmal SVT was 10 beats at 137 bpm, fastest run was 4 beats at 182 bpm, asymptomatic.     I had last seen her in clinic on 11/29/2021.  At that time she had been doing reasonably well, and we increase losartan to 50 mg daily, and had planned for reassessment with a Holter monitor prior to her returning home to [facility].   Unfortunately, on 2/14/2022, she presented to the ED with palpitations.  She was seen in consultation by my colleague Dr. Ackerman for paroxysmal atrial fibrillation with RVR, and she was discharged with low-dose digoxin, in addition to her prior regimen of diltiazem.  She is continued on rivaroxaban.  Unfortunately, she presented to the ED again on 2/27/2022 with dizziness, and was seen in consultation by my colleague Dr. Leon.  Clinical presentation was consistent with recurrent symptomatic paroxysmal atrial fibrillation.  As such, she was started on amiodarone in favor of a change to a rhythm control strategy.  Unfortunately, she presented again on 3/3/2022 with symptomatic bradycardia.  My colleague Dr. Leon was still on the consult service, and amiodarone dosage was decreased to 200 mg daily.    Since then, patient reports that she has been doing reasonably well.  No recurrence of palpitations or dizziness.  She did have an episode of dyspnea on exertion when walking around a store yesterday, however it seems that symptoms have resolved.  She denies any associated palpitations or chest discomfort.  She was recently seen by her PCP Dr. Hilario and lipids were obtained which were very favorable with an LDL of 24, thyroid function testing was also done, patient has baseline hypothyroidism on Synthroid, TSH was elevated at 7.62, and free T4 was also mildly elevated at 1.5.    Patient brings with her a log of blood pressures from home, and blood pressures seem to range from the 150s to 160s over 70s to 80s mmHg.    Patient is visiting family, lives in [place], she is accompanied today by her son-in-law, I did speak with her son who is a dentist (speaker phone).    Assessment and Plan/Recommendations:    # Paroxysmal atrial fibrillation, [id number] 5, on rivaroxaban.  Remains in normal sinus rhythm on low-dose amiodarone (bradycardia at higher dosing).  # Hypothyroidism, free T4 mildly elevated since  starting amiodarone.   # HTN. BP elevated.    # HL.  # CAD s/p PCI (remote history, proximal LAD 20 years ago).  Patient denies symptoms concerning for angina.    -TTE for assessment of dyspnea on exertion  -Will increase losartan 25 mg daily to 37.5 mg daily  -For now, reasonable to continue amiodarone, will discuss with cardiology EP regarding long-term suitability given hypothyroidism and now issues with elevated free T4.  Will discuss dosing of levothyroxine with PCP Dr. Hilario.  -Continue rivaroxaban  -Continue atorvastatin  -Patient lives in [facility] and will follow up with our team prior to returning home    ===ADDENDUM:  Discussed thyroid and amiodarone with Dr. Hilario who had already discussed this with Endocrinology, plan is for close monitoring and continuing current regimen, greatly appreciate assistance/collaboration. Also discussed with cardiology EP colleague, no contraindication for amiodarone as long as thyroid function is monitored closely, which is in keeping with current plan.     Thank you for allowing our team to participate in the care of Jeannie Chu.  Please do not hesitate to call or page me with any questions or concerns.    Sincerely,     Germán Shin MD, [facility]  Cardiology  Text Page   March 16, 2022    Voice recognition software utilized.     Total time spent on this encounter: 35 minutes, providing care in this encounter including, but not limited to, reviewing prior medical records, laboratory data, imaging studies, diagnostic studies, procedure notes, formulating an assessment and plan, recommendations, discussion and counseling with patient face to face, dictation.    Past Medical History:     Past Medical History:   Diagnosis Date     Acquired hypothyroidism 5/27/2021     Coronary artery disease involving native coronary artery of native heart without angina pectoris 5/26/2021     Other and unspecified hyperlipidemia      Unspecified cardiovascular disease 1999  "   stent placed        Past Surgical History:   History reviewed. No pertinent surgical history.    Medications (outpatient):  Current Outpatient Medications   Medication Sig Dispense Refill     [START ON 3/30/2022] amiodarone (PACERONE) 200 MG tablet Take 1 tablet (200 mg) by mouth daily 30 tablet 0     atorvastatin (LIPITOR) 10 MG tablet Take 1 tablet (10 mg) by mouth daily 90 tablet 3     IRON (FERROUS GLUCONATE) 325 MG OR TABS Take 1 tablet by mouth daily  30 0     levothyroxine (SYNTHROID/LEVOTHROID) 25 MCG tablet Take 1.5 tablets (37.5 mcg) by mouth daily Wednesdays 45 tablet 0     losartan (COZAAR) 25 MG tablet Take 1 tablet (25 mg) by mouth daily 90 tablet 0     Multiple Vitamins-Minerals (ICAPS AREDS FORMULA PO) Take 2 tablets by mouth daily        niacin 500 MG tablet Take by mouth daily (with breakfast)       nitroGLYcerin (NITROSTAT) 0.4 MG sublingual tablet For chest pain place 1 tablet under the tongue every 5 minutes for 3 doses. If symptoms persist 5 minutes after 1st dose call 911. 30 tablet 0     ondansetron (ZOFRAN-ODT) 4 MG ODT tab Take 1 tablet (4 mg) by mouth every 8 hours as needed for nausea or vomiting 15 tablet 0     rivaroxaban ANTICOAGULANT (XARELTO) 20 MG TABS tablet Take 1 tablet (20 mg) by mouth daily (with dinner) 90 tablet 3       Allergies:  Allergies   Allergen Reactions     Ibuprofen        Social History:   History   Drug Use No      History   Smoking Status     Never Smoker   Smokeless Tobacco     Never Used     Social History    Substance and Sexual Activity      Alcohol use: No       Family History:  Family History   Problem Relation Age of Onset     Family History Negative Other        Review of Systems:   A complete review of systems was negative except as mentioned in the History of Present Illness.     Objective & Physical Exam:  BP (!) 142/78   Pulse 79   Ht 1.575 m (5' 2\")   Wt 71.9 kg (158 lb 8 oz)   LMP  (LMP Unknown)   SpO2 100%   BMI 28.99 kg/m    Wt Readings " from Last 2 Encounters:   03/16/22 71.9 kg (158 lb 8 oz)   03/11/22 71.7 kg (158 lb)     Body mass index is 28.99 kg/m .   Body surface area is 1.77 meters squared.    Constitutional: appears stated age, in no apparent distress, appears to be well nourished  Head: normocephalic, atraumatic  Neck: supple, trachea midline, no bruit bilaterally   Pulmonary: clear to auscultation bilaterally, no wheezes, no rales, no increased work of breathing  Cardiovascular: JVP normal, regular rate, regular rhythm, normal S1 and S2, no S3, S4, no murmur appreciated, no lower extremity edema  Gastrointestinal: no guarding, non-rigid   Neurologic: awake, alert, moves all extremities  Skin: no jaundice, warm on limited exam  Psychiatric: affect is normal, answers questions appropriately, oriented to self and place    Data reviewed:  Lab Results   Component Value Date    WBC 9.5 03/11/2022    WBC 10.0 05/26/2021    RBC 3.75 (L) 03/11/2022    RBC 4.30 05/26/2021    HGB 11.6 (L) 03/11/2022    HGB 12.9 05/26/2021    HCT 35.8 03/11/2022    HCT 40.3 05/26/2021    MCV 96 03/11/2022    MCV 94 05/26/2021    MCH 30.9 03/11/2022    MCH 30.0 05/26/2021    MCHC 32.4 03/11/2022    MCHC 32.0 05/26/2021    RDW 14.0 03/11/2022    RDW 13.7 05/26/2021     03/11/2022     05/26/2021     Sodium   Date Value Ref Range Status   03/05/2022 135 133 - 144 mmol/L Final   04/30/2021 135 133 - 144 mmol/L Final     Potassium   Date Value Ref Range Status   03/05/2022 3.8 3.4 - 5.3 mmol/L Final   04/30/2021 4.4 3.4 - 5.3 mmol/L Final     Chloride   Date Value Ref Range Status   03/05/2022 103 94 - 109 mmol/L Final   04/30/2021 102 94 - 109 mmol/L Final     Carbon Dioxide   Date Value Ref Range Status   04/30/2021 30 20 - 32 mmol/L Final     Carbon Dioxide (CO2)   Date Value Ref Range Status   03/05/2022 28 20 - 32 mmol/L Final     Anion Gap   Date Value Ref Range Status   03/05/2022 4 3 - 14 mmol/L Final   04/30/2021 3 3 - 14 mmol/L Final     Glucose    Date Value Ref Range Status   03/05/2022 80 70 - 99 mg/dL Final   04/30/2021 89 70 - 99 mg/dL Final     Urea Nitrogen   Date Value Ref Range Status   03/05/2022 15 7 - 30 mg/dL Final   04/30/2021 18 7 - 30 mg/dL Final     Creatinine   Date Value Ref Range Status   03/05/2022 0.85 0.52 - 1.04 mg/dL Final   04/30/2021 0.77 0.52 - 1.04 mg/dL Final     GFR Estimate   Date Value Ref Range Status   03/05/2022 66 >60 mL/min/1.73m2 Final     Comment:     Effective December 21, 2021 eGFRcr in adults is calculated using the 2021 CKD-EPI creatinine equation which includes age and gender (Donavon et al., [place], DOI: 10.1056/KBIDxk9852973)   04/30/2021 70 >60 mL/min/[1.73_m2] Final     Comment:     Non  GFR Calc  Starting 12/18/2018, serum creatinine based estimated GFR (eGFR) will be   calculated using the Chronic Kidney Disease Epidemiology Collaboration   (CKD-EPI) equation.       GFR, ESTIMATED POCT   Date Value Ref Range Status   07/24/2021 >60 >60 mL/min/1.73m2 Final     Calcium   Date Value Ref Range Status   03/05/2022 8.7 8.5 - 10.1 mg/dL Final   04/30/2021 9.3 8.5 - 10.1 mg/dL Final     Bilirubin Total   Date Value Ref Range Status   02/28/2022 0.9 0.2 - 1.3 mg/dL Final   04/30/2021 1.1 0.2 - 1.3 mg/dL Final     Alkaline Phosphatase   Date Value Ref Range Status   02/28/2022 57 40 - 150 U/L Final   04/30/2021 70 40 - 150 U/L Final     ALT   Date Value Ref Range Status   03/11/2022 24 0 - 50 U/L Final   04/30/2021 27 0 - 50 U/L Final     AST   Date Value Ref Range Status   02/28/2022 25 0 - 45 U/L Final   04/30/2021 27 0 - 45 U/L Final     Recent Labs   Lab Test 03/11/22  1505 05/26/21  1129   CHOL 132 135   HDL 81 64   LDL 24 49   TRIG 136 112      No results found for: A1C     No results found for this or any previous visit (from the past 4320 hour(s)).     Thank you for allowing me to participate in the care of your patient.      Sincerely,   Germán Shin MD   [facility]  [facility]      cc: Germán Shin MD  [id number], [address]  [place] [id number]

## 2022-03-17 ENCOUNTER — TELEPHONE (OUTPATIENT)
Dept: CARDIOLOGY | Facility: CLINIC | Age: 87
End: 2022-03-17
Payer: COMMERCIAL

## 2022-03-17 LAB
ANION GAP SERPL CALCULATED.3IONS-SCNC: 8 MMOL/L (ref 3–14)
BUN SERPL-MCNC: 10 MG/DL (ref 7–30)
CALCIUM SERPL-MCNC: 9.3 MG/DL (ref 8.5–10.1)
CHLORIDE BLD-SCNC: 103 MMOL/L (ref 94–109)
CO2 SERPL-SCNC: 25 MMOL/L (ref 20–32)
CREAT SERPL-MCNC: 1.05 MG/DL (ref 0.52–1.04)
GFR SERPL CREATININE-BSD FRML MDRD: 51 ML/MIN/1.73M2
GLUCOSE BLD-MCNC: 89 MG/DL (ref 70–99)
POTASSIUM BLD-SCNC: 4.4 MMOL/L (ref 3.4–5.3)
SODIUM SERPL-SCNC: 136 MMOL/L (ref 133–144)

## 2022-03-17 NOTE — TELEPHONE ENCOUNTER
Call received from pt's daughter Marya (CTC on file). She reports she was trying to schedule TTE for mother. TTE schedule out to may. Per discharge paperwork from appointment yesterday pt was to have this completed within 2 weeks. Will clarify urgency level with MD.     Pt's daughter inquired about plan for elevated T4 level. Dr. Shin was awaiting consultation recommendations from EP regarding amiodarone. Will review once Dr. Shin has reviewed.     Pt's daughter has question regarding losartan dose. Per daughter pt was taken off losartan previously, then it was restarted at the most recent hospital dose at 50mg daily. Pt has only been taking the 25mg daily as she was using a previous prescription. Per the last PMD note (3/11) home BP has been well managed and so pt was advised to continue the 25mg daily. At LOV W/Dr. Shin 3/16/2022 pr was advised to increase the dose to 37.5mg. Pt's daughter is wondering if the mother should return to 50mg that was prescribed at discharge or if she she should be taking 37.5mg. will clarify with MD plan for losartan.       Routing to MD to review and clarify.   GEORGE Tyler RN, BSN.

## 2022-03-17 NOTE — TELEPHONE ENCOUNTER
Germán Shin MD  You 1 hour ago (11:56 AM)     KSENIA    Thanks apologies for the confusion-- I sent a message to her PCP Dr. Hilario who had actually already discussed this with Endocrinology, and they recommend continued monitoring, and had communicated this to our patient. I spoke with EP and they recommended the same. I'll update/addend my clinic note. Given her BPs, I recommend we continue the losartan at 37.5mg daily. Since this episode of dyspnea is new from before, please change it to STAT so we can get this done sooner than a month from now, which I am worried is too long. Thanks!    Message text        Call placed to pt to review information with Pt's daughter Marya. Daughter verbalized understanding and will plan to start the 37.5mg losartan tomorrow.  TTE date/ Time verified with pt's daughter Marya. Pt's daughter updated regarding TSH level and recommendation for continued monitoring at this time.   GEORGE Tyler RN, BSN. 03/18/22 2:12 PM

## 2022-03-22 ENCOUNTER — HOSPITAL ENCOUNTER (OUTPATIENT)
Dept: CARDIOLOGY | Facility: CLINIC | Age: 87
Discharge: HOME | End: 2022-03-22
Attending: INTERNAL MEDICINE | Admitting: INTERNAL MEDICINE
Payer: COMMERCIAL

## 2022-03-22 ENCOUNTER — ALLIED HEALTH/NURSE VISIT (OUTPATIENT)
Dept: INTERNAL MEDICINE | Facility: CLINIC | Age: 87
End: 2022-03-22
Payer: COMMERCIAL

## 2022-03-22 VITALS — HEART RATE: 68 BPM | DIASTOLIC BLOOD PRESSURE: 71 MMHG | OXYGEN SATURATION: 99 % | SYSTOLIC BLOOD PRESSURE: 145 MMHG

## 2022-03-22 DIAGNOSIS — R06.09 DYSPNEA ON EXERTION: ICD-10-CM

## 2022-03-22 DIAGNOSIS — I10 ESSENTIAL HYPERTENSION: Primary | ICD-10-CM

## 2022-03-22 PROCEDURE — 99207 PR NO CHARGE NURSE ONLY: CPT

## 2022-03-22 PROCEDURE — 255N000002 HC RX 255 OP 636: Performed by: INTERNAL MEDICINE

## 2022-03-22 PROCEDURE — 93306 TTE W/DOPPLER COMPLETE: CPT | Mod: 26 | Performed by: INTERNAL MEDICINE

## 2022-03-22 PROCEDURE — 999N000208 ECHOCARDIOGRAM COMPLETE

## 2022-03-22 RX ADMIN — HUMAN ALBUMIN MICROSPHERES AND PERFLUTREN 3 ML: 10; .22 INJECTION, SOLUTION INTRAVENOUS at 10:32

## 2022-03-27 ENCOUNTER — ALLIED HEALTH/NURSE VISIT (OUTPATIENT)
Dept: URGENT CARE | Facility: URGENT CARE | Age: 87
End: 2022-03-27
Payer: COMMERCIAL

## 2022-03-27 ENCOUNTER — TELEPHONE (OUTPATIENT)
Dept: CARDIOLOGY | Facility: CLINIC | Age: 87
End: 2022-03-27

## 2022-03-27 VITALS — HEART RATE: 70 BPM | DIASTOLIC BLOOD PRESSURE: 72 MMHG | SYSTOLIC BLOOD PRESSURE: 152 MMHG

## 2022-03-27 DIAGNOSIS — I10 BENIGN ESSENTIAL HYPERTENSION: Primary | ICD-10-CM

## 2022-03-27 PROCEDURE — 99207 PR NO CHARGE NURSE ONLY: CPT

## 2022-03-28 NOTE — PROGRESS NOTES
Pt came with her daughter wanting to check BP. Pt daughter states that at home BP machine was reading at 165/85 and wanted to double check to make sure machine wasn't defective.     I brought patient and her daughter back to check manual BP which was reading at 156/74 at 7:40pm. Daughter wanted to compare it to her automatic BP machine which read at 164/73. Daughter repeatedly stated she did not want to check in to see a doctor at . I advised to have patient sit for 10 minutes and I will recheck one more time.     I checked BP again for patient per request and got 152/72.  I will route this encounter to pt's cardiologist per request.    I informed patient and daughter that normally we do not do nurse only visits at  and advised if they wanted a nurse only BP check to set up appointment with upstairs staff or walk into a pharmacy.    ANALILIA Quick  8:01 PM 3/27/2022

## 2022-03-28 NOTE — TELEPHONE ENCOUNTER
Patient came into [facility] requesting a blood pressure check. I did a nurse only BP check per request. Please see  nurse only visit regarding BP taken in clinic.     Thank you,  ANALILIA Quick  8:05 PM 3/27/2022

## 2022-03-29 ENCOUNTER — TELEPHONE (OUTPATIENT)
Dept: CARDIOLOGY | Facility: CLINIC | Age: 87
End: 2022-03-29
Payer: COMMERCIAL

## 2022-03-29 NOTE — LETTER
March 29, 2022       TO: Jeannie Chu   [address] [facility] [address]       Dear Ms. Chu,    The results of your recent echocardiogram have been reviewed by Dr. Shin. Overall results are reassuring. Stable structure and function form prior. There is some valve dysfunction noted and this is likely age related and is not anything we would recommend further intervention on rather we recommend routine monitoring in the future. A copy of the results has been included with this letter for your personal records.   Please feel free to contact the Nurse line at [phone] with any questions or concerns.    Nini BERKOWITZ RN, BSN      Sincerely,    [facility]

## 2022-03-29 NOTE — TELEPHONE ENCOUNTER
Call palced to pt's daughter Marya to review results of TTE:     Interpretation Summary     Left ventricular systolic function is normal. EF 60-65%. No regional wall  motion abnormalities noted.  The right ventricle is normal in size and function.  There is mild to moderate (1-2+) mitral regurgitation.  There is trace to mild tricuspid regurgitation.  There is mild (1+) aortic regurgitation.     In comparison to the echo report from 7/25/21, the RV function is normal.     Germán Shin MD   3/22/2022  1:06 PM CDT         Results reviewed, please let the patient know that overall findings are reassuring, stable biventricular function. There is some mild valvular dysfunction, however this is not unexpected given her age, and we were not able to visualize the valves well on the prior study, so I suspect function is stable. Follow up as previously planned, thanks!     No answer - left detailed VM - will mail results as well.   GEORGE Tyler RN, BSN. 03/29/22 8:59 AM

## 2022-04-26 DIAGNOSIS — E03.9 ACQUIRED HYPOTHYROIDISM: Primary | ICD-10-CM

## 2022-04-29 RX ORDER — LEVOTHYROXINE SODIUM 25 UG/1
25 TABLET ORAL DAILY
Qty: 60 TABLET | Refills: 0 | Status: SHIPPED | OUTPATIENT
Start: 2022-04-29 | End: 2022-07-12 | Stop reason: DRUGHIGH

## 2022-04-29 NOTE — TELEPHONE ENCOUNTER
Routing refill request to provider for review/approval because:  Labs out of range:    Lab Test 03/11/22  1505   TSH 7.62*

## 2022-05-16 ENCOUNTER — TELEPHONE (OUTPATIENT)
Dept: CARDIOLOGY | Facility: CLINIC | Age: 87
End: 2022-05-16
Payer: COMMERCIAL

## 2022-05-16 DIAGNOSIS — I10 ESSENTIAL HYPERTENSION: Primary | ICD-10-CM

## 2022-05-16 NOTE — TELEPHONE ENCOUNTER
Call received form Pt's daughter Marya to review recent elevation in BP readings. Per Daughter BP readings are 170's - 180's / 80's.     Pt's daughter reports she bought a new home machine as there was concern the prior machine was not working. Per report - 170's / 70's - 80's consistently over the past week. Pt's daughter not at home to review readings. Per report pt takes BP several times a day.     Pt's daughter reports pt previously on losartan 50mg daily. Pt was hospitalized and the dose was cut down to 25mg - then increased to 37.5mg daily with Dr. Shin at last visit. Pt';s daughter inquiring about increasing this back to 50mg daily.     Routing to Dr. Shin for review / recommendations.   GEORGE Tyler RN, BSN.

## 2022-05-17 RX ORDER — LOSARTAN POTASSIUM 50 MG/1
50 TABLET ORAL DAILY
Qty: 90 TABLET | Refills: 1 | Status: SHIPPED | OUTPATIENT
Start: 2022-05-17 | End: 2022-05-27

## 2022-05-17 NOTE — TELEPHONE ENCOUNTER
Caller: Unspecified (Yesterday,  1:52 PM)  Agreed, increase losartan back to 50mg daily with a BMP at follow up with Saida thanks!       Call placed to pt's daughter zaki to review recommendations. Orders place per MD. Pt scheduled for lab visit prior to OV. Pt and daughter verbalized understanding. Review of BP measuring completed. Pt takes medications around 8AM and then checks BP around 7 PM. Pt will keep a log to bring to OV.   GEORGE Tyler RN, BSN. 05/17/22 2:01 PM

## 2022-05-27 ENCOUNTER — LAB (OUTPATIENT)
Dept: LAB | Facility: CLINIC | Age: 87
End: 2022-05-27
Payer: COMMERCIAL

## 2022-05-27 ENCOUNTER — OFFICE VISIT (OUTPATIENT)
Dept: CARDIOLOGY | Facility: CLINIC | Age: 87
End: 2022-05-27
Payer: COMMERCIAL

## 2022-05-27 VITALS
HEART RATE: 58 BPM | DIASTOLIC BLOOD PRESSURE: 74 MMHG | SYSTOLIC BLOOD PRESSURE: 170 MMHG | HEIGHT: 62 IN | WEIGHT: 154.3 LBS | OXYGEN SATURATION: 99 % | BODY MASS INDEX: 28.39 KG/M2

## 2022-05-27 DIAGNOSIS — E78.5 HYPERLIPIDEMIA LDL GOAL <100: ICD-10-CM

## 2022-05-27 DIAGNOSIS — I10 ESSENTIAL HYPERTENSION: ICD-10-CM

## 2022-05-27 DIAGNOSIS — I10 ESSENTIAL HYPERTENSION: Primary | ICD-10-CM

## 2022-05-27 DIAGNOSIS — I25.10 CORONARY ARTERY DISEASE INVOLVING NATIVE CORONARY ARTERY OF NATIVE HEART WITHOUT ANGINA PECTORIS: ICD-10-CM

## 2022-05-27 DIAGNOSIS — I48.0 PAROXYSMAL ATRIAL FIBRILLATION (H): ICD-10-CM

## 2022-05-27 LAB
ANION GAP SERPL CALCULATED.3IONS-SCNC: 2 MMOL/L (ref 3–14)
BUN SERPL-MCNC: 26 MG/DL (ref 7–30)
CALCIUM SERPL-MCNC: 9.3 MG/DL (ref 8.5–10.1)
CHLORIDE BLD-SCNC: 100 MMOL/L (ref 94–109)
CO2 SERPL-SCNC: 31 MMOL/L (ref 20–32)
CREAT SERPL-MCNC: 0.98 MG/DL (ref 0.52–1.04)
GFR SERPL CREATININE-BSD FRML MDRD: 56 ML/MIN/1.73M2
GLUCOSE BLD-MCNC: 88 MG/DL (ref 70–99)
POTASSIUM BLD-SCNC: 4.4 MMOL/L (ref 3.4–5.3)
SODIUM SERPL-SCNC: 133 MMOL/L (ref 133–144)

## 2022-05-27 PROCEDURE — 36415 COLL VENOUS BLD VENIPUNCTURE: CPT | Performed by: INTERNAL MEDICINE

## 2022-05-27 PROCEDURE — 99214 OFFICE O/P EST MOD 30 MIN: CPT | Performed by: NURSE PRACTITIONER

## 2022-05-27 PROCEDURE — 80048 BASIC METABOLIC PNL TOTAL CA: CPT | Performed by: INTERNAL MEDICINE

## 2022-05-27 RX ORDER — LOSARTAN POTASSIUM 50 MG/1
50 TABLET ORAL 2 TIMES DAILY
Qty: 180 TABLET | Refills: 3 | Status: SHIPPED | OUTPATIENT
Start: 2022-05-27 | End: 2022-11-07

## 2022-05-27 NOTE — PROGRESS NOTES
Cardiology Clinic Progress Note  Jeannie Chu MRN# [id number]   YOB: 1935 Age: 87 year old     Reason For Visit:  Review of echo results    Primary Cardiologist:   Dr. Shin           History of Presenting Illness:      Jeannie Chu is a pleasant 87 year old patient who carries a past medical history significant for hypertension, hyperlipidemia, CAD w/ remote stent (> 20 yrs) and paroxysmal atrial fibrillation.    She was last seen on 3/16/2022 in follow-up to a ER evaluation for symptomatic paroxysmal atrial fibrillation.  She had previously been seen in the ER with atrial fibrillation with RVR and initiated on digoxin, diltiazem, and Xarelto for anticoagulation.  Unfortunately, developed dizziness and digoxin and diltiazem were discontinued.  She was initiated on amiodarone but again returned to the emergency room with symptomatic bradycardia.  Her loading dose of amiodarone was decreased to 200 mg daily.    At her recent visit, she was noted to be hypertensive and losartan was uptitrated to 37.5 mg daily.  She underwent a follow-up echocardiogram that showed a normal ejection fraction estimated at 60 to 65%, no regional wall motion abnormalities, normal RV size and function, mild to moderate mitral regurgitation, trace to mild tricuspid regurgitation, and mild aortic regurgitation.    She presents to the office today accompanied by her daughter for review of echocardiogram and blood pressure reassessment.  She offers no cardiac complaint, denies chest pain, shortness of breath, PND, orthopnea, presyncope, syncope, edema, heart racing, or palpitations.     Blood pressure is elevated at 170/74, consistent with home blood pressure log.  BMP today showed a sodium of 133, potassium 4.4, BUN 26, creatinine 0.98, and GFR 56  BMI 28.22    Activity level is unchanged  Follows a strict low-sodium diet  Remains compliant with all medications.                   Assessment and Plan:       1.   Hypertension -uncontrolled.  Increase losartan to 50 mg twice daily.  Monitor blood pressures daily with a goal of less than 140/90.  Follow-up BMP in 2 weeks.  Strict low-sodium diet.    2.  Paroxysmal atrial fibrillation -currently in normal sinus rhythm, continue amiodarone and Xarelto for stroke prevention.    3.  Coronary artery disease -remote history of stent to the LAD >20 years ago.  Denies any symptoms of ischemia.   4.  Hyperlipidemia -LDL at goal, continue low-dose atorvastatin      Thank you for allowing me to participate in this delightful patient's care. I have recommended she follow up in July with Dr. Shin as scheduled or sooner if needed.       Saida Mayo, APRN CNP         Review of Systems:     Review of Systems:  Skin:  not assessed     Eyes:  Negative    ENT:  Negative    Respiratory:  Negative    Cardiovascular:  Negative    Gastroenterology: Negative    Genitourinary:  not assessed    Musculoskeletal:  Negative    Neurologic:  Positive for headaches  Psychiatric:  not assessed    Heme/Lymph/Imm:  not assessed    Endocrine:  Negative                Physical Exam:     GEN:  In general, this is a well nourished elderly female in no acute distress.  HEENT:  Pupils equal, round. Sclerae nonicteric. Clear oropharynx. Mucous membranes moist.  NECK: Supple, no masses appreciated. Trachea midline.  No JVD   C/V:  Regular rate and rhythm, no murmur, rub or gallop. No S3 or RV heave.   RESP: Respirations are unlabored. No use of accessory muscles. Clear to auscultation bilaterally without wheezing, rales, or rhonchi.  GI: Abdomen soft, nontender, nondistended. No HSM appreciated.   EXTREM: No LE edema. No cyanosis or clubbing.  NEURO: Alert and oriented, cooperative. Gait steady. No obvious focal deficits.   PSYCH: Normal affect.  SKIN: Warm and dry. No rashes or petechiae appreciated.          Past Medical History:     Past Medical History:   Diagnosis Date     Acquired hypothyroidism 5/27/2021      Coronary artery disease involving native coronary artery of native heart without angina pectoris 5/26/2021     Other and unspecified hyperlipidemia      Unspecified cardiovascular disease 1999    stent placed              Past Surgical History:   No past surgical history on file.           Allergies:   Ibuprofen       Data:   All laboratory data reviewed:    LAST CHOLESTEROL:  Lab Results   Component Value Date    CHOL 132 03/11/2022    CHOL 135 05/26/2021     Lab Results   Component Value Date    HDL 81 03/11/2022    HDL 64 05/26/2021     Lab Results   Component Value Date    LDL 24 03/11/2022    LDL 49 05/26/2021     Lab Results   Component Value Date    TRIG 136 03/11/2022    TRIG 112 05/26/2021     No results found for: CHOLHDLRATIO    LAST BMP:  Lab Results   Component Value Date     05/27/2022     04/30/2021      Lab Results   Component Value Date    POTASSIUM 4.4 05/27/2022    POTASSIUM 4.4 04/30/2021     Lab Results   Component Value Date    CHLORIDE 100 05/27/2022    CHLORIDE 102 04/30/2021     Lab Results   Component Value Date    LYLE 9.3 05/27/2022    LYLE 9.3 04/30/2021     Lab Results   Component Value Date    CO2 31 05/27/2022    CO2 30 04/30/2021     Lab Results   Component Value Date    BUN 26 05/27/2022    BUN 18 04/30/2021     Lab Results   Component Value Date    CR 0.98 05/27/2022    CR 0.77 04/30/2021     Lab Results   Component Value Date    GLC 88 05/27/2022    GLC 89 04/30/2021       LAST CBC:  Lab Results   Component Value Date    WBC 9.5 03/11/2022    WBC 10.0 05/26/2021     Lab Results   Component Value Date    RBC 3.75 03/11/2022    RBC 4.30 05/26/2021     Lab Results   Component Value Date    HGB 11.6 03/11/2022    HGB 12.9 05/26/2021     Lab Results   Component Value Date    HCT 35.8 03/11/2022    HCT 40.3 05/26/2021     Lab Results   Component Value Date    MCV 96 03/11/2022    MCV 94 05/26/2021     Lab Results   Component Value Date    MCH 30.9 03/11/2022    MCH 30.0  05/26/2021     Lab Results   Component Value Date    MCHC 32.4 03/11/2022    MCHC 32.0 05/26/2021     Lab Results   Component Value Date    RDW 14.0 03/11/2022    RDW 13.7 05/26/2021     Lab Results   Component Value Date     03/11/2022     05/26/2021

## 2022-05-27 NOTE — LETTER
5/27/2022    Diana Hilario MD  [address] [facility] [address]    RE: Jeannie Chu       Dear Colleague,     I had the pleasure of seeing Jeannie Chu in the [facility].  Cardiology Clinic Progress Note  Jeannie Chu MRN# [id number]   YOB: 1935 Age: 87 year old     Reason For Visit:  Review of echo results    Primary Cardiologist:   Dr. Shin           History of Presenting Illness:      Jeannie Cuh is a pleasant 87 year old patient who carries a past medical history significant for hypertension, hyperlipidemia, CAD w/ remote stent (> 20 yrs) and paroxysmal atrial fibrillation.    She was last seen on 3/16/2022 in follow-up to a ER evaluation for symptomatic paroxysmal atrial fibrillation.  She had previously been seen in the ER with atrial fibrillation with RVR and initiated on digoxin, diltiazem, and Xarelto for anticoagulation.  Unfortunately, developed dizziness and digoxin and diltiazem were discontinued.  She was initiated on amiodarone but again returned to the emergency room with symptomatic bradycardia.  Her loading dose of amiodarone was decreased to 200 mg daily.    At her recent visit, she was noted to be hypertensive and losartan was uptitrated to 37.5 mg daily.  She underwent a follow-up echocardiogram that showed a normal ejection fraction estimated at 60 to 65%, no regional wall motion abnormalities, normal RV size and function, mild to moderate mitral regurgitation, trace to mild tricuspid regurgitation, and mild aortic regurgitation.    She presents to the office today accompanied by her daughter for review of echocardiogram and blood pressure reassessment.  She offers no cardiac complaint, denies chest pain, shortness of breath, PND, orthopnea, presyncope, syncope, edema, heart racing, or palpitations.     Blood pressure is elevated at 170/74, consistent with home blood pressure log.  BMP today showed a sodium of 133,  potassium 4.4, BUN 26, creatinine 0.98, and GFR 56  BMI 28.22    Activity level is unchanged  Follows a strict low-sodium diet  Remains compliant with all medications.                   Assessment and Plan:       1.  Hypertension -uncontrolled.  Increase losartan to 50 mg twice daily.  Monitor blood pressures daily with a goal of less than 140/90.  Follow-up BMP in 2 weeks.  Strict low-sodium diet.    2.  Paroxysmal atrial fibrillation -currently in normal sinus rhythm, continue amiodarone and Xarelto for stroke prevention.    3.  Coronary artery disease -remote history of stent to the LAD >20 years ago.  Denies any symptoms of ischemia.   4.  Hyperlipidemia -LDL at goal, continue low-dose atorvastatin      Thank you for allowing me to participate in this delightful patient's care. I have recommended she follow up in July with Dr. Shin as scheduled or sooner if needed.       Saida Mayo, LORA CNP         Review of Systems:     Review of Systems:  Skin:  not assessed     Eyes:  Negative    ENT:  Negative    Respiratory:  Negative    Cardiovascular:  Negative    Gastroenterology: Negative    Genitourinary:  not assessed    Musculoskeletal:  Negative    Neurologic:  Positive for headaches  Psychiatric:  not assessed    Heme/Lymph/Imm:  not assessed    Endocrine:  Negative                Physical Exam:     GEN:  In general, this is a well nourished elderly female in no acute distress.  HEENT:  Pupils equal, round. Sclerae nonicteric. Clear oropharynx. Mucous membranes moist.  NECK: Supple, no masses appreciated. Trachea midline.  No JVD   C/V:  Regular rate and rhythm, no murmur, rub or gallop. No S3 or RV heave.   RESP: Respirations are unlabored. No use of accessory muscles. Clear to auscultation bilaterally without wheezing, rales, or rhonchi.  GI: Abdomen soft, nontender, nondistended. No HSM appreciated.   EXTREM: No LE edema. No cyanosis or clubbing.  NEURO: Alert and oriented, cooperative. Gait steady. No  obvious focal deficits.   PSYCH: Normal affect.  SKIN: Warm and dry. No rashes or petechiae appreciated.          Past Medical History:     Past Medical History:   Diagnosis Date     Acquired hypothyroidism 5/27/2021     Coronary artery disease involving native coronary artery of native heart without angina pectoris 5/26/2021     Other and unspecified hyperlipidemia      Unspecified cardiovascular disease 1999    stent placed              Past Surgical History:   No past surgical history on file.           Allergies:   Ibuprofen       Data:   All laboratory data reviewed:    LAST CHOLESTEROL:  Lab Results   Component Value Date    CHOL 132 03/11/2022    CHOL 135 05/26/2021     Lab Results   Component Value Date    HDL 81 03/11/2022    HDL 64 05/26/2021     Lab Results   Component Value Date    LDL 24 03/11/2022    LDL 49 05/26/2021     Lab Results   Component Value Date    TRIG 136 03/11/2022    TRIG 112 05/26/2021     No results found for: CHOLHDLRATIO    LAST BMP:  Lab Results   Component Value Date     05/27/2022     04/30/2021      Lab Results   Component Value Date    POTASSIUM 4.4 05/27/2022    POTASSIUM 4.4 04/30/2021     Lab Results   Component Value Date    CHLORIDE 100 05/27/2022    CHLORIDE 102 04/30/2021     Lab Results   Component Value Date    LYLE 9.3 05/27/2022    LYLE 9.3 04/30/2021     Lab Results   Component Value Date    CO2 31 05/27/2022    CO2 30 04/30/2021     Lab Results   Component Value Date    BUN 26 05/27/2022    BUN 18 04/30/2021     Lab Results   Component Value Date    CR 0.98 05/27/2022    CR 0.77 04/30/2021     Lab Results   Component Value Date    GLC 88 05/27/2022    GLC 89 04/30/2021       LAST CBC:  Lab Results   Component Value Date    WBC 9.5 03/11/2022    WBC 10.0 05/26/2021     Lab Results   Component Value Date    RBC 3.75 03/11/2022    RBC 4.30 05/26/2021     Lab Results   Component Value Date    HGB 11.6 03/11/2022    HGB 12.9 05/26/2021     Lab Results    Component Value Date    HCT 35.8 03/11/2022    HCT 40.3 05/26/2021     Lab Results   Component Value Date    MCV 96 03/11/2022    MCV 94 05/26/2021     Lab Results   Component Value Date    MCH 30.9 03/11/2022    MCH 30.0 05/26/2021     Lab Results   Component Value Date    MCHC 32.4 03/11/2022    MCHC 32.0 05/26/2021     Lab Results   Component Value Date    RDW 14.0 03/11/2022    RDW 13.7 05/26/2021     Lab Results   Component Value Date     03/11/2022     05/26/2021       Thank you for allowing me to participate in the care of your patient.      Sincerely,     LORA Rasmussen [facility]  cc:   No referring provider defined for this encounter.

## 2022-05-27 NOTE — PATIENT INSTRUCTIONS
Thanks for participating in a office visit with the [facility] today.     Blood pressures not well controlled, consistently in the 170's systolic  Increase losartan to 50 mg twice daily, follow up BMP in 2 weeks  Continue on all other medications  Reviewed results of echocardiogram - stable  Strict low sodium diet      Follow up in July with Dr. Shin    Please call my nurse at  [phone] with any questions or concerns.    Scheduling phone number: [phone]  Reminder: Please bring in all current medications, over the counter supplements and vitamin bottles to your next appointment.

## 2022-06-07 DIAGNOSIS — I48.91 RAPID ATRIAL FIBRILLATION (H): ICD-10-CM

## 2022-06-07 RX ORDER — AMIODARONE HYDROCHLORIDE 200 MG/1
200 TABLET ORAL DAILY
Qty: 90 TABLET | Refills: 0 | Status: SHIPPED | OUTPATIENT
Start: 2022-06-07 | End: 2022-09-08

## 2022-06-09 ENCOUNTER — LAB (OUTPATIENT)
Dept: LAB | Facility: CLINIC | Age: 87
End: 2022-06-09
Payer: COMMERCIAL

## 2022-06-09 DIAGNOSIS — I10 ESSENTIAL HYPERTENSION: ICD-10-CM

## 2022-06-09 LAB
ANION GAP SERPL CALCULATED.3IONS-SCNC: 2 MMOL/L (ref 3–14)
BUN SERPL-MCNC: 19 MG/DL (ref 7–30)
CALCIUM SERPL-MCNC: 9.4 MG/DL (ref 8.5–10.1)
CHLORIDE BLD-SCNC: 101 MMOL/L (ref 94–109)
CO2 SERPL-SCNC: 28 MMOL/L (ref 20–32)
CREAT SERPL-MCNC: 0.95 MG/DL (ref 0.52–1.04)
GFR SERPL CREATININE-BSD FRML MDRD: 58 ML/MIN/1.73M2
GLUCOSE BLD-MCNC: 88 MG/DL (ref 70–99)
POTASSIUM BLD-SCNC: 3.9 MMOL/L (ref 3.4–5.3)
SODIUM SERPL-SCNC: 131 MMOL/L (ref 133–144)

## 2022-06-09 PROCEDURE — 36415 COLL VENOUS BLD VENIPUNCTURE: CPT | Performed by: NURSE PRACTITIONER

## 2022-06-09 PROCEDURE — 80048 BASIC METABOLIC PNL TOTAL CA: CPT | Performed by: NURSE PRACTITIONER

## 2022-06-14 ENCOUNTER — TELEPHONE (OUTPATIENT)
Dept: INTERNAL MEDICINE | Facility: CLINIC | Age: 87
End: 2022-06-14
Payer: COMMERCIAL

## 2022-06-14 NOTE — TELEPHONE ENCOUNTER
Daughter calling to try to get an appointment scheduled sooner than first available.  Assisted in scheduling an appointment    Next 5 appointments (look out 90 days)    Jul 06, 2022  2:00 PM  (Arrive by 1:40 PM)  Provider Visit with Diana Hilario MD  [facility] ([facility] ) [address] [place] [facility] [id number] [phone]

## 2022-06-15 DIAGNOSIS — E87.1 HYPONATREMIA: Primary | ICD-10-CM

## 2022-06-15 DIAGNOSIS — I25.10 CORONARY ARTERY DISEASE INVOLVING NATIVE CORONARY ARTERY OF NATIVE HEART WITHOUT ANGINA PECTORIS: ICD-10-CM

## 2022-06-15 DIAGNOSIS — I10 ESSENTIAL HYPERTENSION: ICD-10-CM

## 2022-06-15 NOTE — PROGRESS NOTES
Pt to get BMP prior to OV 7/7/22 with . Orders placed. Pt aware to call scheduling. THUY Mauricio

## 2022-06-23 ENCOUNTER — TELEPHONE (OUTPATIENT)
Dept: CARDIOLOGY | Facility: CLINIC | Age: 87
End: 2022-06-23

## 2022-06-23 NOTE — TELEPHONE ENCOUNTER
"Received a VM from pt's daughter stating BPs are still running high after Losartan increased to 50 mg BID. No further information left on VM.     Called and spoke to pt's daughter, Marya. Pt's daughter states that BPs are running \"high\". Pt's daughter did not have BP log with her at the time. Advised pt's daughter to send via [employer] so we can review BPs. Will review once received.     Pt denies SOB, lightheadedness, dizziness, CP.     Luly RN    "

## 2022-07-02 ENCOUNTER — HEALTH MAINTENANCE LETTER (OUTPATIENT)
Age: 87
End: 2022-07-02

## 2022-07-07 ENCOUNTER — LAB (OUTPATIENT)
Dept: LAB | Facility: CLINIC | Age: 87
End: 2022-07-07
Payer: COMMERCIAL

## 2022-07-07 ENCOUNTER — OFFICE VISIT (OUTPATIENT)
Dept: CARDIOLOGY | Facility: CLINIC | Age: 87
End: 2022-07-07
Attending: INTERNAL MEDICINE
Payer: COMMERCIAL

## 2022-07-07 VITALS
HEART RATE: 71 BPM | HEIGHT: 62 IN | DIASTOLIC BLOOD PRESSURE: 70 MMHG | WEIGHT: 154 LBS | BODY MASS INDEX: 28.34 KG/M2 | SYSTOLIC BLOOD PRESSURE: 156 MMHG | OXYGEN SATURATION: 99 %

## 2022-07-07 DIAGNOSIS — E78.5 HYPERLIPIDEMIA LDL GOAL <130: ICD-10-CM

## 2022-07-07 DIAGNOSIS — I48.0 PAROXYSMAL ATRIAL FIBRILLATION (H): ICD-10-CM

## 2022-07-07 DIAGNOSIS — I10 ESSENTIAL HYPERTENSION: Primary | ICD-10-CM

## 2022-07-07 DIAGNOSIS — R06.09 DYSPNEA ON EXERTION: ICD-10-CM

## 2022-07-07 DIAGNOSIS — I10 ESSENTIAL HYPERTENSION: ICD-10-CM

## 2022-07-07 LAB
ANION GAP SERPL CALCULATED.3IONS-SCNC: 6 MMOL/L (ref 3–14)
BUN SERPL-MCNC: 26 MG/DL (ref 7–30)
CALCIUM SERPL-MCNC: 9.2 MG/DL (ref 8.5–10.1)
CHLORIDE BLD-SCNC: 102 MMOL/L (ref 94–109)
CO2 SERPL-SCNC: 27 MMOL/L (ref 20–32)
CREAT SERPL-MCNC: 0.93 MG/DL (ref 0.52–1.04)
GFR SERPL CREATININE-BSD FRML MDRD: 60 ML/MIN/1.73M2
GLUCOSE BLD-MCNC: 79 MG/DL (ref 70–99)
POTASSIUM BLD-SCNC: 4 MMOL/L (ref 3.4–5.3)
SODIUM SERPL-SCNC: 135 MMOL/L (ref 133–144)
T4 FREE SERPL-MCNC: 0.86 NG/DL (ref 0.76–1.46)
TSH SERPL DL<=0.005 MIU/L-ACNC: 27.33 MU/L (ref 0.4–4)

## 2022-07-07 PROCEDURE — 36415 COLL VENOUS BLD VENIPUNCTURE: CPT | Performed by: INTERNAL MEDICINE

## 2022-07-07 PROCEDURE — 84439 ASSAY OF FREE THYROXINE: CPT | Performed by: INTERNAL MEDICINE

## 2022-07-07 PROCEDURE — 84443 ASSAY THYROID STIM HORMONE: CPT | Performed by: INTERNAL MEDICINE

## 2022-07-07 PROCEDURE — 99214 OFFICE O/P EST MOD 30 MIN: CPT | Performed by: INTERNAL MEDICINE

## 2022-07-07 PROCEDURE — 80048 BASIC METABOLIC PNL TOTAL CA: CPT | Performed by: INTERNAL MEDICINE

## 2022-07-07 RX ORDER — AMLODIPINE BESYLATE 2.5 MG/1
2.5 TABLET ORAL DAILY
Qty: 90 UNITS | Refills: 3 | Status: SHIPPED | OUTPATIENT
Start: 2022-07-07 | End: 2022-07-07

## 2022-07-07 RX ORDER — AMLODIPINE BESYLATE 2.5 MG/1
2.5 TABLET ORAL DAILY
Qty: 90 TABLET | Refills: 1 | Status: SHIPPED | OUTPATIENT
Start: 2022-07-07 | End: 2022-08-02 | Stop reason: DRUGHIGH

## 2022-07-07 NOTE — PROGRESS NOTES
Cardiology Clinic Progress Note:    July 7, 2022   Patient Name: Jeannie Chu  Patient MRN: [id number]     Consult indication: atrial fibrillation     HPI:    I had the opportunity to see patient Jeannie Chu in cardiology clinic for a follow up visit. Patient is followed by our colleague Diana Hilario MD with Primary Care.     As you know, patient is a pleasant 87-year-old female with a past medical history significant for hypertension, hyperlipidemia, remote history of CAD status post PCI (approximately 20 years ago), paroxysmal atrial fibrillation, who presents for routine follow up.      I had initially met patient in hospital for a consultation on 7/25/2021.  Patient had been experiencing fatigue and weakness, a blood pressure check at home demonstrated an elevated heart rate in the 120 bpm range.  Patient was brought to the ED, and was found to be in atrial fibrillation with RVR.  Labs at that time were notable for normal troponin.  TTE 7/25/2021 demonstrated normal biventricular function, no significant valvular abnormalities.  Patient was started on a diltiazem gtt., and converted spontaneously to normal sinus rhythm.  She was started on rivaroxaban, oral diltiazem, and discharged home.  Follow-up Holter monitor 7/26/2021 demonstrated normal sinus rhythm, no significant bradycardia, less than 1% PACs, less than 1% PVCs, longest run of paroxysmal SVT was 10 beats at 137 bpm, fastest run was 4 beats at 182 bpm, asymptomatic.     I had last seen her in clinic on 11/29/2021.  At that time she had been doing reasonably well, and we increase losartan to 50 mg daily, and had planned for reassessment with a Holter monitor prior to her returning home to [facility].  Unfortunately, on 2/14/2022, she presented to the ED with palpitations.  She was seen in consultation by my colleague Dr. Ackerman for paroxysmal atrial fibrillation with RVR, and she was discharged with low-dose digoxin, in addition to  her prior regimen of diltiazem.  She is continued on rivaroxaban.  Unfortunately, she presented to the ED again on 2/27/2022 with dizziness, and was seen in consultation by my colleague Dr. Leon.  Clinical presentation was consistent with recurrent symptomatic paroxysmal atrial fibrillation.  As such, she was started on amiodarone in favor of a change to a rhythm control strategy.  Unfortunately, she presented again on 3/3/2022 with symptomatic bradycardia.  My colleague Dr. Leon was still on the consult service, and amiodarone dosage was decreased to 200 mg daily.    I had seen her last on 3/16/2022.  At that time, we discussed her thyroid function and amiodarone with her internist Dr. Hilario. Dr. Hilario had actually already discussed this with Endocrinology, and the plan is for close monitoring.      Since then, patient has been doing reasonably well, though has had persistently elevated blood pressures.  She brings with her a log at home, we did increase losartan to 50 mg twice daily, despite this blood pressures have still been elevated in the 140 to 160 mmHg range systolic.  She denies any chest pain, chest pressure, abnormal shortness of breath.  Overall she feels quite well.    Patient is visiting family, lives in [place], she is accompanied today by her daughter and grand-niece who is visiting from [place], and she will be going into medicine.     Post visit labs are notable for sodium 135, creatinine 0.95, TSH 27, free T4 0.86.    TTE 3/2022  Interpretation Summary     Left ventricular systolic function is normal. EF 60-65%. No regional wall  motion abnormalities noted.  The right ventricle is normal in size and function.  There is mild to moderate (1-2+) mitral regurgitation.  There is trace to mild tricuspid regurgitation.  There is mild (1+) aortic regurgitation.     In comparison to the echo report from 7/25/21, the RV function is normal.    Assessment and  Plan/Recommendations:    # Persistent atrial fibrillation, [id number] 5, on rivaroxaban.  Remains in normal sinus rhythm on low-dose amiodarone (bradycardia at higher dosing).  # Hypothyroidism, TSH 27, free T4 0.86.  On levothyroxine.  # HTN. BP elevated.    # HL, on statin  # CAD s/p PCI (remote history, proximal LAD 20 years ago).  Patient denies symptoms concerning for angina.  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, TTE 3/2022, will need better control of HTN    -We will start amlodipine 2.5 mg daily.  She had been on this for quite some time back in [place], though due to concern for possible side effect of headache, this was discontinued approximately 2 years ago.  We discussed this at length, patient does not recall having a headache with amlodipine previously when in [place], and after discussion with patient's family over the phone, it does not seem that amlodipine was an issue previously.  As such, we will try restarting this at a low dose of 2.5 mg daily.  Goal blood pressure less than 140/90 mmHg.  Patient will monitor her blood pressures and call her clinic if blood pressures remain elevated.  - Post visit labs are notable for TSH 27, free T4 0.86.  As aforementioned, previously we had discussed thyroid function and amiodarone with her internist Dr. Hilario who had also discussed this with Endocrinology, will interface with Dr. Hilario again and update her on the recent labs to see if this requires any further action.  - Otherwise continue current regimen of losartan 50 mg twice daily, rivaroxaban, atorvastatin, amiodarone 200 mg daily, sublingual nitroglycerin as needed    Thank you for allowing our team to participate in the care of Jeannie Chu.  Please do not hesitate to call or page me with any questions or concerns.    Sincerely,     Germán Shin MD, [facility]  Cardiology  Text Page   July 7, 2022    Voice recognition software utilized.     Total time spent on this encounter: 35 minutes,  providing care in this encounter including, but not limited to, reviewing prior medical records, laboratory data, imaging studies, diagnostic studies, procedure notes, formulating an assessment and plan, recommendations, discussion and counseling with patient face to face, dictation.    Past Medical History:   The ASCVD Risk score (Christophe FERNANDO Herrera., et al., 2013) failed to calculate for the following reasons:    The 2013 ASCVD risk score is only valid for ages 40 to 79  Past Medical History:   Diagnosis Date     Acquired hypothyroidism 5/27/2021     Coronary artery disease involving native coronary artery of native heart without angina pectoris 5/26/2021     Other and unspecified hyperlipidemia      Unspecified cardiovascular disease 1999    stent placed        Past Surgical History:   Prior coronary artery stenting    Medications (outpatient):  Current Outpatient Medications   Medication Sig Dispense Refill     amiodarone (PACERONE) 200 MG tablet Take 1 tablet (200 mg) by mouth daily 90 tablet 0     atorvastatin (LIPITOR) 10 MG tablet Take 1 tablet (10 mg) by mouth daily 90 tablet 3     IRON (FERROUS GLUCONATE) 325 MG OR TABS Take 1 tablet by mouth daily  30 0     levothyroxine (EUTHYROX) 25 MCG tablet Take 1 tablet (25 mcg) by mouth daily 60 tablet 0     losartan (COZAAR) 50 MG tablet Take 1 tablet (50 mg) by mouth 2 times daily 180 tablet 3     Multiple Vitamins-Minerals (ICAPS AREDS FORMULA PO) Take 2 tablets by mouth daily        niacin 500 MG tablet Take by mouth daily (with breakfast)       nitroGLYcerin (NITROSTAT) 0.4 MG sublingual tablet For chest pain place 1 tablet under the tongue every 5 minutes for 3 doses. If symptoms persist 5 minutes after 1st dose call 911. 30 tablet 0     ondansetron (ZOFRAN-ODT) 4 MG ODT tab Take 1 tablet (4 mg) by mouth every 8 hours as needed for nausea or vomiting 15 tablet 0     rivaroxaban ANTICOAGULANT (XARELTO) 20 MG TABS tablet Take 1 tablet (20 mg) by mouth daily (with  "dinner) 90 tablet 3     amLODIPine (NORVASC) 2.5 MG tablet Take 1 tablet (2.5 mg) by mouth daily 90 tablet 1       Allergies:  Allergies   Allergen Reactions     Ibuprofen        Social History:   History   Drug Use No      History   Smoking Status     Never Smoker   Smokeless Tobacco     Never Used     Social History    Substance and Sexual Activity      Alcohol use: No       Family History:  Family History   Problem Relation Age of Onset     Family History Negative Other        Review of Systems:   A complete review of systems was negative except as mentioned in the History of Present Illness.     Objective & Physical Exam:  BP (!) 156/70 (BP Location: Right arm, Patient Position: Sitting, Cuff Size: Adult Regular)   Pulse 71   Ht 1.575 m (5' 2\")   Wt 69.9 kg (154 lb)   LMP  (LMP Unknown)   SpO2 99%   BMI 28.17 kg/m    Wt Readings from Last 2 Encounters:   07/07/22 69.9 kg (154 lb)   05/27/22 70 kg (154 lb 4.8 oz)     Body mass index is 28.17 kg/m .   Body surface area is 1.75 meters squared.    Constitutional: appears stated age, in no apparent distress, appears to be well nourished  Head: normocephalic, atraumatic  Neck: supple, trachea midline, no bruit bilaterally   Pulmonary: clear to auscultation bilaterally  Cardiovascular: JVP normal, regular rate, regular rhythm, normal S1 and S2, no S3, S4, no murmur appreciated, no lower extremity edema  Gastrointestinal: no guarding, non-rigid   Neurologic: awake, alert, moves all extremities  Skin: no jaundice, warm on limited exam  Psychiatric: affect is normal, answers questions appropriately, oriented to self and place    Data reviewed:  Lab Results   Component Value Date    WBC 9.5 03/11/2022    WBC 10.0 05/26/2021    RBC 3.75 (L) 03/11/2022    RBC 4.30 05/26/2021    HGB 11.6 (L) 03/11/2022    HGB 12.9 05/26/2021    HCT 35.8 03/11/2022    HCT 40.3 05/26/2021    MCV 96 03/11/2022    MCV 94 05/26/2021    MCH 30.9 03/11/2022    MCH 30.0 05/26/2021    MCHC 32.4 " 03/11/2022    MCHC 32.0 05/26/2021    RDW 14.0 03/11/2022    RDW 13.7 05/26/2021     03/11/2022     05/26/2021     Sodium   Date Value Ref Range Status   07/07/2022 135 133 - 144 mmol/L Final   04/30/2021 135 133 - 144 mmol/L Final     Potassium   Date Value Ref Range Status   07/07/2022 4.0 3.4 - 5.3 mmol/L Final   04/30/2021 4.4 3.4 - 5.3 mmol/L Final     Chloride   Date Value Ref Range Status   07/07/2022 102 94 - 109 mmol/L Final   04/30/2021 102 94 - 109 mmol/L Final     Carbon Dioxide   Date Value Ref Range Status   04/30/2021 30 20 - 32 mmol/L Final     Carbon Dioxide (CO2)   Date Value Ref Range Status   07/07/2022 27 20 - 32 mmol/L Final     Anion Gap   Date Value Ref Range Status   07/07/2022 6 3 - 14 mmol/L Final   04/30/2021 3 3 - 14 mmol/L Final     Glucose   Date Value Ref Range Status   07/07/2022 79 70 - 99 mg/dL Final   04/30/2021 89 70 - 99 mg/dL Final     Urea Nitrogen   Date Value Ref Range Status   07/07/2022 26 7 - 30 mg/dL Final   04/30/2021 18 7 - 30 mg/dL Final     Creatinine   Date Value Ref Range Status   07/07/2022 0.93 0.52 - 1.04 mg/dL Final   04/30/2021 0.77 0.52 - 1.04 mg/dL Final     GFR Estimate   Date Value Ref Range Status   07/07/2022 60 (L) >60 mL/min/1.73m2 Final     Comment:     Effective December 21, 2021 eGFRcr in adults is calculated using the 2021 CKD-EPI creatinine equation which includes age and gender (Donavon et al., NEJM, DOI: 10.1056/GDKRyp0446495)   04/30/2021 70 >60 mL/min/[1.73_m2] Final     Comment:     Non  GFR Calc  Starting 12/18/2018, serum creatinine based estimated GFR (eGFR) will be   calculated using the Chronic Kidney Disease Epidemiology Collaboration   (CKD-EPI) equation.       GFR, ESTIMATED POCT   Date Value Ref Range Status   07/24/2021 >60 >60 mL/min/1.73m2 Final     Calcium   Date Value Ref Range Status   07/07/2022 9.2 8.5 - 10.1 mg/dL Final   04/30/2021 9.3 8.5 - 10.1 mg/dL Final     Bilirubin Total   Date Value Ref  Range Status   2022 0.9 0.2 - 1.3 mg/dL Final   2021 1.1 0.2 - 1.3 mg/dL Final     Alkaline Phosphatase   Date Value Ref Range Status   2022 57 40 - 150 U/L Final   2021 70 40 - 150 U/L Final     ALT   Date Value Ref Range Status   2022 24 0 - 50 U/L Final   2021 27 0 - 50 U/L Final     AST   Date Value Ref Range Status   2022 25 0 - 45 U/L Final   2021 27 0 - 45 U/L Final     Recent Labs   Lab Test 22  1505 21  1129   CHOL 132 135   HDL 81 64   LDL 24 49   TRIG 136 112      No results found for: A1C     Recent Results (from the past 4320 hour(s))   Echocardiogram Complete    Narrative    [id number]  [id number]  [id number]  [id number]^ENRIQUE^HENRY^BRYAN     [facility]  Echocardiography Laboratory  [address] [place] [address]     Name: CORA WAY  MRN: [id number]  : 1935  Study Date: 2022 10:11 AM  Age: 87 yrs  Gender: Female  Patient Location: [facility]  Reason For Study: Dyspnea on exertion  Ordering Physician: HENRY NUNEZ  Referring Physician: HENRY NUNEZ  Performed By: Xiomara Moreno     BSA: 1.7 m2  Height: 62 in  Weight: 158 lb  BP: 142/78 mmHg  ______________________________________________________________________________  Procedure  Complete Echo Adult. Optison (NDC #[id number]) given intravenously.  ______________________________________________________________________________  Interpretation Summary     Left ventricular systolic function is normal. EF 60-65%. No regional wall  motion abnormalities noted.  The right ventricle is normal in size and function.  There is mild to moderate (1-2+) mitral regurgitation.  There is trace to mild tricuspid regurgitation.  There is mild (1+) aortic regurgitation.     In comparison to the echo report from 21, the RV function is normal.  ______________________________________________________________________________  Left Ventricle  The left ventricle is normal in size. There is  normal left ventricular wall  thickness. Left ventricular systolic function is normal. Diastolic Doppler  findings (E/E' ratio and/or other parameters) suggest left ventricular filling  pressures are increased. No regional wall motion abnormalities noted.     Right Ventricle  The right ventricle is normal in size and function.     Atria  The left atrium is moderate to severely dilated. The right atrium is  moderately dilated.     Mitral Valve  There is mild to moderate (1-2+) mitral regurgitation.     Tricuspid Valve  There is trace to mild tricuspid regurgitation.     Aortic Valve  The aortic valve is not well visualized. Probably trileaflet. There is mild  (1+) aortic regurgitation.     Pulmonic Valve  There is mild (1+) pulmonic valvular regurgitation.     Vessels  The aortic root is normal size. The inferior vena cava is normal.     Pericardium  There is no pericardial effusion.     Rhythm  Sinus rhythm was noted.  ______________________________________________________________________________  MMode/2D Measurements & Calculations     IVSd: 0.89 cm  LVIDd: 4.0 cm  LVIDs: 2.3 cm  LVPWd: 0.77 cm  FS: 41.5 %  LV mass(C)d: 96.7 grams  LV mass(C)dI: 55.9 grams/m2  Ao root diam: 2.5 cm  asc Aorta Diam: 3.1 cm  LVOT diam: 1.7 cm  LVOT area: 2.3 cm2  LA Volume (BP): 75.0 ml  RWT: 0.39     Doppler Measurements & Calculations  MV E max danielle: 102.0 cm/sec  MV A max danielle: 69.4 cm/sec  MV E/A: 1.5  MV dec time: 0.18 sec  AI P1/2t: 504.2 msec  PA acc time: 0.09 sec  E/E' av.5     Lateral E/e': 16.0  Medial E/e': 21.0     ______________________________________________________________________________  Report approved by: MD Lizz West 2022 12:59 PM

## 2022-07-07 NOTE — PATIENT INSTRUCTIONS
July 7, 2022    Thank you for allowing our Cardiology team to participate in your care.     Please note the following changes to your heart treatment plan:     Medication changes:   - start amlodipine 2.5mg every morning  - continue losartan 50mg twice daily    Tests to be done:  - thyroid labs today    Please contact our team at [phone] or [phone] for any questions or concerns.   For scheduling, please call [phone].  If you are having a medical emergency, please call [phone].     Sincerely,    Germán Shin MD, FACC  Cardiology    [facility] - [facility] - [facility] - [place]

## 2022-07-07 NOTE — LETTER
7/7/2022    Diana Hilario MD  [address] [facility] [address]    RE: Jeannie Chu       Dear Colleague,     I had the pleasure of seeing Jeannie Chu in the [facility].      Cardiology Clinic Progress Note:    July 7, 2022   Patient Name: Jeannie Chu  Patient MRN: [id number]     Consult indication: atrial fibrillation     HPI:    I had the opportunity to see patient Jeannie Chu in cardiology clinic for a follow up visit. Patient is followed by our colleague Diana Hilario MD with Primary Care.     As you know, patient is a pleasant 87-year-old female with a past medical history significant for hypertension, hyperlipidemia, remote history of CAD status post PCI (approximately 20 years ago), paroxysmal atrial fibrillation, who presents for routine follow up.      I had initially met patient in hospital for a consultation on 7/25/2021.  Patient had been experiencing fatigue and weakness, a blood pressure check at home demonstrated an elevated heart rate in the 120 bpm range.  Patient was brought to the ED, and was found to be in atrial fibrillation with RVR.  Labs at that time were notable for normal troponin.  TTE 7/25/2021 demonstrated normal biventricular function, no significant valvular abnormalities.  Patient was started on a diltiazem gtt., and converted spontaneously to normal sinus rhythm.  She was started on rivaroxaban, oral diltiazem, and discharged home.  Follow-up Holter monitor 7/26/2021 demonstrated normal sinus rhythm, no significant bradycardia, less than 1% PACs, less than 1% PVCs, longest run of paroxysmal SVT was 10 beats at 137 bpm, fastest run was 4 beats at 182 bpm, asymptomatic.     I had last seen her in clinic on 11/29/2021.  At that time she had been doing reasonably well, and we increase losartan to 50 mg daily, and had planned for reassessment with a Holter monitor prior to her returning home to [facility].   Unfortunately, on 2/14/2022, she presented to the ED with palpitations.  She was seen in consultation by my colleague Dr. Ackerman for paroxysmal atrial fibrillation with RVR, and she was discharged with low-dose digoxin, in addition to her prior regimen of diltiazem.  She is continued on rivaroxaban.  Unfortunately, she presented to the ED again on 2/27/2022 with dizziness, and was seen in consultation by my colleague Dr. Leon.  Clinical presentation was consistent with recurrent symptomatic paroxysmal atrial fibrillation.  As such, she was started on amiodarone in favor of a change to a rhythm control strategy.  Unfortunately, she presented again on 3/3/2022 with symptomatic bradycardia.  My colleague Dr. Leon was still on the consult service, and amiodarone dosage was decreased to 200 mg daily.    I had seen her last on 3/16/2022.  At that time, we discussed her thyroid function and amiodarone with her internist Dr. Hilario. Dr. Hilario had actually already discussed this with Endocrinology, and the plan is for close monitoring.      Since then, patient has been doing reasonably well, though has had persistently elevated blood pressures.  She brings with her a log at home, we did increase losartan to 50 mg twice daily, despite this blood pressures have still been elevated in the 140 to 160 mmHg range systolic.  She denies any chest pain, chest pressure, abnormal shortness of breath.  Overall she feels quite well.    Patient is visiting family, lives in [place], she is accompanied today by her daughter and grand-niece who is visiting from [place], and she will be going into medicine.     Post visit labs are notable for sodium 135, creatinine 0.95, TSH 27, free T4 0.86.    TTE 3/2022  Interpretation Summary     Left ventricular systolic function is normal. EF 60-65%. No regional wall  motion abnormalities noted.  The right ventricle is normal in size and function.  There is mild to moderate (1-2+) mitral  regurgitation.  There is trace to mild tricuspid regurgitation.  There is mild (1+) aortic regurgitation.     In comparison to the echo report from 7/25/21, the RV function is normal.    Assessment and Plan/Recommendations:    # Persistent atrial fibrillation, [id number] 5, on rivaroxaban.  Remains in normal sinus rhythm on low-dose amiodarone (bradycardia at higher dosing).  # Hypothyroidism, TSH 27, free T4 0.86.  On levothyroxine.  # HTN. BP elevated.    # HL, on statin  # CAD s/p PCI (remote history, proximal LAD 20 years ago).  Patient denies symptoms concerning for angina.  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, TTE 3/2022, will need better control of HTN    -We will start amlodipine 2.5 mg daily.  She had been on this for quite some time back in [place], though due to concern for possible side effect of headache, this was discontinued approximately 2 years ago.  We discussed this at length, patient does not recall having a headache with amlodipine previously when in [place], and after discussion with patient's family over the phone, it does not seem that amlodipine was an issue previously.  As such, we will try restarting this at a low dose of 2.5 mg daily.  Goal blood pressure less than 140/90 mmHg.  Patient will monitor her blood pressures and call her clinic if blood pressures remain elevated.  - Post visit labs are notable for TSH 27, free T4 0.86.  As aforementioned, previously we had discussed thyroid function and amiodarone with her internist Dr. Hilario who had also discussed this with Endocrinology, will interface with Dr. Hilario again and update her on the recent labs to see if this requires any further action.  - Otherwise continue current regimen of losartan 50 mg twice daily, rivaroxaban, atorvastatin, amiodarone 200 mg daily, sublingual nitroglycerin as needed    Thank you for allowing our team to participate in the care of Jeannie Chu.  Please do not hesitate to call or page me with any questions or  concerns.    Sincerely,     Germán Shin MD, [facility]  Cardiology  Text Page   July 7, 2022    Voice recognition software utilized.     Total time spent on this encounter: 35 minutes, providing care in this encounter including, but not limited to, reviewing prior medical records, laboratory data, imaging studies, diagnostic studies, procedure notes, formulating an assessment and plan, recommendations, discussion and counseling with patient face to face, dictation.    Past Medical History:   The ASCVD Risk score (Christophe FERNANDO Jr., et al., 2013) failed to calculate for the following reasons:    The 2013 ASCVD risk score is only valid for ages 40 to 79  Past Medical History:   Diagnosis Date     Acquired hypothyroidism 5/27/2021     Coronary artery disease involving native coronary artery of native heart without angina pectoris 5/26/2021     Other and unspecified hyperlipidemia      Unspecified cardiovascular disease 1999    stent placed        Past Surgical History:   Prior coronary artery stenting    Medications (outpatient):  Current Outpatient Medications   Medication Sig Dispense Refill     amiodarone (PACERONE) 200 MG tablet Take 1 tablet (200 mg) by mouth daily 90 tablet 0     atorvastatin (LIPITOR) 10 MG tablet Take 1 tablet (10 mg) by mouth daily 90 tablet 3     IRON (FERROUS GLUCONATE) 325 MG OR TABS Take 1 tablet by mouth daily  30 0     levothyroxine (EUTHYROX) 25 MCG tablet Take 1 tablet (25 mcg) by mouth daily 60 tablet 0     losartan (COZAAR) 50 MG tablet Take 1 tablet (50 mg) by mouth 2 times daily 180 tablet 3     Multiple Vitamins-Minerals (ICAPS AREDS FORMULA PO) Take 2 tablets by mouth daily        niacin 500 MG tablet Take by mouth daily (with breakfast)       nitroGLYcerin (NITROSTAT) 0.4 MG sublingual tablet For chest pain place 1 tablet under the tongue every 5 minutes for 3 doses. If symptoms persist 5 minutes after 1st dose call 911. 30 tablet 0     ondansetron (ZOFRAN-ODT) 4 MG ODT  "tab Take 1 tablet (4 mg) by mouth every 8 hours as needed for nausea or vomiting 15 tablet 0     rivaroxaban ANTICOAGULANT (XARELTO) 20 MG TABS tablet Take 1 tablet (20 mg) by mouth daily (with dinner) 90 tablet 3     amLODIPine (NORVASC) 2.5 MG tablet Take 1 tablet (2.5 mg) by mouth daily 90 tablet 1       Allergies:  Allergies   Allergen Reactions     Ibuprofen        Social History:   History   Drug Use No      History   Smoking Status     Never Smoker   Smokeless Tobacco     Never Used     Social History    Substance and Sexual Activity      Alcohol use: No       Family History:  Family History   Problem Relation Age of Onset     Family History Negative Other        Review of Systems:   A complete review of systems was negative except as mentioned in the History of Present Illness.     Objective & Physical Exam:  BP (!) 156/70 (BP Location: Right arm, Patient Position: Sitting, Cuff Size: Adult Regular)   Pulse 71   Ht 1.575 m (5' 2\")   Wt 69.9 kg (154 lb)   LMP  (LMP Unknown)   SpO2 99%   BMI 28.17 kg/m    Wt Readings from Last 2 Encounters:   07/07/22 69.9 kg (154 lb)   05/27/22 70 kg (154 lb 4.8 oz)     Body mass index is 28.17 kg/m .   Body surface area is 1.75 meters squared.    Constitutional: appears stated age, in no apparent distress, appears to be well nourished  Head: normocephalic, atraumatic  Neck: supple, trachea midline, no bruit bilaterally   Pulmonary: clear to auscultation bilaterally  Cardiovascular: JVP normal, regular rate, regular rhythm, normal S1 and S2, no S3, S4, no murmur appreciated, no lower extremity edema  Gastrointestinal: no guarding, non-rigid   Neurologic: awake, alert, moves all extremities  Skin: no jaundice, warm on limited exam  Psychiatric: affect is normal, answers questions appropriately, oriented to self and place    Data reviewed:  Lab Results   Component Value Date    WBC 9.5 03/11/2022    WBC 10.0 05/26/2021    RBC 3.75 (L) 03/11/2022    RBC 4.30 05/26/2021    " HGB 11.6 (L) 03/11/2022    HGB 12.9 05/26/2021    HCT 35.8 03/11/2022    HCT 40.3 05/26/2021    MCV 96 03/11/2022    MCV 94 05/26/2021    MCH 30.9 03/11/2022    MCH 30.0 05/26/2021    MCHC 32.4 03/11/2022    MCHC 32.0 05/26/2021    RDW 14.0 03/11/2022    RDW 13.7 05/26/2021     03/11/2022     05/26/2021     Sodium   Date Value Ref Range Status   07/07/2022 135 133 - 144 mmol/L Final   04/30/2021 135 133 - 144 mmol/L Final     Potassium   Date Value Ref Range Status   07/07/2022 4.0 3.4 - 5.3 mmol/L Final   04/30/2021 4.4 3.4 - 5.3 mmol/L Final     Chloride   Date Value Ref Range Status   07/07/2022 102 94 - 109 mmol/L Final   04/30/2021 102 94 - 109 mmol/L Final     Carbon Dioxide   Date Value Ref Range Status   04/30/2021 30 20 - 32 mmol/L Final     Carbon Dioxide (CO2)   Date Value Ref Range Status   07/07/2022 27 20 - 32 mmol/L Final     Anion Gap   Date Value Ref Range Status   07/07/2022 6 3 - 14 mmol/L Final   04/30/2021 3 3 - 14 mmol/L Final     Glucose   Date Value Ref Range Status   07/07/2022 79 70 - 99 mg/dL Final   04/30/2021 89 70 - 99 mg/dL Final     Urea Nitrogen   Date Value Ref Range Status   07/07/2022 26 7 - 30 mg/dL Final   04/30/2021 18 7 - 30 mg/dL Final     Creatinine   Date Value Ref Range Status   07/07/2022 0.93 0.52 - 1.04 mg/dL Final   04/30/2021 0.77 0.52 - 1.04 mg/dL Final     GFR Estimate   Date Value Ref Range Status   07/07/2022 60 (L) >60 mL/min/1.73m2 Final     Comment:     Effective December 21, 2021 eGFRcr in adults is calculated using the 2021 CKD-EPI creatinine equation which includes age and gender (Donavon et al., NEJM, DOI: 10.1056/FKPIsz8352361)   04/30/2021 70 >60 mL/min/[1.73_m2] Final     Comment:     Non  GFR Calc  Starting 12/18/2018, serum creatinine based estimated GFR (eGFR) will be   calculated using the Chronic Kidney Disease Epidemiology Collaboration   (CKD-EPI) equation.       GFR, ESTIMATED POCT   Date Value Ref Range Status    2021 >60 >60 mL/min/1.73m2 Final     Calcium   Date Value Ref Range Status   2022 9.2 8.5 - 10.1 mg/dL Final   2021 9.3 8.5 - 10.1 mg/dL Final     Bilirubin Total   Date Value Ref Range Status   2022 0.9 0.2 - 1.3 mg/dL Final   2021 1.1 0.2 - 1.3 mg/dL Final     Alkaline Phosphatase   Date Value Ref Range Status   2022 57 40 - 150 U/L Final   2021 70 40 - 150 U/L Final     ALT   Date Value Ref Range Status   2022 24 0 - 50 U/L Final   2021 27 0 - 50 U/L Final     AST   Date Value Ref Range Status   2022 25 0 - 45 U/L Final   2021 27 0 - 45 U/L Final     Recent Labs   Lab Test 22  1505 21  1129   CHOL 132 135   HDL 81 64   LDL 24 49   TRIG 136 112      No results found for: A1C     Recent Results (from the past 4320 hour(s))   Echocardiogram Complete    Narrative    [id number]  [id number]  [id number]  [id number]^ENRIQUE^HENRY^BRYAN     [facility]  Echocardiography Laboratory  [address] [place] [address]     Name: CORA WAY  MRN: [id number]  : 1935  Study Date: 2022 10:11 AM  Age: 87 yrs  Gender: Female  Patient Location: [facility]  Reason For Study: Dyspnea on exertion  Ordering Physician: HENRY NUNEZ  Referring Physician: HENRY NUNEZ  Performed By: Xiomara Moreno     BSA: 1.7 m2  Height: 62 in  Weight: 158 lb  BP: 142/78 mmHg  ______________________________________________________________________________  Procedure  Complete Echo Adult. Optison (NDC #[id number]) given intravenously.  ______________________________________________________________________________  Interpretation Summary     Left ventricular systolic function is normal. EF 60-65%. No regional wall  motion abnormalities noted.  The right ventricle is normal in size and function.  There is mild to moderate (1-2+) mitral regurgitation.  There is trace to mild tricuspid regurgitation.  There is mild (1+) aortic regurgitation.     In comparison to  the echo report from 21, the RV function is normal.  ______________________________________________________________________________  Left Ventricle  The left ventricle is normal in size. There is normal left ventricular wall  thickness. Left ventricular systolic function is normal. Diastolic Doppler  findings (E/E' ratio and/or other parameters) suggest left ventricular filling  pressures are increased. No regional wall motion abnormalities noted.     Right Ventricle  The right ventricle is normal in size and function.     Atria  The left atrium is moderate to severely dilated. The right atrium is  moderately dilated.     Mitral Valve  There is mild to moderate (1-2+) mitral regurgitation.     Tricuspid Valve  There is trace to mild tricuspid regurgitation.     Aortic Valve  The aortic valve is not well visualized. Probably trileaflet. There is mild  (1+) aortic regurgitation.     Pulmonic Valve  There is mild (1+) pulmonic valvular regurgitation.     Vessels  The aortic root is normal size. The inferior vena cava is normal.     Pericardium  There is no pericardial effusion.     Rhythm  Sinus rhythm was noted.  ______________________________________________________________________________  MMode/2D Measurements & Calculations     IVSd: 0.89 cm  LVIDd: 4.0 cm  LVIDs: 2.3 cm  LVPWd: 0.77 cm  FS: 41.5 %  LV mass(C)d: 96.7 grams  LV mass(C)dI: 55.9 grams/m2  Ao root diam: 2.5 cm  asc Aorta Diam: 3.1 cm  LVOT diam: 1.7 cm  LVOT area: 2.3 cm2  LA Volume (BP): 75.0 ml  RWT: 0.39     Doppler Measurements & Calculations  MV E max danielle: 102.0 cm/sec  MV A max danielle: 69.4 cm/sec  MV E/A: 1.5  MV dec time: 0.18 sec  AI P1/2t: 504.2 msec  PA acc time: 0.09 sec  E/E' av.5     Lateral E/e': 16.0  Medial E/e': 21.0     ______________________________________________________________________________  Report approved by: MD Lizz West 2022 12:59 PM              Thank you for allowing me to participate in the care of  your patient.      Sincerely,     Germán Shin MD     [facility]  cc:   Germán Shin MD  [id number], [address]  [place] [address]

## 2022-07-07 NOTE — RESULT ENCOUNTER NOTE
Dr. Hilario, since Pt is on amiodarone, I'm not sure if this increase in TSH means she should have this checked soon or if her levothyroxine should be adjusted? Thanks I appreciate your help with this-Germán Terrazas, please let Pt know that her sodium level is normal and kidney function is better than it has been in months. Her free T4 level is normal which is good, but to be safe, we're going to check with her internist, thanks!

## 2022-07-12 DIAGNOSIS — E03.9 ACQUIRED HYPOTHYROIDISM: Primary | ICD-10-CM

## 2022-07-12 RX ORDER — LEVOTHYROXINE SODIUM 50 UG/1
50 TABLET ORAL DAILY
Qty: 90 TABLET | Refills: 0 | Status: SHIPPED | OUTPATIENT
Start: 2022-07-12 | End: 2022-10-19 | Stop reason: DRUGHIGH

## 2022-07-17 ENCOUNTER — TELEPHONE (OUTPATIENT)
Dept: INTERNAL MEDICINE | Facility: CLINIC | Age: 87
End: 2022-07-17

## 2022-07-17 NOTE — TELEPHONE ENCOUNTER
Please check if pt has picked up the new dose of levoxyl 50 mcg daily due to her abnormal TSH results( has been discussed with endocrinologist)

## 2022-07-25 ENCOUNTER — MYC MEDICAL ADVICE (OUTPATIENT)
Dept: CARDIOLOGY | Facility: CLINIC | Age: 87
End: 2022-07-25

## 2022-07-25 DIAGNOSIS — I10 ESSENTIAL HYPERTENSION: Primary | ICD-10-CM

## 2022-08-01 NOTE — TELEPHONE ENCOUNTER
Kip, Germán ADAMS MD  You 51 minutes ago (7:10 AM)     AH    Recommend increasing amlodipine to 2.5mg BID thanks!    Message text     Routing recommendations to az Tyler RN, BSN.

## 2022-08-02 RX ORDER — AMLODIPINE BESYLATE 2.5 MG/1
2.5 TABLET ORAL 2 TIMES DAILY
Qty: 180 TABLET | Refills: 1 | Status: SHIPPED | OUTPATIENT
Start: 2022-08-02 | End: 2022-10-04

## 2022-08-02 NOTE — TELEPHONE ENCOUNTER
Call placed to pt's daughter zaki as the [employer] message sent has not yet been reviewed. No answer - left detailed VM with instructions. Call back number left on VM for questions or concerns. Script updated per MD. GEORGE Tyler RN, BSN. 08/02/22 5:14 PM

## 2022-09-08 DIAGNOSIS — I48.91 RAPID ATRIAL FIBRILLATION (H): ICD-10-CM

## 2022-09-08 RX ORDER — AMIODARONE HYDROCHLORIDE 200 MG/1
200 TABLET ORAL DAILY
Qty: 90 TABLET | Refills: 3 | Status: ON HOLD | OUTPATIENT
Start: 2022-09-08 | End: 2022-11-02

## 2022-10-03 PROCEDURE — 93005 ELECTROCARDIOGRAM TRACING: CPT

## 2022-10-03 PROCEDURE — 99285 EMERGENCY DEPT VISIT HI MDM: CPT | Mod: 25

## 2022-10-04 ENCOUNTER — HOSPITAL ENCOUNTER (EMERGENCY)
Facility: CLINIC | Age: 87
Discharge: HOME | End: 2022-10-04
Attending: EMERGENCY MEDICINE | Admitting: EMERGENCY MEDICINE
Payer: COMMERCIAL

## 2022-10-04 ENCOUNTER — APPOINTMENT (OUTPATIENT)
Dept: CT IMAGING | Facility: CLINIC | Age: 87
End: 2022-10-04
Attending: EMERGENCY MEDICINE
Payer: COMMERCIAL

## 2022-10-04 ENCOUNTER — TELEPHONE (OUTPATIENT)
Dept: CARDIOLOGY | Facility: CLINIC | Age: 87
End: 2022-10-04

## 2022-10-04 VITALS
RESPIRATION RATE: 11 BRPM | DIASTOLIC BLOOD PRESSURE: 77 MMHG | TEMPERATURE: 97.2 F | OXYGEN SATURATION: 100 % | SYSTOLIC BLOOD PRESSURE: 114 MMHG | HEART RATE: 101 BPM

## 2022-10-04 DIAGNOSIS — I48.91 ATRIAL FIBRILLATION WITH RVR (H): ICD-10-CM

## 2022-10-04 DIAGNOSIS — S09.90XA INJURY OF HEAD, INITIAL ENCOUNTER: ICD-10-CM

## 2022-10-04 DIAGNOSIS — I10 ESSENTIAL HYPERTENSION: ICD-10-CM

## 2022-10-04 DIAGNOSIS — Z79.01 LONG TERM CURRENT USE OF ANTICOAGULANT THERAPY: ICD-10-CM

## 2022-10-04 DIAGNOSIS — I48.0 PAROXYSMAL ATRIAL FIBRILLATION (H): Primary | ICD-10-CM

## 2022-10-04 LAB
ANION GAP SERPL CALCULATED.3IONS-SCNC: 10 MMOL/L (ref 7–15)
ATRIAL RATE - MUSE: 101 BPM
BASOPHILS # BLD AUTO: 0.1 10E3/UL (ref 0–0.2)
BASOPHILS NFR BLD AUTO: 0 %
BUN SERPL-MCNC: 19.2 MG/DL (ref 8–23)
CALCIUM SERPL-MCNC: 9.1 MG/DL (ref 8.8–10.2)
CHLORIDE SERPL-SCNC: 97 MMOL/L (ref 98–107)
CREAT SERPL-MCNC: 1.15 MG/DL (ref 0.51–0.95)
DEPRECATED HCO3 PLAS-SCNC: 26 MMOL/L (ref 22–29)
DIASTOLIC BLOOD PRESSURE - MUSE: NORMAL MMHG
EOSINOPHIL # BLD AUTO: 0.1 10E3/UL (ref 0–0.7)
EOSINOPHIL NFR BLD AUTO: 1 %
ERYTHROCYTE [DISTWIDTH] IN BLOOD BY AUTOMATED COUNT: 14.6 % (ref 10–15)
GFR SERPL CREATININE-BSD FRML MDRD: 46 ML/MIN/1.73M2
GLUCOSE SERPL-MCNC: 115 MG/DL (ref 70–99)
HCT VFR BLD AUTO: 42.8 % (ref 35–47)
HGB BLD-MCNC: 13.6 G/DL (ref 11.7–15.7)
IMM GRANULOCYTES # BLD: 0.1 10E3/UL
IMM GRANULOCYTES NFR BLD: 0 %
INTERPRETATION ECG - MUSE: NORMAL
LYMPHOCYTES # BLD AUTO: 1.6 10E3/UL (ref 0.8–5.3)
LYMPHOCYTES NFR BLD AUTO: 14 %
MAGNESIUM SERPL-MCNC: 1.8 MG/DL (ref 1.7–2.3)
MCH RBC QN AUTO: 29.8 PG (ref 26.5–33)
MCHC RBC AUTO-ENTMCNC: 31.8 G/DL (ref 31.5–36.5)
MCV RBC AUTO: 94 FL (ref 78–100)
MONOCYTES # BLD AUTO: 1 10E3/UL (ref 0–1.3)
MONOCYTES NFR BLD AUTO: 9 %
NEUTROPHILS # BLD AUTO: 8.4 10E3/UL (ref 1.6–8.3)
NEUTROPHILS NFR BLD AUTO: 76 %
NRBC # BLD AUTO: 0 10E3/UL
NRBC BLD AUTO-RTO: 0 /100
NT-PROBNP SERPL-MCNC: 1711 PG/ML (ref 0–1800)
P AXIS - MUSE: NORMAL DEGREES
PLATELET # BLD AUTO: 287 10E3/UL (ref 150–450)
POTASSIUM SERPL-SCNC: 4.7 MMOL/L (ref 3.4–5.3)
PR INTERVAL - MUSE: NORMAL MS
QRS DURATION - MUSE: 80 MS
QT - MUSE: 362 MS
QTC - MUSE: 474 MS
R AXIS - MUSE: 18 DEGREES
RBC # BLD AUTO: 4.56 10E6/UL (ref 3.8–5.2)
SODIUM SERPL-SCNC: 133 MMOL/L (ref 136–145)
SYSTOLIC BLOOD PRESSURE - MUSE: NORMAL MMHG
T AXIS - MUSE: 7 DEGREES
TROPONIN T SERPL HS-MCNC: 13 NG/L
VENTRICULAR RATE- MUSE: 103 BPM
WBC # BLD AUTO: 11.2 10E3/UL (ref 4–11)

## 2022-10-04 PROCEDURE — 83880 ASSAY OF NATRIURETIC PEPTIDE: CPT | Performed by: EMERGENCY MEDICINE

## 2022-10-04 PROCEDURE — 84484 ASSAY OF TROPONIN QUANT: CPT | Performed by: EMERGENCY MEDICINE

## 2022-10-04 PROCEDURE — 83735 ASSAY OF MAGNESIUM: CPT | Performed by: EMERGENCY MEDICINE

## 2022-10-04 PROCEDURE — 82310 ASSAY OF CALCIUM: CPT | Performed by: EMERGENCY MEDICINE

## 2022-10-04 PROCEDURE — 36415 COLL VENOUS BLD VENIPUNCTURE: CPT | Performed by: EMERGENCY MEDICINE

## 2022-10-04 PROCEDURE — 85025 COMPLETE CBC W/AUTO DIFF WBC: CPT | Performed by: EMERGENCY MEDICINE

## 2022-10-04 PROCEDURE — 250N000009 HC RX 250: Performed by: EMERGENCY MEDICINE

## 2022-10-04 PROCEDURE — 70450 CT HEAD/BRAIN W/O DYE: CPT

## 2022-10-04 PROCEDURE — 96374 THER/PROPH/DIAG INJ IV PUSH: CPT

## 2022-10-04 PROCEDURE — 250N000013 HC RX MED GY IP 250 OP 250 PS 637: Performed by: EMERGENCY MEDICINE

## 2022-10-04 RX ORDER — METOPROLOL TARTRATE 1 MG/ML
5 INJECTION, SOLUTION INTRAVENOUS ONCE
Status: COMPLETED | OUTPATIENT
Start: 2022-10-04 | End: 2022-10-04

## 2022-10-04 RX ORDER — AMLODIPINE BESYLATE 2.5 MG/1
2.5 TABLET ORAL ONCE
Status: COMPLETED | OUTPATIENT
Start: 2022-10-04 | End: 2022-10-04

## 2022-10-04 RX ORDER — METOPROLOL SUCCINATE 25 MG/1
25 TABLET, EXTENDED RELEASE ORAL DAILY
Qty: 90 TABLET | Refills: 1 | Status: SHIPPED | OUTPATIENT
Start: 2022-10-04 | End: 2022-11-10

## 2022-10-04 RX ORDER — AMLODIPINE BESYLATE 2.5 MG/1
2.5 TABLET ORAL DAILY
Qty: 180 TABLET | Refills: 1 | Status: SHIPPED | OUTPATIENT
Start: 2022-10-04 | End: 2023-01-25 | Stop reason: DRUGHIGH

## 2022-10-04 RX ADMIN — METOPROLOL TARTRATE 5 MG: 5 INJECTION INTRAVENOUS at 03:11

## 2022-10-04 RX ADMIN — AMLODIPINE BESYLATE 2.5 MG: 2.5 TABLET ORAL at 04:17

## 2022-10-04 ASSESSMENT — ENCOUNTER SYMPTOMS
CHILLS: 0
ABDOMINAL PAIN: 0
LIGHT-HEADEDNESS: 0
SHORTNESS OF BREATH: 0
NAUSEA: 0
PALPITATIONS: 0
HEADACHES: 1
FEVER: 0
NECK PAIN: 0
VOMITING: 0
DIZZINESS: 0
DIARRHEA: 0

## 2022-10-04 ASSESSMENT — ACTIVITIES OF DAILY LIVING (ADL): ADLS_ACUITY_SCORE: 35

## 2022-10-04 NOTE — TELEPHONE ENCOUNTER
Germán Shin MD  You 31 minutes ago (2:53 PM)     AH    Thanks let's decrease amlodipine to 2.5mg daily and start metoprolol succinate 25mg daily, with a 48h Holter 1 week after these changes thanks!    Message text       Call placed to pt's daughter to review recommendations. No answer - left VM requesting call back to review recommendations.   GEORGE Tyler RN, BSN. 10/04/22 3:25 PM     Call back received from pt's daughter. Pt's daughter verbalized understanding. Orders placed per MD. Pt's daughter transferred to scheduling to arrange Holter monitor placement.   GEORGE Tyler RN, BSN. 10/04/22 4:57 PM

## 2022-10-04 NOTE — TELEPHONE ENCOUNTER
Call received form pt's daughter zaki to review recent ED eval for Hr concerns. Per pt';s daughter they noted low HR on home monitor so they brought pt to ED for eval.     ~ 10/3/22 ED note: pt arrived to ED and noted to be Afib with mild RVR with HR 103bpm. Pt had missed amlodipine dose so pt received lopressor 5mg IV and had controlled vent rate. Pt discharged to home in stable condition.     ~ LOV W/Dr. Shin 7/7/22:  # Persistent atrial fibrillation, [id number] 5, on rivaroxaban.  Remains in normal sinus rhythm on low-dose amiodarone (bradycardia at higher dosing).  # HTN. BP elevated  - Pt to start amlodipine 2.5mg daily - dose was later increased to 2.5mg BID on 8/1/2022.     ~ last Holter 2/4/22:   1. Jeannie Chu was monitored for 48:00 hours. The overall quality of the tracing was good. The principle rhythm was sinus. During this time her average heart rate was 76 bpm. She had a minimum heart rate of 57 bpm at 8:45 AM (Day 2) and a maximum heart rate of 122 bpm at 8:16 PM (Day 1). There were zero pauses greater than 2.0 seconds. The KY interval measured .13-.15 seconds, the QRS duration .08 seconds, the QT interval .30-.40 seconds.   2. There were 6 isolated ventricular ectopics.   3. There were 117 isolated SVEs, 2 triplets and 4 runs. The longest run was 5 beats which was also the fastest with a rate of 133 bpm at 8:32 PM (Day 2).   4. No events were logged during the recording period.      Routing to MD for review / recommendations regarding any testing or follow up recommendations.   GEORGE Tyler RN, BSN. 10/04/22 2:14 PM

## 2022-10-04 NOTE — ED PROVIDER NOTES
History   Chief Complaint:  Bradycardia       The history is provided by the patient and a relative.      Jeannie Chu is a 87 year old female on Xarelto with history of atrial fibrillation, hypertension, and hyperlipidemia who presents with bradycardia. The patients daughter provides that they were checking the patients blood pressure today and initially it showed her heart rate was 45 and on recheck it was 41, prompting them to come to the ED. On evaluation, it wad determined the patient is in atrial fibrillation but she does not have any palpitations, lightheadedness, or dizziness. Further denies having any chest pain, shortness of breath, fever, chills, abdominal pain, nausea, vomiting, or diarrhea.    Daughter also notes that the patient has increased leg swelling from a recent 8 hour car ride and that the had a fall 2 days ago where she hit her head on concrete. She did not lose consciousness and has no neck pain but has had a constant headache since the fall. She is otherwise acting appropriately per the daughter.    Review of Systems   Constitutional: Negative for chills and fever.   Respiratory: Negative for shortness of breath.    Cardiovascular: Positive for leg swelling. Negative for chest pain and palpitations.   Gastrointestinal: Negative for abdominal pain, diarrhea, nausea and vomiting.   Musculoskeletal: Negative for neck pain.   Neurological: Positive for headaches. Negative for dizziness and light-headedness.   Psychiatric/Behavioral: Negative for behavioral problems.   All other systems reviewed and are negative.      Allergies:  Ibuprofen    Medications:  Amiodarone  Amlodipine  Lipitor  Levothyroxine  Cozaar  Nitrostat  Zofran  Xarelto    Past Medical History:     CAD  Hypertension  Compression fracture of L1 vertebra  Acquired hypothyroidism  Atrial flutter with RVR  Paroxysmal atrial fibrillation  Symptomatic bradycardia  Hyperlipidemia  Anemia     Past Surgical History:    The patient  denies past surgical history.      Family History:    The patient denies past family history.     Social History:  Patient came from home.  Patient is accompanied in the ED by her daughter.  PCP: Diana Hilario     Physical Exam     Patient Vitals for the past 24 hrs:   BP Temp Temp src Pulse Resp SpO2   10/04/22 0415 112/81 -- -- 98 8 100 %   10/04/22 0400 104/80 -- -- 93 8 100 %   10/04/22 0345 121/80 -- -- 84 11 100 %   10/04/22 0315 128/75 -- -- 115 13 100 %   10/04/22 0300 (!) 143/90 -- -- 120 13 100 %   10/04/22 0245 116/87 -- -- 109 12 100 %   10/04/22 0230 133/84 -- -- 109 17 100 %   10/04/22 0225 (!) 145/90 -- -- 109 26 99 %   10/03/22 2249 (!) 150/93 97.2  F (36.2  C) Temporal 119 20 100 %       Physical Exam  Constitutional: Well appearing.  HEENT: Atraumatic.  PERRL.  EOMI.  Moist mucous membranes.  Neck: Soft.  Supple.  No tenderness to palpation.  Cardiac: Tachycardic rate with an irregularly irregular rhythm.  No murmur or rub.  Respiratory: Clear to auscultation bilaterally.  No respiratory distress.   Abdomen: Soft and nontender.  No rebound or guarding.  Nondistended.  Musculoskeletal: No edema.  Normal range of motion.  Neurologic: Alert and oriented x3.  Normal tone and bulk.  No facial drooping. Normal speech. 5/5 strength in bilateral upper and lower extremities.  Sensation to light touch intact throughout.  Normal gait.  Skin: No rashes.  No edema.  Psych: Normal affect.  Normal behavior.      Emergency Department Course     ECG  ECG results from 10/04/22   EKG 12-lead, tracing only     Value    Systolic Blood Pressure     Diastolic Blood Pressure     Ventricular Rate 103    Atrial Rate 101    MT Interval     QRS Duration 80        QTc 474    P Axis     R AXIS 18    T Axis 7    Interpretation ECG      Atrial fibrillation with rapid ventricular response  Abnormal ECG  When compared with ECG of 03-MAR-2022 20:01,  Atrial fibrillation has replaced Sinus rhythm  Vent. rate has  increased BY  57 BPM  ST no longer elevated in Lateral leads  Nonspecific T wave abnormality, worse in Inferior leads  Nonspecific T wave abnormality no longer evident in Anterior leads  QT has lengthened         Imaging:  CT Head w/o Contrast   Final Result   IMPRESSION:   1.  No CT evidence for acute intracranial process.   2.  Brain atrophy and presumed chronic microvascular ischemic changes as above.        Report per radiology    Laboratory:  Labs Ordered and Resulted from Time of ED Arrival to Time of ED Departure   BASIC METABOLIC PANEL - Abnormal       Result Value    Sodium 133 (*)     Potassium 4.7      Chloride 97 (*)     Carbon Dioxide (CO2) 26      Anion Gap 10      Urea Nitrogen 19.2      Creatinine 1.15 (*)     Calcium 9.1      Glucose 115 (*)     GFR Estimate 46 (*)    CBC WITH PLATELETS AND DIFFERENTIAL - Abnormal    WBC Count 11.2 (*)     RBC Count 4.56      Hemoglobin 13.6      Hematocrit 42.8      MCV 94      MCH 29.8      MCHC 31.8      RDW 14.6      Platelet Count 287      % Neutrophils 76      % Lymphocytes 14      % Monocytes 9      % Eosinophils 1      % Basophils 0      % Immature Granulocytes 0      NRBCs per 100 WBC 0      Absolute Neutrophils 8.4 (*)     Absolute Lymphocytes 1.6      Absolute Monocytes 1.0      Absolute Eosinophils 0.1      Absolute Basophils 0.1      Absolute Immature Granulocytes 0.1      Absolute NRBCs 0.0     TROPONIN T, HIGH SENSITIVITY - Normal    Troponin T, High Sensitivity 13     MAGNESIUM - Normal    Magnesium 1.8     NT PROBNP INPATIENT - Normal    N terminal Pro BNP Inpatient 1,711         Emergency Department Course:       Reviewed:  I reviewed nursing notes, vitals, past medical history and Care Everywhere    Assessments:  0257 I obtained history and examined the patient as noted above.   0436 I rechecked the patient and explained findings.     Interventions:  0311 Lopressor 5 mg IV  0417 Norvasc 2.5 mg PO    Disposition:  The patient was discharged to  home.     Impression & Plan     CMS Diagnoses: None      Medical Decision Making:  Jeannie Chu is an 87-year-old woman who is afebrile and mildly tachycardic.  She is otherwise asymptomatic from tachycardia standpoint.  Her EKG demonstrates atrial fibrillation with a very mild rapid ventricular response.  Lab work-up as noted as above is grossly unrevealing.  She has no chest pain or palpitations.  She denies any shortness of breath to me.  She is already anticoagulated.  She given IV metoprolol with good improvement of her rate.  She missed a dose of amlodipine I will give her a dose of that.  She remained with a heart rate less than 100 and completely asymptomatic.  I think she can safely discharge home with further discussion of rate control with her primary cardiologist who she has seen for atrial fibrillation.  She and daughter feel very comfortable this plan and the patient herself would not of been here had they not noted her heart rate to be low on her blood pressure cuff earlier today.  It did incidentally note that the patient fell 2 days ago and hit her head.  She has been having a headache.  She is on Xarelto so we did obtain a CT scan of the head and this is thankfully unremarkable for acute abnormality.  We discussed need for close primary care follow-up.  Their questions were answered and she was given strict return precautions.  She was in no distress at time of discharge.    Diagnosis:    ICD-10-CM    1. Atrial fibrillation with RVR (H)  I48.91    2. Injury of head, initial encounter  S09.90XA    3. Long term current use of anticoagulant therapy  Z79.01        Discharge Medications:  New Prescriptions    No medications on file       Scribe Disclosure:  Adriel ALMANZA, am serving as a scribe at 2:22 AM on 10/4/2022 to document services personally performed by Erick Galindo MD based on my observations and the provider's statements to me.        Erick Galindo MD  10/04/22 1036

## 2022-10-04 NOTE — ED TRIAGE NOTES
Here for concern of low heart rate of 41-45 associated with feeling dizzy. Daughter stated taht they check her blood pressure daily and today when they checked her blood pressure which was 122/62, they also noticed her low heart rate. Per son, patient was having some sob when walking down the stairs. Stated did had a fall over the weekend. Does have a history of A-fib, currently taking xeralto and amiodarone.  ABCs intact.      Triage Assessment     Row Name 10/03/22 6805       Triage Assessment (Adult)    Airway WDL WDL       Respiratory WDL    Respiratory WDL WDL       Cardiac WDL    Cardiac WDL WDL

## 2022-10-06 ENCOUNTER — TELEPHONE (OUTPATIENT)
Dept: CARDIOLOGY | Facility: CLINIC | Age: 87
End: 2022-10-06

## 2022-10-06 DIAGNOSIS — I48.0 PAROXYSMAL ATRIAL FIBRILLATION (H): Primary | ICD-10-CM

## 2022-10-06 NOTE — TELEPHONE ENCOUNTER
Call received form pt's daughter Maray. Pt's daughter requesting to have mother seen by EP for eval of recurrent Afib and review ongoing treatment / therapy. Will route to Dr. Shin for review/ recommendations.   GEORGE Tyler RN, BSN.

## 2022-10-07 ENCOUNTER — TELEPHONE (OUTPATIENT)
Dept: CARDIOLOGY | Facility: CLINIC | Age: 87
End: 2022-10-07

## 2022-10-07 NOTE — TELEPHONE ENCOUNTER
Germán Shin MD  You 20 hours ago (3:34 PM)         With her issues of tachy/uzair and thyroid issues on amiodarone, AVN/pacemaker may be the best option     Message text       Germán Shin MD  You 20 hours ago (3:32 PM)         Please place an EP referral for atrial fibrillation     Message text       Recommendations reviewed with pt's daughter Marya. Orders placed per MD. GEORGE Tyler RN, BSN.

## 2022-10-07 NOTE — TELEPHONE ENCOUNTER
Call received form pt's daughter zaki to report that Pt vitals this AM:  /64, HR 56  Recheck 30 min later: 155/72, HR 58.     Unclear if HR accurate as home electronic cuff used and pt recently noted to be in Afib. Readings upon arrival to ED was rapid. Pt had recent medication increase due to episode of Afib RVR. Pt on metoprolol 25mg daily. Per recent interaction plan for pt to have EP eval next available due to recurrent Afib RVR with tachy - uzair qualities.     No answer LVM / ACB 10/07/22  10:59 AM GEORGE Tyler RN, BSN.     ++ call back received from pt's daughter. Pt has been checking vitals and is concerned about reading on home machine. Reviewed prior ED readings with prior concern for low readings on home machine. unclear accurate readings. Pt has no sxs reported. Per report pt can get anxious about readings. Pt and daughter advised of kardiamobile device available for purchase which may help with concerns over readings as it is not clear if HR accurate. pt tolerating medications well. No concerns other than the numbers. At this time pt advised to continue current medications. If HR reading continue be of concern ER precautions advised. Will touch base with daughter to review HR's next week. Pt transfer to scheduling to arrange OV with EP as previously recommended.   GOERGE Tyler RN, BSN. 10/07/22 2:14 PM

## 2022-10-13 ENCOUNTER — HOSPITAL ENCOUNTER (OUTPATIENT)
Dept: CARDIOLOGY | Facility: CLINIC | Age: 87
Discharge: HOME | End: 2022-10-13
Attending: INTERNAL MEDICINE | Admitting: INTERNAL MEDICINE
Payer: COMMERCIAL

## 2022-10-13 ENCOUNTER — OFFICE VISIT (OUTPATIENT)
Dept: INTERNAL MEDICINE | Facility: CLINIC | Age: 87
End: 2022-10-13
Payer: COMMERCIAL

## 2022-10-13 VITALS
WEIGHT: 156 LBS | HEIGHT: 61 IN | HEART RATE: 56 BPM | SYSTOLIC BLOOD PRESSURE: 126 MMHG | DIASTOLIC BLOOD PRESSURE: 60 MMHG | OXYGEN SATURATION: 100 % | TEMPERATURE: 95.9 F | BODY MASS INDEX: 29.45 KG/M2 | RESPIRATION RATE: 20 BRPM

## 2022-10-13 DIAGNOSIS — E03.9 ACQUIRED HYPOTHYROIDISM: ICD-10-CM

## 2022-10-13 DIAGNOSIS — R73.09 ELEVATED GLUCOSE: Primary | ICD-10-CM

## 2022-10-13 DIAGNOSIS — M54.50 RIGHT-SIDED LOW BACK PAIN WITHOUT SCIATICA, UNSPECIFIED CHRONICITY: ICD-10-CM

## 2022-10-13 DIAGNOSIS — I10 ESSENTIAL HYPERTENSION: ICD-10-CM

## 2022-10-13 DIAGNOSIS — I48.0 PAROXYSMAL ATRIAL FIBRILLATION (H): ICD-10-CM

## 2022-10-13 LAB
ANION GAP SERPL CALCULATED.3IONS-SCNC: 7 MMOL/L (ref 3–14)
BUN SERPL-MCNC: 21 MG/DL (ref 7–30)
CALCIUM SERPL-MCNC: 9 MG/DL (ref 8.5–10.1)
CHLORIDE BLD-SCNC: 97 MMOL/L (ref 94–109)
CO2 SERPL-SCNC: 27 MMOL/L (ref 20–32)
CREAT SERPL-MCNC: 0.88 MG/DL (ref 0.52–1.04)
GFR SERPL CREATININE-BSD FRML MDRD: 64 ML/MIN/1.73M2
GLUCOSE BLD-MCNC: 75 MG/DL (ref 70–99)
HBA1C MFR BLD: 5.4 % (ref 0–5.6)
POTASSIUM BLD-SCNC: 4.3 MMOL/L (ref 3.4–5.3)
SODIUM SERPL-SCNC: 131 MMOL/L (ref 133–144)
T4 FREE SERPL-MCNC: 1.02 NG/DL (ref 0.76–1.46)
TSH SERPL DL<=0.005 MIU/L-ACNC: 30.75 MU/L (ref 0.4–4)

## 2022-10-13 PROCEDURE — 0124A COVID-19,PF,PFIZER BOOSTER BIVALENT: CPT | Performed by: INTERNAL MEDICINE

## 2022-10-13 PROCEDURE — 80048 BASIC METABOLIC PNL TOTAL CA: CPT | Performed by: INTERNAL MEDICINE

## 2022-10-13 PROCEDURE — 91312 COVID-19,PF,PFIZER BOOSTER BIVALENT: CPT | Performed by: INTERNAL MEDICINE

## 2022-10-13 PROCEDURE — 93225 XTRNL ECG REC<48 HRS REC: CPT

## 2022-10-13 PROCEDURE — 93227 XTRNL ECG REC<48 HR R&I: CPT | Performed by: INTERNAL MEDICINE

## 2022-10-13 PROCEDURE — 84439 ASSAY OF FREE THYROXINE: CPT | Performed by: INTERNAL MEDICINE

## 2022-10-13 PROCEDURE — 99214 OFFICE O/P EST MOD 30 MIN: CPT | Performed by: INTERNAL MEDICINE

## 2022-10-13 PROCEDURE — 36415 COLL VENOUS BLD VENIPUNCTURE: CPT | Performed by: INTERNAL MEDICINE

## 2022-10-13 PROCEDURE — 84443 ASSAY THYROID STIM HORMONE: CPT | Performed by: INTERNAL MEDICINE

## 2022-10-13 PROCEDURE — 83036 HEMOGLOBIN GLYCOSYLATED A1C: CPT | Performed by: INTERNAL MEDICINE

## 2022-10-13 NOTE — PROGRESS NOTES
"  Assessment & Plan     (E03.9) Acquired hypothyroidism  Plan: Had abnormal TSH last office visit and Levoxyl was increased to 50 mcg daily, tolerating well, check TSH with free T4 reflex and adjust Levoxyl dose after results          (I10) Essential hypertension  Plan: Blood pressure well controlled, recently has been started on metoprolol 25 mg daily and amlodipine was decreased to 2.5 mg daily tolerating well, check basic metabolic panel            (M54.50) Right-sided low back pain without sciatica, unspecified chronicity  Plan: Patient has done physical therapy in the past, advised to apply over-the-counter Voltaren gel as directed . OTC Tylenol as directed.  Intermittent rest, proper lifting with avoidance of heavy lifting and intermittent use of heat discussed - avoid sleeping on a heating pad.patient was referred to orthopedic for further evaluation and management.  Orthopedic  Referral            (R73.09) Elevated glucose    Plan: Hemoglobin A1c             Review of the result(s) of each unique test - TSH, BMP, A1c  32 minutes spent on the date of the encounter doing chart review, history and exam, documentation and further activities per the note       BMI:   Estimated body mass index is 29.48 kg/m  as calculated from the following:    Height as of this encounter: 1.549 m (5' 1\").    Weight as of this encounter: 70.8 kg (156 lb).       Return in about 5 months (around 3/13/2023) for BP Recheck.    Diana Hilario MD  [facility] TIGRE Dennis is a 87 year old accompanied by her daughter, presenting for the following health issues:  Musculoskeletal Problem (Back pain ) and Foot Swelling (Both feet )      Musculoskeletal Problem    History of Present Illness       Back Pain:  She presents for follow up of back pain. Patient's back pain is a recurring problem.  Location of back pain:  Right lower back  Description of back pain: other  Back pain spreads: " nowhere    Since patient first noticed back pain, pain is: always present, but gets better and worse  Does back pain interfere with her job:  No      Reason for visit:  Feeling weak back pain    She eats 2-3 servings of fruits and vegetables daily.She consumes 1 sweetened beverage(s) daily.She exercises with enough effort to increase her heart rate 9 or less minutes per day.  She exercises with enough effort to increase her heart rate 3 or less days per week.   She is taking medications regularly.     Pt is a 87 year old female who is seen here to day along with her daughter with c/o low back pain on and off for several months pain mainly located right lower back, no radiation to right lower extremity, no tingling or numbness or weakness on the bilateral lower extremity, no bladder or bowel incontinence.  [place] pain medication from [place], has had MRI of  lumbar spine in 06/21-which showed  L1 vertebral body mild chronic compression deformity.   L1 superior endplate prominent Schmorl's node with adjacent bone marrow edema could reflect acute Schmorl's node. Left L5 vertebral body superior endplate mild chronic compression deformity.   L2 vertebral body demonstrates a large osseous hemangioma. No extraosseous extension.   Multilevel spondylosis described above.  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  Patient has done physical therapy in the past      Hyperlipidemia Follow-Up      Are you regularly taking any medication or supplement to lower your cholesterol?   Yes- lipitor    Are you having muscle aches or other side effects that you think could be caused by your cholesterol lowering medication?  No    Hypertension Follow-up      Do you check your blood pressure regularly outside of the clinic? Yes     Are you following a low salt diet? Yes    Are your blood pressures ever more than 140 on the top number (systolic) OR more   than 90 on the bottom number (diastolic), for example  140/90? Yes    Hypothyroidism Follow-up    Since last visit, patient describes the following symptoms: Weight stable, no hair loss, no skin changes, no constipation, no loose stools, currently on Levoxyl 50 mcg daily,     History of atrial fibrillation: On amiodarone and Xarelto through cardiology and recently she was also started on metoprolol 25 mg daily.  Patient has appointment with the EP provider tomorrow      Past Medical History:   Diagnosis Date     Acquired hypothyroidism 5/27/2021     Coronary artery disease involving native coronary artery of native heart without angina pectoris 5/26/2021     Other and unspecified hyperlipidemia      Unspecified cardiovascular disease 1999    stent placed       Current Outpatient Medications   Medication Sig Dispense Refill     amiodarone (PACERONE) 200 MG tablet Take 1 tablet (200 mg) by mouth daily 90 tablet 3     amLODIPine (NORVASC) 2.5 MG tablet Take 1 tablet (2.5 mg) by mouth daily 180 tablet 1     atorvastatin (LIPITOR) 10 MG tablet Take 1 tablet (10 mg) by mouth daily 90 tablet 3     IRON (FERROUS GLUCONATE) 325 MG OR TABS Take 1 tablet by mouth daily  30 0     levothyroxine (SYNTHROID/LEVOTHROID) 50 MCG tablet Take 1 tablet (50 mcg) by mouth daily 90 tablet 0     losartan (COZAAR) 50 MG tablet Take 1 tablet (50 mg) by mouth 2 times daily 180 tablet 3     metoprolol succinate ER (TOPROL XL) 25 MG 24 hr tablet Take 1 tablet (25 mg) by mouth daily 90 tablet 1     Multiple Vitamins-Minerals (ICAPS AREDS FORMULA PO) Take 2 tablets by mouth daily        niacin 500 MG tablet Take by mouth daily (with breakfast)       nitroGLYcerin (NITROSTAT) 0.4 MG sublingual tablet For chest pain place 1 tablet under the tongue every 5 minutes for 3 doses. If symptoms persist 5 minutes after 1st dose call 911. 30 tablet 0     rivaroxaban ANTICOAGULANT (XARELTO) 20 MG TABS tablet Take 1 tablet (20 mg) by mouth daily (with dinner) 90 tablet 3     ondansetron (ZOFRAN-ODT) 4 MG ODT tab  "Take 1 tablet (4 mg) by mouth every 8 hours as needed for nausea or vomiting 15 tablet 0       Review of Systems   CONSTITUTIONAL: NEGATIVE for fever, chills, change in weight  RESP: NEGATIVE for significant cough or SOB  CV: NEGATIVE for chest pain, palpitations or peripheral edema  MUSCULOSKELETAL: Right lower back pain  NEURO: NEGATIVE for weakness, dizziness or paresthesias  PSYCHIATRIC: NEGATIVE for changes in mood or affect      Objective    /60   Pulse 56   Temp (!) 95.9  F (35.5  C) (Tympanic)   Resp 20   Ht 1.549 m (5' 1\")   Wt 70.8 kg (156 lb)   LMP  (LMP Unknown)   SpO2 100%   BMI 29.48 kg/m    Body mass index is 29.48 kg/m .  Physical Exam   GENERAL:   alert and no distress  RESP: lungs clear to auscultation - no rales, rhonchi or wheezes  CV: regular rate and rhythm at this time  MS: No vertebral tenderness, has tenderness on the right SI joint area, reflexes are normal muscle strength and sensation intact, no lower extremity edema at this time, no calf tenderness bilaterally  PSYCH: mentation appears normal, affect normal/bright      "

## 2022-10-14 ENCOUNTER — HOSPITAL ENCOUNTER (OUTPATIENT)
Facility: CLINIC | Age: 87
End: 2022-10-14
Payer: COMMERCIAL

## 2022-10-14 ENCOUNTER — OFFICE VISIT (OUTPATIENT)
Dept: CARDIOLOGY | Facility: CLINIC | Age: 87
End: 2022-10-14
Attending: INTERNAL MEDICINE
Payer: COMMERCIAL

## 2022-10-14 VITALS
OXYGEN SATURATION: 100 % | BODY MASS INDEX: 28.51 KG/M2 | WEIGHT: 151 LBS | HEART RATE: 60 BPM | DIASTOLIC BLOOD PRESSURE: 72 MMHG | SYSTOLIC BLOOD PRESSURE: 110 MMHG | HEIGHT: 61 IN

## 2022-10-14 DIAGNOSIS — I48.0 PAROXYSMAL ATRIAL FIBRILLATION (H): ICD-10-CM

## 2022-10-14 PROCEDURE — 99204 OFFICE O/P NEW MOD 45 MIN: CPT | Performed by: INTERNAL MEDICINE

## 2022-10-14 NOTE — PROGRESS NOTES
HPI and Plan:   See dictation  [id number]  Today's clinic visit entailed:  The following tests were independently interpreted by me as noted in my documentation: ecg, echo  45 minutes spent on the date of the encounter doing chart review, history and exam, documentation and further activities per the note  Provider  Link to [facility] Help Grid     The level of medical decision making during this visit was of moderate complexity.      Orders Placed This Encounter   Procedures     Case Request EP: EP Ablation AV Node     Case Request EP: Pacemaker Device & Lead Implant- Right ventricular       No orders of the defined types were placed in this encounter.      There are no discontinued medications.      Encounter Diagnosis   Name Primary?     Paroxysmal atrial fibrillation (H)        CURRENT MEDICATIONS:  Current Outpatient Medications   Medication Sig Dispense Refill     amiodarone (PACERONE) 200 MG tablet Take 1 tablet (200 mg) by mouth daily 90 tablet 3     amLODIPine (NORVASC) 2.5 MG tablet Take 1 tablet (2.5 mg) by mouth daily 180 tablet 1     atorvastatin (LIPITOR) 10 MG tablet Take 1 tablet (10 mg) by mouth daily 90 tablet 3     IRON (FERROUS GLUCONATE) 325 MG OR TABS Take 1 tablet by mouth daily  30 0     levothyroxine (SYNTHROID/LEVOTHROID) 50 MCG tablet Take 1 tablet (50 mcg) by mouth daily 90 tablet 0     losartan (COZAAR) 50 MG tablet Take 1 tablet (50 mg) by mouth 2 times daily 180 tablet 3     metoprolol succinate ER (TOPROL XL) 25 MG 24 hr tablet Take 1 tablet (25 mg) by mouth daily 90 tablet 1     Multiple Vitamins-Minerals (ICAPS AREDS FORMULA PO) Take 2 tablets by mouth daily        niacin 500 MG tablet Take by mouth daily (with breakfast)       nitroGLYcerin (NITROSTAT) 0.4 MG sublingual tablet For chest pain place 1 tablet under the tongue every 5 minutes for 3 doses. If symptoms persist 5 minutes after 1st dose call 911. 30 tablet 0     ondansetron (ZOFRAN-ODT) 4 MG ODT tab Take 1 tablet (4 mg) by mouth  "every 8 hours as needed for nausea or vomiting 15 tablet 0     rivaroxaban ANTICOAGULANT (XARELTO) 20 MG TABS tablet Take 1 tablet (20 mg) by mouth daily (with dinner) 90 tablet 3       ALLERGIES     Allergies   Allergen Reactions     Ibuprofen        PAST MEDICAL HISTORY:  Past Medical History:   Diagnosis Date     Acquired hypothyroidism 5/27/2021     Coronary artery disease involving native coronary artery of native heart without angina pectoris 5/26/2021     Other and unspecified hyperlipidemia      Unspecified cardiovascular disease 1999    stent placed       PAST SURGICAL HISTORY:  No past surgical history on file.    FAMILY HISTORY:  Family History   Problem Relation Age of Onset     Family History Negative Other        SOCIAL HISTORY:  Social History     Socioeconomic History     Marital status:    Tobacco Use     Smoking status: Never     Smokeless tobacco: Never   Vaping Use     Vaping Use: Never used   Substance and Sexual Activity     Alcohol use: No     Drug use: No     Sexual activity: Not Currently       Review of Systems:  Skin:          Eyes:         ENT:         Respiratory:          Cardiovascular:         Gastroenterology:        Genitourinary:         Musculoskeletal:         Neurologic:         Psychiatric:         Heme/Lymph/Imm:         Endocrine:           Physical Exam:  Vitals: /72   Pulse 60   Ht 1.549 m (5' 1\")   Wt 68.5 kg (151 lb)   LMP  (LMP Unknown)   SpO2 100%   BMI 28.53 kg/m      Constitutional:           Skin:             Head:           Eyes:           Lymph:      ENT:           Neck:           Respiratory:            Cardiac:                                                           GI:           Extremities and Muscular Skeletal:                 Neurological:           Psych:           CC  Germán Shin MD  [address], [address]  [place] [id number]              "

## 2022-10-14 NOTE — PROGRESS NOTES
Service Date: 10/14/2022    Thank you for allowing me to participate in the care of your very delightful patient.  As you know, Ms. Way is an 87-year-old female with limited English speaking ability with a history of CAD, status post PCI almost 20 years ago and has been seeing Dr. Shin for symptomatic paroxysmal atrial fibrillation complicated by sinus bradycardia as she has been on amiodarone.  She has been in the ER multiple times over the past 6 months with the most recent one shows a few days ago for symptomatic sinus bradycardia associated with hypotension.  In light of that, I was asked to see the patient for further evaluation.    We discussed at length about natural progression of AFib, that is over time, it becomes more frequent.  In her case, she has evidence of sinus node dysfunction as well worsened by the administration of current medical therapy including amiodarone and AV mer blocker agent.      Given her age and the fact that amiodarone failed to maintain sinus rhythm, the best option would be an AV mer ablation and pacemaker.  I went over the procedure in details with risks including but not limited to vascular injury and infection.  I would like her to hold Xarelto 2 days before the procedure.  We will call the patient to schedule this procedure and after we can stop the amiodarone but we will continue metoprolol in light of her history of CAD.    Rogelio Whitt MD        D: 10/14/2022   T: 10/14/2022   MT: DANIA    Name:     CORA WAY  MRN:      [id number]        Account:      [id number]   :      1935           Service Date: 10/14/2022       Document: [id number]

## 2022-10-19 ENCOUNTER — TELEPHONE (OUTPATIENT)
Dept: INTERNAL MEDICINE | Facility: CLINIC | Age: 87
End: 2022-10-19

## 2022-10-19 RX ORDER — LEVOTHYROXINE SODIUM 75 UG/1
75 TABLET ORAL DAILY
Qty: 90 TABLET | Refills: 0 | Status: SHIPPED | OUTPATIENT
Start: 2022-10-19 | End: 2023-01-24

## 2022-10-19 NOTE — TELEPHONE ENCOUNTER
Patient daughter calls regarding lab test results and possible new dosing of levothroxine. She was told  would communicate via [employer] but no info has come thru yet.     pls call to advise [phone]      Also asking about the orthopedics referral that was discussed at last office visit

## 2022-10-19 NOTE — TELEPHONE ENCOUNTER
I have [facility] [place] [facility] [place] [facility]   Orthopedic referral was placed on 10/13/2022 during her office visit  Please advise patient's daughter if ortho has not called her ,she can call  [phone] to schedule appointment.    I have sent result note at 1;59 pm and this message was forwarded to me at 3;49 pm .  Please check patient's chart  .

## 2022-10-20 NOTE — TELEPHONE ENCOUNTER
Daughter Marya said she is unable to read the result note in My Chart.  Result note read to daughter and patient is to recheck labs in 4 weeks.  Copy of ortho referral sent to My Chart as Marya did not have a pen/paper. DANIELLA Fang R.N.

## 2022-10-26 DIAGNOSIS — I49.5 SICK SINUS SYNDROME (H): Primary | ICD-10-CM

## 2022-10-26 RX ORDER — SODIUM CHLORIDE 450 MG/100ML
INJECTION, SOLUTION INTRAVENOUS CONTINUOUS
Status: CANCELLED | OUTPATIENT
Start: 2022-10-26

## 2022-10-26 RX ORDER — LIDOCAINE 40 MG/G
CREAM TOPICAL
Status: CANCELLED | OUTPATIENT
Start: 2022-10-26

## 2022-10-26 RX ORDER — CEFAZOLIN SODIUM 2 G/100ML
2 INJECTION, SOLUTION INTRAVENOUS
Status: CANCELLED | OUTPATIENT
Start: 2022-10-26

## 2022-10-26 NOTE — PROGRESS NOTES
Called and spoke with patient's son-in-law, Mario, regarding patient's pre-procedure instructions for AVNA and PPM Implant on 10/31/22 at 1400:     Anticoagulation: Hold Xarelto 2 days prior to procedure (Starting Saturday, 10/29)   Oral diabetes meds: N/A  Insulin: N/A  SGLT2 Inhibitors (Invokana, Farxiga, Jardiance, Steglatro): N/A  Diuretic: N/A  Contrast allergy: None    Pt to be NPO at midnight.  Pt may have clear liquid breakfast before 8am.      Pt to shower the morning of the procedure, and then put on a clean shirt in order to help prevent infection.     Pt to have post-procedure transportation and 24 hours monitoring set up. Pt reminded to call before coming in if their plans change.  With limited bed availability due to COVID, overnight hospital stays will be allowed for clinical reasons only.    Pt aware of no driving for 24 hours post procedure due to sedation.     COVID test scheduled: Pt to bring results of home COVID test with to procedure.  Pt to call clinic if test is positive so procedure can be rescheduled.      Pt to self-quarantine from the time of the COVID test to the procedure.    Pt to take temperature the morning of the procedure and call [facility] at [phone] if it is above 100.0    Notified of arrival time 1200 and location. Mario verbalized understanding of instructions and they will call if they have any questions.  Device Clinic and [facility] phone numbers provided.    THUY Rose

## 2022-10-31 ENCOUNTER — HOSPITAL ENCOUNTER (OUTPATIENT)
Facility: CLINIC | Age: 87
Discharge: HOME | End: 2022-11-02
Admitting: INTERNAL MEDICINE
Payer: COMMERCIAL

## 2022-10-31 DIAGNOSIS — I49.5 SICK SINUS SYNDROME (H): ICD-10-CM

## 2022-10-31 DIAGNOSIS — I48.0 PAROXYSMAL ATRIAL FIBRILLATION (H): ICD-10-CM

## 2022-10-31 LAB
ANION GAP SERPL CALCULATED.3IONS-SCNC: 5 MMOL/L (ref 3–14)
BUN SERPL-MCNC: 21 MG/DL (ref 7–30)
CALCIUM SERPL-MCNC: 9.3 MG/DL (ref 8.5–10.1)
CHLORIDE BLD-SCNC: 97 MMOL/L (ref 94–109)
CO2 SERPL-SCNC: 26 MMOL/L (ref 20–32)
CREAT SERPL-MCNC: 0.89 MG/DL (ref 0.52–1.04)
ERYTHROCYTE [DISTWIDTH] IN BLOOD BY AUTOMATED COUNT: 14.5 % (ref 10–15)
GFR SERPL CREATININE-BSD FRML MDRD: 63 ML/MIN/1.73M2
GLUCOSE BLD-MCNC: 83 MG/DL (ref 70–99)
HCT VFR BLD AUTO: 39.1 % (ref 35–47)
HGB BLD-MCNC: 12.5 G/DL (ref 11.7–15.7)
MCH RBC QN AUTO: 30 PG (ref 26.5–33)
MCHC RBC AUTO-ENTMCNC: 32 G/DL (ref 31.5–36.5)
MCV RBC AUTO: 94 FL (ref 78–100)
PLATELET # BLD AUTO: 267 10E3/UL (ref 150–450)
POTASSIUM BLD-SCNC: 4.9 MMOL/L (ref 3.4–5.3)
RBC # BLD AUTO: 4.17 10E6/UL (ref 3.8–5.2)
SODIUM SERPL-SCNC: 128 MMOL/L (ref 133–144)
WBC # BLD AUTO: 8.6 10E3/UL (ref 4–11)

## 2022-10-31 PROCEDURE — 99153 MOD SED SAME PHYS/QHP EA: CPT | Performed by: INTERNAL MEDICINE

## 2022-10-31 PROCEDURE — 99152 MOD SED SAME PHYS/QHP 5/>YRS: CPT | Performed by: INTERNAL MEDICINE

## 2022-10-31 PROCEDURE — 250N000011 HC RX IP 250 OP 636: Performed by: INTERNAL MEDICINE

## 2022-10-31 PROCEDURE — 36415 COLL VENOUS BLD VENIPUNCTURE: CPT | Performed by: INTERNAL MEDICINE

## 2022-10-31 PROCEDURE — C1894 INTRO/SHEATH, NON-LASER: HCPCS | Performed by: INTERNAL MEDICINE

## 2022-10-31 PROCEDURE — C1733 CATH, EP, OTHR THAN COOL-TIP: HCPCS | Performed by: INTERNAL MEDICINE

## 2022-10-31 PROCEDURE — 250N000009 HC RX 250: Performed by: INTERNAL MEDICINE

## 2022-10-31 PROCEDURE — 999N000071 HC STATISTIC HEART CATH LAB OR EP LAB

## 2022-10-31 PROCEDURE — 36591 DRAW BLOOD OFF VENOUS DEVICE: CPT

## 2022-10-31 PROCEDURE — 93650 ICAR CATH ABLTJ AV NODE FUNC: CPT | Performed by: INTERNAL MEDICINE

## 2022-10-31 PROCEDURE — 272N000001 HC OR GENERAL SUPPLY STERILE: Performed by: INTERNAL MEDICINE

## 2022-10-31 PROCEDURE — 85027 COMPLETE CBC AUTOMATED: CPT | Performed by: INTERNAL MEDICINE

## 2022-10-31 PROCEDURE — 80048 BASIC METABOLIC PNL TOTAL CA: CPT | Performed by: INTERNAL MEDICINE

## 2022-10-31 PROCEDURE — C1898 LEAD, PMKR, OTHER THAN TRANS: HCPCS | Performed by: INTERNAL MEDICINE

## 2022-10-31 PROCEDURE — 258N000002 HC RX IP 258 OP 250: Performed by: INTERNAL MEDICINE

## 2022-10-31 PROCEDURE — 33208 INSRT HEART PM ATRIAL & VENT: CPT | Mod: KX | Performed by: INTERNAL MEDICINE

## 2022-10-31 PROCEDURE — C1785 PMKR, DUAL, RATE-RESP: HCPCS | Performed by: INTERNAL MEDICINE

## 2022-10-31 PROCEDURE — 999N000184 HC STATISTIC TELEMETRY

## 2022-10-31 PROCEDURE — 250N000013 HC RX MED GY IP 250 OP 250 PS 637: Performed by: INTERNAL MEDICINE

## 2022-10-31 DEVICE — IMP LEAD PACING BIPOLAR CAPSUREFIX NOVUS 45CM 5076-45: Type: IMPLANTABLE DEVICE | Status: FUNCTIONAL

## 2022-10-31 DEVICE — LEAD PACING SOLIA S 53 PROMRI: Type: IMPLANTABLE DEVICE | Status: FUNCTIONAL

## 2022-10-31 DEVICE — PACEMAKER PROMRI DUAL CHAMBER: Type: IMPLANTABLE DEVICE | Status: FUNCTIONAL

## 2022-10-31 RX ORDER — ONDANSETRON 4 MG/1
4 TABLET, ORALLY DISINTEGRATING ORAL EVERY 8 HOURS PRN
Status: DISCONTINUED | OUTPATIENT
Start: 2022-10-31 | End: 2022-11-02 | Stop reason: HOSPADM

## 2022-10-31 RX ORDER — NITROGLYCERIN 0.4 MG/1
0.4 TABLET SUBLINGUAL EVERY 5 MIN PRN
Status: DISCONTINUED | OUTPATIENT
Start: 2022-10-31 | End: 2022-11-02 | Stop reason: HOSPADM

## 2022-10-31 RX ORDER — OXYCODONE AND ACETAMINOPHEN 5; 325 MG/1; MG/1
1 TABLET ORAL EVERY 4 HOURS PRN
Status: DISCONTINUED | OUTPATIENT
Start: 2022-10-31 | End: 2022-11-02 | Stop reason: HOSPADM

## 2022-10-31 RX ORDER — LIDOCAINE 40 MG/G
CREAM TOPICAL
Status: DISCONTINUED | OUTPATIENT
Start: 2022-10-31 | End: 2022-10-31 | Stop reason: HOSPADM

## 2022-10-31 RX ORDER — METOPROLOL SUCCINATE 25 MG/1
25 TABLET, EXTENDED RELEASE ORAL DAILY
Status: DISCONTINUED | OUTPATIENT
Start: 2022-11-01 | End: 2022-11-02 | Stop reason: HOSPADM

## 2022-10-31 RX ORDER — NALOXONE HYDROCHLORIDE 0.4 MG/ML
0.4 INJECTION, SOLUTION INTRAMUSCULAR; INTRAVENOUS; SUBCUTANEOUS
Status: DISCONTINUED | OUTPATIENT
Start: 2022-10-31 | End: 2022-11-02 | Stop reason: HOSPADM

## 2022-10-31 RX ORDER — FENTANYL CITRATE 50 UG/ML
INJECTION, SOLUTION INTRAMUSCULAR; INTRAVENOUS
Status: DISCONTINUED | OUTPATIENT
Start: 2022-10-31 | End: 2022-10-31 | Stop reason: HOSPADM

## 2022-10-31 RX ORDER — ATORVASTATIN CALCIUM 10 MG/1
10 TABLET, FILM COATED ORAL DAILY
Status: DISCONTINUED | OUTPATIENT
Start: 2022-11-01 | End: 2022-11-02 | Stop reason: HOSPADM

## 2022-10-31 RX ORDER — NALOXONE HYDROCHLORIDE 0.4 MG/ML
0.2 INJECTION, SOLUTION INTRAMUSCULAR; INTRAVENOUS; SUBCUTANEOUS
Status: DISCONTINUED | OUTPATIENT
Start: 2022-10-31 | End: 2022-11-02 | Stop reason: HOSPADM

## 2022-10-31 RX ORDER — CEFAZOLIN SODIUM 2 G/100ML
2 INJECTION, SOLUTION INTRAVENOUS
Status: COMPLETED | OUTPATIENT
Start: 2022-10-31 | End: 2022-10-31

## 2022-10-31 RX ORDER — NIACIN 500 MG
500 TABLET ORAL
Status: DISCONTINUED | OUTPATIENT
Start: 2022-11-01 | End: 2022-11-02 | Stop reason: HOSPADM

## 2022-10-31 RX ORDER — LEVOTHYROXINE SODIUM 75 UG/1
75 TABLET ORAL
Status: DISCONTINUED | OUTPATIENT
Start: 2022-11-01 | End: 2022-11-02 | Stop reason: HOSPADM

## 2022-10-31 RX ORDER — CEFAZOLIN SODIUM 2 G/100ML
INJECTION, SOLUTION INTRAVENOUS CONTINUOUS PRN
Status: COMPLETED | OUTPATIENT
Start: 2022-10-31 | End: 2022-10-31

## 2022-10-31 RX ORDER — SODIUM CHLORIDE 450 MG/100ML
INJECTION, SOLUTION INTRAVENOUS CONTINUOUS
Status: DISCONTINUED | OUTPATIENT
Start: 2022-10-31 | End: 2022-10-31 | Stop reason: HOSPADM

## 2022-10-31 RX ORDER — LOSARTAN POTASSIUM 50 MG/1
50 TABLET ORAL 2 TIMES DAILY
Status: DISCONTINUED | OUTPATIENT
Start: 2022-10-31 | End: 2022-11-02 | Stop reason: HOSPADM

## 2022-10-31 RX ORDER — AMLODIPINE BESYLATE 2.5 MG/1
2.5 TABLET ORAL DAILY
Status: DISCONTINUED | OUTPATIENT
Start: 2022-11-01 | End: 2022-11-02 | Stop reason: HOSPADM

## 2022-10-31 RX ADMIN — CEFAZOLIN SODIUM 2 G: 2 INJECTION, SOLUTION INTRAVENOUS at 14:32

## 2022-10-31 RX ADMIN — LOSARTAN POTASSIUM 50 MG: 50 TABLET, FILM COATED ORAL at 21:22

## 2022-10-31 RX ADMIN — SODIUM CHLORIDE: 4.5 INJECTION, SOLUTION INTRAVENOUS at 13:14

## 2022-10-31 ASSESSMENT — ACTIVITIES OF DAILY LIVING (ADL)
ADLS_ACUITY_SCORE: 37
ADLS_ACUITY_SCORE: 35

## 2022-10-31 NOTE — PROGRESS NOTES
Uneventful implantation of a dual chamber ppm and AVN ablation resulting in CHB without an escape rhythm > 30 bpm. Remove suture after 1 hour of bedrest. Stop amio. Resume Xarelto tomorrow. Observe overnight as patient is pacer-dependent.

## 2022-10-31 NOTE — PROGRESS NOTES
[facility] transfer Summary      Items returned to patient:   Home and hospital acquired medications returned to patient n/a   Listed belongings gathered and returned to patient: Yes      Right groin sutures removed without difficulties and dressing applied.    Patient transferred to [facility] via cart and tele. Report to Luis ARAMBULA Right groin/ left subclavian pacer site dry and intact with no bleeding.    Shane Ibrahim RN

## 2022-10-31 NOTE — PROGRESS NOTES
Patient is A/O, vss, a-febrile, denies shortness of braeath, patient transferred from care suites at 1800 to be observed overnight after AVN ablation and PPM implantation, rt groin puncture site CDI, dressing on left upper chest pacer site CDI, tele 100% AV paced.

## 2022-10-31 NOTE — PROGRESS NOTES
[facility] Admission Nursing Note    Patient Information  Name: Jeannie Chu  Age: 87 year old  Reason for admission: AVN ablation/PPM  [facility] arrival time: 1206    Visitor Information  Name: Quinton  Informed of visitor restrictions: Yes  1 visitor allowed per patient   Visitor must screen negative for COVID symptoms   Visitor must wear a mask  Waiting rooms closed to visitors    Patient Admission/Assessment   Pre-procedure assessment complete: Yes  If abnormal assessment/labs, provider notified: N/A  NPO: Yes  Medications held per instructions/orders: Yes  Consent: obtained  If applicable, pregnancy test status: deferred  Patient oriented to room: Yes  Education/questions answered: Yes  Plan/other: proceed as scheduled    Discharge Planning  Discharge name/phone number: Quinton martínez - [phone]  Overnight post sedation caregiver: son and daughter  Discharge location: home    Sadia Mary RN       OK to proceed with procedure per Dr. Whitt even though Covid test is pending.

## 2022-10-31 NOTE — Clinical Note
Potential access sites were evaluated for patency using ultrasound.   The Left subclavian vein was selected. Access was obtained under with Sonosite guidance using a micropuncture 21 gauge needle with direct visualization of needle entry.

## 2022-10-31 NOTE — PROVIDER NOTIFICATION
"Text page sen to Dr Whitt- \"FYI: Sodium level is 128.  Please advise as needed.  Thank you, Carlota DALEY [phone].\"  "

## 2022-11-01 ENCOUNTER — APPOINTMENT (OUTPATIENT)
Dept: GENERAL RADIOLOGY | Facility: CLINIC | Age: 87
End: 2022-11-01
Attending: NURSE PRACTITIONER
Payer: COMMERCIAL

## 2022-11-01 ENCOUNTER — TELEPHONE (OUTPATIENT)
Dept: CARDIOLOGY | Facility: CLINIC | Age: 87
End: 2022-11-01

## 2022-11-01 DIAGNOSIS — I49.5 SICK SINUS SYNDROME (H): Primary | ICD-10-CM

## 2022-11-01 DIAGNOSIS — Z95.0 CARDIAC PACEMAKER IN SITU: ICD-10-CM

## 2022-11-01 LAB
ANION GAP SERPL CALCULATED.3IONS-SCNC: 4 MMOL/L (ref 3–14)
BUN SERPL-MCNC: 16 MG/DL (ref 7–30)
CALCIUM SERPL-MCNC: 9 MG/DL (ref 8.5–10.1)
CHLORIDE BLD-SCNC: 100 MMOL/L (ref 94–109)
CO2 SERPL-SCNC: 28 MMOL/L (ref 20–32)
CREAT SERPL-MCNC: 0.76 MG/DL (ref 0.52–1.04)
CREAT SERPL-MCNC: 0.82 MG/DL (ref 0.52–1.04)
ERYTHROCYTE [DISTWIDTH] IN BLOOD BY AUTOMATED COUNT: 14.3 % (ref 10–15)
GFR SERPL CREATININE-BSD FRML MDRD: 69 ML/MIN/1.73M2
GFR SERPL CREATININE-BSD FRML MDRD: 76 ML/MIN/1.73M2
GLUCOSE BLD-MCNC: 82 MG/DL (ref 70–99)
HCT VFR BLD AUTO: 37.3 % (ref 35–47)
HGB BLD-MCNC: 12.1 G/DL (ref 11.7–15.7)
INR PPP: 0.99 (ref 0.85–1.15)
MCH RBC QN AUTO: 30.3 PG (ref 26.5–33)
MCHC RBC AUTO-ENTMCNC: 32.4 G/DL (ref 31.5–36.5)
MCV RBC AUTO: 94 FL (ref 78–100)
PLATELET # BLD AUTO: 257 10E3/UL (ref 150–450)
POTASSIUM BLD-SCNC: 3.7 MMOL/L (ref 3.4–5.3)
RBC # BLD AUTO: 3.99 10E6/UL (ref 3.8–5.2)
SODIUM SERPL-SCNC: 132 MMOL/L (ref 133–144)
WBC # BLD AUTO: 9 10E3/UL (ref 4–11)

## 2022-11-01 PROCEDURE — C1894 INTRO/SHEATH, NON-LASER: HCPCS | Performed by: INTERNAL MEDICINE

## 2022-11-01 PROCEDURE — 93010 ELECTROCARDIOGRAM REPORT: CPT | Performed by: INTERNAL MEDICINE

## 2022-11-01 PROCEDURE — 272N000001 HC OR GENERAL SUPPLY STERILE: Performed by: INTERNAL MEDICINE

## 2022-11-01 PROCEDURE — 93650 ICAR CATH ABLTJ AV NODE FUNC: CPT | Performed by: INTERNAL MEDICINE

## 2022-11-01 PROCEDURE — 999N000054 HC STATISTIC EKG NON-CHARGEABLE

## 2022-11-01 PROCEDURE — 99152 MOD SED SAME PHYS/QHP 5/>YRS: CPT | Performed by: INTERNAL MEDICINE

## 2022-11-01 PROCEDURE — 71046 X-RAY EXAM CHEST 2 VIEWS: CPT

## 2022-11-01 PROCEDURE — 85610 PROTHROMBIN TIME: CPT | Performed by: NURSE PRACTITIONER

## 2022-11-01 PROCEDURE — 99214 OFFICE O/P EST MOD 30 MIN: CPT | Mod: 25 | Performed by: INTERNAL MEDICINE

## 2022-11-01 PROCEDURE — 250N000013 HC RX MED GY IP 250 OP 250 PS 637: Performed by: INTERNAL MEDICINE

## 2022-11-01 PROCEDURE — 85018 HEMOGLOBIN: CPT | Performed by: NURSE PRACTITIONER

## 2022-11-01 PROCEDURE — 93005 ELECTROCARDIOGRAM TRACING: CPT

## 2022-11-01 PROCEDURE — 99153 MOD SED SAME PHYS/QHP EA: CPT | Performed by: INTERNAL MEDICINE

## 2022-11-01 PROCEDURE — 36415 COLL VENOUS BLD VENIPUNCTURE: CPT

## 2022-11-01 PROCEDURE — 250N000011 HC RX IP 250 OP 636: Performed by: INTERNAL MEDICINE

## 2022-11-01 PROCEDURE — 82565 ASSAY OF CREATININE: CPT

## 2022-11-01 PROCEDURE — 250N000009 HC RX 250: Performed by: INTERNAL MEDICINE

## 2022-11-01 PROCEDURE — 82565 ASSAY OF CREATININE: CPT | Performed by: NURSE PRACTITIONER

## 2022-11-01 PROCEDURE — 250N000013 HC RX MED GY IP 250 OP 250 PS 637: Performed by: STUDENT IN AN ORGANIZED HEALTH CARE EDUCATION/TRAINING PROGRAM

## 2022-11-01 PROCEDURE — 36415 COLL VENOUS BLD VENIPUNCTURE: CPT | Performed by: NURSE PRACTITIONER

## 2022-11-01 PROCEDURE — C1733 CATH, EP, OTHR THAN COOL-TIP: HCPCS | Performed by: INTERNAL MEDICINE

## 2022-11-01 PROCEDURE — 250N000013 HC RX MED GY IP 250 OP 250 PS 637

## 2022-11-01 PROCEDURE — 80048 BASIC METABOLIC PNL TOTAL CA: CPT

## 2022-11-01 PROCEDURE — C1887 CATHETER, GUIDING: HCPCS | Performed by: INTERNAL MEDICINE

## 2022-11-01 RX ORDER — HEPARIN SODIUM 200 [USP'U]/100ML
100-500 INJECTION, SOLUTION INTRAVENOUS CONTINUOUS PRN
Status: DISCONTINUED | OUTPATIENT
Start: 2022-11-01 | End: 2022-11-01

## 2022-11-01 RX ORDER — CEFAZOLIN SODIUM 1 G/3ML
1 INJECTION, POWDER, FOR SOLUTION INTRAMUSCULAR; INTRAVENOUS
Status: DISCONTINUED | OUTPATIENT
Start: 2022-11-01 | End: 2022-11-01

## 2022-11-01 RX ORDER — SODIUM CHLORIDE, SODIUM LACTATE, POTASSIUM CHLORIDE, CALCIUM CHLORIDE 600; 310; 30; 20 MG/100ML; MG/100ML; MG/100ML; MG/100ML
INJECTION, SOLUTION INTRAVENOUS CONTINUOUS
Status: DISCONTINUED | OUTPATIENT
Start: 2022-11-01 | End: 2022-11-01

## 2022-11-01 RX ORDER — HEPARIN SODIUM 200 [USP'U]/100ML
100-600 INJECTION, SOLUTION INTRAVENOUS CONTINUOUS PRN
Status: DISCONTINUED | OUTPATIENT
Start: 2022-11-01 | End: 2022-11-01

## 2022-11-01 RX ORDER — ACETAMINOPHEN 650 MG/1
650 SUPPOSITORY RECTAL EVERY 4 HOURS PRN
Status: DISCONTINUED | OUTPATIENT
Start: 2022-11-01 | End: 2022-11-01

## 2022-11-01 RX ORDER — FENTANYL CITRATE 50 UG/ML
INJECTION, SOLUTION INTRAMUSCULAR; INTRAVENOUS
Status: DISCONTINUED | OUTPATIENT
Start: 2022-11-01 | End: 2022-11-02 | Stop reason: HOSPADM

## 2022-11-01 RX ORDER — LIDOCAINE 40 MG/G
CREAM TOPICAL
Status: DISCONTINUED | OUTPATIENT
Start: 2022-11-01 | End: 2022-11-01

## 2022-11-01 RX ORDER — DOBUTAMINE HYDROCHLORIDE 200 MG/100ML
5-40 INJECTION INTRAVENOUS CONTINUOUS PRN
Status: DISCONTINUED | OUTPATIENT
Start: 2022-11-01 | End: 2022-11-01

## 2022-11-01 RX ORDER — OXYCODONE AND ACETAMINOPHEN 5; 325 MG/1; MG/1
1 TABLET ORAL EVERY 4 HOURS PRN
Status: DISCONTINUED | OUTPATIENT
Start: 2022-11-01 | End: 2022-11-01

## 2022-11-01 RX ORDER — ACETAMINOPHEN 325 MG/1
650 TABLET ORAL EVERY 4 HOURS PRN
Status: DISCONTINUED | OUTPATIENT
Start: 2022-11-01 | End: 2022-11-02 | Stop reason: HOSPADM

## 2022-11-01 RX ORDER — MIDAZOLAM HCL IN 0.9 % NACL/PF 1 MG/ML
.5-6 PLASTIC BAG, INJECTION (ML) INTRAVENOUS CONTINUOUS PRN
Status: DISCONTINUED | OUTPATIENT
Start: 2022-11-01 | End: 2022-11-01

## 2022-11-01 RX ORDER — ACETAMINOPHEN 650 MG/1
650 SUPPOSITORY RECTAL EVERY 4 HOURS PRN
Status: DISCONTINUED | OUTPATIENT
Start: 2022-11-01 | End: 2022-11-02 | Stop reason: HOSPADM

## 2022-11-01 RX ORDER — ACETAMINOPHEN 325 MG/1
650 TABLET ORAL EVERY 4 HOURS PRN
Status: DISCONTINUED | OUTPATIENT
Start: 2022-11-01 | End: 2022-11-01

## 2022-11-01 RX ADMIN — ACETAMINOPHEN 650 MG: 325 TABLET, FILM COATED ORAL at 20:02

## 2022-11-01 RX ADMIN — OXYCODONE HYDROCHLORIDE AND ACETAMINOPHEN 1 TABLET: 5; 325 TABLET ORAL at 08:41

## 2022-11-01 RX ADMIN — ACETAMINOPHEN 650 MG: 325 TABLET, FILM COATED ORAL at 06:15

## 2022-11-01 RX ADMIN — OXYCODONE HYDROCHLORIDE AND ACETAMINOPHEN 1 TABLET: 5; 325 TABLET ORAL at 21:56

## 2022-11-01 RX ADMIN — ATORVASTATIN CALCIUM 10 MG: 10 TABLET, FILM COATED ORAL at 08:41

## 2022-11-01 RX ADMIN — LEVOTHYROXINE SODIUM 75 MCG: 75 TABLET ORAL at 08:40

## 2022-11-01 RX ADMIN — LOSARTAN POTASSIUM 50 MG: 50 TABLET, FILM COATED ORAL at 08:40

## 2022-11-01 RX ADMIN — AMLODIPINE BESYLATE 2.5 MG: 2.5 TABLET ORAL at 08:40

## 2022-11-01 RX ADMIN — METOPROLOL SUCCINATE 25 MG: 25 TABLET, EXTENDED RELEASE ORAL at 08:40

## 2022-11-01 RX ADMIN — LOSARTAN POTASSIUM 50 MG: 50 TABLET, FILM COATED ORAL at 21:56

## 2022-11-01 ASSESSMENT — ACTIVITIES OF DAILY LIVING (ADL)
ADLS_ACUITY_SCORE: 37
ADLS_ACUITY_SCORE: 41

## 2022-11-01 NOTE — PLAN OF CARE
Neuro- A/Ox4  Most Recent Vitals- Temp: 98.6  F (37  C) Temp src: Oral BP: (!) 172/99 Pulse: 60   Resp: 16 SpO2: 99 % O2 Device: None (Room air)   Tele/Cardiac- 100% AV paced  Resp- on RA, LS clear  Activity- SBA w/ gb  Pain- pain at new PPM site- L chest; tylenol given 0615  Drips- n/a  Drains/Tubes- PIV  Skin- WDL ex. R groin site s/p AVN ablation, dressing CDI  GI/- WDL  Aggression Color- Green  COVID status- Negative  Plan- discharge today      Saira Perales RN

## 2022-11-01 NOTE — PROGRESS NOTES
[facility]  Electrophysiology Progress Note  Date of Admission: 10/31/2022  Date of Service: 11/01/2022  Primary Cardiologist: Dr. Shin (general) and Dr. Whitt (EP)    Assessment & Plan   Jeannie Chu is a 87 year old female with past medical history significant for HTN, HLP, CAD s/p PCI ~20 years ago, symptomatic paroxysmal atrial fibrillation complicated by sinus bradycardia previously on amiodarone. She was admitted on 10/31/2022 with post an AVNA and PPM.     She lives in [place] [facility] and is visiting her family in [place].     Assessment:   1. Symptomatic paroxysmal atrial fibrillation     Previously on amiodarone that was stopped post procedure    Anticoagulation on Xarelto     S/p AVNA ablation and PPM on 10/31/2022    Device check showed incomplete AVNA with recurrent atrial fibrillation with RVR    2. Hypertension    [PTA: amlodipine 2.5 mg daily, losartan 50 mg BID, metoprolol 25 mg daily]    3. Coronary artery disease    [PTA: metoprolol 25 mg daily and atorvastatin 10 mg daily]    History of PCI ~20 years ago      Plan:  1. Redo AV node ablation (discussed risk and benefits of redo procedure with patient and son)  2. CXR today post device placement    LORA WOODS CNP  Pager:  [phone]     Physical Exam   Temp: 97.7  F (36.5  C) Temp src: Oral BP: (!) 143/61 Pulse: 60   Resp: 17 SpO2: 100 % O2 Device: None (Room air) Oxygen Delivery: 4 LPM  Vitals:    10/31/22 1205 11/01/22 0530   Weight: 70.3 kg (155 lb) 71.2 kg (156 lb 15.5 oz)       Constitutional:   NAD    Skin:   Warm and dry   Head:   Nontraumatic   Neck:   no JVD   Chest/Lungs:   symmetric, clear to auscultation, PPM site clean dry and intact   Cardiovascular:   regular rate and rhythm   Abdomen:   Benign    Extremities and Back:   No edema   Neurological:   Grossly nonfocal     Medications       amLODIPine  2.5 mg Oral Daily     atorvastatin  10 mg Oral Daily     levothyroxine  75 mcg Oral QAM AC     losartan  50  mg Oral BID     metoprolol succinate ER  25 mg Oral Daily     niacin  500 mg Oral Daily with breakfast       Code Status    Prior    Allergies   Allergies   Allergen Reactions     Ibuprofen

## 2022-11-01 NOTE — PROVIDER NOTIFICATION
Sent text to on call hospitalist:      244-zenon vasquez.  patient is having mild pain at ppm site.  can you order tylenol? no need to call back.

## 2022-11-01 NOTE — PRE-PROCEDURE
GENERAL PRE-PROCEDURE:   Procedure:  Av node ablation  Date/Time:  11/1/2022 6:14 PM    Written consent obtained?: Yes    Risks and benefits: Risks, benefits and alternatives were discussed    Consent given by:  Patient  Patient states understanding of procedure being performed: Yes    Patient's understanding of procedure matches consent: Yes    Procedure consent matches procedure scheduled: Yes    Expected level of sedation:  Moderate  Appropriately NPO:  Yes  Mallampati  :  Grade 2- soft palate, base of uvula, tonsillar pillars, and portion of posterior pharyngeal wall visible  Lungs:  Lungs clear with good breath sounds bilaterally  Heart:  Normal heart sounds and rate  History & Physical reviewed:  History and physical reviewed and no updates needed  Statement of review:  I have reviewed the lab findings, diagnostic data, medications, and the plan for sedation

## 2022-11-01 NOTE — TELEPHONE ENCOUNTER
Post AVNA and PPM device implant education.      Reviewed the following:     - No raising arm above shoulder on the side of implant and lifting anything greater than 10 pounds for 3 weeks (11/22)     - Remove outer dressing 3 days after implant (11/3) May shower after outer dressing removed.      - Leave steri-strips in place, will be removed at 1 week device check     - Limit driving for: 1 week (PPM)     - Watch for redness, drainage, warmth, or fever. Call device clinic if any signs of infection.            1 week device check scheduled:  Need     Pt and son understand

## 2022-11-02 VITALS
BODY MASS INDEX: 27.06 KG/M2 | OXYGEN SATURATION: 99 % | HEART RATE: 64 BPM | RESPIRATION RATE: 16 BRPM | SYSTOLIC BLOOD PRESSURE: 144 MMHG | TEMPERATURE: 98 F | DIASTOLIC BLOOD PRESSURE: 80 MMHG | WEIGHT: 147.05 LBS | HEIGHT: 62 IN

## 2022-11-02 PROCEDURE — 99214 OFFICE O/P EST MOD 30 MIN: CPT | Performed by: INTERNAL MEDICINE

## 2022-11-02 PROCEDURE — 250N000013 HC RX MED GY IP 250 OP 250 PS 637: Performed by: INTERNAL MEDICINE

## 2022-11-02 RX ADMIN — OXYCODONE HYDROCHLORIDE AND ACETAMINOPHEN 1 TABLET: 5; 325 TABLET ORAL at 03:11

## 2022-11-02 RX ADMIN — METOPROLOL SUCCINATE 25 MG: 25 TABLET, EXTENDED RELEASE ORAL at 08:58

## 2022-11-02 RX ADMIN — LOSARTAN POTASSIUM 50 MG: 50 TABLET, FILM COATED ORAL at 08:58

## 2022-11-02 RX ADMIN — LEVOTHYROXINE SODIUM 75 MCG: 75 TABLET ORAL at 08:58

## 2022-11-02 RX ADMIN — ATORVASTATIN CALCIUM 10 MG: 10 TABLET, FILM COATED ORAL at 08:57

## 2022-11-02 RX ADMIN — Medication 500 MG: at 08:58

## 2022-11-02 RX ADMIN — AMLODIPINE BESYLATE 2.5 MG: 2.5 TABLET ORAL at 08:58

## 2022-11-02 ASSESSMENT — ACTIVITIES OF DAILY LIVING (ADL)
ADLS_ACUITY_SCORE: 41
ADLS_ACUITY_SCORE: 43
ADLS_ACUITY_SCORE: 41
ADLS_ACUITY_SCORE: 43
ADLS_ACUITY_SCORE: 41
ADLS_ACUITY_SCORE: 43

## 2022-11-02 NOTE — PLAN OF CARE
Indie speaking w limited English. Son at bedside when interpretation needed. RA, BP 130s-140s, HR 60s, AV paced on tele. Pain reported insertion site on L shoulder and R groin site. Tyelnol x1 and Percocet x2. Pt came back from cath lab with stopcock still in place, removed around 0100 - oozed for 5 mins, held pressure, site is soft and WDL currently. Placed back on bedrest. No SOB or CP. Purewick requested by pt. CMS intact. Plan is to monitor rhythm and CXR for post device placement.

## 2022-11-02 NOTE — PROGRESS NOTES
[facility]  Electrophysiology Progress Note  Date of Admission: 10/31/2022  Date of Service: 11/02/2022  Primary Cardiologist: Dr. Shin (general) and Dr. Whitt (EP)    Assessment & Plan   Jeannie Chu is a 87 year old female with past medical history significant for HTN, HLP, CAD s/p PCI ~20 years ago, symptomatic paroxysmal atrial fibrillation complicated by sinus bradycardia previously on amiodarone. She was admitted on 10/31/2022 with post an AVNA and PPM.     She lives in [place] and is visiting her family in [place].     Assessment:   1. Symptomatic paroxysmal atrial fibrillation     Previously on amiodarone that was stopped post procedure    Anticoagulation on Xarelto 20 mg daily (creatine clearance 52 ml/min)     S/p AVN ablation and PPM on 10/31/2022    Device check (11/1/2022) showed intact AV conduction with atrial pacing at 125 ppm with 1:1 AV conduction with AV block occurring at 130 bpm.    S/p redo AVNA on 11/1/2022     Device check (11/2/2022) post redo AVNA: normal device function with AVNA redo successful     CXR noted normal RA/RV lead position and no pneumothorax    2. Hypertension    [PTA: amlodipine 2.5 mg daily, losartan 50 mg BID, metoprolol 25 mg daily]    Intermittently elevated during stay    3. Coronary artery disease    [PTA: metoprolol 25 mg daily and atorvastatin 10 mg daily]    History of PCI ~20 years ago    Plan:  1. Discharge home today   2. Stop Amiodarone  3. Follow up with device clinic in ~1 week  4. Follow up with [facility] in  3 months    [facility]:   Dr. Shin 7:45 am on 11/10    LORA WOODS CNP  Pager:  [phone]     Physical Exam   Temp: 98  F (36.7  C) Temp src: Oral BP: (!) 148/68 Pulse: 62   Resp: 16 SpO2: 100 % O2 Device: None (Room air) Oxygen Delivery: 2 LPM  Vitals:    10/31/22 1205 11/01/22 0530 11/02/22 0645   Weight: 70.3 kg (155 lb) 71.2 kg (156 lb 15.5 oz) 66.7 kg (147 lb 0.8 oz)       Constitutional:   NAD    Skin:   Warm and  dry   Head:   Nontraumatic   Neck:   no JVD   Chest/Lungs:   symmetric, clear to auscultation, PPM site clean dry and intact, right groin site soft.   Cardiovascular:   regular rate and rhythm   Abdomen:   Benign    Extremities and Back:   No edema   Neurological:   Grossly nonfocal     Medications       amLODIPine  2.5 mg Oral Daily     atorvastatin  10 mg Oral Daily     levothyroxine  75 mcg Oral QAM AC     losartan  50 mg Oral BID     metoprolol succinate ER  25 mg Oral Daily     niacin  500 mg Oral Daily with breakfast       Code Status    Full Code    Allergies   Allergies   Allergen Reactions     Ibuprofen

## 2022-11-02 NOTE — DISCHARGE INSTRUCTIONS
Restart Xarelto 20 mg this evening  No raising arm above shoulder on the side of implant and lifting anything greater than 10 pounds for 3 weeks (11/22)   Remove outer dressing 3 days after implant (11/3) May shower after outer dressing removed.    Leave steri-strips in place, will be removed at 1 week device check    Limit driving for: 1 week (PPM)    Watch for redness, drainage, warmth, or fever. Call device clinic if any signs of infection.  [phone]      Discharge Instructions for Pacemaker Implantation  You have had a procedure to insert a pacemaker. Once inside your body, this small electronic device helps keep your heart from beating too slowly. A pacemaker can t fix existing heart problems. But it can help you feel better and have more energy. As you recover, follow all of the instructions you are given, including those below.  Activity  Follow the instructions you are given about limiting your activity.  Do not raise your left arm above shoulder level for 3 weeks. This gives the device lead wires time to attach securely inside your heart.  Ask your doctor when you can expect to return to work.  You can still exercise. It s good for your body and your heart. Talk with your doctor about an exercise plan.  Other Precautions  Follow your doctor's directions carefully for wound care. You may remove the outer dressing in 3 days (on 11/3/2022) Leave the steri-strips in place; these will be removed at your 1 week follow-up. Never put any creams, lotions, or products like peroxide on an incision unless your doctor tells you to.   You may shower once the outer dressing has been removed.   Before you receive any treatment, tell all health care providers (including your dentist) that you have a pacemaker.  You will be given an ID card that contains information about your pacemaker. Always carry this card with you. You can show this card if your pacemaker sets off a metal detector. You should also show it to avoid  screening with a hand-held security wand.  Keep your cell phone away from your pacemaker. Don t carry the phone in your shirt pocket, even when it s turned off.  Avoid strong magnets. Examples are those used in MRIs or in hand-held security wands.  Avoid strong electrical fields. Examples are those made by radio transmitting towers,  ham  radios, and heavy-duty electrical equipment.  Avoid leaning over the open sánchez of a running car. A running engine creates an electrical field. Most household and yard appliances will not cause any problems. If you use any large power tools, such as an industrial , talk with your doctor.   Follow-Up  Follow up in the device clinic as in 1-2 weeks.   Appointments at [facility] [address], [facility], [place] [address], [place]  Incision check/removal of dressing: Thursday 11/3/2022 at 2:00pm  1-2 week pacemaker check: Friday 11/11/2022 at 2:00pm  Make regular follow-up appointments with your device clinic. They will check the pacemaker to make sure it s working properly.  When to Call Your Device Clinic [phone]  Call your Device Clinic if you have any of the following:  Dizziness  Chest pain  Lack of energy  Fainting spells  Rapid pulse or pounding heartbeat  Shortness of breath  Increased pain around your pacemaker  Fever above 100.4 F (38 C) or other signs of infection (redness, swelling, drainage, or warmth at the incision site)     3232-6770 [employer]. [address] [place] [address]. All rights reserved. This information is not intended as a substitute for professional medical care. Always follow your healthcare professional's instructions.    [facility] in [place]: [phone]  Device Clinic (Monday to Friday, 8am-4pm): [phone]  *The device clinic is closed on weekends and holidays.  Any calls received during this time will be answered on the next business  day. For any urgent questions after hours, please call the main clinic number and  you will be put in contact with the cardiologist on call.

## 2022-11-02 NOTE — TELEPHONE ENCOUNTER
Spoke with Heather DALEY who did PPM education with pt and son yesterday. Pt's son will not be around for the 1-2 week PPM check, so we are to call pt's daughter to schedule that appointment.     Attempted to call pt's daughter Shivani to schedule 1-2 week PPM check. No answer, left VM to call back to device clinic to schedule appointment.     ADDENDUM 9:20AM. Pt's daughter Marya called back. Scheduled 1-2 week device check for 11/11/2022. Marya also requested an appointment tomorrow for help removing the dressing, they were told yesterday they could come in to the clinic for this. Scheduled for tomorrow at 2:00pm for courtesy check.

## 2022-11-02 NOTE — TELEPHONE ENCOUNTER
Received VM from patient's daughter Marya. Pt was feeling dizzy this morning, and she is wondering why pt is dizzy if her heart rate is normal now that she has a PPM. She asked if Dr. Shin would recommend anything.     Chart reviewed. Pt is still inpatient. Called Marya back, no answer, left VM that since pt is still in the hospital she needs to address this with the hospital RNs and doctors.

## 2022-11-03 ENCOUNTER — ANCILLARY PROCEDURE (OUTPATIENT)
Dept: CARDIOLOGY | Facility: CLINIC | Age: 87
End: 2022-11-03
Attending: INTERNAL MEDICINE
Payer: COMMERCIAL

## 2022-11-03 DIAGNOSIS — Z95.0 CARDIAC PACEMAKER IN SITU: ICD-10-CM

## 2022-11-03 DIAGNOSIS — I49.5 SICK SINUS SYNDROME (H): ICD-10-CM

## 2022-11-03 LAB
ATRIAL RATE - MUSE: 61 BPM
DIASTOLIC BLOOD PRESSURE - MUSE: NORMAL MMHG
INTERPRETATION ECG - MUSE: NORMAL
P AXIS - MUSE: NORMAL DEGREES
PR INTERVAL - MUSE: NORMAL MS
QRS DURATION - MUSE: 98 MS
QT - MUSE: 484 MS
QTC - MUSE: 484 MS
R AXIS - MUSE: 46 DEGREES
SYSTOLIC BLOOD PRESSURE - MUSE: NORMAL MMHG
T AXIS - MUSE: 44 DEGREES
VENTRICULAR RATE- MUSE: 60 BPM

## 2022-11-07 ENCOUNTER — TELEPHONE (OUTPATIENT)
Dept: CARDIOLOGY | Facility: CLINIC | Age: 87
End: 2022-11-07

## 2022-11-07 DIAGNOSIS — I10 ESSENTIAL HYPERTENSION: ICD-10-CM

## 2022-11-07 RX ORDER — LOSARTAN POTASSIUM 50 MG/1
50 TABLET ORAL 2 TIMES DAILY
Qty: 180 TABLET | Refills: 2 | Status: SHIPPED | OUTPATIENT
Start: 2022-11-07 | End: 2023-03-31

## 2022-11-07 NOTE — TELEPHONE ENCOUNTER
I called the daughter back. She said [facility] told her they do not have a prescription for twice a day Losartan- even though we show it was sent 5/27 for a year. I will resend the prescription.     [facility] Cardiology Refill Guideline reviewed.  Medication meets criteria for refill.

## 2022-11-07 NOTE — TELEPHONE ENCOUNTER
[facility] Call Center    Phone Message    May a detailed message be left on voicemail: yes     Reason for Call: Medication Refill Request    Has the patient contacted the pharmacy for the refill? Yes   Name of medication being requested: Losartan 50 mg  Provider who prescribed the medication: Dr. Shin  Pharmacy: [facility]  Date medication is needed: 11/07/2022         Action Taken: Message routed to:  [facility] ([facility] Cardio    Travel Screening: Not Applicable

## 2022-11-09 LAB
MDC_IDC_LEAD_IMPLANT_DT: NORMAL
MDC_IDC_LEAD_IMPLANT_DT: NORMAL
MDC_IDC_LEAD_LOCATION: NORMAL
MDC_IDC_LEAD_LOCATION: NORMAL
MDC_IDC_LEAD_LOCATION_DETAIL_1: NORMAL
MDC_IDC_LEAD_LOCATION_DETAIL_1: NORMAL
MDC_IDC_LEAD_MFG: NORMAL
MDC_IDC_LEAD_MFG: NORMAL
MDC_IDC_LEAD_MODEL: NORMAL
MDC_IDC_LEAD_MODEL: NORMAL
MDC_IDC_LEAD_POLARITY_TYPE: NORMAL
MDC_IDC_LEAD_POLARITY_TYPE: NORMAL
MDC_IDC_LEAD_SERIAL: NORMAL
MDC_IDC_LEAD_SERIAL: NORMAL
MDC_IDC_PG_IMPLANT_DTM: NORMAL
MDC_IDC_PG_MFG: NORMAL
MDC_IDC_PG_MODEL: NORMAL
MDC_IDC_PG_SERIAL: NORMAL
MDC_IDC_PG_TYPE: NORMAL
MDC_IDC_SESS_CLINIC_NAME: NORMAL
MDC_IDC_SESS_DTM: NORMAL
MDC_IDC_SESS_TYPE: NORMAL

## 2022-11-10 ENCOUNTER — OFFICE VISIT (OUTPATIENT)
Dept: CARDIOLOGY | Facility: CLINIC | Age: 87
End: 2022-11-10
Attending: INTERNAL MEDICINE
Payer: COMMERCIAL

## 2022-11-10 VITALS
WEIGHT: 153 LBS | OXYGEN SATURATION: 99 % | HEART RATE: 75 BPM | HEIGHT: 61 IN | SYSTOLIC BLOOD PRESSURE: 102 MMHG | DIASTOLIC BLOOD PRESSURE: 60 MMHG | BODY MASS INDEX: 28.89 KG/M2

## 2022-11-10 DIAGNOSIS — E78.5 HYPERLIPIDEMIA LDL GOAL <130: ICD-10-CM

## 2022-11-10 DIAGNOSIS — I48.0 PAROXYSMAL ATRIAL FIBRILLATION (H): ICD-10-CM

## 2022-11-10 DIAGNOSIS — I10 ESSENTIAL HYPERTENSION: ICD-10-CM

## 2022-11-10 PROCEDURE — 99214 OFFICE O/P EST MOD 30 MIN: CPT | Performed by: INTERNAL MEDICINE

## 2022-11-10 RX ORDER — ATORVASTATIN CALCIUM 10 MG/1
10 TABLET, FILM COATED ORAL DAILY
Qty: 90 TABLET | Refills: 3 | Status: ON HOLD | OUTPATIENT
Start: 2022-11-10 | End: 2023-06-21

## 2022-11-10 RX ORDER — METOPROLOL SUCCINATE 25 MG/1
25 TABLET, EXTENDED RELEASE ORAL DAILY
Qty: 90 TABLET | Refills: 3 | Status: SHIPPED | OUTPATIENT
Start: 2022-11-10 | End: 2022-12-05 | Stop reason: DRUGHIGH

## 2022-11-10 NOTE — PROGRESS NOTES
Cardiology Clinic Progress Note:    November 10, 2022   Patient Name: Jeannie Chu  Patient MRN: [id number]     Consult indication: atrial fibrillation     HPI:    I had the opportunity to see patient Jeannie Chu in cardiology clinic for a follow up visit. Patient is followed by our colleague Diana Hilario MD with Primary Care.     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    I had initially met patient in hospital for a consultation on 7/25/2021.  Patient had been experiencing fatigue and weakness, a blood pressure check at home demonstrated an elevated heart rate in the 120 bpm range.  Patient was brought to the ED, and was found to be in atrial fibrillation with RVR.  Labs at that time were notable for normal troponin.  TTE 7/25/2021 demonstrated normal biventricular function, no significant valvular abnormalities.     Following this, we have been managing her atrial fibrillation with a rate control strategy initially, however she presented to the ED with recurrent atrial fibrillation with RVR, ultimately was started on amiodarone.  Since then, she has had issues with bradycardia, dizziness/lightheadedness, Holter monitor demonstrated high-grade AV block.  She was then seen by my colleague Dr. Whitt with cardiology EP, patient underwent elective AV node ablation and pacemaker placement 10/31/2022, however on device interrogation, she was found to have AV node function recovery, and so underwent redo AV node ablation 11/1/2022.    Since then, patient reports that she has been doing well.  She does have some pain at the pacemaker  site, however denies any erythema, swelling, warmth over the area.  Overall she feels well.    Assessment and Plan/Recommendations:    # Persistent atrial fibrillation complicated by tachybradycardia syndrome, now status post AV node ablation and pacemaker (11/1/2022), [id number], on rivaroxaban.   # Hypothyroidism.  On levothyroxine.  # HTN.  BP lower now, 102/60 mmHg.  # HL, on statin  # CAD s/p PCI (remote history, proximal LAD 20 years ago).  Patient denies symptoms concerning for angina.  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  She does have some discomfort at the pacemaker site, however on exam, it appears to be healing well, without evidence of hematoma or infection.  She does still have a dressing in place at the right groin site, removed the dressing to replace it, she has some mild skin breakdown at that area, however no hematoma, erythema, induration, evidence of infection.  Cleaned area with alcohol wipe, replaced dressing with a 2 x 2 gauze and Tegaderm.  - Advised patient to monitor blood pressures at home, if blood pressures are consistently less than 110 mmHg systolic, advised patient (and daughter-in-law) to stop amlodipine 2.5 mg daily  - Otherwise, continue current regimen of metoprolol succinate, losartan, atorvastatin, rivaroxaban  - Follow up in 3 months since she may be returning to [place] at around that time    Thank you for allowing our team to participate in the care of Jeannie Chu.  Please do not hesitate to call or page me with any questions or concerns.    Sincerely,     Germán Shin MD, [facility]  Cardiology  Text Page   November 10, 2022    Voice recognition software utilized.     Total time spent on this encounter: 35 minutes, providing care in this encounter including, but not limited to, reviewing prior medical records, laboratory data, imaging studies, diagnostic  studies, procedure notes, formulating an assessment and plan, recommendations, discussion and counseling with patient face to face, dictation.    Past Medical History:     Past Medical History:   Diagnosis Date     Acquired hypothyroidism 5/27/2021     Coronary artery disease involving native coronary artery of native heart without angina pectoris 5/26/2021     Other and unspecified hyperlipidemia      Unspecified cardiovascular disease 1999    stent placed        Past Surgical History:   Past Surgical History:   Procedure Laterality Date     EP ABLATION AV NODE N/A 10/31/2022    Procedure: Ablation Atrioventricular Node;  Surgeon: Rogelio Whitt MD;  Location:  HEART CARDIAC CATH LAB     EP ABLATION AV NODE N/A 11/1/2022    Procedure: EP Ablation AV Node;  Surgeon: Jd Baker MD;  Location:  HEART CARDIAC CATH LAB     EP PACEMAKER DEVICE & LEAD IMPLANT- RIGHT VENTRICULAR N/A 10/31/2022    Procedure: Pacemaker Device & Lead Implant- Right ventricular;  Surgeon: Rogelio Whitt MD;  Location:  HEART CARDIAC CATH LAB       Medications (outpatient):  Current Outpatient Medications   Medication Sig Dispense Refill     amLODIPine (NORVASC) 2.5 MG tablet Take 1 tablet (2.5 mg) by mouth daily 180 tablet 1     atorvastatin (LIPITOR) 10 MG tablet Take 1 tablet (10 mg) by mouth daily 90 tablet 3     IRON (FERROUS GLUCONATE) 325 MG OR TABS Take 1 tablet by mouth daily  30 0     levothyroxine (SYNTHROID/LEVOTHROID) 75 MCG tablet Take 1 tablet (75 mcg) by mouth daily 90 tablet 0     losartan (COZAAR) 50 MG tablet Take 1 tablet (50 mg) by mouth 2 times daily 180 tablet 2     metoprolol succinate ER (TOPROL XL) 25 MG 24 hr tablet Take 1 tablet (25 mg) by mouth daily 90 tablet 3     Multiple Vitamins-Minerals (ICAPS AREDS FORMULA PO) Take 2 tablets by mouth daily        niacin 500 MG tablet Take by mouth daily (with breakfast)       nitroGLYcerin (NITROSTAT) 0.4 MG sublingual tablet For chest pain place 1 tablet  "under the tongue every 5 minutes for 3 doses. If symptoms persist 5 minutes after 1st dose call 911. 30 tablet 0     ondansetron (ZOFRAN-ODT) 4 MG ODT tab Take 1 tablet (4 mg) by mouth every 8 hours as needed for nausea or vomiting 15 tablet 0     rivaroxaban ANTICOAGULANT (XARELTO) 20 MG TABS tablet Take 1 tablet (20 mg) by mouth daily (with dinner) 90 tablet 3       Allergies:  Allergies   Allergen Reactions     Ibuprofen        Social History:   History   Drug Use No      History   Smoking Status     Never   Smokeless Tobacco     Never     Social History    Substance and Sexual Activity      Alcohol use: No       Family History:  Family History   Problem Relation Age of Onset     Family History Negative Other        Review of Systems:   A complete review of systems was negative except as mentioned in the History of Present Illness.     Objective & Physical Exam:  /60 (BP Location: Right arm, Patient Position: Sitting, Cuff Size: Adult Large)   Pulse 75   Ht 1.549 m (5' 1\")   Wt 69.4 kg (153 lb)   LMP  (LMP Unknown)   SpO2 99%   BMI 28.91 kg/m    Wt Readings from Last 2 Encounters:   11/10/22 69.4 kg (153 lb)   11/02/22 66.7 kg (147 lb 0.8 oz)     Body mass index is 28.91 kg/m .   Body surface area is 1.73 meters squared.    Constitutional: appears stated age, in no apparent distress, appears to be well nourished  Head: normocephalic, atraumatic  Neck: supple, trachea midliney   Pulmonary: clear to auscultation bilaterally, no wheezes, no rales, no increased work of breathing  Cardiovascular: JVP normal, regular rate, regular rhythm, normal S1 and S2, no S3, S4, no murmur appreciated, no lower extremity edema  Gastrointestinal: no guarding, non-rigid   Neurologic: awake, alert, moves all extremities  Skin: Left upper chest pacemaker pocket with Steri-Strips in place, no erythema, warmth, hematoma.  Right femoral groin site, no hematoma, no ecchymosis, mild oozing from superficial skin " breakdown.  Psychiatric: affect is normal, answers questions appropriately, oriented to self and place    Data reviewed:  Lab Results   Component Value Date    WBC 9.0 11/01/2022    WBC 10.0 05/26/2021    RBC 3.99 11/01/2022    RBC 4.30 05/26/2021    HGB 12.1 11/01/2022    HGB 12.9 05/26/2021    HCT 37.3 11/01/2022    HCT 40.3 05/26/2021    MCV 94 11/01/2022    MCV 94 05/26/2021    MCH 30.3 11/01/2022    MCH 30.0 05/26/2021    MCHC 32.4 11/01/2022    MCHC 32.0 05/26/2021    RDW 14.3 11/01/2022    RDW 13.7 05/26/2021     11/01/2022     05/26/2021     Sodium   Date Value Ref Range Status   11/01/2022 132 (L) 133 - 144 mmol/L Final   04/30/2021 135 133 - 144 mmol/L Final     Potassium   Date Value Ref Range Status   11/01/2022 3.7 3.4 - 5.3 mmol/L Final   04/30/2021 4.4 3.4 - 5.3 mmol/L Final     Chloride   Date Value Ref Range Status   11/01/2022 100 94 - 109 mmol/L Final   04/30/2021 102 94 - 109 mmol/L Final     Carbon Dioxide   Date Value Ref Range Status   04/30/2021 30 20 - 32 mmol/L Final     Carbon Dioxide (CO2)   Date Value Ref Range Status   11/01/2022 28 20 - 32 mmol/L Final     Anion Gap   Date Value Ref Range Status   11/01/2022 4 3 - 14 mmol/L Final   04/30/2021 3 3 - 14 mmol/L Final     Glucose   Date Value Ref Range Status   11/01/2022 82 70 - 99 mg/dL Final   04/30/2021 89 70 - 99 mg/dL Final     Urea Nitrogen   Date Value Ref Range Status   11/01/2022 16 7 - 30 mg/dL Final   04/30/2021 18 7 - 30 mg/dL Final     Creatinine   Date Value Ref Range Status   11/01/2022 0.82 0.52 - 1.04 mg/dL Final   04/30/2021 0.77 0.52 - 1.04 mg/dL Final     GFR Estimate   Date Value Ref Range Status   11/01/2022 69 >60 mL/min/1.73m2 Final     Comment:     Effective December 21, 2021 eGFRcr in adults is calculated using the 2021 CKD-EPI creatinine equation which includes age and gender (oDnavon heller al., NEJ, DOI: 10.1056/HLAIpg8962777)   04/30/2021 70 >60 mL/min/[1.73_m2] Final     Comment:     Non   American GFR Calc  Starting 12/18/2018, serum creatinine based estimated GFR (eGFR) will be   calculated using the Chronic Kidney Disease Epidemiology Collaboration   (CKD-EPI) equation.       GFR, ESTIMATED POCT   Date Value Ref Range Status   07/24/2021 >60 >60 mL/min/1.73m2 Final     Calcium   Date Value Ref Range Status   11/01/2022 9.0 8.5 - 10.1 mg/dL Final   04/30/2021 9.3 8.5 - 10.1 mg/dL Final     Bilirubin Total   Date Value Ref Range Status   02/28/2022 0.9 0.2 - 1.3 mg/dL Final   04/30/2021 1.1 0.2 - 1.3 mg/dL Final     Alkaline Phosphatase   Date Value Ref Range Status   02/28/2022 57 40 - 150 U/L Final   04/30/2021 70 40 - 150 U/L Final     ALT   Date Value Ref Range Status   03/11/2022 24 0 - 50 U/L Final   04/30/2021 27 0 - 50 U/L Final     AST   Date Value Ref Range Status   02/28/2022 25 0 - 45 U/L Final   04/30/2021 27 0 - 45 U/L Final     Recent Labs   Lab Test 03/11/22  1505 05/26/21  1129   CHOL 132 135   HDL 81 64   LDL 24 49   TRIG 136 112      Lab Results   Component Value Date    A1C 5.4 10/13/2022

## 2022-11-10 NOTE — LETTER
11/10/2022    Diana Hilario MD  [address] [facility] [address]    RE: Jeannie Chu       Dear Colleague,     I had the pleasure of seeing Jeannie Chu in the [facility].      Cardiology Clinic Progress Note:    November 10, 2022   Patient Name: Jeannie Chu  Patient MRN: [id number]     Consult indication: atrial fibrillation     HPI:    I had the opportunity to see patient Jeannie Chu in cardiology clinic for a follow up visit. Patient is followed by our colleague Diana Hilario MD with Primary Care.     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    I had initially met patient in hospital for a consultation on 7/25/2021.  Patient had been experiencing fatigue and weakness, a blood pressure check at home demonstrated an elevated heart rate in the 120 bpm range.  Patient was brought to the ED, and was found to be in atrial fibrillation with RVR.  Labs at that time were notable for normal troponin.  TTE 7/25/2021 demonstrated normal biventricular function, no significant valvular abnormalities.     Following this, we have been managing her atrial fibrillation with a rate control strategy initially, however she presented to the ED with recurrent atrial fibrillation with RVR, ultimately was started on amiodarone.  Since then, she has had issues with bradycardia, dizziness/lightheadedness, Holter monitor demonstrated high-grade AV block.  She was then seen by my colleague Dr. Whitt with cardiology EP, patient underwent elective AV node ablation and pacemaker placement 10/31/2022, however on  device interrogation, she was found to have AV node function recovery, and so underwent redo AV node ablation 11/1/2022.    Since then, patient reports that she has been doing well.  She does have some pain at the pacemaker site, however denies any erythema, swelling, warmth over the area.  Overall she feels well.    Assessment and Plan/Recommendations:    # Persistent atrial fibrillation complicated by tachybradycardia syndrome, now status post AV node ablation and pacemaker (11/1/2022), [id number], on rivaroxaban.   # Hypothyroidism.  On levothyroxine.  # HTN.  BP lower now, 102/60 mmHg.  # HL, on statin  # CAD s/p PCI (remote history, proximal LAD 20 years ago).  Patient denies symptoms concerning for angina.  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  She does have some discomfort at the pacemaker site, however on exam, it appears to be healing well, without evidence of hematoma or infection.  She does still have a dressing in place at the right groin site, removed the dressing to replace it, she has some mild skin breakdown at that area, however no hematoma, erythema, induration, evidence of infection.  Cleaned area with alcohol wipe, replaced dressing with a 2 x 2 gauze and Tegaderm.  - Advised patient to monitor blood pressures at home, if blood pressures are consistently less than 110 mmHg systolic, advised patient (and daughter-in-law) to stop amlodipine 2.5 mg daily  - Otherwise, continue current regimen of metoprolol succinate, losartan, atorvastatin, rivaroxaban  - Follow up in 3 months since she may be returning to [place] at around that time    Thank you for allowing our team to participate in the care of Jeannie Chu.  Please do not hesitate to call or page me with any questions or concerns.    Sincerely,     Germán Shin MD, [facility]  Cardiology  Text Page   November 10, 2022    Voice  recognition software utilized.     Total time spent on this encounter: 35 minutes, providing care in this encounter including, but not limited to, reviewing prior medical records, laboratory data, imaging studies, diagnostic studies, procedure notes, formulating an assessment and plan, recommendations, discussion and counseling with patient face to face, dictation.    Past Medical History:     Past Medical History:   Diagnosis Date     Acquired hypothyroidism 5/27/2021     Coronary artery disease involving native coronary artery of native heart without angina pectoris 5/26/2021     Other and unspecified hyperlipidemia      Unspecified cardiovascular disease 1999    stent placed        Past Surgical History:   Past Surgical History:   Procedure Laterality Date     EP ABLATION AV NODE N/A 10/31/2022    Procedure: Ablation Atrioventricular Node;  Surgeon: Rogelio Whitt MD;  Location: [facility] CARDIAC CATH LAB     EP ABLATION AV NODE N/A 11/1/2022    Procedure: EP Ablation AV Node;  Surgeon: Jd Baker MD;  Location: [facility] CARDIAC CATH LAB     EP PACEMAKER DEVICE & LEAD IMPLANT- RIGHT VENTRICULAR N/A 10/31/2022    Procedure: Pacemaker Device & Lead Implant- Right ventricular;  Surgeon: Rogelio Whitt MD;  Location: [facility] CARDIAC CATH LAB       Medications (outpatient):  Current Outpatient Medications   Medication Sig Dispense Refill     amLODIPine (NORVASC) 2.5 MG tablet Take 1 tablet (2.5 mg) by mouth daily 180 tablet 1     atorvastatin (LIPITOR) 10 MG tablet Take 1 tablet (10 mg) by mouth daily 90 tablet 3     IRON (FERROUS GLUCONATE) 325 MG OR TABS Take 1 tablet by mouth daily  30 0     levothyroxine (SYNTHROID/LEVOTHROID) 75 MCG tablet Take 1 tablet (75 mcg) by mouth daily 90 tablet 0     losartan (COZAAR) 50 MG tablet Take 1 tablet (50 mg) by mouth 2 times daily 180 tablet 2     metoprolol succinate ER (TOPROL XL) 25 MG 24 hr tablet Take 1 tablet (25 mg) by mouth daily 90 tablet 3     Multiple  "Vitamins-Minerals (ICAPS AREDS FORMULA PO) Take 2 tablets by mouth daily        niacin 500 MG tablet Take by mouth daily (with breakfast)       nitroGLYcerin (NITROSTAT) 0.4 MG sublingual tablet For chest pain place 1 tablet under the tongue every 5 minutes for 3 doses. If symptoms persist 5 minutes after 1st dose call 911. 30 tablet 0     ondansetron (ZOFRAN-ODT) 4 MG ODT tab Take 1 tablet (4 mg) by mouth every 8 hours as needed for nausea or vomiting 15 tablet 0     rivaroxaban ANTICOAGULANT (XARELTO) 20 MG TABS tablet Take 1 tablet (20 mg) by mouth daily (with dinner) 90 tablet 3       Allergies:  Allergies   Allergen Reactions     Ibuprofen        Social History:   History   Drug Use No      History   Smoking Status     Never   Smokeless Tobacco     Never     Social History    Substance and Sexual Activity      Alcohol use: No       Family History:  Family History   Problem Relation Age of Onset     Family History Negative Other        Review of Systems:   A complete review of systems was negative except as mentioned in the History of Present Illness.     Objective & Physical Exam:  /60 (BP Location: Right arm, Patient Position: Sitting, Cuff Size: Adult Large)   Pulse 75   Ht 1.549 m (5' 1\")   Wt 69.4 kg (153 lb)   LMP  (LMP Unknown)   SpO2 99%   BMI 28.91 kg/m    Wt Readings from Last 2 Encounters:   11/10/22 69.4 kg (153 lb)   11/02/22 66.7 kg (147 lb 0.8 oz)     Body mass index is 28.91 kg/m .   Body surface area is 1.73 meters squared.    Constitutional: appears stated age, in no apparent distress, appears to be well nourished  Head: normocephalic, atraumatic  Neck: supple, trachea midliney   Pulmonary: clear to auscultation bilaterally, no wheezes, no rales, no increased work of breathing  Cardiovascular: JVP normal, regular rate, regular rhythm, normal S1 and S2, no S3, S4, no murmur appreciated, no lower extremity edema  Gastrointestinal: no guarding, non-rigid   Neurologic: awake, alert, " moves all extremities  Skin: Left upper chest pacemaker pocket with Steri-Strips in place, no erythema, warmth, hematoma.  Right femoral groin site, no hematoma, no ecchymosis, mild oozing from superficial skin breakdown.  Psychiatric: affect is normal, answers questions appropriately, oriented to self and place    Data reviewed:  Lab Results   Component Value Date    WBC 9.0 11/01/2022    WBC 10.0 05/26/2021    RBC 3.99 11/01/2022    RBC 4.30 05/26/2021    HGB 12.1 11/01/2022    HGB 12.9 05/26/2021    HCT 37.3 11/01/2022    HCT 40.3 05/26/2021    MCV 94 11/01/2022    MCV 94 05/26/2021    MCH 30.3 11/01/2022    MCH 30.0 05/26/2021    MCHC 32.4 11/01/2022    MCHC 32.0 05/26/2021    RDW 14.3 11/01/2022    RDW 13.7 05/26/2021     11/01/2022     05/26/2021     Sodium   Date Value Ref Range Status   11/01/2022 132 (L) 133 - 144 mmol/L Final   04/30/2021 135 133 - 144 mmol/L Final     Potassium   Date Value Ref Range Status   11/01/2022 3.7 3.4 - 5.3 mmol/L Final   04/30/2021 4.4 3.4 - 5.3 mmol/L Final     Chloride   Date Value Ref Range Status   11/01/2022 100 94 - 109 mmol/L Final   04/30/2021 102 94 - 109 mmol/L Final     Carbon Dioxide   Date Value Ref Range Status   04/30/2021 30 20 - 32 mmol/L Final     Carbon Dioxide (CO2)   Date Value Ref Range Status   11/01/2022 28 20 - 32 mmol/L Final     Anion Gap   Date Value Ref Range Status   11/01/2022 4 3 - 14 mmol/L Final   04/30/2021 3 3 - 14 mmol/L Final     Glucose   Date Value Ref Range Status   11/01/2022 82 70 - 99 mg/dL Final   04/30/2021 89 70 - 99 mg/dL Final     Urea Nitrogen   Date Value Ref Range Status   11/01/2022 16 7 - 30 mg/dL Final   04/30/2021 18 7 - 30 mg/dL Final     Creatinine   Date Value Ref Range Status   11/01/2022 0.82 0.52 - 1.04 mg/dL Final   04/30/2021 0.77 0.52 - 1.04 mg/dL Final     GFR Estimate   Date Value Ref Range Status   11/01/2022 69 >60 mL/min/1.73m2 Final     Comment:     Effective December 21, 2021 eGFRcr in adults  is calculated using the 2021 CKD-EPI creatinine equation which includes age and gender (Donavon et al., NE, DOI: 10.1056/VGWRfb0787577)   04/30/2021 70 >60 mL/min/[1.73_m2] Final     Comment:     Non  GFR Calc  Starting 12/18/2018, serum creatinine based estimated GFR (eGFR) will be   calculated using the Chronic Kidney Disease Epidemiology Collaboration   (CKD-EPI) equation.       GFR, ESTIMATED POCT   Date Value Ref Range Status   07/24/2021 >60 >60 mL/min/1.73m2 Final     Calcium   Date Value Ref Range Status   11/01/2022 9.0 8.5 - 10.1 mg/dL Final   04/30/2021 9.3 8.5 - 10.1 mg/dL Final     Bilirubin Total   Date Value Ref Range Status   02/28/2022 0.9 0.2 - 1.3 mg/dL Final   04/30/2021 1.1 0.2 - 1.3 mg/dL Final     Alkaline Phosphatase   Date Value Ref Range Status   02/28/2022 57 40 - 150 U/L Final   04/30/2021 70 40 - 150 U/L Final     ALT   Date Value Ref Range Status   03/11/2022 24 0 - 50 U/L Final   04/30/2021 27 0 - 50 U/L Final     AST   Date Value Ref Range Status   02/28/2022 25 0 - 45 U/L Final   04/30/2021 27 0 - 45 U/L Final     Recent Labs   Lab Test 03/11/22  1505 05/26/21  1129   CHOL 132 135   HDL 81 64   LDL 24 49   TRIG 136 112      Lab Results   Component Value Date    A1C 5.4 10/13/2022      Thank you for allowing me to participate in the care of your patient.      Sincerely,     Germán Shin MD     [facility]  cc:   Germán Shin MD  [address], [address]  [place] [address]

## 2022-11-10 NOTE — PATIENT INSTRUCTIONS
November 10, 2022    Thank you for allowing our Cardiology team to participate in your care.     Please note the following changes to your heart treatment plan:     Medication changes:   - monitor BPs at home, if BPs are consistently <110 mmHg (top number), stop amlodipine    Tests to be done:  - none    Follow up:  - Follow up in 3 months, or sooner as needed.      For scheduling, please call [phone].    Please contact our team at [phone] (Nini DALEY) or [phone] for any questions or concerns.     If you are having a medical emergency, please call [phone].     Sincerely,    Germán Shin MD, [facility]  Cardiology    [facility] - [facility] and [facility] - [facility] [place]

## 2022-11-11 ENCOUNTER — ANCILLARY PROCEDURE (OUTPATIENT)
Dept: CARDIOLOGY | Facility: CLINIC | Age: 87
End: 2022-11-11
Attending: INTERNAL MEDICINE
Payer: COMMERCIAL

## 2022-11-11 ENCOUNTER — TELEPHONE (OUTPATIENT)
Dept: CARDIOLOGY | Facility: CLINIC | Age: 87
End: 2022-11-11

## 2022-11-11 DIAGNOSIS — Z95.0 CARDIAC PACEMAKER IN SITU: Primary | ICD-10-CM

## 2022-11-11 DIAGNOSIS — Z95.0 CARDIAC PACEMAKER IN SITU: ICD-10-CM

## 2022-11-11 DIAGNOSIS — I49.5 SICK SINUS SYNDROME (H): ICD-10-CM

## 2022-11-11 PROCEDURE — 93280 PM DEVICE PROGR EVAL DUAL: CPT | Performed by: INTERNAL MEDICINE

## 2022-11-11 NOTE — TELEPHONE ENCOUNTER
"Pt seen in clinic today for 8 day post [employer]  PPM placement    Incidental finding: Noted CXR from11/1/2022 showed device very \" low in chest\". Concern for device migration and increased risk for lead dislodgement.   Dr Solo here and shown CXR. Recommends pt be seen back in clinic in 1-2 weeks for threshold tests and CXR   Called and discussed above with daughter (who was here with pt at today's visit).      In clinic device check appointment made  For 11/17/2022.   CXR ordered. Daughter will call to schedule this prior to visit on the 17th.     Will route to implanting MD.                                                                   Won RN    Addendum: Received response back from Dr Whitt. No new recommendations at this time.         "

## 2022-11-16 ENCOUNTER — HOSPITAL ENCOUNTER (OUTPATIENT)
Dept: GENERAL RADIOLOGY | Facility: CLINIC | Age: 87
Discharge: HOME | End: 2022-11-16
Attending: INTERNAL MEDICINE | Admitting: INTERNAL MEDICINE
Payer: COMMERCIAL

## 2022-11-16 DIAGNOSIS — Z95.0 CARDIAC PACEMAKER IN SITU: ICD-10-CM

## 2022-11-16 PROCEDURE — 71046 X-RAY EXAM CHEST 2 VIEWS: CPT

## 2022-11-17 ENCOUNTER — ANCILLARY PROCEDURE (OUTPATIENT)
Dept: CARDIOLOGY | Facility: CLINIC | Age: 87
End: 2022-11-17
Attending: INTERNAL MEDICINE
Payer: COMMERCIAL

## 2022-11-17 DIAGNOSIS — Z95.0 CARDIAC PACEMAKER IN SITU: ICD-10-CM

## 2022-11-17 DIAGNOSIS — I49.5 SICK SINUS SYNDROME (H): ICD-10-CM

## 2022-11-17 LAB
MDC_IDC_LEAD_IMPLANT_DT: NORMAL
MDC_IDC_LEAD_IMPLANT_DT: NORMAL
MDC_IDC_LEAD_LOCATION: NORMAL
MDC_IDC_LEAD_LOCATION: NORMAL
MDC_IDC_LEAD_LOCATION_DETAIL_1: NORMAL
MDC_IDC_LEAD_LOCATION_DETAIL_1: NORMAL
MDC_IDC_LEAD_MFG: NORMAL
MDC_IDC_LEAD_MFG: NORMAL
MDC_IDC_LEAD_MODEL: NORMAL
MDC_IDC_LEAD_MODEL: NORMAL
MDC_IDC_LEAD_POLARITY_TYPE: NORMAL
MDC_IDC_LEAD_POLARITY_TYPE: NORMAL
MDC_IDC_LEAD_SERIAL: NORMAL
MDC_IDC_LEAD_SERIAL: NORMAL
MDC_IDC_MSMT_BATTERY_STATUS: NORMAL
MDC_IDC_MSMT_LEADCHNL_RA_IMPEDANCE_VALUE: 487 OHM
MDC_IDC_MSMT_LEADCHNL_RA_PACING_THRESHOLD_AMPLITUDE: 0.6 V
MDC_IDC_MSMT_LEADCHNL_RA_PACING_THRESHOLD_AMPLITUDE: 0.6 V
MDC_IDC_MSMT_LEADCHNL_RA_PACING_THRESHOLD_PULSEWIDTH: 0.4 MS
MDC_IDC_MSMT_LEADCHNL_RA_PACING_THRESHOLD_PULSEWIDTH: 0.4 MS
MDC_IDC_MSMT_LEADCHNL_RA_SENSING_INTR_AMPL: 9.1 MV
MDC_IDC_MSMT_LEADCHNL_RA_SENSING_INTR_AMPL: 9.1 MV
MDC_IDC_MSMT_LEADCHNL_RV_IMPEDANCE_VALUE: 585 OHM
MDC_IDC_MSMT_LEADCHNL_RV_PACING_THRESHOLD_AMPLITUDE: 0.7 V
MDC_IDC_MSMT_LEADCHNL_RV_PACING_THRESHOLD_AMPLITUDE: 0.7 V
MDC_IDC_MSMT_LEADCHNL_RV_PACING_THRESHOLD_PULSEWIDTH: 0.4 MS
MDC_IDC_MSMT_LEADCHNL_RV_PACING_THRESHOLD_PULSEWIDTH: 0.4 MS
MDC_IDC_PG_IMPLANT_DTM: NORMAL
MDC_IDC_PG_MFG: NORMAL
MDC_IDC_PG_MODEL: NORMAL
MDC_IDC_PG_SERIAL: NORMAL
MDC_IDC_PG_TYPE: NORMAL
MDC_IDC_SESS_CLINIC_NAME: NORMAL
MDC_IDC_SESS_DTM: NORMAL
MDC_IDC_SESS_REPROGRAMMED: NORMAL
MDC_IDC_SESS_TYPE: NORMAL
MDC_IDC_SET_BRADY_AT_MODE_SWITCH_MODE: NORMAL
MDC_IDC_SET_BRADY_AT_MODE_SWITCH_RATE: 180 {BEATS}/MIN
MDC_IDC_SET_BRADY_HYSTRATE: 60 {BEATS}/MIN
MDC_IDC_SET_BRADY_LOWRATE: 60 {BEATS}/MIN
MDC_IDC_SET_BRADY_MAX_SENSOR_RATE: 120 {BEATS}/MIN
MDC_IDC_SET_BRADY_MAX_TRACKING_RATE: 130 {BEATS}/MIN
MDC_IDC_SET_BRADY_MODE: NORMAL
MDC_IDC_SET_BRADY_NIGHT_RATE: 60 {BEATS}/MIN
MDC_IDC_SET_BRADY_PAV_DELAY_HIGH: 160 MS
MDC_IDC_SET_BRADY_PAV_DELAY_LOW: 200 MS
MDC_IDC_SET_BRADY_SAV_DELAY_HIGH: 130 MS
MDC_IDC_SET_BRADY_SAV_DELAY_LOW: 170 MS
MDC_IDC_SET_CRT_PACED_CHAMBERS: NORMAL
MDC_IDC_SET_LEADCHNL_LV_PACING_CATHODE_ELECTRODE_1: NORMAL
MDC_IDC_SET_LEADCHNL_LV_PACING_CATHODE_LOCATION_1: NORMAL
MDC_IDC_SET_LEADCHNL_LV_PACING_POLARITY: NORMAL
MDC_IDC_SET_LEADCHNL_LV_SENSING_CATHODE_ELECTRODE_1: NORMAL
MDC_IDC_SET_LEADCHNL_LV_SENSING_CATHODE_LOCATION_1: NORMAL
MDC_IDC_SET_LEADCHNL_LV_SENSING_POLARITY: NORMAL
MDC_IDC_SET_LEADCHNL_RA_PACING_AMPLITUDE: 1.6 V
MDC_IDC_SET_LEADCHNL_RA_PACING_POLARITY: NORMAL
MDC_IDC_SET_LEADCHNL_RA_PACING_PULSEWIDTH: 0.4 MS
MDC_IDC_SET_LEADCHNL_RA_SENSING_ADAPTATION_MODE: NORMAL
MDC_IDC_SET_LEADCHNL_RA_SENSING_POLARITY: NORMAL
MDC_IDC_SET_LEADCHNL_RA_SENSING_SENSITIVITY: NORMAL
MDC_IDC_SET_LEADCHNL_RV_PACING_AMPLITUDE: 1.2 V
MDC_IDC_SET_LEADCHNL_RV_PACING_POLARITY: NORMAL
MDC_IDC_SET_LEADCHNL_RV_PACING_PULSEWIDTH: 0.4 MS
MDC_IDC_SET_LEADCHNL_RV_SENSING_ADAPTATION_MODE: NORMAL
MDC_IDC_SET_LEADCHNL_RV_SENSING_POLARITY: NORMAL
MDC_IDC_SET_LEADCHNL_RV_SENSING_SENSITIVITY: NORMAL

## 2022-11-17 PROCEDURE — 99207 CARDIAC DEVICE CHECK - IN CLINIC: CPT | Performed by: INTERNAL MEDICINE

## 2022-11-17 NOTE — TELEPHONE ENCOUNTER
"Patient came in for her courtesy check to reassess leads.  Spoke with Dr. Whitt who stated that there was \"No change in lead slack which is adequate\" from the chest x-ray on 11/16/22.  He stated no concern as long as patient's thresholds were okay.  RA threshold was 0.6V@0.4ms and RV threshold was 0.8V@0.4ms.  Will recheck leads at previously scheduled 6 week check on 1/5/23.     THUY Rose         "

## 2022-11-18 ENCOUNTER — TELEPHONE (OUTPATIENT)
Dept: CARDIOLOGY | Facility: CLINIC | Age: 87
End: 2022-11-18

## 2022-11-18 DIAGNOSIS — I10 ESSENTIAL HYPERTENSION: Primary | ICD-10-CM

## 2022-11-18 NOTE — TELEPHONE ENCOUNTER
Pt's daughter Marya called to report that she has been monitoring her mothers BP over the past week. Per her report they note that her BP has been elevated vs. Low since the time of her recent OV.    156/84, HR 75  168/84, HR 79  148/76, HR 76  145/80, HR 77      Medications:   Metoprolol 25mg daily  (PM)  Losartan 50mg daily (AM)   Amlodipine 2.5mg daily (AM)       Per report pt has continued to have intermittent episodes of lightheadedness / dizziness. No aggravating or alleviating factors consistently present. Per report her sxs occur mostly after she has had increased activity such as getting dressed for the day to go out of the house. Pts daughter advised to monitor BP 2X per day over the weekend so that we can ensure BP not too low during daytime hours as she has had episode of hypotension previously. Additionally advised to encourage hydration in pt. Prior ED eval showed dehydration related to hypotension in patient.  Will touch base with pt's daughter Monday to review BP readings from the weekend.   GEORGE Tyler RN, BSN.

## 2022-11-21 ENCOUNTER — TELEPHONE (OUTPATIENT)
Dept: CARDIOLOGY | Facility: CLINIC | Age: 87
End: 2022-11-21

## 2022-11-21 NOTE — TELEPHONE ENCOUNTER
Pt was seen in office on 11/11/22, this was discussed and another xray was taken, pt had follow up on 11/17/22 and was nearly 1/2 hour late for. And no changes. This information was gone over in depth and for an extensive amount of time with daughter. Again, per MD, watch for lead measurement changes and monitor for now. THUY Sheldon   Returned call to son. He would not listen to anything that I was trying to explain regarding how her pacemaker was migrating and kept interrupting, stating it wasn't done correctly. He demands to speak with the doctor this instant. Informed him that currently neither the implanting MD nor the ordering MD is in the clinic until this afternoon. They both have clinic after. Son would not have this for an answer. encourgaged son to have a conversation with his sister who was here for these in depth discussions and encouraged him to come along to her next appt. THUY Cochran

## 2022-11-21 NOTE — TELEPHONE ENCOUNTER
[facility] Call Center    Phone Message    May a detailed message be left on voicemail: yes     Reason for Call: Other: Please advise patients son:    Patients son Simone is calling and states that his moms pacemaker that was placed in October has moved. Imaging has been done to confirm this and he would like advisement.    Please call son.    Action Taken: Other: Cardiology    Travel Screening: Not Applicable

## 2022-11-22 ENCOUNTER — MYC MEDICAL ADVICE (OUTPATIENT)
Dept: CARDIOLOGY | Facility: CLINIC | Age: 87
End: 2022-11-22

## 2022-11-23 ENCOUNTER — TELEPHONE (OUTPATIENT)
Dept: CARDIOLOGY | Facility: CLINIC | Age: 87
End: 2022-11-23

## 2022-11-23 NOTE — TELEPHONE ENCOUNTER
Pt's son sent a [employer] message with these questions:     1)Why did the device migrate. What is the device held in place. if I remember you mentioned that the device is in a  sac, so is the sac moving and why.  2) Is this common to see the device migrate or is this unusual.   3) is this migration due to her body/health  or is there anything we could do not  migrate further.     I will forward this to Dr. Whitt to get his opinion.

## 2022-11-23 NOTE — TELEPHONE ENCOUNTER
[employer] message sent to pt's daughter zaki with request to send updated BP readings from the past week.   GEORGE Tyler RN, BSN. 11/23/22 12:31 PM

## 2022-11-23 NOTE — TELEPHONE ENCOUNTER
Pt's son sent a [employer] message with these questions:     1)Why did the device migrate. What is the device held in place. if I remember you mentioned that the device is in a  sac, so is the sac moving and why.  2) Is this common to see the device migrate or is this unusual.   3) is this migration due to her body/health  or is there anything we could do not  migrate further.     I will forward this to Dr. Whitt to get his opinion.

## 2022-12-02 NOTE — TELEPHONE ENCOUNTER
mir sent with pt reporting the folowing BP log:       Pt takes BP in the AM after her medications and before bed.     Medications:   Metoprolol 25mg daily  (PM)  Losartan 50mg daily (AM)   Amlodipine 2.5mg daily (AM)    See prior tele encounter. Pt has had elevated BP readings recently.   GEORGE Tyler RN, BSN. 12/02/22 1:17 PM

## 2022-12-05 NOTE — TELEPHONE ENCOUNTER
Kip, Germán ADAMS MD  You 3 days ago     AH  Thanks for the update, let's increase metoprolol succinate 25mg daily to 37.5mg daily thanks!      Call placed to pt's daughter Marya to review as well as a my chart message as pt's daughter would like both to ensure accuracy. awaiting response from daughter regarding recommendation for increased dose. GEORGE Tyler RN, BSN. 12/06/22 11:09 AM

## 2022-12-06 ENCOUNTER — MYC MEDICAL ADVICE (OUTPATIENT)
Dept: CARDIOLOGY | Facility: CLINIC | Age: 87
End: 2022-12-06

## 2022-12-06 RX ORDER — METOPROLOL SUCCINATE 25 MG/1
37.5 TABLET, EXTENDED RELEASE ORAL DAILY
Qty: 135 TABLET | Refills: 1 | Status: SHIPPED | OUTPATIENT
Start: 2022-12-06 | End: 2023-03-31

## 2022-12-22 ENCOUNTER — NURSE TRIAGE (OUTPATIENT)
Dept: NURSING | Facility: CLINIC | Age: 87
End: 2022-12-22

## 2022-12-22 PROCEDURE — 99282 EMERGENCY DEPT VISIT SF MDM: CPT

## 2022-12-23 ENCOUNTER — HOSPITAL ENCOUNTER (EMERGENCY)
Facility: CLINIC | Age: 87
Discharge: HOME | End: 2022-12-23
Attending: EMERGENCY MEDICINE | Admitting: EMERGENCY MEDICINE
Payer: COMMERCIAL

## 2022-12-23 ENCOUNTER — TELEPHONE (OUTPATIENT)
Dept: CARDIOLOGY | Facility: CLINIC | Age: 87
End: 2022-12-23

## 2022-12-23 VITALS
SYSTOLIC BLOOD PRESSURE: 145 MMHG | HEART RATE: 64 BPM | OXYGEN SATURATION: 99 % | RESPIRATION RATE: 18 BRPM | TEMPERATURE: 98.5 F | DIASTOLIC BLOOD PRESSURE: 73 MMHG

## 2022-12-23 DIAGNOSIS — K91.840 SURGICAL WOUND HEMORRHAGE AFTER DENTAL PROCEDURE: ICD-10-CM

## 2022-12-23 DIAGNOSIS — Z98.818 SURGICAL WOUND HEMORRHAGE AFTER DENTAL PROCEDURE: ICD-10-CM

## 2022-12-23 ASSESSMENT — ACTIVITIES OF DAILY LIVING (ADL): ADLS_ACUITY_SCORE: 33

## 2022-12-23 NOTE — TELEPHONE ENCOUNTER
[facility] Call Center    Phone Message    May a detailed message be left on voicemail: yes     Reason for Call: Other: Daughter called on behalf of the patient. States patient just had a recent tooth extraction, and has been bleeding a lot. They have stopped the blood thinner, but would like to know if she should continue to be off it. Please call daughter back to further discuss, thank you.    Action Taken: Message routed to:  Other: Cardiology    Travel Screening: Not Applicable     Thank you!  [facility]

## 2022-12-23 NOTE — TELEPHONE ENCOUNTER
S: Has wisdom tooth extraction on R upper jaw today.    B: This evening went to bed and woke up with blood on face, nightgown  and pillow.  Patient is taking Xarelto this medication was not stopped prior to dental extraction.     A: Patient is going to try one more time to place pressure onto gum line and ice to outside of upper cheek.    R:  If bleeding continues after a second time of bernardo pressure then to be seen in the ED    Hetal Pemberton RN, [facility] Triage Nurse Advisor     Reason for Disposition    [1] Bleeding now AND [2] second call after being instructed in correct technique of direct pressure    Additional Information    Negative: Sounds like a life-threatening emergency to the triager    Negative: [1] Bleeding present > 30 minutes AND [2] using correct technique of direct pressure    Protocols used: TOOTH [id number]

## 2022-12-23 NOTE — ED TRIAGE NOTES
Pt arrives to ED with bleeding from tooth extraction. Pt is on thinners and bleeding has gotten worse as night progressed. Pt was not supposed to have tooth removed today, did not stop thinners. Active bleeding to R top back of mouth. New gauze applied and pain at extraction site. Pt took pain med from [facility] at home tonight.

## 2022-12-23 NOTE — ED PROVIDER NOTES
History   Chief Complaint:  Dental Problem       HPI   Jeannie Chu is a 87 year old female, on Xarelto with history of atrial fibrillation who presents with dental problem. She had a right upper wisdom tooth removed at 0930 this morning. She had been applying gauze for some bleeding at home following this, but her bleeding increased after going to bed. Gauze was applied at home and new gauze was applied in triage. She was not supposed to have a tooth extraction today and did not stop her Xarelto prior. She did not take her Xarelto tonight.    Review of Systems   HENT: Positive for dental problem.    All other systems reviewed and are negative.      Allergies:  Ibuprofen    Medications:  Norvasc  Lipitor  Iron  Synthroid  Cozaar  Toprol  Nitrostat  Zofran  Xarelto    Past Medical History:     CAD  Hypertension  Compression fracture of L1 vertebra  Acquired hypothyroidism  Atrial flutter with RVR  Paroxysmal atrial fibrillation  Symptomatic bradycardia  Hyperlipidemia  Anemia    Past Surgical History:    Ablation AV node x2  Pacemaker device and lead implant, right ventricular    Social History:  The patient presents to the ED with her family members.  PCP: Diana Hilario       Physical Exam     Patient Vitals for the past 24 hrs:   BP Temp Temp src Pulse Resp SpO2   12/23/22 0140 (!) 145/73 -- -- 64 18 99 %   12/22/22 2309 (!) 168/74 98.5  F (36.9  C) Temporal 62 20 100 %       Physical Exam  Constitutional:  Oriented to person, place, and time. Well appearing.  HENT:   Head:    Normocephalic.   Mouth/Throat:   Oropharynx is clear and moist. Gelfoam and gauze in place with clot formation over extracted right upper 3rd molar. No active bleeding.   Eyes:    EOM are normal. Pupils are equal, round, and reactive to light.   Neck:    Neck supple.   Cardiovascular:  Normal rate, regular rhythm and normal heart sounds.      Exam reveals no gallop and no friction rub.       No murmur  heard.  Pulmonary/Chest:  Effort normal and breath sounds normal.      No respiratory distress. No wheezes. No rales.      No reproducible chest wall pain.  Abdominal:   Soft. No distension. No tenderness. No rebound and no guarding.   Musculoskeletal:  Normal range of motion.   Neurological:   Alert and oriented to person, place, and time.           Moves all 4 extremities spontaneously    Skin:    No rash noted. No pallor.       Emergency Department Course     Emergency Department Course:       Reviewed:  I reviewed nursing notes, vitals, past medical history and Care Everywhere    Assessments:  0041 I obtained history and examined the patient as noted above.   0131 I rechecked the patient. On recheck, continued clot formation without any active bleeding.     Disposition:  The patient was discharged to home.     Impression & Plan     Medical Decision Makin-year-old female on Xarelto who is here for bleeding from extraction site of her right upper third molar.  Gelfoam and gauze was placed at extraction site prior to my evaluation.  On my evaluation extraction site has clotted off with no further bleeding.  She was observed here for additional 1 hour with no repeat bleeding.  She is otherwise vitally stable no signs of anemia and or need for further laboratory work-up.  I discussed holding Xarelto for 3 days as this is for atrial fibrillation and I believe the risk outweighs the benefit with active bleeding.  Should bleeding stop she could restart Xarelto.  I discussed with close follow-up with her dentist.  I discussed technique of direct pressure with gauze and/or teabag should bleeding recur and should discontinue this should return for any new or worsening symptoms or concerns.      Diagnosis:    ICD-10-CM    1. Surgical wound hemorrhage after dental procedure  K91.840     Z98.818           Scribe Disclosure:  I, Joanna Denis, am serving as a scribe at 12:31 AM on 2022 to document services personally  performed by Darnell Hahn MD based on my observations and the provider's statements to me.              Darnell Hahn MD  12/23/22 0455

## 2022-12-23 NOTE — TELEPHONE ENCOUNTER
"Called pt's daughter back to discuss Xarelto.     Per ED notes on 12/22/22, \"  I discussed holding Xarelto for 3 days as this is for atrial fibrillation and I believe the risk outweighs the benefit with active bleeding.  Should bleeding stop she could restart Xarelto.  I discussed with close follow-up with her dentist.  I discussed technique of direct pressure with gauze and/or teabag should bleeding recur and should discontinue this should return for any new or worsening symptoms or concerns.\"    Discussed with pt's daughter. Pt will hold Xarelto until 12/24 and restart on 12/25. No further questions. THUY Mauricio      "

## 2022-12-23 NOTE — ED NOTES
Spoke with Dr. Bradford regarding pt. MD suggested surgifoam and gauze to help staunch bleeding. Saturated gauze from triage removed from pt's mouth with a blood clot. Surgifoam and new gauze applied to area and pt instructed to bite down.

## 2022-12-26 ENCOUNTER — HEALTH MAINTENANCE LETTER (OUTPATIENT)
Age: 87
End: 2022-12-26

## 2023-01-05 ENCOUNTER — ANCILLARY PROCEDURE (OUTPATIENT)
Dept: CARDIOLOGY | Facility: CLINIC | Age: 88
End: 2023-01-05
Attending: INTERNAL MEDICINE
Payer: COMMERCIAL

## 2023-01-05 DIAGNOSIS — Z95.0 CARDIAC PACEMAKER IN SITU: ICD-10-CM

## 2023-01-05 DIAGNOSIS — I49.5 SICK SINUS SYNDROME (H): ICD-10-CM

## 2023-01-05 PROCEDURE — 93280 PM DEVICE PROGR EVAL DUAL: CPT | Performed by: INTERNAL MEDICINE

## 2023-01-10 LAB
MDC_IDC_LEAD_IMPLANT_DT: NORMAL
MDC_IDC_LEAD_IMPLANT_DT: NORMAL
MDC_IDC_LEAD_LOCATION: NORMAL
MDC_IDC_LEAD_LOCATION: NORMAL
MDC_IDC_LEAD_LOCATION_DETAIL_1: NORMAL
MDC_IDC_LEAD_LOCATION_DETAIL_1: NORMAL
MDC_IDC_LEAD_MFG: NORMAL
MDC_IDC_LEAD_MFG: NORMAL
MDC_IDC_LEAD_MODEL: NORMAL
MDC_IDC_LEAD_MODEL: NORMAL
MDC_IDC_LEAD_POLARITY_TYPE: NORMAL
MDC_IDC_LEAD_POLARITY_TYPE: NORMAL
MDC_IDC_LEAD_SERIAL: NORMAL
MDC_IDC_LEAD_SERIAL: NORMAL
MDC_IDC_MSMT_BATTERY_STATUS: NORMAL
MDC_IDC_MSMT_LEADCHNL_RA_IMPEDANCE_VALUE: 468 OHM
MDC_IDC_MSMT_LEADCHNL_RA_PACING_THRESHOLD_AMPLITUDE: 0.6 V
MDC_IDC_MSMT_LEADCHNL_RA_PACING_THRESHOLD_PULSEWIDTH: 0.4 MS
MDC_IDC_MSMT_LEADCHNL_RA_SENSING_INTR_AMPL: 8.7 MV
MDC_IDC_MSMT_LEADCHNL_RA_SENSING_INTR_AMPL: 9.7 MV
MDC_IDC_MSMT_LEADCHNL_RV_IMPEDANCE_VALUE: 585 OHM
MDC_IDC_MSMT_LEADCHNL_RV_PACING_THRESHOLD_AMPLITUDE: 0.8 V
MDC_IDC_MSMT_LEADCHNL_RV_PACING_THRESHOLD_PULSEWIDTH: 0.4 MS
MDC_IDC_MSMT_LEADCHNL_RV_SENSING_INTR_AMPL: 15 MV
MDC_IDC_PG_IMPLANT_DTM: NORMAL
MDC_IDC_PG_MFG: NORMAL
MDC_IDC_PG_MODEL: NORMAL
MDC_IDC_PG_SERIAL: NORMAL
MDC_IDC_PG_TYPE: NORMAL
MDC_IDC_SESS_CLINIC_NAME: NORMAL
MDC_IDC_SESS_DTM: NORMAL
MDC_IDC_SESS_REPROGRAMMED: NORMAL
MDC_IDC_SESS_TYPE: NORMAL
MDC_IDC_SET_BRADY_AT_MODE_SWITCH_MODE: NORMAL
MDC_IDC_SET_BRADY_AT_MODE_SWITCH_RATE: 180 {BEATS}/MIN
MDC_IDC_SET_BRADY_HYSTRATE: 60 {BEATS}/MIN
MDC_IDC_SET_BRADY_LOWRATE: 60 {BEATS}/MIN
MDC_IDC_SET_BRADY_MAX_SENSOR_RATE: 120 {BEATS}/MIN
MDC_IDC_SET_BRADY_MAX_TRACKING_RATE: 130 {BEATS}/MIN
MDC_IDC_SET_BRADY_MODE: NORMAL
MDC_IDC_SET_BRADY_NIGHT_RATE: 60 {BEATS}/MIN
MDC_IDC_SET_BRADY_PAV_DELAY_HIGH: 160 MS
MDC_IDC_SET_BRADY_PAV_DELAY_LOW: 200 MS
MDC_IDC_SET_BRADY_SAV_DELAY_HIGH: 130 MS
MDC_IDC_SET_BRADY_SAV_DELAY_LOW: 170 MS
MDC_IDC_SET_CRT_PACED_CHAMBERS: NORMAL
MDC_IDC_SET_LEADCHNL_LV_PACING_CATHODE_ELECTRODE_1: NORMAL
MDC_IDC_SET_LEADCHNL_LV_PACING_CATHODE_LOCATION_1: NORMAL
MDC_IDC_SET_LEADCHNL_LV_PACING_POLARITY: NORMAL
MDC_IDC_SET_LEADCHNL_LV_SENSING_CATHODE_ELECTRODE_1: NORMAL
MDC_IDC_SET_LEADCHNL_LV_SENSING_CATHODE_LOCATION_1: NORMAL
MDC_IDC_SET_LEADCHNL_LV_SENSING_POLARITY: NORMAL
MDC_IDC_SET_LEADCHNL_RA_PACING_AMPLITUDE: 1.6 V
MDC_IDC_SET_LEADCHNL_RA_PACING_POLARITY: NORMAL
MDC_IDC_SET_LEADCHNL_RA_PACING_PULSEWIDTH: 0.4 MS
MDC_IDC_SET_LEADCHNL_RA_SENSING_ADAPTATION_MODE: NORMAL
MDC_IDC_SET_LEADCHNL_RA_SENSING_POLARITY: NORMAL
MDC_IDC_SET_LEADCHNL_RA_SENSING_SENSITIVITY: NORMAL
MDC_IDC_SET_LEADCHNL_RV_PACING_AMPLITUDE: 1.3 V
MDC_IDC_SET_LEADCHNL_RV_PACING_POLARITY: NORMAL
MDC_IDC_SET_LEADCHNL_RV_PACING_PULSEWIDTH: 0.4 MS
MDC_IDC_SET_LEADCHNL_RV_SENSING_ADAPTATION_MODE: NORMAL
MDC_IDC_SET_LEADCHNL_RV_SENSING_POLARITY: NORMAL
MDC_IDC_SET_LEADCHNL_RV_SENSING_SENSITIVITY: NORMAL

## 2023-01-17 ENCOUNTER — OFFICE VISIT (OUTPATIENT)
Dept: ORTHOPEDICS | Facility: CLINIC | Age: 88
End: 2023-01-17
Payer: COMMERCIAL

## 2023-01-17 VITALS
WEIGHT: 150 LBS | HEIGHT: 61 IN | SYSTOLIC BLOOD PRESSURE: 137 MMHG | DIASTOLIC BLOOD PRESSURE: 81 MMHG | BODY MASS INDEX: 28.32 KG/M2 | HEART RATE: 74 BPM | OXYGEN SATURATION: 100 %

## 2023-01-17 DIAGNOSIS — M47.817 FACET ARTHROPATHY, LUMBOSACRAL: Primary | ICD-10-CM

## 2023-01-17 DIAGNOSIS — M54.17 LUMBOSACRAL RADICULOPATHY: ICD-10-CM

## 2023-01-17 DIAGNOSIS — M51.379 DDD (DEGENERATIVE DISC DISEASE), LUMBOSACRAL: ICD-10-CM

## 2023-01-17 PROCEDURE — 99204 OFFICE O/P NEW MOD 45 MIN: CPT | Performed by: PHYSICAL MEDICINE & REHABILITATION

## 2023-01-17 ASSESSMENT — PAIN SCALES - GENERAL: PAINLEVEL: EXTREME PAIN (8)

## 2023-01-17 NOTE — PROGRESS NOTES
"PHYSICAL MEDICINE & REHABILITATION / MEDICAL SPINE        Date:  Jan 17, 2023    Name:  Jeannie Chu  YOB: 1935  MRN:  [id number]          REASON FOR CONSULTATION:  Right low back pain.        REFERRING PROVIDER:  Diana Hilario MD        CHART REVIEW:  Reviewed 10/15/2022 note from Diana Hilario MD (internal medicine).  Ms. Jeannie Chu had right low back pain.  This was a recurrent problem.  Pain did have a waxing and waning course.  Ms. Jeannie Chu has had low back pain off and on for several months.  There was no radiation to the right lower extremity.  There was no tingling, numbness, weakness.  There was no bowel or bladder incontinence.  Ms. Jeannie Chu had physical therapy in the past.  Over-the-counter Voltaren gel was recommended.  Over-the-counter Tylenol was recommended.  Referral to orthopedic and spine  was placed.        HISTORY OF PRESENT ILLNESS:  Ms. Jeannie Chu is an 87-year-old female.  She is accompanied to today's appointment by her daughter, Ms. Shivani Villeda, who provides interpretation.    Ms. Jeannie Chu has noted left greater than right low back pain for the past 6 months.  Pain is described as \"pain.\"  Pain is constant and without radiation.  Low back pain is worsened by standing, walking, bending.  Sitting also worsens her low back pain.  Ms. Jeannie Chu cannot identify any alleviating factors.  Her low back pain has not changed since it began.    Ms. Jeannie Chu's low back pain varies from 7/10 to 9/10.  Coughing and sneezing do not change her pain.  Pain does not wake Ms. Jeannie Chu nor keep her awake.  Ms. Jeannie Chu has not tried acupuncture, chiropractor treatments, injections, massage.  Physical therapy did not provide any relief.  Ms. Jeannie Chu has used Salonpas, Bengay, and Tylenol.    Ms. Jeannie Chu denies any weakness.  She denies any give way episodes " of her lower extremities.  She denies any tripping/stumbling.  Ms. Jeannie Chu last had a fall 2 years ago.  Ms. Jeannie Chu is not using any assistive devices for ambulation.  She denies any numbness, tingling, pins-and-needles.  Ms. Jeannie Chu denies any saddle anesthesia, bowel incontinence, bladder incontinence.  She denies that her bilateral lower extremities become weak and tired with walking.    Ms. Jeannie Chu states that she had a fall many years ago and sustained a lumbar compression fracture.  She denies any other injuries of her low back, pelvis, hips, thighs, knees, legs, ankles, feet, toes.    Ms. Jeannie Chu denies any personal or family history of autoimmune diseases, rheumatologic diseases, gout, pseudogout.  She denies any personal family history of neurologic diseases.  Ms. Jeannie Chu denies any personal or family history of inflammatory bowel diseases.        REVIEW OF SYSTEMS:  Review of Systems     Constitutional:  Positive for weight gain. Negative for fever, weight loss and fatigue.   HENT:  Negative for tinnitus, trouble swallowing and hoarse voice.    Eyes:  Negative for pain, eye pain and decreased vision.   Respiratory:   Negative for cough, shortness of breath and wheezing.    Cardiovascular:  Negative for chest pain, palpitations and leg swelling.   Gastrointestinal:  Negative for heartburn, nausea, vomiting, abdominal pain, diarrhea, constipation, blood in stool and bowel incontinence.   Genitourinary:  Negative for bladder incontinence, dysuria, hematuria and difficulty urinating.   Musculoskeletal:  Positive for myalgias and arthralgias.   Skin:  Negative for itching, poor wound healing and poor wound healing.   Neurological:  Negative for dizziness, seizures, loss of consciousness, headaches and difficulty walking.   Endo/Heme:  Negative for anemia, swollen glands and bruises/bleeds easily.   Psychiatric/Behavioral:  Negative for depression.           ALLERGIES:  Allergies   Allergen Reactions     Ibuprofen          MEDICATIONS:  Current Outpatient Medications   Medication Sig Dispense Refill     amLODIPine (NORVASC) 2.5 MG tablet Take 1 tablet (2.5 mg) by mouth daily 180 tablet 1     atorvastatin (LIPITOR) 10 MG tablet Take 1 tablet (10 mg) by mouth daily 90 tablet 3     IRON (FERROUS GLUCONATE) 325 MG OR TABS Take 1 tablet by mouth daily  30 0     levothyroxine (SYNTHROID/LEVOTHROID) 75 MCG tablet Take 1 tablet (75 mcg) by mouth daily 90 tablet 0     losartan (COZAAR) 50 MG tablet Take 1 tablet (50 mg) by mouth 2 times daily 180 tablet 2     metoprolol succinate ER (TOPROL XL) 25 MG 24 hr tablet Take 1.5 tablets (37.5 mg) by mouth daily 135 tablet 1     Multiple Vitamins-Minerals (ICAPS AREDS FORMULA PO) Take 2 tablets by mouth daily        niacin 500 MG tablet Take by mouth daily (with breakfast)       nitroGLYcerin (NITROSTAT) 0.4 MG sublingual tablet For chest pain place 1 tablet under the tongue every 5 minutes for 3 doses. If symptoms persist 5 minutes after 1st dose call 911. 30 tablet 0     ondansetron (ZOFRAN-ODT) 4 MG ODT tab Take 1 tablet (4 mg) by mouth every 8 hours as needed for nausea or vomiting 15 tablet 0     rivaroxaban ANTICOAGULANT (XARELTO) 20 MG TABS tablet Take 1 tablet (20 mg) by mouth daily (with dinner) 90 tablet 3         PAST MEDICAL HISTORY:  Past Medical History:   Diagnosis Date     Acquired hypothyroidism 5/27/2021     Coronary artery disease involving native coronary artery of native heart without angina pectoris 5/26/2021     Other and unspecified hyperlipidemia      Unspecified cardiovascular disease 1999    stent placed         PAST SURGICAL HISTORY:  Past Surgical History:   Procedure Laterality Date     EP ABLATION AV NODE N/A 10/31/2022    Procedure: Ablation Atrioventricular Node;  Surgeon: Rogelio Whitt MD;  Location: [facility] CARDIAC CATH LAB     EP ABLATION AV NODE N/A 11/1/2022    Procedure: EP Ablation AV  "Node;  Surgeon: Jd Baker MD;  Location:  [facility]ARD[facility]     EP PACEMAKER DEVICE & LEAD IMPLANT- RIGHT VENTRICULAR N/A 10/31/2022    Procedure: Pacemaker Device & Lead Implant- Right ventricular;  Surgeon: Rogelio Whitt MD;  Location:  [facility] CARDIAC CATH LAB         FAMILY HISTORY:  Family History   Problem Relation Age of Onset     Family History Negative Other          SOCIAL HISTORY:  Social History     Socioeconomic History     Marital status:      Spouse name: Not on file     Number of children: Not on file     Years of education: Not on file     Highest education level: Not on file   Occupational History     Not on file   Tobacco Use     Smoking status: Never     Smokeless tobacco: Never   Vaping Use     Vaping Use: Never used   Substance and Sexual Activity     Alcohol use: No     Drug use: No     Sexual activity: Not Currently   Other Topics Concern     Not on file   Social History Narrative     Not on file     Social Determinants of Health     Financial Resource Strain: Not on file   Food Insecurity: Not on file   Transportation Needs: Not on file   Physical Activity: Not on file   Stress: Not on file   Social Connections: Not on file   Intimate Partner Violence: Not on file   Housing Stability: Not on file         PHYSICAL EXAMINATION:  Vitals:    01/17/23 0918   BP: 137/81   Pulse: 74   SpO2: 100%   Weight: 68 kg (150 lb)   Height: 1.549 m (5' 1\")       GENERAL:  No acute distress.  Pleasant and cooperative.   PSYCH:  Normal mood and affect.  HEAD:  Normocephalic.  SPEECH:  No dysarthria.  EYES:  No scleral icterus.  EARS:  Hearing is intact to spoken voice.  NOSE/MOUTH:  Wearing a face mask.  LUNGS:  No respiratory distress.  No increased work of breathing.  VASCULAR/PULSES:  Dorsalis pedis:  Right 2+.  Left 2+.  LOWER EXTREMITIES: No clubbing, cyanosis, or edema bilaterally.    BALANCE AND GAIT: The patient has reciprocal gait pattern without antalgia.  She does ambulate " with the decreased pace, shortened stride length, and slight forward trunk lean.  She is able to heel walk and toe walk without difficulty.  She has difficulty tandem walking.  Double leg squat is performed with quadriceps dominant pattern.    INSPECTION:  There is no erythema, ecchymosis, deformity, asymmetry, or abnormality of the low back.    LUMBOPELVIC PALPATION:  Lumbar Spinous Processes:  not tender.  Lumbar Paraspinals:  Right not tender.  Left mild tenderness L4-L5.  Posterior Superior Iliac Spine:  Right not tender.  Left mild tenderness.  Superior Cluneal Nerves:  Right not tender.  Left mild tenderness.  Iliac Crest:  Right not tender.  Left not tender.  Sacroiliac Joint:  Right not tender.  Left not tender.  Hip External Rotators:  Right not tender.  Left not tender.  Ischial Tuberosity:  Right not tender.  Left not tender.  Greater Trochanter:  Right not tender.  Left not tender.  Coccyx:  not tender.    THORACOLUMBAR RANGE OF MOTION:  Forward flexion (85 ): Normal range of motion, does not cause pain, does not cause radiating pain.  Extension (30 ): Mildly reduced range of motion, causes slight increase in left low back pain, does not cause radiating pain.  Lateral bending right (30 ):  Mildly reduced range of motion, causes slight increase in left low back pain, does not cause radiating pain.  Lateral bending left (30 ):  Mildly reduced range of motion, causes slight increase in left low back pain, does not cause radiating pain.  Twisting right with extension:  Mildly reduced range of motion, causes slight increase in left low back pain, does not cause radiating pain.  Twisting left with extension:  Mildly reduced range of motion, causes slight increase in left low back pain, does not cause radiating pain.  Sacroiliac joint dysfunction:  Right -.  Left -.    HIP RANGE OF MOTION:  Flexion (110 ):  Right 105 .  Left 105 .  Extension (30 ):  Right 25 .  Left 25 .  External rotation (45 ):  Right 40 .   Left 40 .  Internal rotation (35 ):  Right 20 .  Left 20 .    KNEE RANGE OF MOTION:  Extension (0 ):  Right 0 .  Left 0 .  Flexion (135 ):  Right 130 .  Left 130 .    STRENGTH:  Hip flexion:  Right 4/5.  Left 4/5.  Hip abduction:  Right 4/5.  Left 4/5.  Hip external rotation:  Right 4/5.  Left 4/5.  Hip extension:  Right 4/5.  Left 4/5.  Knee extension:  Right 4/5.  Left 4/5.  Knee flexion:  Right 4/5.  Left 4/5.  Ankle dorsiflexion:  Right 4/5.  Left 4/5.  Great toe dorsiflexion:  Right 4/5.  Left 4/5.  Ankle plantar flexion:  Right 4/5.  Left 4/5.    SENSATION:  Intact to light touch along right L2, L3, L4, L5, S1, S2.  Intact to light touch along left L2, L3, L4, L5, S1, S2.    REFLEXES:  Patellar (L2-L4):  Right 1+.  Left 1+.  Achilles (S1-S2):  Right 1+.  Left 1+.  Babinski:  Right downgoing.  Left downgoing.  Forced ankle dorsiflexion (clonus):  Right 0 beats.  Left 0 beats.    LUMBOPELVIC SPECIAL TESTS:  Log roll:  Right -.  Left -.  Straight leg raise:  Right -.  Left -.  Crossover straight leg raise:  Right -.  Left -.  LAURA:  Right -.  Left -.  FADIR:  Right -.  Left -.  Hip scour:  Right -.  Left -.  Lateral sacral compression:  Right -.  Left -.  Clamshell:  Right -.  Left -.  Ely s:  Right +.  Left +.  Yeoman s:  Right -.  Left -.  Distraction:  Right -.  Left -.  Sacral thrust:  Right -.  Left -.  Noble compression:  Right -.  Left -.        IMAGING:  EXAM: MR LUMBAR SPINE W/O CONTRAST  LOCATION: [facility]  DATE/TIME: 6/18/2021 6:09 PM     INDICATION: Compression fracture, L-spine  COMPARISON: Lumbar spine x-ray 04/30/2021  TECHNIQUE: Routine Lumbar Spine MRI without IV contrast.     FINDINGS:   Nomenclature is based on 5 lumbar type vertebral bodies.      L1 vertebral body mild chronic compression deformity. L1 superior endplate prominent Schmorl's node with adjacent bone marrow edema could reflect acute Schmorl's node. Left L5 vertebral body superior endplate mild chronic compression  deformity. Remaining vertebral heights are maintained.     L2 vertebral body demonstrates a large osseous hemangioma. No extraosseous extension. Scattered small osseous hemangiomas are present. No concerning bone marrow lesions.     Normal distal spinal cord and cauda equina with conus medullaris at L2-L3. Sacral spinal canal demonstrates several small cysts. Unremarkable visualized bony pelvis.     Mild lumbar dextroscoliosis. Multilevel degenerative disc disease manifested by disc desiccation, Schmorl's nodes, and endplate osteophytosis. L2-L3 mild degenerative type I Modic changes. L3-L4, L4-L5, and L5-S1 mild degenerative type II Modic changes. Multilevel facet arthropathy. Multiple prominent facet joint effusions. Some of the spinous processes demonstrate mild irregularity could suggest mild Baastrup's disease. No interspinous bursitis.     T12-L1 mild grade 1 anterolisthesis measuring 2 mm. L2-L3 mild grade 1 retrolisthesis measuring 2 mm. L4-L5 slight grade 1 anterolisthesis measuring 1 mm.     T12-L1: Mild bulge with bilateral foraminal extension. Small superimposed posterior disc extrusion extending mildly above and below the disc margin.   No significant thecal sac stenosis.  No significant left foraminal stenosis.  No significant right foraminal stenosis.     L1-L2: Bulge with bilateral foraminal extension. Central posterior disc extrusion extending mildly above and below the disc margin.   Mild thecal sac stenosis.  No significant left foraminal         ASSESSMENT/PLAN:  Ms. Jeannie Chu is an 87-year-old female.  Her pain is most consistent with left greater than right lumbosacral facet arthropathy.  She does have some lumbosacral degenerative disc disease.  Symptoms extending into the bilateral lateral feet are concerning for possible bilateral lumbosacral radiculopathies.  Ms. Jeannie Chu currently denies any symptoms of lumbosacral spinal stenosis.  Discussed diagnoses, pathophysiologies,  and treatment options with Ms. Jeannie Chu.  Discussed the options of doing nothing/living with it, physical therapy, chiropractic care, oral medications such as gabapentin and pregabalin, lumbosacral facet joint medial branch blocks with following radiofrequency ablations, lumbosacral epidural corticosteroid injection.  Ms. Jeannie Chu is to be set up for an MRI of her lumbar spine.  She is to follow-up in this clinic after the MRI of her lumbar spine.        Total Time on encounter:  59 minutes were spent on one more or more of the following:  discussion with patient, history, exam, coordinating care, treatment goals, record review, documenting clinical information, and/or data review.      Jose L Parsons MD

## 2023-01-17 NOTE — PATIENT INSTRUCTIONS
Please schedule an MRI.  The [facility] Imaging Scheduling team will call you to schedule your imaging exam in the next 0-3 days.  You can also call them directly at [facility] [phone] ([id number], [address], [place], [place]) and [facility] [phone] ([address], [place], [place]) to schedule your appointment.    Please schedule a follow-up appointment after your MRI.  You can schedule this at the , or you can call [phone].

## 2023-01-17 NOTE — LETTER
"    1/17/2023         RE: Jeannie Chu  [address] [place] [address]        Dear Colleague,    Thank you for referring your patient, Jeannie Chu, to the [facility]. Please see a copy of my visit note below.    PHYSICAL MEDICINE & REHABILITATION / MEDICAL SPINE        Date:  Jan 17, 2023    Name:  Jeannie Chu  YOB: 1935  MRN:  [id number]          REASON FOR CONSULTATION:  Right low back pain.        REFERRING PROVIDER:  Diana Hilario MD        CHART REVIEW:  Reviewed 10/15/2022 note from Diana Hilario MD (internal medicine).  Ms. Jeannie Chu had right low back pain.  This was a recurrent problem.  Pain did have a waxing and waning course.  Ms. Jeannie Chu has had low back pain off and on for several months.  There was no radiation to the right lower extremity.  There was no tingling, numbness, weakness.  There was no bowel or bladder incontinence.  Ms. Jeannie Chu had physical therapy in the past.  Over-the-counter Voltaren gel was recommended.  Over-the-counter Tylenol was recommended.  Referral to orthopedic and spine  was placed.        HISTORY OF PRESENT ILLNESS:  Ms. Jeannie Chu is an 87-year-old female.  She is accompanied to today's appointment by her daughter, Ms. Shivani Villeda, who provides interpretation.    Ms. Jeannie Chu has noted left greater than right low back pain for the past 6 months.  Pain is described as \"pain.\"  Pain is constant and without radiation.  Low back pain is worsened by standing, walking, bending.  Sitting also worsens her low back pain.  Ms. Jeannie Chu cannot identify any alleviating factors.  Her low back pain has not changed since it began.    Ms. Jeannie Chu's low back pain varies from 7/10 to 9/10.  Coughing and sneezing do not change her pain.  Pain does not wake Ms. Jeannie Chu nor keep her awake.  Ms. Jeannie Chu has " not tried acupuncture, chiropractor treatments, injections, massage.  Physical therapy did not provide any relief.  Ms. Jeannie Chu has used Salonpas, Bengay, and Tylenol.    Ms. Jeannie Chu denies any weakness.  She denies any give way episodes of her lower extremities.  She denies any tripping/stumbling.  Ms. Jeannie Chu last had a fall 2 years ago.  Ms. Jeannie Chu is not using any assistive devices for ambulation.  She denies any numbness, tingling, pins-and-needles.  Ms. Jeannie Chu denies any saddle anesthesia, bowel incontinence, bladder incontinence.  She denies that her bilateral lower extremities become weak and tired with walking.    Ms. Jeannie Chu states that she had a fall many years ago and sustained a lumbar compression fracture.  She denies any other injuries of her low back, pelvis, hips, thighs, knees, legs, ankles, feet, toes.    Ms. Jeannie Chu denies any personal or family history of autoimmune diseases, rheumatologic diseases, gout, pseudogout.  She denies any personal family history of neurologic diseases.  Ms. Jeannie Chu denies any personal or family history of inflammatory bowel diseases.        REVIEW OF SYSTEMS:  Review of Systems     Constitutional:  Positive for weight gain. Negative for fever, weight loss and fatigue.   HENT:  Negative for tinnitus, trouble swallowing and hoarse voice.    Eyes:  Negative for pain, eye pain and decreased vision.   Respiratory:   Negative for cough, shortness of breath and wheezing.    Cardiovascular:  Negative for chest pain, palpitations and leg swelling.   Gastrointestinal:  Negative for heartburn, nausea, vomiting, abdominal pain, diarrhea, constipation, blood in stool and bowel incontinence.   Genitourinary:  Negative for bladder incontinence, dysuria, hematuria and difficulty urinating.   Musculoskeletal:  Positive for myalgias and arthralgias.   Skin:  Negative for itching, poor wound healing and  poor wound healing.   Neurological:  Negative for dizziness, seizures, loss of consciousness, headaches and difficulty walking.   Endo/Heme:  Negative for anemia, swollen glands and bruises/bleeds easily.   Psychiatric/Behavioral:  Negative for depression.          ALLERGIES:  Allergies   Allergen Reactions     Ibuprofen          MEDICATIONS:  Current Outpatient Medications   Medication Sig Dispense Refill     amLODIPine (NORVASC) 2.5 MG tablet Take 1 tablet (2.5 mg) by mouth daily 180 tablet 1     atorvastatin (LIPITOR) 10 MG tablet Take 1 tablet (10 mg) by mouth daily 90 tablet 3     IRON (FERROUS GLUCONATE) 325 MG OR TABS Take 1 tablet by mouth daily  30 0     levothyroxine (SYNTHROID/LEVOTHROID) 75 MCG tablet Take 1 tablet (75 mcg) by mouth daily 90 tablet 0     losartan (COZAAR) 50 MG tablet Take 1 tablet (50 mg) by mouth 2 times daily 180 tablet 2     metoprolol succinate ER (TOPROL XL) 25 MG 24 hr tablet Take 1.5 tablets (37.5 mg) by mouth daily 135 tablet 1     Multiple Vitamins-Minerals (ICAPS AREDS FORMULA PO) Take 2 tablets by mouth daily        niacin 500 MG tablet Take by mouth daily (with breakfast)       nitroGLYcerin (NITROSTAT) 0.4 MG sublingual tablet For chest pain place 1 tablet under the tongue every 5 minutes for 3 doses. If symptoms persist 5 minutes after 1st dose call 911. 30 tablet 0     ondansetron (ZOFRAN-ODT) 4 MG ODT tab Take 1 tablet (4 mg) by mouth every 8 hours as needed for nausea or vomiting 15 tablet 0     rivaroxaban ANTICOAGULANT (XARELTO) 20 MG TABS tablet Take 1 tablet (20 mg) by mouth daily (with dinner) 90 tablet 3         PAST MEDICAL HISTORY:  Past Medical History:   Diagnosis Date     Acquired hypothyroidism 5/27/2021     Coronary artery disease involving native coronary artery of native heart without angina pectoris 5/26/2021     Other and unspecified hyperlipidemia      Unspecified cardiovascular disease 1999    stent placed         PAST SURGICAL HISTORY:  Past Surgical  "History:   Procedure Laterality Date     EP ABLATION AV NODE N/A 10/31/2022    Procedure: Ablation Atrioventricular Node;  Surgeon: Rogelio Whitt MD;  Location:  [facility]     EP ABLATION AV NODE N/A 11/1/2022    Procedure: EP Ablation AV Node;  Surgeon: Jd Baker MD;  Location:  [facility]     EP PACEMAKER DEVICE & LEAD IMPLANT- RIGHT VENTRICULAR N/A 10/31/2022    Procedure: Pacemaker Device & Lead Implant- Right ventricular;  Surgeon: Rogelio Whitt MD;  Location:  [facility]         FAMILY HISTORY:  Family History   Problem Relation Age of Onset     Family History Negative Other          SOCIAL HISTORY:  Social History     Socioeconomic History     Marital status:      Spouse name: Not on file     Number of children: Not on file     Years of education: Not on file     Highest education level: Not on file   Occupational History     Not on file   Tobacco Use     Smoking status: Never     Smokeless tobacco: Never   Vaping Use     Vaping Use: Never used   Substance and Sexual Activity     Alcohol use: No     Drug use: No     Sexual activity: Not Currently   Other Topics Concern     Not on file   Social History Narrative     Not on file     Social Determinants of Health     Financial Resource Strain: Not on file   Food Insecurity: Not on file   Transportation Needs: Not on file   Physical Activity: Not on file   Stress: Not on file   Social Connections: Not on file   Intimate Partner Violence: Not on file   Housing Stability: Not on file         PHYSICAL EXAMINATION:  Vitals:    01/17/23 0918   BP: 137/81   Pulse: 74   SpO2: 100%   Weight: 68 kg (150 lb)   Height: 1.549 m (5' 1\")       GENERAL:  No acute distress.  Pleasant and cooperative.   PSYCH:  Normal mood and affect.  HEAD:  Normocephalic.  SPEECH:  No dysarthria.  EYES:  No scleral icterus.  EARS:  Hearing is intact to spoken voice.  NOSE/MOUTH:  Wearing a face mask.  LUNGS:  No respiratory " distress.  No increased work of breathing.  VASCULAR/PULSES:  Dorsalis pedis:  Right 2+.  Left 2+.  LOWER EXTREMITIES: No clubbing, cyanosis, or edema bilaterally.    BALANCE AND GAIT: The patient has reciprocal gait pattern without antalgia.  She does ambulate with the decreased pace, shortened stride length, and slight forward trunk lean.  She is able to heel walk and toe walk without difficulty.  She has difficulty tandem walking.  Double leg squat is performed with quadriceps dominant pattern.    INSPECTION:  There is no erythema, ecchymosis, deformity, asymmetry, or abnormality of the low back.    LUMBOPELVIC PALPATION:  Lumbar Spinous Processes:  not tender.  Lumbar Paraspinals:  Right not tender.  Left mild tenderness L4-L5.  Posterior Superior Iliac Spine:  Right not tender.  Left mild tenderness.  Superior Cluneal Nerves:  Right not tender.  Left mild tenderness.  Iliac Crest:  Right not tender.  Left not tender.  Sacroiliac Joint:  Right not tender.  Left not tender.  Hip External Rotators:  Right not tender.  Left not tender.  Ischial Tuberosity:  Right not tender.  Left not tender.  Greater Trochanter:  Right not tender.  Left not tender.  Coccyx:  not tender.    THORACOLUMBAR RANGE OF MOTION:  Forward flexion (85 ): Normal range of motion, does not cause pain, does not cause radiating pain.  Extension (30 ): Mildly reduced range of motion, causes slight increase in left low back pain, does not cause radiating pain.  Lateral bending right (30 ):  Mildly reduced range of motion, causes slight increase in left low back pain, does not cause radiating pain.  Lateral bending left (30 ):  Mildly reduced range of motion, causes slight increase in left low back pain, does not cause radiating pain.  Twisting right with extension:  Mildly reduced range of motion, causes slight increase in left low back pain, does not cause radiating pain.  Twisting left with extension:  Mildly reduced range of motion, causes  slight increase in left low back pain, does not cause radiating pain.  Sacroiliac joint dysfunction:  Right -.  Left -.    HIP RANGE OF MOTION:  Flexion (110 ):  Right 105 .  Left 105 .  Extension (30 ):  Right 25 .  Left 25 .  External rotation (45 ):  Right 40 .  Left 40 .  Internal rotation (35 ):  Right 20 .  Left 20 .    KNEE RANGE OF MOTION:  Extension (0 ):  Right 0 .  Left 0 .  Flexion (135 ):  Right 130 .  Left 130 .    STRENGTH:  Hip flexion:  Right 4/5.  Left 4/5.  Hip abduction:  Right 4/5.  Left 4/5.  Hip external rotation:  Right 4/5.  Left 4/5.  Hip extension:  Right 4/5.  Left 4/5.  Knee extension:  Right 4/5.  Left 4/5.  Knee flexion:  Right 4/5.  Left 4/5.  Ankle dorsiflexion:  Right 4/5.  Left 4/5.  Great toe dorsiflexion:  Right 4/5.  Left 4/5.  Ankle plantar flexion:  Right 4/5.  Left 4/5.    SENSATION:  Intact to light touch along right L2, L3, L4, L5, S1, S2.  Intact to light touch along left L2, L3, L4, L5, S1, S2.    REFLEXES:  Patellar (L2-L4):  Right 1+.  Left 1+.  Achilles (S1-S2):  Right 1+.  Left 1+.  Babinski:  Right downgoing.  Left downgoing.  Forced ankle dorsiflexion (clonus):  Right 0 beats.  Left 0 beats.    LUMBOPELVIC SPECIAL TESTS:  Log roll:  Right -.  Left -.  Straight leg raise:  Right -.  Left -.  Crossover straight leg raise:  Right -.  Left -.  LAURA:  Right -.  Left -.  FADIR:  Right -.  Left -.  Hip scour:  Right -.  Left -.  Lateral sacral compression:  Right -.  Left -.  Clamshell:  Right -.  Left -.  Ely s:  Right +.  Left +.  Yeoman s:  Right -.  Left -.  Distraction:  Right -.  Left -.  Sacral thrust:  Right -.  Left -.  Noble compression:  Right -.  Left -.        IMAGING:  EXAM: MR LUMBAR SPINE W/O CONTRAST  LOCATION: [facility]  DATE/TIME: 6/18/2021 6:09 PM     INDICATION: Compression fracture, L-spine  COMPARISON: Lumbar spine x-ray 04/30/2021  TECHNIQUE: Routine Lumbar Spine MRI without IV contrast.     FINDINGS:   Nomenclature is based on 5  lumbar type vertebral bodies.      L1 vertebral body mild chronic compression deformity. L1 superior endplate prominent Schmorl's node with adjacent bone marrow edema could reflect acute Schmorl's node. Left L5 vertebral body superior endplate mild chronic compression deformity. Remaining vertebral heights are maintained.     L2 vertebral body demonstrates a large osseous hemangioma. No extraosseous extension. Scattered small osseous hemangiomas are present. No concerning bone marrow lesions.     Normal distal spinal cord and cauda equina with conus medullaris at L2-L3. Sacral spinal canal demonstrates several small cysts. Unremarkable visualized bony pelvis.     Mild lumbar dextroscoliosis. Multilevel degenerative disc disease manifested by disc desiccation, Schmorl's nodes, and endplate osteophytosis. L2-L3 mild degenerative type I Modic changes. L3-L4, L4-L5, and L5-S1 mild degenerative type II Modic changes. Multilevel facet arthropathy. Multiple prominent facet joint effusions. Some of the spinous processes demonstrate mild irregularity could suggest mild Baastrup's disease. No interspinous bursitis.     T12-L1 mild grade 1 anterolisthesis measuring 2 mm. L2-L3 mild grade 1 retrolisthesis measuring 2 mm. L4-L5 slight grade 1 anterolisthesis measuring 1 mm.     T12-L1: Mild bulge with bilateral foraminal extension. Small superimposed posterior disc extrusion extending mildly above and below the disc margin.   No significant thecal sac stenosis.  No significant left foraminal stenosis.  No significant right foraminal stenosis.     L1-L2: Bulge with bilateral foraminal extension. Central posterior disc extrusion extending mildly above and below the disc margin.   Mild thecal sac stenosis.  No significant left foraminal         ASSESSMENT/PLAN:  Ms. Jeannie Chu is an 87-year-old female.  Her pain is most consistent with left greater than right lumbosacral facet arthropathy.  She does have some lumbosacral  degenerative disc disease.  Symptoms extending into the bilateral lateral feet are concerning for possible bilateral lumbosacral radiculopathies.  Ms. Jeannie Chu currently denies any symptoms of lumbosacral spinal stenosis.  Discussed diagnoses, pathophysiologies, and treatment options with Ms. Jeannie Chu.  Discussed the options of doing nothing/living with it, physical therapy, chiropractic care, oral medications such as gabapentin and pregabalin, lumbosacral facet joint medial branch blocks with following radiofrequency ablations, lumbosacral epidural corticosteroid injection.  Ms. Jeannie Chu is to be set up for an MRI of her lumbar spine.  She is to follow-up in this clinic after the MRI of her lumbar spine.    Total Time on encounter:  59 minutes were spent on one more or more of the following:  discussion with patient, history, exam, coordinating care, treatment goals, record review, documenting clinical information, and/or data review.      Jose L Parsons MD

## 2023-01-19 ASSESSMENT — ENCOUNTER SYMPTOMS
BLOOD IN STOOL: 0
EYE PAIN: 0
DEPRESSION: 0
ABDOMINAL PAIN: 0
VOMITING: 0
WEIGHT GAIN: 1
LOSS OF CONSCIOUSNESS: 0
COUGH: 0
CONSTIPATION: 0
WEIGHT LOSS: 0
LEG SWELLING: 0
BRUISES/BLEEDS EASILY: 0
INSOMNIA: 0
TROUBLE SWALLOWING: 0
MYALGIAS: 1
HOARSE VOICE: 0
ARTHRALGIAS: 1
PALPITATIONS: 0
SHORTNESS OF BREATH: 0
DIFFICULTY URINATING: 0
SEIZURES: 0
HEADACHES: 0
FEVER: 0
HEMATURIA: 0
DIARRHEA: 0
DYSURIA: 0
BOWEL INCONTINENCE: 0
WHEEZING: 0
NAUSEA: 0
FATIGUE: 0
NERVOUS/ANXIOUS: 0
HEARTBURN: 0
POOR WOUND HEALING: 0
SWOLLEN GLANDS: 0
DIZZINESS: 0

## 2023-01-23 ENCOUNTER — CARE COORDINATION (OUTPATIENT)
Dept: CARDIOLOGY | Facility: CLINIC | Age: 88
End: 2023-01-23
Payer: COMMERCIAL

## 2023-01-23 DIAGNOSIS — E03.9 ACQUIRED HYPOTHYROIDISM: ICD-10-CM

## 2023-01-23 NOTE — PROGRESS NOTES
Request received for patient to have a MRI. Patient has a MRI non-conditional Biotronik [id number] Edora 8 DR-T Pacemaker with Medtronic RA lead and Biotronik RV lead. SARAH Palacio NP has approved the MRI utilizing the Magnetic Resonance Imagining nonconditional cardiac Implantable electronic device (CIED) Protocol. MRI to be scheduled by MRI department.

## 2023-01-23 NOTE — LETTER
January 26, 2023      Jeannie Chu  [address] [address]          Dear Jeannie,    We are concerned about your health care.  We recently provided you with a medication refill.  Many medications require routine follow-up with your provider.      At this time we ask that: You schedule a routine office visit with your physician and labs to follow your thyroid    Your prescription: Has been refilled for 1 month so you may have time for the above noted follow-up.      Thank you,      Lance Hilario M.D.

## 2023-01-24 ENCOUNTER — MYC MEDICAL ADVICE (OUTPATIENT)
Dept: CARDIOLOGY | Facility: CLINIC | Age: 88
End: 2023-01-24
Payer: COMMERCIAL

## 2023-01-24 DIAGNOSIS — I10 ESSENTIAL HYPERTENSION: Primary | ICD-10-CM

## 2023-01-24 RX ORDER — LEVOTHYROXINE SODIUM 75 UG/1
TABLET ORAL
Qty: 30 TABLET | Refills: 0 | Status: SHIPPED | OUTPATIENT
Start: 2023-01-24 | End: 2023-02-24

## 2023-01-24 NOTE — TELEPHONE ENCOUNTER
Patient's last TSH was abnormal and her Levoxyl dose was adjusted and she was advised to repeat TSH in 4 weeks but no repeat labs done, I have faxed for 1 month only until the labs and office visit appointment is made, please inform patient

## 2023-01-25 RX ORDER — AMLODIPINE BESYLATE 5 MG/1
5 TABLET ORAL DAILY
Qty: 90 TABLET | Refills: 0 | Status: SHIPPED | OUTPATIENT
Start: 2023-01-25 | End: 2023-03-31

## 2023-01-25 NOTE — TELEPHONE ENCOUNTER
Germán Shin MD  You 2 hours ago (9:21 AM)     AH  Thanks recommend increasing amlodipine to 5mg daily thanks!      Recommendations routed via [employer]. Orders placed per MD. Will touch base in 2 weeks to review readings.   GEORGE Tyler RN, BSN. 01/25/23 12:15 PM

## 2023-01-25 NOTE — TELEPHONE ENCOUNTER
~LOV 11/10/22 with Dr. Shin:   - Advised patient to monitor blood pressures at home, if blood pressures are consistently less than 110 mmHg systolic, advised patient (and daughter-in-law) to stop amlodipine 2.5 mg daily  - Otherwise, continue current regimen of metoprolol succinate, losartan, atorvastatin, rivaroxaban    ~11/18/22 Tele encounter:   Pt reports increased BP readings. Plan to increase Metoprolol succinate from 25mg daily to 37.5mg daily.     ~ new BP readings:           PMD OV 1/17/23 (unclear if ths is what is meant by hospital). /81, HR 74. Unclear if readings are taken after medications. Daughter Marya reports BP taken after medications in the AM.     Current medications:   Metoprolol succinate 37.5mg daily.   Losartan 50mg daily  Amlodipine 2.5mg daily.     Routing to provider for review / recommendations.   GEORGE Tyler RN, BSN.

## 2023-01-26 NOTE — TELEPHONE ENCOUNTER
Call received form pt's daughter to review recommendations. Pt's daughter reports she take half her meds in the AM and the other half in the PM. Pt's daughter not sure which meds she takes when as she is not currently with pt.     Pt;s daughter verbalized understanding regarding medication recommendations. Orders placed per MD. Per daughter will plan to check BP nad med admin times and send readings as well as what itmes she takes which medications in about 2 weeks.   GEORGE Tyler RN, BSN. 01/26/23 2:00 PM

## 2023-02-09 ENCOUNTER — OFFICE VISIT (OUTPATIENT)
Dept: CARDIOLOGY | Facility: CLINIC | Age: 88
End: 2023-02-09
Attending: INTERNAL MEDICINE
Payer: COMMERCIAL

## 2023-02-09 VITALS
DIASTOLIC BLOOD PRESSURE: 74 MMHG | WEIGHT: 153 LBS | SYSTOLIC BLOOD PRESSURE: 135 MMHG | BODY MASS INDEX: 28.89 KG/M2 | HEART RATE: 74 BPM | HEIGHT: 61 IN

## 2023-02-09 DIAGNOSIS — I48.0 PAROXYSMAL ATRIAL FIBRILLATION (H): ICD-10-CM

## 2023-02-09 DIAGNOSIS — R52 PAIN IN PACEMAKER POCKET: Primary | ICD-10-CM

## 2023-02-09 DIAGNOSIS — I10 ESSENTIAL HYPERTENSION: ICD-10-CM

## 2023-02-09 PROCEDURE — 99215 OFFICE O/P EST HI 40 MIN: CPT | Performed by: INTERNAL MEDICINE

## 2023-02-09 NOTE — PATIENT INSTRUCTIONS
February 9, 2023    Thank you for allowing our Cardiology team to participate in your care.     Please note the following changes to your heart treatment plan:     Medication changes:   - continue current regimen of amlodipine in the morning, losartan twice a day, metoprolol in the evening, atorvastatin in the evening  - monitor BPs in the morning 2 hours after taking medications, and continue to monitor BPs in the evening 2 hours after taking evening medications    Tests to be done:  - Chest x-ray    Follow up:  - Follow up in about 2 months, or sooner as needed.      For scheduling, please call [phone].    Please contact our team at [phone] (Nini DALEY) or [phone] for any questions or concerns.     If you are having a medical emergency, please call [phone].     Sincerely,    Germán Shin MD, [facility]  Cardiology    [facility] - [facility] - [facility] - [place]

## 2023-02-09 NOTE — PROGRESS NOTES
Cardiology Clinic Progress Note:    February 9, 2023   Patient Name: Jeannie Chu  Patient MRN: [id number]     Consult indication: HTN, atrial fibrillation     HPI:    I had the opportunity to see patient Jeannie Chu in cardiology clinic for a follow up visit. Patient is followed by our colleague Diana Hilario MD with Primary Care.     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     I had initially met patient in hospital for a consultation on 7/25/2021.  Patient had been experiencing fatigue and weakness, a blood pressure check at home demonstrated an elevated heart rate in the 120 bpm range.  Patient was brought to the ED, and was found to be in atrial fibrillation with RVR.  Labs at that time were notable for normal troponin.  TTE 7/25/2021 demonstrated normal biventricular function, no significant valvular abnormalities.      Following this, we have been managing her atrial fibrillation with a rate control strategy initially, however she presented to the ED with recurrent atrial fibrillation with RVR, ultimately was started on amiodarone.  Since then, she has had issues with bradycardia, dizziness/lightheadedness, Holter monitor demonstrated high-grade AV block.  She was then seen by my colleague Dr. Whitt with cardiology EP, patient underwent elective AV node ablation and pacemaker placement 10/31/2022, however on device interrogation, she was found to have AV node function recovery, and so underwent redo AV node ablation 11/1/2022.     Since then, patient reports that she has been doing well.    Unfortunately, she still has  discomfort in her pacemaker pocket site, around lateral aspect that is a little bit more superficial than the medial side.  Pain is present when she presses in on this area, no pain at rest.  No fever, chills, swelling, erythema, warmth over the site.    Blood pressures have been somewhat variable, we did restart amlodipine 5 mg daily.  She has been taking metoprolol at night.  Blood pressure today in clinic is 135 over 74 mmHg, and she brings with her a log of blood pressures at home, which have improved with initiation of amlodipine.  Blood pressures are still somewhat elevated in the 140s mmHg range systolic, though these readings are done in the evening after her p.m. educations.  She denies any chest pain, chest pressure, dizziness/lightheadedness.  Device interrogation from 1/5/2023 demonstrates normal device function, battery status 9 years and 3 months.    Assessment and Plan/Recommendations:    # Persistent atrial fibrillation complicated by tachybradycardia syndrome, now status post AV node ablation and pacemaker (11/1/2022), [id number], on rivaroxaban.    # Mild discomfort at pacemaker site, does not appear infected, no overlying hematoma, minimally tender to palpation over lateral aspect which is slightly more superficial than medial aspect.  No systemic signs suggestive of infection.  # Hypothyroidism.  On levothyroxine.  # HTN.   # HL, on statin  # CAD s/p PCI (remote history, proximal LAD 20 years ago).  Patient denies symptoms concerning for angina.  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, TTE 3/2022    -We will check a chest x-ray of the pacemaker device site given persistence of discomfort, though physical exam findings are reassuring  - Advised patient to continue monitoring blood pressures, to check blood pressures in the morning as well as the evening, will maintain current regimen of amlodipine 5 mg in the morning, metoprolol succinate 37.5 mg nightly, losartan 50 mg twice daily, rivaroxaban,  atorvastatin  - If blood pressures are elevated in the morning, will increase amlodipine to 7.5 mg daily  - Follow-up in about 2 months, or sooner as needed    Thank you for allowing our team to participate in the care of Jeannie Chu.  Please do not hesitate to call or page me with any questions or concerns.    Sincerely,     Germán Shin MD, [facility]  Cardiology  Text Page   February 9, 2023    Voice recognition software utilized.     Total time spent on this encounter: 40 minutes, providing care in this encounter including, but not limited to, reviewing prior medical records, laboratory data, imaging studies, diagnostic studies, procedure notes, formulating an assessment and plan, recommendations, discussion and counseling with patient face to face, dictation.    Past Medical History:     Past Medical History:   Diagnosis Date     Acquired hypothyroidism 5/27/2021     Coronary artery disease involving native coronary artery of native heart without angina pectoris 5/26/2021     Other and unspecified hyperlipidemia      Unspecified cardiovascular disease 1999    stent placed        Past Surgical History:   Past Surgical History:   Procedure Laterality Date     EP ABLATION AV NODE N/A 10/31/2022    Procedure: Ablation Atrioventricular Node;  Surgeon: Rogelio Whitt MD;  Location: [facility] CARDIAC CATH LAB     EP ABLATION AV NODE N/A 11/1/2022    Procedure: EP Ablation AV Node;  Surgeon: Jd Baker MD;  Location: [facility] CARDIAC CATH LAB     EP PACEMAKER DEVICE & LEAD IMPLANT- RIGHT VENTRICULAR N/A 10/31/2022    Procedure: Pacemaker Device & Lead Implant- Right ventricular;  Surgeon: Rogelio Whitt MD;  Location: [facility] CARDIAC CATH LAB       Medications (outpatient):  Current Outpatient Medications   Medication Sig Dispense Refill     amLODIPine (NORVASC) 5 MG tablet Take 1 tablet (5 mg) by mouth daily 90 tablet 0     atorvastatin (LIPITOR) 10 MG tablet Take 1 tablet (10 mg) by  "mouth daily 90 tablet 3     IRON (FERROUS GLUCONATE) 325 MG OR TABS Take 1 tablet by mouth daily  30 0     levothyroxine (SYNTHROID/LEVOTHROID) 75 MCG tablet Take 1 tablet by mouth once daily 30 tablet 0     losartan (COZAAR) 50 MG tablet Take 1 tablet (50 mg) by mouth 2 times daily 180 tablet 2     metoprolol succinate ER (TOPROL XL) 25 MG 24 hr tablet Take 1.5 tablets (37.5 mg) by mouth daily 135 tablet 1     Multiple Vitamins-Minerals (ICAPS AREDS FORMULA PO) Take 2 tablets by mouth daily        niacin 500 MG tablet Take by mouth daily (with breakfast)       nitroGLYcerin (NITROSTAT) 0.4 MG sublingual tablet For chest pain place 1 tablet under the tongue every 5 minutes for 3 doses. If symptoms persist 5 minutes after 1st dose call 911. 30 tablet 0     ondansetron (ZOFRAN-ODT) 4 MG ODT tab Take 1 tablet (4 mg) by mouth every 8 hours as needed for nausea or vomiting 15 tablet 0     rivaroxaban ANTICOAGULANT (XARELTO) 20 MG TABS tablet Take 1 tablet (20 mg) by mouth daily (with dinner) 90 tablet 3       Allergies:  Allergies   Allergen Reactions     Ibuprofen        Social History:   History   Drug Use No      History   Smoking Status     Never   Smokeless Tobacco     Never     Social History    Substance and Sexual Activity      Alcohol use: No       Family History:  Family History   Problem Relation Age of Onset     Family History Negative Other        Review of Systems:   A complete review of systems was negative except as mentioned in the History of Present Illness.     Objective & Physical Exam:  /74   Pulse 74   Ht 1.549 m (5' 1\")   Wt 69.4 kg (153 lb)   LMP  (LMP Unknown)   BMI 28.91 kg/m    Wt Readings from Last 2 Encounters:   02/09/23 69.4 kg (153 lb)   01/17/23 68 kg (150 lb)     Body mass index is 28.91 kg/m .   Body surface area is 1.73 meters squared.    Constitutional: appears stated age, in no apparent distress, appears to be well nourished  Head: normocephalic, atraumatic  Neck: supple, " trachea midline, no bruit bilaterally   Pulmonary: clear to auscultation bilaterally, no wheezes, no rales, no increased work of breathing  Cardiovascular: JVP normal, regular rate, regular rhythm, normal S1 and S2, no S3, S4, no murmur appreciated, no lower extremity edema, (+)PPM Left upper chest wall, mild tenderness to palpation over lateral aspect which is slightly more superficial the medial aspect, no erythema, swelling, warmth over the area  Gastrointestinal: no guarding, non-rigid   Neurologic: awake, alert, moves all extremities  Skin: no jaundice, warm on limited exam  Psychiatric: affect is normal, answers questions appropriately, oriented to self and place    Data reviewed:  Lab Results   Component Value Date    WBC 9.0 11/01/2022    WBC 10.0 05/26/2021    RBC 3.99 11/01/2022    RBC 4.30 05/26/2021    HGB 12.1 11/01/2022    HGB 12.9 05/26/2021    HCT 37.3 11/01/2022    HCT 40.3 05/26/2021    MCV 94 11/01/2022    MCV 94 05/26/2021    MCH 30.3 11/01/2022    MCH 30.0 05/26/2021    MCHC 32.4 11/01/2022    MCHC 32.0 05/26/2021    RDW 14.3 11/01/2022    RDW 13.7 05/26/2021     11/01/2022     05/26/2021     Sodium   Date Value Ref Range Status   11/01/2022 132 (L) 133 - 144 mmol/L Final   04/30/2021 135 133 - 144 mmol/L Final     Potassium   Date Value Ref Range Status   11/01/2022 3.7 3.4 - 5.3 mmol/L Final   04/30/2021 4.4 3.4 - 5.3 mmol/L Final     Chloride   Date Value Ref Range Status   11/01/2022 100 94 - 109 mmol/L Final   04/30/2021 102 94 - 109 mmol/L Final     Carbon Dioxide   Date Value Ref Range Status   04/30/2021 30 20 - 32 mmol/L Final     Carbon Dioxide (CO2)   Date Value Ref Range Status   11/01/2022 28 20 - 32 mmol/L Final     Anion Gap   Date Value Ref Range Status   11/01/2022 4 3 - 14 mmol/L Final   04/30/2021 3 3 - 14 mmol/L Final     Glucose   Date Value Ref Range Status   11/01/2022 82 70 - 99 mg/dL Final   04/30/2021 89 70 - 99 mg/dL Final     Urea Nitrogen   Date Value  Ref Range Status   11/01/2022 16 7 - 30 mg/dL Final   04/30/2021 18 7 - 30 mg/dL Final     Creatinine   Date Value Ref Range Status   11/01/2022 0.82 0.52 - 1.04 mg/dL Final   04/30/2021 0.77 0.52 - 1.04 mg/dL Final     GFR Estimate   Date Value Ref Range Status   11/01/2022 69 >60 mL/min/1.73m2 Final     Comment:     Effective December 21, 2021 eGFRcr in adults is calculated using the 2021 CKD-EPI creatinine equation which includes age and gender (Donavon et al., NEJM, DOI: 10.1056/VETErn8752989)   04/30/2021 70 >60 mL/min/[1.73_m2] Final     Comment:     Non  GFR Calc  Starting 12/18/2018, serum creatinine based estimated GFR (eGFR) will be   calculated using the Chronic Kidney Disease Epidemiology Collaboration   (CKD-EPI) equation.       GFR, ESTIMATED POCT   Date Value Ref Range Status   07/24/2021 >60 >60 mL/min/1.73m2 Final     Calcium   Date Value Ref Range Status   11/01/2022 9.0 8.5 - 10.1 mg/dL Final   04/30/2021 9.3 8.5 - 10.1 mg/dL Final     Bilirubin Total   Date Value Ref Range Status   02/28/2022 0.9 0.2 - 1.3 mg/dL Final   04/30/2021 1.1 0.2 - 1.3 mg/dL Final     Alkaline Phosphatase   Date Value Ref Range Status   02/28/2022 57 40 - 150 U/L Final   04/30/2021 70 40 - 150 U/L Final     ALT   Date Value Ref Range Status   03/11/2022 24 0 - 50 U/L Final   04/30/2021 27 0 - 50 U/L Final     AST   Date Value Ref Range Status   02/28/2022 25 0 - 45 U/L Final   04/30/2021 27 0 - 45 U/L Final     Recent Labs   Lab Test 03/11/22  1505 05/26/21  1129   CHOL 132 135   HDL 81 64   LDL 24 49   TRIG 136 112      Lab Results   Component Value Date    A1C 5.4 10/13/2022

## 2023-02-09 NOTE — LETTER
2/9/2023    Diana Hilario MD  [address] [facility] [address]    RE: Jeannie Chu       Dear Colleague,     I had the pleasure of seeing Jeannie Chu in the [facility].      Cardiology Clinic Progress Note:    February 9, 2023   Patient Name: Jeannie Chu  Patient MRN: [id number]     Consult indication: HTN, atrial fibrillation     HPI:    I had the opportunity to see patient Jeannie Chu in cardiology clinic for a follow up visit. Patient is followed by our colleague Diana Hilario MD with Primary Care.     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     I had initially met patient in hospital for a consultation on 7/25/2021.  Patient had been experiencing fatigue and weakness, a blood pressure check at home demonstrated an elevated heart rate in the 120 bpm range.  Patient was brought to the ED, and was found to be in atrial fibrillation with RVR.  Labs at that time were notable for normal troponin.  TTE 7/25/2021 demonstrated normal biventricular function, no significant valvular abnormalities.      Following this, we have been managing her atrial fibrillation with a rate control strategy initially, however she presented to the ED with recurrent atrial fibrillation with RVR, ultimately was started on amiodarone.  Since then, she has had issues with bradycardia, dizziness/lightheadedness, Holter monitor demonstrated high-grade AV block.  She was then seen by my colleague Dr. Whitt with cardiology EP, patient underwent elective AV node ablation and pacemaker placement 10/31/2022, however  on device interrogation, she was found to have AV node function recovery, and so underwent redo AV node ablation 11/1/2022.     Since then, patient reports that she has been doing well.    Unfortunately, she still has discomfort in her pacemaker pocket site, around lateral aspect that is a little bit more superficial than the medial side.  Pain is present when she presses in on this area, no pain at rest.  No fever, chills, swelling, erythema, warmth over the site.    Blood pressures have been somewhat variable, we did restart amlodipine 5 mg daily.  She has been taking metoprolol at night.  Blood pressure today in clinic is 135 over 74 mmHg, and she brings with her a log of blood pressures at home, which have improved with initiation of amlodipine.  Blood pressures are still somewhat elevated in the 140s mmHg range systolic, though these readings are done in the evening after her p.m. educations.  She denies any chest pain, chest pressure, dizziness/lightheadedness.  Device interrogation from 1/5/2023 demonstrates normal device function, battery status 9 years and 3 months.    Assessment and Plan/Recommendations:    # Persistent atrial fibrillation complicated by tachybradycardia syndrome, now status post AV node ablation and pacemaker (11/1/2022), [id number], on rivaroxaban.    # Mild discomfort at pacemaker site, does not appear infected, no overlying hematoma, minimally tender to palpation over lateral aspect which is slightly more superficial than medial aspect.  No systemic signs suggestive of infection.  # Hypothyroidism.  On levothyroxine.  # HTN.   # HL, on statin  # CAD s/p PCI (remote history, proximal LAD 20 years ago).  Patient denies symptoms concerning for angina.  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, TTE 3/2022    -We will check a chest x-ray of the pacemaker device site given persistence of discomfort, though physical exam findings are reassuring  - Advised patient to continue monitoring blood pressures, to  check blood pressures in the morning as well as the evening, will maintain current regimen of amlodipine 5 mg in the morning, metoprolol succinate 37.5 mg nightly, losartan 50 mg twice daily, rivaroxaban, atorvastatin  - If blood pressures are elevated in the morning, will increase amlodipine to 7.5 mg daily  - Follow-up in about 2 months, or sooner as needed    Thank you for allowing our team to participate in the care of Jeannie Chu.  Please do not hesitate to call or page me with any questions or concerns.    Sincerely,     Germán Shin MD, [facility]  Cardiology  Text Page   February 9, 2023    Voice recognition software utilized.     Total time spent on this encounter: 40 minutes, providing care in this encounter including, but not limited to, reviewing prior medical records, laboratory data, imaging studies, diagnostic studies, procedure notes, formulating an assessment and plan, recommendations, discussion and counseling with patient face to face, dictation.    Past Medical History:     Past Medical History:   Diagnosis Date     Acquired hypothyroidism 5/27/2021     Coronary artery disease involving native coronary artery of native heart without angina pectoris 5/26/2021     Other and unspecified hyperlipidemia      Unspecified cardiovascular disease 1999    stent placed        Past Surgical History:   Past Surgical History:   Procedure Laterality Date     EP ABLATION AV NODE N/A 10/31/2022    Procedure: Ablation Atrioventricular Node;  Surgeon: Rogelio Whitt MD;  Location: [facility] CARDIAC CATH LAB     EP ABLATION AV NODE N/A 11/1/2022    Procedure: EP Ablation AV Node;  Surgeon: Jd Baker MD;  Location: [facility] CARDIAC CATH LAB     EP PACEMAKER DEVICE & LEAD IMPLANT- RIGHT VENTRICULAR N/A 10/31/2022    Procedure: Pacemaker Device & Lead Implant- Right ventricular;  Surgeon: Rogelio Whitt MD;  Location: [facility] CARDIAC CATH LAB       Medications (outpatient):  Current  "Outpatient Medications   Medication Sig Dispense Refill     amLODIPine (NORVASC) 5 MG tablet Take 1 tablet (5 mg) by mouth daily 90 tablet 0     atorvastatin (LIPITOR) 10 MG tablet Take 1 tablet (10 mg) by mouth daily 90 tablet 3     IRON (FERROUS GLUCONATE) 325 MG OR TABS Take 1 tablet by mouth daily  30 0     levothyroxine (SYNTHROID/LEVOTHROID) 75 MCG tablet Take 1 tablet by mouth once daily 30 tablet 0     losartan (COZAAR) 50 MG tablet Take 1 tablet (50 mg) by mouth 2 times daily 180 tablet 2     metoprolol succinate ER (TOPROL XL) 25 MG 24 hr tablet Take 1.5 tablets (37.5 mg) by mouth daily 135 tablet 1     Multiple Vitamins-Minerals (ICAPS AREDS FORMULA PO) Take 2 tablets by mouth daily        niacin 500 MG tablet Take by mouth daily (with breakfast)       nitroGLYcerin (NITROSTAT) 0.4 MG sublingual tablet For chest pain place 1 tablet under the tongue every 5 minutes for 3 doses. If symptoms persist 5 minutes after 1st dose call 911. 30 tablet 0     ondansetron (ZOFRAN-ODT) 4 MG ODT tab Take 1 tablet (4 mg) by mouth every 8 hours as needed for nausea or vomiting 15 tablet 0     rivaroxaban ANTICOAGULANT (XARELTO) 20 MG TABS tablet Take 1 tablet (20 mg) by mouth daily (with dinner) 90 tablet 3       Allergies:  Allergies   Allergen Reactions     Ibuprofen        Social History:   History   Drug Use No      History   Smoking Status     Never   Smokeless Tobacco     Never     Social History    Substance and Sexual Activity      Alcohol use: No       Family History:  Family History   Problem Relation Age of Onset     Family History Negative Other        Review of Systems:   A complete review of systems was negative except as mentioned in the History of Present Illness.     Objective & Physical Exam:  /74   Pulse 74   Ht 1.549 m (5' 1\")   Wt 69.4 kg (153 lb)   LMP  (LMP Unknown)   BMI 28.91 kg/m    Wt Readings from Last 2 Encounters:   02/09/23 69.4 kg (153 lb)   01/17/23 68 kg (150 lb)     Body mass " index is 28.91 kg/m .   Body surface area is 1.73 meters squared.    Constitutional: appears stated age, in no apparent distress, appears to be well nourished  Head: normocephalic, atraumatic  Neck: supple, trachea midline, no bruit bilaterally   Pulmonary: clear to auscultation bilaterally, no wheezes, no rales, no increased work of breathing  Cardiovascular: JVP normal, regular rate, regular rhythm, normal S1 and S2, no S3, S4, no murmur appreciated, no lower extremity edema, (+)PPM Left upper chest wall, mild tenderness to palpation over lateral aspect which is slightly more superficial the medial aspect, no erythema, swelling, warmth over the area  Gastrointestinal: no guarding, non-rigid   Neurologic: awake, alert, moves all extremities  Skin: no jaundice, warm on limited exam  Psychiatric: affect is normal, answers questions appropriately, oriented to self and place    Data reviewed:  Lab Results   Component Value Date    WBC 9.0 11/01/2022    WBC 10.0 05/26/2021    RBC 3.99 11/01/2022    RBC 4.30 05/26/2021    HGB 12.1 11/01/2022    HGB 12.9 05/26/2021    HCT 37.3 11/01/2022    HCT 40.3 05/26/2021    MCV 94 11/01/2022    MCV 94 05/26/2021    MCH 30.3 11/01/2022    MCH 30.0 05/26/2021    MCHC 32.4 11/01/2022    MCHC 32.0 05/26/2021    RDW 14.3 11/01/2022    RDW 13.7 05/26/2021     11/01/2022     05/26/2021     Sodium   Date Value Ref Range Status   11/01/2022 132 (L) 133 - 144 mmol/L Final   04/30/2021 135 133 - 144 mmol/L Final     Potassium   Date Value Ref Range Status   11/01/2022 3.7 3.4 - 5.3 mmol/L Final   04/30/2021 4.4 3.4 - 5.3 mmol/L Final     Chloride   Date Value Ref Range Status   11/01/2022 100 94 - 109 mmol/L Final   04/30/2021 102 94 - 109 mmol/L Final     Carbon Dioxide   Date Value Ref Range Status   04/30/2021 30 20 - 32 mmol/L Final     Carbon Dioxide (CO2)   Date Value Ref Range Status   11/01/2022 28 20 - 32 mmol/L Final     Anion Gap   Date Value Ref Range Status    11/01/2022 4 3 - 14 mmol/L Final   04/30/2021 3 3 - 14 mmol/L Final     Glucose   Date Value Ref Range Status   11/01/2022 82 70 - 99 mg/dL Final   04/30/2021 89 70 - 99 mg/dL Final     Urea Nitrogen   Date Value Ref Range Status   11/01/2022 16 7 - 30 mg/dL Final   04/30/2021 18 7 - 30 mg/dL Final     Creatinine   Date Value Ref Range Status   11/01/2022 0.82 0.52 - 1.04 mg/dL Final   04/30/2021 0.77 0.52 - 1.04 mg/dL Final     GFR Estimate   Date Value Ref Range Status   11/01/2022 69 >60 mL/min/1.73m2 Final     Comment:     Effective December 21, 2021 eGFRcr in adults is calculated using the 2021 CKD-EPI creatinine equation which includes age and gender (Donavon et al., NEJ, DOI: 10.1056/AEWXnq7691240)   04/30/2021 70 >60 mL/min/[1.73_m2] Final     Comment:     Non  GFR Calc  Starting 12/18/2018, serum creatinine based estimated GFR (eGFR) will be   calculated using the Chronic Kidney Disease Epidemiology Collaboration   (CKD-EPI) equation.       GFR, ESTIMATED POCT   Date Value Ref Range Status   07/24/2021 >60 >60 mL/min/1.73m2 Final     Calcium   Date Value Ref Range Status   11/01/2022 9.0 8.5 - 10.1 mg/dL Final   04/30/2021 9.3 8.5 - 10.1 mg/dL Final     Bilirubin Total   Date Value Ref Range Status   02/28/2022 0.9 0.2 - 1.3 mg/dL Final   04/30/2021 1.1 0.2 - 1.3 mg/dL Final     Alkaline Phosphatase   Date Value Ref Range Status   02/28/2022 57 40 - 150 U/L Final   04/30/2021 70 40 - 150 U/L Final     ALT   Date Value Ref Range Status   03/11/2022 24 0 - 50 U/L Final   04/30/2021 27 0 - 50 U/L Final     AST   Date Value Ref Range Status   02/28/2022 25 0 - 45 U/L Final   04/30/2021 27 0 - 45 U/L Final     Recent Labs   Lab Test 03/11/22  1505 05/26/21  1129   CHOL 132 135   HDL 81 64   LDL 24 49   TRIG 136 112      Lab Results   Component Value Date    A1C 5.4 10/13/2022          Thank you for allowing me to participate in the care of your patient.      Sincerely,     Germán Shin MD     M  [facility]  cc:   Germán Shin MD  [address], [place]  [place] [address]

## 2023-02-20 ENCOUNTER — HOSPITAL ENCOUNTER (OUTPATIENT)
Dept: GENERAL RADIOLOGY | Facility: CLINIC | Age: 88
Discharge: HOME | End: 2023-02-20
Attending: INTERNAL MEDICINE | Admitting: INTERNAL MEDICINE
Payer: COMMERCIAL

## 2023-02-20 DIAGNOSIS — R52 PAIN IN PACEMAKER POCKET: ICD-10-CM

## 2023-02-20 PROCEDURE — 71046 X-RAY EXAM CHEST 2 VIEWS: CPT

## 2023-02-21 NOTE — RESULT ENCOUNTER NOTE
Results reviewed, please let the patient know that overall findings are reassuring, pacemaker position is unchanged. Follow up as previously planned, thanks!

## 2023-02-24 DIAGNOSIS — E03.9 ACQUIRED HYPOTHYROIDISM: ICD-10-CM

## 2023-02-24 NOTE — TELEPHONE ENCOUNTER
Patient is all out of her levothyroxine. She will do her lab work 3/2/23. Can she miss her medication until then?

## 2023-02-25 ENCOUNTER — MYC MEDICAL ADVICE (OUTPATIENT)
Dept: INTERNAL MEDICINE | Facility: CLINIC | Age: 88
End: 2023-02-25
Payer: COMMERCIAL

## 2023-02-25 RX ORDER — LEVOTHYROXINE SODIUM 75 UG/1
75 TABLET ORAL DAILY
Qty: 30 TABLET | Refills: 0 | Status: SHIPPED | OUTPATIENT
Start: 2023-02-25 | End: 2023-03-05 | Stop reason: DRUGHIGH

## 2023-02-27 RX ORDER — LEVOTHYROXINE SODIUM 75 UG/1
TABLET ORAL
Qty: 30 TABLET | Refills: 0 | OUTPATIENT
Start: 2023-02-27

## 2023-03-01 ENCOUNTER — TELEPHONE (OUTPATIENT)
Dept: CARDIOLOGY | Facility: CLINIC | Age: 88
End: 2023-03-01
Payer: COMMERCIAL

## 2023-03-01 NOTE — TELEPHONE ENCOUNTER
[facility] Call [facility]    Phone Message    May a detailed message be left on voicemail: yes     Reason for Call: Other: BP Readings    Yesterday 128/69  2/27 126/59  2/26 126/71  2/25 125/68  2/24 140/69  2/23 144/75  The daughter Marya called with BP readings and wondered if medication needs to be adjusted or if the Diastolic number is ok?     Action Taken: Other: Cardiology    Travel Screening: Not Applicable     Thank you!  [facility]

## 2023-03-01 NOTE — TELEPHONE ENCOUNTER
I can see this pt  on 04/03/23 at 2 pm, check in time is 1:40 pm , please check with the patient, I have also sent [employer] message but patient has not read it.

## 2023-03-02 ENCOUNTER — LAB (OUTPATIENT)
Dept: LAB | Facility: CLINIC | Age: 88
End: 2023-03-02
Payer: COMMERCIAL

## 2023-03-02 DIAGNOSIS — I10 ESSENTIAL HYPERTENSION: ICD-10-CM

## 2023-03-02 DIAGNOSIS — E03.9 ACQUIRED HYPOTHYROIDISM: ICD-10-CM

## 2023-03-02 LAB
SODIUM SERPL-SCNC: 134 MMOL/L (ref 136–145)
T4 FREE SERPL-MCNC: 1.37 NG/DL (ref 0.9–1.7)
TSH SERPL DL<=0.005 MIU/L-ACNC: 13.5 UIU/ML (ref 0.3–4.2)

## 2023-03-02 PROCEDURE — 84443 ASSAY THYROID STIM HORMONE: CPT

## 2023-03-02 PROCEDURE — 36415 COLL VENOUS BLD VENIPUNCTURE: CPT

## 2023-03-02 PROCEDURE — 84439 ASSAY OF FREE THYROXINE: CPT

## 2023-03-02 PROCEDURE — 84295 ASSAY OF SERUM SODIUM: CPT

## 2023-03-02 PROCEDURE — 80061 LIPID PANEL: CPT | Performed by: INTERNAL MEDICINE

## 2023-03-03 DIAGNOSIS — E78.5 HYPERLIPIDEMIA LDL GOAL <100: Primary | ICD-10-CM

## 2023-03-03 LAB
CHOLEST SERPL-MCNC: 147 MG/DL
HDLC SERPL-MCNC: 73 MG/DL
LDLC SERPL CALC-MCNC: 60 MG/DL
NONHDLC SERPL-MCNC: 74 MG/DL
TRIGL SERPL-MCNC: 71 MG/DL

## 2023-03-05 DIAGNOSIS — E03.9 ACQUIRED HYPOTHYROIDISM: Primary | ICD-10-CM

## 2023-03-05 RX ORDER — LEVOTHYROXINE SODIUM 88 UG/1
88 TABLET ORAL DAILY
Qty: 90 TABLET | Refills: 0 | Status: ON HOLD | OUTPATIENT
Start: 2023-03-05 | End: 2023-06-21

## 2023-03-09 NOTE — TELEPHONE ENCOUNTER
Call placed to pt's daughter Marya to review that BP readings are WNL and improved from prior. BP range acceptable. No changes recommended.   GEORGE Tyler RN, BSN.

## 2023-03-30 ENCOUNTER — TELEPHONE (OUTPATIENT)
Dept: ORTHOPEDICS | Facility: CLINIC | Age: 88
End: 2023-03-30
Payer: COMMERCIAL

## 2023-03-30 DIAGNOSIS — M54.17 LUMBOSACRAL RADICULOPATHY: ICD-10-CM

## 2023-03-30 DIAGNOSIS — M51.379 DDD (DEGENERATIVE DISC DISEASE), LUMBOSACRAL: ICD-10-CM

## 2023-03-30 DIAGNOSIS — M47.817 FACET ARTHROPATHY, LUMBOSACRAL: Primary | ICD-10-CM

## 2023-03-31 ENCOUNTER — ANCILLARY PROCEDURE (OUTPATIENT)
Dept: CARDIOLOGY | Facility: CLINIC | Age: 88
End: 2023-03-31
Attending: INTERNAL MEDICINE
Payer: COMMERCIAL

## 2023-03-31 VITALS
BODY MASS INDEX: 29.36 KG/M2 | OXYGEN SATURATION: 100 % | DIASTOLIC BLOOD PRESSURE: 82 MMHG | SYSTOLIC BLOOD PRESSURE: 151 MMHG | WEIGHT: 155.5 LBS | HEIGHT: 61 IN | HEART RATE: 62 BPM

## 2023-03-31 DIAGNOSIS — Z95.0 CARDIAC PACEMAKER IN SITU: ICD-10-CM

## 2023-03-31 DIAGNOSIS — I34.0 NONRHEUMATIC MITRAL VALVE REGURGITATION: Primary | ICD-10-CM

## 2023-03-31 DIAGNOSIS — I49.5 SICK SINUS SYNDROME (H): ICD-10-CM

## 2023-03-31 DIAGNOSIS — I10 ESSENTIAL HYPERTENSION: ICD-10-CM

## 2023-03-31 PROCEDURE — 99214 OFFICE O/P EST MOD 30 MIN: CPT | Mod: 25 | Performed by: INTERNAL MEDICINE

## 2023-03-31 PROCEDURE — 93280 PM DEVICE PROGR EVAL DUAL: CPT | Performed by: INTERNAL MEDICINE

## 2023-03-31 RX ORDER — LOSARTAN POTASSIUM 50 MG/1
50 TABLET ORAL 2 TIMES DAILY
Qty: 180 TABLET | Refills: 3 | Status: ON HOLD | OUTPATIENT
Start: 2023-03-31 | End: 2023-06-21

## 2023-03-31 RX ORDER — CARVEDILOL 12.5 MG/1
12.5 TABLET ORAL 2 TIMES DAILY WITH MEALS
Qty: 180 TABLET | Refills: 3 | Status: ON HOLD | OUTPATIENT
Start: 2023-03-31 | End: 2023-06-21

## 2023-03-31 RX ORDER — AMLODIPINE BESYLATE 5 MG/1
TABLET ORAL
Qty: 135 TABLET | Refills: 3 | Status: SHIPPED | OUTPATIENT
Start: 2023-03-31 | End: 2023-03-31

## 2023-03-31 NOTE — LETTER
3/31/2023    Diana Hilario MD  [address] [facility] [address]    RE: Jeannie Chu       Dear Colleague,     I had the pleasure of seeing Jeannie Chu in the [facility].      Cardiology Clinic Progress Note:    March 31, 2023   Patient Name: Jeannie Chu  Patient MRN: [id number]     Consult indication: HTN    HPI:    I had the opportunity to see patient Jeannie Chu in cardiology clinic for a follow up visit. Patient is followed by our colleague Diana Hilario MD with Primary Care.     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    I have been following patient closely in clinic over the past couple of years, primarily for hypertension.  Patient also has a history of atrial fibrillation complicated by tacky bradycardia syndrome, for which she ultimately underwent AV node ablation with pacemaker placement.  She did require redo AV node ablation 11/1/2022.    Blood pressures have been somewhat labile, and she has had some medication side effects.  She is accompanied today by her daughter and son, her daughter has accompanied her for almost all of her visits.  Blood pressure today in clinic is elevated at 151/82 mmHg, blood pressure log from home does note some blood pressure readings as high as 160 mmHg systolic.  She denies any chest pain, chest pressure, abnormal shortness of breath.  She has noticed lower extremity edema since starting amlodipine.  Previously had been on hydrochlorothiazide, however had issues with hyponatremia.  Patient also has had some discomfort at the site of the  pacemaker, a chest x-ray was done which showed stable device placement.    Primary concern now is related to back pain, unfortunately due to the pacemaker, there have been issues with scheduling this.    Assessment and Plan/Recommendations:    # HTN.  BP elevated.  Did not tolerate amlodipine due to lower extremity swelling, HCTZ not tolerated due to hyponatremia.  # Persistent atrial fibrillation complicated by tachybradycardia syndrome, now status post AV node ablation and pacemaker (11/1/2022), [id number], on rivaroxaban.    # Mild discomfort at pacemaker site, does not appear infected, no overlying hematoma, minimally tender to palpation over lateral aspect which is slightly more superficial than medial aspect.  No systemic signs suggestive of infection.  # Hypothyroidism.  On levothyroxine.  # HL, on statin  # CAD s/p PCI (remote history, proximal LAD 20 years ago).  Patient denies symptoms concerning for angina.  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, TTE 3/2022    - Discussed that in general, given advanced age, weighing risks versus benefits, would tolerate higher blood pressures versus lower blood pressures, though blood pressures with systolics in the 160s mmHg range are too elevated  - Will stop amlodipine  - Change metoprolol succinate to carvedilol, will start at 12.5 mg twice daily, advised to monitor blood pressures at home, may need to increase to 18.75 mg twice daily  - Continue current regimen otherwise including losartan 50 mg twice daily, atorvastatin, rivaroxaban  - TTE  - Follow-up in about 1 month, patient is planning to go back to [place] soon    Thank you for allowing our team to participate in the care of Jeannie Chu.  Please do not hesitate to call or page me with any questions or concerns.    Sincerely,     Germán Shin MD, [facility]  Cardiology  Text Page   March 31, 2023    Voice recognition software utilized.     Total time spent on this encounter: 35 minutes, providing care in  this encounter including, but not limited to, reviewing prior medical records, laboratory data, imaging studies, diagnostic studies, procedure notes, formulating an assessment and plan, recommendations, discussion and counseling with patient face to face, dictation.    Past Medical History:     Past Medical History:   Diagnosis Date    Acquired hypothyroidism 5/27/2021    Coronary artery disease involving native coronary artery of native heart without angina pectoris 5/26/2021    Other and unspecified hyperlipidemia     Unspecified cardiovascular disease 1999    stent placed        Past Surgical History:   Past Surgical History:   Procedure Laterality Date    EP ABLATION AV NODE N/A 10/31/2022    Procedure: Ablation Atrioventricular Node;  Surgeon: Rogelio Whitt MD;  Location:  HEART CARDIAC CATH LAB    EP ABLATION AV NODE N/A 11/1/2022    Procedure: EP Ablation AV Node;  Surgeon: Jd Baker MD;  Location:  HEART CARDIAC CATH LAB    EP PACEMAKER DEVICE & LEAD IMPLANT- RIGHT VENTRICULAR N/A 10/31/2022    Procedure: Pacemaker Device & Lead Implant- Right ventricular;  Surgeon: Rogelio Whitt MD;  Location:  HEART CARDIAC CATH LAB       Medications (outpatient):  Current Outpatient Medications   Medication Sig Dispense Refill    amLODIPine (NORVASC) 5 MG tablet Take 1 tablet (5 mg) by mouth daily 90 tablet 0    atorvastatin (LIPITOR) 10 MG tablet Take 1 tablet (10 mg) by mouth daily 90 tablet 3    IRON (FERROUS GLUCONATE) 325 MG OR TABS Take 1 tablet by mouth daily  30 0    levothyroxine (SYNTHROID/LEVOTHROID) 88 MCG tablet Take 1 tablet (88 mcg) by mouth daily 90 tablet 0    losartan (COZAAR) 50 MG tablet Take 1 tablet (50 mg) by mouth 2 times daily 180 tablet 2    metoprolol succinate ER (TOPROL XL) 25 MG 24 hr tablet Take 1.5 tablets (37.5 mg) by mouth daily 135 tablet 1    Multiple Vitamins-Minerals (ICAPS AREDS FORMULA PO) Take 2 tablets by mouth daily       niacin 500 MG tablet Take by mouth  "daily (with breakfast)      nitroGLYcerin (NITROSTAT) 0.4 MG sublingual tablet For chest pain place 1 tablet under the tongue every 5 minutes for 3 doses. If symptoms persist 5 minutes after 1st dose call 911. 30 tablet 0    ondansetron (ZOFRAN-ODT) 4 MG ODT tab Take 1 tablet (4 mg) by mouth every 8 hours as needed for nausea or vomiting 15 tablet 0    rivaroxaban ANTICOAGULANT (XARELTO) 20 MG TABS tablet Take 1 tablet (20 mg) by mouth daily (with dinner) 90 tablet 3       Allergies:  Allergies   Allergen Reactions    Ibuprofen        Social History:   History   Drug Use No      History   Smoking Status    Never   Smokeless Tobacco    Never     Social History    Substance and Sexual Activity      Alcohol use: No       Family History:  Family History   Problem Relation Age of Onset    Family History Negative Other        Review of Systems:   A complete review of systems was negative except as mentioned in the History of Present Illness.     Objective & Physical Exam:  BP (!) 151/82 (BP Location: Left arm, Patient Position: Sitting)   Pulse 62   Ht 1.549 m (5' 1\")   Wt 70.5 kg (155 lb 8 oz)   LMP  (LMP Unknown)   SpO2 100%   BMI 29.38 kg/m    Wt Readings from Last 2 Encounters:   03/31/23 70.5 kg (155 lb 8 oz)   02/09/23 69.4 kg (153 lb)     Body mass index is 29.38 kg/m .   Body surface area is 1.74 meters squared.    Constitutional: appears stated age, in no apparent distress, appears to be well nourished  Head: normocephalic, atraumatic  Neck: supple, trachea midline  Pulmonary: clear to auscultation bilaterally, no wheezes, no rales, no increased work of breathing  Cardiovascular: JVP normal, regular rate, regular rhythm, normal S1 and S2, no S3, S4, no murmur appreciated, no 1+ BLE edema  Gastrointestinal: no guarding, non-rigid   Neurologic: awake, alert, moves all extremities  Skin: no jaundice, warm on limited exam  Psychiatric: affect is normal, answers questions appropriately, oriented to self and " place    Data reviewed:  Lab Results   Component Value Date    WBC 9.0 11/01/2022    WBC 10.0 05/26/2021    RBC 3.99 11/01/2022    RBC 4.30 05/26/2021    HGB 12.1 11/01/2022    HGB 12.9 05/26/2021    HCT 37.3 11/01/2022    HCT 40.3 05/26/2021    MCV 94 11/01/2022    MCV 94 05/26/2021    MCH 30.3 11/01/2022    MCH 30.0 05/26/2021    MCHC 32.4 11/01/2022    MCHC 32.0 05/26/2021    RDW 14.3 11/01/2022    RDW 13.7 05/26/2021     11/01/2022     05/26/2021     Sodium   Date Value Ref Range Status   03/02/2023 134 (L) 136 - 145 mmol/L Final   04/30/2021 135 133 - 144 mmol/L Final     Potassium   Date Value Ref Range Status   11/01/2022 3.7 3.4 - 5.3 mmol/L Final   04/30/2021 4.4 3.4 - 5.3 mmol/L Final     Chloride   Date Value Ref Range Status   11/01/2022 100 94 - 109 mmol/L Final   04/30/2021 102 94 - 109 mmol/L Final     Carbon Dioxide   Date Value Ref Range Status   04/30/2021 30 20 - 32 mmol/L Final     Carbon Dioxide (CO2)   Date Value Ref Range Status   11/01/2022 28 20 - 32 mmol/L Final     Anion Gap   Date Value Ref Range Status   11/01/2022 4 3 - 14 mmol/L Final   04/30/2021 3 3 - 14 mmol/L Final     Glucose   Date Value Ref Range Status   11/01/2022 82 70 - 99 mg/dL Final   04/30/2021 89 70 - 99 mg/dL Final     Urea Nitrogen   Date Value Ref Range Status   11/01/2022 16 7 - 30 mg/dL Final   04/30/2021 18 7 - 30 mg/dL Final     Creatinine   Date Value Ref Range Status   11/01/2022 0.82 0.52 - 1.04 mg/dL Final   04/30/2021 0.77 0.52 - 1.04 mg/dL Final     GFR Estimate   Date Value Ref Range Status   11/01/2022 69 >60 mL/min/1.73m2 Final     Comment:     Effective December 21, 2021 eGFRcr in adults is calculated using the 2021 CKD-EPI creatinine equation which includes age and gender (Donavon heller al., NEJM, DOI: 10.1056/GJPGkp7102265)   04/30/2021 70 >60 mL/min/[1.73_m2] Final     Comment:     Non  GFR Calc  Starting 12/18/2018, serum creatinine based estimated GFR (eGFR) will be    calculated using the Chronic Kidney Disease Epidemiology Collaboration   (CKD-EPI) equation.       GFR, ESTIMATED POCT   Date Value Ref Range Status   07/24/2021 >60 >60 mL/min/1.73m2 Final     Calcium   Date Value Ref Range Status   11/01/2022 9.0 8.5 - 10.1 mg/dL Final   04/30/2021 9.3 8.5 - 10.1 mg/dL Final     Bilirubin Total   Date Value Ref Range Status   02/28/2022 0.9 0.2 - 1.3 mg/dL Final   04/30/2021 1.1 0.2 - 1.3 mg/dL Final     Alkaline Phosphatase   Date Value Ref Range Status   02/28/2022 57 40 - 150 U/L Final   04/30/2021 70 40 - 150 U/L Final     ALT   Date Value Ref Range Status   03/11/2022 24 0 - 50 U/L Final   04/30/2021 27 0 - 50 U/L Final     AST   Date Value Ref Range Status   02/28/2022 25 0 - 45 U/L Final   04/30/2021 27 0 - 45 U/L Final     Recent Labs   Lab Test 03/02/23  0723 03/11/22  1505   CHOL 147 132   HDL 73 81   LDL 60 24   TRIG 71 136      Lab Results   Component Value Date    A1C 5.4 10/13/2022          Thank you for allowing me to participate in the care of your patient.      Sincerely,     Germán Shin MD     [facility]  cc:   Germán Shin MD  [address], [address]  [place] [address]

## 2023-03-31 NOTE — PROGRESS NOTES
Cardiology Clinic Progress Note:    March 31, 2023   Patient Name: Jeannie Chu  Patient MRN: [id number]     Consult indication: HTN    HPI:    I had the opportunity to see patient Jeannie Chu in cardiology clinic for a follow up visit. Patient is followed by our colleague Diana Hilario MD with Primary Care.     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    I have been following patient closely in clinic over the past couple of years, primarily for hypertension.  Patient also has a history of atrial fibrillation complicated by tacky bradycardia syndrome, for which she ultimately underwent AV node ablation with pacemaker placement.  She did require redo AV node ablation 11/1/2022.    Blood pressures have been somewhat labile, and she has had some medication side effects.  She is accompanied today by her daughter and son, her daughter has accompanied her for almost all of her visits.  Blood pressure today in clinic is elevated at 151/82 mmHg, blood pressure log from home does note some blood pressure readings as high as 160 mmHg systolic.  She denies any chest pain, chest pressure, abnormal shortness of breath.  She has noticed lower extremity edema since starting amlodipine.  Previously had been on hydrochlorothiazide, however had issues with hyponatremia.  Patient also has had some discomfort at the site of the pacemaker, a chest x-ray was done which showed stable device placement.    Primary concern now is related to back pain, unfortunately due to the pacemaker, there have been issues with scheduling this.    Assessment and  Plan/Recommendations:    # HTN.  BP elevated.  Did not tolerate amlodipine due to lower extremity swelling, HCTZ not tolerated due to hyponatremia.  # Persistent atrial fibrillation complicated by tachybradycardia syndrome, now status post AV node ablation and pacemaker (11/1/2022), [id number], on rivaroxaban.    # Mild discomfort at pacemaker site, does not appear infected, no overlying hematoma, minimally tender to palpation over lateral aspect which is slightly more superficial than medial aspect.  No systemic signs suggestive of infection.  # Hypothyroidism.  On levothyroxine.  # HL, on statin  # CAD s/p PCI (remote history, proximal LAD 20 years ago).  Patient denies symptoms concerning for angina.  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, TTE 3/2022    - Discussed that in general, given advanced age, weighing risks versus benefits, would tolerate higher blood pressures versus lower blood pressures, though blood pressures with systolics in the 160s mmHg range are too elevated  - Will stop amlodipine  - Change metoprolol succinate to carvedilol, will start at 12.5 mg twice daily, advised to monitor blood pressures at home, may need to increase to 18.75 mg twice daily  - Continue current regimen otherwise including losartan 50 mg twice daily, atorvastatin, rivaroxaban  - TTE  - Follow-up in about 1 month, patient is planning to go back to [place] soon    Thank you for allowing our team to participate in the care of Jeannie Chu.  Please do not hesitate to call or page me with any questions or concerns.    Sincerely,     Germán Shin MD, [facility]  Cardiology  Text Page   March 31, 2023    Voice recognition software utilized.     Total time spent on this encounter: 35 minutes, providing care in this encounter including, but not limited to, reviewing prior medical records, laboratory data, imaging studies, diagnostic studies, procedure notes, formulating an assessment and plan, recommendations, discussion and  counseling with patient face to face, dictation.    Past Medical History:     Past Medical History:   Diagnosis Date     Acquired hypothyroidism 5/27/2021     Coronary artery disease involving native coronary artery of native heart without angina pectoris 5/26/2021     Other and unspecified hyperlipidemia      Unspecified cardiovascular disease 1999    stent placed        Past Surgical History:   Past Surgical History:   Procedure Laterality Date     EP ABLATION AV NODE N/A 10/31/2022    Procedure: Ablation Atrioventricular Node;  Surgeon: Rogelio Whitt MD;  Location:  HEART CARDIAC CATH LAB     EP ABLATION AV NODE N/A 11/1/2022    Procedure: EP Ablation AV Node;  Surgeon: Jd Baker MD;  Location:  HEART CARDIAC CATH LAB     EP PACEMAKER DEVICE & LEAD IMPLANT- RIGHT VENTRICULAR N/A 10/31/2022    Procedure: Pacemaker Device & Lead Implant- Right ventricular;  Surgeon: Rogelio Whitt MD;  Location:  HEART CARDIAC CATH LAB       Medications (outpatient):  Current Outpatient Medications   Medication Sig Dispense Refill     amLODIPine (NORVASC) 5 MG tablet Take 1 tablet (5 mg) by mouth daily 90 tablet 0     atorvastatin (LIPITOR) 10 MG tablet Take 1 tablet (10 mg) by mouth daily 90 tablet 3     IRON (FERROUS GLUCONATE) 325 MG OR TABS Take 1 tablet by mouth daily  30 0     levothyroxine (SYNTHROID/LEVOTHROID) 88 MCG tablet Take 1 tablet (88 mcg) by mouth daily 90 tablet 0     losartan (COZAAR) 50 MG tablet Take 1 tablet (50 mg) by mouth 2 times daily 180 tablet 2     metoprolol succinate ER (TOPROL XL) 25 MG 24 hr tablet Take 1.5 tablets (37.5 mg) by mouth daily 135 tablet 1     Multiple Vitamins-Minerals (ICAPS AREDS FORMULA PO) Take 2 tablets by mouth daily        niacin 500 MG tablet Take by mouth daily (with breakfast)       nitroGLYcerin (NITROSTAT) 0.4 MG sublingual tablet For chest pain place 1 tablet under the tongue every 5 minutes for 3 doses. If symptoms persist 5 minutes after 1st dose  "call 911. 30 tablet 0     ondansetron (ZOFRAN-ODT) 4 MG ODT tab Take 1 tablet (4 mg) by mouth every 8 hours as needed for nausea or vomiting 15 tablet 0     rivaroxaban ANTICOAGULANT (XARELTO) 20 MG TABS tablet Take 1 tablet (20 mg) by mouth daily (with dinner) 90 tablet 3       Allergies:  Allergies   Allergen Reactions     Ibuprofen        Social History:   History   Drug Use No      History   Smoking Status     Never   Smokeless Tobacco     Never     Social History    Substance and Sexual Activity      Alcohol use: No       Family History:  Family History   Problem Relation Age of Onset     Family History Negative Other        Review of Systems:   A complete review of systems was negative except as mentioned in the History of Present Illness.     Objective & Physical Exam:  BP (!) 151/82 (BP Location: Left arm, Patient Position: Sitting)   Pulse 62   Ht 1.549 m (5' 1\")   Wt 70.5 kg (155 lb 8 oz)   LMP  (LMP Unknown)   SpO2 100%   BMI 29.38 kg/m    Wt Readings from Last 2 Encounters:   03/31/23 70.5 kg (155 lb 8 oz)   02/09/23 69.4 kg (153 lb)     Body mass index is 29.38 kg/m .   Body surface area is 1.74 meters squared.    Constitutional: appears stated age, in no apparent distress, appears to be well nourished  Head: normocephalic, atraumatic  Neck: supple, trachea midline  Pulmonary: clear to auscultation bilaterally, no wheezes, no rales, no increased work of breathing  Cardiovascular: JVP normal, regular rate, regular rhythm, normal S1 and S2, no S3, S4, no murmur appreciated, no 1+ BLE edema  Gastrointestinal: no guarding, non-rigid   Neurologic: awake, alert, moves all extremities  Skin: no jaundice, warm on limited exam  Psychiatric: affect is normal, answers questions appropriately, oriented to self and place    Data reviewed:  Lab Results   Component Value Date    WBC 9.0 11/01/2022    WBC 10.0 05/26/2021    RBC 3.99 11/01/2022    RBC 4.30 05/26/2021    HGB 12.1 11/01/2022    HGB 12.9 05/26/2021 "    HCT 37.3 11/01/2022    HCT 40.3 05/26/2021    MCV 94 11/01/2022    MCV 94 05/26/2021    MCH 30.3 11/01/2022    MCH 30.0 05/26/2021    MCHC 32.4 11/01/2022    MCHC 32.0 05/26/2021    RDW 14.3 11/01/2022    RDW 13.7 05/26/2021     11/01/2022     05/26/2021     Sodium   Date Value Ref Range Status   03/02/2023 134 (L) 136 - 145 mmol/L Final   04/30/2021 135 133 - 144 mmol/L Final     Potassium   Date Value Ref Range Status   11/01/2022 3.7 3.4 - 5.3 mmol/L Final   04/30/2021 4.4 3.4 - 5.3 mmol/L Final     Chloride   Date Value Ref Range Status   11/01/2022 100 94 - 109 mmol/L Final   04/30/2021 102 94 - 109 mmol/L Final     Carbon Dioxide   Date Value Ref Range Status   04/30/2021 30 20 - 32 mmol/L Final     Carbon Dioxide (CO2)   Date Value Ref Range Status   11/01/2022 28 20 - 32 mmol/L Final     Anion Gap   Date Value Ref Range Status   11/01/2022 4 3 - 14 mmol/L Final   04/30/2021 3 3 - 14 mmol/L Final     Glucose   Date Value Ref Range Status   11/01/2022 82 70 - 99 mg/dL Final   04/30/2021 89 70 - 99 mg/dL Final     Urea Nitrogen   Date Value Ref Range Status   11/01/2022 16 7 - 30 mg/dL Final   04/30/2021 18 7 - 30 mg/dL Final     Creatinine   Date Value Ref Range Status   11/01/2022 0.82 0.52 - 1.04 mg/dL Final   04/30/2021 0.77 0.52 - 1.04 mg/dL Final     GFR Estimate   Date Value Ref Range Status   11/01/2022 69 >60 mL/min/1.73m2 Final     Comment:     Effective December 21, 2021 eGFRcr in adults is calculated using the 2021 CKD-EPI creatinine equation which includes age and gender (Donavon heller al., NEJ, DOI: 10.1056/RDBBtp9715635)   04/30/2021 70 >60 mL/min/[1.73_m2] Final     Comment:     Non  GFR Calc  Starting 12/18/2018, serum creatinine based estimated GFR (eGFR) will be   calculated using the Chronic Kidney Disease Epidemiology Collaboration   (CKD-EPI) equation.       GFR, ESTIMATED POCT   Date Value Ref Range Status   07/24/2021 >60 >60 mL/min/1.73m2 Final     Calcium    Date Value Ref Range Status   11/01/2022 9.0 8.5 - 10.1 mg/dL Final   04/30/2021 9.3 8.5 - 10.1 mg/dL Final     Bilirubin Total   Date Value Ref Range Status   02/28/2022 0.9 0.2 - 1.3 mg/dL Final   04/30/2021 1.1 0.2 - 1.3 mg/dL Final     Alkaline Phosphatase   Date Value Ref Range Status   02/28/2022 57 40 - 150 U/L Final   04/30/2021 70 40 - 150 U/L Final     ALT   Date Value Ref Range Status   03/11/2022 24 0 - 50 U/L Final   04/30/2021 27 0 - 50 U/L Final     AST   Date Value Ref Range Status   02/28/2022 25 0 - 45 U/L Final   04/30/2021 27 0 - 45 U/L Final     Recent Labs   Lab Test 03/02/23  0723 03/11/22  1505   CHOL 147 132   HDL 73 81   LDL 60 24   TRIG 71 136      Lab Results   Component Value Date    A1C 5.4 10/13/2022

## 2023-03-31 NOTE — PATIENT INSTRUCTIONS
March 31, 2023    Thank you for allowing our Cardiology team to participate in your care.     Please note the following changes to your heart treatment plan:     Medication changes:   - stop amlodipine  - stop metoprolol succinate    - start carvedilol 12.5mg twice daily  - monitor BPs at home, if BPs are consistently >140/90 mmHg, then increase carvedilol to 18.75mg twice daily (1 and 1/2 tablets twice a day)    Tests to be done:  - TTE in 1 month    Follow up:  - Follow up 4/27, or sooner as needed.      For scheduling, please call [phone].    Please contact our team at [phone] (Nini DALEY) or [phone] for any questions or concerns.     If you are having a medical emergency, please call [phone].     Sincerely,    Germán Shin MD, [facility]  Cardiology    [facility] and [facility] and [facility]

## 2023-04-03 ENCOUNTER — OFFICE VISIT (OUTPATIENT)
Dept: INTERNAL MEDICINE | Facility: CLINIC | Age: 88
End: 2023-04-03
Payer: COMMERCIAL

## 2023-04-03 VITALS
HEIGHT: 61 IN | SYSTOLIC BLOOD PRESSURE: 128 MMHG | HEART RATE: 63 BPM | WEIGHT: 155.5 LBS | TEMPERATURE: 96.2 F | OXYGEN SATURATION: 97 % | BODY MASS INDEX: 29.36 KG/M2 | RESPIRATION RATE: 12 BRPM | DIASTOLIC BLOOD PRESSURE: 60 MMHG

## 2023-04-03 DIAGNOSIS — I10 ESSENTIAL HYPERTENSION: ICD-10-CM

## 2023-04-03 DIAGNOSIS — G44.89 OTHER HEADACHE SYNDROME: Primary | ICD-10-CM

## 2023-04-03 DIAGNOSIS — E03.9 ACQUIRED HYPOTHYROIDISM: ICD-10-CM

## 2023-04-03 DIAGNOSIS — E78.5 HYPERLIPIDEMIA LDL GOAL <100: ICD-10-CM

## 2023-04-03 DIAGNOSIS — H91.93 DECREASED HEARING OF BOTH EARS: ICD-10-CM

## 2023-04-03 DIAGNOSIS — Z00.00 ENCOUNTER FOR MEDICARE ANNUAL WELLNESS EXAM: ICD-10-CM

## 2023-04-03 LAB
ERYTHROCYTE [SEDIMENTATION RATE] IN BLOOD BY WESTERGREN METHOD: 25 MM/HR (ref 0–30)
T4 FREE SERPL-MCNC: 1.84 NG/DL (ref 0.9–1.7)
TSH SERPL DL<=0.005 MIU/L-ACNC: 7.83 UIU/ML (ref 0.3–4.2)

## 2023-04-03 PROCEDURE — 84439 ASSAY OF FREE THYROXINE: CPT | Performed by: INTERNAL MEDICINE

## 2023-04-03 PROCEDURE — 99214 OFFICE O/P EST MOD 30 MIN: CPT | Mod: 25 | Performed by: INTERNAL MEDICINE

## 2023-04-03 PROCEDURE — 90471 IMMUNIZATION ADMIN: CPT | Performed by: INTERNAL MEDICINE

## 2023-04-03 PROCEDURE — 99397 PER PM REEVAL EST PAT 65+ YR: CPT | Mod: 25 | Performed by: INTERNAL MEDICINE

## 2023-04-03 PROCEDURE — 90677 PCV20 VACCINE IM: CPT | Performed by: INTERNAL MEDICINE

## 2023-04-03 PROCEDURE — 36415 COLL VENOUS BLD VENIPUNCTURE: CPT | Performed by: INTERNAL MEDICINE

## 2023-04-03 PROCEDURE — 85652 RBC SED RATE AUTOMATED: CPT | Performed by: INTERNAL MEDICINE

## 2023-04-03 PROCEDURE — 84443 ASSAY THYROID STIM HORMONE: CPT | Performed by: INTERNAL MEDICINE

## 2023-04-03 ASSESSMENT — ACTIVITIES OF DAILY LIVING (ADL): CURRENT_FUNCTION: NO ASSISTANCE NEEDED

## 2023-04-03 ASSESSMENT — ENCOUNTER SYMPTOMS
HEADACHES: 1
HEARTBURN: 0
SHORTNESS OF BREATH: 0
ABDOMINAL PAIN: 0
EYE PAIN: 1
NERVOUS/ANXIOUS: 0
ARTHRALGIAS: 0
NAUSEA: 0
BREAST MASS: 0
DIZZINESS: 1
FEVER: 0
CONSTIPATION: 0
FREQUENCY: 0
SORE THROAT: 0
HEMATOCHEZIA: 0
MYALGIAS: 0
WEAKNESS: 0
JOINT SWELLING: 0
PALPITATIONS: 0
DIARRHEA: 0
DYSURIA: 0
PARESTHESIAS: 0
COUGH: 0
HEMATURIA: 0

## 2023-04-03 NOTE — PROGRESS NOTES
"SUBJECTIVE:   Jeannie is a 88 year old who presents for Preventive Visit along with her son and daughter.       View : No data to display.              Patient has been advised of split billing requirements and indicates understanding: Yes  Are you in the first 12 months of your Medicare coverage?  No    Healthy Habits:     In general, how would you rate your overall health?  Good    Frequency of exercise:  None    Do you usually eat at least 4 servings of fruit and vegetables a day, include whole grains    & fiber and avoid regularly eating high fat or \"junk\" foods?  Yes    Taking medications regularly:  Yes    Medication side effects:  None    Ability to successfully perform activities of daily living:  No assistance needed    Home Safety:  No safety concerns identified    Hearing Impairment:  Need to ask people to speak up or repeat themselves    In the past 6 months, have you been bothered by leaking of urine?  No    In general, how would you rate your overall mental or emotional health?  Good      PHQ-2 Total Score: 0    Additional concerns today:  No      Have you ever done Advance Care Planning? (For example, a Health Directive, POLST, or a discussion with a medical provider or your loved ones about your wishes): No, advance care planning information given to patient to review.  Patient plans to discuss their wishes with loved ones or provider.         Fall risk  Fallen 2 or more times in the past year?: No  Any fall with injury in the past year?: No    Cognitive Screening   1) Repeat 3 items (Leader, Season, Table)    2) Clock draw: son helped  3) 3 item recall: Recalls 3 objects  Results: 3 items recalled: COGNITIVE IMPAIRMENT LESS LIKELY    Mini-CogTM Copyright EDWIN Gonzalez. Licensed by the author for use in [facility]; reprinted with permission ([email or web address]@.[facility]). All rights reserved.      Do you have sleep apnea, excessive snoring or daytime drowsiness?: no    Reviewed and updated as needed this " visit by clinical staff   Tobacco                Reviewed and updated as needed this visit by Provider                   Past Medical History:   Diagnosis Date     Acquired hypothyroidism 5/27/2021     Coronary artery disease involving native coronary artery of native heart without angina pectoris 5/26/2021     Other and unspecified hyperlipidemia      Unspecified cardiovascular disease 1999    stent placed       Past Surgical History:   Procedure Laterality Date     EP ABLATION AV NODE N/A 10/31/2022    Procedure: Ablation Atrioventricular Node;  Surgeon: Rogelio Whitt MD;  Location:  [facility]     EP ABLATION AV NODE N/A 11/1/2022    Procedure: EP Ablation AV Node;  Surgeon: Jd Baker MD;  Location: [facility] CARDIAC CATH LAB     EP PACEMAKER DEVICE & LEAD IMPLANT- RIGHT VENTRICULAR N/A 10/31/2022    Procedure: Pacemaker Device & Lead Implant- Right ventricular;  Surgeon: Rogelio Whitt MD;  Location: [facility] CARDIAC CATH LAB       Current Outpatient Medications   Medication Sig Dispense Refill     atorvastatin (LIPITOR) 10 MG tablet Take 1 tablet (10 mg) by mouth daily 90 tablet 3     carvedilol (COREG) 12.5 MG tablet Take 1 tablet (12.5 mg) by mouth 2 times daily (with meals) 180 tablet 3     IRON (FERROUS GLUCONATE) 325 MG OR TABS Take 1 tablet by mouth daily  30 0     levothyroxine (SYNTHROID/LEVOTHROID) 88 MCG tablet Take 1 tablet (88 mcg) by mouth daily 90 tablet 0     losartan (COZAAR) 50 MG tablet Take 1 tablet (50 mg) by mouth 2 times daily 180 tablet 3     Multiple Vitamins-Minerals (ICAPS AREDS FORMULA PO) Take 2 tablets by mouth daily        niacin 500 MG tablet Take by mouth daily (with breakfast)       rivaroxaban ANTICOAGULANT (XARELTO) 20 MG TABS tablet Take 1 tablet (20 mg) by mouth daily (with dinner) 90 tablet 3     nitroGLYcerin (NITROSTAT) 0.4 MG sublingual tablet For chest pain place 1 tablet under the tongue every 5 minutes for 3 doses. If symptoms persist 5  minutes after 1st dose call 911. (Patient not taking: Reported on 4/3/2023) 30 tablet 0     ondansetron (ZOFRAN-ODT) 4 MG ODT tab Take 1 tablet (4 mg) by mouth every 8 hours as needed for nausea or vomiting (Patient not taking: Reported on 4/3/2023) 15 tablet 0       Family History   Problem Relation Age of Onset     Family History Negative Other        Social History     Tobacco Use     Smoking status: Never     Smokeless tobacco: Never   Vaping Use     Vaping status: Never Used   Substance Use Topics     Alcohol use: No             4/3/2023     1:54 PM   Alcohol Use   Prescreen: >3 drinks/day or >7 drinks/week? No          View : No data to display.              Do you have a current opioid prescription? No  Do you use any other controlled substances or medications that are not prescribed by a provider? None     Patient complains of on and off bitemporal headache for few years and more recently since 1 week, no nausea or vomiting with the headache, takes over-the-counter Tylenol      Hypertension Follow-up      Do you check your blood pressure regularly outside of the clinic? Yes , amlodipine was discontinued by cardiologist due to leg edema-been started on carvedilol 12.5 mg twice daily also on losartan 50 mg twice daily    Are you following a low salt diet? Yes    Are your blood pressures ever more than 140 on the top number (systolic) OR more   than 90 on the bottom number (diastolic), for example 140/90? Yes    Hypothyroidism Follow-up      Since last visit, patient describes the following symptoms: Weight stable, no hair loss, no skin changes, no constipation, no loose stools      Current providers sharing in care for this patient include:   Patient Care Team:  Diana Hilario MD as PCP - General (Internal Medicine)  Diana Hilario MD as Assigned PCP  Germán Shin MD as Assigned Heart and Vascular Provider  Jose L Parsons MD as Assigned Neuroscience Provider    The following health  "maintenance items are reviewed in Epic and correct as of today:  Health Maintenance   Topic Date Due     ANNUAL REVIEW OF HM ORDERS  Never done     ADVANCE CARE PLANNING  Never done     ZOSTER IMMUNIZATION (1 of 2) Never done     MEDICARE ANNUAL WELLNESS VISIT  Never done     Pneumococcal Vaccine: 65+ Years (2 - PCV) 05/30/2021     INFLUENZA VACCINE (1) 09/01/2022     TSH W/FREE T4 REFLEX  03/02/2024     FALL RISK ASSESSMENT  04/03/2024     DTAP/TDAP/TD IMMUNIZATION (2 - Td or Tdap) 05/30/2030     PHQ-2 (once per calendar year)  Completed     COVID-19 Vaccine  Completed     IPV IMMUNIZATION  Aged Out     MENINGITIS IMMUNIZATION  Aged Out       Pertinent mammograms are reviewed under the imaging tab.    Review of Systems   Constitutional: Negative for fever.   HENT: Negative for congestion, ear pain, hearing loss and sore throat.    Eyes: Negative for visual disturbance.   Respiratory: Negative for cough and shortness of breath.    Cardiovascular: Negative for chest pain and palpitations.   Gastrointestinal: Negative for abdominal pain, constipation, diarrhea, heartburn, hematochezia and nausea.   Breasts:  Negative for tenderness, breast mass and discharge.   Genitourinary: Negative for dysuria, frequency, genital sores, hematuria, pelvic pain, urgency, vaginal bleeding and vaginal discharge.   Musculoskeletal: Negative for arthralgias, joint swelling and myalgias.   Skin: Negative for rash.   Neurological: Positive for headaches. Negative for weakness and paresthesias.   Psychiatric/Behavioral: Negative for mood changes. The patient is not nervous/anxious.      Objective   /60   Pulse 63   Temp (!) 96.2  F (35.7  C) (Tympanic)   Resp 12   Ht 1.549 m (5' 1\")   Wt 70.5 kg (155 lb 8 oz)   LMP  (LMP Unknown)   SpO2 97%   BMI 29.38 kg/m   Estimated body mass index is 29.38 kg/m  as calculated from the following:    Height as of this encounter: 1.549 m (5' 1\").    Weight as of 3/31/23: 70.5 kg (155 lb 8 " "oz).  Physical Exam  GENERAL APPEARANCE:  , alert and no distress  EYES: Eyes grossly normal to inspection, PERRL and conjunctivae and sclerae normal  HENT: ear canals- cerumen noted   RESP: lungs clear to auscultation - no rales, rhonchi or wheezes  BREAST: Patient declined  CV: regular rate and rhythm, at this time  ABDOMEN: soft, nontender,  and bowel sounds normal  MS: no musculoskeletal defects are noted and gait is age appropriate without ataxia  NEURO: Normal strength and tone, sensory exam grossly normal, mentation intact and speech normal  PSYCH: mentation appears normal and affect normal/bright     ASSESSMENT / PLAN:      (Z00.00) Encounter for Medicare annual wellness exam  Plan: Labs reviewed, declines mammogram, bone density.  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  Adult Audiology  Referral             Patient has been advised of split billing requirements and indicates understanding: Yes      COUNSELING:  Reviewed preventive health counseling, as reflected in patient instructions       Regular exercise       Healthy diet/nutrition       Immunizations    Vaccinated for: Pneumococcal          BMI:   Estimated body mass index is 29.38 kg/m  as calculated from the following:    Height as of this encounter: 1.549 m (5' 1\").    Weight as of this encounter: 70.5 kg (155 lb 8 oz).         She reports that " she has never smoked. She has never used smokeless tobacco.      Appropriate preventive services were discussed with this patient, including applicable screening as appropriate for cardiovascular disease, diabetes, osteopenia/osteoporosis, and glaucoma.  As appropriate for age/gender, discussed screening for colorectal cancer, prostate cancer, breast cancer, and cervical cancer. Checklist reviewing preventive services available has been given to the patient.    Reviewed patients plan of care and provided an AVS. The Basic Care Plan (routine screening as documented in Health Maintenance) for Jeannie meets the Care Plan requirement. This Care Plan has been established and reviewed with the Patient and son and daughter.          Diana Hilario MD  [facility]    Identified Health Risks:

## 2023-04-03 NOTE — NURSING NOTE
"/60   Pulse 63   Temp (!) 96.2  F (35.7  C) (Tympanic)   Resp 12   Ht 1.549 m (5' 1\")   Wt 70.5 kg (155 lb 8 oz)   LMP  (LMP Unknown)   SpO2 97%   BMI 29.38 kg/m        Prior to immunization administration, verified patients identity using patient s name and date of birth. Please see Immunization Activity for additional information.     Screening Questionnaire for Adult Immunization    Are you sick today?   No   Do you have allergies to medications, food, a vaccine component or latex?   No   Have you ever had a serious reaction after receiving a vaccination?   No   Do you have a long-term health problem with heart, lung, kidney, or metabolic disease (e.g., diabetes), asthma, a blood disorder, no spleen, complement component deficiency, a cochlear implant, or a spinal fluid leak?  Are you on long-term aspirin therapy?   No   Do you have cancer, leukemia, HIV/AIDS, or any other immune system problem?   No   Do you have a parent, brother, or sister with an immune system problem?   No   In the past 3 months, have you taken medications that affect  your immune system, such as prednisone, other steroids, or anticancer drugs; drugs for the treatment of rheumatoid arthritis, Crohn s disease, or psoriasis; or have you had radiation treatments?   No   Have you had a seizure, or a brain or other nervous system problem?   No   During the past year, have you received a transfusion of blood or blood    products, or been given immune (gamma) globulin or antiviral drug?   No   For women: Are you pregnant or is there a chance you could become       pregnant during the next month?   No   Have you received any vaccinations in the past 4 weeks?   No     Immunization questionnaire answers were all negative.      Injection of pneumococcal 20 given by Nohemy Solis MA. Patient instructed to remain in clinic for 15 minutes afterwards, and to report any adverse reactions.     Screening performed by Nohemy Solis MA on " 4/3/2023 at 5:28 PM.

## 2023-04-04 ENCOUNTER — TELEPHONE (OUTPATIENT)
Dept: ORTHOPEDICS | Facility: CLINIC | Age: 88
End: 2023-04-04
Payer: COMMERCIAL

## 2023-04-04 NOTE — TELEPHONE ENCOUNTER
[facility] Call Center    Phone Message    May a detailed message be left on voicemail: yes     Reason for Call: Order(s): Other:   Reason for requested: MRI to the [facility]  Date needed: asap  Provider name: Jose L Parsons    Recent order was for [facility] and can not be used at [facility]. Need new order to [facility]. Patient's daughter is also requesting a call back.      Action Taken: Other: [facility]    Travel Screening: Not Applicable

## 2023-04-05 NOTE — TELEPHONE ENCOUNTER
I placed a new MRI lumbar spine order.    I attempted to call Ms. Jeannie Chu's daughter (Shivani Villeda).  She did not answer.  I left a voicemail requesting a call back.      Jose L Parsons MD

## 2023-04-07 LAB
MDC_IDC_LEAD_IMPLANT_DT: NORMAL
MDC_IDC_LEAD_IMPLANT_DT: NORMAL
MDC_IDC_LEAD_LOCATION: NORMAL
MDC_IDC_LEAD_LOCATION: NORMAL
MDC_IDC_LEAD_LOCATION_DETAIL_1: NORMAL
MDC_IDC_LEAD_LOCATION_DETAIL_1: NORMAL
MDC_IDC_LEAD_MFG: NORMAL
MDC_IDC_LEAD_MFG: NORMAL
MDC_IDC_LEAD_MODEL: NORMAL
MDC_IDC_LEAD_MODEL: NORMAL
MDC_IDC_LEAD_POLARITY_TYPE: NORMAL
MDC_IDC_LEAD_POLARITY_TYPE: NORMAL
MDC_IDC_LEAD_SERIAL: NORMAL
MDC_IDC_LEAD_SERIAL: NORMAL
MDC_IDC_MSMT_BATTERY_STATUS: NORMAL
MDC_IDC_MSMT_LEADCHNL_RA_IMPEDANCE_VALUE: 448 OHM
MDC_IDC_MSMT_LEADCHNL_RA_PACING_THRESHOLD_AMPLITUDE: 0.5 V
MDC_IDC_MSMT_LEADCHNL_RA_PACING_THRESHOLD_AMPLITUDE: 0.6 V
MDC_IDC_MSMT_LEADCHNL_RA_PACING_THRESHOLD_AMPLITUDE: 0.6 V
MDC_IDC_MSMT_LEADCHNL_RA_PACING_THRESHOLD_PULSEWIDTH: 0.4 MS
MDC_IDC_MSMT_LEADCHNL_RA_SENSING_INTR_AMPL: 8.4 MV
MDC_IDC_MSMT_LEADCHNL_RA_SENSING_INTR_AMPL: 9.3 MV
MDC_IDC_MSMT_LEADCHNL_RV_IMPEDANCE_VALUE: 624 OHM
MDC_IDC_MSMT_LEADCHNL_RV_PACING_THRESHOLD_AMPLITUDE: 0.6 V
MDC_IDC_MSMT_LEADCHNL_RV_PACING_THRESHOLD_AMPLITUDE: 0.6 V
MDC_IDC_MSMT_LEADCHNL_RV_PACING_THRESHOLD_PULSEWIDTH: 0.4 MS
MDC_IDC_MSMT_LEADCHNL_RV_PACING_THRESHOLD_PULSEWIDTH: 0.4 MS
MDC_IDC_MSMT_LEADCHNL_RV_SENSING_INTR_AMPL: 13.4 MV
MDC_IDC_PG_IMPLANT_DTM: NORMAL
MDC_IDC_PG_MFG: NORMAL
MDC_IDC_PG_MODEL: NORMAL
MDC_IDC_PG_SERIAL: NORMAL
MDC_IDC_PG_TYPE: NORMAL
MDC_IDC_SESS_CLINIC_NAME: NORMAL
MDC_IDC_SESS_DTM: NORMAL
MDC_IDC_SESS_REPROGRAMMED: NORMAL
MDC_IDC_SESS_TYPE: NORMAL
MDC_IDC_SET_BRADY_AT_MODE_SWITCH_MODE: NORMAL
MDC_IDC_SET_BRADY_AT_MODE_SWITCH_RATE: 180 {BEATS}/MIN
MDC_IDC_SET_BRADY_HYSTRATE: 60 {BEATS}/MIN
MDC_IDC_SET_BRADY_LOWRATE: 60 {BEATS}/MIN
MDC_IDC_SET_BRADY_MAX_SENSOR_RATE: 120 {BEATS}/MIN
MDC_IDC_SET_BRADY_MAX_TRACKING_RATE: 130 {BEATS}/MIN
MDC_IDC_SET_BRADY_MODE: NORMAL
MDC_IDC_SET_BRADY_NIGHT_RATE: 60 {BEATS}/MIN
MDC_IDC_SET_BRADY_PAV_DELAY_HIGH: 160 MS
MDC_IDC_SET_BRADY_PAV_DELAY_LOW: 200 MS
MDC_IDC_SET_BRADY_SAV_DELAY_HIGH: 130 MS
MDC_IDC_SET_BRADY_SAV_DELAY_LOW: 170 MS
MDC_IDC_SET_CRT_PACED_CHAMBERS: NORMAL
MDC_IDC_SET_LEADCHNL_LV_PACING_CATHODE_ELECTRODE_1: NORMAL
MDC_IDC_SET_LEADCHNL_LV_PACING_CATHODE_LOCATION_1: NORMAL
MDC_IDC_SET_LEADCHNL_LV_PACING_POLARITY: NORMAL
MDC_IDC_SET_LEADCHNL_LV_SENSING_CATHODE_ELECTRODE_1: NORMAL
MDC_IDC_SET_LEADCHNL_LV_SENSING_CATHODE_LOCATION_1: NORMAL
MDC_IDC_SET_LEADCHNL_LV_SENSING_POLARITY: NORMAL
MDC_IDC_SET_LEADCHNL_RA_PACING_AMPLITUDE: 1.5 V
MDC_IDC_SET_LEADCHNL_RA_PACING_POLARITY: NORMAL
MDC_IDC_SET_LEADCHNL_RA_PACING_PULSEWIDTH: 0.4 MS
MDC_IDC_SET_LEADCHNL_RA_SENSING_ADAPTATION_MODE: NORMAL
MDC_IDC_SET_LEADCHNL_RA_SENSING_POLARITY: NORMAL
MDC_IDC_SET_LEADCHNL_RA_SENSING_SENSITIVITY: NORMAL
MDC_IDC_SET_LEADCHNL_RV_PACING_AMPLITUDE: 1.1 V
MDC_IDC_SET_LEADCHNL_RV_PACING_POLARITY: NORMAL
MDC_IDC_SET_LEADCHNL_RV_PACING_PULSEWIDTH: 0.4 MS
MDC_IDC_SET_LEADCHNL_RV_SENSING_ADAPTATION_MODE: NORMAL
MDC_IDC_SET_LEADCHNL_RV_SENSING_POLARITY: NORMAL
MDC_IDC_SET_LEADCHNL_RV_SENSING_SENSITIVITY: NORMAL

## 2023-04-10 ENCOUNTER — HOSPITAL ENCOUNTER (OUTPATIENT)
Dept: CARDIOLOGY | Facility: CLINIC | Age: 88
Discharge: HOME | End: 2023-04-10
Attending: INTERNAL MEDICINE | Admitting: INTERNAL MEDICINE
Payer: COMMERCIAL

## 2023-04-10 DIAGNOSIS — I34.0 NONRHEUMATIC MITRAL VALVE REGURGITATION: ICD-10-CM

## 2023-04-10 LAB — LVEF ECHO: NORMAL

## 2023-04-10 PROCEDURE — 255N000002 HC RX 255 OP 636: Performed by: INTERNAL MEDICINE

## 2023-04-10 PROCEDURE — 93306 TTE W/DOPPLER COMPLETE: CPT | Mod: 26 | Performed by: INTERNAL MEDICINE

## 2023-04-10 PROCEDURE — 999N000208 ECHOCARDIOGRAM COMPLETE

## 2023-04-10 RX ADMIN — HUMAN ALBUMIN MICROSPHERES AND PERFLUTREN 6 ML: 10; .22 INJECTION, SOLUTION INTRAVENOUS at 11:26

## 2023-04-12 ENCOUNTER — TELEPHONE (OUTPATIENT)
Dept: ORTHOPEDICS | Facility: CLINIC | Age: 88
End: 2023-04-12
Payer: COMMERCIAL

## 2023-04-12 NOTE — TELEPHONE ENCOUNTER
Hello, Radiology is calling because they need the new MRI order extended past May 5th because it would not allow them to schedule past 5/5/2023, she has a follow up appointment 6/13/2023 with  and there is nothing close to that date so follow up will need to be rescheduled as well after MRI is completed. Usually orders are good for a year but the new one that was placed is only good for a month. Can someone call imaging once this has been done.  Thank you,  Carol

## 2023-04-20 ENCOUNTER — OFFICE VISIT (OUTPATIENT)
Dept: CARDIOLOGY | Facility: CLINIC | Age: 88
End: 2023-04-20
Attending: INTERNAL MEDICINE
Payer: COMMERCIAL

## 2023-04-20 VITALS
WEIGHT: 155.4 LBS | SYSTOLIC BLOOD PRESSURE: 146 MMHG | OXYGEN SATURATION: 99 % | BODY MASS INDEX: 29.34 KG/M2 | DIASTOLIC BLOOD PRESSURE: 68 MMHG | HEART RATE: 67 BPM | HEIGHT: 61 IN

## 2023-04-20 DIAGNOSIS — I10 ESSENTIAL HYPERTENSION: ICD-10-CM

## 2023-04-20 PROCEDURE — 99213 OFFICE O/P EST LOW 20 MIN: CPT | Performed by: INTERNAL MEDICINE

## 2023-04-20 NOTE — PROGRESS NOTES
Cardiology Clinic Progress Note:    April 20, 2023   Patient Name: Jeannie Chu  Patient MRN: [id number]     Consult indication: HTN    HPI:    I had the opportunity to see patient Jeannie Chu in cardiology clinic for a follow up visit. Patient is followed by our colleague Diana Hilario MD with Primary Care.     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     I have been following patient closely in clinic over the past couple of years, primarily for hypertension.  Patient also has a history of atrial fibrillation complicated by tacky bradycardia syndrome, for which she ultimately underwent AV node ablation with pacemaker placement.  She did require redo AV node ablation 11/1/2022.     At our last visit 3/31/2023, she was experiencing lower extremity swelling since starting amlodipine, and so we stopped this.  We changed metoprolol succinate to carvedilol.  She had also been experiencing some dyspnea, and so a TTE was done 4/10/23 which demonstrated LVEF 55 to 60%, normal RV function with pacemaker in place, mild to moderate MR, mild aortic regurgitation, no significant change from 3/22/2022.  Overall patient reports that she has been doing well.  She is accompanied today by her son.  She brings with her a log of blood pressures, primarily in the 120s to 140s mmHg range systolic.  There are isolated values with systolic readings around 160 mmHg, however these are rare, and there are also lower values with systolics in the 110 mmHg range.  Patient also has had some discomfort at around her pacemaker site, no warmth, swelling, this  discomfort has been chronic, previously we evaluated it with a chest x-ray which showed stable device placement.    Assessment and Plan/Recommendations:    # HTN.  Well controlled.  Discussed that given her advanced age, would favor blood pressures to be slightly higher than lower due to fall risk.  # Persistent atrial fibrillation complicated by tachybradycardia syndrome, now status post AV node ablation and pacemaker (11/1/2022), [id number], on rivaroxaban.    # Mild discomfort at pacemaker site, does not appear infected, no overlying hematoma, minimally tender to palpation over lateral aspect which is slightly more superficial than medial aspect.  No systemic signs suggestive of infection.  # Hypothyroidism.  On levothyroxine.  # HL, on statin  # CAD s/p PCI (remote history, proximal LAD 20 years ago).  Patient denies symptoms concerning for angina.  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, TTE 4/2023, stable from 3/2022    -Overall patient is in stable cardiac health without symptoms concerning for angina or decompensated heart failure  - Continue current cardiac regimen of losartan 50 mg twice daily, carvedilol 12.5 mg twice daily, atorvastatin 10 mg daily, rivaroxaban  - Patient is planning to return to [facility] in September, she would like to check in and we would be glad to see her back prior to her departure    Thank you for allowing our team to participate in the care of Jeannie Chu.  Please do not hesitate to call or page me with any questions or concerns.    Sincerely,     Germán Shin MD, [facility]  Cardiology  Text Page   April 20, 2023    Voice recognition software utilized.     Total time spent on this encounter: 24 minutes, providing care in this encounter including, but not limited to, reviewing prior medical records, laboratory data, imaging studies, diagnostic studies, procedure notes, formulating an assessment and plan, recommendations, discussion and counseling with patient face to face,  dictation.    Past Medical History:   The ASCVD Risk score (Angel Luis DK, et al., 2019) failed to calculate for the following reasons:    The 2019 ASCVD risk score is only valid for ages 40 to 79  Past Medical History:   Diagnosis Date     Acquired hypothyroidism 5/27/2021     Coronary artery disease involving native coronary artery of native heart without angina pectoris 5/26/2021     Other and unspecified hyperlipidemia      Unspecified cardiovascular disease 1999    stent placed        Past Surgical History:   Past Surgical History:   Procedure Laterality Date     EP ABLATION AV NODE N/A 10/31/2022    Procedure: Ablation Atrioventricular Node;  Surgeon: Rogelio Whitt MD;  Location:  [facility]     EP ABLATION AV NODE N/A 11/1/2022    Procedure: EP Ablation AV Node;  Surgeon: Jd Baker MD;  Location:  [facility]     EP PACEMAKER DEVICE & LEAD IMPLANT- RIGHT VENTRICULAR N/A 10/31/2022    Procedure: Pacemaker Device & Lead Implant- Right ventricular;  Surgeon: Rogelio Whitt MD;  Location:  [facility]ATH [facility]       Medications (outpatient):  Current Outpatient Medications   Medication Sig Dispense Refill     atorvastatin (LIPITOR) 10 MG tablet Take 1 tablet (10 mg) by mouth daily 90 tablet 3     carvedilol (COREG) 12.5 MG tablet Take 1 tablet (12.5 mg) by mouth 2 times daily (with meals) 180 tablet 3     IRON (FERROUS GLUCONATE) 325 MG OR TABS Take 1 tablet by mouth daily  30 0     levothyroxine (SYNTHROID/LEVOTHROID) 88 MCG tablet Take 1 tablet (88 mcg) by mouth daily 90 tablet 0     losartan (COZAAR) 50 MG tablet Take 1 tablet (50 mg) by mouth 2 times daily 180 tablet 3     Multiple Vitamins-Minerals (ICAPS AREDS FORMULA PO) Take 2 tablets by mouth daily        niacin 500 MG tablet Take by mouth daily (with breakfast)       nitroGLYcerin (NITROSTAT) 0.4 MG sublingual tablet For chest pain place 1 tablet under the tongue every 5 minutes for 3 doses. If symptoms persist  "5 minutes after 1st dose call 911. 30 tablet 0     rivaroxaban ANTICOAGULANT (XARELTO) 20 MG TABS tablet Take 1 tablet (20 mg) by mouth daily (with dinner) 90 tablet 3       Allergies:  Allergies   Allergen Reactions     Amlodipine Swelling     BLE edema     Ibuprofen        Social History:   History   Drug Use No      History   Smoking Status     Never   Smokeless Tobacco     Never     Social History    Substance and Sexual Activity      Alcohol use: No       Family History:  Family History   Problem Relation Age of Onset     Family History Negative Other        Review of Systems:   A complete review of systems was negative except as mentioned in the History of Present Illness.     Objective & Physical Exam:  BP (!) 146/68 (BP Location: Right arm, Patient Position: Sitting, Cuff Size: Adult Regular)   Pulse 67   Ht 1.549 m (5' 1\")   Wt 70.5 kg (155 lb 6.4 oz)   LMP  (LMP Unknown)   SpO2 99%   BMI 29.36 kg/m    Wt Readings from Last 2 Encounters:   04/20/23 70.5 kg (155 lb 6.4 oz)   04/03/23 70.5 kg (155 lb 8 oz)     Body mass index is 29.36 kg/m .   Body surface area is 1.74 meters squared.    Constitutional: appears stated age, in no apparent distress, appears to be well nourished  Head: normocephalic, atraumatic  Neck: supple, trachea midline  Pulmonary: clear to auscultation bilaterally, no wheezes, no rales, no increased work of breathing  Cardiovascular: JVP normal, regular rate, regular rhythm, normal S1 and S2, no S3, S4, no murmur appreciated, no lower extremity edema  Gastrointestinal: no guarding, non-rigid   Neurologic: awake, alert, moves all extremities  Skin: no jaundice, warm on limited exam, pacemaker site no hematoma, no erythema, no significant tenderness to palpation  Psychiatric: affect is normal, answers questions appropriately, oriented to self and place    Data reviewed:  Lab Results   Component Value Date    WBC 9.0 11/01/2022    WBC 10.0 05/26/2021    RBC 3.99 11/01/2022    RBC 4.30 " 05/26/2021    HGB 12.1 11/01/2022    HGB 12.9 05/26/2021    HCT 37.3 11/01/2022    HCT 40.3 05/26/2021    MCV 94 11/01/2022    MCV 94 05/26/2021    MCH 30.3 11/01/2022    MCH 30.0 05/26/2021    MCHC 32.4 11/01/2022    MCHC 32.0 05/26/2021    RDW 14.3 11/01/2022    RDW 13.7 05/26/2021     11/01/2022     05/26/2021     Sodium   Date Value Ref Range Status   03/02/2023 134 (L) 136 - 145 mmol/L Final   04/30/2021 135 133 - 144 mmol/L Final     Potassium   Date Value Ref Range Status   11/01/2022 3.7 3.4 - 5.3 mmol/L Final   04/30/2021 4.4 3.4 - 5.3 mmol/L Final     Chloride   Date Value Ref Range Status   11/01/2022 100 94 - 109 mmol/L Final   04/30/2021 102 94 - 109 mmol/L Final     Carbon Dioxide   Date Value Ref Range Status   04/30/2021 30 20 - 32 mmol/L Final     Carbon Dioxide (CO2)   Date Value Ref Range Status   11/01/2022 28 20 - 32 mmol/L Final     Anion Gap   Date Value Ref Range Status   11/01/2022 4 3 - 14 mmol/L Final   04/30/2021 3 3 - 14 mmol/L Final     Glucose   Date Value Ref Range Status   11/01/2022 82 70 - 99 mg/dL Final   04/30/2021 89 70 - 99 mg/dL Final     Urea Nitrogen   Date Value Ref Range Status   11/01/2022 16 7 - 30 mg/dL Final   04/30/2021 18 7 - 30 mg/dL Final     Creatinine   Date Value Ref Range Status   11/01/2022 0.82 0.52 - 1.04 mg/dL Final   04/30/2021 0.77 0.52 - 1.04 mg/dL Final     GFR Estimate   Date Value Ref Range Status   11/01/2022 69 >60 mL/min/1.73m2 Final     Comment:     Effective December 21, 2021 eGFRcr in adults is calculated using the 2021 CKD-EPI creatinine equation which includes age and gender (Donavon et al., NEJM, DOI: 10.1056/BUZShl3356279)   04/30/2021 70 >60 mL/min/[1.73_m2] Final     Comment:     Non  GFR Calc  Starting 12/18/2018, serum creatinine based estimated GFR (eGFR) will be   calculated using the Chronic Kidney Disease Epidemiology Collaboration   (CKD-EPI) equation.       GFR, ESTIMATED POCT   Date Value Ref Range  Status   2021 >60 >60 mL/min/1.73m2 Final     Calcium   Date Value Ref Range Status   2022 9.0 8.5 - 10.1 mg/dL Final   2021 9.3 8.5 - 10.1 mg/dL Final     Bilirubin Total   Date Value Ref Range Status   2022 0.9 0.2 - 1.3 mg/dL Final   2021 1.1 0.2 - 1.3 mg/dL Final     Alkaline Phosphatase   Date Value Ref Range Status   2022 57 40 - 150 U/L Final   2021 70 40 - 150 U/L Final     ALT   Date Value Ref Range Status   2022 24 0 - 50 U/L Final   2021 27 0 - 50 U/L Final     AST   Date Value Ref Range Status   2022 25 0 - 45 U/L Final   2021 27 0 - 45 U/L Final     Recent Labs   Lab Test 23  0723 22  1505   CHOL 147 132   HDL 73 81   LDL 60 24   TRIG 71 136      Lab Results   Component Value Date    A1C 5.4 10/13/2022        Recent Results (from the past 4320 hour(s))   Echocardiogram Complete   Result Value    LVEF  55-60%    Narrative    [id number]  [facility]  [id number]  [id number]^ENRIQUE^HENRY^BRYAN     [facility]  Echocardiography Laboratory  [address] [place] [address]     Name: CORA WAY  MRN: [id number]  : 1935  Study Date: 04/10/2023 10:45 AM  Age: 88 yrs  Gender: Female  Patient Location: [facility]  Reason For Study: Nonrheumatic mitral valve regurgitation  Ordering Physician: HENRY NUNEZ  Referring Physician: HENRY NUNEZ  Performed By: OUMOU Nicole     BSA: 1.7 m2  Height: 61 in  Weight: 155 lb  BP: 148/87 mmHg  ______________________________________________________________________________  Procedure  Complete Echo Adult. Optison (NDC #[id number]) given intravenously.  ______________________________________________________________________________  Interpretation Summary     1. The left ventricle is normal in size. There is normal left ventricular wall  thickness. Left ventricular systolic function is normal. The visual ejection  fraction is 55-60%. Diastolic Doppler findings (E/E' ratio and/or  other  parameters) suggest left ventricular filling pressures are increased. Septal  wall motion abnormality may reflect pacemaker activation. There is no thrombus  seen in the left ventricle.  2. The right ventricle is normal size. There is a catheter/pacemaker lead seen  in the right ventricle. The right ventricular systolic function is normal.  3.There is moderate biatrial enlargement. There is no color Doppler evidence  of an atrial shunt.  4. There is moderate mitral annular calcification. There is mild to moderate  (1-2+) mitral regurgitation.  5. Mild aortic regurgitation.  6. In direct comparison to the previous study dated 03/22/2022, the findings  are similar.  ______________________________________________________________________________  Left Ventricle  The left ventricle is normal in size. There is normal left ventricular wall  thickness. Left ventricular systolic function is normal. The visual ejection  fraction is 55-60%. Diastolic Doppler findings (E/E' ratio and/or other  parameters) suggest left ventricular filling pressures are increased. Septal  wall motion abnormality may reflect pacemaker activation. There is no thrombus  seen in the left ventricle.     Right Ventricle  The right ventricle is normal size. There is a catheter/pacemaker lead seen in  the right ventricle. The right ventricular systolic function is normal.     Atria  There is moderate biatrial enlargement. There is no color Doppler evidence of  an atrial shunt.     Mitral Valve  There is moderate mitral annular calcification. There is mild to moderate (1-  2+) mitral regurgitation.     Tricuspid Valve  There is trace tricuspid regurgitation. Right ventricular systolic pressure  could not be approximated due to inadequate tricuspid regurgitation.     Aortic Valve  There is mild trileaflet aortic sclerosis. There is mild (1+) aortic  regurgitation. No aortic stenosis is present.     Pulmonic Valve  There is no pulmonic valvular  stenosis.     Vessels  The aortic root is normal size. Normal size ascending aorta. The inferior vena  cava is normal.     Pericardium  There is no pericardial effusion.     Rhythm  Sinus rhythm was noted.  ______________________________________________________________________________  MMode/2D Measurements & Calculations     IVSd: 0.97 cm  LVIDd: 3.8 cm  LVIDs: 2.3 cm  LVPWd: 0.92 cm  FS: 39.4 %  LV mass(C)d: 110.1 grams  LV mass(C)dI: 64.9 grams/m2  Ao root diam: 2.5 cm  asc Aorta Diam: 3.0 cm  LVOT diam: 1.8 cm  LVOT area: 2.6 cm2  LA Volume (BP): 67.1 ml  LA Volume Index (BP): 39.5 ml/m2  RV Base: 2.8 cm     RWT: 0.48  TAPSE: 2.0 cm     Doppler Measurements & Calculations  MV E max rg: 105.0 cm/sec  MV A max rg: 92.2 cm/sec  MV E/A: 1.1  MV max P.0 mmHg  MV mean P.7 mmHg  MV V2 VTI: 24.9 cm  MV dec time: 0.19 sec  AI P1/2t: 412.2 msec  PA V2 max: 90.6 cm/sec  PA max PG: 3.3 mmHg  PA acc time: 0.07 sec  E/E' av.3  Lateral E/e': 12.1  Medial E/e': 22.4  RV S Rg: 11.4 cm/sec     ______________________________________________________________________________  Report approved by: Radha Chau 04/10/2023 12:01 PM

## 2023-04-20 NOTE — PATIENT INSTRUCTIONS
April 20, 2023    Thank you for allowing our Cardiology team to participate in your care.     Please note the following changes to your heart treatment plan:     Medication changes:   - none    Tests to be done:  - none    Follow up:  - Follow up in 4 months before you travel       For scheduling, please call [phone].    Please contact our team at [phone] (Nini DALEY) or [phone] for any questions or concerns.     If you are having a medical emergency, please call [phone].     Sincerely,    Germán Shin MD, FACC  Cardiology    [facility] - [place]

## 2023-04-20 NOTE — LETTER
4/20/2023    Diana Hilario MD  [address] [facility] [address]    RE: Jeannie Chu       Dear Colleague,     I had the pleasure of seeing Jeannie Chu in the [facility].      Cardiology Clinic Progress Note:    April 20, 2023   Patient Name: Jeannie Chu  Patient MRN: [id number]     Consult indication: HTN    HPI:    I had the opportunity to see patient Jeannie Chu in cardiology clinic for a follow up visit. Patient is followed by our colleague Diana Hilario MD with Primary Care.     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     I have been following patient closely in clinic over the past couple of years, primarily for hypertension.  Patient also has a history of atrial fibrillation complicated by tacky bradycardia syndrome, for which she ultimately underwent AV node ablation with pacemaker placement.  She did require redo AV node ablation 11/1/2022.     At our last visit 3/31/2023, she was experiencing lower extremity swelling since starting amlodipine, and so we stopped this.  We changed metoprolol succinate to carvedilol.  She had also been experiencing some dyspnea, and so a TTE was done 4/10/23 which demonstrated LVEF 55 to 60%, normal RV function with pacemaker in place, mild to moderate MR, mild aortic regurgitation, no significant change from 3/22/2022.  Overall patient reports that she has been doing well.  She is accompanied today by her son.  She brings with her a log of blood pressures, primarily in the 120s to 140s mmHg range systolic.  There are isolated values with systolic  readings around 160 mmHg, however these are rare, and there are also lower values with systolics in the 110 mmHg range.  Patient also has had some discomfort at around her pacemaker site, no warmth, swelling, this discomfort has been chronic, previously we evaluated it with a chest x-ray which showed stable device placement.    Assessment and Plan/Recommendations:    # HTN.  Well controlled.  Discussed that given her advanced age, would favor blood pressures to be slightly higher than lower due to fall risk.  # Persistent atrial fibrillation complicated by tachybradycardia syndrome, now status post AV node ablation and pacemaker (11/1/2022), [id number], on rivaroxaban.    # Mild discomfort at pacemaker site, does not appear infected, no overlying hematoma, minimally tender to palpation over lateral aspect which is slightly more superficial than medial aspect.  No systemic signs suggestive of infection.  # Hypothyroidism.  On levothyroxine.  # HL, on statin  # CAD s/p PCI (remote history, proximal LAD 20 years ago).  Patient denies symptoms concerning for angina.  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, TTE 4/2023, stable from 3/2022    -Overall patient is in stable cardiac health without symptoms concerning for angina or decompensated heart failure  - Continue current cardiac regimen of losartan 50 mg twice daily, carvedilol 12.5 mg twice daily, atorvastatin 10 mg daily, rivaroxaban  - Patient is planning to return to [facility] in September, she would like to check in and we would be glad to see her back prior to her departure    Thank you for allowing our team to participate in the care of Jeannie Chu.  Please do not hesitate to call or page me with any questions or concerns.    Sincerely,     Germán Shin MD, [facility]  Cardiology  Text Page   April 20, 2023    Voice recognition software utilized.     Total time spent on this encounter: 24 minutes, providing care in this encounter including, but not limited to,  reviewing prior medical records, laboratory data, imaging studies, diagnostic studies, procedure notes, formulating an assessment and plan, recommendations, discussion and counseling with patient face to face, dictation.    Past Medical History:   The ASCVD Risk score (Angel Luis ALVARADO, et al., 2019) failed to calculate for the following reasons:    The 2019 ASCVD risk score is only valid for ages 40 to 79  Past Medical History:   Diagnosis Date    Acquired hypothyroidism 5/27/2021    Coronary artery disease involving native coronary artery of native heart without angina pectoris 5/26/2021    Other and unspecified hyperlipidemia     Unspecified cardiovascular disease 1999    stent placed        Past Surgical History:   Past Surgical History:   Procedure Laterality Date    EP ABLATION AV NODE N/A 10/31/2022    Procedure: Ablation Atrioventricular Node;  Surgeon: Rogelio Whitt MD;  Location: [facility] CARDIAC CATH LAB    EP ABLATION AV NODE N/A 11/1/2022    Procedure: EP Ablation AV Node;  Surgeon: Jd Baker MD;  Location: [facility] CARDIAC CATH LAB    EP PACEMAKER DEVICE & LEAD IMPLANT- RIGHT VENTRICULAR N/A 10/31/2022    Procedure: Pacemaker Device & Lead Implant- Right ventricular;  Surgeon: Rogelio Whitt MD;  Location: [facility] CARDIAC CATH LAB       Medications (outpatient):  Current Outpatient Medications   Medication Sig Dispense Refill    atorvastatin (LIPITOR) 10 MG tablet Take 1 tablet (10 mg) by mouth daily 90 tablet 3    carvedilol (COREG) 12.5 MG tablet Take 1 tablet (12.5 mg) by mouth 2 times daily (with meals) 180 tablet 3    IRON (FERROUS GLUCONATE) 325 MG OR TABS Take 1 tablet by mouth daily  30 0    levothyroxine (SYNTHROID/LEVOTHROID) 88 MCG tablet Take 1 tablet (88 mcg) by mouth daily 90 tablet 0    losartan (COZAAR) 50 MG tablet Take 1 tablet (50 mg) by mouth 2 times daily 180 tablet 3    Multiple Vitamins-Minerals (ICAPS AREDS FORMULA PO) Take 2 tablets by mouth daily       niacin 500 MG  "tablet Take by mouth daily (with breakfast)      nitroGLYcerin (NITROSTAT) 0.4 MG sublingual tablet For chest pain place 1 tablet under the tongue every 5 minutes for 3 doses. If symptoms persist 5 minutes after 1st dose call 911. 30 tablet 0    rivaroxaban ANTICOAGULANT (XARELTO) 20 MG TABS tablet Take 1 tablet (20 mg) by mouth daily (with dinner) 90 tablet 3       Allergies:  Allergies   Allergen Reactions    Amlodipine Swelling     BLE edema    Ibuprofen        Social History:   History   Drug Use No      History   Smoking Status    Never   Smokeless Tobacco    Never     Social History    Substance and Sexual Activity      Alcohol use: No       Family History:  Family History   Problem Relation Age of Onset    Family History Negative Other        Review of Systems:   A complete review of systems was negative except as mentioned in the History of Present Illness.     Objective & Physical Exam:  BP (!) 146/68 (BP Location: Right arm, Patient Position: Sitting, Cuff Size: Adult Regular)   Pulse 67   Ht 1.549 m (5' 1\")   Wt 70.5 kg (155 lb 6.4 oz)   LMP  (LMP Unknown)   SpO2 99%   BMI 29.36 kg/m    Wt Readings from Last 2 Encounters:   04/20/23 70.5 kg (155 lb 6.4 oz)   04/03/23 70.5 kg (155 lb 8 oz)     Body mass index is 29.36 kg/m .   Body surface area is 1.74 meters squared.    Constitutional: appears stated age, in no apparent distress, appears to be well nourished  Head: normocephalic, atraumatic  Neck: supple, trachea midline  Pulmonary: clear to auscultation bilaterally, no wheezes, no rales, no increased work of breathing  Cardiovascular: JVP normal, regular rate, regular rhythm, normal S1 and S2, no S3, S4, no murmur appreciated, no lower extremity edema  Gastrointestinal: no guarding, non-rigid   Neurologic: awake, alert, moves all extremities  Skin: no jaundice, warm on limited exam, pacemaker site no hematoma, no erythema, no significant tenderness to palpation  Psychiatric: affect is normal, " answers questions appropriately, oriented to self and place    Data reviewed:  Lab Results   Component Value Date    WBC 9.0 11/01/2022    WBC 10.0 05/26/2021    RBC 3.99 11/01/2022    RBC 4.30 05/26/2021    HGB 12.1 11/01/2022    HGB 12.9 05/26/2021    HCT 37.3 11/01/2022    HCT 40.3 05/26/2021    MCV 94 11/01/2022    MCV 94 05/26/2021    MCH 30.3 11/01/2022    MCH 30.0 05/26/2021    MCHC 32.4 11/01/2022    MCHC 32.0 05/26/2021    RDW 14.3 11/01/2022    RDW 13.7 05/26/2021     11/01/2022     05/26/2021     Sodium   Date Value Ref Range Status   03/02/2023 134 (L) 136 - 145 mmol/L Final   04/30/2021 135 133 - 144 mmol/L Final     Potassium   Date Value Ref Range Status   11/01/2022 3.7 3.4 - 5.3 mmol/L Final   04/30/2021 4.4 3.4 - 5.3 mmol/L Final     Chloride   Date Value Ref Range Status   11/01/2022 100 94 - 109 mmol/L Final   04/30/2021 102 94 - 109 mmol/L Final     Carbon Dioxide   Date Value Ref Range Status   04/30/2021 30 20 - 32 mmol/L Final     Carbon Dioxide (CO2)   Date Value Ref Range Status   11/01/2022 28 20 - 32 mmol/L Final     Anion Gap   Date Value Ref Range Status   11/01/2022 4 3 - 14 mmol/L Final   04/30/2021 3 3 - 14 mmol/L Final     Glucose   Date Value Ref Range Status   11/01/2022 82 70 - 99 mg/dL Final   04/30/2021 89 70 - 99 mg/dL Final     Urea Nitrogen   Date Value Ref Range Status   11/01/2022 16 7 - 30 mg/dL Final   04/30/2021 18 7 - 30 mg/dL Final     Creatinine   Date Value Ref Range Status   11/01/2022 0.82 0.52 - 1.04 mg/dL Final   04/30/2021 0.77 0.52 - 1.04 mg/dL Final     GFR Estimate   Date Value Ref Range Status   11/01/2022 69 >60 mL/min/1.73m2 Final     Comment:     Effective December 21, 2021 eGFRcr in adults is calculated using the 2021 CKD-EPI creatinine equation which includes age and gender (Donavon heller al., NEJ, DOI: 10.1056/QMCKjt4664580)   04/30/2021 70 >60 mL/min/[1.73_m2] Final     Comment:     Non  GFR Calc  Starting 12/18/2018, serum  creatinine based estimated GFR (eGFR) will be   calculated using the Chronic Kidney Disease Epidemiology Collaboration   (CKD-EPI) equation.       GFR, ESTIMATED POCT   Date Value Ref Range Status   2021 >60 >60 mL/min/1.73m2 Final     Calcium   Date Value Ref Range Status   2022 9.0 8.5 - 10.1 mg/dL Final   2021 9.3 8.5 - 10.1 mg/dL Final     Bilirubin Total   Date Value Ref Range Status   2022 0.9 0.2 - 1.3 mg/dL Final   2021 1.1 0.2 - 1.3 mg/dL Final     Alkaline Phosphatase   Date Value Ref Range Status   2022 57 40 - 150 U/L Final   2021 70 40 - 150 U/L Final     ALT   Date Value Ref Range Status   2022 24 0 - 50 U/L Final   2021 27 0 - 50 U/L Final     AST   Date Value Ref Range Status   2022 25 0 - 45 U/L Final   2021 27 0 - 45 U/L Final     Recent Labs   Lab Test 23  0723 22  1505   CHOL 147 132   HDL 73 81   LDL 60 24   TRIG 71 136      Lab Results   Component Value Date    A1C 5.4 10/13/2022        Recent Results (from the past 4320 hour(s))   Echocardiogram Complete   Result Value    LVEF  55-60%    Narrative    [id number]  [id number]  [id number]  [id number]^ENRIQUE^HENRY^BRYAN     [facility]  Echocardiography Laboratory  [address] [place] [address]     Name: CORA WAY  MRN: [id number]  : 1935  Study Date: 04/10/2023 10:45 AM  Age: 88 yrs  Gender: Female  Patient Location: [facility]  Reason For Study: Nonrheumatic mitral valve regurgitation  Ordering Physician: HENRY NUNEZ  Referring Physician: HENRY NUNEZ  Performed By: OUMOU Nicole     BSA: 1.7 m2  Height: 61 in  Weight: 155 lb  BP: 148/87 mmHg  ______________________________________________________________________________  Procedure  Complete Echo Adult. Optison (NDC #[id number]) given intravenously.  ______________________________________________________________________________  Interpretation Summary     1. The left ventricle is normal in  size. There is normal left ventricular wall  thickness. Left ventricular systolic function is normal. The visual ejection  fraction is 55-60%. Diastolic Doppler findings (E/E' ratio and/or other  parameters) suggest left ventricular filling pressures are increased. Septal  wall motion abnormality may reflect pacemaker activation. There is no thrombus  seen in the left ventricle.  2. The right ventricle is normal size. There is a catheter/pacemaker lead seen  in the right ventricle. The right ventricular systolic function is normal.  3.There is moderate biatrial enlargement. There is no color Doppler evidence  of an atrial shunt.  4. There is moderate mitral annular calcification. There is mild to moderate  (1-2+) mitral regurgitation.  5. Mild aortic regurgitation.  6. In direct comparison to the previous study dated 03/22/2022, the findings  are similar.  ______________________________________________________________________________  Left Ventricle  The left ventricle is normal in size. There is normal left ventricular wall  thickness. Left ventricular systolic function is normal. The visual ejection  fraction is 55-60%. Diastolic Doppler findings (E/E' ratio and/or other  parameters) suggest left ventricular filling pressures are increased. Septal  wall motion abnormality may reflect pacemaker activation. There is no thrombus  seen in the left ventricle.     Right Ventricle  The right ventricle is normal size. There is a catheter/pacemaker lead seen in  the right ventricle. The right ventricular systolic function is normal.     Atria  There is moderate biatrial enlargement. There is no color Doppler evidence of  an atrial shunt.     Mitral Valve  There is moderate mitral annular calcification. There is mild to moderate (1-  2+) mitral regurgitation.     Tricuspid Valve  There is trace tricuspid regurgitation. Right ventricular systolic pressure  could not be approximated due to inadequate tricuspid  regurgitation.     Aortic Valve  There is mild trileaflet aortic sclerosis. There is mild (1+) aortic  regurgitation. No aortic stenosis is present.     Pulmonic Valve  There is no pulmonic valvular stenosis.     Vessels  The aortic root is normal size. Normal size ascending aorta. The inferior vena  cava is normal.     Pericardium  There is no pericardial effusion.     Rhythm  Sinus rhythm was noted.  ______________________________________________________________________________  MMode/2D Measurements & Calculations     IVSd: 0.97 cm  LVIDd: 3.8 cm  LVIDs: 2.3 cm  LVPWd: 0.92 cm  FS: 39.4 %  LV mass(C)d: 110.1 grams  LV mass(C)dI: 64.9 grams/m2  Ao root diam: 2.5 cm  asc Aorta Diam: 3.0 cm  LVOT diam: 1.8 cm  LVOT area: 2.6 cm2  LA Volume (BP): 67.1 ml  LA Volume Index (BP): 39.5 ml/m2  RV Base: 2.8 cm     RWT: 0.48  TAPSE: 2.0 cm     Doppler Measurements & Calculations  MV E max rg: 105.0 cm/sec  MV A max rg: 92.2 cm/sec  MV E/A: 1.1  MV max P.0 mmHg  MV mean P.7 mmHg  MV V2 VTI: 24.9 cm  MV dec time: 0.19 sec  AI P1/2t: 412.2 msec  PA V2 max: 90.6 cm/sec  PA max PG: 3.3 mmHg  PA acc time: 0.07 sec  E/E' av.3  Lateral E/e': 12.1  Medial E/e': 22.4  RV S Rg: 11.4 cm/sec     ______________________________________________________________________________  Report approved by: Radha Chau 04/10/2023 12:01 PM                  Thank you for allowing me to participate in the care of your patient.      Sincerely,     Germán Shin MD     [facility]  cc:   Germán Shin MD  [address] [facility] [address] [place] [id number]

## 2023-05-22 ENCOUNTER — NURSE TRIAGE (OUTPATIENT)
Dept: INTERNAL MEDICINE | Facility: CLINIC | Age: 88
End: 2023-05-22
Payer: COMMERCIAL

## 2023-05-22 NOTE — TELEPHONE ENCOUNTER
Patient daughter calls to report that her mom has had severe stomach pain for 7+ days. No vomiting or diarrhea but frequent BM, 3 to 4 times daily. Stomach ache follows having a bm. Does not wake her at night    Hesitant to eat as she is avoiding stomach pain.     Appointment was offered and declined as appt times did not work for her. They only want to see Dr Sulaiman Malhotra is asking what to do to help her mom     pls call [phone] Marya / daughter

## 2023-05-23 ENCOUNTER — TELEPHONE (OUTPATIENT)
Dept: INTERNAL MEDICINE | Facility: CLINIC | Age: 88
End: 2023-05-23
Payer: COMMERCIAL

## 2023-05-23 ENCOUNTER — TELEPHONE (OUTPATIENT)
Dept: NURSING | Facility: CLINIC | Age: 88
End: 2023-05-23
Payer: COMMERCIAL

## 2023-05-23 NOTE — TELEPHONE ENCOUNTER
Daughter Marya advised of MD message.  Daughter states she knows no other providers, would prefer to have mother seen by Dr. Hilario.  Advised there are no openings with Dr. Hilario and that mother need's to be evaluated before next available openings.  Advised UC or schedule with another provider.  Daughter then asked to be transferred to scheduling.  DANIELLA Fang R.N.

## 2023-05-23 NOTE — TELEPHONE ENCOUNTER
Reason for Call:  Appointment Request    Patient requesting this type of appt:  PAIN/ILLNESS    Requested provider: Diana Hilario    Reason patient unable to be scheduled: Not within requested timeframe    When does patient want to be seen/preferred time: Same day    Comments: ABDOMINAL PAIN X1WK, BOWEL MOVEMENTS 4-5X'S A DAY. IF NO AVAILABLE APPT, CAN PCP RECOMMEND SCHEDULING WITH SPECIALTY.     [facility] or would you prefer to receive a phone call?:   Patient would prefer a phone call   Okay to leave a detailed message?: Yes at Cell number on file:    Telephone Information:   Mobile [phone]       Call taken on 5/23/2023 at 4:55 PM by Danielle Cervantes V

## 2023-05-23 NOTE — TELEPHONE ENCOUNTER
Triage Call:    Patient's daughter, Marya, transferred from scheduling due to patient having abdominal pain for a week.  Consent to communicate located in chart.  Daughter reports that there has been no changes since patient was triaged yesterday.   sent note to PCP to request appointment for tomorrow.  Encouraged daughter to bring patient to UC/ED tonight as per disposition and she declined stating that the patient is able to tolerate the pain.    Natalie Pabon RN  05/23/23 5:07 PM  [facility] Nurse Advisor

## 2023-05-23 NOTE — TELEPHONE ENCOUNTER
pts daughter calling stating that we made an appointment for her - she just got a text message from Anthon about an appointment     Rn reviewed appointments with daughter and noted there was not one made yet, then daughter pulled up text message and stated that it was for a different thing and to disregard this call,  she will wait for the triage nurse fabi to call her back    Natalie Dixon RN, BSN  [facility] Triage

## 2023-05-23 NOTE — TELEPHONE ENCOUNTER
Erroneous Encounter - Disregard    Natalie Pabon RN  05/23/23 5:07 PM  [facility] Nurse Advisor

## 2023-05-23 NOTE — TELEPHONE ENCOUNTER
Daughter calls back. There is CTC on file.   Pt is having Severe abdominal pain around the belly button all the time.   No lumps.   Symptoms Going on for 8 days or more.   Having frequent BM 3-4 times a day.   Everything, food, fluids, are upsetting her stomach.   No recent travel. No recent antibiotics. No blood noted, per Marya.   She is in bed a lot.     Drinking enough fluids per the daughter.   Offered ED, ADS. Marya declined any appointments this week.   Marya cannot bring her in today.     The soonest she could bring her in would be 5/30. She is waiting for her brother to come into town from [place].     Reason for Disposition    Constant abdominal pain lasting > 2 hours    Additional Information    Negative: Passed out (i.e., fainted, collapsed and was not responding)    Negative: Shock suspected (e.g., cold/pale/clammy skin, too weak to stand, low BP, rapid pulse)    Negative: Sounds like a life-threatening emergency to the triager    Negative: Chest pain    Negative: Pain is mainly in upper abdomen (if needed ask: 'is it mainly above the belly button?')    Negative: Abdominal pain and pregnant < 20 weeks    Negative: Abdominal pain and pregnant 20 or more weeks    Negative: SEVERE abdominal pain (e.g., excruciating)    Negative: Vomiting red blood or black (coffee ground) material    Negative: Bloody, black, or tarry bowel movements  (Exception: Chronic-unchanged black-grey bowel movements and is taking iron pills or Pepto-Bismol.)    Protocols used: ABDOMINAL PAIN - FEMALE-A-OH

## 2023-05-25 ENCOUNTER — OFFICE VISIT (OUTPATIENT)
Dept: INTERNAL MEDICINE | Facility: CLINIC | Age: 88
End: 2023-05-25
Payer: COMMERCIAL

## 2023-05-25 VITALS
HEIGHT: 61 IN | RESPIRATION RATE: 18 BRPM | DIASTOLIC BLOOD PRESSURE: 62 MMHG | OXYGEN SATURATION: 100 % | TEMPERATURE: 97.3 F | SYSTOLIC BLOOD PRESSURE: 104 MMHG | WEIGHT: 154 LBS | BODY MASS INDEX: 29.07 KG/M2 | HEART RATE: 105 BPM

## 2023-05-25 DIAGNOSIS — R10.84 ABDOMINAL PAIN, GENERALIZED: Primary | ICD-10-CM

## 2023-05-25 DIAGNOSIS — R19.7 DIARRHEA, UNSPECIFIED TYPE: ICD-10-CM

## 2023-05-25 DIAGNOSIS — K52.9 GASTROENTERITIS: ICD-10-CM

## 2023-05-25 PROCEDURE — 99213 OFFICE O/P EST LOW 20 MIN: CPT | Performed by: FAMILY MEDICINE

## 2023-05-25 ASSESSMENT — ENCOUNTER SYMPTOMS: ABDOMINAL PAIN: 1

## 2023-05-25 NOTE — NURSING NOTE
"Chief Complaint   Patient presents with     Abdominal Pain     Stomach pain and diarrhea     initial /62   Pulse 105   Temp 97.3  F (36.3  C) (Oral)   Resp 18   Ht 1.549 m (5' 1\")   Wt 69.9 kg (154 lb)   LMP  (LMP Unknown)   SpO2 100%   BMI 29.10 kg/m   Estimated body mass index is 29.1 kg/m  as calculated from the following:    Height as of this encounter: 1.549 m (5' 1\").    Weight as of this encounter: 69.9 kg (154 lb)..  bp completed using cuff size regular  DANGELO LOYD LPN  "

## 2023-05-25 NOTE — PATIENT INSTRUCTIONS
Tea.    Soup, rice.    Yogurt.    Light foods only until improving.    Tylenol is OK for pain.

## 2023-05-25 NOTE — PROGRESS NOTES
"  (R10.84) Abdominal pain, generalized  (primary encounter diagnosis)  Comment:   Plan:     (R19.7) Diarrhea, unspecified type  Comment:   Plan:     (K52.9) Gastroenteritis  Comment:   Plan:     Initially some pains and now diarrhea for a few days.  Benign abdominal exam.  Presently more of a gastroenteritis picture.  Try to continue with clear liquids and light foods and Tylenol.  Observe.  Worsening pain, fever, vomiting would be indications for emergency room.  Discussed with her daughter also.        Joanna [place] is a 88 year old, presenting for the following health issues:  Abdominal Pain (Stomach pain and diarrhea)        5/25/2023     4:18 PM   Additional Questions   Roomed by Pita NOE LPN     Abdominal Pain     History of Present Illness       Reason for visit:  Stomach pain and diarahea  Symptom onset:  3-7 days ago  Symptom intensity:  Severe  Symptom progression:  Staying the same  Had these symptoms before:  No    She eats 0-1 servings of fruits and vegetables daily.She consumes 1 sweetened beverage(s) daily.She exercises with enough effort to increase her heart rate 9 or less minutes per day.  She exercises with enough effort to increase her heart rate 3 or less days per week.   She is taking medications regularly.       This is a nice 88-year-old woman here with her daughter.    She has had some waxing and waning, crampy generalized pain for 7 to 10 days.  Then in the last 2 to 3 days, she has developed diarrhea.  She is not having bloody diarrhea.  Not having fever.  No vomiting.  She has lightened up on her diet.      Review of Systems   Gastrointestinal: Positive for abdominal pain.      No fever.  No respiratory congestion.  No chest pain.  No extremity pain.  No edema.  No weakness.      Objective    /62   Pulse 105   Temp 97.3  F (36.3  C) (Oral)   Resp 18   Ht 1.549 m (5' 1\")   Wt 69.9 kg (154 lb)   LMP  (LMP Unknown)   SpO2 100%   BMI 29.10 kg/m    There is no height or " weight on file to calculate BMI.  Physical Exam     Alert.  Does not appear in distress.  No scleral icterus.  Pharynx appears normal, mucous membranes moist.  Neck without adenopathy or mass.  Lungs clear, nonlabored breathing.  Cardiac RSR without murmur.  Abdomen nondistended, active bowel sounds without high-pitched sounds or rushes.  No localized area of tenderness or guarding.  No lower extremity edema.  Ambulates with some assistance.

## 2023-05-31 LAB
BASOPHILS # BLD AUTO: 0 10E3/UL (ref 0–0.2)
BASOPHILS NFR BLD AUTO: 1 %
EOSINOPHIL # BLD AUTO: 0.2 10E3/UL (ref 0–0.7)
EOSINOPHIL NFR BLD AUTO: 3 %
ERYTHROCYTE [DISTWIDTH] IN BLOOD BY AUTOMATED COUNT: 14.2 % (ref 10–15)
HCT VFR BLD AUTO: 36.6 % (ref 35–47)
HGB BLD-MCNC: 12.1 G/DL (ref 11.7–15.7)
IMM GRANULOCYTES # BLD: 0 10E3/UL
IMM GRANULOCYTES NFR BLD: 0 %
LYMPHOCYTES # BLD AUTO: 1 10E3/UL (ref 0.8–5.3)
LYMPHOCYTES NFR BLD AUTO: 17 %
MCH RBC QN AUTO: 30.6 PG (ref 26.5–33)
MCHC RBC AUTO-ENTMCNC: 33.1 G/DL (ref 31.5–36.5)
MCV RBC AUTO: 93 FL (ref 78–100)
MONOCYTES # BLD AUTO: 0.7 10E3/UL (ref 0–1.3)
MONOCYTES NFR BLD AUTO: 12 %
NEUTROPHILS # BLD AUTO: 4.3 10E3/UL (ref 1.6–8.3)
NEUTROPHILS NFR BLD AUTO: 67 %
NRBC # BLD AUTO: 0 10E3/UL
NRBC BLD AUTO-RTO: 0 /100
PLATELET # BLD AUTO: 158 10E3/UL (ref 150–450)
RBC # BLD AUTO: 3.95 10E6/UL (ref 3.8–5.2)
WBC # BLD AUTO: 6.3 10E3/UL (ref 4–11)

## 2023-05-31 PROCEDURE — 84484 ASSAY OF TROPONIN QUANT: CPT | Performed by: EMERGENCY MEDICINE

## 2023-05-31 PROCEDURE — 99285 EMERGENCY DEPT VISIT HI MDM: CPT | Mod: 25

## 2023-05-31 PROCEDURE — 82310 ASSAY OF CALCIUM: CPT | Performed by: EMERGENCY MEDICINE

## 2023-05-31 PROCEDURE — 93005 ELECTROCARDIOGRAM TRACING: CPT

## 2023-05-31 PROCEDURE — 36415 COLL VENOUS BLD VENIPUNCTURE: CPT | Performed by: EMERGENCY MEDICINE

## 2023-05-31 PROCEDURE — 83880 ASSAY OF NATRIURETIC PEPTIDE: CPT | Performed by: EMERGENCY MEDICINE

## 2023-05-31 PROCEDURE — 85025 COMPLETE CBC W/AUTO DIFF WBC: CPT | Performed by: EMERGENCY MEDICINE

## 2023-05-31 PROCEDURE — 84484 ASSAY OF TROPONIN QUANT: CPT | Mod: 91 | Performed by: EMERGENCY MEDICINE

## 2023-06-01 ENCOUNTER — APPOINTMENT (OUTPATIENT)
Dept: CT IMAGING | Facility: CLINIC | Age: 88
End: 2023-06-01
Attending: EMERGENCY MEDICINE
Payer: COMMERCIAL

## 2023-06-01 ENCOUNTER — APPOINTMENT (OUTPATIENT)
Dept: ULTRASOUND IMAGING | Facility: CLINIC | Age: 88
End: 2023-06-01
Attending: EMERGENCY MEDICINE
Payer: COMMERCIAL

## 2023-06-01 ENCOUNTER — HOSPITAL ENCOUNTER (EMERGENCY)
Facility: CLINIC | Age: 88
Discharge: HOME | End: 2023-06-01
Attending: EMERGENCY MEDICINE | Admitting: EMERGENCY MEDICINE
Payer: COMMERCIAL

## 2023-06-01 VITALS
OXYGEN SATURATION: 96 % | SYSTOLIC BLOOD PRESSURE: 152 MMHG | BODY MASS INDEX: 29.29 KG/M2 | RESPIRATION RATE: 18 BRPM | DIASTOLIC BLOOD PRESSURE: 95 MMHG | TEMPERATURE: 97.9 F | WEIGHT: 155 LBS | HEART RATE: 69 BPM

## 2023-06-01 DIAGNOSIS — J18.9 PNEUMONIA OF LEFT LOWER LOBE DUE TO INFECTIOUS ORGANISM: ICD-10-CM

## 2023-06-01 DIAGNOSIS — Z79.01 CHRONIC ANTICOAGULATION: ICD-10-CM

## 2023-06-01 DIAGNOSIS — E87.1 HYPONATREMIA: ICD-10-CM

## 2023-06-01 LAB
ANION GAP SERPL CALCULATED.3IONS-SCNC: 10 MMOL/L (ref 7–15)
ATRIAL RATE - MUSE: 276 BPM
BUN SERPL-MCNC: 11.5 MG/DL (ref 8–23)
CALCIUM SERPL-MCNC: 9 MG/DL (ref 8.8–10.2)
CHLORIDE SERPL-SCNC: 93 MMOL/L (ref 98–107)
CREAT SERPL-MCNC: 0.93 MG/DL (ref 0.51–0.95)
DEPRECATED HCO3 PLAS-SCNC: 24 MMOL/L (ref 22–29)
DIASTOLIC BLOOD PRESSURE - MUSE: NORMAL MMHG
GFR SERPL CREATININE-BSD FRML MDRD: 59 ML/MIN/1.73M2
GLUCOSE SERPL-MCNC: 137 MG/DL (ref 70–99)
HOLD SPECIMEN: NORMAL
HOLD SPECIMEN: NORMAL
INTERPRETATION ECG - MUSE: NORMAL
NT-PROBNP SERPL-MCNC: ABNORMAL PG/ML (ref 0–1800)
P AXIS - MUSE: NORMAL DEGREES
POTASSIUM SERPL-SCNC: 4.7 MMOL/L (ref 3.4–5.3)
PR INTERVAL - MUSE: NORMAL MS
QRS DURATION - MUSE: 136 MS
QT - MUSE: 456 MS
QTC - MUSE: 488 MS
R AXIS - MUSE: -65 DEGREES
SODIUM SERPL-SCNC: 127 MMOL/L (ref 136–145)
SYSTOLIC BLOOD PRESSURE - MUSE: NORMAL MMHG
T AXIS - MUSE: 163 DEGREES
TROPONIN T SERPL HS-MCNC: 20 NG/L
TROPONIN T SERPL HS-MCNC: 26 NG/L
VENTRICULAR RATE- MUSE: 69 BPM

## 2023-06-01 PROCEDURE — 74177 CT ABD & PELVIS W/CONTRAST: CPT

## 2023-06-01 PROCEDURE — 250N000011 HC RX IP 250 OP 636: Performed by: EMERGENCY MEDICINE

## 2023-06-01 PROCEDURE — 84484 ASSAY OF TROPONIN QUANT: CPT | Performed by: EMERGENCY MEDICINE

## 2023-06-01 PROCEDURE — 76705 ECHO EXAM OF ABDOMEN: CPT

## 2023-06-01 PROCEDURE — 36415 COLL VENOUS BLD VENIPUNCTURE: CPT | Performed by: EMERGENCY MEDICINE

## 2023-06-01 PROCEDURE — 250N000013 HC RX MED GY IP 250 OP 250 PS 637: Performed by: EMERGENCY MEDICINE

## 2023-06-01 RX ORDER — AZITHROMYCIN 250 MG/1
250 TABLET, FILM COATED ORAL DAILY
Qty: 4 TABLET | Refills: 0 | Status: ON HOLD | OUTPATIENT
Start: 2023-06-01 | End: 2023-06-21

## 2023-06-01 RX ORDER — AZITHROMYCIN 250 MG/1
500 TABLET, FILM COATED ORAL ONCE
Status: COMPLETED | OUTPATIENT
Start: 2023-06-01 | End: 2023-06-01

## 2023-06-01 RX ORDER — MORPHINE SULFATE 4 MG/ML
4 INJECTION, SOLUTION INTRAMUSCULAR; INTRAVENOUS ONCE
Status: DISCONTINUED | OUTPATIENT
Start: 2023-06-01 | End: 2023-06-01 | Stop reason: HOSPADM

## 2023-06-01 RX ORDER — IOPAMIDOL 755 MG/ML
500 INJECTION, SOLUTION INTRAVASCULAR ONCE
Status: COMPLETED | OUTPATIENT
Start: 2023-06-01 | End: 2023-06-01

## 2023-06-01 RX ADMIN — AZITHROMYCIN MONOHYDRATE 500 MG: 250 TABLET ORAL at 05:18

## 2023-06-01 RX ADMIN — IOPAMIDOL 65 ML: 755 INJECTION, SOLUTION INTRAVENOUS at 03:45

## 2023-06-01 RX ADMIN — AMOXICILLIN AND CLAVULANATE POTASSIUM 1 TABLET: 875; 125 TABLET, FILM COATED ORAL at 05:20

## 2023-06-01 ASSESSMENT — ACTIVITIES OF DAILY LIVING (ADL)
ADLS_ACUITY_SCORE: 35
ADLS_ACUITY_SCORE: 35

## 2023-06-01 NOTE — ED PROVIDER NOTES
History     Chief Complaint:  Abdominal pain and Shortness of Breath     HPI   Jeannie Chu is a 88 year old female who presents with several weeks of intermittent abdominal pain that seems to get worse with food.  She has been eating less because of this.  In the last days she has become more short of breath and has gotten very winded with simple things like getting out of a chair which is new for her.  No cough or fever.  She has no known underlying lung disease.  Her daughter notes she has been coughing more lately than usual.  She has had some vague pains in her chest but more points to her right upper quadrant.  No vomiting or diarrhea.      Independent Historian:    In addition to the patient, the daughter provides additional history    Review of External Notes:  Care everywhere reviewed in epic updated    Medications:    atorvastatin (LIPITOR) 10 MG tablet  carvedilol (COREG) 12.5 MG tablet  IRON (FERROUS GLUCONATE) 325 MG OR TABS  levothyroxine (SYNTHROID/LEVOTHROID) 88 MCG tablet  losartan (COZAAR) 50 MG tablet  Multiple Vitamins-Minerals (ICAPS AREDS FORMULA PO)  niacin 500 MG tablet  nitroGLYcerin (NITROSTAT) 0.4 MG sublingual tablet  rivaroxaban ANTICOAGULANT (XARELTO) 20 MG TABS tablet      Past Medical History:    Diagnosis     Acquired hypothyroidism     Atrial fibrillation     Benign essential hypertension     Coronary artery disease     Hyperlipidemia       Past Surgical History:    Past Surgical History:   Procedure Laterality Date     Coronary angiogram with stent placement       EP ABLATION AV NODE N/A 10/31/2022    Procedure: Ablation Atrioventricular Node;  Surgeon: Rogelio Whitt MD;  Location: [facility] CARDIAC CATH LAB     EP ABLATION AV NODE N/A 11/01/2022    Procedure: EP Ablation AV Node;  Surgeon: Jd Baker MD;  Location: [facility] CARDIAC CATH LAB     EP PACEMAKER DEVICE & LEAD IMPLANT- RIGHT VENTRICULAR N/A 10/31/2022    Procedure: Pacemaker Device & Lead Implant-  Right ventricular;  Surgeon: Rogelio Whitt MD;  [facility]      Physical Exam     Patient Vitals for the past 24 hrs:   BP Temp Pulse Resp SpO2 Weight   06/01/23 0119 -- -- -- -- 96 % --   06/01/23 0118  152/95 -- 69 -- -- --   05/31/23 2336  153/112 -- -- -- -- --   05/31/23 2335 -- 97.9  F (36.6  C) 69 18 98 % 70.3 kg (155 lb)      Physical Exam  Nursing note and vitals reviewed.  Constitutional: Cooperative. Sitting up comfortably.  HENT:   Mouth/Throat: Mucous membranes are normal.   Cardiovascular: Normal rate, regular rhythm and normal heart sounds.  No murmur.  Pulmonary/Chest: Effort normal and breath sounds normal. No respiratory distress. No wheezes. No rales. Pacemaker noted - left upper chest  Abdominal: Soft. Normal appearance and bowel sounds are normal. No distension. There is right upper quadrant tenderness. There is no rigidity and no guarding.   Neurological: Alert. Oriented x3  Skin: Skin is warm and dry   Psychiatric: Normal mood and affect.     Emergency Department Course   ECG  EKG shows a ventricular paced rhythm with a ventricular rate of 69 beats a minute.  Underlying atrial flutter appears to be present based on V1.    Imaging:  CT Chest (PE) Abdomen Pelvis w Contrast   Final Result   IMPRESSION:   1.  Nondiagnostic pulmonary artery angiogram due to severely suboptimal contrast bolus.   2.   Small bilateral pleural effusions. There is also a patchy area of groundglass opacity involving the left lower lobe which is suspicious for pneumonia.   3.  No correlate for the 2.3 cm isoechoic right hepatic lobe lesion seen on prior ultrasound. This was likely artifactual.   4.  No other acute process in the chest, abdomen, or pelvis.      US Abdomen Limited   Final Result   IMPRESSION:   1.  Unremarkable gallbladder. No biliary obstruction.   2.  Question isoechoic mass of the right lobe measuring up to 2.3 cm. Recommend liver protocol MRI or CT for further  characterization.   3.  Pancreas not well visualized due to overlying bowel gas.      Report per radiology    Laboratory:  Labs Ordered and Resulted from Time of ED Arrival to Time of ED Departure   BASIC METABOLIC PANEL - Abnormal       Result Value    Sodium 127 (*)     Potassium 4.7      Chloride 93 (*)     Carbon Dioxide (CO2) 24      Anion Gap 10      Urea Nitrogen 11.5      Creatinine 0.93      Calcium 9.0      Glucose 137 (*)     GFR Estimate 59 (*)    TROPONIN T, HIGH SENSITIVITY - Abnormal    Troponin T, High Sensitivity 26 (*)    TROPONIN T, HIGH SENSITIVITY - Abnormal    Troponin T, High Sensitivity 20 (*)    NT PROBNP INPATIENT - Abnormal    N terminal Pro BNP Inpatient 14,188 (*)    CBC WITH PLATELETS AND DIFFERENTIAL    WBC Count 6.3      RBC Count 3.95      Hemoglobin 12.1      Hematocrit 36.6      MCV 93      MCH 30.6      MCHC 33.1      RDW 14.2      Platelet Count 158      % Neutrophils 67      % Lymphocytes 17      % Monocytes 12      % Eosinophils 3      % Basophils 1      % Immature Granulocytes 0      NRBCs per 100 WBC 0      Absolute Neutrophils 4.3      Absolute Lymphocytes 1.0      Absolute Monocytes 0.7      Absolute Eosinophils 0.2      Absolute Basophils 0.0      Absolute Immature Granulocytes 0.0      Absolute NRBCs 0.0        Interventions:  Medications   0.9% sodium chloride BOLUS (has no administration in time range)   morphine (PF) injection 4 mg (has no administration in time range)   azithromycin (ZITHROMAX) tablet 500 mg (500 mg Oral $Given 6/1/23 0518)   amoxicillin-clavulanate (AUGMENTIN) 875-125 MG per tablet 1 tablet (1 tablet Oral $Given 6/1/23 0520)      Assessments:  0200: Patient assessed and additional labs and imaging ordered after triage assessment and labs reviewed  0500: Discussed findings of work-up.    Independent Interpretation (X-rays, CTs, rhythm strip):  None    Consultations/Discussion of Management or Tests:  None       Social Determinants of Health affecting  care:  English as second language.     Disposition:  The patient was discharged to home.     Impression & Plan      Medical Decision Makin-year-old female with nonspecific shortness of breath and cough.  CT was nondiagnostic for pulmonary embolism given suboptimal bolus though I have a low clinical suspicion that this is the case.  She is not hypoxic.  There is an alternative diagnosis with an infiltrate in the left lower lobe consistent with pneumonia which fits the clinical scenario with her cough and shortness of breath.  No acute findings in the abdomen despite some right upper quadrant abdominal tenderness on exam.  Specifically the gallbladder is reassuring.  Basic labs are otherwise unremarkable.  First dose of antibiotics administered in the emergency department with outpatient plan discussed in detail    Diagnosis:    ICD-10-CM    1. Pneumonia of left lower lobe due to infectious organism  J18.9       2. Chronic anticoagulation  Z79.01       3. Hyponatremia - Chronic  E87.1            Discharge Medications:  Discharge Medication List as of 2023  5:09 AM      START taking these medications    Details   amoxicillin-clavulanate (AUGMENTIN) 875-125 MG tablet Take 1 tablet by mouth 2 times daily for 7 days, Disp-14 tablet, R-0, Local Print      azithromycin (ZITHROMAX) 250 MG tablet Take 1 tablet (250 mg) by mouth daily for 4 days, Disp-4 tablet, R-0, Local Print                       Mark Carpenter MD  [phone]

## 2023-06-02 ENCOUNTER — HOSPITAL ENCOUNTER (INPATIENT)
Facility: CLINIC | Age: 88
LOS: 17 days | Discharge: HOME | End: 2023-06-21
Attending: EMERGENCY MEDICINE | Admitting: INTERNAL MEDICINE
Payer: COMMERCIAL

## 2023-06-02 ENCOUNTER — APPOINTMENT (OUTPATIENT)
Dept: GENERAL RADIOLOGY | Facility: CLINIC | Age: 88
End: 2023-06-02
Attending: EMERGENCY MEDICINE
Payer: COMMERCIAL

## 2023-06-02 DIAGNOSIS — J18.9 COMMUNITY ACQUIRED PNEUMONIA, UNSPECIFIED LATERALITY: ICD-10-CM

## 2023-06-02 DIAGNOSIS — M62.81 GENERALIZED MUSCLE WEAKNESS: ICD-10-CM

## 2023-06-02 DIAGNOSIS — R11.0 NAUSEA: ICD-10-CM

## 2023-06-02 DIAGNOSIS — R60.9 EDEMA, UNSPECIFIED TYPE: ICD-10-CM

## 2023-06-02 DIAGNOSIS — R10.84 ABDOMINAL PAIN, GENERALIZED: ICD-10-CM

## 2023-06-02 DIAGNOSIS — R79.89 ELEVATED BRAIN NATRIURETIC PEPTIDE (BNP) LEVEL: ICD-10-CM

## 2023-06-02 DIAGNOSIS — E87.1 HYPONATREMIA: ICD-10-CM

## 2023-06-02 DIAGNOSIS — I10 ESSENTIAL HYPERTENSION: ICD-10-CM

## 2023-06-02 DIAGNOSIS — E03.9 ACQUIRED HYPOTHYROIDISM: ICD-10-CM

## 2023-06-02 DIAGNOSIS — E87.70 HYPERVOLEMIA, UNSPECIFIED HYPERVOLEMIA TYPE: ICD-10-CM

## 2023-06-02 DIAGNOSIS — R10.13 ABDOMINAL PAIN, EPIGASTRIC: Primary | ICD-10-CM

## 2023-06-02 DIAGNOSIS — N39.0 URINARY TRACT INFECTION WITHOUT HEMATURIA, SITE UNSPECIFIED: ICD-10-CM

## 2023-06-02 DIAGNOSIS — E83.51 HYPOCALCEMIA: ICD-10-CM

## 2023-06-02 DIAGNOSIS — E83.42 HYPOMAGNESEMIA: ICD-10-CM

## 2023-06-02 DIAGNOSIS — R19.7 DIARRHEA, UNSPECIFIED TYPE: ICD-10-CM

## 2023-06-02 LAB
ALBUMIN SERPL BCG-MCNC: 3.7 G/DL (ref 3.5–5.2)
ALP SERPL-CCNC: 77 U/L (ref 35–104)
ALT SERPL W P-5'-P-CCNC: 55 U/L (ref 10–35)
ANION GAP SERPL CALCULATED.3IONS-SCNC: 8 MMOL/L (ref 7–15)
AST SERPL W P-5'-P-CCNC: 59 U/L (ref 10–35)
BASOPHILS # BLD AUTO: 0.1 10E3/UL (ref 0–0.2)
BASOPHILS NFR BLD AUTO: 1 %
BILIRUB DIRECT SERPL-MCNC: 0.26 MG/DL (ref 0–0.3)
BILIRUB SERPL-MCNC: 1 MG/DL
BUN SERPL-MCNC: 9.5 MG/DL (ref 8–23)
CALCIUM SERPL-MCNC: 8.6 MG/DL (ref 8.8–10.2)
CHLORIDE SERPL-SCNC: 94 MMOL/L (ref 98–107)
CREAT SERPL-MCNC: 0.73 MG/DL (ref 0.51–0.95)
DEPRECATED HCO3 PLAS-SCNC: 23 MMOL/L (ref 22–29)
EOSINOPHIL # BLD AUTO: 0.1 10E3/UL (ref 0–0.7)
EOSINOPHIL NFR BLD AUTO: 2 %
ERYTHROCYTE [DISTWIDTH] IN BLOOD BY AUTOMATED COUNT: 14.4 % (ref 10–15)
GFR SERPL CREATININE-BSD FRML MDRD: 79 ML/MIN/1.73M2
GLUCOSE SERPL-MCNC: 127 MG/DL (ref 70–99)
HCT VFR BLD AUTO: 32.6 % (ref 35–47)
HGB BLD-MCNC: 10.7 G/DL (ref 11.7–15.7)
IMM GRANULOCYTES # BLD: 0 10E3/UL
IMM GRANULOCYTES NFR BLD: 0 %
LYMPHOCYTES # BLD AUTO: 1.2 10E3/UL (ref 0.8–5.3)
LYMPHOCYTES NFR BLD AUTO: 17 %
MAGNESIUM SERPL-MCNC: 1.6 MG/DL (ref 1.7–2.3)
MCH RBC QN AUTO: 30.5 PG (ref 26.5–33)
MCHC RBC AUTO-ENTMCNC: 32.8 G/DL (ref 31.5–36.5)
MCV RBC AUTO: 93 FL (ref 78–100)
MONOCYTES # BLD AUTO: 0.8 10E3/UL (ref 0–1.3)
MONOCYTES NFR BLD AUTO: 11 %
NEUTROPHILS # BLD AUTO: 4.6 10E3/UL (ref 1.6–8.3)
NEUTROPHILS NFR BLD AUTO: 69 %
NRBC # BLD AUTO: 0 10E3/UL
NRBC BLD AUTO-RTO: 0 /100
NT-PROBNP SERPL-MCNC: ABNORMAL PG/ML (ref 0–1800)
PHOSPHATE SERPL-MCNC: 3.1 MG/DL (ref 2.5–4.5)
PLATELET # BLD AUTO: 147 10E3/UL (ref 150–450)
POTASSIUM SERPL-SCNC: 4.2 MMOL/L (ref 3.4–5.3)
PROT SERPL-MCNC: 6.7 G/DL (ref 6.4–8.3)
RBC # BLD AUTO: 3.51 10E6/UL (ref 3.8–5.2)
SODIUM SERPL-SCNC: 125 MMOL/L (ref 136–145)
WBC # BLD AUTO: 6.7 10E3/UL (ref 4–11)

## 2023-06-02 PROCEDURE — 36415 COLL VENOUS BLD VENIPUNCTURE: CPT | Performed by: EMERGENCY MEDICINE

## 2023-06-02 PROCEDURE — 96361 HYDRATE IV INFUSION ADD-ON: CPT

## 2023-06-02 PROCEDURE — 82248 BILIRUBIN DIRECT: CPT | Performed by: EMERGENCY MEDICINE

## 2023-06-02 PROCEDURE — 83735 ASSAY OF MAGNESIUM: CPT | Performed by: EMERGENCY MEDICINE

## 2023-06-02 PROCEDURE — 258N000003 HC RX IP 258 OP 636: Performed by: HOSPITALIST

## 2023-06-02 PROCEDURE — 250N000009 HC RX 250: Performed by: EMERGENCY MEDICINE

## 2023-06-02 PROCEDURE — 85025 COMPLETE CBC W/AUTO DIFF WBC: CPT | Performed by: EMERGENCY MEDICINE

## 2023-06-02 PROCEDURE — 96365 THER/PROPH/DIAG IV INF INIT: CPT

## 2023-06-02 PROCEDURE — 250N000013 HC RX MED GY IP 250 OP 250 PS 637: Performed by: EMERGENCY MEDICINE

## 2023-06-02 PROCEDURE — 94640 AIRWAY INHALATION TREATMENT: CPT

## 2023-06-02 PROCEDURE — 250N000011 HC RX IP 250 OP 636: Performed by: EMERGENCY MEDICINE

## 2023-06-02 PROCEDURE — 71046 X-RAY EXAM CHEST 2 VIEWS: CPT

## 2023-06-02 PROCEDURE — 84100 ASSAY OF PHOSPHORUS: CPT | Performed by: EMERGENCY MEDICINE

## 2023-06-02 PROCEDURE — 96375 TX/PRO/DX INJ NEW DRUG ADDON: CPT

## 2023-06-02 PROCEDURE — 83880 ASSAY OF NATRIURETIC PEPTIDE: CPT | Performed by: EMERGENCY MEDICINE

## 2023-06-02 PROCEDURE — 250N000011 HC RX IP 250 OP 636: Performed by: HOSPITALIST

## 2023-06-02 PROCEDURE — 99285 EMERGENCY DEPT VISIT HI MDM: CPT

## 2023-06-02 PROCEDURE — G0378 HOSPITAL OBSERVATION PER HR: HCPCS

## 2023-06-02 PROCEDURE — 80053 COMPREHEN METABOLIC PANEL: CPT | Performed by: EMERGENCY MEDICINE

## 2023-06-02 PROCEDURE — 99223 1ST HOSP IP/OBS HIGH 75: CPT | Performed by: HOSPITALIST

## 2023-06-02 RX ORDER — CARVEDILOL 6.25 MG/1
12.5 TABLET ORAL ONCE
Status: COMPLETED | OUTPATIENT
Start: 2023-06-02 | End: 2023-06-02

## 2023-06-02 RX ORDER — MAGNESIUM SULFATE HEPTAHYDRATE 40 MG/ML
2 INJECTION, SOLUTION INTRAVENOUS ONCE
Status: COMPLETED | OUTPATIENT
Start: 2023-06-02 | End: 2023-06-02

## 2023-06-02 RX ORDER — DICYCLOMINE HCL 20 MG
20 TABLET ORAL ONCE
Status: COMPLETED | OUTPATIENT
Start: 2023-06-02 | End: 2023-06-02

## 2023-06-02 RX ORDER — LOSARTAN POTASSIUM 50 MG/1
50 TABLET ORAL ONCE
Status: COMPLETED | OUTPATIENT
Start: 2023-06-02 | End: 2023-06-02

## 2023-06-02 RX ORDER — ACETAMINOPHEN 325 MG/1
650 TABLET ORAL EVERY 6 HOURS PRN
Status: DISCONTINUED | OUTPATIENT
Start: 2023-06-02 | End: 2023-06-19

## 2023-06-02 RX ORDER — MORPHINE SULFATE 4 MG/ML
4 INJECTION, SOLUTION INTRAMUSCULAR; INTRAVENOUS ONCE
Status: COMPLETED | OUTPATIENT
Start: 2023-06-02 | End: 2023-06-02

## 2023-06-02 RX ORDER — ACETAMINOPHEN 650 MG/1
650 SUPPOSITORY RECTAL EVERY 6 HOURS PRN
Status: DISCONTINUED | OUTPATIENT
Start: 2023-06-02 | End: 2023-06-19

## 2023-06-02 RX ORDER — ONDANSETRON 2 MG/ML
4 INJECTION INTRAMUSCULAR; INTRAVENOUS EVERY 6 HOURS PRN
Status: DISCONTINUED | OUTPATIENT
Start: 2023-06-02 | End: 2023-06-21 | Stop reason: HOSPADM

## 2023-06-02 RX ORDER — SODIUM CHLORIDE 9 MG/ML
INJECTION, SOLUTION INTRAVENOUS CONTINUOUS
Status: DISCONTINUED | OUTPATIENT
Start: 2023-06-02 | End: 2023-06-03

## 2023-06-02 RX ORDER — IPRATROPIUM BROMIDE AND ALBUTEROL SULFATE 2.5; .5 MG/3ML; MG/3ML
3 SOLUTION RESPIRATORY (INHALATION) ONCE
Status: COMPLETED | OUTPATIENT
Start: 2023-06-02 | End: 2023-06-02

## 2023-06-02 RX ORDER — ATORVASTATIN CALCIUM 10 MG/1
10 TABLET, FILM COATED ORAL ONCE
Status: COMPLETED | OUTPATIENT
Start: 2023-06-02 | End: 2023-06-02

## 2023-06-02 RX ORDER — ONDANSETRON 4 MG/1
4 TABLET, ORALLY DISINTEGRATING ORAL EVERY 6 HOURS PRN
Status: DISCONTINUED | OUTPATIENT
Start: 2023-06-02 | End: 2023-06-21 | Stop reason: HOSPADM

## 2023-06-02 RX ADMIN — MAGNESIUM SULFATE HEPTAHYDRATE 2 G: 2 INJECTION, SOLUTION INTRAVENOUS at 20:25

## 2023-06-02 RX ADMIN — ATORVASTATIN CALCIUM 10 MG: 10 TABLET, FILM COATED ORAL at 22:06

## 2023-06-02 RX ADMIN — IPRATROPIUM BROMIDE AND ALBUTEROL SULFATE 3 ML: 2.5; .5 SOLUTION RESPIRATORY (INHALATION) at 18:43

## 2023-06-02 RX ADMIN — LOSARTAN POTASSIUM 50 MG: 50 TABLET, FILM COATED ORAL at 22:05

## 2023-06-02 RX ADMIN — ONDANSETRON 4 MG: 2 INJECTION INTRAMUSCULAR; INTRAVENOUS at 23:56

## 2023-06-02 RX ADMIN — AMOXICILLIN AND CLAVULANATE POTASSIUM 1 TABLET: 875; 125 TABLET, FILM COATED ORAL at 22:06

## 2023-06-02 RX ADMIN — SODIUM CHLORIDE: 9 INJECTION, SOLUTION INTRAVENOUS at 23:45

## 2023-06-02 RX ADMIN — RIVAROXABAN 20 MG: 20 TABLET, FILM COATED ORAL at 22:06

## 2023-06-02 RX ADMIN — DICYCLOMINE HYDROCHLORIDE 20 MG: 20 TABLET ORAL at 22:07

## 2023-06-02 RX ADMIN — CARVEDILOL 12.5 MG: 6.25 TABLET, FILM COATED ORAL at 22:05

## 2023-06-02 RX ADMIN — MORPHINE SULFATE 4 MG: 4 INJECTION, SOLUTION INTRAMUSCULAR; INTRAVENOUS at 22:07

## 2023-06-02 ASSESSMENT — ACTIVITIES OF DAILY LIVING (ADL)
ADLS_ACUITY_SCORE: 35

## 2023-06-02 NOTE — ED TRIAGE NOTES
Arrives via EMS from home with complaints of diarrhea and lower abdominal pain.  Seen yesterday and sent home on two antibiotics for pneumonia.  Today presents wheezy with previously listed GI symptoms.  EMS reports diarrhea and abdominal pain x 3 weeks.  Hx of afib, has pacemaker,  in route otherwise VSS on RA.

## 2023-06-02 NOTE — ED PROVIDER NOTES
History     Chief Complaint:  Diarrhea and Wheezing       The history is provided by the EMS personnel and a relative.      Jeannie Chu is a 88 year old female with diarrhea and abdominal pain. EMS reports that the patient was seen here yesterday and was diagnosed with pneumonia and sent home with a prescription for Augmentin and Zithromax. She presents today with complaints of worsening diarrhea and abdominal pain. They report a history of anticoagulation due to atrial fibrillation.    Independent Historian:    Daughter - They report That the patient has been feeling weaker since going home.  She had watery diarrhea 7 times today.  The last time the patient wiped there was blood on the toilet tissue.  Due to the ongoing diarrhea over the last 3 weeks the daughter wonders if we are going to test her stool today.    Review of External Notes:  I reviewed the ED note from yesterday. I also reviewed office visit note from 5/25/23 for abdominal pain and diarrhea.    ROS:  See HPI    Allergies:  Amlodipine  Ibuprofen     Physical Exam     Patient Vitals for the past 24 hrs:   BP Temp Temp src Pulse Resp SpO2   06/02/23 2242 -- -- -- -- -- 98 %   06/02/23 2157 -- -- -- -- -- 100 %   06/02/23 2100 -- -- -- -- -- 99 %   06/02/23 2057 -- -- -- -- -- 99 %   06/02/23 2030 (!) 134/104 -- -- -- -- 100 %   06/02/23 1930 -- -- -- -- -- 99 %   06/02/23 1911 -- -- -- 69 -- --   06/02/23 1910 (!) 162/90 98.3  F (36.8  C) Oral -- 20 99 %   06/02/23 1909 -- -- -- -- -- 99 %        Physical Exam  Constitutional: Vital signs reviewed as above.   Head: No external signs of trauma noted.  Eyes: Pupils are equal, round, and reactive to light.   Neck: No JVD noted  Cardiovascular: normal rate, Regular rhythm and normal heart sounds.  No murmur heard. Equal B/L peripheral pulses.  Pulmonary/Chest: No respiratory distress. Patient has expiratory wheezes. Patient has no rales.   Gastrointestinal: Soft. There is no tenderness.    Musculoskeletal/Extremities: +1 B/L ankle pitting edema noted. Normal tone.  Neurological: Patient is alert and oriented to person, place, and time.   Skin: Skin is warm and dry. There is no diaphoresis noted.   Psychiatric: The patient appears calm.        Emergency Department Course     Imaging:  Chest XR,  PA & LAT   Final Result   IMPRESSION: Heart size is enlarged. Cardiac pacemaker is noted in place. No evidence of central pulmonary vasculature without overt failure, lobar consolidation, or significant effusions. No pneumothorax. No acute bony abnormality.         Report per radiology    Laboratory:  Labs Ordered and Resulted from Time of ED Arrival to Time of ED Departure   BASIC METABOLIC PANEL - Abnormal       Result Value    Sodium 125 (*)     Potassium 4.2      Chloride 94 (*)     Carbon Dioxide (CO2) 23      Anion Gap 8      Urea Nitrogen 9.5      Creatinine 0.73      Calcium 8.6 (*)     Glucose 127 (*)     GFR Estimate 79     MAGNESIUM - Abnormal    Magnesium 1.6 (*)    NT PROBNP INPATIENT - Abnormal    N terminal Pro BNP Inpatient 12,008 (*)    CBC WITH PLATELETS AND DIFFERENTIAL - Abnormal    WBC Count 6.7      RBC Count 3.51 (*)     Hemoglobin 10.7 (*)     Hematocrit 32.6 (*)     MCV 93      MCH 30.5      MCHC 32.8      RDW 14.4      Platelet Count 147 (*)     % Neutrophils 69      % Lymphocytes 17      % Monocytes 11      % Eosinophils 2      % Basophils 1      % Immature Granulocytes 0      NRBCs per 100 WBC 0      Absolute Neutrophils 4.6      Absolute Lymphocytes 1.2      Absolute Monocytes 0.8      Absolute Eosinophils 0.1      Absolute Basophils 0.1      Absolute Immature Granulocytes 0.0      Absolute NRBCs 0.0     HEPATIC FUNCTION PANEL - Abnormal    Protein Total 6.7      Albumin 3.7      Bilirubin Total 1.0      Alkaline Phosphatase 77      AST 59 (*)     ALT 55 (*)     Bilirubin Direct 0.26     PHOSPHORUS - Normal    Phosphorus 3.1     CALPROTECTIN FECES   ELASTASE FECAL   FECAL FAT  QUALITATIVE RANDOM   ENTERIC BACTERIA AND VIRUS PANEL BY BAHMAN STOOL   C. DIFFICILE TOXIN B PCR WITH REFLEX TO C. DIFFICILE ANTIGEN AND TOXINS A/B EIA        Procedures       Emergency Department Course & Assessments:             Interventions:  Medications   melatonin tablet 1 mg (has no administration in time range)   ondansetron (ZOFRAN ODT) ODT tab 4 mg (has no administration in time range)     Or   ondansetron (ZOFRAN) injection 4 mg (has no administration in time range)   acetaminophen (TYLENOL) tablet 650 mg (has no administration in time range)     Or   acetaminophen (TYLENOL) Suppository 650 mg (has no administration in time range)   sodium chloride 0.9% infusion ( Intravenous $New Bag 6/2/23 2345)   ipratropium - albuterol 0.5 mg/2.5 mg/3 mL (DUONEB) neb solution 3 mL (3 mLs Nebulization $Given 6/2/23 1843)   magnesium sulfate 2 g in 50 mL sterile water intermittent infusion (0 g Intravenous Stopped 6/2/23 2123)   morphine (PF) injection 4 mg (4 mg Intravenous $Given 6/2/23 2207)   dicyclomine (BENTYL) tablet 20 mg (20 mg Oral $Given 6/2/23 2207)   amoxicillin-clavulanate (AUGMENTIN) 875-125 MG per tablet 1 tablet (1 tablet Oral $Given 6/2/23 2206)   atorvastatin (LIPITOR) tablet 10 mg (10 mg Oral $Given 6/2/23 2206)   carvedilol (COREG) tablet 12.5 mg (12.5 mg Oral $Given 6/2/23 2205)   losartan (COZAAR) tablet 50 mg (50 mg Oral $Given 6/2/23 2205)   rivaroxaban ANTICOAGULANT (XARELTO) tablet 20 mg (20 mg Oral $Given 6/2/23 2206)        Assessments, Independent Interpretation, Consult/Discussion of ManagementTests:  ED Course as of 06/02/23 2347 Fri Jun 02, 2023 1821 I obtained history and examined the patient.    2015 Chest XR,  PA & LAT  No gross fluid overload   2130 Rechecked and updated.   2144 Evening meds ordered   2223 D/W Dr. Rayo. Ok for obs.   2240 The patient's family had questions.  A family friend of theirs is is a gastroenterologist and wonders if we can test for ova and parasites,  fecal fat, calprotectin, and elastase.  I do not see an option in epic for testing O&P though the other stool studies may be able to account for this.  I was able to add the other 3 test that they requested.       Social Determinants of Health affecting care:  None    Disposition:  The patient was placed in observation under the care of Dr. Rayo.     Impression & Plan    CMS Diagnoses: None    Code Status: Full Code    Medical Decision Making:  This 88-year-old female patient presents the ED due to diarrhea and weakness.  Please see the HPI and exam for specifics.  The patient remained well in the ED.  I reviewed yesterday's ED visit.  Laboratory studies were ordered today as well with results as above.  I note several electrolyte abnormalities and also I wonder if the patient could have some degree of CHF given her longstanding diarrhea, worse diarrhea today, abdominal pain, and antibiotic initiation.  Phosphorus and hepatic panel are pending.  After discussion with the hospitalist we will plan on placing the patient in observation for further monitoring and care.    Critical Care time:  was 0 minutes for this patient excluding procedures.    Diagnosis:    ICD-10-CM    1. Generalized muscle weakness  M62.81       2. Hypomagnesemia  E83.42       3. Hyponatremia  E87.1       4. Hypocalcemia  E83.51       5. Diarrhea, unspecified type  R19.7       6. Abdominal pain, generalized  R10.84       7. Community acquired pneumonia, unspecified laterality  J18.9       8. Elevated brain natriuretic peptide (BNP) level  R79.89            Discharge Medications:  New Prescriptions    No medications on file        Scribe Disclosure:  I, Ra Delong, am serving as a scribe at 6:25 PM on 6/2/2023 to document services personally performed by Trent Moreno DO based on my observations and the provider's statements to me.       Trent Moreno DO  06/02/23 2229       Trent Moreno DO  06/02/23  [id number]

## 2023-06-03 ENCOUNTER — APPOINTMENT (OUTPATIENT)
Dept: PHYSICAL THERAPY | Facility: CLINIC | Age: 88
End: 2023-06-03
Attending: PHYSICIAN ASSISTANT
Payer: COMMERCIAL

## 2023-06-03 LAB
ALBUMIN SERPL BCG-MCNC: 3.5 G/DL (ref 3.5–5.2)
ALP SERPL-CCNC: 70 U/L (ref 35–104)
ALT SERPL W P-5'-P-CCNC: 47 U/L (ref 10–35)
ANION GAP SERPL CALCULATED.3IONS-SCNC: 11 MMOL/L (ref 7–15)
AST SERPL W P-5'-P-CCNC: 57 U/L (ref 10–35)
BILIRUB SERPL-MCNC: 0.9 MG/DL
BUN SERPL-MCNC: 8.9 MG/DL (ref 8–23)
CALCIUM SERPL-MCNC: 8.5 MG/DL (ref 8.8–10.2)
CHLORIDE SERPL-SCNC: 93 MMOL/L (ref 98–107)
CREAT SERPL-MCNC: 0.75 MG/DL (ref 0.51–0.95)
DEPRECATED HCO3 PLAS-SCNC: 22 MMOL/L (ref 22–29)
ERYTHROCYTE [DISTWIDTH] IN BLOOD BY AUTOMATED COUNT: 14.3 % (ref 10–15)
GFR SERPL CREATININE-BSD FRML MDRD: 77 ML/MIN/1.73M2
GLUCOSE SERPL-MCNC: 87 MG/DL (ref 70–99)
HCT VFR BLD AUTO: 34.4 % (ref 35–47)
HGB BLD-MCNC: 11.2 G/DL (ref 11.7–15.7)
MAGNESIUM SERPL-MCNC: 1.8 MG/DL (ref 1.7–2.3)
MCH RBC QN AUTO: 30.7 PG (ref 26.5–33)
MCHC RBC AUTO-ENTMCNC: 32.6 G/DL (ref 31.5–36.5)
MCV RBC AUTO: 94 FL (ref 78–100)
PHOSPHATE SERPL-MCNC: 3.4 MG/DL (ref 2.5–4.5)
PLATELET # BLD AUTO: 143 10E3/UL (ref 150–450)
POTASSIUM SERPL-SCNC: 4.8 MMOL/L (ref 3.4–5.3)
PROT SERPL-MCNC: 6.5 G/DL (ref 6.4–8.3)
RBC # BLD AUTO: 3.65 10E6/UL (ref 3.8–5.2)
SODIUM SERPL-SCNC: 126 MMOL/L (ref 136–145)
SODIUM SERPL-SCNC: 127 MMOL/L (ref 136–145)
SODIUM UR-SCNC: <20 MMOL/L
WBC # BLD AUTO: 6.1 10E3/UL (ref 4–11)

## 2023-06-03 PROCEDURE — 250N000011 HC RX IP 250 OP 636: Performed by: HOSPITALIST

## 2023-06-03 PROCEDURE — 36415 COLL VENOUS BLD VENIPUNCTURE: CPT | Performed by: NURSE PRACTITIONER

## 2023-06-03 PROCEDURE — 84450 TRANSFERASE (AST) (SGOT): CPT | Performed by: HOSPITALIST

## 2023-06-03 PROCEDURE — 36415 COLL VENOUS BLD VENIPUNCTURE: CPT | Performed by: HOSPITALIST

## 2023-06-03 PROCEDURE — 82653 EL-1 FECAL QUANTITATIVE: CPT | Performed by: EMERGENCY MEDICINE

## 2023-06-03 PROCEDURE — 36415 COLL VENOUS BLD VENIPUNCTURE: CPT | Performed by: PHYSICIAN ASSISTANT

## 2023-06-03 PROCEDURE — G0378 HOSPITAL OBSERVATION PER HR: HCPCS

## 2023-06-03 PROCEDURE — 97530 THERAPEUTIC ACTIVITIES: CPT | Mod: GP | Performed by: PHYSICAL THERAPIST

## 2023-06-03 PROCEDURE — 83993 ASSAY FOR CALPROTECTIN FECAL: CPT | Performed by: EMERGENCY MEDICINE

## 2023-06-03 PROCEDURE — 87493 C DIFF AMPLIFIED PROBE: CPT | Performed by: EMERGENCY MEDICINE

## 2023-06-03 PROCEDURE — 83735 ASSAY OF MAGNESIUM: CPT | Performed by: PHYSICIAN ASSISTANT

## 2023-06-03 PROCEDURE — 250N000013 HC RX MED GY IP 250 OP 250 PS 637: Performed by: NURSE PRACTITIONER

## 2023-06-03 PROCEDURE — 83935 ASSAY OF URINE OSMOLALITY: CPT | Performed by: NURSE PRACTITIONER

## 2023-06-03 PROCEDURE — 96361 HYDRATE IV INFUSION ADD-ON: CPT

## 2023-06-03 PROCEDURE — 82705 FATS/LIPIDS FECES QUAL: CPT | Performed by: EMERGENCY MEDICINE

## 2023-06-03 PROCEDURE — 97161 PT EVAL LOW COMPLEX 20 MIN: CPT | Mod: GP | Performed by: PHYSICAL THERAPIST

## 2023-06-03 PROCEDURE — 87506 IADNA-DNA/RNA PROBE TQ 6-11: CPT | Performed by: EMERGENCY MEDICINE

## 2023-06-03 PROCEDURE — 84100 ASSAY OF PHOSPHORUS: CPT | Performed by: PHYSICIAN ASSISTANT

## 2023-06-03 PROCEDURE — 85027 COMPLETE CBC AUTOMATED: CPT | Performed by: HOSPITALIST

## 2023-06-03 PROCEDURE — 84300 ASSAY OF URINE SODIUM: CPT | Performed by: NURSE PRACTITIONER

## 2023-06-03 PROCEDURE — 250N000013 HC RX MED GY IP 250 OP 250 PS 637: Performed by: HOSPITALIST

## 2023-06-03 PROCEDURE — 99233 SBSQ HOSP IP/OBS HIGH 50: CPT | Performed by: NURSE PRACTITIONER

## 2023-06-03 PROCEDURE — 84295 ASSAY OF SERUM SODIUM: CPT | Performed by: NURSE PRACTITIONER

## 2023-06-03 RX ORDER — CARVEDILOL 12.5 MG/1
12.5 TABLET ORAL 2 TIMES DAILY WITH MEALS
Status: DISCONTINUED | OUTPATIENT
Start: 2023-06-03 | End: 2023-06-14

## 2023-06-03 RX ORDER — LOSARTAN POTASSIUM 50 MG/1
50 TABLET ORAL 2 TIMES DAILY
Status: DISCONTINUED | OUTPATIENT
Start: 2023-06-03 | End: 2023-06-14

## 2023-06-03 RX ORDER — LEVOTHYROXINE SODIUM 88 UG/1
88 TABLET ORAL DAILY
Status: DISCONTINUED | OUTPATIENT
Start: 2023-06-03 | End: 2023-06-07

## 2023-06-03 RX ORDER — ATORVASTATIN CALCIUM 10 MG/1
10 TABLET, FILM COATED ORAL DAILY
Status: DISCONTINUED | OUTPATIENT
Start: 2023-06-03 | End: 2023-06-08

## 2023-06-03 RX ADMIN — LOSARTAN POTASSIUM 50 MG: 50 TABLET, FILM COATED ORAL at 20:13

## 2023-06-03 RX ADMIN — RIVAROXABAN 20 MG: 20 TABLET, FILM COATED ORAL at 16:35

## 2023-06-03 RX ADMIN — ACETAMINOPHEN 650 MG: 325 TABLET, FILM COATED ORAL at 20:13

## 2023-06-03 RX ADMIN — ONDANSETRON 4 MG: 4 TABLET, ORALLY DISINTEGRATING ORAL at 14:15

## 2023-06-03 RX ADMIN — Medication 1 MG: at 00:15

## 2023-06-03 RX ADMIN — LEVOTHYROXINE SODIUM 88 MCG: 0.09 TABLET ORAL at 16:35

## 2023-06-03 RX ADMIN — ATORVASTATIN CALCIUM 10 MG: 10 TABLET, FILM COATED ORAL at 20:13

## 2023-06-03 RX ADMIN — Medication 1 MG: at 23:40

## 2023-06-03 RX ADMIN — CARVEDILOL 12.5 MG: 12.5 TABLET, FILM COATED ORAL at 18:06

## 2023-06-03 ASSESSMENT — ACTIVITIES OF DAILY LIVING (ADL)
ADLS_ACUITY_SCORE: 35
ADLS_ACUITY_SCORE: 37
ADLS_ACUITY_SCORE: 37
ADLS_ACUITY_SCORE: 35
ADLS_ACUITY_SCORE: 35

## 2023-06-03 NOTE — UTILIZATION REVIEW
"  [facility] Utilization Review  Admission Status; Secondary Review Determination     Admission Date: 6/2/2023  6:19 PM      Under the authority of the Utilization Management Committee, the utilization review process indicated a secondary review on the above patient.  The review outcome is based on review of the medical records, discussions with staff, and applying clinical experience noted on the date of the review.        (X) Observation Status Appropriate - This patient does not meet hospital inpatient criteria and is placed in observation status. If this patient's primary payer is Medicare and was admitted as an inpatient, Condition Code 44 should be used and patient status changed to \"observation\".   () Observation Status concurrent Review           RATIONALE FOR DETERMINATION   88-year-old female with history of atrial fibrillation, hypertension, coronary artery disease, hypothyroidism, admitted with worsening diarrhea associated with abdominal pain, ongoing for 3 weeks, diarrhea recently worse after taking antibiotics and noticed some blood in the stool.  Patient found to have hyponatremia, hypomagnesemia, hypocalcemia, CT chest abdomen pelvis did not show any acute pathology.  IV fluids have been discontinued, electrolyte abnormalities improving.  Diarrhea of unclear etiology, enteric pathogen pending.Patient does not meet criteria for inpatient stay, recommend continue observation status      The severity of illness, intensity of service provided, expected LOS make the care appropriate for observation status at this time.        The information on this document is developed by the utilization review team in order for the business office to ensure compliance.  This only denotes the appropriateness of proper admission status and does not reflect the quality of care rendered.         The definitions of Inpatient Status and Observation Status used in making the determination above are those provided " in the CMS Coverage Manual, Chapter 1 and Chapter 6, section 70.4.      Sincerely,       Beau Mccollum MD  Physician Advisor  Utilization Review-[place]    Phone: [phone]

## 2023-06-03 NOTE — PLAN OF CARE
ROOM # 227    Living Situation (if not independent, order SW consult): Home with family  Facility name:  : [facility] (Daughter)   [phone] (Home Phone)    Activity level at baseline: Independent  Activity level on admit: Assist x1    Who will be transporting you at discharge: family    Patient registered to observation; given Patient Bill of Rights; given the opportunity to ask questions about observation status and their plan of care.  Patient has been oriented to the observation room, bathroom and call light is in place.    Discussed discharge goals and expectations with patient/family.

## 2023-06-03 NOTE — PROGRESS NOTES
[facility]    Medicine Progress Note - Hospitalist Service    Date of Admission:  6/2/2023    Assessment & Plan      Jeannie Chu is a 88 year old female admitted on 6/2/2023. Patient with significant history of atrial fibrillation, hypertension, coronary artery disease, hypothyroidism presented to the ER with worsening diarrhea associated with abdominal pain.  This has been going on for 3 weeks.  Yesterday patient was in the ER with shortness of breath and patient was diagnosed with pneumonia and at that time her BNP was elevated.  Daughter is at bedside and she mentioned her diarrhea got worse yesterday after taking antibiotics and she noticed some blood this morning.  Patient is having pain all over the abdomen during the diarrhea.  Patient denies any chest pain, shortness of breath now.  She has nausea but denies any vomitings.  In ER patient vitals were stable.  Her blood work showed low sodium at 125, magnesium at 1.6, calcium and 8.6.  Her BNP is 12,000 but it is much better than yesterday which was 14,000.  Patient had CT chest abdomen pelvis with contrast yesterday which did not show any acute pathology.  Patient is getting admitted for observation for diarrhea -unclear etiology.    Principal Problem:    Diarrhea, unspecified type    Assessment: Unsure of clear etiology.    Plan: Stool studies were ordered. Discontinued IVF in the setting of elevated BNP. Started on regular diet.     Active Problems:    Abdominal pain, RUQ    Assessment: Secondary to diarrhea    Plan: We will closely monitor, Tylenol PRN - of note, patient refused all pain medications.       Electrolyte imbalance: Hypocalcemia,  Hypomagnesemia, Hyponatremia     Assessment: From diarrhea    Plan: We will monitor her blood work    Ordered magnesium replacement protocol. Kept her on telemetry    Ordered urine sodium and osmolality and BMP in the morning. Consider IVF vs. Fluids restriction based on the lab results.  "        Generalized muscle weakness    Assessment: From diarrhea    Plan: Will monitor, PT consulted - advised to use a walker but patient refused      Elevated brain natriuretic peptide (BNP) level    Assessment: Patient BNP level is better than yesterday but last year her BNP was 1700s.    Plan: It needs to be followed up as outpatient.  If she started to have shortness of breath, chest pain may be she needs a repeat echocardiogram in the hospital. Recent ECHO completed on 4/10/2023 with EF 55-60%  Continue Carvedilol      Community acquired pneumonia, unspecified laterality    Assessment: She was diagnosed with this yesterday.  Chest x-ray was repeated today and it did not show any pneumonia    Plan:No antibiotics.    Atrial Fibrillation  -Continue Xarelto  Hypothyroidism  -Continue Levothyroxine  Hypertension  -Pain management  -Continue losartan, Carvedilol           Diet: Regular Diet Adult    DVT Prophylaxis: DOAC  Ramos Catheter: Not present  Lines: None     Cardiac Monitoring: ACTIVE order. Indication: Electrolyte Imbalance (24 hours)- Magnesium <1.3 mg/ml; Potassium < =2.8 or > 5.5 mg/ml  Code Status: Full Code      Clinically Significant Risk Factors Present on Admission          # Hyponatremia: Lowest Na = 125 mmol/L in last 2 days, will monitor as appropriate    # Hypomagnesemia: Lowest Mg = 1.6 mg/dL in last 2 days, will replace as needed    # Drug Induced Coagulation Defect: home medication list includes an anticoagulant medication    # Hypertension: Noted on problem list      # Obesity: Estimated body mass index is 31.03 kg/m  as calculated from the following:    Height as of this encounter: 1.549 m (5' 1\").    Weight as of this encounter: 74.5 kg (164 lb 3.9 oz).            Disposition Plan      Expected Discharge Date: 06/04/2023                The patient's care was discussed with the Bedside Nurse, Patient and Patient's Family.    LORA Santos [facility]  Hospitalist Service  [facility] " [facility]  Securely message with [employer] (more info)  Text page via [facility] Paging/Directory   ______________________________________________________________________    Interval History   Patient overall feels better, pain less and refusing Tylenol for pain. She did not have diarrhea while she was on clear liquid. Denies CP, SOB or nausea or vomiting. After lunch, she did have a loose stool. Sent for analysis but could not run at this time.     Physical Exam   Vital Signs: Temp: 97.3  F (36.3  C) Temp src: Oral BP: 128/76 Pulse: 72   Resp: 16 SpO2: 99 % O2 Device: None (Room air)    Weight: 164 lbs 3.88 oz  GEN:   Alert, oriented x 3, appears comfortable, NAD.  NECK:  Supple ,no mass or thyromegaly   HEENT: Normocephalic/atraumatic, no scleral icterus, no nasal discharge, mouth moist.  CV:  irregular rate and rhythm,    LUNGS: Clear to auscultation bilaterally without rales/rhonchi/wheezing/retractions.  Symmetric chest rise on inhalation noted.  ABD: Active bowel sounds, soft, RUQ pain on palpation  EXT:  No edema.  No cyanosis.  No joint synovitis noted.  SKIN:Dry to touch, no exanthems noted in the visualized areas.  Neurologic: Grossly intact,non focal.  General: normal, calm and normal eye contact  Level of consciousness: alert / normal  Affect: normal  Orientation: oriented to self, place, time and situation    Medical Decision Making     45 MINUTES SPENT BY ME on the date of service doing chart review, history, exam, documentation & further activities per the note.      Data     I have personally reviewed the following data over the past 24 hrs:    6.1  \   11.2 (L)   / 143 (L)     127 (L) 93 (L) 8.9 /  87   4.8 22 0.75 \       ALT: 47 (H) AST: 57 (H) AP: 70 TBILI: 0.9   ALB: 3.5 TOT PROTEIN: 6.5 LIPASE: N/A       Trop: N/A BNP: 12,008 (H)       Imaging results reviewed over the past 24 hrs:   Recent Results (from the past 24 hour(s))   Chest XR,  PA & LAT    Narrative    EXAM: XR CHEST 2 VIEWS  LOCATION:  [facility]  DATE/TIME: 6/2/2023 7:53 PM CDT    INDICATION: dyspnea  COMPARISON: 02/20/2023      Impression    IMPRESSION: Heart size is enlarged. Cardiac pacemaker is noted in place. No evidence of central pulmonary vasculature without overt failure, lobar consolidation, or significant effusions. No pneumothorax. No acute bony abnormality.

## 2023-06-03 NOTE — H&P
[facility]    History and Physical - Hospitalist Service       Date of Admission:  6/2/2023    Assessment & Plan      Jeannie Chu is a 88 year old female admitted on 6/2/2023. Patient with significant history of atrial fibrillation, hypertension, coronary artery disease, hypothyroidism presented to the ER with worsening diarrhea associated with abdominal pain.  This has been going on for 3 weeks.  Yesterday patient was in the ER with shortness of breath and patient was diagnosed with pneumonia and at that time her BNP was elevated.  Daughter is at bedside and she mentioned her diarrhea got worse yesterday after taking antibiotics and she noticed some blood this morning.  Patient is having pain all over the abdomen during the diarrhea.  Patient denies any chest pain, shortness of breath now.  She has nausea but denies any vomitings.  In ER patient vitals were stable.  Her blood work showed low sodium at 125, magnesium at 1.6, calcium and 8.6.  Her BNP is 12,000 but it is much better than yesterday which was 14,000.  Patient had CT chest abdomen pelvis with contrast yesterday which did not show any acute pathology.  Patient is getting admitted for observation for diarrhea -unclear etiology.    Principal Problem:    Diarrhea, unspecified type    Assessment: Unsure of clear etiology.    Plan: Stool studies were ordered by the ER physician.  Gentle hydration with IV fluids  at 75 cc/hr as she has elevated BNP.  Active Problems:    Abdominal pain, generalized    Assessment: Secondary to diarrhea    Plan: We will closely monitor    Hypocalcemia    Assessment: From diarrhea    Plan: We will monitor her blood work    Hypomagnesemia    Assessment: From diarrhea.  She does not have any symptoms    Plan: Ordered magnesium replacement protocol. Kept her on telemetry    Hyponatremia    Assessment: from dehydration    Plan: Ordered IV fluids.  Will monitor blood work in the morning    Generalized  muscle weakness    Assessment: From diarrhea    Plan: Will monitor and if needed we will get PT, OT and    Elevated brain natriuretic peptide (BNP) level    Assessment: Patient BNP level is better than yesterday but last year her BNP was 1700s.    Plan: It needs to be followed up as outpatient.  If she started to have shortness of breath, chest pain may be she needs a repeat echocardiogram in the hospital.    Community acquired pneumonia, unspecified laterality    Assessment: She was diagnosed with this yesterday.  Chest x-ray was repeated today and it did not show any pneumonia    Plan: I did not restart her antibiotics .       Diet:   Clear liquid diet  DVT Prophylaxis: DOAC  Ramos Catheter: Not present  Lines: None     Cardiac Monitoring: None  Code Status:   FULL CODE      Disposition Plan      Expected Discharge Date: 06/03/2023                  Kayy Rayo MD  Hospitalist Service  [facility]  Securely message with [employer] (more info)  Text page via [facility] Paging/Directory     ______________________________________________________________________    Chief Complaint   Diarrhea    History is obtained from the patient's daughter    History of Present Illness   Jeannie Chu is a 88 year old female past medical history of atrial fibrillation, hypertension, coronary artery disease, hypothyroidism presented to the ER with worsening diarrhea associated with abdominal pain.  This has been going on for 3 weeks.  Yesterday patient was in the ER with shortness of breath and patient was diagnosed with pneumonia and at that time her BNP was elevated.  Daughter is at bedside and she mentioned her diarrhea got worse yesterday after taking antibiotics and she noticed some blood this morning.  Patient is having pain all over the abdomen during the diarrhea.  Patient denies any chest pain, shortness of breath now.  She has nausea but denies any vomitings.  In ER patient vitals were stable.  Her  blood work showed low sodium at 125, magnesium at 1.6, calcium and 8.6.  Her BNP is 12,000 but it is much better than yesterday which was 14,000.  Patient had CT chest abdomen pelvis with contrast yesterday which did not show any acute pathology.  Patient is getting admitted for observation for diarrhea -unclear etiology.      Past Medical History    Past Medical History:   Diagnosis Date     Acquired hypothyroidism 05/27/2021     Atrial fibrillation      Benign essential hypertension      Coronary artery disease 05/26/2021     Hyperlipidemia        Past Surgical History   Past Surgical History:   Procedure Laterality Date     Coronary angiogram with stent placement       EP ABLATION AV NODE N/A 10/31/2022    Procedure: Ablation Atrioventricular Node;  Surgeon: Rogelio Whitt MD;  Location: [facility] CARDIAC CATH LAB     EP ABLATION AV NODE N/A 11/01/2022    Procedure: EP Ablation AV Node;  Surgeon: Jd Baker MD;  Location: [facility] CARDIAC CATH LAB     EP PACEMAKER DEVICE & LEAD IMPLANT- RIGHT VENTRICULAR N/A 10/31/2022    Procedure: Pacemaker Device & Lead Implant- Right ventricular;  Surgeon: Rogelio Whitt MD;  Location: [facility] CARDIAC CATH LAB       Prior to Admission Medications   Prior to Admission Medications   Prescriptions Last Dose Informant Patient Reported? Taking?   IRON (FERROUS GLUCONATE) 325 MG OR TABS   Yes No   Sig: Take 1 tablet by mouth daily    Multiple Vitamins-Minerals (ICAPS AREDS FORMULA PO)   Yes No   Sig: Take 2 tablets by mouth daily    amoxicillin-clavulanate (AUGMENTIN) 875-125 MG tablet   No No   Sig: Take 1 tablet by mouth 2 times daily for 7 days   atorvastatin (LIPITOR) 10 MG tablet   No No   Sig: Take 1 tablet (10 mg) by mouth daily   azithromycin (ZITHROMAX) 250 MG tablet   No No   Sig: Take 1 tablet (250 mg) by mouth daily for 4 days   carvedilol (COREG) 12.5 MG tablet   No No   Sig: Take 1 tablet (12.5 mg) by mouth 2 times daily (with meals)   levothyroxine  (SYNTHROID/LEVOTHROID) 88 MCG tablet   No No   Sig: Take 1 tablet (88 mcg) by mouth daily   losartan (COZAAR) 50 MG tablet   No No   Sig: Take 1 tablet (50 mg) by mouth 2 times daily   niacin 500 MG tablet   Yes No   Sig: Take by mouth daily (with breakfast)   nitroGLYcerin (NITROSTAT) 0.4 MG sublingual tablet   No No   Sig: For chest pain place 1 tablet under the tongue every 5 minutes for 3 doses. If symptoms persist 5 minutes after 1st dose call 911.   rivaroxaban ANTICOAGULANT (XARELTO) 20 MG TABS tablet   No No   Sig: Take 1 tablet (20 mg) by mouth daily (with dinner)      Facility-Administered Medications: None        Review of Systems    The 10 point Review of Systems is negative other than noted in the HPI .    Social History   I have reviewed this patient's social history and updated it with pertinent information if needed.  Social History     Tobacco Use     Smoking status: Never     Smokeless tobacco: Never   Vaping Use     Vaping status: Never Used   Substance Use Topics     Alcohol use: No     Drug use: No       Family History   I have reviewed this patient's family history and updated it with pertinent information if needed.  Family History   Problem Relation Age of Onset     Family History Negative Other        Allergies   Allergies   Allergen Reactions     Amlodipine Swelling     BLE edema     Ibuprofen         Physical Exam   Vital Signs: Temp: 98.3  F (36.8  C) Temp src: Oral BP: (!) 134/104 Pulse: 69   Resp: 20 SpO2: 99 % O2 Device: None (Room air)    Weight: 0 lbs 0 oz    Constitutional: awake, alert, cooperative, no apparent distress, and appears stated age  Eyes: Lids and lashes normal, pupils equal, round and reactive to light, extra ocular muscles intact, sclera clear, conjunctiva normal  ENT: Normocephalic, without obvious abnormality, atraumatic, sinuses nontender on palpation, external ears without lesions, oral pharynx with moist mucous membranes, tonsils without erythema or exudates,  gums normal and good dentition.  Respiratory: No increased work of breathing, good air exchange, clear to auscultation bilaterally, no crackles or wheezing  Cardiovascular: irregularly irregular  GI: no scars, soft, diffuse mild tenderness, no guarding, no rigidity no rebound tenderness, bowel sounds positive  Skin: normal skin color, texture, turgor  Neurologic: Awake, alert, oriented to name, place and time.  Cranial nerves II-XII are grossly intact.  Motor is 5 out of 5 bilaterally.  Cerebellar finger to nose, heel to shin intact.  Sensory is intact.  Babinski down going, Romberg negative, and gait is normal.      55 MINUTES SPENT BY ME on the date of service doing chart review, history, exam, documentation & further activities per the note.      Data     I have personally reviewed the following data over the past 24 hrs:    6.7  \   10.7 (L)   / 147 (L)     125 (L) 94 (L) 9.5 /  127 (H)   4.2 23 0.73 \       ALT: 55 (H) AST: 59 (H) AP: 77 TBILI: 1.0   ALB: 3.7 TOT PROTEIN: 6.7 LIPASE: N/A       Trop: N/A BNP: 12,008 (H)       Imaging results reviewed over the past 24 hrs:   Recent Results (from the past 24 hour(s))   Chest XR,  PA & LAT    Narrative    EXAM: XR CHEST 2 VIEWS  LOCATION: [facility]  DATE/TIME: 6/2/2023 7:53 PM CDT    INDICATION: dyspnea  COMPARISON: 02/20/2023      Impression    IMPRESSION: Heart size is enlarged. Cardiac pacemaker is noted in place. No evidence of central pulmonary vasculature without overt failure, lobar consolidation, or significant effusions. No pneumothorax. No acute bony abnormality.     Recent Labs   Lab 06/02/23  1840 05/31/23  2347   WBC 6.7 6.3   HGB 10.7* 12.1   MCV 93 93   * 158   * 127*   POTASSIUM 4.2 4.7   CHLORIDE 94* 93*   CO2 23 24   BUN 9.5 11.5   CR 0.73 0.93   ANIONGAP 8 10   LYLE 8.6* 9.0   * 137*   ALBUMIN 3.7  --    PROTTOTAL 6.7  --    BILITOTAL 1.0  --    ALKPHOS 77  --    ALT 55*  --    AST 59*  --

## 2023-06-03 NOTE — PROGRESS NOTES
06/03/23 1308   Appointment Info   Signing Clinician's Name / Credentials (PT) Ailyn Montana, DPRASHI   Quick Adds   Quick Adds Certification   Living Environment   People in Home child(osman), adult   Living Environment Comments Very difficult with communication due to pt speaking some english and also use of . Pt not quite understanding the questions asked. Pt seemingly defensive re: questions re: home and her ability to mobilize. Pt lives in home with stairs to perform per her report.   Self-Care   Equipment Currently Used at Home none   Activity/Exercise/Self-Care Comment Pt reports inde with cares at home, again difficulty with communication despite rephrasing questions.   General Information   Onset of Illness/Injury or Date of Surgery 06/02/23   Referring Physician Carmina May PA-C   Patient/Family Therapy Goals Statement (PT) Pt no stated goals   Pertinent History of Current Problem (include personal factors and/or comorbidities that impact the POC) Jeannie Chu is a 88 year old female admitted on 6/2/2023. Patient with significant history of atrial fibrillation, hypertension, coronary artery disease, hypothyroidism presented to the ER with worsening diarrhea associated with abdominal pain.  This has been going on for 3 weeks.  Yesterday patient was in the ER with shortness of breath and patient was diagnosed with pneumonia and at that time her BNP was elevated.  Daughter is at bedside and she mentioned her diarrhea got worse yesterday after taking antibiotics and she noticed some blood this morning.  Patient is having pain all over the abdomen during the diarrhea.  Patient denies any chest pain, shortness of breath now.  She has nausea but denies any vomitings.   Cognition   Affect/Mental Status (Cognition) WNL   Pain Assessment   Patient Currently in Pain Yes, see Vital Sign flowsheet  (abd pain)   Range of Motion (ROM)   Range of Motion ROM is WFL   Strength (Manual Muscle Testing)    Strength (Manual Muscle Testing) strength is WFL   Bed Mobility   Comment, (Bed Mobility) min A   Transfers   Comment, (Transfers) CGA   Gait/Stairs (Locomotion)   Comment, (Gait/Stairs) Min A/HHA, Furniture walking   Balance   Balance Comments Poor needing 1 UE at least for safe ambulation   Clinical Impression   Criteria for Skilled Therapeutic Intervention Yes, treatment indicated   PT Diagnosis (PT) decreased functional mobility   Influenced by the following impairments decreased balance   Functional limitations due to impairments decreased safety with ambulation and stairs   Clinical Presentation (PT Evaluation Complexity) Stable/Uncomplicated   Clinical Presentation Rationale improving   Clinical Decision Making (Complexity) low complexity   Planned Therapy Interventions (PT) bed mobility training;balance training;gait training;home exercise program;strengthening;progressive activity/exercise;risk factor education;home program guidelines   Anticipated Equipment Needs at Discharge (PT) walker, rolling  (pt refusing)   Risk & Benefits of therapy have been explained evaluation/treatment results reviewed;care plan/treatment goals reviewed;risks/benefits reviewed;current/potential barriers reviewed;participants voiced agreement with care plan;participants included;patient   PT Total Evaluation Time   PT Eval, Low Complexity Minutes (93163) 5   Therapy Certification   Start of care date 06/03/23   Certification date from 06/03/23   Certification date to 06/04/23   Medical Diagnosis Diarrhea   Physical Therapy Goals   PT Frequency Daily   PT Predicted Duration/Target Date for Goal Attainment 06/04/23   PT Goals Bed Mobility;Transfers;Gait;Stairs   PT: Bed Mobility Independent;Supine to/from sit   PT: Transfers Modified independent;Sit to/from stand;Bed to/from chair   PT: Gait Modified independent;Rolling walker;100 feet   PT: Stairs Modified independent;6 stairs;Assistive device;Rail on right   Interventions    Interventions Quick Adds Therapeutic Activity   Therapeutic Activity   Therapeutic Activities: dynamic activities to improve functional performance Minutes (15338) 14   Treatment Detail/Skilled Intervention Pt refusing suggestion of walker despite education. Offered HHA, but still pt is reaching out for furniture for ambulation in hallway with the HHA.  Pt fatiguing and refused to sit down. Pt was cued for return to room, over 200 feet of ambulation. Pt needing 2-3 standing rest breaks. Min A for safe mobilization needed. Pt educated again on rec of FWW for home, A on stairs, but overall pt dismissive to suggestions. BP stable. Up in chair at end of session with alarm on.   PT Discharge Planning   PT Plan Attempt to reinforce suggestion of 4WW for home   PT Discharge Recommendation (DC Rec) home with assist;home with home care physical therapy;Leaving home requires significant taxing effort   PT Rationale for DC Rec Pt would likely be mod I with use of FWW, as actually has good gait speed, but instead pt refusing and needing min A for all mobilization due to unsafe furniture walking. Recommend 4WW for ambulation, home PT to improve balance and A x 1 if continues to decline mobility aide.   PT Brief overview of current status 200 feet of ambulation min A.   Total Session Time   Timed Code Treatment Minutes 14   Total Session Time (sum of timed and untimed services) 19   [facility]  OUTPATIENT PHYSICAL THERAPY EVALUATION  PLAN OF TREATMENT FOR OUTPATIENT REHABILITATION  (COMPLETE FOR INITIAL CLAIMS ONLY)  Patient's Last Name, First Name, M.I.  YOB: 1935  Jeannie Chu                           Provider's Name  [facility] Medical Record No.  [id number]                             Onset Date:  06/02/23   Start of Care Date:  06/03/23   Type:     _X_PT   ___OT   ___SLP Medical Diagnosis:  Diarrhea              PT Diagnosis:  decreased  functional mobility Visits from SOC:  1     See note for plan of treatment, functional goals and certification details    I CERTIFY THE NEED FOR THESE SERVICES FURNISHED UNDER        THIS PLAN OF TREATMENT AND WHILE UNDER MY CARE     (Physician co-signature of this document indicates review and certification of the therapy plan).

## 2023-06-03 NOTE — ED NOTES
[facility]  ED Nurse Handoff Report    ED Chief complaint: Diarrhea and Wheezing  . ED Diagnosis:   Final diagnoses:   Generalized muscle weakness   Hypomagnesemia   Hyponatremia   Hypocalcemia   Diarrhea, unspecified type   Abdominal pain, generalized   Community acquired pneumonia, unspecified laterality   Elevated brain natriuretic peptide (BNP) level       Allergies:   Allergies   Allergen Reactions     Amlodipine Swelling     BLE edema     Ibuprofen        Code Status: Full Code    Activity level - Baseline/Home:  independent.  Activity Level - Current:   assist of 1.   Lift room needed: No.   Bariatric: No   Needed: Yes   Isolation: Yes.   Infection: C-Diff Pending.     Respiratory status: Room air    Vital Signs (within 30 minutes):   Vitals:    06/02/23 1911 06/02/23 1930 06/02/23 2030 06/02/23 2100   BP:   (!) 134/104    Pulse: 69      Resp:       Temp:       TempSrc:       SpO2:  99% 100% 99%       Cardiac Rhythm:  ,      Pain level:    Patient confused: No.   Patient Falls Risk: nonskid shoes/slippers when out of bed, patient and family education, assistive device/personal items within reach, and activity supervised.   Elimination Status: Has voided     Patient Report - Initial Complaint: dizzy, wheezing.   Focused Assessment: pt Dx with PNA yesterday, stareted ABX. Had worsening SOB, abd pain and dizziness. VSS on RA. Reports 7 loose stool at home, none here in the ER. CXR looks ok.      Abnormal Results:   Labs Ordered and Resulted from Time of ED Arrival to Time of ED Departure   BASIC METABOLIC PANEL - Abnormal       Result Value    Sodium 125 (*)     Potassium 4.2      Chloride 94 (*)     Carbon Dioxide (CO2) 23      Anion Gap 8      Urea Nitrogen 9.5      Creatinine 0.73      Calcium 8.6 (*)     Glucose 127 (*)     GFR Estimate 79     MAGNESIUM - Abnormal    Magnesium 1.6 (*)    NT PROBNP INPATIENT - Abnormal    N terminal Pro BNP Inpatient 12,008 (*)    CBC WITH  PLATELETS AND DIFFERENTIAL - Abnormal    WBC Count 6.7      RBC Count 3.51 (*)     Hemoglobin 10.7 (*)     Hematocrit 32.6 (*)     MCV 93      MCH 30.5      MCHC 32.8      RDW 14.4      Platelet Count 147 (*)     % Neutrophils 69      % Lymphocytes 17      % Monocytes 11      % Eosinophils 2      % Basophils 1      % Immature Granulocytes 0      NRBCs per 100 WBC 0      Absolute Neutrophils 4.6      Absolute Lymphocytes 1.2      Absolute Monocytes 0.8      Absolute Eosinophils 0.1      Absolute Basophils 0.1      Absolute Immature Granulocytes 0.0      Absolute NRBCs 0.0     PHOSPHORUS - Normal    Phosphorus 3.1     HEPATIC FUNCTION PANEL   ENTERIC BACTERIA AND VIRUS PANEL BY BAHMAN STOOL   C. DIFFICILE TOXIN B PCR WITH REFLEX TO C. DIFFICILE ANTIGEN AND TOXINS A/B EIA        Chest XR,  PA & LAT   Final Result   IMPRESSION: Heart size is enlarged. Cardiac pacemaker is noted in place. No evidence of central pulmonary vasculature without overt failure, lobar consolidation, or significant effusions. No pneumothorax. No acute bony abnormality.          Treatments provided: meds, images  Family Comments: daughter and son at bedside, will go home  OBS brochure/video discussed/provided to patient:  Yes  ED Medications:   Medications   ipratropium - albuterol 0.5 mg/2.5 mg/3 mL (DUONEB) neb solution 3 mL (3 mLs Nebulization $Given 6/2/23 1843)   magnesium sulfate 2 g in 50 mL sterile water intermittent infusion (0 g Intravenous Stopped 6/2/23 2123)   morphine (PF) injection 4 mg (4 mg Intravenous $Given 6/2/23 2207)   dicyclomine (BENTYL) tablet 20 mg (20 mg Oral $Given 6/2/23 2207)   amoxicillin-clavulanate (AUGMENTIN) 875-125 MG per tablet 1 tablet (1 tablet Oral $Given 6/2/23 2206)   atorvastatin (LIPITOR) tablet 10 mg (10 mg Oral $Given 6/2/23 2206)   carvedilol (COREG) tablet 12.5 mg (12.5 mg Oral $Given 6/2/23 2205)   losartan (COZAAR) tablet 50 mg (50 mg Oral $Given 6/2/23 2205)   rivaroxaban ANTICOAGULANT (XARELTO)  tablet 20 mg (20 mg Oral $Given 6/2/23 0299)       Drips infusing:  No  For the majority of the shift this patient was Green.   Interventions performed were none.    Sepsis treatment initiated: No    Cares/treatment/interventions/medications to be completed following ED care: per orders    ED Nurse Name: Robert Fernandes RN  10:35 PM  RECEIVING UNIT ED HANDOFF REVIEW    Above ED Nurse Handoff Report was reviewed: Yes  Reviewed by: Alee Ramos RN on Elina 3, 2023 at 1:34 AM

## 2023-06-03 NOTE — PLAN OF CARE
"PRIMARY DIAGNOSIS: DIARRHEA    OUTPATIENT/OBSERVATION GOALS TO BE MET BEFORE DISCHARGE  1. Orthostatic performed: N/A    2. Tolerating PO fluid and/or antibiotics (if applicable): Yes    3. Nausea/Vomiting/Diarrhea symptoms improved: Yes    4. Pain status: Improved-controlled with oral pain medications.    5. Return to near baseline physical activity: No    Discharge Planner Nurse   Safe discharge environment identified: No  Barriers to discharge: Yes       Entered by: Alee Ramos RN 06/03/2023 4:44 AM   Blood pressure 124/71, pulse 74, temperature 97.4  F (36.3  C), temperature source Oral, resp. rate 16, height 1.549 m (5' 1\"), weight 74.5 kg (164 lb 3.9 oz), SpO2 95 %, not currently breastfeeding.   Pt speaks limited English and makes needs known, but would need an  for complex discussions. Alert and oriented x4. Up with assist x1 with gb and walker. Reports some abdominal pain. No diarrhea noted, collection hat in place to collect sample. PIV replaced on admission. She arrived on unit with 2LPM O2 via NC, but has maintained saturations above 95%, O2 was discontinued. Continuous tele monitoring on, Per tele tech, She's V-paced 69. On K, Mg and Phos protocol.   Please review provider order for any additional goals.   Nurse to notify provider when observation goals have been met and patient is ready for discharge.  "

## 2023-06-03 NOTE — PHARMACY-ADMISSION MEDICATION HISTORY
Pharmacist Admission Medication History    Admission medication history is complete. The information provided in this note is only as accurate as the sources available at the time of the update.    Medication reconciliation/reorder completed by provider prior to medication history? No    Information Source(s): Patient via in-person    Pertinent Information: Patient recently started on Augmentin x 7 days and azithromycin x 4 days for pneumonia    Changes made to PTA medication list:    Added: None    Deleted: None    Changed: None    Medication Affordability:  Not including over the counter (OTC) medications, was there a time in the past 3 months when you did not take your medications as prescribed because of cost?: No    Allergies reviewed with patient and updates made in EHR: no    Medication History Completed By: Shyla Lee RPH 6/3/2023 10:29 AM    Prior to Admission medications    Medication Sig Last Dose Taking? Auth Provider Long Term End Date   amoxicillin-clavulanate (AUGMENTIN) 875-125 MG tablet Take 1 tablet by mouth 2 times daily for 7 days 6/2/2023 at AM Yes Mark Carpenter MD  6/8/23   atorvastatin (LIPITOR) 10 MG tablet Take 1 tablet (10 mg) by mouth daily 6/2/2023 at PM Yes Germán Shin MD Yes    azithromycin (ZITHROMAX) 250 MG tablet Take 1 tablet (250 mg) by mouth daily for 4 days 6/2/2023 at AM Yes Mark Carpenter MD  6/5/23   carvedilol (COREG) 12.5 MG tablet Take 1 tablet (12.5 mg) by mouth 2 times daily (with meals) 6/2/2023 at AM Yes Germán Shin MD Yes    IRON (FERROUS GLUCONATE) 325 MG OR TABS Take 1 tablet by mouth daily  6/2/2023 at AM Yes Joanna Garcia MD     levothyroxine (SYNTHROID/LEVOTHROID) 88 MCG tablet Take 1 tablet (88 mcg) by mouth daily 6/2/2023 at AM Yes Diana Hilario MD Yes    losartan (COZAAR) 50 MG tablet Take 1 tablet (50 mg) by mouth 2 times daily 6/2/2023 at AM Yes Germán Shin MD Yes    Multiple Vitamins-Minerals (ICAPS AREDS FORMULA PO) Take 2 tablets  by mouth daily  6/2/2023 at AM Yes Reported, Patient     niacin 500 MG tablet Take by mouth daily (with breakfast) 6/2/2023 Yes Reported, Patient     nitroGLYcerin (NITROSTAT) 0.4 MG sublingual tablet For chest pain place 1 tablet under the tongue every 5 minutes for 3 doses. If symptoms persist 5 minutes after 1st dose call 911. PRN at PRN Yes Ese Avendano MD Yes    rivaroxaban ANTICOAGULANT (XARELTO) 20 MG TABS tablet Take 1 tablet (20 mg) by mouth daily (with dinner) 6/2/2023 at PM Yes Germán Shin MD Yes

## 2023-06-04 LAB
ANION GAP SERPL CALCULATED.3IONS-SCNC: 11 MMOL/L (ref 7–15)
BUN SERPL-MCNC: 9.4 MG/DL (ref 8–23)
C COLI+JEJUNI+LARI FUSA STL QL NAA+PROBE: NOT DETECTED
C DIFF TOX B STL QL: NEGATIVE
CALCIUM SERPL-MCNC: 8.8 MG/DL (ref 8.8–10.2)
CHLORIDE SERPL-SCNC: 94 MMOL/L (ref 98–107)
CREAT SERPL-MCNC: 0.8 MG/DL (ref 0.51–0.95)
DEPRECATED HCO3 PLAS-SCNC: 22 MMOL/L (ref 22–29)
EC STX1 GENE STL QL NAA+PROBE: NOT DETECTED
EC STX2 GENE STL QL NAA+PROBE: NOT DETECTED
GFR SERPL CREATININE-BSD FRML MDRD: 71 ML/MIN/1.73M2
GLUCOSE SERPL-MCNC: 97 MG/DL (ref 70–99)
MAGNESIUM SERPL-MCNC: 1.7 MG/DL (ref 1.7–2.3)
NOROV GI+II ORF1-ORF2 JNC STL QL NAA+PR: NOT DETECTED
OSMOLALITY UR: 210 MMOL/KG (ref 100–1200)
PHOSPHATE SERPL-MCNC: 3.8 MG/DL (ref 2.5–4.5)
POTASSIUM SERPL-SCNC: 4.3 MMOL/L (ref 3.4–5.3)
RVA NSP5 STL QL NAA+PROBE: NOT DETECTED
SALMONELLA SP RPOD STL QL NAA+PROBE: NOT DETECTED
SHIGELLA SP+EIEC IPAH STL QL NAA+PROBE: NOT DETECTED
SODIUM SERPL-SCNC: 126 MMOL/L (ref 136–145)
SODIUM SERPL-SCNC: 127 MMOL/L (ref 136–145)
V CHOL+PARA RFBL+TRKH+TNAA STL QL NAA+PR: NOT DETECTED
Y ENTERO RECN STL QL NAA+PROBE: NOT DETECTED

## 2023-06-04 PROCEDURE — 36415 COLL VENOUS BLD VENIPUNCTURE: CPT | Performed by: PHYSICIAN ASSISTANT

## 2023-06-04 PROCEDURE — G0378 HOSPITAL OBSERVATION PER HR: HCPCS

## 2023-06-04 PROCEDURE — 36415 COLL VENOUS BLD VENIPUNCTURE: CPT | Performed by: NURSE PRACTITIONER

## 2023-06-04 PROCEDURE — 83735 ASSAY OF MAGNESIUM: CPT | Performed by: PHYSICIAN ASSISTANT

## 2023-06-04 PROCEDURE — 250N000013 HC RX MED GY IP 250 OP 250 PS 637: Performed by: PHYSICIAN ASSISTANT

## 2023-06-04 PROCEDURE — 80048 BASIC METABOLIC PNL TOTAL CA: CPT | Performed by: NURSE PRACTITIONER

## 2023-06-04 PROCEDURE — 84295 ASSAY OF SERUM SODIUM: CPT | Performed by: PHYSICIAN ASSISTANT

## 2023-06-04 PROCEDURE — 84100 ASSAY OF PHOSPHORUS: CPT | Performed by: PHYSICIAN ASSISTANT

## 2023-06-04 PROCEDURE — 99232 SBSQ HOSP IP/OBS MODERATE 35: CPT | Performed by: PHYSICIAN ASSISTANT

## 2023-06-04 PROCEDURE — 250N000013 HC RX MED GY IP 250 OP 250 PS 637: Performed by: NURSE PRACTITIONER

## 2023-06-04 PROCEDURE — 120N000001 HC R&B MED SURG/OB

## 2023-06-04 PROCEDURE — 258N000003 HC RX IP 258 OP 636: Performed by: PHYSICIAN ASSISTANT

## 2023-06-04 RX ORDER — LANOLIN ALCOHOL/MO/W.PET/CERES
3 CREAM (GRAM) TOPICAL
Status: DISCONTINUED | OUTPATIENT
Start: 2023-06-04 | End: 2023-06-21 | Stop reason: HOSPADM

## 2023-06-04 RX ORDER — SODIUM CHLORIDE 9 MG/ML
INJECTION, SOLUTION INTRAVENOUS CONTINUOUS
Status: DISCONTINUED | OUTPATIENT
Start: 2023-06-04 | End: 2023-06-05

## 2023-06-04 RX ORDER — LOPERAMIDE HCL 2 MG
2 CAPSULE ORAL 4 TIMES DAILY PRN
Status: DISCONTINUED | OUTPATIENT
Start: 2023-06-04 | End: 2023-06-16

## 2023-06-04 RX ADMIN — LOSARTAN POTASSIUM 50 MG: 50 TABLET, FILM COATED ORAL at 08:39

## 2023-06-04 RX ADMIN — RIVAROXABAN 20 MG: 20 TABLET, FILM COATED ORAL at 17:15

## 2023-06-04 RX ADMIN — ATORVASTATIN CALCIUM 10 MG: 10 TABLET, FILM COATED ORAL at 19:34

## 2023-06-04 RX ADMIN — Medication 3 MG: at 21:25

## 2023-06-04 RX ADMIN — SODIUM CHLORIDE: 9 INJECTION, SOLUTION INTRAVENOUS at 18:25

## 2023-06-04 RX ADMIN — LOPERAMIDE HYDROCHLORIDE 2 MG: 2 CAPSULE ORAL at 10:39

## 2023-06-04 RX ADMIN — SODIUM CHLORIDE: 9 INJECTION, SOLUTION INTRAVENOUS at 08:38

## 2023-06-04 RX ADMIN — LEVOTHYROXINE SODIUM 88 MCG: 0.09 TABLET ORAL at 08:39

## 2023-06-04 RX ADMIN — CARVEDILOL 12.5 MG: 12.5 TABLET, FILM COATED ORAL at 17:15

## 2023-06-04 RX ADMIN — LOSARTAN POTASSIUM 50 MG: 50 TABLET, FILM COATED ORAL at 19:34

## 2023-06-04 RX ADMIN — CARVEDILOL 12.5 MG: 12.5 TABLET, FILM COATED ORAL at 08:39

## 2023-06-04 ASSESSMENT — ACTIVITIES OF DAILY LIVING (ADL)
ADLS_ACUITY_SCORE: 35
ADLS_ACUITY_SCORE: 35
ADLS_ACUITY_SCORE: 33
ADLS_ACUITY_SCORE: 35
ADLS_ACUITY_SCORE: 35
ADLS_ACUITY_SCORE: 33
ADLS_ACUITY_SCORE: 35
ADLS_ACUITY_SCORE: 33
ADLS_ACUITY_SCORE: 35

## 2023-06-04 NOTE — PROGRESS NOTES
Goal Outcome Evaluation:PRIMARY DIAGNOSIS: GENERALIZED WEAKNESS & Diarrhea    OUTPATIENT/OBSERVATION GOALS TO BE MET BEFORE DISCHARGE  1. Orthostatic performed: No    2. Tolerating PO medications: Yes    3. Return to near baseline physical activity: Yes    4. Cleared for discharge by consultants (if involved): Yes    Discharge Planner Nurse   Safe discharge environment identified: Yes  Barriers to discharge: Yes       Entered by: Danna Oneill RN 06/04/2023      Please review provider order for any additional goals.   Nurse to notify provider when observation goals have been met and patient is ready for discharge.      Plan of Care Reviewed With: patient    Overall Patient Progress: improvingOverall Patient Progress: improving    Pt A&Ox4; vss on r/a. Son at bedside to assisting with interpreting; pt refusing use of I-pad for .  Tolerating a regular diet and oral meds. Denies N&V, denies CP, pain, SOB. Mag/K/Phos protocol WNL. Tele discontinued. Ambulating in room, furniture surfing, refusing GB/W. X6 loose stools from 0352-6277 watery brown w/o visible blood. Ns at 100 mL/hr in L-AC PIV. Pt stating stomach pain with BM. Calls for cares; will continue to monitor.

## 2023-06-04 NOTE — PLAN OF CARE
"PRIMARY DIAGNOSIS: GASTROENTERITIS    OUTPATIENT/OBSERVATION GOALS TO BE MET BEFORE DISCHARGE  1. Orthostatic performed: No    2. Tolerating PO fluid and/or antibiotics (if applicable): Yes, no concurrent abx at this time    3. Nausea/Vomiting/Diarrhea symptoms improved: No,  watery, loose, and brown    4. Pain status: Improved with use of alternative comfort measures i.e.: heat and positioning    5. Return to near baseline physical activity: No    Discharge Planner Nurse   Safe discharge environment identified: Yes  Barriers to discharge: Yes       Entered by: Xochilt Fitzgerald RN 06/03/2023 7:03 PM   Pt A/O x4. Requires a Tamil , but can follow simple commands. Ax1 -- please note pt refuses the gait belt and walker, states \"I'm ok without it.\" Pt has a very unsteady gait to the bathroom, holds your hand while ambulating, but also holds onto the wall for support. Thoroughly educated the pt that she needs the assistive devices as it is for her own safety, educated about the risk for falls, and still refused. Notified provider whom reinforced education, and still refused. NaCl 0.9 infusing at 75 mL/hr. Clear liquid diet, denies abdominal pain. PT consult.  1200 addendum: PT saw the pt and re-inforced education regarding gaitbelt and walker and still refused. IVF discontinued. Timed Na+ ordered. Regular diet ordered. Stool sample collected, per  do not place stickers on stool sample due to 5 stickers. Stool was thrown away by lab, stating \"stickers need to be on sample.\"  1600 addendum: Urine sample ordered. Ambulated pt the bathroom and pt missed the hat. Pt staying for hyponatremia.  Please review provider order for any additional goals.   Nurse to notify provider when observation goals have been met and patient is ready for discharge.  "

## 2023-06-04 NOTE — PLAN OF CARE
"PRIMARY DIAGNOSIS: DIARRHEA    OUTPATIENT/OBSERVATION GOALS TO BE MET BEFORE DISCHARGE  1. Orthostatic performed: N/A    2. Tolerating PO fluid and/or antibiotics (if applicable): Yes    3. Nausea/Vomiting/Diarrhea symptoms improved: Yes    4. Pain status: Improved-controlled with oral pain medications.    5. Return to near baseline physical activity: No    Discharge Planner Nurse   Safe discharge environment identified: No  Barriers to discharge: Yes       Entered by: Alee Ramos RN 06/04/2023 3:27 AM   Blood pressure 123/70, pulse 69, temperature 97.4  F (36.3  C), temperature source Oral, resp. rate 16, height 1.549 m (5' 1\"), weight 74.5 kg (164 lb 3.9 oz), SpO2 100 %, not currently breastfeeding.   Pt is refusing to ambulate with walker. She is reaching and trying to furniture walk. Education was provided and patient needs reinforcement. Family at bedside and they also attempted to reinforcement but she continues to be non adherent.Tele V-paced 69  Please review provider order for any additional goals.   Nurse to notify provider when observation goals have been met and patient is ready for discharge.  "

## 2023-06-04 NOTE — PLAN OF CARE
Goal Outcome Evaluation:PRIMARY DIAGNOSIS: GENERALIZED WEAKNESS    OUTPATIENT/OBSERVATION GOALS TO BE MET BEFORE DISCHARGE  1. Orthostatic performed: No    2. Tolerating PO medications: Yes    3. Return to near baseline physical activity: Yes    4. Cleared for discharge by consultants (if involved): Yes    Discharge Planner Nurse   Safe discharge environment identified: Yes  Barriers to discharge: Yes       Entered by: Danna Oneill RN 06/04/2023 11:14 AM     Please review provider order for any additional goals.   Nurse to notify provider when observation goals have been met and patient is ready for discharge.      Plan of Care Reviewed With: patient    Overall Patient Progress: improvingOverall Patient Progress: improving     Pt A&Ox4; vss on r/a. Interpretor I-pad in room, pt refusing use, stating son is coming to help interpret. Tolerating advanced regular diet; 3-meal trays consumed for breakfast, tolerating oral meds. Denies N&V, denies CP, pain, SOB. Mag/K/Phos protocol WNL. Tele v-paced 60s. Ambulating in room, furniture surfing, refusing GB/W. X5 loose stools from 9077-7470; watery brown w/o visible blood. Pt stating stomach pain with BM. Calls for cares; will continue to monitor.

## 2023-06-04 NOTE — PLAN OF CARE
"PRIMARY DIAGNOSIS: DIARRHEA    OUTPATIENT/OBSERVATION GOALS TO BE MET BEFORE DISCHARGE  1. Orthostatic performed: N/A    2. Tolerating PO fluid and/or antibiotics (if applicable): Yes    3. Nausea/Vomiting/Diarrhea symptoms improved: Yes    4. Pain status: Improved-controlled with oral pain medications.    5. Return to near baseline physical activity: No    Discharge Planner Nurse   Safe discharge environment identified: No  Barriers to discharge: Yes       Entered by: Aele Ramos RN 06/03/2023 9:20 PM   Blood pressure (P) 134/79, pulse (P) 72, temperature (P) 98.3  F (36.8  C), temperature source (P) Oral, resp. rate (P) 18, height 1.549 m (5' 1\"), weight 74.5 kg (164 lb 3.9 oz), SpO2 (P) 98 %, not currently breastfeeding.   Pt is refusing to ambulate with walker. She is reaching and trying to furniture walk. Education was provided and patient needs reinforcement. Family at bedside and they also attempted to reinforcement but she continues to be non adherent. Stool and urine sample collected and sent to lab. C/o abdominal pain and was given Tylenol with good effect.On K, Mg and Phos protocol.   Please review provider order for any additional goals.   Nurse to notify provider when observation goals have been met and patient is ready for discharge.  "

## 2023-06-04 NOTE — PLAN OF CARE
"PRIMARY DIAGNOSIS: GASTROENTERITIS    OUTPATIENT/OBSERVATION GOALS TO BE MET BEFORE DISCHARGE  1. Orthostatic performed: No    2. Tolerating PO fluid and/or antibiotics (if applicable): Yes, no concurrent abx at this time    3. Nausea/Vomiting/Diarrhea symptoms improved: No,  watery, loose, and brown    4. Pain status: Improved with use of alternative comfort measures i.e.: heat and positioning    5. Return to near baseline physical activity: No    Discharge Planner Nurse   Safe discharge environment identified: Yes  Barriers to discharge: Yes       Entered by: Xochilt Fitzgerald RN 06/03/2023 7:01 PM   Pt A/O x4. Requires a Tamil , but can follow simple commands. Ax1 -- please note pt refuses the gait belt and walker, states \"I'm ok without it.\" Pt has a very unsteady gait to the bathroom, holds your hand while ambulating, but also holds onto the wall for support. Thoroughly educated the pt that she needs the assistive devices as it is for her own safety, educated about the risk for falls, and still refused. Notified provider whom reinforced education, and still refused. NaCl 0.9 infusing at 75 mL/hr. Clear liquid diet, denies abdominal pain. PT consult.  1200 addendum: PT saw the pt and re-inforced education regarding gaitbelt and walker and still refused. IVF discontinued. Timed Na+ ordered. Regular diet ordered. Stool sample collected, per  do not place stickers on stool sample due to 5 stickers. Stool was thrown away by lab, stating \"stickers need to be on sample.\"  Please review provider order for any additional goals.   Nurse to notify provider when observation goals have been met and patient is ready for discharge.  "

## 2023-06-04 NOTE — UTILIZATION REVIEW
Admission Status; Secondary Review Determination       Under the authority of the Utilization Management Committee, the utilization review process indicated a secondary review on the above patient. The review outcome is based on review of the medical records, discussions with staff, and applying clinical experience noted on the date of the review.     (x) Inpatient Status Appropriate - This patient's medical care is consistent with medical management for inpatient care and reasonable inpatient medical practice.     RATIONALE FOR DETERMINATION    88-year-old female with a history of atrial fibrillation, hypertension, coronary artery disease, and hypothyroidism presented to the ER with worsening diarrhea and abdominal pain. She had a recent diagnosis of pneumonia and elevated BNP levels. Blood work revealed electrolyte imbalances (hypocalcemia, hypomagnesemia, hyponatremia). CT scan showed no acute pathology. Stool studies were ordered to determine the cause of diarrhea. The patient's abdominal pain was managed with Tylenol, and her pain medications were refused. She had generalized muscle weakness and was monitored by PT. Her BNP level improved but required outpatient follow-up. Atrial fibrillation was managed with Xarelto, hypothyroidism with Levothyroxine, and hypertension with losartan and carvedilol. No antibiotics were given for pneumonia.  Patient requires inpatient admission versus short stay observation or outpatient treatment for the following reasons: patient's sodium still low and still has increased diarrhea indicating the need for continued monitoring and potential adjustments to her fluid management. The electrolyte imbalance, along with the diarrhea, can contribute to generalized weakness and may require additional interventions.     Carmina May PA-C  notified  The expected length of stay at the time of admission was more than 2 nights because of the severity of illness, intensity of service  provided, and risk for adverse outcome. Inpatient admission is appropriate.         This document was produced using voice recognition software       The information on this document is developed by the utilization review team in order for the business office to ensure compliance. This only denotes the appropriateness of proper admission status and does not reflect the quality of care rendered.   The definitions of Inpatient Status and Observation Status used in making the determination above are those provided in the CMS Coverage Manual, Chapter 1 and Chapter 6, section 70.4.   Sincerely,   RUSTY GIBBS MD   System Medical Director   Utilization Management   [facility].

## 2023-06-04 NOTE — PROGRESS NOTES
[facility]    Medicine Progress Note - Hospitalist Service    Date of Admission:  6/2/2023    Assessment & Plan   Jeannie Chu is a 87 y/o female with PMH significant for CAD with previous stenting, dyslipidemia, paroxysmal A-fib on anticoagulation with Xarelto, hypertension, and hypothyroidism who was admitted on 6/2/2023 for further evaluation and management of worsening diarrhea with abdominal pain that has been going on for 3 weeks.    Patient was initially seen in the ED on 5/31 with concern of shortness of breath and was diagnosed with pneumonia based on CT of chest which showed nondiagnostic pulmonary artery angiogram, but low suspicion for PE, small bilateral pleural effusions, patchy area of groundglass opacity involving the left lower lobe which is suspicious for pneumonia.  Patient was prescribed Augmentin and azithromycin which patient took upon discharge and caused increased diarrhea.  During that visit she also was noted to have an elevated proBNP.  The patient returned to the ED on 6/2 due to ongoing/worsening diarrhea with associated abdominal pain.  Labs revealed hypocalcemia, hypomagnesia, and hyponatremia.  Her proBNP was rechecked and still elevated at 12,000, improvement from previous 14,000 the day prior.  Initially the patient was treated supportively with IV fluids and stool samples were collected for testing.  C. difficile and enteric panel are negative.  Initially IV fluids were discontinued on 6/3 due to concern for elevated proBNP and patient tolerating a regular diet.  Patient has remained admitted for additional work-up of ongoing hyponatremia with persistent diarrhea and abdominal pain.    #Diarrhea  #Diffuse abdominal pain: possibly functional diarrhea but etiology not entirely clear. Continues to have abdominal cramping with bowel movements. No melena or hematochezia.  Suspect diarrhea worsened by antibiotics started on 6/1, these have since been  discontinued.  -Fecal fat, elastase and calprotectin currently pending  -add on parasite and ova stool testing for completeness   -4 episodes of diarrhea by late morning today  -As needed Imodium with C. difficile and enteric panel negative  -Monitor I&Os  -IVF with NS at 100 cc/h due to ongoing GI losses   -regular diet as tolerated  -consider GI consult in AM if not improving     #Hyponatremia: previously mild hyponatremia over the last year with sodium around 131-134. Initial sodium of 127 on 5/31 that decreased to 125 on 6/2. Sodium has only slightly improved to 127 on 6/4. Would expect this is related to hypovolemia from ongoing GI losses.   -urine osmolality and urine sodium obtained on 6/3  -restarted IVFs with NS at 100 ml/hour  -sodium slightly decreased to 126 later in the day on 6/3 but in setting of ongoing diarrhea  -recheck sodium in AM and reassess     #Hypocalcemia and hypomagnesemia: resolved with replacement     #Generalized muscle weakness: due to acute illness and ongoing GI losses.   -PT consulted and saw patient on 6/3, recommending use of walker which patient is currently refusing even after being educated with family present    #Elevated proBNP: initial proBNP on 5/31 elevated at 14,188 with repeat of 12,008. Prior to this he proBNP was normal at 1,711 in 10/2022. Most recent echocardiogram on 4/10/2023 showed EF of 55-60%.  Recent CT of chest did small bilateral pleural effusions but on exam patient does not appear fluid overloaded.  She has mild bilateral lower extremity edema and a dry cough of recent.  No chest pain or shortness of breath.  -etiology unclear, does have history of A-fib which can cause this to be elevated but previously known and well controlled   -monitor for evidence of heart failure and consider repeat echo if warranted     #Recently diagnosed CAP: diagnosed in ED on 5/31 but with repeat CXR on 6/2 which was negative. Patient does have a recent dry cough and previously  "complained of shortness of breath that has resolved. Afebrile and no leukocytosis.   -antibiotics were not continued on admission, continue to monitor if needed     #Mild thrombocytopenia: platelet count of 147 on 6/3 with slight decrease to 143 on 6/4. No evidence of bleeding. Monitor.     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  -continue PTA Carvedilol and Xarelto     #HTN: stable, continue PTA Losartan and Carvedilol     #Hypothyroidism: continue PTA Levothyroxine      Diet: Regular Diet Adult    DVT Prophylaxis: DOAC  Ramos Catheter: Not present  Lines: None     Cardiac Monitoring: None  Code Status: Full Code      Clinically Significant Risk Factors Present on Admission         # Hyponatremia: Lowest Na = 125 mmol/L in last 2 days, will monitor as appropriate    # Hypomagnesemia: Lowest Mg = 1.6 mg/dL in last 2 days, will replace as needed    # Drug Induced Coagulation Defect: home medication list includes an anticoagulant medication    # Hypertension: Noted on problem list      # Obesity: Estimated body mass index is 31.03 kg/m  as calculated from the following:    Height as of this encounter: 1.549 m (5' 1\").    Weight as of this encounter: 74.5 kg (164 lb 3.9 oz).            Disposition Plan     Expected Discharge Date: 06/04/2023                The patient's care was discussed with the Attending Physician, Dr. Lopez , Bedside Nurse, Patient and Patient's Family.    Carmina May PA-C  Hospitalist Service  [facility]  Securely message with [employer] (more info)  Text page via [employer] Paging/Directory   ______________________________________________________________________    Interval History   Patient's son is at the bedside and assists with interpreting for the patient.     Patient reports feeling nauseated this morning and took an  spice which helped.  Denies any vomiting.  She has had 4 episodes of diarrhea " 09-Dec-2021 10:15 this morning.  She has associated crampy abdominal pain with these episodes.  She has a dry cough for the last 7 to 10 days but denies shortness of breath.  Denies lower extremity swelling.,  Chest pain or recent fever.  Denies any recent travel or known sick contacts.    Physical Exam   Vital Signs: Temp: 97.9  F (36.6  C) Temp src: Oral BP: 137/70 Pulse: 70   Resp: 18 SpO2: 100 % O2 Device: None (Room air)    Weight: 164 lbs 3.88 oz    General Appearance: Pleasant, interactive, using toilet upon entering room and having episode of diarrhea  Respiratory: CTAB without wheezing or crackles  Cardiovascular: RRR, normal rate, no murmur   GI: soft, mild generalized tenderness, non-distended, normoactive bowel sounds  Skin: warm, dry  Extremities: mild bilateral LE edema      Medical Decision Making       45 MINUTES SPENT BY ME on the date of service doing chart review, history, exam, documentation & further activities per the note.      Data   ------------------------- PAST 24 HR DATA REVIEWED -----------------------------------------------

## 2023-06-05 LAB
ALBUMIN UR-MCNC: 10 MG/DL
ANION GAP SERPL CALCULATED.3IONS-SCNC: 9 MMOL/L (ref 7–15)
APPEARANCE UR: CLEAR
BILIRUB UR QL STRIP: NEGATIVE
BUN SERPL-MCNC: 6.1 MG/DL (ref 8–23)
CALCIUM SERPL-MCNC: 8.6 MG/DL (ref 8.8–10.2)
CALPROTECTIN STL-MCNT: 17.9 MG/KG (ref 0–49.9)
CHLORIDE SERPL-SCNC: 95 MMOL/L (ref 98–107)
COLOR UR AUTO: ABNORMAL
CREAT SERPL-MCNC: 0.76 MG/DL (ref 0.51–0.95)
DEPRECATED HCO3 PLAS-SCNC: 22 MMOL/L (ref 22–29)
FAT STL QL: NORMAL
GFR SERPL CREATININE-BSD FRML MDRD: 75 ML/MIN/1.73M2
GLUCOSE SERPL-MCNC: 105 MG/DL (ref 70–99)
GLUCOSE UR STRIP-MCNC: NEGATIVE MG/DL
HGB UR QL STRIP: NEGATIVE
KETONES UR STRIP-MCNC: NEGATIVE MG/DL
LEUKOCYTE ESTERASE UR QL STRIP: NEGATIVE
MAGNESIUM SERPL-MCNC: 1.6 MG/DL (ref 1.7–2.3)
MUCOUS THREADS #/AREA URNS LPF: PRESENT /LPF
NEUTRAL FAT STL QL: NORMAL
NITRATE UR QL: NEGATIVE
PH UR STRIP: 6 [PH] (ref 5–7)
PHOSPHATE SERPL-MCNC: 3.3 MG/DL (ref 2.5–4.5)
POTASSIUM SERPL-SCNC: 4.9 MMOL/L (ref 3.4–5.3)
RBC URINE: 1 /HPF
SODIUM SERPL-SCNC: 126 MMOL/L (ref 136–145)
SP GR UR STRIP: 1.01 (ref 1–1.03)
SQUAMOUS EPITHELIAL: 1 /HPF
TRANSITIONAL EPI: <1 /HPF
UROBILINOGEN UR STRIP-MCNC: NORMAL MG/DL
WBC URINE: 1 /HPF

## 2023-06-05 PROCEDURE — 84100 ASSAY OF PHOSPHORUS: CPT | Performed by: PHYSICIAN ASSISTANT

## 2023-06-05 PROCEDURE — 80048 BASIC METABOLIC PNL TOTAL CA: CPT | Performed by: PHYSICIAN ASSISTANT

## 2023-06-05 PROCEDURE — 250N000011 HC RX IP 250 OP 636: Performed by: HOSPITALIST

## 2023-06-05 PROCEDURE — 87329 GIARDIA AG IA: CPT | Performed by: PHYSICIAN ASSISTANT

## 2023-06-05 PROCEDURE — 250N000013 HC RX MED GY IP 250 OP 250 PS 637: Performed by: HOSPITALIST

## 2023-06-05 PROCEDURE — 250N000013 HC RX MED GY IP 250 OP 250 PS 637: Performed by: NURSE PRACTITIONER

## 2023-06-05 PROCEDURE — 87209 SMEAR COMPLEX STAIN: CPT | Performed by: PHYSICIAN ASSISTANT

## 2023-06-05 PROCEDURE — 258N000003 HC RX IP 258 OP 636: Performed by: PHYSICIAN ASSISTANT

## 2023-06-05 PROCEDURE — 120N000001 HC R&B MED SURG/OB

## 2023-06-05 PROCEDURE — 250N000011 HC RX IP 250 OP 636: Performed by: PHYSICIAN ASSISTANT

## 2023-06-05 PROCEDURE — 87328 CRYPTOSPORIDIUM AG IA: CPT | Performed by: PHYSICIAN ASSISTANT

## 2023-06-05 PROCEDURE — 250N000013 HC RX MED GY IP 250 OP 250 PS 637: Performed by: PHYSICIAN ASSISTANT

## 2023-06-05 PROCEDURE — 99232 SBSQ HOSP IP/OBS MODERATE 35: CPT | Performed by: PHYSICIAN ASSISTANT

## 2023-06-05 PROCEDURE — 81001 URINALYSIS AUTO W/SCOPE: CPT | Performed by: PHYSICIAN ASSISTANT

## 2023-06-05 PROCEDURE — 83735 ASSAY OF MAGNESIUM: CPT | Performed by: PHYSICIAN ASSISTANT

## 2023-06-05 PROCEDURE — 36415 COLL VENOUS BLD VENIPUNCTURE: CPT | Performed by: PHYSICIAN ASSISTANT

## 2023-06-05 RX ORDER — ALPRAZOLAM 0.5 MG
0.5 TABLET ORAL DAILY PRN
Status: DISCONTINUED | OUTPATIENT
Start: 2023-06-05 | End: 2023-06-15

## 2023-06-05 RX ORDER — ATORVASTATIN CALCIUM 10 MG/1
10 TABLET, FILM COATED ORAL DAILY
Status: CANCELLED | OUTPATIENT
Start: 2023-06-05

## 2023-06-05 RX ORDER — LORAZEPAM 0.5 MG/1
0.25 TABLET ORAL EVERY 4 HOURS PRN
Status: DISCONTINUED | OUTPATIENT
Start: 2023-06-05 | End: 2023-06-12

## 2023-06-05 RX ORDER — ONDANSETRON 2 MG/ML
4 INJECTION INTRAMUSCULAR; INTRAVENOUS
Status: CANCELLED | OUTPATIENT
Start: 2023-06-05

## 2023-06-05 RX ORDER — LIDOCAINE 40 MG/G
CREAM TOPICAL
Status: CANCELLED | OUTPATIENT
Start: 2023-06-05

## 2023-06-05 RX ORDER — MAGNESIUM CARB/ALUMINUM HYDROX 105-160MG
296 TABLET,CHEWABLE ORAL ONCE
Status: COMPLETED | OUTPATIENT
Start: 2023-06-06 | End: 2023-06-06

## 2023-06-05 RX ORDER — LORAZEPAM 2 MG/ML
0.5 INJECTION INTRAMUSCULAR ONCE
Status: COMPLETED | OUTPATIENT
Start: 2023-06-05 | End: 2023-06-05

## 2023-06-05 RX ORDER — ALPRAZOLAM 0.5 MG
0.5 TABLET ORAL DAILY PRN
COMMUNITY

## 2023-06-05 RX ADMIN — LOSARTAN POTASSIUM 50 MG: 50 TABLET, FILM COATED ORAL at 08:15

## 2023-06-05 RX ADMIN — POLYETHYLENE GLYCOL 3350, SODIUM SULFATE ANHYDROUS, SODIUM BICARBONATE, SODIUM CHLORIDE, POTASSIUM CHLORIDE 4000 ML: 236; 22.74; 6.74; 5.86; 2.97 POWDER, FOR SOLUTION ORAL at 20:49

## 2023-06-05 RX ADMIN — SODIUM CHLORIDE: 9 INJECTION, SOLUTION INTRAVENOUS at 03:45

## 2023-06-05 RX ADMIN — LOSARTAN POTASSIUM 50 MG: 50 TABLET, FILM COATED ORAL at 20:49

## 2023-06-05 RX ADMIN — LORAZEPAM 0.5 MG: 2 INJECTION INTRAMUSCULAR; INTRAVENOUS at 10:50

## 2023-06-05 RX ADMIN — LEVOTHYROXINE SODIUM 88 MCG: 0.09 TABLET ORAL at 08:15

## 2023-06-05 RX ADMIN — LORAZEPAM 0.25 MG: 0.5 TABLET ORAL at 06:27

## 2023-06-05 RX ADMIN — ONDANSETRON 4 MG: 4 TABLET, ORALLY DISINTEGRATING ORAL at 08:14

## 2023-06-05 RX ADMIN — LOPERAMIDE HYDROCHLORIDE 2 MG: 2 CAPSULE ORAL at 08:15

## 2023-06-05 RX ADMIN — CARVEDILOL 12.5 MG: 12.5 TABLET, FILM COATED ORAL at 17:43

## 2023-06-05 RX ADMIN — CARVEDILOL 12.5 MG: 12.5 TABLET, FILM COATED ORAL at 08:15

## 2023-06-05 RX ADMIN — SODIUM CHLORIDE 500 ML: 9 INJECTION, SOLUTION INTRAVENOUS at 14:24

## 2023-06-05 ASSESSMENT — ACTIVITIES OF DAILY LIVING (ADL)
CONCENTRATING,_REMEMBERING_OR_MAKING_DECISIONS_DIFFICULTY: NO
ADLS_ACUITY_SCORE: 39
ADLS_ACUITY_SCORE: 39
DIFFICULTY_COMMUNICATING: NO
TOILETING_ISSUES: NO
ADLS_ACUITY_SCORE: 26
DRESSING/BATHING_DIFFICULTY: NO
ADLS_ACUITY_SCORE: 26
ADLS_ACUITY_SCORE: 39
ADLS_ACUITY_SCORE: 32
DIFFICULTY_EATING/SWALLOWING: NO
WEAR_GLASSES_OR_BLIND: YES
VISION_MANAGEMENT: GLASSESS
DOING_ERRANDS_INDEPENDENTLY_DIFFICULTY: YES
ADLS_ACUITY_SCORE: 26
ADLS_ACUITY_SCORE: 39
ADLS_ACUITY_SCORE: 32
ADLS_ACUITY_SCORE: 39
CHANGE_IN_FUNCTIONAL_STATUS_SINCE_ONSET_OF_CURRENT_ILLNESS/INJURY: NO
ADLS_ACUITY_SCORE: 39
WALKING_OR_CLIMBING_STAIRS_DIFFICULTY: NO
ADLS_ACUITY_SCORE: 32
FALL_HISTORY_WITHIN_LAST_SIX_MONTHS: NO

## 2023-06-05 NOTE — PROGRESS NOTES
[facility]    Medicine Progress Note - Hospitalist Service    Date of Admission:  6/2/2023    Assessment & Plan   Jeannie Chu is a 89 y/o female with PMH significant for CAD with previous stenting, dyslipidemia, paroxysmal A-fib on anticoagulation with Xarelto, hypertension, and hypothyroidism who was admitted on 6/2/2023 for further evaluation and management of worsening diarrhea with abdominal pain that has been going on for 3 weeks.    Patient was initially seen in the ED on 5/31 with concern of shortness of breath and was diagnosed with pneumonia based on CT of chest which showed nondiagnostic pulmonary artery angiogram, but low suspicion for PE, small bilateral pleural effusions, patchy area of groundglass opacity involving the left lower lobe which is suspicious for pneumonia.  Patient was prescribed Augmentin and azithromycin which patient took upon discharge and caused increased diarrhea.  During that visit she also was noted to have an elevated proBNP.  The patient returned to the ED on 6/2 due to ongoing/worsening diarrhea with associated abdominal pain.  Labs revealed hypocalcemia, hypomagnesia, and hyponatremia.  Her proBNP was rechecked and still elevated at 12,000, improvement from previous 14,000 the day prior.  Initially the patient was treated supportively with IV fluids and stool samples were collected for testing.  C. difficile and enteric panel are negative.  Initially IV fluids were discontinued on 6/3 due to concern for elevated proBNP and patient tolerating a regular diet.  Patient has remained admitted for additional work-up of ongoing hyponatremia with persistent diarrhea and abdominal pain.    #Diarrhea  #Diffuse abdominal pain: possibly functional diarrhea but etiology not entirely clear. Continues to have abdominal cramping with bowel movements. No melena or hematochezia.  Suspect diarrhea worsened by antibiotics started on 6/1, these have since been  discontinued.  -Fecal fat, elastase and calprotectin currently pending  -add on parasite and ova stool testing along with giardia for completeness   -4 episodes of diarrhea so far this morning even with imodium   -As needed Imodium with C. difficile and enteric panel negative  -Monitor I&Os  -regular diet as tolerated for now  -hold PTA Atorvastatin since this could contribute to microscopic colitis   -GI consult for further assessment, possibly related to microscopic colitis     #Hyponatremia: previously mild hyponatremia over the last year with sodium around 131-134. Initial sodium of 127 on 5/31 that decreased to 125 on 6/2. Sodium has only slightly improved to 127 on 6/4. Would expect this is related to hypovolemia from ongoing GI losses.   -urine osmolality of 210 and urine sodium <20 on 6/3, this was after receiving IVFs  -restarted IVFs with NS at 100 ml/hour on 6/5 with sodium still at 126   -noted to be orthostatic down to 83/65 with standing and reported dizziness on 6/5  -give 500 ml NS bolus on 6/5 at 100 ml/hour   -continue to monitor BP closely and recheck orthostatics later today after bolus  -will otherwise fluid restrict at 1.5 L per day in case SIADH contributing   -recheck BMP in AM    #Hypocalcemia and hypomagnesemia: resolved with replacement     #Generalized muscle weakness: due to acute illness and ongoing GI losses.   -PT consulted and saw patient on 6/3, recommending use of walker which patient is currently refusing even after being educated with family present    #Elevated proBNP: initial proBNP on 5/31 elevated at 14,188 with repeat of 12,008. Prior to this he proBNP was normal at 1,711 in 10/2022. Most recent echocardiogram on 4/10/2023 showed EF of 55-60%.  Recent CT of chest did small bilateral pleural effusions but on exam patient does not appear fluid overloaded.  She has mild bilateral lower extremity edema and a dry cough of recent.  No chest pain or shortness of breath.  -etiology  "unclear, does have history of A-fib which can cause this to be elevated but previously known and well controlled   -monitor for evidence of heart failure and consider repeat echo if warranted     #Recently diagnosed CAP: diagnosed in ED on 5/31 but with repeat CXR on 6/2 which was negative. Patient does have a recent dry cough and previously complained of shortness of breath that has resolved. Afebrile and no leukocytosis.   -antibiotics were not continued on admission, holding off since admission since this initially worsened diarrhea, continue to reassess for changes      #Mild thrombocytopenia: platelet count of 147 on 6/3 with slight decrease to 143 on 6/4. No evidence of bleeding. Monitor.     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  -continue PTA Carvedilol and Xarelto     #HTN: stable, continue PTA Losartan and Carvedilol     #Hypothyroidism: continue PTA Levothyroxine      Diet: Regular Diet Adult  Fluid restriction 1500 ML FLUID    DVT Prophylaxis: DOAC  Ramos Catheter: Not present  Lines: None     Cardiac Monitoring: None  Code Status: Full Code      Clinically Significant Risk Factors Present on Admission         # Hyponatremia: Lowest Na = 126 mmol/L in last 2 days, will monitor as appropriate    # Hypomagnesemia: Lowest Mg = 1.6 mg/dL in last 2 days, will replace as needed    # Drug Induced Coagulation Defect: home medication list includes an anticoagulant medication    # Hypertension: Noted on problem list      # Obesity: Estimated body mass index is 31.03 kg/m  as calculated from the following:    Height as of this encounter: 1.549 m (5' 1\").    Weight as of this encounter: 74.5 kg (164 lb 3.9 oz).          Disposition Plan      Expected Discharge Date: 06/06/2023                The patient's care was discussed with the Attending Physician, Dr. Lopez , Bedside Nurse, Patient and Patient's Family.    Carmina May PA-C  Hospitalist " Service  [facility]  Securely message with [employer] (more info)  Text page via [employer] Paging/Directory   ______________________________________________________________________    Interval History   Patient's son is at the bedside and assists with interpreting for the patient.     Patient sitting up and appears uncomfortable as well as tired.  She has been reporting dizziness this morning and appears more unsteady when ambulating with nursing staff.  She has had 4 episodes of diarrhea this morning with associated abdominal discomfort.  She is having stool urgency.    Physical Exam   Vital Signs: Temp: 98.1  F (36.7  C) Temp src: Oral BP: (!) 140/110 Pulse: 69   Resp: 18 SpO2: 97 % O2 Device: None (Room air)    Weight: 164 lbs 3.88 oz    General Appearance: Appears tired sitting up on edge of bed, uncomfortable    Respiratory: CTAB except for decreased breath sounds in lung bases without wheezing  Cardiovascular: RRR, normal rate, no murmur   GI: soft, non-tender, non-distended, normoactive bowel sounds  Skin: warm, dry  Extremities: 1+ bilateral LE edema      Medical Decision Making       45 MINUTES SPENT BY ME on the date of service doing chart review, history, exam, documentation & further activities per the note.      Data   ------------------------- PAST 24 HR DATA REVIEWED -----------------------------------------------

## 2023-06-05 NOTE — PLAN OF CARE
"[id number]:    Pt is A&Ox4, able to make needs known. VSS on RA. Up Ax1, refusing assistance & GB/Walker. Denies dizziness, nausea & CP. Denies pain this shift. Reports SOB around 0300, son called & came in to comfort & interpret for pt. Tamil speaking. LS diminished. NS @ 100 ml/hr. PRN ativan given x1 for anxiety, restless & trouble sleeping throughout night. Voiding frequently. BS active, loose stools. Still needs stool sample to be collected. Will continue plan of care.    BP (!) 136/93 (BP Location: Left arm)   Pulse 69   Temp 97.6  F (36.4  C) (Oral)   Resp 18   Ht 1.549 m (5' 1\")   Wt 74.5 kg (164 lb 3.9 oz)   LMP  (LMP Unknown)   SpO2 99%   BMI 31.03 kg/m            "

## 2023-06-05 NOTE — PROGRESS NOTES
[id number]    Pt A&Ox4; vss on r/a with baseline HTN continuing. Son at bedside to assisting with interpreting; pt refusing use of I-pad for .  Tolerating a regular diet and oral meds, writer encouraging oral intake. Intermittent nausea; PRN ODT Zofran given 1x, with relief. denies CP, pain, SOB. Mag/K/Phos protocol WNL.  Ambulating in room, furniture surfing, refusing GB/W.  Ns at 100 mL/hr in L-AC PIV discontinue. 1x 500mL n/s bolus given at 1400. Pt stating stomach pain with BM. Intermittent dizziness, pt feels most comfortable sitting on the side of the bed; intermittent swaying.  1x dose of IV Ativan given for anxiety. Conversation with son on calling for help with ambulation d/t increased dizziness. Orthostatics completed; positive. Stool sent; per lab a second sample required to complete all orders, urine sample sent. X2 loose stool and x1 formed stool this shift.  [facility] Consulted; plan for colonoscopy post-xarelto hold. 1-2 days. Calls for cares; will continue to monitor

## 2023-06-05 NOTE — CONSULTS
GASTROENTEROLOGY CONSULTATION      Jeannie Chu  [address]  [place] [address]  88 year old female     Admission Date/Time: 6/2/2023  Primary Care Provider: Diana Hilario     We were asked to see the patient in consultation by Dr. Carmina HOGAN for evaluation of abdominal pain, diarrhea.    CC: Abdominal pain, diarrhea      HPI:  Jeannie Chu is a 88 year old female with history of CAD, dyslipidemia, atrial fibrillation on anticoagulation with Xarelto, hypertension, hypothyroidism who was admitted on 6/2/2023 for 3 weeks of diarrhea and abdominal pain.  The patient was recently presented for dyspnea on 5/31 and was treated for pneumonia  with augmentin and azithromycin. Antibiotics discontinued as they seemed to cause increased diarrhea.  C. difficile and enteric pathogen panel negative.  Blood work notable for elevated BNP at 14,000. Hyponatremic to 125    The patient's son is at the bedside who assists with the history and interpreting.  The patient has reported intermittent abdominal pain for the past 3 weeks.  Over this timeframe has had intermittent episodes of diarrhea. Some days she would have looser stools with 3-4 bowel movements per day.  When she began the antibiotics she began having more significant diarrhea with 8-10 watery bowel movements throughout the day. Diarrhea continued even after antibiotics were discontinued. She does report that her abdominal pain will mildly improve after having a bowel movement.  Denies any hematochezia, unintended weight loss. Has had reduced appetite but eating regular diet without nausea or vomiting.     She has been using Imodium 2 mg as needed.  States that it was not particularly helpful yesterday but has only had 2 bowel movements since receiving a dose at 8 AM.    She has never had a colonoscopy before.    Denies any recent international travel or known sick contacts. Outside of the antibiotics denies any new medications.      PAST MEDICAL HISTORY:  Patient Active Problem List    Diagnosis Date Noted     Abdominal pain, generalized 06/02/2023     Priority: Medium     Hypocalcemia 06/02/2023     Priority: Medium     Hypomagnesemia 06/02/2023     Priority: Medium     Hyponatremia 06/02/2023     Priority: Medium     Generalized muscle weakness 06/02/2023     Priority: Medium     Elevated brain natriuretic peptide (BNP) level 06/02/2023     Priority: Medium     Diarrhea, unspecified type 06/02/2023     Priority: Medium     Community acquired pneumonia, unspecified laterality 06/02/2023     Priority: Medium     Facet arthropathy, lumbosacral 01/17/2023     Priority: Medium     Lumbosacral radiculopathy 01/17/2023     Priority: Medium     Hyperlipidemia LDL goal <100 03/12/2022     Priority: Medium     Anemia, unspecified type 03/12/2022     Priority: Medium     Symptomatic bradycardia 03/03/2022     Priority: Medium     Rapid atrial fibrillation (H) 02/27/2022     Priority: Medium     Atrial flutter, unspecified type (H) 02/14/2022     Priority: Medium     Paroxysmal atrial fibrillation (H) 07/27/2021     Priority: Medium     Atrial flutter with rapid ventricular response (H) 07/24/2021     Priority: Medium     Acquired hypothyroidism 05/27/2021     Priority: Medium     Coronary artery disease involving native coronary artery of native heart without angina pectoris 05/26/2021     Priority: Medium     Essential hypertension 05/26/2021     Priority: Medium     Compression fracture of L1 vertebra, sequela 05/26/2021     Priority: Medium          ROS: A comprehensive ten point review of systems was negative aside from those in mentioned in the HPI.       MEDICATIONS:   Prior to Admission medications    Medication Sig Start Date End Date Taking? Authorizing Provider   ALPRAZolam (XANAX) 0.5 MG tablet Take 0.5 mg by mouth daily as needed for anxiety   Yes Unknown, Entered By History   amoxicillin-clavulanate (AUGMENTIN) 875-125 MG tablet Take 1  "tablet by mouth 2 times daily for 7 days 6/1/23 6/8/23 Yes Mark Carpenter MD   atorvastatin (LIPITOR) 10 MG tablet Take 1 tablet (10 mg) by mouth daily 11/10/22  Yes Germán Shin MD   azithromycin (ZITHROMAX) 250 MG tablet Take 1 tablet (250 mg) by mouth daily for 4 days 6/1/23 6/5/23 Yes Mark Carpenter MD   carvedilol (COREG) 12.5 MG tablet Take 1 tablet (12.5 mg) by mouth 2 times daily (with meals) 3/31/23  Yes Germán Shin MD   IRON (FERROUS GLUCONATE) 325 MG OR TABS Take 1 tablet by mouth daily  1/16/06  Yes Joanna Garcia MD   levothyroxine (SYNTHROID/LEVOTHROID) 88 MCG tablet Take 1 tablet (88 mcg) by mouth daily 3/5/23  Yes Diana Hilario MD   losartan (COZAAR) 50 MG tablet Take 1 tablet (50 mg) by mouth 2 times daily 3/31/23  Yes Germán Shin MD   Multiple Vitamins-Minerals (ICAPS AREDS FORMULA PO) Take 2 tablets by mouth daily    Yes Reported, Patient   niacin 500 MG tablet Take by mouth daily (with breakfast)   Yes Reported, Patient   nitroGLYcerin (NITROSTAT) 0.4 MG sublingual tablet For chest pain place 1 tablet under the tongue every 5 minutes for 3 doses. If symptoms persist 5 minutes after 1st dose call 911. 2/16/22  Yes Ese Avendano MD   rivaroxaban ANTICOAGULANT (XARELTO) 20 MG TABS tablet Take 1 tablet (20 mg) by mouth daily (with dinner) 11/10/22  Yes Germán Shin MD        ALLERGIES:   Allergies   Allergen Reactions     Amlodipine Swelling     BLE edema     Ibuprofen         SOCIAL HISTORY:  Social History     Tobacco Use     Smoking status: Never     Smokeless tobacco: Never   Vaping Use     Vaping status: Never Used   Substance Use Topics     Alcohol use: No     Drug use: No        FAMILY HISTORY:  Family History   Problem Relation Age of Onset     Family History Negative Other         PHYSICAL EXAM:   BP (!) 156/89 (BP Location: Left arm)   Pulse 69   Temp 97.3  F (36.3  C) (Oral)   Resp 18   Ht 1.549 m (5' 1\")   Wt 74.5 kg (164 lb 3.9 oz)   LMP  (LMP Unknown)   SpO2 " 100%   BMI 31.03 kg/m       PHYSICAL EXAM:  General: elderly, NAD  SKIN: no suspicious lesions, rashes, jaundice, or spider angiomas  HEAD: Normocephalic. No masses, lesions, tenderness or abnormalities  NECK: Neck supple. No adenopathy. Thyroid symmetric, normal size.  EYES: No scleral icterus  RESPIRATORY: expiratory wheezing   CARDIOVASCULAR: RRR  GASTROINTESTINAL: +BS, soft, nontender, nondistended  NEURO: alert and oriented   PSYCH: appropriate      LABS:  I reviewed the patient's new clinical lab test results.   Recent Labs   Lab Test 06/03/23  0655 06/02/23  1840 05/31/23  2347 11/01/22  1648   WBC 6.1 6.7 6.3 9.0   HGB 11.2* 10.7* 12.1 12.1   MCV 94 93 93 94   * 147* 158 257   INR  --   --   --  0.99     Recent Labs   Lab Test 06/05/23  0632 06/04/23  1519 06/04/23  0651 06/03/23  1451 06/03/23  0655   * 126* 127*   < > 126*   POTASSIUM 4.9  --  4.3  --  4.8   CHLORIDE 95*  --  94*  --  93*   CO2 22  --  22  --  22   BUN 6.1*  --  9.4  --  8.9   ANIONGAP 9  --  11  --  11   LYLE 8.6*  --  8.8  --  8.5*    < > = values in this interval not displayed.     Recent Labs   Lab Test 06/03/23  0655 06/02/23  1840 03/11/22  1505 02/28/22  0846 02/27/22  1305 02/15/22  0921   ALBUMIN 3.5 3.7  --  2.6*  --   --    BILITOTAL 0.9 1.0  --  0.9  --   --    ALT 47* 55* 24 18  --   --    AST 57* 59*  --  25  --   --    ALKPHOS 70 77  --  57  --   --    PROTEIN  --   --   --   --  Negative Negative         IMAGING  CT C/A/P 6/1/2023  1.  Nondiagnostic pulmonary artery angiogram due to severely suboptimal contrast bolus.  2.   Small bilateral pleural effusions. There is also a patchy area of groundglass opacity involving the left lower lobe which is suspicious for pneumonia.  3.  No correlate for the 2.3 cm isoechoic right hepatic lobe lesion seen on prior ultrasound. This was likely artifactual.  4.  No other acute process in the chest, abdomen, or pelvis.     CONSULTATION ASSESSMENT AND PLAN:    Abdominal  Pain  Diarrhea     This patient is an 88-year-old female with history of CAD, dyslipidemia, atrial fibrillation on Xarelto, hypertension, hypothyroidism, and recent pneumonia who presents with abdominal pain and diarrhea.  Diarrhea has continued even after antibiotics discontinued.  C. difficile and enteric pathogen panel negative.  Agree with additional work-up including ova and parasite and fecal elastase.  We will proceed with a colonoscopy for further evaluation of potential causes including microscopic colitis, though this does not typically cause abdominal pain. Malignancy should be ruled out without any prior colonoscopy, though less likely.  It is otherwise possible that the patient is experiencing a postinfectious irritable bowel syndrome in which case we would consider trial of an antispasmodic medication.     Plan  --Hold anticoagulation.   --NPO after midnight.   --Colonoscopy tomorrow in OR with MAC.   --Continue Imodium PRN.   --Follow pending stool studies.   --Further recommendations to follow procedure.     Discussed with Dr. Manning, GI staff physician.     Total time spent:  Approximately 40 minutes was spent providing patient care, including patient evaluation, reviewing documentation/test results, and . Thank you for asking us to participate in the care of this patient.      Constance Caceres, CNP  [facility] ([facility])  [phone]

## 2023-06-05 NOTE — H&P (VIEW-ONLY)
GASTROENTEROLOGY CONSULTATION      Jeannie Chu  [address]  [place] [address]  88 year old female     Admission Date/Time: 6/2/2023  Primary Care Provider: Diana Hilario     We were asked to see the patient in consultation by Dr. Carmina HOGAN for evaluation of abdominal pain, diarrhea.    CC: Abdominal pain, diarrhea      HPI:  Jeannie Chu is a 88 year old female with history of CAD, dyslipidemia, atrial fibrillation on anticoagulation with Xarelto, hypertension, hypothyroidism who was admitted on 6/2/2023 for 3 weeks of diarrhea and abdominal pain.  The patient was recently presented for dyspnea on 5/31 and was treated for pneumonia  with augmentin and azithromycin. Antibiotics discontinued as they seemed to cause increased diarrhea.  C. difficile and enteric pathogen panel negative.  Blood work notable for elevated BNP at 14,000. Hyponatremic to 125    The patient's son is at the bedside who assists with the history and interpreting.  The patient has reported intermittent abdominal pain for the past 3 weeks.  Over this timeframe has had intermittent episodes of diarrhea. Some days she would have looser stools with 3-4 bowel movements per day.  When she began the antibiotics she began having more significant diarrhea with 8-10 watery bowel movements throughout the day. Diarrhea continued even after antibiotics were discontinued. She does report that her abdominal pain will mildly improve after having a bowel movement.  Denies any hematochezia, unintended weight loss. Has had reduced appetite but eating regular diet without nausea or vomiting.     She has been using Imodium 2 mg as needed.  States that it was not particularly helpful yesterday but has only had 2 bowel movements since receiving a dose at 8 AM.    She has never had a colonoscopy before.    Denies any recent international travel or known sick contacts. Outside of the antibiotics denies any new medications.      PAST MEDICAL HISTORY:  Patient Active Problem List    Diagnosis Date Noted     Abdominal pain, generalized 06/02/2023     Priority: Medium     Hypocalcemia 06/02/2023     Priority: Medium     Hypomagnesemia 06/02/2023     Priority: Medium     Hyponatremia 06/02/2023     Priority: Medium     Generalized muscle weakness 06/02/2023     Priority: Medium     Elevated brain natriuretic peptide (BNP) level 06/02/2023     Priority: Medium     Diarrhea, unspecified type 06/02/2023     Priority: Medium     Community acquired pneumonia, unspecified laterality 06/02/2023     Priority: Medium     Facet arthropathy, lumbosacral 01/17/2023     Priority: Medium     Lumbosacral radiculopathy 01/17/2023     Priority: Medium     Hyperlipidemia LDL goal <100 03/12/2022     Priority: Medium     Anemia, unspecified type 03/12/2022     Priority: Medium     Symptomatic bradycardia 03/03/2022     Priority: Medium     Rapid atrial fibrillation (H) 02/27/2022     Priority: Medium     Atrial flutter, unspecified type (H) 02/14/2022     Priority: Medium     Paroxysmal atrial fibrillation (H) 07/27/2021     Priority: Medium     Atrial flutter with rapid ventricular response (H) 07/24/2021     Priority: Medium     Acquired hypothyroidism 05/27/2021     Priority: Medium     Coronary artery disease involving native coronary artery of native heart without angina pectoris 05/26/2021     Priority: Medium     Essential hypertension 05/26/2021     Priority: Medium     Compression fracture of L1 vertebra, sequela 05/26/2021     Priority: Medium          ROS: A comprehensive ten point review of systems was negative aside from those in mentioned in the HPI.       MEDICATIONS:   Prior to Admission medications    Medication Sig Start Date End Date Taking? Authorizing Provider   ALPRAZolam (XANAX) 0.5 MG tablet Take 0.5 mg by mouth daily as needed for anxiety   Yes Unknown, Entered By History   amoxicillin-clavulanate (AUGMENTIN) 875-125 MG tablet Take 1  "tablet by mouth 2 times daily for 7 days 6/1/23 6/8/23 Yes Mark Carpenter MD   atorvastatin (LIPITOR) 10 MG tablet Take 1 tablet (10 mg) by mouth daily 11/10/22  Yes Germán Shin MD   azithromycin (ZITHROMAX) 250 MG tablet Take 1 tablet (250 mg) by mouth daily for 4 days 6/1/23 6/5/23 Yes Mark Carpenter MD   carvedilol (COREG) 12.5 MG tablet Take 1 tablet (12.5 mg) by mouth 2 times daily (with meals) 3/31/23  Yes Germán Shin MD   IRON (FERROUS GLUCONATE) 325 MG OR TABS Take 1 tablet by mouth daily  1/16/06  Yes Joanna Garcia MD   levothyroxine (SYNTHROID/LEVOTHROID) 88 MCG tablet Take 1 tablet (88 mcg) by mouth daily 3/5/23  Yes Diana Hilario MD   losartan (COZAAR) 50 MG tablet Take 1 tablet (50 mg) by mouth 2 times daily 3/31/23  Yes Germán Shin MD   Multiple Vitamins-Minerals (ICAPS AREDS FORMULA PO) Take 2 tablets by mouth daily    Yes Reported, Patient   niacin 500 MG tablet Take by mouth daily (with breakfast)   Yes Reported, Patient   nitroGLYcerin (NITROSTAT) 0.4 MG sublingual tablet For chest pain place 1 tablet under the tongue every 5 minutes for 3 doses. If symptoms persist 5 minutes after 1st dose call 911. 2/16/22  Yes Ese Avendano MD   rivaroxaban ANTICOAGULANT (XARELTO) 20 MG TABS tablet Take 1 tablet (20 mg) by mouth daily (with dinner) 11/10/22  Yes Germán Shin MD        ALLERGIES:   Allergies   Allergen Reactions     Amlodipine Swelling     BLE edema     Ibuprofen         SOCIAL HISTORY:  Social History     Tobacco Use     Smoking status: Never     Smokeless tobacco: Never   Vaping Use     Vaping status: Never Used   Substance Use Topics     Alcohol use: No     Drug use: No        FAMILY HISTORY:  Family History   Problem Relation Age of Onset     Family History Negative Other         PHYSICAL EXAM:   BP (!) 156/89 (BP Location: Left arm)   Pulse 69   Temp 97.3  F (36.3  C) (Oral)   Resp 18   Ht 1.549 m (5' 1\")   Wt 74.5 kg (164 lb 3.9 oz)   LMP  (LMP Unknown)   SpO2 " 100%   BMI 31.03 kg/m       PHYSICAL EXAM:  General: elderly, NAD  SKIN: no suspicious lesions, rashes, jaundice, or spider angiomas  HEAD: Normocephalic. No masses, lesions, tenderness or abnormalities  NECK: Neck supple. No adenopathy. Thyroid symmetric, normal size.  EYES: No scleral icterus  RESPIRATORY: expiratory wheezing   CARDIOVASCULAR: RRR  GASTROINTESTINAL: +BS, soft, nontender, nondistended  NEURO: alert and oriented   PSYCH: appropriate      LABS:  I reviewed the patient's new clinical lab test results.   Recent Labs   Lab Test 06/03/23  0655 06/02/23  1840 05/31/23  2347 11/01/22  1648   WBC 6.1 6.7 6.3 9.0   HGB 11.2* 10.7* 12.1 12.1   MCV 94 93 93 94   * 147* 158 257   INR  --   --   --  0.99     Recent Labs   Lab Test 06/05/23  0632 06/04/23  1519 06/04/23  0651 06/03/23  1451 06/03/23  0655   * 126* 127*   < > 126*   POTASSIUM 4.9  --  4.3  --  4.8   CHLORIDE 95*  --  94*  --  93*   CO2 22  --  22  --  22   BUN 6.1*  --  9.4  --  8.9   ANIONGAP 9  --  11  --  11   LYLE 8.6*  --  8.8  --  8.5*    < > = values in this interval not displayed.     Recent Labs   Lab Test 06/03/23  0655 06/02/23  1840 03/11/22  1505 02/28/22  0846 02/27/22  1305 02/15/22  0921   ALBUMIN 3.5 3.7  --  2.6*  --   --    BILITOTAL 0.9 1.0  --  0.9  --   --    ALT 47* 55* 24 18  --   --    AST 57* 59*  --  25  --   --    ALKPHOS 70 77  --  57  --   --    PROTEIN  --   --   --   --  Negative Negative         IMAGING  CT C/A/P 6/1/2023  1.  Nondiagnostic pulmonary artery angiogram due to severely suboptimal contrast bolus.  2.   Small bilateral pleural effusions. There is also a patchy area of groundglass opacity involving the left lower lobe which is suspicious for pneumonia.  3.  No correlate for the 2.3 cm isoechoic right hepatic lobe lesion seen on prior ultrasound. This was likely artifactual.  4.  No other acute process in the chest, abdomen, or pelvis.     CONSULTATION ASSESSMENT AND PLAN:    Abdominal  Pain  Diarrhea     This patient is an 88-year-old female with history of CAD, dyslipidemia, atrial fibrillation on Xarelto, hypertension, hypothyroidism, and recent pneumonia who presents with abdominal pain and diarrhea.  Diarrhea has continued even after antibiotics discontinued.  C. difficile and enteric pathogen panel negative.  Agree with additional work-up including ova and parasite and fecal elastase.  We will proceed with a colonoscopy for further evaluation of potential causes including microscopic colitis, though this does not typically cause abdominal pain. Malignancy should be ruled out without any prior colonoscopy, though less likely.  It is otherwise possible that the patient is experiencing a postinfectious irritable bowel syndrome in which case we would consider trial of an antispasmodic medication.     Plan  --Hold anticoagulation.   --NPO after midnight.   --Colonoscopy tomorrow in OR with MAC.   --Continue Imodium PRN.   --Follow pending stool studies.   --Further recommendations to follow procedure.     Discussed with Dr. Manning, GI staff physician.     Total time spent:  Approximately 40 minutes was spent providing patient care, including patient evaluation, reviewing documentation/test results, and . Thank you for asking us to participate in the care of this patient.      Constance Caceres, CNP  [facility] ([facility])  [phone]

## 2023-06-05 NOTE — PROVIDER NOTIFICATION
Pt very restless & anxious. Requesting something to help w/ anxiety. Could we get 1x dose of something?     PRN ativan ordered

## 2023-06-06 ENCOUNTER — ANESTHESIA (OUTPATIENT)
Dept: SURGERY | Facility: CLINIC | Age: 88
End: 2023-06-06
Payer: COMMERCIAL

## 2023-06-06 ENCOUNTER — ANESTHESIA EVENT (OUTPATIENT)
Dept: SURGERY | Facility: CLINIC | Age: 88
End: 2023-06-06
Payer: COMMERCIAL

## 2023-06-06 LAB
ALBUMIN SERPL BCG-MCNC: 3.4 G/DL (ref 3.5–5.2)
ALP SERPL-CCNC: 93 U/L (ref 35–104)
ALT SERPL W P-5'-P-CCNC: 82 U/L (ref 10–35)
ANION GAP SERPL CALCULATED.3IONS-SCNC: 9 MMOL/L (ref 7–15)
AST SERPL W P-5'-P-CCNC: 77 U/L (ref 10–35)
BASOPHILS # BLD AUTO: 0 10E3/UL (ref 0–0.2)
BASOPHILS NFR BLD AUTO: 0 %
BILIRUB DIRECT SERPL-MCNC: 0.21 MG/DL (ref 0–0.3)
BILIRUB SERPL-MCNC: 0.8 MG/DL
BUN SERPL-MCNC: 4.9 MG/DL (ref 8–23)
C PARVUM AG STL QL IA: NEGATIVE
CALCIUM SERPL-MCNC: 8.4 MG/DL (ref 8.8–10.2)
CHLORIDE SERPL-SCNC: 98 MMOL/L (ref 98–107)
COLONOSCOPY: NORMAL
CREAT SERPL-MCNC: 0.76 MG/DL (ref 0.51–0.95)
DEPRECATED HCO3 PLAS-SCNC: 22 MMOL/L (ref 22–29)
EOSINOPHIL # BLD AUTO: 0.2 10E3/UL (ref 0–0.7)
EOSINOPHIL NFR BLD AUTO: 3 %
ERYTHROCYTE [DISTWIDTH] IN BLOOD BY AUTOMATED COUNT: 14.3 % (ref 10–15)
G LAMBLIA AG STL QL IA: NEGATIVE
GFR SERPL CREATININE-BSD FRML MDRD: 75 ML/MIN/1.73M2
GLUCOSE SERPL-MCNC: 99 MG/DL (ref 70–99)
HCT VFR BLD AUTO: 35.6 % (ref 35–47)
HGB BLD-MCNC: 11.6 G/DL (ref 11.7–15.7)
IMM GRANULOCYTES # BLD: 0 10E3/UL
IMM GRANULOCYTES NFR BLD: 0 %
LYMPHOCYTES # BLD AUTO: 1.4 10E3/UL (ref 0.8–5.3)
LYMPHOCYTES NFR BLD AUTO: 20 %
MAGNESIUM SERPL-MCNC: 1.6 MG/DL (ref 1.7–2.3)
MCH RBC QN AUTO: 31 PG (ref 26.5–33)
MCHC RBC AUTO-ENTMCNC: 32.6 G/DL (ref 31.5–36.5)
MCV RBC AUTO: 95 FL (ref 78–100)
MONOCYTES # BLD AUTO: 1.1 10E3/UL (ref 0–1.3)
MONOCYTES NFR BLD AUTO: 16 %
NEUTROPHILS # BLD AUTO: 4.3 10E3/UL (ref 1.6–8.3)
NEUTROPHILS NFR BLD AUTO: 61 %
NRBC # BLD AUTO: 0 10E3/UL
NRBC BLD AUTO-RTO: 0 /100
O+P STL MICRO: NEGATIVE
PHOSPHATE SERPL-MCNC: 3 MG/DL (ref 2.5–4.5)
PLATELET # BLD AUTO: 143 10E3/UL (ref 150–450)
POTASSIUM SERPL-SCNC: 4.9 MMOL/L (ref 3.4–5.3)
PROT SERPL-MCNC: 6.2 G/DL (ref 6.4–8.3)
RBC # BLD AUTO: 3.74 10E6/UL (ref 3.8–5.2)
SODIUM SERPL-SCNC: 129 MMOL/L (ref 136–145)
WBC # BLD AUTO: 7.1 10E3/UL (ref 4–11)

## 2023-06-06 PROCEDURE — 88305 TISSUE EXAM BY PATHOLOGIST: CPT | Mod: TC | Performed by: INTERNAL MEDICINE

## 2023-06-06 PROCEDURE — 999N000141 HC STATISTIC PRE-PROCEDURE NURSING ASSESSMENT: Performed by: INTERNAL MEDICINE

## 2023-06-06 PROCEDURE — 85018 HEMOGLOBIN: CPT | Performed by: PHYSICIAN ASSISTANT

## 2023-06-06 PROCEDURE — 272N000001 HC OR GENERAL SUPPLY STERILE: Performed by: INTERNAL MEDICINE

## 2023-06-06 PROCEDURE — 82248 BILIRUBIN DIRECT: CPT | Performed by: HOSPITALIST

## 2023-06-06 PROCEDURE — 370N000017 HC ANESTHESIA TECHNICAL FEE, PER MIN: Performed by: INTERNAL MEDICINE

## 2023-06-06 PROCEDURE — 258N000003 HC RX IP 258 OP 636: Performed by: NURSE ANESTHETIST, CERTIFIED REGISTERED

## 2023-06-06 PROCEDURE — 360N000075 HC SURGERY LEVEL 2, PER MIN: Performed by: INTERNAL MEDICINE

## 2023-06-06 PROCEDURE — 88305 TISSUE EXAM BY PATHOLOGIST: CPT | Mod: 26

## 2023-06-06 PROCEDURE — 250N000013 HC RX MED GY IP 250 OP 250 PS 637: Performed by: PHYSICIAN ASSISTANT

## 2023-06-06 PROCEDURE — 250N000013 HC RX MED GY IP 250 OP 250 PS 637: Performed by: HOSPITALIST

## 2023-06-06 PROCEDURE — 120N000001 HC R&B MED SURG/OB

## 2023-06-06 PROCEDURE — 999N000036 HC STATISTIC COLONOSCOPY (OR PROCEDURE): Performed by: INTERNAL MEDICINE

## 2023-06-06 PROCEDURE — 710N000012 HC RECOVERY PHASE 2, PER MINUTE: Performed by: INTERNAL MEDICINE

## 2023-06-06 PROCEDURE — 250N000013 HC RX MED GY IP 250 OP 250 PS 637: Performed by: NURSE PRACTITIONER

## 2023-06-06 PROCEDURE — 36415 COLL VENOUS BLD VENIPUNCTURE: CPT | Performed by: PHYSICIAN ASSISTANT

## 2023-06-06 PROCEDURE — 83735 ASSAY OF MAGNESIUM: CPT | Performed by: PHYSICIAN ASSISTANT

## 2023-06-06 PROCEDURE — 84100 ASSAY OF PHOSPHORUS: CPT | Performed by: PHYSICIAN ASSISTANT

## 2023-06-06 PROCEDURE — 80053 COMPREHEN METABOLIC PANEL: CPT | Performed by: PHYSICIAN ASSISTANT

## 2023-06-06 PROCEDURE — 250N000011 HC RX IP 250 OP 636: Performed by: NURSE ANESTHETIST, CERTIFIED REGISTERED

## 2023-06-06 PROCEDURE — 0DB68ZX EXCISION OF STOMACH, VIA NATURAL OR ARTIFICIAL OPENING ENDOSCOPIC, DIAGNOSTIC: ICD-10-PCS | Performed by: INTERNAL MEDICINE

## 2023-06-06 PROCEDURE — 0DBE8ZX EXCISION OF LARGE INTESTINE, VIA NATURAL OR ARTIFICIAL OPENING ENDOSCOPIC, DIAGNOSTIC: ICD-10-PCS | Performed by: INTERNAL MEDICINE

## 2023-06-06 PROCEDURE — 0DB98ZX EXCISION OF DUODENUM, VIA NATURAL OR ARTIFICIAL OPENING ENDOSCOPIC, DIAGNOSTIC: ICD-10-PCS | Performed by: INTERNAL MEDICINE

## 2023-06-06 PROCEDURE — 250N000009 HC RX 250: Performed by: ANESTHESIOLOGY

## 2023-06-06 PROCEDURE — 250N000013 HC RX MED GY IP 250 OP 250 PS 637: Performed by: INTERNAL MEDICINE

## 2023-06-06 PROCEDURE — 85041 AUTOMATED RBC COUNT: CPT | Performed by: PHYSICIAN ASSISTANT

## 2023-06-06 RX ORDER — NALOXONE HYDROCHLORIDE 0.4 MG/ML
0.2 INJECTION, SOLUTION INTRAMUSCULAR; INTRAVENOUS; SUBCUTANEOUS
Status: DISCONTINUED | OUTPATIENT
Start: 2023-06-06 | End: 2023-06-10

## 2023-06-06 RX ORDER — ONDANSETRON 2 MG/ML
4 INJECTION INTRAMUSCULAR; INTRAVENOUS EVERY 30 MIN PRN
Status: DISCONTINUED | OUTPATIENT
Start: 2023-06-06 | End: 2023-06-06 | Stop reason: HOSPADM

## 2023-06-06 RX ORDER — SODIUM CHLORIDE, SODIUM LACTATE, POTASSIUM CHLORIDE, CALCIUM CHLORIDE 600; 310; 30; 20 MG/100ML; MG/100ML; MG/100ML; MG/100ML
INJECTION, SOLUTION INTRAVENOUS CONTINUOUS PRN
Status: DISCONTINUED | OUTPATIENT
Start: 2023-06-06 | End: 2023-06-06

## 2023-06-06 RX ORDER — ONDANSETRON 4 MG/1
4 TABLET, ORALLY DISINTEGRATING ORAL EVERY 30 MIN PRN
Status: DISCONTINUED | OUTPATIENT
Start: 2023-06-06 | End: 2023-06-06 | Stop reason: HOSPADM

## 2023-06-06 RX ORDER — HYDROMORPHONE HCL IN WATER/PF 6 MG/30 ML
0.4 PATIENT CONTROLLED ANALGESIA SYRINGE INTRAVENOUS EVERY 5 MIN PRN
Status: DISCONTINUED | OUTPATIENT
Start: 2023-06-06 | End: 2023-06-06 | Stop reason: HOSPADM

## 2023-06-06 RX ORDER — LOPERAMIDE HCL 2 MG
4 CAPSULE ORAL
Status: DISCONTINUED | OUTPATIENT
Start: 2023-06-06 | End: 2023-06-07 | Stop reason: SINTOL

## 2023-06-06 RX ORDER — FENTANYL CITRATE 50 UG/ML
50 INJECTION, SOLUTION INTRAMUSCULAR; INTRAVENOUS EVERY 5 MIN PRN
Status: DISCONTINUED | OUTPATIENT
Start: 2023-06-06 | End: 2023-06-06 | Stop reason: HOSPADM

## 2023-06-06 RX ORDER — NALOXONE HYDROCHLORIDE 0.4 MG/ML
0.4 INJECTION, SOLUTION INTRAMUSCULAR; INTRAVENOUS; SUBCUTANEOUS
Status: DISCONTINUED | OUTPATIENT
Start: 2023-06-06 | End: 2023-06-10

## 2023-06-06 RX ORDER — BENZONATATE 100 MG/1
100 CAPSULE ORAL 3 TIMES DAILY PRN
Status: DISCONTINUED | OUTPATIENT
Start: 2023-06-06 | End: 2023-06-20

## 2023-06-06 RX ORDER — LOPERAMIDE HCL 2 MG
2 CAPSULE ORAL 4 TIMES DAILY PRN
Status: DISCONTINUED | OUTPATIENT
Start: 2023-06-06 | End: 2023-06-06

## 2023-06-06 RX ORDER — SODIUM CHLORIDE, SODIUM LACTATE, POTASSIUM CHLORIDE, CALCIUM CHLORIDE 600; 310; 30; 20 MG/100ML; MG/100ML; MG/100ML; MG/100ML
INJECTION, SOLUTION INTRAVENOUS CONTINUOUS
Status: DISCONTINUED | OUTPATIENT
Start: 2023-06-06 | End: 2023-06-06 | Stop reason: HOSPADM

## 2023-06-06 RX ORDER — FENTANYL CITRATE 50 UG/ML
25 INJECTION, SOLUTION INTRAMUSCULAR; INTRAVENOUS EVERY 5 MIN PRN
Status: DISCONTINUED | OUTPATIENT
Start: 2023-06-06 | End: 2023-06-06 | Stop reason: HOSPADM

## 2023-06-06 RX ORDER — IPRATROPIUM BROMIDE AND ALBUTEROL SULFATE 2.5; .5 MG/3ML; MG/3ML
3 SOLUTION RESPIRATORY (INHALATION) ONCE
Status: COMPLETED | OUTPATIENT
Start: 2023-06-06 | End: 2023-06-06

## 2023-06-06 RX ORDER — LABETALOL HYDROCHLORIDE 5 MG/ML
10 INJECTION, SOLUTION INTRAVENOUS
Status: DISCONTINUED | OUTPATIENT
Start: 2023-06-06 | End: 2023-06-06 | Stop reason: HOSPADM

## 2023-06-06 RX ORDER — PROPOFOL 10 MG/ML
INJECTION, EMULSION INTRAVENOUS CONTINUOUS PRN
Status: DISCONTINUED | OUTPATIENT
Start: 2023-06-06 | End: 2023-06-06

## 2023-06-06 RX ORDER — HYDROMORPHONE HCL IN WATER/PF 6 MG/30 ML
0.2 PATIENT CONTROLLED ANALGESIA SYRINGE INTRAVENOUS EVERY 5 MIN PRN
Status: DISCONTINUED | OUTPATIENT
Start: 2023-06-06 | End: 2023-06-06 | Stop reason: HOSPADM

## 2023-06-06 RX ADMIN — LOSARTAN POTASSIUM 50 MG: 50 TABLET, FILM COATED ORAL at 16:06

## 2023-06-06 RX ADMIN — BENZONATATE 100 MG: 100 CAPSULE ORAL at 16:01

## 2023-06-06 RX ADMIN — CARVEDILOL 12.5 MG: 12.5 TABLET, FILM COATED ORAL at 09:20

## 2023-06-06 RX ADMIN — SODIUM CHLORIDE, POTASSIUM CHLORIDE, SODIUM LACTATE AND CALCIUM CHLORIDE: 600; 310; 30; 20 INJECTION, SOLUTION INTRAVENOUS at 10:53

## 2023-06-06 RX ADMIN — Medication 3 MG: at 23:32

## 2023-06-06 RX ADMIN — RIVAROXABAN 20 MG: 20 TABLET, FILM COATED ORAL at 16:01

## 2023-06-06 RX ADMIN — ALPRAZOLAM 0.5 MG: 0.5 TABLET ORAL at 09:20

## 2023-06-06 RX ADMIN — PROPOFOL 75 MCG/KG/MIN: 10 INJECTION, EMULSION INTRAVENOUS at 11:59

## 2023-06-06 RX ADMIN — CARVEDILOL 12.5 MG: 12.5 TABLET, FILM COATED ORAL at 17:07

## 2023-06-06 RX ADMIN — IPRATROPIUM BROMIDE AND ALBUTEROL SULFATE 3 ML: .5; 3 SOLUTION RESPIRATORY (INHALATION) at 13:05

## 2023-06-06 RX ADMIN — LOPERAMIDE HYDROCHLORIDE 2 MG: 2 CAPSULE ORAL at 14:14

## 2023-06-06 RX ADMIN — LEVOTHYROXINE SODIUM 88 MCG: 0.09 TABLET ORAL at 14:14

## 2023-06-06 RX ADMIN — LOSARTAN POTASSIUM 50 MG: 50 TABLET, FILM COATED ORAL at 23:32

## 2023-06-06 RX ADMIN — LOPERAMIDE HYDROCHLORIDE 4 MG: 2 CAPSULE ORAL at 17:06

## 2023-06-06 ASSESSMENT — ACTIVITIES OF DAILY LIVING (ADL)
ADLS_ACUITY_SCORE: 27
ADLS_ACUITY_SCORE: 27
ADLS_ACUITY_SCORE: 30
ADLS_ACUITY_SCORE: 26
ADLS_ACUITY_SCORE: 30
ADLS_ACUITY_SCORE: 26
ADLS_ACUITY_SCORE: 26
ADLS_ACUITY_SCORE: 30
ADLS_ACUITY_SCORE: 27
ADLS_ACUITY_SCORE: 30

## 2023-06-06 ASSESSMENT — ENCOUNTER SYMPTOMS: DYSRHYTHMIAS: 1

## 2023-06-06 NOTE — PLAN OF CARE
"Care from [id number]    Inpatient Progress Note:  For complete assessment see flow sheet documentation.    BP (!) 169/89 (BP Location: Left arm)   Pulse 70   Temp 97.3  F (36.3  C) (Oral)   Resp 20   Ht 1.549 m (5' 1\")   Wt 74.5 kg (164 lb 3.9 oz)   LMP  (LMP Unknown)   SpO2 100%   BMI 31.03 kg/m      Pt was given Golytely and has been BM consistently. She drank 4000ml of goLetly without any issues and  Mag citrate was not given as it was not available per pharmacy tech. Put a bedside commode by pt as she is very unsteady and refuses to use a walker. Denies pain or nausea. Colonoscopy scheduled for today at 1300.        Will continue to monitor and provide cares.    Alee Ramos RN   "

## 2023-06-06 NOTE — PLAN OF CARE
Physical Therapy Discharge Summary    Reason for therapy discharge:    Patient/family request discontinuation of services.    Progress towards therapy goal(s). See goals on Care Plan in [facility] electronic health record for goal details.  Goals not met.  Barriers to achieving goals:   limited tolerance for therapy.    Therapy recommendation(s):      Pt not seen by discharging therapist on this date, note written based on previous treating therapist's notes and recommendations: Pt would likely be mod I with use of FWW, as actually has good gait speed, but instead pt refusing and needing min A for all mobilization due to unsafe furniture walking. Recommend 4WW for ambulation, home PT to improve balance and A x 1 if continues to decline mobility aide.     Patient and family have declined further participation in PT.

## 2023-06-06 NOTE — ANESTHESIA PREPROCEDURE EVALUATION
Anesthesia Pre-Procedure Evaluation    Patient: Jeannie Chu   MRN: [id number] : 1935        Procedure : Procedure(s):  COLONOSCOPY          Past Medical History:   Diagnosis Date     Acquired hypothyroidism 2021     Atrial fibrillation      Benign essential hypertension      Coronary artery disease 2021     Hyperlipidemia       Past Surgical History:   Procedure Laterality Date     Coronary angiogram with stent placement       EP ABLATION AV NODE N/A 10/31/2022    Procedure: Ablation Atrioventricular Node;  Surgeon: Rogelio Whitt MD;  [facility]:  [facility]     EP ABLATION AV NODE N/A 2022    Procedure: EP Ablation AV Node;  Surgeon: Jd Baker MD;  Location:  [facility]     EP PACEMAKER DEVICE & LEAD IMPLANT- RIGHT VENTRICULAR N/A 10/31/2022    Procedure: Pacemaker Device & Lead Implant- Right ventricular;  Surgeon: Rogelio Whitt MD;  Location:  [facility]      Allergies   Allergen Reactions     Amlodipine Swelling     BLE edema     Ibuprofen       Social History     Tobacco Use     Smoking status: Never     Smokeless tobacco: Never   Vaping Use     Vaping status: Never Used   Substance Use Topics     Alcohol use: No      Wt Readings from Last 1 Encounters:   23 74.5 kg (164 lb 3.9 oz)        Anesthesia Evaluation            ROS/MED HX  ENT/Pulmonary:  - neg pulmonary ROS     Neurologic:  - neg neurologic ROS     Cardiovascular: Comment: Study Date: 04/10/2023 10:45 AM  Age: 88 yrs  Gender: Female  Patient Location: [facility]  Reason For Study: Nonrheumatic mitral valve regurgitation  Ordering Physician: HENRY NUNEZ  Referring Physician: HENRY NUNEZ  Performed By: OUMOU Nicole     BSA: 1.7 m2  Height: 61 in  Weight: 155 lb  BP: 148/87 mmHg  ______________________________________________________________________________  Procedure  Complete Echo Adult. [employer] (NDC #[id number]) given  intravenously.  ______________________________________________________________________________  Interpretation Summary     1. The left ventricle is normal in size. There is normal left ventricular wall  thickness. Left ventricular systolic function is normal. The visual ejection  fraction is 55-60%. Diastolic Doppler findings (E/E' ratio and/or other  parameters) suggest left ventricular filling pressures are increased. Septal  wall motion abnormality may reflect pacemaker activation. There is no thrombus  seen in the left ventricle.  2. The right ventricle is normal size. There is a catheter/pacemaker lead seen  in the right ventricle. The right ventricular systolic function is normal.  3.There is moderate biatrial enlargement. There is no color Doppler evidence  of an atrial shunt.  4. There is moderate mitral annular calcification. There is mild to moderate  (1-2+) mitral regurgitation.  5. Mild aortic regurgitation.  6. In direct comparison to the previous study dated 03/22/2022, the findings  are similar.    (+) Dyslipidemia hypertension--CAD ---dysrhythmias, a-fib and a-flutter,  (-) pulmonary hypertension   METS/Exercise Tolerance:     Hematologic:    (-) anemia   Musculoskeletal: Comment: Weakness      GI/Hepatic: Comment: Chronic diarrhea w/ low Na,Ca and Mg   (-) GERD   Renal/Genitourinary:       Endo:     (+) thyroid problem, hypothyroidism,     Psychiatric/Substance Use:  - neg psychiatric ROS     Infectious Disease:       Malignancy:       Other:            Physical Exam    Airway        Mallampati: II   TM distance: > 3 FB   Neck ROM: full     Respiratory Devices and Support         Dental           Cardiovascular   cardiovascular exam normal          Pulmonary   pulmonary exam normal            Other findings: Lab Test        06/06/23 06/03/23 06/02/23 05/31/23 11/01/22                       0548          0655          1840          2347          1648          WBC          7.1           6.1          6.7            < >        9.0           HGB          11.6*        11.2*        10.7*          < >        12.1          MCV          95           94           93             < >        94            PLT          143*         143*         147*           < >        257           INR           --           --           --           --          0.99           < > = values in this interval not displayed.                  Lab Test        06/06/23 06/05/23 06/04/23 06/04/23                       0548          0632          1519          0651          NA           129*         126*         126*         127*          POTASSIUM    4.9          4.9           --          4.3           CHLORIDE     98           95*           --          94*           CO2          22           22            --          22            BUN          4.9*         6.1*          --          9.4           CR           0.76         0.76          --          0.80          ANIONGAP     9            9             --          11            LYLE          8.4*         8.6*          --          8.8           GLC          99           105*          --          97              OUTSIDE LABS:  CBC:   Lab Results   Component Value Date    WBC 7.1 06/06/2023    WBC 6.1 06/03/2023    HGB 11.6 (L) 06/06/2023    HGB 11.2 (L) 06/03/2023    HCT 35.6 06/06/2023    HCT 34.4 (L) 06/03/2023     (L) 06/06/2023     (L) 06/03/2023     BMP:   Lab Results   Component Value Date     (L) 06/06/2023     (L) 06/05/2023    POTASSIUM 4.9 06/06/2023    POTASSIUM 4.9 06/05/2023    CHLORIDE 98 06/06/2023    CHLORIDE 95 (L) 06/05/2023    CO2 22 06/06/2023    CO2 22 06/05/2023    BUN 4.9 (L) 06/06/2023    BUN 6.1 (L) 06/05/2023    CR 0.76 06/06/2023    CR 0.76 06/05/2023    GLC 99 06/06/2023     (H) 06/05/2023     COAGS:   Lab Results   Component Value Date    INR 0.99 11/01/2022     POC: No results found for: BGM, HCG, HCGS  HEPATIC:    Lab Results   Component Value Date    ALBUMIN 3.5 06/03/2023    PROTTOTAL 6.5 06/03/2023    ALT 47 (H) 06/03/2023    AST 57 (H) 06/03/2023    ALKPHOS 70 06/03/2023    BILITOTAL 0.9 06/03/2023     OTHER:   Lab Results   Component Value Date    A1C 5.4 10/13/2022    LYLE 8.4 (L) 06/06/2023    PHOS 3.0 06/06/2023    MAG 1.6 (L) 06/06/2023    TSH 7.83 (H) 04/03/2023    T4 1.84 (H) 04/03/2023    CRP <2.9 09/26/2020    SED 25 04/03/2023       Anesthesia Plan    ASA Status:  3      Anesthesia Type: MAC.   Induction: Propofol.           Consents    Anesthesia Plan(s) and associated risks, benefits, and realistic alternatives discussed. Questions answered and patient/representative(s) expressed understanding.    - Discussed:     - Discussed with:  Patient,       - Extended Intubation/Ventilatory Support Discussed: No.      - Patient is DNR/DNI Status: No    Use of blood products discussed: No .     Postoperative Care    Pain management: IV analgesics, Oral pain medications.   PONV prophylaxis: Ondansetron (or other 5HT-3), Background Propofol Infusion     Comments:                Mark Gasca MD

## 2023-06-06 NOTE — PROGRESS NOTES
[id number]  Pt A&Ox4; vss on r/a with baseline HTN continuing. Son at bedside to assisting with interpreting; pt refusing use of I-pad for . NPO for Colonoscopy at 1130. Denies CP, SOB, denies N&V. Mag/K/Phos protocol WNL, hyponatremia continuing 129.  Ambulating in room, furniture surfing, refusing GB/W. L-AC PIV SL. Continued c/o abdominal pain; 10/10 PRN Xanex given.    Intermittent dizziness.  [facility] Following.Calls for cares; will continue to monitor.     [id number]  Pt arrived from PACU lethargic; capno in place sats >97% on r/a. Capno removed d/t pt tolerating regular diet and oral meds. PRN Tessalon given for dry non-productive cough. x1 loose stool this shift. Adequate continent UOP. VSS post-procedure with continued baseline HTN. Pts son requesting one more night of observation to ensure pt is tolerating diet. discharge likely for tomorrow to home @ [facility]. GI signed-off.     [id number]  Pt tolerating a regular diet and oral meds. Denies N&V, no increased pain with diet. Continue c/o intermittent abdominal pain; non-specific.

## 2023-06-06 NOTE — PROGRESS NOTES
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     Abdominal pain/Diarrhea    - unclear cause    - work-up including CT abdomen and colonoscopy were unrevealing    - stool studies negative (including O&P, fecal fat)    - pending biopsies from colonoscopy    - no herbal use (except occ clove)    - try regular diet    - start imodium    - noticed mild elevation of LFTs in recent past, re-check    Hyponatremia    - chronic    - likely related to GI losses    - re-check in am    Hypocalcemia and hypomagnesemia    - resolved with replacement      Generalized muscle weakness    - due to acute illness and ongoing GI losses.     - PT consulted and saw patient on 6/3, recommending use of walker which patient is currently refusing even after being educated with family present    - family comfortable with her mobility     Elevated proBNP    - initial proBNP on 5/31 elevated at 14,188 with repeat of 12,008    - prior to this he proBNP was normal at 1,711 in 10/2022    - most recent echocardiogram on 4/10/2023 showed EF of 55-60%    - CT of chest did small bilateral pleural effusions but on exam patient does not appear fluid overloaded    - does not appear to be in heart failure clinically     Recently diagnosed CAP    - diagnosed in ED on 5/31 but with repeat CXR on 6/2 which was negative    - does have a recent dry cough and previously complained of shortness of breath that has resolved    - afebrile and no  "leukocytosis.     - antibiotics were not continued on admission, holding off since admission since this initially worsened diarrhea, continue to reassess for changes       Mild thrombocytopenia    - platelet count of 147 on 6/3 with slight decrease to 143 on 6/4    - no evidence of bleeding     Tachy-uzair syndrome s/p PPM (10/2022) and redo AV node ablation (11/2022)   A-fib    - rate controlled    - continue PTA Carvedilol     - resume xarelto     HTN    - stable, continue PTA Losartan and Carvedilol      Hypothyroidism    - continue PTA Levothyroxine       Family in room. Updated     Diet: Fluid restriction 1500 ML FLUID  Advance Diet as Tolerated: Clear Liquid Diet    DVT Prophylaxis: DOAC  Ramos Catheter: Not present  Lines: None     Cardiac Monitoring: ACTIVE order. Indication: Procedural area  Code Status: Full Code      Clinically Significant Risk Factors         # Hyponatremia: Lowest Na = 126 mmol/L in last 2 days, will monitor as appropriate    # Hypomagnesemia: Lowest Mg = 1.6 mg/dL in last 2 days, will replace as needed       # Hypertension: Noted on problem list        # Obesity: Estimated body mass index is 31.03 kg/m  as calculated from the following:    Height as of this encounter: 1.549 m (5' 1\").    Weight as of this encounter: 74.5 kg (164 lb 3.9 oz)., PRESENT ON ADMISSION          Disposition Plan     Expected Discharge Date: 06/06/2023                  Darryl Mcdaniel MD  Hospitalist Service  [facility]  Securely message with [employer] (more info)  Text page via [employer] Paging/Directory   ______________________________________________________________________    Interval History   Patient just returned from colonoscopy. History reviewed again with son. Patient still has some abdo pain (cramping). No chest pain, sob. Occ cough    Physical Exam   Vital Signs: Temp: (!) 96.5  F (35.8  C) Temp src: Temporal BP: 121/74 Pulse: 69   Resp: 20 SpO2: 99 % O2 Device: Simple face mask  "   Weight: 164 lbs 3.88 oz    Constitutional: awake, alert, cooperative, no apparent distress, and appears stated age  Eyes: Lids and lashes normal, pupils equal, round and reactive to light, extra ocular muscles intact, sclera clear, conjunctiva normal  ENT: Normocephalic, without obvious abnormality, atraumatic, sinuses nontender on palpation, external ears without lesions, oral pharynx with moist mucous membranes, tonsils without erythema or exudates, gums normal and good dentition.  Respiratory: No increased work of breathing, good air exchange, clear to auscultation bilaterally, no crackles or wheezing  Cardiovascular: Normal apical impulse, regular rate and rhythm, normal S1 and S2, no S3 or S4, and no murmur noted  GI: soft. Epigastric tenderness    Medical Decision Making       30 MINUTES SPENT BY ME on the date of service doing chart review, history, exam, documentation & further activities per the note.      Data     I have personally reviewed the following data over the past 24 hrs:    7.1  \   11.6 (L)   / 143 (L)     129 (L) 98 4.9 (L) /  99   4.9 22 0.76 \       Imaging results reviewed over the past 24 hrs:   No results found for this or any previous visit (from the past 24 hour(s)).

## 2023-06-06 NOTE — ANESTHESIA CARE TRANSFER NOTE
Patient: Jeannie Chu    Procedure: Procedure(s):  COLONOSCOPY, BIOPSIES       Diagnosis: Diarrhea, unspecified type [R19.7]  Diagnosis Additional Information: No value filed.    Anesthesia Type:   MAC     Note:    Oropharynx: spontaneously breathing  Level of Consciousness: drowsy  Oxygen Supplementation: face mask    Independent Airway: airway patency satisfactory and stable  Dentition: dentition unchanged  Vital Signs Stable: post-procedure vital signs reviewed and stable  Report to RN Given: handoff report given  Patient transferred to: PACU  Comments: To PACU, report to RN, oxygen per face mask.        Vitals:  Vitals Value Taken Time   BP 92/52 06/06/23 1225   Temp 96.5  F (35.8  C) 06/06/23 1227   Pulse 88 06/06/23 1227   Resp 10 06/06/23 1227   SpO2 100 % 06/06/23 1227   Vitals shown include unvalidated device data.    Electronically Signed By: LORA Romo CRNA  June 6, 2023  12:28 PM

## 2023-06-06 NOTE — ANESTHESIA POSTPROCEDURE EVALUATION
Patient: Jeannie Chu    Procedure: Procedure(s):  COLONOSCOPY, BIOPSIES       Anesthesia Type:  MAC    Note:  Disposition: Outpatient   Postop Pain Control: Uneventful            Sign Out: Well controlled pain   PONV: No   Neuro/Psych: Uneventful            Sign Out: Acceptable/Baseline neuro status   Airway/Respiratory: Uneventful            Sign Out: Acceptable/Baseline resp. status   CV/Hemodynamics: Uneventful            Sign Out: Acceptable CV status; No obvious hypovolemia; No obvious fluid overload   Other NRE: NONE   DID A NON-ROUTINE EVENT OCCUR? No           Last vitals:  Vitals Value Taken Time   /81 06/06/23 1534   Temp 98  F (36.7  C) 06/06/23 1534   Pulse 70 06/06/23 1534   Resp 16 06/06/23 1534   SpO2 95 % 06/06/23 1534       Electronically Signed By: Mark Gasca MD  June 6, 2023  5:08 PM

## 2023-06-06 NOTE — CONSULTS
Care Management Discharge Note    Discharge Date: 06/06/2023     Discharge Disposition:  Home w/ family    Additional Information:  CM consulted as PT recommended home PT at discharge. Referral was sent for home RN/PT/OT to 13 agencies, no accepting HC agency at this time. Per AM care rounds, pt and family are declining PT and deny having any discharge needs at this time. Will clear consult, please call if needs arise.     Quita Gardiner RN BSN   Inpatient Care Coordination  [facility]   Phone [phone]

## 2023-06-07 LAB
ALBUMIN SERPL BCG-MCNC: 3.2 G/DL (ref 3.5–5.2)
ALP SERPL-CCNC: 103 U/L (ref 35–104)
ALT SERPL W P-5'-P-CCNC: 80 U/L (ref 10–35)
ANION GAP SERPL CALCULATED.3IONS-SCNC: 13 MMOL/L (ref 7–15)
AST SERPL W P-5'-P-CCNC: ABNORMAL U/L
BILIRUB DIRECT SERPL-MCNC: 0.2 MG/DL (ref 0–0.3)
BILIRUB SERPL-MCNC: 0.8 MG/DL
BUN SERPL-MCNC: 6.3 MG/DL (ref 8–23)
CALCIUM SERPL-MCNC: 8.5 MG/DL (ref 8.8–10.2)
CHLORIDE SERPL-SCNC: 96 MMOL/L (ref 98–107)
CREAT SERPL-MCNC: 0.79 MG/DL (ref 0.51–0.95)
DEPRECATED HCO3 PLAS-SCNC: 19 MMOL/L (ref 22–29)
ELASTASE PANC STL-MCNT: 309 UG/G
GFR SERPL CREATININE-BSD FRML MDRD: 72 ML/MIN/1.73M2
GLUCOSE SERPL-MCNC: 92 MG/DL (ref 70–99)
HAV IGM SERPL QL IA: NONREACTIVE
HBV CORE IGM SERPL QL IA: NONREACTIVE
HBV SURFACE AG SERPL QL IA: NONREACTIVE
HCV AB SERPL QL IA: NONREACTIVE
HOLD SPECIMEN: NORMAL
MAGNESIUM SERPL-MCNC: 1.6 MG/DL (ref 1.7–2.3)
PATH REPORT.COMMENTS IMP SPEC: NORMAL
PATH REPORT.FINAL DX SPEC: NORMAL
PATH REPORT.GROSS SPEC: NORMAL
PATH REPORT.MICROSCOPIC SPEC OTHER STN: NORMAL
PATH REPORT.RELEVANT HX SPEC: NORMAL
PHOSPHATE SERPL-MCNC: 3.8 MG/DL (ref 2.5–4.5)
PHOTO IMAGE: NORMAL
POTASSIUM SERPL-SCNC: 4.8 MMOL/L (ref 3.4–5.3)
POTASSIUM SERPL-SCNC: 4.8 MMOL/L (ref 3.4–5.3)
PROT SERPL-MCNC: 5.7 G/DL (ref 6.4–8.3)
SODIUM SERPL-SCNC: 128 MMOL/L (ref 136–145)
T4 FREE SERPL-MCNC: 1.91 NG/DL (ref 0.9–1.7)
TSH SERPL DL<=0.005 MIU/L-ACNC: 4.37 UIU/ML (ref 0.3–4.2)

## 2023-06-07 PROCEDURE — 250N000013 HC RX MED GY IP 250 OP 250 PS 637: Performed by: PHYSICIAN ASSISTANT

## 2023-06-07 PROCEDURE — 84155 ASSAY OF PROTEIN SERUM: CPT | Performed by: HOSPITALIST

## 2023-06-07 PROCEDURE — 250N000013 HC RX MED GY IP 250 OP 250 PS 637: Performed by: NURSE PRACTITIONER

## 2023-06-07 PROCEDURE — 84439 ASSAY OF FREE THYROXINE: CPT | Performed by: HOSPITALIST

## 2023-06-07 PROCEDURE — 99232 SBSQ HOSP IP/OBS MODERATE 35: CPT | Performed by: HOSPITALIST

## 2023-06-07 PROCEDURE — 83735 ASSAY OF MAGNESIUM: CPT | Performed by: INTERNAL MEDICINE

## 2023-06-07 PROCEDURE — 250N000013 HC RX MED GY IP 250 OP 250 PS 637: Performed by: INTERNAL MEDICINE

## 2023-06-07 PROCEDURE — 84443 ASSAY THYROID STIM HORMONE: CPT | Performed by: HOSPITALIST

## 2023-06-07 PROCEDURE — 80074 ACUTE HEPATITIS PANEL: CPT | Performed by: HOSPITALIST

## 2023-06-07 PROCEDURE — 120N000001 HC R&B MED SURG/OB

## 2023-06-07 PROCEDURE — 84132 ASSAY OF SERUM POTASSIUM: CPT | Performed by: INTERNAL MEDICINE

## 2023-06-07 PROCEDURE — 82248 BILIRUBIN DIRECT: CPT | Performed by: HOSPITALIST

## 2023-06-07 PROCEDURE — 36415 COLL VENOUS BLD VENIPUNCTURE: CPT | Performed by: HOSPITALIST

## 2023-06-07 PROCEDURE — 84100 ASSAY OF PHOSPHORUS: CPT | Performed by: INTERNAL MEDICINE

## 2023-06-07 PROCEDURE — 258N000003 HC RX IP 258 OP 636: Performed by: HOSPITALIST

## 2023-06-07 PROCEDURE — 250N000013 HC RX MED GY IP 250 OP 250 PS 637: Performed by: HOSPITALIST

## 2023-06-07 RX ORDER — SODIUM CHLORIDE 9 MG/ML
INJECTION, SOLUTION INTRAVENOUS CONTINUOUS
Status: DISCONTINUED | OUTPATIENT
Start: 2023-06-07 | End: 2023-06-09

## 2023-06-07 RX ORDER — LEVOTHYROXINE SODIUM 50 UG/1
50 TABLET ORAL
Status: DISCONTINUED | OUTPATIENT
Start: 2023-06-08 | End: 2023-06-21 | Stop reason: HOSPADM

## 2023-06-07 RX ADMIN — LEVOTHYROXINE SODIUM 88 MCG: 0.09 TABLET ORAL at 09:29

## 2023-06-07 RX ADMIN — CARVEDILOL 12.5 MG: 12.5 TABLET, FILM COATED ORAL at 17:52

## 2023-06-07 RX ADMIN — ALPRAZOLAM 0.5 MG: 0.5 TABLET ORAL at 20:53

## 2023-06-07 RX ADMIN — CARVEDILOL 12.5 MG: 12.5 TABLET, FILM COATED ORAL at 09:29

## 2023-06-07 RX ADMIN — LOPERAMIDE HYDROCHLORIDE 4 MG: 2 CAPSULE ORAL at 09:28

## 2023-06-07 RX ADMIN — RIVAROXABAN 20 MG: 20 TABLET, FILM COATED ORAL at 17:52

## 2023-06-07 RX ADMIN — LOSARTAN POTASSIUM 50 MG: 50 TABLET, FILM COATED ORAL at 20:53

## 2023-06-07 RX ADMIN — LOSARTAN POTASSIUM 50 MG: 50 TABLET, FILM COATED ORAL at 09:28

## 2023-06-07 RX ADMIN — BENZONATATE 100 MG: 100 CAPSULE ORAL at 20:58

## 2023-06-07 RX ADMIN — SODIUM CHLORIDE: 9 INJECTION, SOLUTION INTRAVENOUS at 15:08

## 2023-06-07 ASSESSMENT — ACTIVITIES OF DAILY LIVING (ADL)
ADLS_ACUITY_SCORE: 27
ADLS_ACUITY_SCORE: 28
ADLS_ACUITY_SCORE: 27
ADLS_ACUITY_SCORE: 28
ADLS_ACUITY_SCORE: 27
ADLS_ACUITY_SCORE: 27

## 2023-06-07 NOTE — PLAN OF CARE
PRIMARY DIAGNOSIS: AB pain/Diarrhea    OUTPATIENT/OBSERVATION GOALS TO BE MET BEFORE DISCHARGE  1. Orthostatic performed: No    2. Tolerating PO fluid and/or antibiotics (if applicable): Yes    3. Nausea/Vomiting/Diarrhea symptoms improved: Yes    4. Pain status: Improved with use of alternative comfort measures i.e.: positioning    5. Return to near baseline physical activity: Yes    Discharge Planner Nurse   Safe discharge environment identified: Yes  Barriers to discharge: Yes       Entered by: Sofy Ryder RN 06/07/2023      A&Ox4, speaks Tamil, son (Quinton) in room interpreting. A1, refuses GB/W. Had 1 formed bm around noon, then a loose incontinent episode at 3pm. IV NS @ 100mL/hr, denies pain.     Please review provider order for any additional goals.   Nurse to notify provider when observation goals have been met and patient is ready for discharge.

## 2023-06-07 NOTE — PROGRESS NOTES
"[facility]  [facility]  Gastroenterology Progress note    Interval History:    Patient received 4mg imodium this morning-- no bowel movements yet. Son now concerned for constipation. Patient reporting intermittent abdominal pain, feels like her abdomen is like a \"rock\", wants to have a bowel movement. Some intermittent nausea that resolves with hard candy. No vomiting.    Vital Signs:      /61 (BP Location: Right arm)   Pulse 85   Temp 98.5  F (36.9  C) (Oral)   Resp 16   Ht 1.549 m (5' 1\")   Wt 74.5 kg (164 lb 3.9 oz)   LMP  (LMP Unknown)   SpO2 96%   BMI 31.03 kg/m    Temp (24hrs), Av.6  F (36.4  C), Min:96.5  F (35.8  C), Max:98.5  F (36.9  C)    No data found.    Intake/Output Summary (Last 24 hours) at 2023 1007  Last data filed at 2023 0851  Gross per 24 hour   Intake 240 ml   Output --   Net 240 ml         Constitutional: NAD, comfortable, son in room- serves as     Respiratory: non-labored  Abdomen: soft, non-tender, nondistended, +BS    Additional Comments:  ROS, FH, SH: See initial GI consult for details.    Laboratory Data:  Recent Labs   Lab Test 23  0548 23  0655 23  1840 23  2347 22  1648   WBC 7.1 6.1 6.7   < > 9.0   HGB 11.6* 11.2* 10.7*   < > 12.1   MCV 95 94 93   < > 94   * 143* 147*   < > 257   INR  --   --   --   --  0.99    < > = values in this interval not displayed.     Recent Labs   Lab Test 23  0548 23  0632 23  1519 23  0651   * 126* 126* 127*   POTASSIUM 4.9 4.9  --  4.3   CHLORIDE 98 95*  --  94*   CO2 22 22  --  22   BUN 4.9* 6.1*  --  9.4   CR 0.76 0.76  --  0.80   ANIONGAP 9 9  --  11   LYLE 8.4* 8.6*  --  8.8     Recent Labs   Lab Test 23  0600 23  0548 23  1550 23  0655 23  1840 22  0846 22  1305 02/15/22  0921   ALBUMIN 3.2* 3.4*  --  3.5 3.7   < >  --   --    BILITOTAL 0.8 0.8  --  0.9 1.0   < >  --  "  --    DBIL 0.20 0.21  --   --  0.26   < >  --   --    ALT 80* 82*  --  47* 55*   < >  --   --    AST  --  77*  --  57* 59*   < >  --   --    ALKPHOS 103 93  --  70 77   < >  --   --    PROTEIN  --   --  10*  --   --   --  Negative Negative    < > = values in this interval not displayed.       IMAGING  CT C/A/P 6/1/2023  1.  Nondiagnostic pulmonary artery angiogram due to severely suboptimal contrast bolus.  2.   Small bilateral pleural effusions. There is also a patchy area of groundglass opacity involving the left lower lobe which is suspicious for pneumonia.  3.  No correlate for the 2.3 cm isoechoic right hepatic lobe lesion seen on prior ultrasound. This was likely artifactual.  4.  No other acute process in the chest, abdomen, or pelvis.    Colonoscopy 6/7/23 (Link):  - Preparation of the colon was fair.                             - The examined portion of the ileum was normal.                             - Normal mucosa in the entire examined colon.                             Biopsied.                             - Internal hemorrhoids.      ASSESSMENT AND PLAN:    Abdominal Pain  Diarrhea   This patient is an 88-year-old female with history of CAD, dyslipidemia, atrial fibrillation on Xarelto, hypertension, hypothyroidism, and recent pneumonia who presents with abdominal pain and diarrhea.  Diarrhea has continued even after antibiotics discontinued.  C. difficile and enteric pathogen panel negative.  O/P exam normal. Negative giardia/cryptosporidium. Fecal fat negative, pancreatic fecal elastase pending (although with normal fecal fat, EPI less likely).     Colonoscopy was unremarkable, biopsies pending.     It is possible that the patient is experiencing a postinfectious irritable bowel syndrome in which case we would consider trial of an antispasmodic medication.    Plan:  -- Awaiting biopsies  -- Awaiting pancreatic fecal elastase  -- Continue imodium, titrate as needed  -- Can consider antispasmodic  medication in the future (can cause constipation- discussed with patient and son, they do not want to add this medication at this time over possible constipation as side effect)    Will discuss with Dr. Manning.    30 minutes spent on patient care including chart review, patient visit, documentation, coordination.     Miriam Warren PA-C  [facility]  Cell: [phone] until 12PM  Office: [phone]

## 2023-06-07 NOTE — PROGRESS NOTES
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     Abdominal pain/Diarrhea    - unclear cause    - work-up including CT abdomen and colonoscopy were unrevealing    - stool studies negative (including O&P, fecal fat)    - pending biopsies from colonoscopy    - no herbal use (except occ clove)    - trying regular diet    - started imodium    - this am patient was constipated: now had normal BM today    - stopped scheduled Imodium, cont PRN imodium     Hyponatremia    - chronic    - likely related to GI losses    - will restart IVF today as Na is lower    Hepatitis    - noticed previous labs from ED showed mild elevation of LFTs    - no work-up was done    - checking acute hepatitis panel    - hold statin    - CT/US were unrevealing    Hypocalcemia and hypomagnesemia    - resolved with replacement      Generalized muscle weakness    - due to acute illness and ongoing GI losses.     - PT consulted and saw patient on 6/3, recommending use of walker which patient is currently refusing even after being educated with family present    - family comfortable with her mobility     Elevated proBNP    - initial proBNP on 5/31 elevated at 14,188 with repeat of 12,008    - prior to this he proBNP was normal at 1,711 in 10/2022    - most recent echocardiogram on 4/10/2023 showed EF of 55-60%    - CT of chest did small bilateral pleural effusions but on exam patient does not appear fluid overloaded    - does not appear to be  "in heart failure clinically     Recently diagnosed CAP    - diagnosed in ED on 5/31 but with repeat CXR on 6/2 which was negative    - does have a recent dry cough and previously complained of shortness of breath that has resolved    - afebrile and no leukocytosis.     - antibiotics were not continued on admission, holding off since admission since this initially worsened diarrhea, continue to reassess for changes       Mild thrombocytopenia    - platelet count of 147 on 6/3 with slight decrease to 143 on 6/4    - no evidence of bleeding     Tachy-uzair syndrome s/p PPM (10/2022) and redo AV node ablation (11/2022)   A-fib    - rate controlled    - continue PTA Carvedilol     - resume xarelto     HTN    - stable, continue PTA Losartan and Carvedilol      Hypothyroidism    - continue PTA Levothyroxine     - I see she had abnormal thyroid labs in the past (April 2023), will check      Family in room. Updated     Diet: Regular Diet Adult    DVT Prophylaxis: DOAC  Ramos Catheter: Not present  Lines: None     Cardiac Monitoring: None  Code Status: Full Code      Clinically Significant Risk Factors         # Hyponatremia: Lowest Na = 128 mmol/L in last 2 days, will monitor as appropriate  # Hypocalcemia: Lowest Ca = 8.4 mg/dL in last 2 days, will monitor and replace as appropriate   # Hypomagnesemia: Lowest Mg = 1.6 mg/dL in last 2 days, will replace as needed   # Hypoalbuminemia: Lowest albumin = 3.2 g/dL at 6/7/2023  6:00 AM, will monitor as appropriate     # Hypertension: Noted on problem list        # Obesity: Estimated body mass index is 31.03 kg/m  as calculated from the following:    Height as of this encounter: 1.549 m (5' 1\").    Weight as of this encounter: 74.5 kg (164 lb 3.9 oz)., PRESENT ON ADMISSION          Disposition Plan      Expected Discharge Date: 06/07/2023,  3:00 PM                Darryl Mcdaniel MD  Hospitalist Service  [facility]  Securely message with Christ[employer] (more " info)  Text page via [facility] Paging/Directory   ______________________________________________________________________    Interval History   Patient just returned from colonoscopy. History reviewed again with son. Patient still has some abdo pain (cramping). No chest pain, sob. Occ cough    Physical Exam   Vital Signs: Temp: 98.5  F (36.9  C) Temp src: Oral BP: 130/74 Pulse: 72   Resp: 18 SpO2: 100 % O2 Device: None (Room air)    Weight: 164 lbs 3.88 oz    Constitutional: awake, alert, cooperative, no apparent distress, and appears stated age  Eyes: Lids and lashes normal, pupils equal, round and reactive to light, extra ocular muscles intact, sclera clear, conjunctiva normal  ENT: Normocephalic, without obvious abnormality, atraumatic, sinuses nontender on palpation, external ears without lesions, oral pharynx with moist mucous membranes, tonsils without erythema or exudates, gums normal and good dentition.  Respiratory: No increased work of breathing, good air exchange, clear to auscultation bilaterally, no crackles or wheezing  Cardiovascular: Normal apical impulse, regular rate and rhythm, normal S1 and S2, no S3 or S4, and no murmur noted  GI: soft. Epigastric tenderness    Medical Decision Making       30 MINUTES SPENT BY ME on the date of service doing chart review, history, exam, documentation & further activities per the note.      Data     I have personally reviewed the following data over the past 24 hrs:    N/A  \   N/A   / N/A     128 (L) 96 (L) 6.3 (L) /  92   4.8; 4.8 19 (L) 0.79 \       ALT: 80 (H) AST: N/A AP: 103 TBILI: 0.8   ALB: 3.2 (L) TOT PROTEIN: 5.7 (L) LIPASE: N/A       TSH: 4.37 (H) T4: N/A A1C: N/A       Imaging results reviewed over the past 24 hrs:   No results found for this or any previous visit (from the past 24 hour(s)).

## 2023-06-07 NOTE — PLAN OF CARE
Goal Outcome Evaluation:      Plan of Care Reviewed With: patient     Care from [id number]    Inpatient Progress Note:  For complete assessment see flow sheet documentation.    No nausea or vomiting noted, verbalized abdominal discomfort did not require pain medication.  Tolerated soup and apple in the evening.  Voiding freely.  No BM during the shift.

## 2023-06-08 LAB
ALBUMIN SERPL BCG-MCNC: 3.2 G/DL (ref 3.5–5.2)
ALP SERPL-CCNC: 105 U/L (ref 35–104)
ALT SERPL W P-5'-P-CCNC: 72 U/L (ref 10–35)
ANION GAP SERPL CALCULATED.3IONS-SCNC: 7 MMOL/L (ref 7–15)
AST SERPL W P-5'-P-CCNC: 52 U/L (ref 10–35)
BILIRUB DIRECT SERPL-MCNC: 0.22 MG/DL (ref 0–0.3)
BILIRUB SERPL-MCNC: 0.9 MG/DL
BUN SERPL-MCNC: 7.1 MG/DL (ref 8–23)
CALCIUM SERPL-MCNC: 8.6 MG/DL (ref 8.8–10.2)
CHLORIDE SERPL-SCNC: 99 MMOL/L (ref 98–107)
CREAT SERPL-MCNC: 0.75 MG/DL (ref 0.51–0.95)
DEPRECATED HCO3 PLAS-SCNC: 23 MMOL/L (ref 22–29)
GFR SERPL CREATININE-BSD FRML MDRD: 77 ML/MIN/1.73M2
GLUCOSE SERPL-MCNC: 89 MG/DL (ref 70–99)
MAGNESIUM SERPL-MCNC: 1.6 MG/DL (ref 1.7–2.3)
PHOSPHATE SERPL-MCNC: 4 MG/DL (ref 2.5–4.5)
POTASSIUM SERPL-SCNC: 4.4 MMOL/L (ref 3.4–5.3)
PROT SERPL-MCNC: 5.9 G/DL (ref 6.4–8.3)
SODIUM SERPL-SCNC: 129 MMOL/L (ref 136–145)

## 2023-06-08 PROCEDURE — 250N000013 HC RX MED GY IP 250 OP 250 PS 637: Performed by: HOSPITALIST

## 2023-06-08 PROCEDURE — 82310 ASSAY OF CALCIUM: CPT | Performed by: HOSPITALIST

## 2023-06-08 PROCEDURE — 250N000013 HC RX MED GY IP 250 OP 250 PS 637: Performed by: PHYSICIAN ASSISTANT

## 2023-06-08 PROCEDURE — 120N000001 HC R&B MED SURG/OB

## 2023-06-08 PROCEDURE — 250N000013 HC RX MED GY IP 250 OP 250 PS 637: Performed by: NURSE PRACTITIONER

## 2023-06-08 PROCEDURE — 99232 SBSQ HOSP IP/OBS MODERATE 35: CPT | Performed by: HOSPITALIST

## 2023-06-08 PROCEDURE — 82248 BILIRUBIN DIRECT: CPT | Performed by: HOSPITALIST

## 2023-06-08 PROCEDURE — 84100 ASSAY OF PHOSPHORUS: CPT | Performed by: HOSPITALIST

## 2023-06-08 PROCEDURE — 258N000003 HC RX IP 258 OP 636: Performed by: HOSPITALIST

## 2023-06-08 PROCEDURE — 36415 COLL VENOUS BLD VENIPUNCTURE: CPT | Performed by: HOSPITALIST

## 2023-06-08 PROCEDURE — 83735 ASSAY OF MAGNESIUM: CPT | Performed by: HOSPITALIST

## 2023-06-08 RX ORDER — DIPHENHYDRAMINE HYDROCHLORIDE 50 MG/ML
25 INJECTION INTRAMUSCULAR; INTRAVENOUS EVERY 6 HOURS PRN
Status: DISCONTINUED | OUTPATIENT
Start: 2023-06-08 | End: 2023-06-16

## 2023-06-08 RX ORDER — DIPHENHYDRAMINE HCL 25 MG
25 CAPSULE ORAL EVERY 6 HOURS PRN
Status: DISCONTINUED | OUTPATIENT
Start: 2023-06-08 | End: 2023-06-16

## 2023-06-08 RX ADMIN — LEVOTHYROXINE SODIUM 50 MCG: 50 TABLET ORAL at 06:34

## 2023-06-08 RX ADMIN — RIVAROXABAN 20 MG: 20 TABLET, FILM COATED ORAL at 17:26

## 2023-06-08 RX ADMIN — LOSARTAN POTASSIUM 50 MG: 50 TABLET, FILM COATED ORAL at 20:45

## 2023-06-08 RX ADMIN — ALPRAZOLAM 0.5 MG: 0.5 TABLET ORAL at 23:39

## 2023-06-08 RX ADMIN — CARVEDILOL 12.5 MG: 12.5 TABLET, FILM COATED ORAL at 17:26

## 2023-06-08 RX ADMIN — LOSARTAN POTASSIUM 50 MG: 50 TABLET, FILM COATED ORAL at 09:34

## 2023-06-08 RX ADMIN — LORAZEPAM 0.25 MG: 0.5 TABLET ORAL at 06:03

## 2023-06-08 RX ADMIN — CARVEDILOL 12.5 MG: 12.5 TABLET, FILM COATED ORAL at 09:34

## 2023-06-08 RX ADMIN — SODIUM CHLORIDE: 9 INJECTION, SOLUTION INTRAVENOUS at 01:39

## 2023-06-08 ASSESSMENT — ACTIVITIES OF DAILY LIVING (ADL)
ADLS_ACUITY_SCORE: 29
ADLS_ACUITY_SCORE: 26
ADLS_ACUITY_SCORE: 26
ADLS_ACUITY_SCORE: 29
ADLS_ACUITY_SCORE: 26
ADLS_ACUITY_SCORE: 26
ADLS_ACUITY_SCORE: 29
ADLS_ACUITY_SCORE: 26

## 2023-06-08 NOTE — PROGRESS NOTES
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     Abdominal pain/Diarrhea    - unclear cause    - work-up including CT abdomen and colonoscopy were unrevealing    - stool studies negative (including O&P, fecal fat)    - pending biopsies from colonoscopy    - no herbal use (except occ clove)    - trying regular diet    - started imodium    - yesterday am patient was constipated, then had normal stool, then incontinent loose stool    - stopped scheduled Imodium, cont PRN imodium    - still c/o abdominal fullness/discomfort    - only taking in a few bites of food and minimal fluids    - spoke with GI: EGD tomorrow     Hyponatremia    - chronic    - likely related to GI losses    - cont IVF with NS at 50cc/hr    Hepatitis    - noticed previous labs from ED showed mild elevation of LFTs    - no work-up was done    - checking acute hepatitis panel    - hold statin    - CT/US were unrevealing    Hypocalcemia and hypomagnesemia    - resolved with replacement      Generalized muscle weakness    - due to acute illness and ongoing GI losses.     - PT consulted and saw patient on 6/3, recommending use of walker which patient is currently refusing even after being educated with family present    - family comfortable with her mobility     Elevated proBNP    - initial proBNP on 5/31 elevated at 14,188 with repeat of 12,008    - prior to this he proBNP was normal at 1,711 in 10/2022    - most recent echocardiogram on  "4/10/2023 showed EF of 55-60%    - CT of chest did small bilateral pleural effusions but on exam patient does not appear fluid overloaded    - does not appear to be in heart failure clinically     Recently diagnosed CAP    - diagnosed in ED on 5/31 but with repeat CXR on 6/2 which was negative    - does have a recent dry cough and previously complained of shortness of breath that has resolved    - afebrile and no leukocytosis.     - antibiotics were not continued on admission, holding off since admission since this initially worsened diarrhea, continue to reassess for changes       Mild thrombocytopenia    - platelet count of 147 on 6/3 with slight decrease to 143 on 6/4    - no evidence of bleeding     Tachy-uzair syndrome s/p PPM (10/2022) and redo AV node ablation (11/2022)   A-fib    - rate controlled    - continue PTA Carvedilol     - resumed xarelto     HTN    - stable, continue PTA Losartan and Carvedilol      Hypothyroidism    - continue PTA Levothyroxine     - I see she had abnormal thyroid labs in the past (April 2023), will check    - free T4 elevated. Decreased levothyroxine to 50mcg fom 88mcg    - re-check in 4-6 weeks      Family in room. Updated     Diet: Regular Diet Adult  Snacks/Supplements Adult: Ensure Clear; Between Meals    DVT Prophylaxis: DOAC  Ramos Catheter: Not present  Lines: None     Cardiac Monitoring: None  Code Status: Full Code      Clinically Significant Risk Factors         # Hyponatremia: Lowest Na = 128 mmol/L in last 2 days, will monitor as appropriate    # Hypomagnesemia: Lowest Mg = 1.6 mg/dL in last 2 days, will replace as needed   # Hypoalbuminemia: Lowest albumin = 3.2 g/dL at 6/8/2023  5:24 AM, will monitor as appropriate     # Hypertension: Noted on problem list        # Obesity: Estimated body mass index is 31.03 kg/m  as calculated from the following:    Height as of this encounter: 1.549 m (5' 1\").    Weight as of this encounter: 74.5 kg (164 lb 3.9 oz)., PRESENT ON " "ADMISSION          Disposition Plan           Darryl Mcdaniel MD  Hospitalist Service  [facility]  Securely message with [employer] (more info)  Text page via [facility] Paging/Directory   ______________________________________________________________________    Interval History     Patient in chair. Son in room. Patient states she is tired. Wants meds to help her sleep. At home she stays up very late and naps a lot during the day. Feels \"full\" and feels like she cannot eat or drink. Some stool yesterday.    Physical Exam   Vital Signs: Temp: 97.4  F (36.3  C) Temp src: Axillary BP: (!) 154/101 Pulse: 74   Resp: 20 SpO2: 94 % O2 Device: None (Room air)    Weight: 164 lbs 3.88 oz    Constitutional: awake, alert, cooperative, no apparent distress, and appears stated age  Eyes: Lids and lashes normal, pupils equal, round and reactive to light, extra ocular muscles intact, sclera clear, conjunctiva normal  ENT: Normocephalic, without obvious abnormality, atraumatic, sinuses nontender on palpation, external ears without lesions, oral pharynx with moist mucous membranes, tonsils without erythema or exudates, gums normal and good dentition.  Respiratory: No increased work of breathing, good air exchange, clear to auscultation bilaterally, no crackles or wheezing  Cardiovascular: Normal apical impulse, regular rate and rhythm, normal S1 and S2, no S3 or S4, and no murmur noted  GI: soft. Epigastric tenderness. Decreased BS, but present  Lower extrem pitting edema +2 bilat    Medical Decision Making       30 MINUTES SPENT BY ME on the date of service doing chart review, history, exam, documentation & further activities per the note.      Data     I have personally reviewed the following data over the past 24 hrs:    N/A  \   N/A   / N/A     129 (L) 99 7.1 (L) /  89   4.4 23 0.75 \       ALT: 72 (H) AST: 52 (H) AP: 105 (H) TBILI: 0.9   ALB: 3.2 (L) TOT PROTEIN: 5.9 (L) LIPASE: N/A       TSH: N/A T4: N/A A1C: " N/A       Imaging results reviewed over the past 24 hrs:   No results found for this or any previous visit (from the past 24 hour(s)).

## 2023-06-08 NOTE — PLAN OF CARE
"Shift from [id number]     Inpatient Progress Note:  For complete assessment see flow sheet documentation.      Orientation: A/O x4  Neuro: WDL  Pain status: C/O epigastric abdominal pain, 3/10.  Activity: Ax1 with gaitbelt, pt refusing the walker.  Peripheral edema: WDL  Resp: Dyspnea on exertion noted in the beginning of the shift.  Cardiac: WDL  GI: Epigastric abdominal pain noted; pt had one episode of loose stool overnight, did not have any stools this shift. Poor PO intake: only ate 2 bites of white bread. Doesn't feel hungry, states she's feeling abdominal fulness.  : Voids spontaneously without difficulty.  Skin: WDL  LDA: PIV  Infusions: NS 0.9 running at 50 mL/hr   Pertinent Labs: K+/Mg+/P protocol: all rechecks tomorrow 6am.  Diet: Regular diet  Consults: GI following  Discharge Plan: Pt to have an EGD tomorrow.    Vital signs:  Temp: 98.4  F (36.9  C) Temp src: Oral BP: (!) 140/89 Pulse: 71   Resp: 20 SpO2: 100 % O2 Device: None (Room air) Oxygen Delivery: 2 LPM Height: 154.9 cm (5' 1\") Weight: 74.5 kg (164 lb 3.9 oz)  Estimated body mass index is 31.03 kg/m  as calculated from the following:    Height as of this encounter: 1.549 m (5' 1\").    Weight as of this encounter: 74.5 kg (164 lb 3.9 oz).     Xochilt Fitzgerald RN  "

## 2023-06-08 NOTE — PROGRESS NOTES
"[facility]  [facility]  Gastroenterology Progress note    Interval History:    Patient sleeping when I arrive. Patient's son provides most of the history today. Notes ongoing abdominal pain and nausea (can improve with hard candy). No vomiting. Is still not eating much-- son notes he has to remind his mother frequently to eat. Is also very tired- is not sleeping well. After we talked yesterday, patient had two bowel movements. Imodium is PRN now. Today, has had few smaller stools.     Vital Signs:      BP (!) 154/101 (BP Location: Left arm)   Pulse 74   Temp 97.4  F (36.3  C) (Axillary)   Resp 20   Ht 1.549 m (5' 1\")   Wt 74.5 kg (164 lb 3.9 oz)   LMP  (LMP Unknown)   SpO2 94%   BMI 31.03 kg/m    Temp (24hrs), Av.8  F (36.6  C), Min:97.4  F (36.3  C), Max:98.5  F (36.9  C)    No data found.  No intake or output data in the 24 hours ending 23 0957      Constitutional: NAD, comfortable, sitting in the chair   Respiratory: Non-labored  Abdomen: obese, non-distended    Additional Comments:  ROS, FH, SH: See initial GI consult for details.    Laboratory Data:  Recent Labs   Lab Test 23  0548 23  0655 23  1840 23  2347 22  1648   WBC 7.1 6.1 6.7   < > 9.0   HGB 11.6* 11.2* 10.7*   < > 12.1   MCV 95 94 93   < > 94   * 143* 147*   < > 257   INR  --   --   --   --  0.99    < > = values in this interval not displayed.     Recent Labs   Lab Test 23  0524 23  0600 23  0548   * 128* 129*   POTASSIUM 4.4 4.8  4.8 4.9   CHLORIDE 99 96* 98   CO2 23 19* 22   BUN 7.1* 6.3* 4.9*   CR 0.75 0.79 0.76   ANIONGAP 7 13 9   LYLE 8.6* 8.5* 8.4*     Recent Labs   Lab Test 23  0524 23  0600 23  0548 23  1550 23  0655 22  0846 22  1305 02/15/22  0921   ALBUMIN 3.2* 3.2* 3.4*  --  3.5   < >  --   --    BILITOTAL 0.9 0.8 0.8  --  0.9   < >  --   --    DBIL 0.22 0.20 0.21  --   --    < >  " --   --    ALT 72* 80* 82*  --  47*   < >  --   --    AST 52*  --  77*  --  57*   < >  --   --    ALKPHOS 105* 103 93  --  70   < >  --   --    PROTEIN  --   --   --  10*  --   --  Negative Negative    < > = values in this interval not displayed.       IMAGING  CT C/A/P 6/1/2023  1.  Nondiagnostic pulmonary artery angiogram due to severely suboptimal contrast bolus.  2.   Small bilateral pleural effusions. There is also a patchy area of groundglass opacity involving the left lower lobe which is suspicious for pneumonia.  3.  No correlate for the 2.3 cm isoechoic right hepatic lobe lesion seen on prior ultrasound. This was likely artifactual.  4.  No other acute process in the chest, abdomen, or pelvis.     Colonoscopy 6/7/23 (Link):  - Preparation of the colon was fair.    - The examined portion of the ileum was normal.    - Normal mucosa in the entire examined colon. Biopsied.   - Internal hemorrhoids.   Colon, random biopsy:  -Consistent with focal active colitis.  See description and comment.     ASSESSMENT AND PLAN:    Abdominal Pain  Diarrhea   This patient is an 88-year-old female with history of CAD, dyslipidemia, atrial fibrillation on Xarelto, hypertension, hypothyroidism, and recent pneumonia who presents with abdominal pain and diarrhea.  Diarrhea has continued even after antibiotics discontinued.  C. difficile and enteric pathogen panel negative.  O/P exam normal. Negative giardia/cryptosporidium. Fecal fat negative, pancreatic fecal elastase normal.      Colonoscopy was endoscopically unremarkable. Pathology with focal active colitis (differentials include infection, bowel prep, NSAIDs, less likely IBD). Suspect this pathology finding to either be bowel prep related or infection that is resolving-- patient's diarrhea is improving and controlled with imodium PRN.     Continues to have abdominal pain, intermittent nausea and is not eating much. Wonder about possible upper GI cause such as esophagitis,  gastritis, PUD, outlet obstruction, other. Will proceed with EGD tomorrow in the OR.     Patient has also had mildly elevated AST/ALT. US 6/1/23 with normal liver parenchyma but question of isoechoic mass in the right lobe. CT scan with IV contrast 6/1/23 without any correlate for the isoechoic mass and because of this, the ultrasound finding is likely artifactual. Hepatitis A, B, C negative. Patient's mildly elevated transaminases are not causing abdominal pain. Would recommend further workup as an outpatient and periodic monitoring of LFTs.       Plan:  -- Continue imodium PRN  -- NPO at midnight  -- EGD in the OR on 6/9  -- OK to continue Xarelto  -- Agree with adding Boost/Ensure. Consider calorie counts in the future.   -- Outpatient workup for mildly elevated ALT/AST.      Discussed with Dr. Manning.    35 minutes spent on patient care including chart review, patient visit, documentation, coordination.     Miriam Warren PA-C  [facility]  Cell: [phone] until 12PM  Office: [phone]

## 2023-06-08 NOTE — PLAN OF CARE
PRIMARY DIAGNOSIS: ABDOMINAL PAIN / DIARRHEA  OUTPATIENT/OBSERVATION GOALS TO BE MET BEFORE DISCHARGE:  1. Pain Status: Improved-controlled with oral pain medications.    2. Return to near baseline physical activity: Yes    3. Cleared for discharge by consultants (if involved): No    Discharge Planner Nurse   Safe discharge environment identified: Yes  Barriers to discharge: Yes       Entered by: Nohemy Draper RN 06/08/2023 5:13 AM     Patient is Aox4, VSS, assist of 1 while holding on to the wall and furniture, refuses to use the gait belt and walker, tolerating regular diet, but doesn't have much of an appetite, PIV running  mL/hr, on room air, pain in the stomach noted at a 4 but didn't want anything for it, on room air, speaks Tamil, son was here until she went to bed, Xanax administered for sleep, sleeping well, able to make needs known, will continue to monitor and prove cares.    Please review provider order for any additional goals.   Nurse to notify provider when observation goals have been met and patient is ready for discharge.

## 2023-06-08 NOTE — PLAN OF CARE
PRIMARY DIAGNOSIS: AB pain/Diarrhea  OUTPATIENT/OBSERVATION GOALS TO BE MET BEFORE DISCHARGE  1. Orthostatic performed: No    2. Tolerating PO fluid and/or antibiotics (if applicable): Yes    3. Nausea/Vomiting/Diarrhea symptoms improved: Yes    4. Pain status: Improved with use of alternative comfort measures i.e.: positioning    5. Return to near baseline physical activity: Yes    Discharge Planner Nurse   Safe discharge environment identified: Yes  Barriers to discharge: Yes       Entered by: Hannah Rodriguez RN 06/07/2023 11:20 PM     Pt alert and oriented x4, non english speaking, son on bed side interpreting, c/o mild pain refused any pain meds, on regular diet, A x 1, has NS infusing at 100 ml/hr. Plan to discharge to be discharge.     Please review provider order for any additional goals.   Nurse to notify provider when observation goals have been met and patient is ready for discharge.

## 2023-06-09 ENCOUNTER — ANESTHESIA EVENT (OUTPATIENT)
Dept: SURGERY | Facility: CLINIC | Age: 88
End: 2023-06-09
Payer: COMMERCIAL

## 2023-06-09 ENCOUNTER — ANESTHESIA (OUTPATIENT)
Dept: SURGERY | Facility: CLINIC | Age: 88
End: 2023-06-09
Payer: COMMERCIAL

## 2023-06-09 LAB
ANION GAP SERPL CALCULATED.3IONS-SCNC: 7 MMOL/L (ref 7–15)
BASOPHILS # BLD AUTO: 0 10E3/UL (ref 0–0.2)
BASOPHILS NFR BLD AUTO: 0 %
BUN SERPL-MCNC: 8.2 MG/DL (ref 8–23)
CALCIUM SERPL-MCNC: 8.7 MG/DL (ref 8.8–10.2)
CHLORIDE SERPL-SCNC: 99 MMOL/L (ref 98–107)
CREAT SERPL-MCNC: 0.76 MG/DL (ref 0.51–0.95)
DEPRECATED HCO3 PLAS-SCNC: 23 MMOL/L (ref 22–29)
EOSINOPHIL # BLD AUTO: 0.2 10E3/UL (ref 0–0.7)
EOSINOPHIL NFR BLD AUTO: 4 %
ERYTHROCYTE [DISTWIDTH] IN BLOOD BY AUTOMATED COUNT: 14.6 % (ref 10–15)
GFR SERPL CREATININE-BSD FRML MDRD: 75 ML/MIN/1.73M2
GLUCOSE SERPL-MCNC: 86 MG/DL (ref 70–99)
HCT VFR BLD AUTO: 34.3 % (ref 35–47)
HGB BLD-MCNC: 11.2 G/DL (ref 11.7–15.7)
IMM GRANULOCYTES # BLD: 0 10E3/UL
IMM GRANULOCYTES NFR BLD: 0 %
LYMPHOCYTES # BLD AUTO: 1.2 10E3/UL (ref 0.8–5.3)
LYMPHOCYTES NFR BLD AUTO: 18 %
MAGNESIUM SERPL-MCNC: 1.6 MG/DL (ref 1.7–2.3)
MCH RBC QN AUTO: 31 PG (ref 26.5–33)
MCHC RBC AUTO-ENTMCNC: 32.7 G/DL (ref 31.5–36.5)
MCV RBC AUTO: 95 FL (ref 78–100)
MONOCYTES # BLD AUTO: 1.2 10E3/UL (ref 0–1.3)
MONOCYTES NFR BLD AUTO: 17 %
NEUTROPHILS # BLD AUTO: 4.2 10E3/UL (ref 1.6–8.3)
NEUTROPHILS NFR BLD AUTO: 61 %
NRBC # BLD AUTO: 0 10E3/UL
NRBC BLD AUTO-RTO: 0 /100
PHOSPHATE SERPL-MCNC: 4.4 MG/DL (ref 2.5–4.5)
PLATELET # BLD AUTO: 128 10E3/UL (ref 150–450)
POTASSIUM SERPL-SCNC: 4.5 MMOL/L (ref 3.4–5.3)
RBC # BLD AUTO: 3.61 10E6/UL (ref 3.8–5.2)
SODIUM SERPL-SCNC: 129 MMOL/L (ref 136–145)
UPPER GI ENDOSCOPY: NORMAL
WBC # BLD AUTO: 6.9 10E3/UL (ref 4–11)

## 2023-06-09 PROCEDURE — 258N000003 HC RX IP 258 OP 636: Performed by: HOSPITALIST

## 2023-06-09 PROCEDURE — 250N000009 HC RX 250: Performed by: ANESTHESIOLOGY

## 2023-06-09 PROCEDURE — 250N000013 HC RX MED GY IP 250 OP 250 PS 637: Performed by: PHYSICIAN ASSISTANT

## 2023-06-09 PROCEDURE — 88305 TISSUE EXAM BY PATHOLOGIST: CPT | Mod: TC | Performed by: INTERNAL MEDICINE

## 2023-06-09 PROCEDURE — 83735 ASSAY OF MAGNESIUM: CPT | Performed by: HOSPITALIST

## 2023-06-09 PROCEDURE — 710N000010 HC RECOVERY PHASE 1, LEVEL 2, PER MIN: Performed by: INTERNAL MEDICINE

## 2023-06-09 PROCEDURE — 120N000001 HC R&B MED SURG/OB

## 2023-06-09 PROCEDURE — 36415 COLL VENOUS BLD VENIPUNCTURE: CPT | Performed by: HOSPITALIST

## 2023-06-09 PROCEDURE — 250N000011 HC RX IP 250 OP 636: Performed by: NURSE ANESTHETIST, CERTIFIED REGISTERED

## 2023-06-09 PROCEDURE — 250N000009 HC RX 250: Performed by: NURSE ANESTHETIST, CERTIFIED REGISTERED

## 2023-06-09 PROCEDURE — 80048 BASIC METABOLIC PNL TOTAL CA: CPT | Performed by: HOSPITALIST

## 2023-06-09 PROCEDURE — 250N000013 HC RX MED GY IP 250 OP 250 PS 637: Performed by: HOSPITALIST

## 2023-06-09 PROCEDURE — 250N000013 HC RX MED GY IP 250 OP 250 PS 637: Performed by: NURSE PRACTITIONER

## 2023-06-09 PROCEDURE — 258N000003 HC RX IP 258 OP 636: Performed by: NURSE ANESTHETIST, CERTIFIED REGISTERED

## 2023-06-09 PROCEDURE — 88305 TISSUE EXAM BY PATHOLOGIST: CPT | Mod: 26 | Performed by: PATHOLOGY

## 2023-06-09 PROCEDURE — 999N000141 HC STATISTIC PRE-PROCEDURE NURSING ASSESSMENT: Performed by: INTERNAL MEDICINE

## 2023-06-09 PROCEDURE — 370N000017 HC ANESTHESIA TECHNICAL FEE, PER MIN: Performed by: INTERNAL MEDICINE

## 2023-06-09 PROCEDURE — 250N000013 HC RX MED GY IP 250 OP 250 PS 637: Performed by: INTERNAL MEDICINE

## 2023-06-09 PROCEDURE — 258N000003 HC RX IP 258 OP 636: Performed by: ANESTHESIOLOGY

## 2023-06-09 PROCEDURE — 85025 COMPLETE CBC W/AUTO DIFF WBC: CPT | Performed by: HOSPITALIST

## 2023-06-09 PROCEDURE — 84100 ASSAY OF PHOSPHORUS: CPT | Performed by: HOSPITALIST

## 2023-06-09 PROCEDURE — 360N000075 HC SURGERY LEVEL 2, PER MIN: Performed by: INTERNAL MEDICINE

## 2023-06-09 PROCEDURE — 272N000001 HC OR GENERAL SUPPLY STERILE: Performed by: INTERNAL MEDICINE

## 2023-06-09 PROCEDURE — 99232 SBSQ HOSP IP/OBS MODERATE 35: CPT | Performed by: HOSPITALIST

## 2023-06-09 RX ORDER — FENTANYL CITRATE 50 UG/ML
25 INJECTION, SOLUTION INTRAMUSCULAR; INTRAVENOUS EVERY 5 MIN PRN
Status: DISCONTINUED | OUTPATIENT
Start: 2023-06-09 | End: 2023-06-09 | Stop reason: HOSPADM

## 2023-06-09 RX ORDER — SODIUM CHLORIDE, SODIUM LACTATE, POTASSIUM CHLORIDE, CALCIUM CHLORIDE 600; 310; 30; 20 MG/100ML; MG/100ML; MG/100ML; MG/100ML
INJECTION, SOLUTION INTRAVENOUS CONTINUOUS PRN
Status: DISCONTINUED | OUTPATIENT
Start: 2023-06-09 | End: 2023-06-09

## 2023-06-09 RX ORDER — FLUMAZENIL 0.1 MG/ML
0.2 INJECTION, SOLUTION INTRAVENOUS
Status: ACTIVE | OUTPATIENT
Start: 2023-06-09 | End: 2023-06-10

## 2023-06-09 RX ORDER — FLUMAZENIL 0.1 MG/ML
0.2 INJECTION, SOLUTION INTRAVENOUS
Status: DISCONTINUED | OUTPATIENT
Start: 2023-06-09 | End: 2023-06-09

## 2023-06-09 RX ORDER — LIDOCAINE 40 MG/G
CREAM TOPICAL
Status: DISCONTINUED | OUTPATIENT
Start: 2023-06-09 | End: 2023-06-09 | Stop reason: HOSPADM

## 2023-06-09 RX ORDER — PROPOFOL 10 MG/ML
INJECTION, EMULSION INTRAVENOUS CONTINUOUS PRN
Status: DISCONTINUED | OUTPATIENT
Start: 2023-06-09 | End: 2023-06-09

## 2023-06-09 RX ORDER — NALOXONE HYDROCHLORIDE 0.4 MG/ML
0.2 INJECTION, SOLUTION INTRAMUSCULAR; INTRAVENOUS; SUBCUTANEOUS
Status: DISCONTINUED | OUTPATIENT
Start: 2023-06-09 | End: 2023-06-20

## 2023-06-09 RX ORDER — ONDANSETRON 4 MG/1
4 TABLET, ORALLY DISINTEGRATING ORAL EVERY 30 MIN PRN
Status: DISCONTINUED | OUTPATIENT
Start: 2023-06-09 | End: 2023-06-09 | Stop reason: HOSPADM

## 2023-06-09 RX ORDER — EPHEDRINE SULFATE 50 MG/ML
INJECTION, SOLUTION INTRAMUSCULAR; INTRAVENOUS; SUBCUTANEOUS PRN
Status: DISCONTINUED | OUTPATIENT
Start: 2023-06-09 | End: 2023-06-09

## 2023-06-09 RX ORDER — SODIUM CHLORIDE, SODIUM LACTATE, POTASSIUM CHLORIDE, CALCIUM CHLORIDE 600; 310; 30; 20 MG/100ML; MG/100ML; MG/100ML; MG/100ML
INJECTION, SOLUTION INTRAVENOUS CONTINUOUS
Status: DISCONTINUED | OUTPATIENT
Start: 2023-06-09 | End: 2023-06-09 | Stop reason: HOSPADM

## 2023-06-09 RX ORDER — PANTOPRAZOLE SODIUM 40 MG/1
40 TABLET, DELAYED RELEASE ORAL
Status: DISCONTINUED | OUTPATIENT
Start: 2023-06-09 | End: 2023-06-21 | Stop reason: HOSPADM

## 2023-06-09 RX ORDER — IPRATROPIUM BROMIDE AND ALBUTEROL SULFATE 2.5; .5 MG/3ML; MG/3ML
3 SOLUTION RESPIRATORY (INHALATION) ONCE
Status: COMPLETED | OUTPATIENT
Start: 2023-06-09 | End: 2023-06-09

## 2023-06-09 RX ORDER — SUCRALFATE ORAL 1 G/10ML
1 SUSPENSION ORAL
Status: DISCONTINUED | OUTPATIENT
Start: 2023-06-09 | End: 2023-06-11

## 2023-06-09 RX ORDER — NALOXONE HYDROCHLORIDE 0.4 MG/ML
0.2 INJECTION, SOLUTION INTRAMUSCULAR; INTRAVENOUS; SUBCUTANEOUS
Status: DISCONTINUED | OUTPATIENT
Start: 2023-06-09 | End: 2023-06-21 | Stop reason: HOSPADM

## 2023-06-09 RX ORDER — ONDANSETRON 2 MG/ML
4 INJECTION INTRAMUSCULAR; INTRAVENOUS EVERY 30 MIN PRN
Status: DISCONTINUED | OUTPATIENT
Start: 2023-06-09 | End: 2023-06-09 | Stop reason: HOSPADM

## 2023-06-09 RX ORDER — NALOXONE HYDROCHLORIDE 0.4 MG/ML
0.4 INJECTION, SOLUTION INTRAMUSCULAR; INTRAVENOUS; SUBCUTANEOUS
Status: DISCONTINUED | OUTPATIENT
Start: 2023-06-09 | End: 2023-06-20

## 2023-06-09 RX ORDER — FENTANYL CITRATE 50 UG/ML
50 INJECTION, SOLUTION INTRAMUSCULAR; INTRAVENOUS EVERY 5 MIN PRN
Status: DISCONTINUED | OUTPATIENT
Start: 2023-06-09 | End: 2023-06-09 | Stop reason: HOSPADM

## 2023-06-09 RX ORDER — LIDOCAINE HYDROCHLORIDE 20 MG/ML
INJECTION, SOLUTION INFILTRATION; PERINEURAL PRN
Status: DISCONTINUED | OUTPATIENT
Start: 2023-06-09 | End: 2023-06-09

## 2023-06-09 RX ORDER — PROPOFOL 10 MG/ML
INJECTION, EMULSION INTRAVENOUS PRN
Status: DISCONTINUED | OUTPATIENT
Start: 2023-06-09 | End: 2023-06-09

## 2023-06-09 RX ADMIN — SODIUM CHLORIDE: 9 INJECTION, SOLUTION INTRAVENOUS at 15:06

## 2023-06-09 RX ADMIN — CARVEDILOL 12.5 MG: 12.5 TABLET, FILM COATED ORAL at 09:09

## 2023-06-09 RX ADMIN — SODIUM CHLORIDE, POTASSIUM CHLORIDE, SODIUM LACTATE AND CALCIUM CHLORIDE: 600; 310; 30; 20 INJECTION, SOLUTION INTRAVENOUS at 12:43

## 2023-06-09 RX ADMIN — LOSARTAN POTASSIUM 50 MG: 50 TABLET, FILM COATED ORAL at 09:09

## 2023-06-09 RX ADMIN — RIVAROXABAN 20 MG: 20 TABLET, FILM COATED ORAL at 18:18

## 2023-06-09 RX ADMIN — LOSARTAN POTASSIUM 50 MG: 50 TABLET, FILM COATED ORAL at 21:11

## 2023-06-09 RX ADMIN — Medication 5 MG: at 13:26

## 2023-06-09 RX ADMIN — LEVOTHYROXINE SODIUM 50 MCG: 50 TABLET ORAL at 06:42

## 2023-06-09 RX ADMIN — CARVEDILOL 12.5 MG: 12.5 TABLET, FILM COATED ORAL at 18:18

## 2023-06-09 RX ADMIN — SUCRALFATE ORAL 1 G: 1 SUSPENSION ORAL at 21:11

## 2023-06-09 RX ADMIN — LIDOCAINE HYDROCHLORIDE 20 MG: 20 INJECTION, SOLUTION INFILTRATION; PERINEURAL at 13:03

## 2023-06-09 RX ADMIN — LIDOCAINE HYDROCHLORIDE 30 MG: 20 INJECTION, SOLUTION INFILTRATION; PERINEURAL at 13:00

## 2023-06-09 RX ADMIN — Medication 5 MG: at 13:28

## 2023-06-09 RX ADMIN — SUCRALFATE ORAL 1 G: 1 SUSPENSION ORAL at 18:18

## 2023-06-09 RX ADMIN — SODIUM CHLORIDE, POTASSIUM CHLORIDE, SODIUM LACTATE AND CALCIUM CHLORIDE: 600; 310; 30; 20 INJECTION, SOLUTION INTRAVENOUS at 11:58

## 2023-06-09 RX ADMIN — BENZONATATE 100 MG: 100 CAPSULE ORAL at 21:11

## 2023-06-09 RX ADMIN — Medication 3 MG: at 21:37

## 2023-06-09 RX ADMIN — PHENYLEPHRINE HYDROCHLORIDE 300 MCG: 10 INJECTION INTRAVENOUS at 13:15

## 2023-06-09 RX ADMIN — PHENYLEPHRINE HYDROCHLORIDE 300 MCG: 10 INJECTION INTRAVENOUS at 13:21

## 2023-06-09 RX ADMIN — PANTOPRAZOLE SODIUM 40 MG: 40 TABLET, DELAYED RELEASE ORAL at 18:18

## 2023-06-09 RX ADMIN — IPRATROPIUM BROMIDE AND ALBUTEROL SULFATE 3 ML: .5; 3 SOLUTION RESPIRATORY (INHALATION) at 13:36

## 2023-06-09 RX ADMIN — PROPOFOL 20 MG: 10 INJECTION, EMULSION INTRAVENOUS at 13:03

## 2023-06-09 RX ADMIN — PROPOFOL 100 MCG/KG/MIN: 10 INJECTION, EMULSION INTRAVENOUS at 13:00

## 2023-06-09 ASSESSMENT — ACTIVITIES OF DAILY LIVING (ADL)
ADLS_ACUITY_SCORE: 29
ADLS_ACUITY_SCORE: 28
ADLS_ACUITY_SCORE: 32
ADLS_ACUITY_SCORE: 28
ADLS_ACUITY_SCORE: 32
ADLS_ACUITY_SCORE: 29
ADLS_ACUITY_SCORE: 28
ADLS_ACUITY_SCORE: 35
ADLS_ACUITY_SCORE: 35
ADLS_ACUITY_SCORE: 29
ADLS_ACUITY_SCORE: 35
ADLS_ACUITY_SCORE: 28

## 2023-06-09 ASSESSMENT — ENCOUNTER SYMPTOMS: DYSRHYTHMIAS: 1

## 2023-06-09 NOTE — PLAN OF CARE
PRIMARY DIAGNOSIS: ABDOMINAL PAIN / DIARRHEA  OUTPATIENT/OBSERVATION GOALS TO BE MET BEFORE DISCHARGE:  1. Pain Status: Improved-controlled with oral pain medications.    2. Return to near baseline physical activity: Yes    3. Cleared for discharge by consultants (if involved): No    Discharge Planner Nurse   Safe discharge environment identified: Yes  Barriers to discharge: Yes       Entered by: Nohemy Draper RN 06/09/2023 6:14 AM     Patient is Aox4, VSS, assist of 1 while holding on to the wall and furniture, refuses to use the gait belt and walker, NPO at midnight for EGD, PIV running NS 50 mL/hr, on room air, no pain noted at this time, on room air, speaks Tamil, Xanax administered for sleep, sleeping on and off throughout the night, able to make needs known, will continue to monitor and prove cares.    Please review provider order for any additional goals.   Nurse to notify provider when observation goals have been met and patient is ready for discharge.

## 2023-06-09 NOTE — PLAN OF CARE
"IN PATIENT NOTE: [id number]  Diagnosis: Diarrhea Unspecified  /78 (BP Location: Left arm)   Pulse 73   Temp 97.6  F (36.4  C) (Oral)   Resp 18   Ht 1.549 m (5' 1\")   Wt 74.5 kg (164 lb 3.9 oz)   LMP  (LMP Unknown)   SpO2 99%   BMI 31.03 kg/m       Pt A & O X 3. On regular diet. Standby assist of one with cares and ambulation with gait belt to bathroom. Her IV was noted to be infiltrated around 1715 after waking up from nap. Pt asleep at beginning of shift and son requested her not to be disturbed. IV was removed. IV Flyer RN restarted IV to same right dorsal area. She continues on Nacl at 50 ml/hr. She was noted with scattered bruising but more so to left upper arm. Pt seems to be edematous 2+ to both arms and legs. Still reporting epigastric pains. Ensure given between meals. On Mag, phos, and potassium RN replacement protocol with recheck in Am.     Plan: For EGD tomorrow. NPO from Midnight for procedure. Will continue to provide supportive cares.     "

## 2023-06-09 NOTE — ANESTHESIA CARE TRANSFER NOTE
Patient: Jeannie Chu    Procedure: Procedure(s):  ESOPHAGOGASTRODUODENOSCOPY       Diagnosis: Abdominal pain, epigastric [R10.13]  Diagnosis Additional Information: No value filed.    Anesthesia Type:   MAC     Note:      Level of Consciousness: drowsy  Oxygen Supplementation: face mask    Independent Airway: airway patency satisfactory and stable  Dentition: dentition unchanged  Vital Signs Stable: post-procedure vital signs reviewed and stable  Report to RN Given: handoff report given  Patient transferred to: PACU    Handoff Report: Identifed the Patient, Identified the Reponsible Provider, Reviewed the pertinent medical history, Discussed the surgical course, Reviewed Intra-OP anesthesia mangement and issues during anesthesia, Set expectations for post-procedure period and Allowed opportunity for questions and acknowledgement of understanding      Vitals:  Vitals Value Taken Time   BP     Temp     Pulse 69 06/09/23 1336   Resp 20 06/09/23 1336   SpO2 100 % 06/09/23 1336   Vitals shown include unvalidated device data.    Electronically Signed By: LORA Cancino CRNA  June 9, 2023  1:37 PM

## 2023-06-09 NOTE — PLAN OF CARE
Vitals are Temp: 97.5  F (36.4  C) Temp src: Temporal BP: 127/84 Pulse: 69   Resp: 15 SpO2: 100 %.  Patient is Alert and Oriented x4. Pt seemed sleepy this morning. They are SBA with no assistive devices .  Pt is on  NPO diet.  They are denying pain.  Patient is Saline locked. pt has gone for an EGD. Pt is a Tamil speaker and son has been helping with interpretation. Will cont to monitor.

## 2023-06-09 NOTE — PROGRESS NOTES
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     Abdominal pain/Diarrhea    - unclear cause    - work-up including CT abdomen (on 6/1), RUQ US (on 6/1) and colonoscopy were unrevealing    - stool studies negative (including O&P, fecal fat)    - pending biopsies from colonoscopy    - no herbal use (except occ clove)    - trying regular diet    - started imodium    - 6/7 am patient was constipated, then had normal stool, then incontinent loose stool    - stopped scheduled Imodium, cont PRN imodium    - still c/o abdominal fullness/discomfort    - only taking in a few bites of food and minimal fluids, although drinking the Ensures that I ordered on 6/7    - EGD today     Addendum: spoke with GI (Dr. Manning). EGD showed only some mild erythema. Patient already on PPI. Added carafate. Her diarrhea seems to have resolved, but she has continued with some abdominal discomfort and still is not tolerating her normal PO intake. At this point I have added supplements. Will discontinue IVF. If tolerates enough intake to keep from getting dehydrated, may need to discharge home and follow-up as an outpatient versus re-imaging?  Updated son    Hyponatremia    - chronic    - likely related to GI losses    - cont IVF with NS at 50cc/hr    Hypomagnesemia    - replacement protocol    Hepatitis    - noticed previous labs from ED showed mild elevation of LFTs    - no work-up was done    - checking acute hepatitis panel: negative     - hold statin    - CT/US were unrevealing    Hypocalcemia and hypomagnesemia    - resolved with replacement      Generalized muscle weakness    - due to acute illness and ongoing GI losses.     - PT consulted and saw patient on 6/3, recommending use of walker which patient is currently refusing even after being educated with family present    - family comfortable with her mobility     Elevated proBNP    - initial proBNP on 5/31 elevated at 14,188 with repeat of 12,008    - prior to this he proBNP was normal at 1,711 in 10/2022    - most recent echocardiogram on 4/10/2023 showed EF of 55-60%    - CT of chest did small bilateral pleural effusions but on exam patient does not appear fluid overloaded    - does not appear to be in heart failure clinically     Recently diagnosed CAP    - diagnosed in ED on 5/31 but with repeat CXR on 6/2 which was negative    - does have a recent dry cough and previously complained of shortness of breath that has resolved    - afebrile and no leukocytosis.     - antibiotics were not continued on admission, holding off since admission since this initially worsened diarrhea, continue to reassess for changes       Mild thrombocytopenia    - platelet count of 147 on 6/3 with slight decrease to 143 on 6/4    - no evidence of bleeding     Tachy-uzair syndrome s/p PPM (10/2022) and redo AV node ablation (11/2022)   A-fib    - rate controlled    - continue PTA Carvedilol     - resumed xarelto     HTN    - stable, continue PTA Losartan and Carvedilol      Hypothyroidism    - continue PTA Levothyroxine     - I see she had abnormal thyroid labs in the past (April 2023), will check    - free T4 elevated. Decreased levothyroxine to 50mcg fom 88mcg    - re-check in 4-6 weeks      Family in room. Updated     Diet: Snacks/Supplements Adult: Ensure Clear; Between Meals  NPO per Anesthesia Guidelines for Procedure/Surgery Except for: Meds    DVT Prophylaxis: DOAC  Ramos Catheter: Not present  Lines: None   "   Cardiac Monitoring: None  Code Status: Full Code      Clinically Significant Risk Factors         # Hyponatremia: Lowest Na = 129 mmol/L in last 2 days, will monitor as appropriate    # Hypomagnesemia: Lowest Mg = 1.6 mg/dL in last 2 days, will replace as needed   # Hypoalbuminemia: Lowest albumin = 3.2 g/dL at 6/8/2023  5:24 AM, will monitor as appropriate   # Thrombocytopenia: Lowest platelets = 128 in last 2 days, will monitor for bleeding   # Hypertension: Noted on problem list        # Obesity: Estimated body mass index is 31.03 kg/m  as calculated from the following:    Height as of this encounter: 1.549 m (5' 1\").    Weight as of this encounter: 74.5 kg (164 lb 3.9 oz).           Disposition Plan     Expected Discharge Date: 06/09/2023,  3:00 PM              Darryl Mcdaniel MD  Hospitalist Service  [facility]  Securely message with [employer] (more info)  Text page via [facility] Paging/Directory   ______________________________________________________________________    Interval History   Patient in bed. Son in room. Still c/o mid abdo cramping/feeling \"full\". Complaining that she is not sleeping well. No vomiting. No further diarrhea  Physical Exam   Vital Signs: Temp: 97.6  F (36.4  C) Temp src: Oral BP: (!) 149/85 Pulse: 72   Resp: 20 SpO2: 100 % O2 Device: None (Room air)    Weight: 164 lbs 3.88 oz    Constitutional: awake, alert, cooperative, no apparent distress, and appears stated age  Eyes: Lids and lashes normal, pupils equal, round and reactive to light, extra ocular muscles intact, sclera clear, conjunctiva normal  ENT: Normocephalic, without obvious abnormality, atraumatic, sinuses nontender on palpation, external ears without lesions, oral pharynx with moist mucous membranes, tonsils without erythema or exudates, gums normal and good dentition.  Respiratory: No increased work of breathing, good air exchange, clear to auscultation bilaterally, no crackles or " wheezing  Cardiovascular: Normal apical impulse, regular rate and rhythm, normal S1 and S2, no S3 or S4, and no murmur noted  GI: soft. Epigastric tenderness. Decreased BS, but present  Lower extrem pitting edema +2 bilat    Medical Decision Making       30 MINUTES SPENT BY ME on the date of service doing chart review, history, exam, documentation & further activities per the note.      Data     I have personally reviewed the following data over the past 24 hrs:    6.9  \   11.2 (L)   / 128 (L)     129 (L) 99 8.2 /  86   4.5 23 0.76 \       Imaging results reviewed over the past 24 hrs:   No results found for this or any previous visit (from the past 24 hour(s)).

## 2023-06-09 NOTE — ANESTHESIA PREPROCEDURE EVALUATION
Anesthesia Pre-Procedure Evaluation    Patient: Jeannie Chu   MRN: [id number] : 1935        Procedure : Procedure(s):  ESOPHAGOGASTRODUODENOSCOPY          Past Medical History:   Diagnosis Date     Acquired hypothyroidism 2021     Atrial fibrillation      Benign essential hypertension      Coronary artery disease 2021     Hyperlipidemia       Past Surgical History:   Procedure Laterality Date     COLONOSCOPY N/A 2023    Procedure: COLONOSCOPY, BIOPSIES;  Surgeon: Germán Manning MD;  Location: [facility] OR     Coronary angiogram with stent placement       EP ABLATION AV NODE N/A 10/31/2022    Procedure: Ablation Atrioventricular Node;  Surgeon: Rogelio Whitt MD;  Location:  [facility]ARD[facility] C[facility] LAB     EP ABLATION AV NODE N/A 2022    Procedure: EP Ablation AV Node;  Surgeon: Jd Baker MD;  Location:  HE[facility]ARD[facility] C[facility] LAB     EP PACEMAKER DEVICE & LEAD IMPLANT- RIGHT VENTRICULAR N/A 10/31/2022    Procedure: Pacemaker Device & Lead Implant- Right ventricular;  Surgeon: Rogelio Whitt MD;  Location:  HEART CARDI[facility] CATH LAB      Allergies   Allergen Reactions     Amlodipine Swelling     BLE edema     Ibuprofen Swelling      Social History     Tobacco Use     Smoking status: Never     Smokeless tobacco: Never   Vaping Use     Vaping status: Never Used   Substance Use Topics     Alcohol use: No      Wt Readings from Last 1 Encounters:   23 74.5 kg (164 lb 3.9 oz)        Anesthesia Evaluation            ROS/MED HX  ENT/Pulmonary:    (-) sleep apnea   Neurologic: Comment: Weakness      Cardiovascular: Comment: Anesthesia Evaluation        ROS/MED HX  ENT/Pulmonary:  - neg pulmonary ROS    Neurologic:  - neg neurologic ROS    Cardiovascular: Comment: Study Date: 04/10/2023 10:45 AM  Age: 88 yrs  Gender: Female  Patient Location: [facility]  Reason For Study: Nonrheumatic mitral valve regurgitation  Ordering Physician: GERMÁN NUNEZ  Referring Physician: GERMÁN NUNEZ  Performed  By: OUMOU Bebe Nicole     BSA: 1.7 m2  Height: 61 in  Weight: 155 lb  BP: 148/87 mmHg  ______________________________________________________________________________  Procedure  Complete Echo Adult. Optison (NDC #[id number]) given intravenously.  ______________________________________________________________________________  Interpretation Summary     1. The left ventricle is normal in size. There is normal left ventricular wall  thickness. Left ventricular systolic function is normal. The visual ejection  fraction is 55-60%. Diastolic Doppler findings (E/E' ratio and/or other  parameters) suggest left ventricular filling pressures are increased. Septal  wall motion abnormality may reflect pacemaker activation. There is no thrombus  seen in the left ventricle.  2. The right ventricle is normal size. There is a catheter/pacemaker lead seen  in the right ventricle. The right ventricular systolic function is normal.  3.There is moderate biatrial enlargement. There is no color Doppler evidence  of an atrial shunt.  4. There is moderate mitral annular calcification. There is mild to moderate  (1-2+) mitral regurgitation.  5. Mild aortic regurgitation.  6. In direct comparison to the previous study dated 03/22/2022, the findings  are similar.    (+) Dyslipidemia hypertension--CAD ---dysrhythmias, a-fib and a-flutter,  (-) pulmonary hypertension        (+) hypertension--CAD ---pacemaker, dysrhythmias, a-fib, valvular problems/murmurs type: MR     METS/Exercise Tolerance:     Hematologic:  - neg hematologic  ROS     Musculoskeletal:   (+) arthritis,     GI/Hepatic:    (-) GERD   Renal/Genitourinary: Comment: Low Na, Ca, Mg,      Endo:     (+) thyroid problem, hypothyroidism, Obesity,     Psychiatric/Substance Use:  - neg psychiatric ROS     Infectious Disease:       Malignancy:       Other:            Physical Exam    Airway        Mallampati: II   TM distance: > 3 FB   Neck ROM: full   Mouth opening: > 3 cm    Respiratory  Devices and Support         Dental           Cardiovascular   cardiovascular exam normal          Pulmonary   pulmonary exam normal            Other findings: Lab Test        06/09/23 06/06/23 06/03/23 05/31/23 11/01/22                       0551          0548          0655          2347          1648          WBC          6.9          7.1          6.1            < >        9.0           HGB          11.2*        11.6*        11.2*          < >        12.1          MCV          95           95           94             < >        94            PLT          128*         143*         143*           < >        257           INR           --           --           --           --          0.99           < > = values in this interval not displayed.                  Lab Test        06/09/23 06/08/23 06/07/23                       0551          0524          0600          NA           129*         129*         128*          POTASSIUM    4.5          4.4          4.8  4.8     CHLORIDE     99           99           96*           CO2          23           23           19*           BUN          8.2          7.1*         6.3*          CR           0.76         0.75         0.79          ANIONGAP     7            7            13            LYLE          8.7*         8.6*         8.5*          GLC          86           89           92              OUTSIDE LABS:  CBC:   Lab Results   Component Value Date    WBC 6.9 06/09/2023    WBC 7.1 06/06/2023    HGB 11.2 (L) 06/09/2023    HGB 11.6 (L) 06/06/2023    HCT 34.3 (L) 06/09/2023    HCT 35.6 06/06/2023     (L) 06/09/2023     (L) 06/06/2023     BMP:   Lab Results   Component Value Date     (L) 06/09/2023     (L) 06/08/2023    POTASSIUM 4.5 06/09/2023    POTASSIUM 4.4 06/08/2023    CHLORIDE 99 06/09/2023    CHLORIDE 99 06/08/2023    CO2 23 06/09/2023    CO2 23 06/08/2023    BUN 8.2 06/09/2023    BUN 7.1 (L) 06/08/2023    CR 0.76  06/09/2023    CR 0.75 06/08/2023    GLC 86 06/09/2023    GLC 89 06/08/2023     COAGS:   Lab Results   Component Value Date    INR 0.99 11/01/2022     POC: No results found for: BGM, HCG, HCGS  HEPATIC:   Lab Results   Component Value Date    ALBUMIN 3.2 (L) 06/08/2023    PROTTOTAL 5.9 (L) 06/08/2023    ALT 72 (H) 06/08/2023    AST 52 (H) 06/08/2023    ALKPHOS 105 (H) 06/08/2023    BILITOTAL 0.9 06/08/2023     OTHER:   Lab Results   Component Value Date    A1C 5.4 10/13/2022    LYLE 8.7 (L) 06/09/2023    PHOS 4.4 06/09/2023    MAG 1.6 (L) 06/09/2023    TSH 4.37 (H) 06/07/2023    T4 1.91 (H) 06/07/2023    CRP <2.9 09/26/2020    SED 25 04/03/2023       Anesthesia Plan    ASA Status:  3      Anesthesia Type: MAC.              Consents    Anesthesia Plan(s) and associated risks, benefits, and realistic alternatives discussed. Questions answered and patient/representative(s) expressed understanding.    - Discussed:     - Discussed with:  Patient, Legal Guardian         Postoperative Care    Pain management: IV analgesics, Oral pain medications.   PONV prophylaxis: Ondansetron (or other 5HT-3), Background Propofol Infusion     Comments:                Mark Gasca MD

## 2023-06-09 NOTE — ANESTHESIA POSTPROCEDURE EVALUATION
Patient: Jeannie Chu    Procedure: Procedure(s):  ESOPHAGOGASTRODUODENOSCOPY       Anesthesia Type:  MAC    Note:  Disposition: Inpatient   Postop Pain Control: Uneventful            Sign Out: Well controlled pain   PONV: No   Neuro/Psych: Uneventful            Sign Out: Acceptable/Baseline neuro status   Airway/Respiratory: Uneventful            Sign Out: Acceptable/Baseline resp. status   CV/Hemodynamics: Uneventful            Sign Out: Acceptable CV status; No obvious hypovolemia; No obvious fluid overload   Other NRE: NONE   DID A NON-ROUTINE EVENT OCCUR? No           Last vitals:  Vitals Value Taken Time   /76 06/09/23 1420   Temp 97.5  F (36.4  C) 06/09/23 1420   Pulse 69 06/09/23 1430   Resp 14 06/09/23 1430   SpO2 99 % 06/09/23 1433   Vitals shown include unvalidated device data.    Electronically Signed By: Mark Gasca MD  June 9, 2023  3:16 PM

## 2023-06-10 ENCOUNTER — APPOINTMENT (OUTPATIENT)
Dept: CT IMAGING | Facility: CLINIC | Age: 88
End: 2023-06-10
Attending: INTERNAL MEDICINE
Payer: COMMERCIAL

## 2023-06-10 ENCOUNTER — APPOINTMENT (OUTPATIENT)
Dept: GENERAL RADIOLOGY | Facility: CLINIC | Age: 88
End: 2023-06-10
Attending: INTERNAL MEDICINE
Payer: COMMERCIAL

## 2023-06-10 LAB
ALBUMIN SERPL BCG-MCNC: 3.1 G/DL (ref 3.5–5.2)
ALP SERPL-CCNC: 119 U/L (ref 35–104)
ALT SERPL W P-5'-P-CCNC: 72 U/L (ref 10–35)
ANION GAP SERPL CALCULATED.3IONS-SCNC: 8 MMOL/L (ref 7–15)
AST SERPL W P-5'-P-CCNC: 56 U/L (ref 10–35)
BASOPHILS # BLD AUTO: 0 10E3/UL (ref 0–0.2)
BASOPHILS NFR BLD AUTO: 0 %
BILIRUB DIRECT SERPL-MCNC: 0.32 MG/DL (ref 0–0.3)
BILIRUB SERPL-MCNC: 1.1 MG/DL
BUN SERPL-MCNC: 9.8 MG/DL (ref 8–23)
CALCIUM SERPL-MCNC: 8.7 MG/DL (ref 8.8–10.2)
CHLORIDE SERPL-SCNC: 99 MMOL/L (ref 98–107)
CREAT SERPL-MCNC: 0.81 MG/DL (ref 0.51–0.95)
DEPRECATED HCO3 PLAS-SCNC: 21 MMOL/L (ref 22–29)
EOSINOPHIL # BLD AUTO: 0.1 10E3/UL (ref 0–0.7)
EOSINOPHIL NFR BLD AUTO: 2 %
ERYTHROCYTE [DISTWIDTH] IN BLOOD BY AUTOMATED COUNT: 15 % (ref 10–15)
GFR SERPL CREATININE-BSD FRML MDRD: 70 ML/MIN/1.73M2
GLUCOSE SERPL-MCNC: 131 MG/DL (ref 70–99)
HCT VFR BLD AUTO: 34.9 % (ref 35–47)
HGB BLD-MCNC: 11 G/DL (ref 11.7–15.7)
IMM GRANULOCYTES # BLD: 0 10E3/UL
IMM GRANULOCYTES NFR BLD: 1 %
LYMPHOCYTES # BLD AUTO: 1 10E3/UL (ref 0.8–5.3)
LYMPHOCYTES NFR BLD AUTO: 15 %
MAGNESIUM SERPL-MCNC: 1.5 MG/DL (ref 1.7–2.3)
MCH RBC QN AUTO: 30.9 PG (ref 26.5–33)
MCHC RBC AUTO-ENTMCNC: 31.5 G/DL (ref 31.5–36.5)
MCV RBC AUTO: 98 FL (ref 78–100)
MONOCYTES # BLD AUTO: 0.9 10E3/UL (ref 0–1.3)
MONOCYTES NFR BLD AUTO: 12 %
NEUTROPHILS # BLD AUTO: 5 10E3/UL (ref 1.6–8.3)
NEUTROPHILS NFR BLD AUTO: 70 %
NRBC # BLD AUTO: 0 10E3/UL
NRBC BLD AUTO-RTO: 0 /100
PHOSPHATE SERPL-MCNC: 4 MG/DL (ref 2.5–4.5)
PLATELET # BLD AUTO: 128 10E3/UL (ref 150–450)
POTASSIUM SERPL-SCNC: 4.3 MMOL/L (ref 3.4–5.3)
PROT SERPL-MCNC: 6 G/DL (ref 6.4–8.3)
RBC # BLD AUTO: 3.56 10E6/UL (ref 3.8–5.2)
SODIUM SERPL-SCNC: 128 MMOL/L (ref 136–145)
WBC # BLD AUTO: 7.1 10E3/UL (ref 4–11)

## 2023-06-10 PROCEDURE — 250N000013 HC RX MED GY IP 250 OP 250 PS 637: Performed by: INTERNAL MEDICINE

## 2023-06-10 PROCEDURE — 250N000009 HC RX 250: Performed by: INTERNAL MEDICINE

## 2023-06-10 PROCEDURE — 83735 ASSAY OF MAGNESIUM: CPT | Performed by: HOSPITALIST

## 2023-06-10 PROCEDURE — 250N000013 HC RX MED GY IP 250 OP 250 PS 637: Performed by: PHYSICIAN ASSISTANT

## 2023-06-10 PROCEDURE — 250N000013 HC RX MED GY IP 250 OP 250 PS 637: Performed by: NURSE PRACTITIONER

## 2023-06-10 PROCEDURE — 99233 SBSQ HOSP IP/OBS HIGH 50: CPT | Performed by: INTERNAL MEDICINE

## 2023-06-10 PROCEDURE — 74177 CT ABD & PELVIS W/CONTRAST: CPT

## 2023-06-10 PROCEDURE — 82248 BILIRUBIN DIRECT: CPT | Performed by: HOSPITALIST

## 2023-06-10 PROCEDURE — 71045 X-RAY EXAM CHEST 1 VIEW: CPT

## 2023-06-10 PROCEDURE — 85025 COMPLETE CBC W/AUTO DIFF WBC: CPT | Performed by: HOSPITALIST

## 2023-06-10 PROCEDURE — 250N000013 HC RX MED GY IP 250 OP 250 PS 637: Performed by: HOSPITALIST

## 2023-06-10 PROCEDURE — 120N000001 HC R&B MED SURG/OB

## 2023-06-10 PROCEDURE — 36415 COLL VENOUS BLD VENIPUNCTURE: CPT | Performed by: HOSPITALIST

## 2023-06-10 PROCEDURE — 250N000011 HC RX IP 250 OP 636: Performed by: INTERNAL MEDICINE

## 2023-06-10 PROCEDURE — 84100 ASSAY OF PHOSPHORUS: CPT | Performed by: HOSPITALIST

## 2023-06-10 PROCEDURE — 80053 COMPREHEN METABOLIC PANEL: CPT | Performed by: HOSPITALIST

## 2023-06-10 PROCEDURE — 250N000011 HC RX IP 250 OP 636: Performed by: HOSPITALIST

## 2023-06-10 RX ORDER — HYDROMORPHONE HCL IN WATER/PF 6 MG/30 ML
0.2 PATIENT CONTROLLED ANALGESIA SYRINGE INTRAVENOUS
Status: DISCONTINUED | OUTPATIENT
Start: 2023-06-10 | End: 2023-06-11

## 2023-06-10 RX ORDER — IOPAMIDOL 755 MG/ML
500 INJECTION, SOLUTION INTRAVASCULAR ONCE
Status: COMPLETED | OUTPATIENT
Start: 2023-06-10 | End: 2023-06-10

## 2023-06-10 RX ORDER — MAGNESIUM SULFATE HEPTAHYDRATE 40 MG/ML
4 INJECTION, SOLUTION INTRAVENOUS ONCE
Status: COMPLETED | OUTPATIENT
Start: 2023-06-10 | End: 2023-06-11

## 2023-06-10 RX ORDER — HYDROXYZINE HYDROCHLORIDE 50 MG/1
50 TABLET, FILM COATED ORAL EVERY 6 HOURS PRN
Status: DISCONTINUED | OUTPATIENT
Start: 2023-06-10 | End: 2023-06-21 | Stop reason: HOSPADM

## 2023-06-10 RX ORDER — HYDROXYZINE HYDROCHLORIDE 25 MG/1
25 TABLET, FILM COATED ORAL EVERY 6 HOURS PRN
Status: DISCONTINUED | OUTPATIENT
Start: 2023-06-10 | End: 2023-06-21 | Stop reason: HOSPADM

## 2023-06-10 RX ORDER — SACCHAROMYCES BOULARDII 250 MG
250 CAPSULE ORAL 2 TIMES DAILY
Status: DISCONTINUED | OUTPATIENT
Start: 2023-06-10 | End: 2023-06-10

## 2023-06-10 RX ADMIN — CARVEDILOL 12.5 MG: 12.5 TABLET, FILM COATED ORAL at 17:34

## 2023-06-10 RX ADMIN — HYDROXYZINE HYDROCHLORIDE 25 MG: 50 TABLET, FILM COATED ORAL at 14:39

## 2023-06-10 RX ADMIN — RIVAROXABAN 20 MG: 20 TABLET, FILM COATED ORAL at 17:33

## 2023-06-10 RX ADMIN — CARVEDILOL 12.5 MG: 12.5 TABLET, FILM COATED ORAL at 07:49

## 2023-06-10 RX ADMIN — ACETAMINOPHEN 650 MG: 325 TABLET, FILM COATED ORAL at 14:08

## 2023-06-10 RX ADMIN — LORAZEPAM 0.25 MG: 0.5 TABLET ORAL at 14:08

## 2023-06-10 RX ADMIN — SUCRALFATE ORAL 1 G: 1 SUSPENSION ORAL at 07:49

## 2023-06-10 RX ADMIN — LOSARTAN POTASSIUM 50 MG: 50 TABLET, FILM COATED ORAL at 07:49

## 2023-06-10 RX ADMIN — LORAZEPAM 0.25 MG: 0.5 TABLET ORAL at 17:33

## 2023-06-10 RX ADMIN — HYDROMORPHONE HYDROCHLORIDE 1 MG: 2 TABLET ORAL at 20:46

## 2023-06-10 RX ADMIN — SUCRALFATE ORAL 1 G: 1 SUSPENSION ORAL at 14:08

## 2023-06-10 RX ADMIN — LEVOTHYROXINE SODIUM 50 MCG: 50 TABLET ORAL at 07:49

## 2023-06-10 RX ADMIN — HYDROXYZINE HYDROCHLORIDE 25 MG: 50 TABLET, FILM COATED ORAL at 17:34

## 2023-06-10 RX ADMIN — LORAZEPAM 0.25 MG: 0.5 TABLET ORAL at 00:33

## 2023-06-10 RX ADMIN — SUCRALFATE ORAL 1 G: 1 SUSPENSION ORAL at 17:33

## 2023-06-10 RX ADMIN — IOPAMIDOL 82 ML: 755 INJECTION, SOLUTION INTRAVENOUS at 21:22

## 2023-06-10 RX ADMIN — ALPRAZOLAM 0.5 MG: 0.5 TABLET ORAL at 10:28

## 2023-06-10 RX ADMIN — PANTOPRAZOLE SODIUM 40 MG: 40 TABLET, DELAYED RELEASE ORAL at 07:49

## 2023-06-10 RX ADMIN — SODIUM CHLORIDE 61 ML: 9 INJECTION, SOLUTION INTRAVENOUS at 21:22

## 2023-06-10 RX ADMIN — ONDANSETRON 4 MG: 2 INJECTION INTRAMUSCULAR; INTRAVENOUS at 17:33

## 2023-06-10 ASSESSMENT — ACTIVITIES OF DAILY LIVING (ADL)
ADLS_ACUITY_SCORE: 29
ADLS_ACUITY_SCORE: 31
ADLS_ACUITY_SCORE: 35
ADLS_ACUITY_SCORE: 29
ADLS_ACUITY_SCORE: 35
ADLS_ACUITY_SCORE: 29
ADLS_ACUITY_SCORE: 35
ADLS_ACUITY_SCORE: 31
ADLS_ACUITY_SCORE: 29
ADLS_ACUITY_SCORE: 35

## 2023-06-10 NOTE — PLAN OF CARE
"A&O X4. VSS. Not tolerating regular diet, poor appetite. Encouraged supplemental food/drink and fluids. Patient experiencing abdominal pain 9/10, tylenol and atarax given. Patient has not had diarrhea on this shift. GI consulted and added PPI and carafate. Potassiun, mag, and phosphate protocol, no replacements needed. Ax1 with gait belt and walker, ambulated to the restroom and to the chair. Chest XR completed, unremarkable. Saline locked. Bed in lowest position and call light within reach.     Blood pressure 125/70, pulse 69, temperature 97.5  F (36.4  C), temperature source Oral, resp. rate 20, height 1.549 m (5' 1\"), weight 74.5 kg (164 lb 3.9 oz), SpO2 97 %, not currently breastfeeding.                          "

## 2023-06-10 NOTE — PROGRESS NOTES
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  In the emergency department, patient was found have a temperature of 90.3  F, blood pressure 134/104, heart rate 69, respiratory rate 20, SPO2 99% on room air.  Initial lab work showed WBC 6.7, hemoglobin 10.7, platelet 147, sodium 125, chloride 94, glucose 127, ALT 55, AST 59, alkaline phosphatase 77, proBNP 12,008.  Chest x-ray showed enlarged heart and otherwise unremarkable.  The patient had presented to the emergency department the day prior for similar symptoms and had a CT chest/abdomen/pelvis that showed small bilateral pleural effusions with patchy area of groundglass opacity involving the left lower lobe suspicious for pneumonia.  The patient was discharged home with oral antibiotics.  Upon presentation, pneumonia was thought less likely and antibiotics were held.  Gastroenterology was consulted to see the patient.  On 6/6, the patient had a colonoscopy that showed internal hemorrhoids and otherwise negative.  Patient was started on Imodium.  Unfortunately, the patient's symptoms continued and on 6/9, the patient had an upper endoscopy that revealed erythematous mucosa in the gastric body that was biopsied.  Patient was started on pantoprazole daily.    TODAY'S PLAN: Patient son at bedside.  Patient was up to the bathroom several times overnight.  She reports that she had 1 episode of bowel movement, but the  rest were urine.  She did eat some yogurt for breakfast.  Currently she is quite somnolent, suspect she did not sleep much last night.  She does seem to complain of abdominal cramping/fullness.  It is very difficult to understand exactly what symptoms she is having.  Continue Imodium as needed.  EGD biopsy pending, but noted to show mild erythema.  Patient started on PPI and Carafate added yesterday.  Of note, patient does not appear tender to palpation on exam.  Patient also with some shortness of breath with ambulation.  Had been on IV fluids.  We will check chest x-ray.  Son reports patient has plans for an MRI that took months to schedule on Monday.  It is a specialized MRI given she has a pacemaker.  We will discuss with the son tomorrow possible discharge home tomorrow for the MRI.    Problem List:   Abdominal pain/Diarrhea  Mild Gastritis    - unclear cause    - work-up including CT abdomen (on 6/1), RUQ US (on 6/1) and colonoscopy were unrevealing    - stool studies negative (including O&P, fecal fat)    - Colonoscopy biopsies from 6/6 showed focal active colitis (possibly secondary to bowel prep effect)    - no herbal use (except occ clove)    - trying regular diet    - started imodium    - 6/7 am patient was constipated, then had normal stool, then incontinent loose stool    - stopped scheduled Imodium, cont PRN imodium    - still c/o abdominal fullness/discomfort    - only taking in a few bites of food and minimal fluids, although drinking the Ensures that my partner ordered on 6/7    - EGD on 6/9 showed mild area of erythema in gastric body    - PPI daily    - Appreciate GI recommendations     Hyponatremia    - chronic    - likely related to GI losses    - cont IVF with NS at 50cc/hr     Hypomagnesemia    - replacement protocol     Hepatitis    - noticed previous labs from ED showed mild elevation of LFTs    - no work-up was done    - checking acute hepatitis panel: negative    - hold statin    - CT/US  were unrevealing     Hypocalcemia and hypomagnesemia    - resolved with replacement      Generalized muscle weakness    - due to acute illness and ongoing GI losses.     - PT consulted and saw patient on 6/3, recommending use of walker which patient is currently refusing even after being educated with family present    - family comfortable with her mobility     Elevated proBNP    - initial proBNP on 5/31 elevated at 14,188 with repeat of 12,008    - prior to this he proBNP was normal at 1,711 in 10/2022    - most recent echocardiogram on 4/10/2023 showed EF of 55-60%    - CT of chest did small bilateral pleural effusions but on exam patient does not appear fluid overloaded    - does not appear to be in heart failure clinically     Recently diagnosed CAP    - diagnosed in ED on 5/31 but with repeat CXR on 6/2 which was negative    - does have a recent dry cough and previously complained of shortness of breath that has resolved    - afebrile and no leukocytosis.     - antibiotics were not continued on admission, holding off since admission since this initially worsened diarrhea, continue to reassess for changes       Mild thrombocytopenia    - platelet count of 147 on 6/3 with slight decrease to 143 on 6/4    - no evidence of bleeding     Tachy-uzair syndrome s/p PPM (10/2022) and redo AV node ablation (11/2022)   A-fib    - rate controlled    - continue PTA Carvedilol     - resumed xarelto     HTN    - stable, continue PTA Losartan and Carvedilol      Hypothyroidism    - continue PTA Levothyroxine     - I see she had abnormal thyroid labs in the past (April 2023), will check    - free T4 elevated. Decreased levothyroxine to 50mcg fom 88mcg    - re-check in 4-6 weeks    I spent 55 minutes in reviewing this patient's labs, imaging, medications, medical history.  In addition time was spent interviewing the patient, communicating with family, and medical decision making.  I reviewed the notes of gastroenterology,  nursing, EGD report, colonoscopy report, care coordinator.    DVT Prophylaxis: DOAC  Code Status: Full Code  Diet: Snacks/Supplements Adult: Ensure Clear; Between Meals  Advance Diet as Tolerated: Regular Diet Adult    Ramos Catheter: Not present  Disposition: Expected discharge in 1-2 days to home with family assist. Goals prior to discharge include tolerating oral diet, sob improving.   Family updated today: Yes      Interval History   Pt seen and examined.  Pt somnolent but arousable.  Son assisted with translation.    -Data reviewed today: I personally reviewed all new labs and imaging results over the last 24 hours.     Physical Exam   Temp: 97.5  F (36.4  C) Temp src: Oral BP: 125/70 Pulse: 69   Resp: 20 SpO2: 97 % O2 Device: None (Room air) Oxygen Delivery: 2 LPM  Vitals:    06/03/23 0218 06/04/23 0731   Weight: 74.5 kg (164 lb 3.9 oz) 74.5 kg (164 lb 3.9 oz)     Vital Signs with Ranges  Temp:  [97.4  F (36.3  C)-98.2  F (36.8  C)] 97.5  F (36.4  C)  Pulse:  [68-78] 69  Resp:  [10-24] 20  BP: (119-148)/(70-94) 125/70  SpO2:  [94 %-100 %] 97 %  I/O last 3 completed shifts:  In: 400 [I.V.:400]  Out: -     GENERAL: No apparent distress. Somnolent  HEENT: Normocephalic, atraumatic. Extraocular movements intact.  CARDIOVASCULAR: Regular rate and rhythm without murmurs or rubs. No S3.  PULMONARY: Clear bilaterally.  GASTROINTESTINAL: Soft, non-tender, non-distended. Bowel sounds normoactive.   EXTREMITIES: No cyanosis or clubbing. No edema.  NEUROLOGICAL: CN 2-12 grossly intact, no focal neurological deficits.  DERMATOLOGICAL: No rash, ulcer, bruising, nor jaundice.    Medications       carvedilol  12.5 mg Oral BID w/meals     levothyroxine  50 mcg Oral QAM AC     losartan  50 mg Oral BID     pantoprazole  40 mg Oral QAM AC     rivaroxaban ANTICOAGULANT  20 mg Oral Daily with supper     sucralfate  1 g Oral 4x Daily AC & HS     Data     Laboratory:  Recent Labs   Lab 06/10/23  1051 06/09/23  0551 06/06/23  0548    WBC 7.1 6.9 7.1   HGB 11.0* 11.2* 11.6*   HCT 34.9* 34.3* 35.6   MCV 98 95 95   * 128* 143*     Recent Labs   Lab 06/10/23  1051 06/09/23  0551 06/08/23  0524 06/07/23  0600 06/06/23  0548   * 129* 129* 128* 129*   POTASSIUM 4.3 4.5 4.4 4.8  4.8 4.9   CHLORIDE 99 99 99 96* 98   CO2 21* 23 23 19* 22   ANIONGAP 8 7 7 13 9   * 86 89 92 99   BUN 9.8 8.2 7.1* 6.3* 4.9*   CR 0.81 0.76 0.75 0.79 0.76   GFRESTIMATED 70 75 77 72 75   LYLE 8.7* 8.7* 8.6* 8.5* 8.4*   MAG 1.5* 1.6* 1.6* 1.6* 1.6*   PHOS 4.0 4.4 4.0 3.8 3.0   PROTTOTAL 6.0*  --  5.9* 5.7* 6.2*   ALBUMIN 3.1*  --  3.2* 3.2* 3.4*   BILITOTAL 1.1  --  0.9 0.8 0.8   ALKPHOS 119*  --  105* 103 93   AST 56*  --  52*  --  77*   ALT 72*  --  72* 80* 82*     No results for input(s): CULT in the last 168 hours.    Imaging:  No results found for this or any previous visit (from the past 24 hour(s)).      Tj Ruvalcaba DO  [facility] Hospitalist  [address] [place] [place] [address]  Securely message with [employer] (more info)  Text page via [employer] Paging/Directory   06/10/2023

## 2023-06-10 NOTE — PROGRESS NOTES
"GASTROENTEROLOGY PROGRESS NOTE    IMPRESSION: 87 y/o female with history of CAD, dyslipidemia, atrial fibrillation on Xarelto, HTN, hypothyroidism, who presented with abdominal pain and diarrhea. The latter has resolved.    RECOMMENDATIONS:  1. Abdominal pain. Very difficult historian, but per son, pain was previously on the right side and less severe. Now in the LLQ and unbearable 10/10.  Given the change, will repeat CT to r/o complication from procedure or new acute process such as diverticulitis (though suspicion low). Perhaps this new pain is related to constipation now that the diarrhea has resolved (no BMs today).  -- CT abdomen / pelvis ordered  -- Pain control per hospitalist    2. Diarrhea. Resolved. Enteric pathogen, C diff, O/P, giardia/cryptosporidium all negative. Fecal fat negative, fecal elastase normal. Colonoscopy to the TI was unremarkable. Random biopsies with acute colitis. Suspect that was a self-limiting process. No longer requiring loperamide. Had 1 soft BM lats night. No BMs today.     We will continue to follow with you.      ---------------------------------------------------------------------------------------------------------------------  SUBJECTIVE:  Patient having severe LLQ pain. Now 10/10. Before, was on the right side. According to son, this started sometime this afternoon.  Had 1 soft BM yesterday. No BMs today.     OBJECTIVE:  General Appearance:   /70 (BP Location: Right arm)   Pulse 69   Temp 97.5  F (36.4  C) (Oral)   Resp 20   Ht 1.549 m (5' 1\")   Wt 74.5 kg (164 lb 3.9 oz)   LMP  (LMP Unknown)   SpO2 97%   BMI 31.03 kg/m    Temp (24hrs), Av  F (36.7  C), Min:97.5  F (36.4  C), Max:98.2  F (36.8  C)    No data found.  No intake or output data in the 24 hours ending 06/10/23 7489    General: awake, alert, well appearing  HEENT: normal  Abdomen: soft, TTP LLQ    LABS:  I have reviewed the patient's new clinical lab results:    Recent Labs   Lab Test " 06/10/23  1051 06/09/23  0551 06/06/23  0548 05/31/23  2347 11/01/22  1648   WBC 7.1 6.9 7.1   < > 9.0   HGB 11.0* 11.2* 11.6*   < > 12.1   MCV 98 95 95   < > 94   * 128* 143*   < > 257   INR  --   --   --   --  0.99    < > = values in this interval not displayed.     Recent Labs   Lab Test 06/10/23  1051 06/09/23  0551 06/08/23  0524   POTASSIUM 4.3 4.5 4.4   CHLORIDE 99 99 99   CO2 21* 23 23   BUN 9.8 8.2 7.1*   ANIONGAP 8 7 7     Recent Labs   Lab Test 06/10/23  1051 06/08/23  0524 06/07/23  0600 06/06/23  0548 06/05/23  1550 02/28/22  0846 02/27/22  1305 02/15/22  0921   ALBUMIN 3.1* 3.2* 3.2* 3.4*  --    < >  --   --    BILITOTAL 1.1 0.9 0.8 0.8  --    < >  --   --    ALT 72* 72* 80* 82*  --    < >  --   --    AST 56* 52*  --  77*  --    < >  --   --    PROTEIN  --   --   --   --  10*  --  Negative Negative    < > = values in this interval not displayed.       IMAGING:  No new applicable imaging     Elizabeth Frazier MD  [facility]  After 5pm: [phone]

## 2023-06-10 NOTE — PLAN OF CARE
Patient did not sleep overnight. Vital signs are stable. Alert/oriented x4. Up with assist of 1 to the bathroom. Patient repots having not slept for 7 days. Ativan administered for restlessness ineffective. Patient spent most of the night sitting up in recliner in room. No changes to current POC or treatments.

## 2023-06-10 NOTE — PLAN OF CARE
Temp: 98  F (36.7  C) Temp src: Oral BP: (!) 141/77 Pulse: 68   Resp: 15 SpO2: 97 % O2 Device: None (Room air) pt refusing capno, pt agreeable to continuous pulse ox.     A&Ox4. Up Ax1- pt refusing walker/gait belt. Son at bedside and very helpful with translating/cares. Pt given bed bath per pt request. Continent of urine and bowel. Pt up in recliner chair most of evening shift, ambulated to the bathroom x2- pt very sob with activity, O2 sats checked pt still sating in the lower 90s on RA. Pt refusing pcds. Pain more controlled this evening, tolerated full liquids for dinner- advanced to regular diet for breakfast. Per pt her pain is better after eating full liquids. Per son prn tylenol does nothing for her pain. When she complains of pain she complains of RUQ pain. Pt requesting something for her cough- tessalon given x1. Pt also requesting something to help her sleep, melatonin given x1. Pts left arm swollen- pt denies pain in that arm, pt elevated arm on pillows and the swelling has gotten better but still swollen. Plan- biopsies from colonoscopy pending, mag/phos/potassium protocol- recheck in am, pain control, encourage oob.

## 2023-06-11 LAB
ALBUMIN SERPL BCG-MCNC: 3.5 G/DL (ref 3.5–5.2)
ALP SERPL-CCNC: 167 U/L (ref 35–104)
ALT SERPL W P-5'-P-CCNC: 108 U/L (ref 10–35)
ANION GAP SERPL CALCULATED.3IONS-SCNC: 9 MMOL/L (ref 7–15)
AST SERPL W P-5'-P-CCNC: 96 U/L (ref 10–35)
BILIRUB SERPL-MCNC: 0.7 MG/DL
BUN SERPL-MCNC: 17 MG/DL (ref 8–23)
CALCIUM SERPL-MCNC: 9 MG/DL (ref 8.8–10.2)
CHLORIDE SERPL-SCNC: 97 MMOL/L (ref 98–107)
CREAT SERPL-MCNC: 0.88 MG/DL (ref 0.51–0.95)
CRP SERPL-MCNC: 11.91 MG/L
DEPRECATED HCO3 PLAS-SCNC: 24 MMOL/L (ref 22–29)
ERYTHROCYTE [DISTWIDTH] IN BLOOD BY AUTOMATED COUNT: 14.8 % (ref 10–15)
GFR SERPL CREATININE-BSD FRML MDRD: 63 ML/MIN/1.73M2
GLUCOSE SERPL-MCNC: 100 MG/DL (ref 70–99)
HCT VFR BLD AUTO: 33.1 % (ref 35–47)
HGB BLD-MCNC: 10.9 G/DL (ref 11.7–15.7)
INR PPP: 3.53 (ref 0.85–1.15)
MAGNESIUM SERPL-MCNC: 1.7 MG/DL (ref 1.7–2.3)
MCH RBC QN AUTO: 31 PG (ref 26.5–33)
MCHC RBC AUTO-ENTMCNC: 32.9 G/DL (ref 31.5–36.5)
MCV RBC AUTO: 94 FL (ref 78–100)
PHOSPHATE SERPL-MCNC: 4.8 MG/DL (ref 2.5–4.5)
PLATELET # BLD AUTO: 101 10E3/UL (ref 150–450)
POTASSIUM SERPL-SCNC: 5.3 MMOL/L (ref 3.4–5.3)
PROT SERPL-MCNC: 6.4 G/DL (ref 6.4–8.3)
RBC # BLD AUTO: 3.52 10E6/UL (ref 3.8–5.2)
SODIUM SERPL-SCNC: 130 MMOL/L (ref 136–145)
TOTAL PROTEIN SERUM FOR ELP: 6.3 G/DL (ref 6.4–8.3)
TROPONIN T SERPL HS-MCNC: 15 NG/L
WBC # BLD AUTO: 6.4 10E3/UL (ref 4–11)

## 2023-06-11 PROCEDURE — 84166 PROTEIN E-PHORESIS/URINE/CSF: CPT | Mod: 26

## 2023-06-11 PROCEDURE — 36415 COLL VENOUS BLD VENIPUNCTURE: CPT | Performed by: INTERNAL MEDICINE

## 2023-06-11 PROCEDURE — 84165 PROTEIN E-PHORESIS SERUM: CPT | Mod: 26

## 2023-06-11 PROCEDURE — 86334 IMMUNOFIX E-PHORESIS SERUM: CPT | Performed by: PATHOLOGY

## 2023-06-11 PROCEDURE — 86038 ANTINUCLEAR ANTIBODIES: CPT | Performed by: PATHOLOGY

## 2023-06-11 PROCEDURE — 250N000013 HC RX MED GY IP 250 OP 250 PS 637: Performed by: INTERNAL MEDICINE

## 2023-06-11 PROCEDURE — 85610 PROTHROMBIN TIME: CPT | Performed by: INTERNAL MEDICINE

## 2023-06-11 PROCEDURE — 250N000013 HC RX MED GY IP 250 OP 250 PS 637: Performed by: NURSE PRACTITIONER

## 2023-06-11 PROCEDURE — 99233 SBSQ HOSP IP/OBS HIGH 50: CPT | Performed by: INTERNAL MEDICINE

## 2023-06-11 PROCEDURE — 84165 PROTEIN E-PHORESIS SERUM: CPT | Mod: TC | Performed by: PATHOLOGY

## 2023-06-11 PROCEDURE — 250N000013 HC RX MED GY IP 250 OP 250 PS 637: Performed by: HOSPITALIST

## 2023-06-11 PROCEDURE — 84100 ASSAY OF PHOSPHORUS: CPT | Performed by: INTERNAL MEDICINE

## 2023-06-11 PROCEDURE — 80053 COMPREHEN METABOLIC PANEL: CPT | Performed by: INTERNAL MEDICINE

## 2023-06-11 PROCEDURE — 83521 IG LIGHT CHAINS FREE EACH: CPT | Performed by: INTERNAL MEDICINE

## 2023-06-11 PROCEDURE — 83735 ASSAY OF MAGNESIUM: CPT | Performed by: INTERNAL MEDICINE

## 2023-06-11 PROCEDURE — 84155 ASSAY OF PROTEIN SERUM: CPT | Performed by: INTERNAL MEDICINE

## 2023-06-11 PROCEDURE — 86334 IMMUNOFIX E-PHORESIS SERUM: CPT | Mod: 26

## 2023-06-11 PROCEDURE — 84166 PROTEIN E-PHORESIS/URINE/CSF: CPT | Performed by: PATHOLOGY

## 2023-06-11 PROCEDURE — 86140 C-REACTIVE PROTEIN: CPT | Performed by: INTERNAL MEDICINE

## 2023-06-11 PROCEDURE — 84484 ASSAY OF TROPONIN QUANT: CPT | Performed by: INTERNAL MEDICINE

## 2023-06-11 PROCEDURE — 250N000011 HC RX IP 250 OP 636: Performed by: INTERNAL MEDICINE

## 2023-06-11 PROCEDURE — 120N000001 HC R&B MED SURG/OB

## 2023-06-11 PROCEDURE — 85027 COMPLETE CBC AUTOMATED: CPT | Performed by: INTERNAL MEDICINE

## 2023-06-11 RX ORDER — FUROSEMIDE 10 MG/ML
20 INJECTION INTRAMUSCULAR; INTRAVENOUS ONCE
Status: COMPLETED | OUTPATIENT
Start: 2023-06-11 | End: 2023-06-11

## 2023-06-11 RX ORDER — DICYCLOMINE HCL 20 MG
20 TABLET ORAL
Status: DISCONTINUED | OUTPATIENT
Start: 2023-06-11 | End: 2023-06-13

## 2023-06-11 RX ORDER — SUCRALFATE ORAL 1 G/10ML
1 SUSPENSION ORAL
Status: DISCONTINUED | OUTPATIENT
Start: 2023-06-11 | End: 2023-06-13

## 2023-06-11 RX ORDER — POLYETHYLENE GLYCOL 3350 17 G/17G
17 POWDER, FOR SOLUTION ORAL DAILY
Status: DISCONTINUED | OUTPATIENT
Start: 2023-06-11 | End: 2023-06-16

## 2023-06-11 RX ADMIN — SUCRALFATE ORAL 1 G: 1 SUSPENSION ORAL at 11:19

## 2023-06-11 RX ADMIN — LOSARTAN POTASSIUM 50 MG: 50 TABLET, FILM COATED ORAL at 12:01

## 2023-06-11 RX ADMIN — LORAZEPAM 0.25 MG: 0.5 TABLET ORAL at 04:13

## 2023-06-11 RX ADMIN — RIVAROXABAN 20 MG: 20 TABLET, FILM COATED ORAL at 17:45

## 2023-06-11 RX ADMIN — DICYCLOMINE HYDROCHLORIDE 20 MG: 20 TABLET ORAL at 21:23

## 2023-06-11 RX ADMIN — DICYCLOMINE HYDROCHLORIDE 20 MG: 20 TABLET ORAL at 17:45

## 2023-06-11 RX ADMIN — SUCRALFATE ORAL 1 G: 1 SUSPENSION ORAL at 12:40

## 2023-06-11 RX ADMIN — CARVEDILOL 12.5 MG: 12.5 TABLET, FILM COATED ORAL at 12:01

## 2023-06-11 RX ADMIN — HYDROXYZINE HYDROCHLORIDE 50 MG: 50 TABLET, FILM COATED ORAL at 21:23

## 2023-06-11 RX ADMIN — SUCRALFATE ORAL 1 G: 1 SUSPENSION ORAL at 18:03

## 2023-06-11 RX ADMIN — LEVOTHYROXINE SODIUM 50 MCG: 50 TABLET ORAL at 11:19

## 2023-06-11 RX ADMIN — SUCRALFATE ORAL 1 G: 1 SUSPENSION ORAL at 21:23

## 2023-06-11 RX ADMIN — POLYETHYLENE GLYCOL 3350 17 G: 17 POWDER, FOR SOLUTION ORAL at 11:19

## 2023-06-11 RX ADMIN — DICYCLOMINE HYDROCHLORIDE 20 MG: 20 TABLET ORAL at 11:19

## 2023-06-11 RX ADMIN — CARVEDILOL 12.5 MG: 12.5 TABLET, FILM COATED ORAL at 17:45

## 2023-06-11 RX ADMIN — PANTOPRAZOLE SODIUM 40 MG: 40 TABLET, DELAYED RELEASE ORAL at 11:19

## 2023-06-11 RX ADMIN — LOSARTAN POTASSIUM 50 MG: 50 TABLET, FILM COATED ORAL at 20:27

## 2023-06-11 RX ADMIN — FUROSEMIDE 20 MG: 10 INJECTION, SOLUTION INTRAMUSCULAR; INTRAVENOUS at 12:40

## 2023-06-11 RX ADMIN — ACETAMINOPHEN 650 MG: 325 TABLET, FILM COATED ORAL at 03:15

## 2023-06-11 RX ADMIN — DICYCLOMINE HYDROCHLORIDE 20 MG: 20 TABLET ORAL at 13:51

## 2023-06-11 RX ADMIN — HYDROXYZINE HYDROCHLORIDE 25 MG: 50 TABLET, FILM COATED ORAL at 03:15

## 2023-06-11 RX ADMIN — HYDROMORPHONE HYDROCHLORIDE 1 MG: 2 TABLET ORAL at 21:23

## 2023-06-11 ASSESSMENT — ACTIVITIES OF DAILY LIVING (ADL)
ADLS_ACUITY_SCORE: 33
ADLS_ACUITY_SCORE: 31
ADLS_ACUITY_SCORE: 26
ADLS_ACUITY_SCORE: 26

## 2023-06-11 NOTE — PROGRESS NOTES
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  In the emergency department, patient was found have a temperature of 90.3  F, blood pressure 134/104, heart rate 69, respiratory rate 20, SPO2 99% on room air.  Initial lab work showed WBC 6.7, hemoglobin 10.7, platelet 147, sodium 125, chloride 94, glucose 127, ALT 55, AST 59, alkaline phosphatase 77, proBNP 12,008.  Chest x-ray showed enlarged heart and otherwise unremarkable.  The patient had presented to the emergency department the day prior for similar symptoms and had a CT chest/abdomen/pelvis that showed small bilateral pleural effusions with patchy area of groundglass opacity involving the left lower lobe suspicious for pneumonia.  The patient was discharged home with oral antibiotics.  Upon presentation, pneumonia was thought less likely and antibiotics were held.  Gastroenterology was consulted to see the patient.  On 6/6, the patient had a colonoscopy that showed internal hemorrhoids and otherwise negative.  Patient was started on Imodium.  Unfortunately, the patient's symptoms continued and on 6/9, the patient had an upper endoscopy that revealed erythematous mucosa in the gastric body that was biopsied.  Patient was started on pantoprazole daily.    TODAY'S PLAN: Patient with son at bedside who assisted with translation.  Patient still with diffuse abdominal pain.  Diarrhea seems to have improved.  Patient  not eating much and son states that she tells him she feels full and is not hungry.  Appreciate GI recommendations - will schedule dicyclomine and add dilaudid PO 1 mg prn.  Colon biopsies show mild colitis, which has been attributed to bowel prep.  LFTs stable/slightly worse.  INR elevated at 3.5, though patient on Xarelto.  Hemoglobin low and platelets decreased today.  Patient also now with bilateral lower extremity edema.  Also having significant shortness of breath with mild exertion to and from the bathroom.  Patient's son states that she normally at home walks up and down the stairs without issue.  Unclear etiology of constitutional issues.  Suspect we are missing something.  We will check echocardiogram and troponin today.  proBNP elevated to 12,000 upon arrival.  We will check SPEP, UPEP, and serum free light chains with concern for possible amyloidosis.  Discharge not recommended at this point, anticipate another several days in the hospital.    Problem List:   Acute Intractable Abdominal Pain and Diarrhea  Mild Gastritis  - Unclear etiology at this point  - CT abd/pelv on 6/1, RUQ US on 6/1 and colonoscopy on 6/6 unrevealing.  Colonscopy biopsies positive for focal active colitis.  This was thought possibly secondary to bowel prep as no evidence of colitis seen on CT.  Pt had EGD on 6/9 that showed mild erythema of gastric body.  Pt started on PPI and carafate without much improvement  - C dif PCR negative, Enteric panel negative, ova and parasites negative, cryptosporidia and giardia negative  - Diarrhea now improved  - Imodium prn  - Minimal PO intake, but son assists with pt drinking ensure.  Pt c/o feeling full and pain makes her not hungry  - Appreciate GI recommendations  - Dicylclomine 20 mg QID  - Pain control    Hepatitis  - Unclear etiology  - Appreciate GI recommendations  - Statin held  - CT (6/1 and 6/10) and RUQ US negative  - Viral hepatitis panel negative  - Check ARELIS, SPEP/UPEP, FLC  (Autoimmune and Amyloidosis consideration)  - Daily CMP    Dyspnea  Elevated Troponin  BLE Edema  Coronary Artery Disease s/p PCI (20 yrs ago)  Persistent Atrial Fibrillation complicated Tachybradia Syndrome s/p AV Ablation and Permanent Pacemaker in 2022  - Most recent echo in 4/2023 showed EF 55-60%, moderate biatrial enlargement, moderate mitral calcification, mild to moderate mitral regurgitation  - Troponin on 6/11 = 15  - ProBNP on arrival 12,008  - Check updated echocardiogram  - Lasix 20 mg IV x1 on 6/11  - Pt follows with Dr. Shin of Cardiology.  Last seen on 4/20/23  - Continue Xarelto for Afib  - Continue carvedilol    Hypertension  Hyperlipidemia  Hypothyroidism  - Continue Levothyroxine.  Dose adjusted from 88 mcg to 50 mcg as free T4 returned mildly elevated  - Recommend repeat TSH/T4 in 6-8 weeks with PCP  - Continue Losartan and Carvedilol    Bicytopenia (Thrombocytopenia, Normocytic Anemia)  - Unclear etiology.  Possibly secondary to liver issue  - Check peripheral smear  - Daily CBC    Hypomagnesemia  Hyponatremia  Hypocalcemia  - Improving  - Electrolyte replacement protocol    Generalized Muscle Weakness  - PT/OT - PT consulted and saw patient on 6/3, recommending use of walker which patient is currently refusing even after being educated with family present    I spent 55 minutes in reviewing this patient's labs, imaging, medications, medical history.  In addition time was spent interviewing the patient, communicating with family, and medical decision making.     DVT Prophylaxis: DOAC  Code Status: Full Code  Diet: Snacks/Supplements Adult: Ensure Clear; Between Meals  Advance Diet as Tolerated: Regular Diet Adult    Ramos Catheter: Not present  Disposition: Expected discharge in 3-4 days to home with family assist. Goals prior to discharge include LFTs stable, symptoms improving, tolerating oral diet, work up complete.   Family updated today: Yes      Interval History   Pt seen and examined.  Pt  sleeping on arrival but arousable.  Son assisted with translation.  Pt c/o abd pain diffusely.  Not eating much due to the pain.    -Data reviewed today: I personally reviewed all new labs and imaging results over the last 24 hours.     Physical Exam   Temp: 96.8  F (36  C) Temp src: Axillary BP: (!) 141/78 Pulse: 71   Resp: 20 SpO2: 97 % O2 Device: None (Room air)    Vitals:    06/03/23 0218 06/04/23 0731   Weight: 74.5 kg (164 lb 3.9 oz) 74.5 kg (164 lb 3.9 oz)     Vital Signs with Ranges  Temp:  [96.3  F (35.7  C)-97.5  F (36.4  C)] 96.8  F (36  C)  Pulse:  [68-71] 71  Resp:  [18-20] 20  BP: (102-141)/(70-85) 141/78  SpO2:  [90 %-97 %] 97 %  I/O last 3 completed shifts:  In: 960 [P.O.:960]  Out: -     GENERAL: No apparent distress. Awake, alert, and fully oriented.  HEENT: Normocephalic, atraumatic. Extraocular movements intact.  CARDIOVASCULAR: Regular rate and rhythm without murmurs or rubs. No S3.  PULMONARY: Clear bilaterally.  GASTROINTESTINAL: Soft, non-tender, non-distended. Bowel sounds normoactive.   EXTREMITIES: No cyanosis or clubbing. 1+ edema to above ankle BLE.  NEUROLOGICAL: CN 2-12 grossly intact, no focal neurological deficits.  DERMATOLOGICAL: No rash, ulcer, bruising, nor jaundice.    Medications       carvedilol  12.5 mg Oral BID w/meals     dicyclomine  20 mg Oral 4x Daily AC & HS     levothyroxine  50 mcg Oral QAM AC     losartan  50 mg Oral BID     pantoprazole  40 mg Oral QAM AC     polyethylene glycol  17 g Oral Daily     rivaroxaban ANTICOAGULANT  20 mg Oral Daily with supper     sucralfate  1 g Oral 4x Daily AC & HS     Data     Laboratory:  Recent Labs   Lab 06/11/23  0656 06/10/23  1051 06/09/23  0551   WBC 6.4 7.1 6.9   HGB 10.9* 11.0* 11.2*   HCT 33.1* 34.9* 34.3*   MCV 94 98 95   * 128* 128*     Recent Labs   Lab 06/11/23  0656 06/10/23  1051 06/09/23  0551 06/08/23  0524 06/07/23  0600 06/06/23  0548   NA  --  128* 129* 129* 128* 129*   POTASSIUM  --  4.3 4.5 4.4 4.8  4.8  4.9   CHLORIDE  --  99 99 99 96* 98   CO2  --  21* 23 23 19* 22   ANIONGAP  --  8 7 7 13 9   GLC  --  131* 86 89 92 99   BUN  --  9.8 8.2 7.1* 6.3* 4.9*   CR  --  0.81 0.76 0.75 0.79 0.76   GFRESTIMATED  --  70 75 77 72 75   LYLE  --  8.7* 8.7* 8.6* 8.5* 8.4*   MAG  --  1.5* 1.6* 1.6* 1.6* 1.6*   PHOS 4.8* 4.0 4.4 4.0 3.8 3.0   PROTTOTAL  --  6.0*  --  5.9* 5.7* 6.2*   ALBUMIN  --  3.1*  --  3.2* 3.2* 3.4*   BILITOTAL  --  1.1  --  0.9 0.8 0.8   ALKPHOS  --  119*  --  105* 103 93   AST  --  56*  --  52*  --  77*   ALT  --  72*  --  72* 80* 82*     Recent Labs   Lab 06/11/23  0656   INR 3.53*     No results for input(s): CULT in the last 168 hours.    Imaging:  Recent Results (from the past 24 hour(s))   XR Chest Port 1 View    Narrative    EXAM: XR CHEST PORT 1 VIEW  LOCATION: [facility]  DATE/TIME: 6/10/2023 12:30 PM CDT    INDICATION: Shortness of breath.  COMPARISON: 06/02/2023.      Impression    IMPRESSION: Hypoexpanded lungs. Minimal basilar opacities, probably atelectasis. Remaining lungs are clear. No obvious pulmonary edema or pleural effusion. Stable enlarged cardiac silhouette. Dual-chamber pacemaker. No pneumothorax.     CT Abdomen Pelvis w Contrast    Narrative    EXAM: CT ABDOMEN PELVIS W CONTRAST  LOCATION: [facility]  DATE/TIME: 6/10/2023 9:30 PM CDT    INDICATION: now with severe LLQ pain, after EGD yesterday, r o perf, new acue process. Also previously had diarrhea   acute colitis. No BMs today. Please comment on stool burden.  COMPARISON: US 06/01/2023  TECHNIQUE: CT scan of the abdomen and pelvis was performed following injection of IV contrast. Multiplanar reformats were obtained. Dose reduction techniques were used.  CONTRAST: 82mL Isovue 370    FINDINGS:   LOWER CHEST: Small bilateral pleural effusions. Cardiomegaly. Cardiac pacer lead in place.    HEPATOBILIARY: Normal.    PANCREAS: Normal.    SPLEEN: Normal.    ADRENAL GLANDS:  Normal.    KIDNEYS/BLADDER: No significant mass, stones, or hydronephrosis. There are simple or benign cysts. No follow up is needed.    BOWEL: No obstruction or inflammatory change. Moderate stool burden. Mesenteric edema. Small free fluid.    LYMPH NODES: Normal.    VASCULATURE: Atherosclerosis.    PELVIC ORGANS: Normal.    MUSCULOSKELETAL: Degenerative changes. T2 intraosseous hemangioma. Compression deformity superior endplate T1 appears chronic. Chronic healed fracture deformity left inferior pubic ramus. Subcutaneous soft tissue edema.      Impression    IMPRESSION:   1.  No acute process.         Tj Ruvalcaba DO  [facility] Hospitalist  [address] [place] [address]  Securely message with [employer] (more info)  Text page via [facility] Paging/Directory   06/11/2023

## 2023-06-11 NOTE — PROVIDER NOTIFICATION
X Cover   Called for uncontrolled abdominal pain, rated 9/10 around 1400 today. Tylenol, ativan, atarax with minimal improvement.  Change in clinical status and not currently somnolent.    This is a 88 year old female admitted on 6/2/2023 with abdominal pain and diarrhea x3 weeks.  Times of work-up here and GI involvement.  This evening plans for repeat imaging with CT abdomen pelvis given LLQ pain to r/o complication from procedure or new acute process such as diverticulitis (though suspicion low).  -order one time dose PO dilaudid or IV if unable to tolerate po

## 2023-06-11 NOTE — PLAN OF CARE
Temp: 97.5  F (36.4  C) Temp src: Oral BP: 110/80 Pulse: 68   Resp: 18 SpO2: 90 % O2 Device: None (Room air)   continuous pulse ox in place.      A&Ox4. Up Ax1- pt refusing walker/gait belt. Son at bedside and very helpful with translating/cares. Pt appears more uncomfortable today than yesterday, anxious, restless at times, 1x dose of prn po dilaudid given- per pt and son this was helpful, but pain is not completely gone. Continent of urine and bowel. Ambulated to the bathroom x1- pt very sob with activity, O2 sats checked pt still sating in the 90s on RA. Pt refusing pcds. Advanced to regular diet yesterday, pt only had ensure today and refused other liquid/food. Per son prn tylenol does nothing for her pain. When she complains of pain she complains of LUQ pain and yesterday it was RUQ pain. Plan- mag/phos/potassium protocol- recheck phos/potassium in am, mag not replaced today- per son let her sleep at night and give in the am will need to be reordered in the am, pain control, encourage oob, CT chest/pelvis completed and negative for acute findings.

## 2023-06-11 NOTE — PROGRESS NOTES
"  BP (!) 141/78 (BP Location: Left arm)   Pulse 71   Temp 96.8  F (36  C) (Axillary)   Resp 20   Ht 1.549 m (5' 1\")   Wt 74.5 kg (164 lb 3.9 oz)   LMP  (LMP Unknown)   SpO2 97%   BMI 31.03 kg/m      Pt A&Ox4. VSS on RA. Pt up with Ax2 GB/Walker. Pt up to bedside commode. Pt anxious and restless, Tylenol/Atarax and Ativan given. Pt on mag/phos/potassium protocol. Mag to be replaced in morning.  Per pts son, he would like pt to try and sleep though out the night, so will hold on replacing magnesium. Pt able to use call light to make needs known, will continue to monitor.   "

## 2023-06-11 NOTE — PLAN OF CARE
"  Inpatient Progress Note: [id number]  PRIMARY DIAGNOSIS: Acute Intractable Abdominal Pain and Diarrhea  Mild Gastritis  Vitals: BP (!) 142/98   Pulse 55   Temp 96.8  F (36  C) (Axillary)   Resp 20   Ht 1.549 m (5' 1\")   Wt 74.5 kg (164 lb 3.9 oz)   LMP  (LMP Unknown)   SpO2 97%   BMI 31.03 kg/m      Neuro: A&O x 4, pt is Tamil speaking. Family reported pt speech is incoherent sometimes, which is not her baseline.   Card: Denies chest pain  Resp:dyspnes on exertion, audible wheezing and infrequent cough.   GI: Normoactive BS. No BM this shift.   : Voiding adequately   Skin: Scatter bruising   Activity: Up with one assist, pt refused to use walker, unsteady gait.   Diet: Regular   PIV: SL  Pain: Denies   Plan: Encourage oral intake. Family bedside. Care continues.                           "

## 2023-06-11 NOTE — PROGRESS NOTES
GASTROENTEROLOGY PROGRESS NOTE    IMPRESSION: 89 y/o female with history of CAD, dyslipidemia, atrial fibrillation on Xarelto, HTN, hypothyroidism, who presented with abdominal pain and diarrhea. The diarrhea has resolved, but abdominal pain continues to be a issue.     RECOMMENDATIONS:  1. Abdominal pain. Very difficult historian, but per son, pain seems to be moving around (previously on left side, then on right side) and vacillating in severity. US, EGD and CT scan are unremarkable. Although she had histologic evidence of acute colitis on random colon biopsies, do not think this is related to her pain (no radiographic or endoscopic report of colitis). Perhaps this new pain is related to constipation now that the diarrhea has resolved (no BMs now in >24 hours). Biliary source seems unlikely given change in location and lack of relation to eating.  Also considered chronic mesenteric ischemia, but this too seems unlikely given that it is unrelated to eating (severe yesterday despite practically no PO intake).  -- Follow up on gastric / duodenal biopsies (6/9 EGD)  -- Will start miralax 1 capful / day  -- Consider scheduling tylenol  -- Continue pantoprazole 40 mg once daily  -- Agree with adding dicyclomine as needed    2. Diarrhea. Resolved. Enteric pathogen, C diff, O/P, giardia/cryptosporidium all negative. Fecal fat negative, fecal elastase normal. Colonoscopy to the TI was unremarkable. Random biopsies with acute colitis. Suspect that was a self-limiting process. No longer requiring loperamide. Had 1 soft BM on 6/9. No BMs on 6/10 or yet today on 6/11.      3. ?Liver mass on US - CT without correlate, so felt to be artifact on US. No further evaluation needed.    4. Abnormal LFTs  - seems to be a transcellular versus biliary process. Have been relatively stable throughout her admission. Hep A, B, C negative. Agree with deferring further evaluation to the outpatient setting.      We will continue to follow with  "you.    -------------------------------------------------------------------------    SUBJECTIVE:  Son says last night was rough. A lot of pain. She refused to eat yesterday. No BMs yesterday. She finally fell asleep at 7:30AM and is now comfortable, sleeping soundly.    OBJECTIVE:  General Appearance:    BP (!) 141/78 (BP Location: Left arm)   Pulse 71   Temp 96.8  F (36  C) (Axillary)   Resp 20   Ht 1.549 m (5' 1\")   Wt 74.5 kg (164 lb 3.9 oz)   LMP  (LMP Unknown)   SpO2 97%   BMI 31.03 kg/m    Temp (24hrs), Av.9  F (36.1  C), Min:96.3  F (35.7  C), Max:97.5  F (36.4  C)  Intake/Output Summary (Last 24 hours) at 2023 0951  Last data filed at 6/10/2023 2018  Gross per 24 hour   Intake 960 ml   Output --   Net 960 ml       General: sleeping  Abdomen: soft, nontender, does not awake with even deep palpation        LABS:  I have reviewed the patient's new clinical lab results:    Recent Labs   Lab Test 23  0656 06/10/23  1051 23  0551 23  2347 22  1648   WBC 6.4 7.1 6.9   < > 9.0   HGB 10.9* 11.0* 11.2*   < > 12.1   MCV 94 98 95   < > 94   * 128* 128*   < > 257   INR 3.53*  --   --   --  0.99    < > = values in this interval not displayed.     Recent Labs   Lab Test 06/10/23  1051 23  0551 23  0524   POTASSIUM 4.3 4.5 4.4   CHLORIDE 99 99 99   CO2    BUN 9.8 8.2 7.1*   ANIONGAP 8 7 7     Recent Labs   Lab Test 06/10/23  1051 23  0524 23  0600 23  0548 23  1550 22  0846 22  1305 02/15/22  0921   ALBUMIN 3.1* 3.2* 3.2* 3.4*  --    < >  --   --    BILITOTAL 1.1 0.9 0.8 0.8  --    < >  --   --    ALT 72* 72* 80* 82*  --    < >  --   --    AST 56* 52*  --  77*  --    < >  --   --    PROTEIN  --   --   --   --  10*  --  Negative Negative    < > = values in this interval not displayed.       IMAGING:  CT abdomen / pelvis with contrast 6/10/2023                                                   IMPRESSION:   1.  No acute " process.    Elizabeth Frazier MD  [facility]  After 5pm: [phone]

## 2023-06-12 ENCOUNTER — APPOINTMENT (OUTPATIENT)
Dept: CARDIOLOGY | Facility: CLINIC | Age: 88
End: 2023-06-12
Attending: INTERNAL MEDICINE
Payer: COMMERCIAL

## 2023-06-12 DIAGNOSIS — Z95.0 CARDIAC PACEMAKER IN SITU: Primary | ICD-10-CM

## 2023-06-12 LAB
ALBUMIN SERPL BCG-MCNC: 3.4 G/DL (ref 3.5–5.2)
ALBUMIN SERPL ELPH-MCNC: 3.3 G/DL (ref 3.7–5.1)
ALP SERPL-CCNC: 147 U/L (ref 35–104)
ALPHA1 GLOB SERPL ELPH-MCNC: 0.3 G/DL (ref 0.2–0.4)
ALPHA2 GLOB SERPL ELPH-MCNC: 0.8 G/DL (ref 0.5–0.9)
ALT SERPL W P-5'-P-CCNC: 91 U/L (ref 10–35)
ANION GAP SERPL CALCULATED.3IONS-SCNC: 8 MMOL/L (ref 7–15)
AST SERPL W P-5'-P-CCNC: 65 U/L (ref 10–35)
B-GLOBULIN SERPL ELPH-MCNC: 0.6 G/DL (ref 0.6–1)
BASOPHILS # BLD AUTO: 0 10E3/UL (ref 0–0.2)
BASOPHILS NFR BLD AUTO: 0 %
BILIRUB SERPL-MCNC: 0.8 MG/DL
BUN SERPL-MCNC: 18.4 MG/DL (ref 8–23)
CALCIUM SERPL-MCNC: 8.9 MG/DL (ref 8.8–10.2)
CHLORIDE SERPL-SCNC: 94 MMOL/L (ref 98–107)
CREAT SERPL-MCNC: 0.9 MG/DL (ref 0.51–0.95)
DEPRECATED HCO3 PLAS-SCNC: 25 MMOL/L (ref 22–29)
EOSINOPHIL # BLD AUTO: 0.1 10E3/UL (ref 0–0.7)
EOSINOPHIL NFR BLD AUTO: 2 %
ERYTHROCYTE [DISTWIDTH] IN BLOOD BY AUTOMATED COUNT: 14.6 % (ref 10–15)
ERYTHROCYTE [SEDIMENTATION RATE] IN BLOOD BY WESTERGREN METHOD: 10 MM/HR (ref 0–30)
GAMMA GLOB SERPL ELPH-MCNC: 1.2 G/DL (ref 0.7–1.6)
GFR SERPL CREATININE-BSD FRML MDRD: 62 ML/MIN/1.73M2
GLUCOSE SERPL-MCNC: 100 MG/DL (ref 70–99)
HCT VFR BLD AUTO: 34.1 % (ref 35–47)
HGB BLD-MCNC: 11 G/DL (ref 11.7–15.7)
IMM GRANULOCYTES # BLD: 0 10E3/UL
IMM GRANULOCYTES NFR BLD: 0 %
KAPPA LC FREE SER-MCNC: 4.48 MG/DL (ref 0.33–1.94)
KAPPA LC FREE/LAMBDA FREE SER NEPH: 1.92 {RATIO} (ref 0.26–1.65)
LAMBDA LC FREE SERPL-MCNC: 2.33 MG/DL (ref 0.57–2.63)
LVEF ECHO: NORMAL
LYMPHOCYTES # BLD AUTO: 1 10E3/UL (ref 0.8–5.3)
LYMPHOCYTES NFR BLD AUTO: 14 %
M PROTEIN SERPL ELPH-MCNC: 0 G/DL
MAGNESIUM SERPL-MCNC: 1.6 MG/DL (ref 1.7–2.3)
MCH RBC QN AUTO: 30.7 PG (ref 26.5–33)
MCHC RBC AUTO-ENTMCNC: 32.3 G/DL (ref 31.5–36.5)
MCV RBC AUTO: 95 FL (ref 78–100)
MONOCYTES # BLD AUTO: 1 10E3/UL (ref 0–1.3)
MONOCYTES NFR BLD AUTO: 15 %
NEUTROPHILS # BLD AUTO: 4.7 10E3/UL (ref 1.6–8.3)
NEUTROPHILS NFR BLD AUTO: 69 %
NRBC # BLD AUTO: 0 10E3/UL
NRBC BLD AUTO-RTO: 0 /100
PATH REPORT.COMMENTS IMP SPEC: NORMAL
PATH REPORT.FINAL DX SPEC: NORMAL
PATH REPORT.FINAL DX SPEC: NORMAL
PATH REPORT.GROSS SPEC: NORMAL
PATH REPORT.MICROSCOPIC SPEC OTHER STN: NORMAL
PATH REPORT.RELEVANT HX SPEC: NORMAL
PHOSPHATE SERPL-MCNC: 4.7 MG/DL (ref 2.5–4.5)
PHOTO IMAGE: NORMAL
PLATELET # BLD AUTO: 109 10E3/UL (ref 150–450)
POTASSIUM SERPL-SCNC: 4.8 MMOL/L (ref 3.4–5.3)
PROT PATTERN SERPL ELPH-IMP: ABNORMAL
PROT PATTERN UR ELPH-IMP: NORMAL
PROT SERPL-MCNC: 6.3 G/DL (ref 6.4–8.3)
RBC # BLD AUTO: 3.58 10E6/UL (ref 3.8–5.2)
RETICS # AUTO: 0.13 10E6/UL (ref 0.03–0.1)
RETICS/RBC NFR AUTO: 3.7 % (ref 0.5–2)
SODIUM SERPL-SCNC: 127 MMOL/L (ref 136–145)
WBC # BLD AUTO: 6.9 10E3/UL (ref 4–11)

## 2023-06-12 PROCEDURE — 36415 COLL VENOUS BLD VENIPUNCTURE: CPT | Performed by: INTERNAL MEDICINE

## 2023-06-12 PROCEDURE — 93306 TTE W/DOPPLER COMPLETE: CPT | Mod: 26 | Performed by: INTERNAL MEDICINE

## 2023-06-12 PROCEDURE — 85045 AUTOMATED RETICULOCYTE COUNT: CPT | Performed by: INTERNAL MEDICINE

## 2023-06-12 PROCEDURE — 85060 BLOOD SMEAR INTERPRETATION: CPT | Performed by: PATHOLOGY

## 2023-06-12 PROCEDURE — 250N000013 HC RX MED GY IP 250 OP 250 PS 637: Performed by: NURSE PRACTITIONER

## 2023-06-12 PROCEDURE — 99223 1ST HOSP IP/OBS HIGH 75: CPT | Performed by: INTERNAL MEDICINE

## 2023-06-12 PROCEDURE — 255N000002 HC RX 255 OP 636: Performed by: INTERNAL MEDICINE

## 2023-06-12 PROCEDURE — 250N000013 HC RX MED GY IP 250 OP 250 PS 637: Performed by: HOSPITALIST

## 2023-06-12 PROCEDURE — 250N000013 HC RX MED GY IP 250 OP 250 PS 637: Performed by: INTERNAL MEDICINE

## 2023-06-12 PROCEDURE — 80053 COMPREHEN METABOLIC PANEL: CPT | Performed by: INTERNAL MEDICINE

## 2023-06-12 PROCEDURE — 250N000013 HC RX MED GY IP 250 OP 250 PS 637: Performed by: PHYSICIAN ASSISTANT

## 2023-06-12 PROCEDURE — 99233 SBSQ HOSP IP/OBS HIGH 50: CPT | Performed by: INTERNAL MEDICINE

## 2023-06-12 PROCEDURE — 120N000001 HC R&B MED SURG/OB

## 2023-06-12 PROCEDURE — 999N000127 HC STATISTIC PERIPHERAL IV START W US GUIDANCE

## 2023-06-12 PROCEDURE — 85025 COMPLETE CBC W/AUTO DIFF WBC: CPT | Performed by: INTERNAL MEDICINE

## 2023-06-12 PROCEDURE — 83735 ASSAY OF MAGNESIUM: CPT | Performed by: INTERNAL MEDICINE

## 2023-06-12 PROCEDURE — 84100 ASSAY OF PHOSPHORUS: CPT | Performed by: INTERNAL MEDICINE

## 2023-06-12 PROCEDURE — 85652 RBC SED RATE AUTOMATED: CPT | Performed by: INTERNAL MEDICINE

## 2023-06-12 RX ORDER — MULTIVITAMIN,THERAPEUTIC
1 TABLET ORAL DAILY
Status: DISCONTINUED | OUTPATIENT
Start: 2023-06-12 | End: 2023-06-21 | Stop reason: HOSPADM

## 2023-06-12 RX ADMIN — SUCRALFATE ORAL 1 G: 1 SUSPENSION ORAL at 10:21

## 2023-06-12 RX ADMIN — DICYCLOMINE HYDROCHLORIDE 20 MG: 20 TABLET ORAL at 13:38

## 2023-06-12 RX ADMIN — DICYCLOMINE HYDROCHLORIDE 20 MG: 20 TABLET ORAL at 16:09

## 2023-06-12 RX ADMIN — LORAZEPAM 0.25 MG: 0.5 TABLET ORAL at 01:04

## 2023-06-12 RX ADMIN — HYDROMORPHONE HYDROCHLORIDE 1 MG: 2 TABLET ORAL at 05:39

## 2023-06-12 RX ADMIN — SUCRALFATE ORAL 1 G: 1 SUSPENSION ORAL at 13:38

## 2023-06-12 RX ADMIN — THERA TABS 1 TABLET: TAB at 16:09

## 2023-06-12 RX ADMIN — POLYETHYLENE GLYCOL 3350 17 G: 17 POWDER, FOR SOLUTION ORAL at 10:23

## 2023-06-12 RX ADMIN — Medication 3 MG: at 01:04

## 2023-06-12 RX ADMIN — CARVEDILOL 12.5 MG: 12.5 TABLET, FILM COATED ORAL at 19:01

## 2023-06-12 RX ADMIN — ALPRAZOLAM 0.5 MG: 0.5 TABLET ORAL at 23:27

## 2023-06-12 RX ADMIN — LORAZEPAM 0.25 MG: 0.5 TABLET ORAL at 05:39

## 2023-06-12 RX ADMIN — LOSARTAN POTASSIUM 50 MG: 50 TABLET, FILM COATED ORAL at 10:19

## 2023-06-12 RX ADMIN — LOPERAMIDE HYDROCHLORIDE 2 MG: 2 CAPSULE ORAL at 19:01

## 2023-06-12 RX ADMIN — DICYCLOMINE HYDROCHLORIDE 20 MG: 20 TABLET ORAL at 10:20

## 2023-06-12 RX ADMIN — HUMAN ALBUMIN MICROSPHERES AND PERFLUTREN 3 ML: 10; .22 INJECTION, SOLUTION INTRAVENOUS at 08:45

## 2023-06-12 RX ADMIN — SUCRALFATE ORAL 1 G: 1 SUSPENSION ORAL at 22:36

## 2023-06-12 RX ADMIN — LOSARTAN POTASSIUM 50 MG: 50 TABLET, FILM COATED ORAL at 20:32

## 2023-06-12 RX ADMIN — DICYCLOMINE HYDROCHLORIDE 20 MG: 20 TABLET ORAL at 22:36

## 2023-06-12 RX ADMIN — SUCRALFATE ORAL 1 G: 1 SUSPENSION ORAL at 16:09

## 2023-06-12 RX ADMIN — HYDROMORPHONE HYDROCHLORIDE 1 MG: 2 TABLET ORAL at 20:32

## 2023-06-12 RX ADMIN — CARVEDILOL 12.5 MG: 12.5 TABLET, FILM COATED ORAL at 10:19

## 2023-06-12 RX ADMIN — PANTOPRAZOLE SODIUM 40 MG: 40 TABLET, DELAYED RELEASE ORAL at 10:20

## 2023-06-12 RX ADMIN — RIVAROXABAN 20 MG: 20 TABLET, FILM COATED ORAL at 19:01

## 2023-06-12 RX ADMIN — LEVOTHYROXINE SODIUM 50 MCG: 50 TABLET ORAL at 10:20

## 2023-06-12 ASSESSMENT — ACTIVITIES OF DAILY LIVING (ADL)
ADLS_ACUITY_SCORE: 37
ADLS_ACUITY_SCORE: 33
ADLS_ACUITY_SCORE: 37
ADLS_ACUITY_SCORE: 31
ADLS_ACUITY_SCORE: 32
ADLS_ACUITY_SCORE: 33
ADLS_ACUITY_SCORE: 32
ADLS_ACUITY_SCORE: 33
ADLS_ACUITY_SCORE: 33
ADLS_ACUITY_SCORE: 31
ADLS_ACUITY_SCORE: 33
ADLS_ACUITY_SCORE: 33

## 2023-06-12 NOTE — PROGRESS NOTES
"[facility] Progress Note     Interval History:  Continues to be distended. Had a BM - soft pieces. No blood, no diarrhea. Having generalized abdominal pain. Sons says she can't eat because she feels too full. Son asking about mesenteric edema on CT scan.     Physical Exam:    BP (!) 140/83 (BP Location: Left arm, Patient Position: Dangled, Cuff Size: Adult Large)   Pulse 68   Temp 97.5  F (36.4  C) (Oral)   Resp 18   Ht 1.549 m (5' 1\")   Wt 86.5 kg (190 lb 11.2 oz)   LMP  (LMP Unknown)   SpO2 98%   BMI 36.03 kg/m    Temp (24hrs), Av.3  F (36.3  C), Min:97.1  F (36.2  C), Max:97.5  F (36.4  C)    Patient Vitals for the past 72 hrs:   Weight   23 1135 86.5 kg (190 lb 11.2 oz)       Intake/Output Summary (Last 24 hours) at 2023 1136  Last data filed at 2023 0846  Gross per 24 hour   Intake 480 ml   Output 260 ml   Net 220 ml       Constitutional: No acute distress  Cardiovascular: RRR, normal S1/S2  Respiratory: Effort normal, CTA bilaterally  Abdomen: Soft, BS hypo, moderate distension, nontender on exam    Laboratory Data  Recent Labs   Lab Test 23  0709 23  0656 06/10/23  1051 23  2347 22  1648   WBC 6.9 6.4 7.1   < > 9.0   HGB 11.0* 10.9* 11.0*   < > 12.1   MCV 95 94 98   < > 94   * 101* 128*   < > 257   INR  --  3.53*  --   --  0.99    < > = values in this interval not displayed.     Recent Labs   Lab Test 23  0709 23  1122 06/10/23  1051   * 130* 128*   POTASSIUM 4.8 5.3 4.3   CHLORIDE 94* 97* 99   CO2 25 24 21*   BUN 18.4 17.0 9.8   CR 0.90 0.88 0.81   ANIONGAP 8 9 8   LYLE 8.9 9.0 8.7*     Recent Labs   Lab Test 23  0709 23  1122 06/10/23  1051 23  0524 23  0600 23  0548 23  1550 22  0846 22  1305 02/15/22  0921   ALBUMIN 3.4* 3.5 3.1* 3.2* 3.2*   < >  --    < >  --   --    BILITOTAL 0.8 0.7 1.1 0.9 0.8   < >  --    < >  --   --    DBIL  --   --  0.32* 0.22 0.20   < >  --    < >  --   --    ALT " 91* 108* 72* 72* 80*   < >  --    < >  --   --    AST 65* 96* 56* 52*  --    < >  --    < >  --   --    ALKPHOS 147* 167* 119* 105* 103   < >  --    < >  --   --    PROTEIN  --   --   --   --   --   --  10*  --  Negative Negative    < > = values in this interval not displayed.       Imaging  EXAM: US ABDOMEN LIMITED  LOCATION: [facility]  DATE/TIME: 6/1/2023 3:28 AM CDT  IMPRESSION:  1.  Unremarkable gallbladder. No biliary obstruction.  2.  Question isoechoic mass of the right lobe measuring up to 2.3 cm. Recommend liver protocol MRI or CT for further characterization.  3.  Pancreas not well visualized due to overlying bowel gas.     EXAM: CT ABDOMEN PELVIS W CONTRAST  LOCATION: [facility]  DATE/TIME: 6/10/2023 9:30 PM CDT     INDICATION: now with severe LLQ pain, after EGD yesterday, r o perf, new acue process. Also previously had diarrhea   acute colitis. No BMs today. Please comment on stool burden.  COMPARISON: US 06/01/2023  TECHNIQUE: CT scan of the abdomen and pelvis was performed following injection of IV contrast. Multiplanar reformats were obtained. Dose reduction techniques were used.  CONTRAST: 82mL Isovue 370     FINDINGS:   LOWER CHEST: Small bilateral pleural effusions. Cardiomegaly. Cardiac pacer lead in place.  HEPATOBILIARY: Normal.  PANCREAS: Normal.  SPLEEN: Normal  ADRENAL GLANDS: Normal.  KIDNEYS/BLADDER: No significant mass, stones, or hydronephrosis. There are simple or benign cysts. No follow up is needed.  BOWEL: No obstruction or inflammatory change. Moderate stool burden. Mesenteric edema. Small free fluid.  LYMPH NODES: Normal.  VASCULATURE: Atherosclerosis.  PELVIC ORGANS: Normal.  MUSCULOSKELETAL: Degenerative changes. T2 intraosseous hemangioma. Compression deformity superior endplate T1 appears chronic. Chronic healed fracture deformity left inferior pubic ramus. Subcutaneous soft tissue edema.                                                                       IMPRESSION:   1.  No acute process.    Endoscopic Workup  EGD 6/9/23  Findings:        The esophagus was normal.        The Z-line was found 38 cm from the incisors.        Patchy erythematous mucosa was found in the gastric body. Biopsies were        taken with a cold forceps for histology.        The examined duodenum was normal. Biopsies were taken with a cold        forceps for histology.                                                                                     Impression:               - Normal esophagus.                             - Z-line, 38 cm from the incisors.                             - Erythematous mucosa in the gastric body. Biopsied.                             - Normal examined duodenum. Biopsied.   Recommendation:           - Await pathology results.                             - Omeprazole 40 mg PO QD.                             - Discussed with Dr. Mcdaniel.   Colonoscopy 6/9/23  Findings:        The perianal and digital rectal examinations were normal.        The terminal ileum appeared normal.        Normal mucosa was found in the entire colon. Biopsies were taken with a        cold forceps for histology.        Internal hemorrhoids were found during retroflexion.                                                                                     Impression:               - Preparation of the colon was fair.                             - The examined portion of the ileum was normal.                             - Normal mucosa in the entire examined colon.                             Biopsied.                             - Internal hemorrhoids.   Recommendation:           - [facility].                             - I would recommend scheduled imodium, 2 tablets                             BID to start. Could also consider cholestyramine.                             - Discussed with Dr. Mcdaniel.     Assessment & Plan:   87 y/o  female with history of CAD, dyslipidemia, atrial fibrillation on Xarelto, HTN, hypothyroidism, who presented with abdominal pain and diarrhea. The diarrhea has resolved, but abdominal pain continues to be a issue.      RECOMMENDATIONS:  1. Abdominal pain. Difficult historian, but per son, pain seems to be moving around (previously on left side, then on right side) and vacillating in severity. US, EGD and CT scan are unremarkable. Gastric and duodenal biopsies normal. Although she had histologic evidence of acute colitis on random colon biopsies, do not think this is related to her pain (no radiographic or endoscopic report of colitis). ? Constipation. Biliary source vs chronic mesenteric ischemia have seemed less likely given severe pain with little PO. Discussed with hospitalist, will get HIDA. Dr. Davenport reviewed CT -mesenteric edema on CT scan felt to be of little clinical significance  -- HIDA scan today   -- Miralax 1 capful / day  -- Consider scheduling tylenol  -- Continue pantoprazole 40 mg once daily  -- Dicyclomine as needed     2. Diarrhea. Resolved. Enteric pathogen, C diff, O/P, giardia/cryptosporidium all negative. Fecal fat negative, fecal elastase normal. Colonoscopy to the  was unremarkable. Random biopsies with acute colitis. Suspect that was a self-limiting process. No longer requiring loperamide.       3. ?Liver mass on US - CT without correlate, so felt to be artifact on US. No further evaluation needed.     4. Abnormal LFTs  - seems to be a transcellular versus biliary process. Have been relatively stable throughout her admission. Hep A, B, C negative.   -Improving. Total bili normal.     Fatoumata Fabian, CNP  [facility]  Cell: [phone]   Office: [phone]

## 2023-06-12 NOTE — PLAN OF CARE
"In Patient Note: [id number]  Diagnosis: Diarrhea    /73 (BP Location: Right arm, Cuff Size: Adult Large)   Pulse 71   Temp 97.4  F (36.3  C) (Axillary)   Resp 16   Ht 1.549 m (5' 1\")   Wt 74.5 kg (164 lb 3.9 oz)   LMP  (LMP Unknown)   SpO2 97%   BMI 31.03 kg/m       Pt alert to self and family, disoriented to place, time, and situation. On regular diet. Assist of two with cares/transfers with walker and gait belt. Denied pain earlier but later around 2100 received PRN Dilaudid for abdominal pain. She has poor P.O intake and took a few bites of food for dinner. Son/family reported that she seemed incoherent in speech since morning. She has been sleep for most of this shift and was up to chair x 1. Family also reported abdominal distension with fatigue and BLE edema. Provider notified and re-assured family with plan of care.     Plan: Continue to monitor vitals signs. Avoid opiates r/t somnolence. Echocardiogram complete tomorrow. Will continue to provide supportive cares.   "

## 2023-06-12 NOTE — PROVIDER NOTIFICATION
"X Cover  Called to evaluate for family reports of drowsiness, poor intake, abdominal distension, fatigue.     Assessed patient. Family at bedside. Just had Dilaudid. Patient denies any complaints she is sleeping.  /73 (BP Location: Right arm, Cuff Size: Adult Large)   Pulse 71   Temp 97.4  F (36.3  C) (Axillary)   Resp 16   Ht 1.549 m (5' 1\")   Wt 74.5 kg (164 lb 3.9 oz)   LMP  (LMP Unknown)   SpO2 97%   BMI 31.03 kg/m      Exam is notable for awakens to voice.  Does follow commands.  No guarding on abdominal exam.  Sounds are present.  No wheezing. Reviewed plan of care from today with RN and family.     See progress note from earlier today regarding extensive work-up and plan for echo given ongoing shortness of breath vitals are stable had Lasix earlier today would not give additional Lasix tonight.  Continue to monitor vital signs.  Allow the patient to sleep given concern for hospital delirium.  Would avoid/limit opiates and benzos if worsening somnolence, fatigue. Reviewed plan of care from today with RN and family.   "

## 2023-06-12 NOTE — PLAN OF CARE
Goal Outcome Evaluation:      Plan of Care Reviewed With: patient, child    Overall Patient Progress: decliningOverall Patient Progress: declining    Outcome Evaluation: Per pt's son pt has not eaten much of anyting for 2 days and < 50% usual intake for 8 days. Ordered Multivitamin as pt is meeting < 75% protein needs  Pineapple Gelatein for pt to try  Vanilla Ensure Enlive daily b/n meals

## 2023-06-12 NOTE — CONSULTS
[facility] Consultation      Jeannie Chu MRN# [id number]   YOB: 1935 Age: 88 year old   Date of Admission: 6/2/2023  Requesting physician: Tj Ruvalcaba DO  Reason for consult: Elevated kappa free light chains.           Assessment and Plan:   88 year old female with afib, HTN, CAD, hypothyroidsim who was admitted on 6/2/203 for 3- week history of worsening diarrhea, associated generalized abdominal pain, and progressive dyspnea.    1. Elevated kappa free light chains  2. Diarrhea  3. Altered mental status  4. Bilateral lower extremity edema  5. Abdominal pain  6. Dyspnea on exertion  7. Atrial fibrillation  8. Transaminitis  9. Hypomagnesemia  10. Hyponatremia  --[place] free light chains only modestly elevated.  SPEP negative for monoclonal protein.  Will check serum immunofixation.  However, even if a small or trace monoclonal protein, I doubt this is the cause of her fatigue, diarrhea, other issues.  Doubt amyloidosis -- no clinical evidence for this by echocardiogram or CT C/A/P scan.  --Anemia and thrombocytopenia likely related to acute illness as peripheral blood smear showed benign findings.  --Given altered sensorium per pt's son, consider head CT (unable to do MRI due to pacemaker).  I paged hospitalist to discuss pt's case.    Thank you for the consult.  Please call if questions.    Gisela Dunn MD  Hematology/Oncology  [facility]    Total time spent: 80 minutes in chart review, patient evaluation, counseling, documentation, test and/or medication/prescription orders, and coordination of care.               Chief Complaint:   Diarrhea and Wheezing           History of Present Illness:   This patient is a 88 year old female with afib, HTN, CAD, hypothyroidsim who was admitted on 6/2/203 for 3- week history of worsening diarrhea, associated generalized abdominal pain, and progressive dyspnea.  She was diagnosed with pneumonia and found to have  "elevated BNP. She endorsed nausea, no emesis. She has had worsening bilateral lower extremity edema.  She has had extensive workup during this hospitalization which I have reviewed:  --6/1/2023 CT C/A/P showed small bilateral pleural effusions; patchy groundglass opacity at left lower lobe suspicious for pneumonia; o/w negative.  --6/10/2023: CT A/P showed moderate stool burden; mesenteric edema; small free fluid in the bowels. No masses. No pathologic bone lesions.  --6/12/2023: Echocardiogram showed severe distal septal hypokinesis; LVEF 55-60%; mildly decreased RV systolic function, MR, TR; moderate pulmonary hypertension.    5/31/2023 PTA labs showed normal CBC/    6/2/2023 Labs showed Hgb 10.7, WBC 6.7, platelets 147,000. Normal WBC differential.  6/11/023 Serum kappa free light chain elevated at 4.48, kappa:lambda ratio elevated at 1.92; lambda FLC normal at 2.33. SPEP negative for monoclonal protein. Spot urine negative for monoclonal protein.  6/12/2023 Labs showed Hgb 11, plts down to 109,000. ARC elevated at 0.132. Sed rate normal at 10. Peripheral blood smear showed mild normocytic normochromic anemia; mild thrombocytopenia; platelets quantitatively within normal limits and without diagnostic morphologic abnormality; no schistocytes; no hemolysis; no myelodysplasia or blasts.    Patient speaks Tamil; her son provided interpretation today.  She reports intermittent generalized abdominal pain.  Son reports her diarrhea is improved.  Stool studies have been negative.  He states she has been overall declining/worsening.  BLE edema worsening.  She has dyspnea on exertion.  He states she is mumbling and not quite right.           Physical Exam:   Vitals were reviewed  Blood pressure 139/69, pulse 71, temperature 97.5  F (36.4  C), temperature source Oral, resp. rate 22, height 1.549 m (5' 1\"), weight 86.5 kg (190 lb 11.2 oz), SpO2 98 %, not currently breastfeeding.  Temperatures:  Current - Temp: 97.5  F (36.4 "  C); Max - Temp  Av.5  F (36.4  C)  Min: 97.4  F (36.3  C)  Max: 97.5  F (36.4  C)  Respiration range: Resp  Av.7  Min: 16  Max: 22  Pulse range: Pulse  Av  Min: 68  Max: 86  Blood pressure range: Systolic (24hrs), Av , Min:120 , Max:140   ; Diastolic (24hrs), Av, Min:69, Max:83    Pulse oximetry range: SpO2  Av.7 %  Min: 97 %  Max: 98 %    Intake/Output Summary (Last 24 hours) at 2023 1844  Last data filed at 2023 0846  Gross per 24 hour   Intake 0 ml   Output --   Net 0 ml       GENERAL: No acute distress.  SKIN: No rashes or jaundice.  HEENT: Normocephalic, atraumatic. Eyes anicteric. Oropharynx is clear.  LYMPH: No palpable lymphadenopathy in the cervical, supraclavicular regions.  HEART: No PMI displacement.  LUNGS: No audible cough or wheezing.  ABDOMEN: Soft, nontender, nondistended with no palpable hepatosplenomegaly.  EXTREMITIES: No clubbing, cyanosis; 3+ BLE symmetric pitting edema.  MENTAL: Alert.            Past Medical History:   I have reviewed this patient's past medical history  Past Medical History:   Diagnosis Date     Acquired hypothyroidism 2021     Atrial fibrillation      Benign essential hypertension      Coronary artery disease 2021     Hyperlipidemia              Past Surgical History:   I have reviewed this patient's past surgical history  Past Surgical History:   Procedure Laterality Date     COLONOSCOPY N/A 2023    Procedure: COLONOSCOPY, BIOPSIES;  Surgeon: Germán Manning MD;  Location: [facility] OR     Coronary angiogram with stent placement       EP ABLATION AV NODE N/A 10/31/2022    Procedure: Ablation Atrioventricular Node;  Surgeon: Rogelio Whitt MD;  Location:  HE[facility]ARDI[facility] CATH LAB     EP ABLATION AV NODE N/A 2022    Procedure: EP Ablation AV Node;  Surgeon: Jd Baker MD;  Location:  HEART CARDIAC CATH LAB     EP PACEMAKER DEVICE & LEAD IMPLANT- RIGHT VENTRICULAR N/A 10/31/2022    Procedure: Pacemaker Device  & Lead Implant- Right ventricular;  Surgeon: Rogelio Whitt MD;  Location:  HEART CARDIAC CATH LAB     ESOPHAGOSCOPY, GASTROSCOPY, DUODENOSCOPY (EGD), COMBINED N/A 2023    Procedure: ESOPHAGOGASTRODUODENOSCOPY;  Surgeon: Germán Manning MD;  Location: [facility] OR               Social History:   I have reviewed this patient's social history  Social History     Tobacco Use     Smoking status: Never     Smokeless tobacco: Never   Vaping Use     Vaping status: Never Used   Substance Use Topics     Alcohol use: No             Family History:   I have reviewed this patient's family history  Family History   Problem Relation Age of Onset     Family History Negative Other              Allergies:     Allergies   Allergen Reactions     Amlodipine Swelling     BLE edema     Ibuprofen Swelling             Medications:   I have reviewed this patient's current medications  Medications Prior to Admission   Medication Sig Dispense Refill Last Dose     ALPRAZolam (XANAX) 0.5 MG tablet Take 0.5 mg by mouth daily as needed for anxiety        [] amoxicillin-clavulanate (AUGMENTIN) 875-125 MG tablet Take 1 tablet by mouth 2 times daily for 7 days 14 tablet 0 2023 at AM     atorvastatin (LIPITOR) 10 MG tablet Take 1 tablet (10 mg) by mouth daily 90 tablet 3 2023 at PM     [] azithromycin (ZITHROMAX) 250 MG tablet Take 1 tablet (250 mg) by mouth daily for 4 days 4 tablet 0 2023 at AM     carvedilol (COREG) 12.5 MG tablet Take 1 tablet (12.5 mg) by mouth 2 times daily (with meals) 180 tablet 3 2023 at AM     IRON (FERROUS GLUCONATE) 325 MG OR TABS Take 1 tablet by mouth daily  30 0 2023 at AM     levothyroxine (SYNTHROID/LEVOTHROID) 88 MCG tablet Take 1 tablet (88 mcg) by mouth daily 90 tablet 0 2023 at AM     losartan (COZAAR) 50 MG tablet Take 1 tablet (50 mg) by mouth 2 times daily 180 tablet 3 2023 at AM     Multiple Vitamins-Minerals (ICAPS AREDS FORMULA PO) Take 2 tablets by mouth daily     6/2/2023 at AM     niacin 500 MG tablet Take by mouth daily (with breakfast)   6/2/2023     nitroGLYcerin (NITROSTAT) 0.4 MG sublingual tablet For chest pain place 1 tablet under the tongue every 5 minutes for 3 doses. If symptoms persist 5 minutes after 1st dose call 911. 30 tablet 0 PRN at PRN     rivaroxaban ANTICOAGULANT (XARELTO) 20 MG TABS tablet Take 1 tablet (20 mg) by mouth daily (with dinner) 90 tablet 3 6/2/2023 at PM     Current Facility-Administered Medications Ordered in Epic   Medication Dose Route Frequency Last Rate Last Admin     acetaminophen (TYLENOL) tablet 650 mg  650 mg Oral Q6H PRN   650 mg at 06/11/23 0315    Or     acetaminophen (TYLENOL) Suppository 650 mg  650 mg Rectal Q6H PRN         ALPRAZolam (XANAX) tablet 0.5 mg  0.5 mg Oral Daily PRN   0.5 mg at 06/10/23 1028     benzonatate (TESSALON) capsule 100 mg  100 mg Oral TID PRN   100 mg at 06/09/23 2111     carvedilol (COREG) tablet 12.5 mg  12.5 mg Oral BID w/meals   12.5 mg at 06/12/23 1019     dicyclomine (BENTYL) tablet 20 mg  20 mg Oral 4x Daily AC & HS   20 mg at 06/12/23 1609     diphenhydrAMINE (BENADRYL) capsule 25 mg  25 mg Oral Q6H PRN        Or     diphenhydrAMINE (BENADRYL) injection 25 mg  25 mg Intravenous Q6H PRN         HYDROmorphone (DILAUDID) half-tab 1 mg  1 mg Oral Q4H PRN   1 mg at 06/12/23 0539     hydrOXYzine (ATARAX) tablet 25 mg  25 mg Oral Q6H PRN   25 mg at 06/11/23 0315    Or     hydrOXYzine (ATARAX) tablet 50 mg  50 mg Oral Q6H PRN   50 mg at 06/11/23 2123     levothyroxine (SYNTHROID/LEVOTHROID) tablet 50 mcg  50 mcg Oral QAM AC   50 mcg at 06/12/23 1020     loperamide (IMODIUM) capsule 2 mg  2 mg Oral 4x Daily PRN   2 mg at 06/06/23 1414     losartan (COZAAR) tablet 50 mg  50 mg Oral BID   50 mg at 06/12/23 1019     melatonin tablet 3 mg  3 mg Oral At Bedtime PRN   3 mg at 06/12/23 0104     multivitamin, therapeutic (THERA-VIT) tablet 1 tablet  1 tablet Oral Daily   1 tablet at 06/12/23 7569     naloxone  (NARCAN) injection 0.2 mg  0.2 mg Intravenous Q2 Min PRN         naloxone (NARCAN) injection 0.2 mg  0.2 mg Intramuscular Q2 Min PRN         naloxone (NARCAN) injection 0.4 mg  0.4 mg Intravenous Q2 Min PRN         naloxone (NARCAN) injection 0.4 mg  0.4 mg Intramuscular Q2 Min PRN         ondansetron (ZOFRAN ODT) ODT tab 4 mg  4 mg Oral Q6H PRN   4 mg at 06/05/23 0814    Or     ondansetron (ZOFRAN) injection 4 mg  4 mg Intravenous Q6H PRN   4 mg at 06/10/23 1733     pantoprazole (PROTONIX) EC tablet 40 mg  40 mg Oral QAM AC   40 mg at 06/12/23 1020     polyethylene glycol (MIRALAX) Packet 17 g  17 g Oral Daily   17 g at 06/12/23 1023     rivaroxaban ANTICOAGULANT (XARELTO) tablet 20 mg  20 mg Oral Daily with supper   20 mg at 06/11/23 1745     sucralfate (CARAFATE) suspension 1 g  1 g Oral 4x Daily AC & HS   1 g at 06/12/23 1609     No current [facility]-ordered outpatient medications on file.             Review of Systems:     The 14 point Review of Systems is negative other than noted in the HPI.            Data:   All laboratory data reviewed  Results for orders placed or performed during the hospital encounter of 06/02/23 (from the past 24 hour(s))   Lab Blood Morphology Pathologist Review    Narrative    The following orders were created for panel order Lab Blood Morphology Pathologist Review.  Procedure                               Abnormality         Status                     ---------                               -----------         ------                     Bld morphology pathology...[[id number]]                      Final result               CBC with platelets and d...[[id number]]  Abnormal            Final result               Reticulocyte count[[id number]]           Abnormal            Final result               Morphology Tracking[[id number]]                              Final result                 Please view results for these tests on the individual orders.   Comprehensive metabolic panel   Result Value  Ref Range    Sodium 127 (L) 136 - 145 mmol/L    Potassium 4.8 3.4 - 5.3 mmol/L    Chloride 94 (L) 98 - 107 mmol/L    Carbon Dioxide (CO2) 25 22 - 29 mmol/L    Anion Gap 8 7 - 15 mmol/L    Urea Nitrogen 18.4 8.0 - 23.0 mg/dL    Creatinine 0.90 0.51 - 0.95 mg/dL    Calcium 8.9 8.8 - 10.2 mg/dL    Glucose 100 (H) 70 - 99 mg/dL    Alkaline Phosphatase 147 (H) 35 - 104 U/L    AST 65 (H) 10 - 35 U/L    ALT 91 (H) 10 - 35 U/L    Protein Total 6.3 (L) 6.4 - 8.3 g/dL    Albumin 3.4 (L) 3.5 - 5.2 g/dL    Bilirubin Total 0.8 <=1.2 mg/dL    GFR Estimate 62 >60 mL/min/1.73m2   Erythrocyte sedimentation rate auto   Result Value Ref Range    Erythrocyte Sedimentation Rate 10 0 - 30 mm/hr   Bld morphology pathology review   Result Value Ref Range    Final Diagnosis       Peripheral blood, morphology:  - Mild anemia, normocytic and normochromic with polychromasia.  - Mild thrombocytopenia; no significant platelet clumping seen on the slide preparations.  - Platelets quantitatively within normal limits and without diagnostic morphologic abnormality.  - Negative for schistocytes or definitive morphologic features of hemolysis.  - Negative for overt features of myelodysplasia or circulating blasts.    See comment.    COMMENT:  Normocytic anemia is nonspecific and may be attributable to multiple overlapping etiologies, including chronic disease, renal and/or endocrine disease, blood loss, iron deficiency (in some patients), and/or bone marrow suppression (by underlying infection, various nutritional deficiencies, toxicities, alcohol use, or medications).  Thrombocytopenia is nonspecific and in general may be associated with liver disease, splenomegaly, infection, medications (including heparin), alcohol use, idiopathic thrombocytopenic purpura (ITP) or other autoimmune disease, and/or recent thrombosis.  Clinical correlation is recommended.      Peripheral Smear       A microscopic examination is performed.       Peripheral Hematologic  Data        Latest Reference Range & Units 06/12/23 07:09   WBC 4.0 - 11.0 10e3/uL 6.9   Hemoglobin 11.7 - 15.7 g/dL 11.0 (L)   Hematocrit 35.0 - 47.0 % 34.1 (L)   Platelet Count 150 - 450 10e3/uL 109 (L)   RBC Count 3.80 - 5.20 10e6/uL 3.58 (L)   MCV 78 - 100 fL 95   MCH 26.5 - 33.0 pg 30.7   MCHC 31.5 - 36.5 g/dL 32.3   RDW 10.0 - 15.0 % 14.6   % Neutrophils % 69   % Lymphocytes % 14   % Monocytes % 15   % Eosinophils % 2   % Basophils % 0   Absolute Basophils 0.0 - 0.2 10e3/uL 0.0   Absolute Eosinophils 0.0 - 0.7 10e3/uL 0.1   Absolute Immature Granulocytes <=0.4 10e3/uL 0.0   Absolute Lymphocytes 0.8 - 5.3 10e3/uL 1.0   Absolute Monocytes 0.0 - 1.3 10e3/uL 1.0   % Immature Granulocytes % 0   Absolute Neutrophils 1.6 - 8.3 10e3/uL 4.7   Absolute NRBCs 10e3/uL 0.0   NRBCs per 100 WBC <1 /100 0   % Retic 0.5 - 2.0 % 3.7 (H)   Absolute Retic 0.025 - 0.095 10e6/uL 0.132 (H)   (L): Data is abnormally low  (H): Data is abnormally high    For patients over 18 years old, anemia and quantitative abnormalities of WBC and platelets (if present) may be further stratified as follows:    Hemoglobin (g/dL) in females:    9.7 - 11.6: Mild anemia    7.7 - 9.6: Moderate anemia    Less than 7.7: Marked anemia    WBC (10^9/L):    Greater than 50.0: Marked leukocytosis    18.0 - 50.0: Moderate leukocytosis    11.1 - 17.9: Mild leukocytosis    3.0 - 3.9: Mild leukopenia    2.0 - 2.9: Moderate leukopenia    Less than 2.0: Marked leukopenia    Platelets (10^9/L):    Greater than 900: Marked thrombocytosis    601 - 900: Moderate thrombocytosis    451 - 600: Mild thrombocytosis    100 - 149: Mild thrombocytopenia    50 - 99: Moderate thrombocytopenia    Less than 50: Marked thrombocytopenia       Performing Labs       The technical component of this testing was completed at [facility], [facility] and [facility]     CBC with platelets and  differential   Result Value Ref Range    WBC Count 6.9 4.0 - 11.0 10e3/uL    RBC Count 3.58 (L) 3.80 - 5.20 10e6/uL    Hemoglobin 11.0 (L) 11.7 - 15.7 g/dL    Hematocrit 34.1 (L) 35.0 - 47.0 %    MCV 95 78 - 100 fL    MCH 30.7 26.5 - 33.0 pg    MCHC 32.3 31.5 - 36.5 g/dL    RDW 14.6 10.0 - 15.0 %    Platelet Count 109 (L) 150 - 450 10e3/uL    % Neutrophils 69 %    % Lymphocytes 14 %    % Monocytes 15 %    % Eosinophils 2 %    % Basophils 0 %    % Immature Granulocytes 0 %    NRBCs per 100 WBC 0 <1 /100    Absolute Neutrophils 4.7 1.6 - 8.3 10e3/uL    Absolute Lymphocytes 1.0 0.8 - 5.3 10e3/uL    Absolute Monocytes 1.0 0.0 - 1.3 10e3/uL    Absolute Eosinophils 0.1 0.0 - 0.7 10e3/uL    Absolute Basophils 0.0 0.0 - 0.2 10e3/uL    Absolute Immature Granulocytes 0.0 <=0.4 10e3/uL    Absolute NRBCs 0.0 10e3/uL   Reticulocyte count   Result Value Ref Range    % Reticulocyte 3.7 (H) 0.5 - 2.0 %    Absolute Reticulocyte 0.132 (H) 0.025 - 0.095 10e6/uL   Magnesium   Result Value Ref Range    Magnesium 1.6 (L) 1.7 - 2.3 mg/dL   Phosphorus   Result Value Ref Range    Phosphorus 4.7 (H) 2.5 - 4.5 mg/dL   Echocardiogram Complete   Result Value Ref Range    LVEF  55-60%     [facility]    [id number]  [id number]  [id number]  [id number]^[email or web address]^SUHA^HARESH     [facility]  Echocardiography Laboratory  [address] [place] [address]     Name: CORA WAY  MRN: [id number]  : 1935  Study Date: 2023 08:10 AM  Age: 88 yrs  Gender: Female  [facility]  Reason For Study: SOB  Ordering Physician: SUHA RUST  Performed By: Juliane Schrader     BSA: 1.7 m2  Height: 61 in  Weight: 164 lb  HR: 65  BP: 141/78 mmHg  ______________________________________________________________________________  Procedure  Complete Portable Echo Adult. Optison (NDC #[id number]) given intravenously.  ______________________________________________________________________________  Interpretation Summary      There is severe distal septal hypokinesis.  The visual ejection fraction is 55-60%.  Mildly decreased right ventricular systolic function  There is mod-severe biatrial enlargement.  There is moderate (2+) mitral regurgitation.  There is moderate to mod-severe (2-3+) tricuspid regurgitation.  Dilation of the inferior vena cava is present with abnormal respiratory  variation in diameter.  Moderate (46-55mmHg) pulmonary hypertension is present.  There is mild (1+) aortic regurgitation.  There is a catheter/pacemaker lead seen in the right ventricle.  Contrast was used without apparent complications. Compared to prior study,  changes are noted.  ______________________________________________________________________________  Left Ventricle  The left ventricle is normal in size. There is normal left ventricular wall  thickness. Left ventricular diastolic function is abnormal. The visual  ejection fraction is 55-60%. There is severe septal hypokinesis. Septal wall  motion abnormality may reflect pacemaker activation.     Right Ventricle  There is a catheter/pacemaker lead seen in the right ventricle. The right  ventricle is normal size. Mildly decreased right ventricular systolic  function.     Atria  There is mod-severe biatrial enlargement.     Mitral Valve  The mitral valve leaflets appear thickened, but open well. There is mild  mitral annular calcification. There is moderate (2+) mitral regurgitation.     Tricuspid Valve  The tricuspid valve is not well visualized, but is grossly normal. There is  moderate to mod-severe (2-3+) tricuspid regurgitation. Moderate (46-55mmHg)  pulmonary hypertension is present.     Aortic Valve  There is mild trileaflet aortic sclerosis. There is mild (1+) aortic  regurgitation.     Pulmonic Valve  The pulmonic valve is not well visualized.     Vessels  The aortic root is normal size. Dilation of the inferior vena cava is present  with abnormal respiratory variation in diameter.      Pericardium  There is no pericardial effusion.     Rhythm  The rhythm was atrial fibrillation.  ______________________________________________________________________________  MMode/2D Measurements & Calculations     IVSd: 0.80 cm  LVIDd: 3.6 cm  LVIDs: 2.6 cm  LVPWd: 0.84 cm  IVC diam: 2.4 cm  FS: 26.2 %  LV mass(C)d: 80.1 grams  LV mass(C)dI: 46.1 grams/m2  Ao root diam: 2.8 cm  LA dimension: 4.2 cm  LA/Ao: 1.5  LVOT diam: 1.8 cm  LVOT area: 2.4 cm2  LA Volume (BP): 76.4 ml  LA Volume Index (BP): 43.9 ml/m2  RV Base: 3.5 cm  RWT: 0.47     TAPSE: 1.2 cm     Doppler Measurements & Calculations  MV E max rg: 114.0 cm/sec  MV max P.8 mmHg  MV mean P.8 mmHg  MV V2 VTI: 23.6 cm  MVA(VTI): 1.4 cm2  MV P1/2t max rg: 129.9 cm/sec  MV P1/2t: 59.5 msec  MVA(P1/2t): 3.7 cm2  MV dec slope: 639.4 cm/sec2  MV dec time: 0.17 sec  Ao V2 max: 116.1 cm/sec  Ao max P.0 mmHg  Ao V2 mean: 79.7 cm/sec  Ao mean P.9 mmHg  Ao V2 VTI: 24.5 cm  AMMY(I,D): 1.4 cm2  AMMY(V,D): 1.3 cm2  AI P1/2t: 623.4 msec  LV V1 max P.5 mmHg  LV V1 max: 60.8 cm/sec  LV V1 VTI: 13.8 cm  MR PISA: 2.1 cm2  MR ERO: 0.16 cm2  MR volume: 27.1 ml  SV(LVOT): 33.6 ml  SI(LVOT): 19.3 ml/m2  PA V2 max: 79.8 cm/sec  PA max P.5 mmHg  PA mean P.5 mmHg  PA V2 VTI: 16.2 cm  PA acc time: 0.12 sec  TR max rg: 270.3 cm/sec  TR max P.4 mmHg  AV Rg Ratio (DI): 0.52  AMMY Index (cm2/m2): 0.79  E/E' av.1     Lateral E/e': 10.8  Medial E/e': 17.5  RV S Rg: 8.9 cm/sec     ______________________________________________________________________________  Report approved by: Radha Overton 2023 02:39 PM

## 2023-06-12 NOTE — PROGRESS NOTES
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  In the emergency department, patient was found have a temperature of 90.3  F, blood pressure 134/104, heart rate 69, respiratory rate 20, SPO2 99% on room air.  Initial lab work showed WBC 6.7, hemoglobin 10.7, platelet 147, sodium 125, chloride 94, glucose 127, ALT 55, AST 59, alkaline phosphatase 77, proBNP 12,008.  Chest x-ray showed enlarged heart and otherwise unremarkable.  The patient had presented to the emergency department the day prior for similar symptoms and had a CT chest/abdomen/pelvis that showed small bilateral pleural effusions with patchy area of groundglass opacity involving the left lower lobe suspicious for pneumonia.  The patient was discharged home with oral antibiotics.  Upon presentation, pneumonia was thought less likely and antibiotics were held.  Gastroenterology was consulted to see the patient.  On 6/6, the patient had a colonoscopy that showed internal hemorrhoids and otherwise negative.  Patient was started on Imodium.  Unfortunately, the patient's symptoms continued and on 6/9, the patient had an upper endoscopy that revealed erythematous mucosa in the gastric body that was biopsied.  Patient was started on pantoprazole daily.    TODAY'S PLAN: Son at bedside.  Patient appears more comfortable today and son agrees with this.  Appreciate gastroenterology recommendations.  Despite negative  CT scan, would consider HIDA scan in the setting of elevated LFTs, abdominal fullness and pain.  Discussed with GI and this will be ordered.  Discussed with son who is agreeable.  In addition, given the patient's constitutional symptoms more rare conditions were considered.  Free kappa light chains returned elevated.  We will ask for hematology evaluation regarding free kappa light chains and appreciate recommendations.  LFTs slightly improved today.  INR elevated at 3.5 yesterday, though patient on Xarelto which could be contributing factor.  No evidence of bleeding at this time.  Echocardiogram currently pending and patient with lower extremity pitting edema that is slightly improved today after IV Lasix yesterday.  Shortness of breath seems to be slightly better as well.  Patient still quite winded after walking to and from the bathroom.  Would expect some level of deconditioning in the setting of a prolonged hospitalization and advanced age.  This was discussed with the son who expressed understanding.    Problem List:   Acute Intractable Abdominal Pain and Diarrhea  Mild Gastritis  - Unclear etiology at this point  - CT abd/pelv on 6/1, RUQ US on 6/1 and colonoscopy on 6/6 unrevealing.  Colonscopy biopsies positive for focal active colitis.  This was thought possibly secondary to bowel prep as no evidence of colitis seen on CT.  Pt had EGD on 6/9 that showed mild erythema of gastric body.  Pt started on PPI and carafate without much improvement  - C dif PCR negative, Enteric panel negative, ova and parasites negative, cryptosporidia and giardia negative  - Diarrhea now improved  - Imodium prn  - Minimal PO intake, but son assists with pt drinking ensure.  Pt c/o feeling full and pain makes her not hungry  - Appreciate GI recommendations  - Dicylclomine 20 mg QID  - Pain control     Hepatitis  - Unclear etiology  - Appreciate GI recommendations  - Statin held  - CT (6/1 and 6/10) and RUQ US negative  - Check  HIDA scan today  - Viral hepatitis panel negative  - Check ARELIS, SPEP/UPEP, FLC (Autoimmune and Amyloidosis consideration)  - Daily CMP     Dyspnea  Elevated Troponin  BLE Edema  Coronary Artery Disease s/p PCI (20 yrs ago)  Persistent Atrial Fibrillation complicated Tachybradia Syndrome s/p AV Ablation and Permanent Pacemaker in 2022  - Most recent echo in 4/2023 showed EF 55-60%, moderate biatrial enlargement, moderate mitral calcification, mild to moderate mitral regurgitation  - Troponin on 6/11 = 15  - ProBNP on arrival 12,008  - Check updated echocardiogram  - Lasix 20 mg IV x1 on 6/11  - Pt follows with Dr. Shin of Cardiology.  Last seen on 4/20/23  - Continue Xarelto for Afib  - Continue carvedilol     Hypertension  Hyperlipidemia  Hypothyroidism  - Continue Levothyroxine.  Dose adjusted from 88 mcg to 50 mcg as free T4 returned mildly elevated  - Recommend repeat TSH/T4 in 6-8 weeks with PCP  - Continue Losartan and Carvedilol     Bicytopenia (Thrombocytopenia, Normocytic Anemia)  Elevated Free Kappa Light Chains  - Unclear etiology.  Possibly secondary to liver issue  - Peripheral smear pending  - Appreciate Hematology recommendations  - Daily CBC     Hypomagnesemia  Hyponatremia  Hypocalcemia  - Improving  - Electrolyte replacement protocol     Generalized Muscle Weakness  - PT/OT - PT consulted and saw patient on 6/3, recommending use of walker which patient is currently refusing even after being educated with family present    I spent 55 minutes in reviewing this patient's labs, imaging, medications, medical history.  In addition time was spent interviewing the patient, communicating with family, and medical decision making.     DVT Prophylaxis: DOAC  Code Status: Full Code  Diet: Snacks/Supplements Adult: Ensure Clear; Between Meals  Advance Diet as Tolerated: Regular Diet Adult    Ramos Catheter: Not present  Disposition: Expected discharge in 2-3 days to home with family assist. Goals prior to discharge  include GI work up complete, tolerating oral diet, pain improving.   Family updated today: Yes      Interval History   Pt seen and examined.  Pt asleep on arrival but easily arrousable.      -Data reviewed today: I personally reviewed all new labs and imaging results over the last 24 hours.     Physical Exam   Temp: 97.5  F (36.4  C) Temp src: Oral BP: (!) 140/83 Pulse: 68   Resp: 18 SpO2: 98 % O2 Device: None (Room air)    Vitals:    06/03/23 0218 06/04/23 0731 06/12/23 1135   Weight: 74.5 kg (164 lb 3.9 oz) 74.5 kg (164 lb 3.9 oz) 86.5 kg (190 lb 11.2 oz)     Vital Signs with Ranges  Temp:  [97.1  F (36.2  C)-97.5  F (36.4  C)] 97.5  F (36.4  C)  Pulse:  [55-78] 68  Resp:  [16-18] 18  BP: (116-142)/(71-98) 140/83  SpO2:  [97 %-98 %] 98 %  I/O last 3 completed shifts:  In: 480 [P.O.:480]  Out: 260 [Urine:260]    GENERAL: No apparent distress. Awake, alert, and fully oriented.  HEENT: Normocephalic, atraumatic. Extraocular movements intact.  CARDIOVASCULAR: Regular rate and rhythm without murmurs or rubs. No S3.  PULMONARY: Clear bilaterally.  GASTROINTESTINAL: Soft, non-tender, non-distended. Bowel sounds normoactive.   EXTREMITIES: No cyanosis or clubbing. 1+ pitting edema to bilateral ankles  NEUROLOGICAL: CN 2-12 grossly intact, no focal neurological deficits.  DERMATOLOGICAL: No rash, ulcer, bruising, nor jaundice.    Medications       carvedilol  12.5 mg Oral BID w/meals     dicyclomine  20 mg Oral 4x Daily AC & HS     levothyroxine  50 mcg Oral QAM AC     losartan  50 mg Oral BID     pantoprazole  40 mg Oral QAM AC     polyethylene glycol  17 g Oral Daily     rivaroxaban ANTICOAGULANT  20 mg Oral Daily with supper     sucralfate  1 g Oral 4x Daily AC & HS     Data     Laboratory:  Recent Labs   Lab 06/12/23  0709 06/11/23  0656 06/10/23  1051   WBC 6.9 6.4 7.1   HGB 11.0* 10.9* 11.0*   HCT 34.1* 33.1* 34.9*   MCV 95 94 98   * 101* 128*     Recent Labs   Lab 06/12/23  0709 06/11/23  1122 06/11/23  0656  06/10/23  1051 06/09/23  0551   * 130*  --  128* 129*   POTASSIUM 4.8 5.3  --  4.3 4.5   CHLORIDE 94* 97*  --  99 99   CO2 25 24  --  21* 23   ANIONGAP 8 9  --  8 7   * 100*  --  131* 86   BUN 18.4 17.0  --  9.8 8.2   CR 0.90 0.88  --  0.81 0.76   GFRESTIMATED 62 63  --  70 75   LYLE 8.9 9.0  --  8.7* 8.7*   MAG  --  1.7  --  1.5* 1.6*   PHOS  --   --  4.8* 4.0 4.4   PROTTOTAL 6.3* 6.4  --  6.0*  --    ALBUMIN 3.4* 3.5  --  3.1*  --    BILITOTAL 0.8 0.7  --  1.1  --    ALKPHOS 147* 167*  --  119*  --    AST 65* 96*  --  56*  --    ALT 91* 108*  --  72*  --      Recent Labs   Lab 06/11/23  0656   INR 3.53*     No results for input(s): CULT in the last 168 hours.    Imaging:  No results found for this or any previous visit (from the past 24 hour(s)).      Tj Ruvalcaba DO  [facility] Hospitalist  [address] [place] [address]  Securely message with [employer] (more info)  Text page via [email or web address] Paging/Directory   06/12/2023

## 2023-06-12 NOTE — PROVIDER NOTIFICATION
"Provider Notification:  Pt has been sleeping on and off compared to other days. She was up in chair x 1 one. Family reports worsening of condition from yesterday with incoherence in speech and distension of abdomen. Pt has had poor oral intake and only took few bites of her food for dinner. She had a smear of greenish BM. Urine output at 200 ml. She was given PRN Dilaudid for generalized pain. BLE edema still present, legs elevated on pillows. She had lasix this morning. She gets very dyspneic with exertion. Echocardiogram complete tomorrow. SAMIRA Back web paged for re-evaluation of pt.     /73 (BP Location: Right arm, Cuff Size: Adult Large)   Pulse 71   Temp 97.4  F (36.3  C) (Axillary)   Resp 16   Ht 1.549 m (5' 1\")   Wt 74.5 kg (164 lb 3.9 oz)   LMP  (LMP Unknown)   SpO2 97%   BMI 31.03 kg/m       Will continue to monitor.   "

## 2023-06-12 NOTE — PROGRESS NOTES
"CLINICAL NUTRITION SERVICES - ASSESSMENT NOTE     Nutrition Prescription    RECOMMENDATIONS FOR MDs/PROVIDERS TO ORDER:  Mg 1.6, consider replacing.    Malnutrition Status:    Patient does not meet criteria for malnutrition    Recommendations already ordered by Registered Dietitian (RD):  Multivitamin as pt is meeting < 75% protein needs  Pineapple Gelatein for pt to try  Vanilla Ensure Enlive daily b/n meals    Future/Additional Recommendations:  Adjust supplements pending PO intake/patient preference       REASON FOR ASSESSMENT  Jeannie Chu is a/an 88 year old female assessed by the dietitian for [facility]    Patient presents with hepatitis, A.Fib, HTN, hypothyroidism, mild gastritis, mild colitis, CAD, HLD, bicytopenia.    NUTRITION HISTORY  Information obtained from son who was interpreting for patient. Patient has had nothing to eat for 2 days and has been eating < 50% usual intake since admission. Pt occasionally has yogurt or break for breakfast. Patient was enjoying a buttermilk rice pudding with lime at home and eating normally PTA. Patient does not eat beef products. NKFA. Son stated he was \"forcing\" pt to drink Ensure clear as she wasn't eating or drinking anything else. Stated UBW 155lbs.     CURRENT NUTRITION ORDERS  Diet: Regular  Intake/Tolerance: Patient eating 50% of meals per nursing records and receiving on average 1935kcals and 60g protein with advanced diet. Patient likes the apple Ensure clear.     LABS  Labs reviewed: Na 127, Mg 1.6, phos 4.7, alb 3.4, alk phos 147, alt 91, ast 65, hgb 11.0, hematocrit 34.1, platelet count 109, rbc 3.58      MEDICATIONS  Medications reviewed: synthroid/levothroid, protonix, miralax     ANTHROPOMETRICS  Height: 154.9 cm (5' 1\")  Most Recent Weight: 86.5 kg (190 lb 11.2 oz)    IBW: 47.8 kg  BMI: Obesity Grade II BMI 35-39.9  Weight History:   Wt Readings from Last 30 Encounters:   06/12/23 86.5 kg (190 lb 11.2 oz)   05/31/23 70.3 kg (155 lb)   05/25/23 69.9 kg " (154 lb)   04/20/23 70.5 kg (155 lb 6.4 oz)   04/03/23 70.5 kg (155 lb 8 oz)   03/31/23 70.5 kg (155 lb 8 oz)   02/09/23 69.4 kg (153 lb)   01/17/23 68 kg (150 lb)   11/10/22 69.4 kg (153 lb)   11/02/22 66.7 kg (147 lb 0.8 oz)   10/14/22 68.5 kg (151 lb)   10/13/22 70.8 kg (156 lb)   07/07/22 69.9 kg (154 lb)   05/27/22 70 kg (154 lb 4.8 oz)   03/16/22 71.9 kg (158 lb 8 oz)   03/11/22 71.7 kg (158 lb)   03/02/22 71.6 kg (157 lb 12.8 oz)   02/16/22 72 kg (158 lb 11.2 oz)   11/29/21 72.5 kg (159 lb 14.4 oz)   08/11/21 72.6 kg (160 lb)   07/27/21 72.8 kg (160 lb 8 oz)   07/25/21 73.1 kg (161 lb 1.6 oz)   06/28/21 72.1 kg (159 lb)   05/26/21 72.1 kg (159 lb)   04/30/21 72.1 kg (159 lb)   07/17/20 72.6 kg (160 lb)   07/02/20 72.6 kg (160 lb)   02/27/20 70.8 kg (156 lb)   01/13/20 69.4 kg (153 lb)   05/28/15 73 kg (161 lb)       Dosing Weight: 86.5 kg and 57.5kg ABW for protein and fluid    ASSESSED NUTRITION NEEDS  Estimated Energy Needs: 1493 kcals/day ([place] Jeor)  Justification: Obese  Estimated Protein Needs: 57-69 grams protein/day (1 - 1.2 grams of pro/kg)  Justification: Maintenance  Estimated Fluid Needs: 4583-6492 mL/day (25 - 30 mL/kg)   Justification: Maintenance    PHYSICAL FINDINGS  See malnutrition section below.  Dry skin  Scattered bruising  Poor skin turgor       MALNUTRITION:  % Weight Loss:  None noted  % Intake:  Decreased intake does not meet criteria for malnutrition as it has only been 2 days of not meeting at least 75% energy needs  Subcutaneous Fat Loss:  Upper arm region moderate depletion  Muscle Loss:  Temporal region mild depletion  Fluid Retention:  Moderate 2+    Malnutrition Diagnosis: Patient does not meet two of the above criteria necessary for diagnosing malnutrition      NUTRITION DIAGNOSIS  Inadequate protein-energy intake related to hepatitis, gastritis, colitis as evidenced by meeting 65% estimated energy needs and 79% estimated protein needs since diet advanced to  regular    INTERVENTIONS  Implementation  Nutrition Education: Per provider order if indicated   Medical food supplement therapy  Multivitamin/mineral supplement therapy     Goals  Patient to consume % of nutritionally adequate meals three times per day, or the equivalent with supplements/snacks.  Total avg nutritional intake to meet a minimum of 1493 kcal/kg and 57 g PRO/kg daily (per dosing wt 86.5 kg and 57.5 kg).     Monitoring/Evaluation  Progress toward goals will be monitored and evaluated per protocol. Po intake, supplement tolerance, wt, labs

## 2023-06-13 ENCOUNTER — APPOINTMENT (OUTPATIENT)
Dept: NUCLEAR MEDICINE | Facility: CLINIC | Age: 88
End: 2023-06-13
Attending: HOSPITALIST
Payer: COMMERCIAL

## 2023-06-13 LAB
ALBUMIN SERPL BCG-MCNC: 3.2 G/DL (ref 3.5–5.2)
ALP SERPL-CCNC: ABNORMAL U/L
ALT SERPL W P-5'-P-CCNC: ABNORMAL U/L
ANA SER QL IF: NEGATIVE
ANION GAP SERPL CALCULATED.3IONS-SCNC: 9 MMOL/L (ref 7–15)
AST SERPL W P-5'-P-CCNC: ABNORMAL U/L
BILIRUB SERPL-MCNC: 0.7 MG/DL
BUN SERPL-MCNC: 24.9 MG/DL (ref 8–23)
CALCIUM SERPL-MCNC: 8.8 MG/DL (ref 8.8–10.2)
CHLORIDE SERPL-SCNC: 93 MMOL/L (ref 98–107)
CREAT SERPL-MCNC: 0.9 MG/DL (ref 0.51–0.95)
DEPRECATED HCO3 PLAS-SCNC: 24 MMOL/L (ref 22–29)
ERYTHROCYTE [DISTWIDTH] IN BLOOD BY AUTOMATED COUNT: 14.8 % (ref 10–15)
GFR SERPL CREATININE-BSD FRML MDRD: 62 ML/MIN/1.73M2
GLUCOSE SERPL-MCNC: 99 MG/DL (ref 70–99)
HCT VFR BLD AUTO: 32.9 % (ref 35–47)
HGB BLD-MCNC: 10.8 G/DL (ref 11.7–15.7)
MAGNESIUM SERPL-MCNC: 1.6 MG/DL (ref 1.7–2.3)
MCH RBC QN AUTO: 31.1 PG (ref 26.5–33)
MCHC RBC AUTO-ENTMCNC: 32.8 G/DL (ref 31.5–36.5)
MCV RBC AUTO: 95 FL (ref 78–100)
PHOSPHATE SERPL-MCNC: 4.7 MG/DL (ref 2.5–4.5)
PLATELET # BLD AUTO: 107 10E3/UL (ref 150–450)
POTASSIUM SERPL-SCNC: 5.5 MMOL/L (ref 3.4–5.3)
PROT PATTERN SERPL IFE-IMP: NORMAL
PROT SERPL-MCNC: 6.1 G/DL (ref 6.4–8.3)
RBC # BLD AUTO: 3.47 10E6/UL (ref 3.8–5.2)
SODIUM SERPL-SCNC: 126 MMOL/L (ref 136–145)
WBC # BLD AUTO: 5.6 10E3/UL (ref 4–11)

## 2023-06-13 PROCEDURE — 250N000013 HC RX MED GY IP 250 OP 250 PS 637: Performed by: INTERNAL MEDICINE

## 2023-06-13 PROCEDURE — 120N000001 HC R&B MED SURG/OB

## 2023-06-13 PROCEDURE — 99222 1ST HOSP IP/OBS MODERATE 55: CPT | Performed by: INTERNAL MEDICINE

## 2023-06-13 PROCEDURE — 343N000001 HC RX 343: Performed by: INTERNAL MEDICINE

## 2023-06-13 PROCEDURE — 99222 1ST HOSP IP/OBS MODERATE 55: CPT | Performed by: SURGERY

## 2023-06-13 PROCEDURE — 83735 ASSAY OF MAGNESIUM: CPT | Performed by: INTERNAL MEDICINE

## 2023-06-13 PROCEDURE — 250N000013 HC RX MED GY IP 250 OP 250 PS 637: Performed by: NURSE PRACTITIONER

## 2023-06-13 PROCEDURE — 85027 COMPLETE CBC AUTOMATED: CPT | Performed by: INTERNAL MEDICINE

## 2023-06-13 PROCEDURE — 84155 ASSAY OF PROTEIN SERUM: CPT | Performed by: INTERNAL MEDICINE

## 2023-06-13 PROCEDURE — 80069 RENAL FUNCTION PANEL: CPT | Performed by: INTERNAL MEDICINE

## 2023-06-13 PROCEDURE — 250N000013 HC RX MED GY IP 250 OP 250 PS 637: Performed by: PHYSICIAN ASSISTANT

## 2023-06-13 PROCEDURE — 250N000011 HC RX IP 250 OP 636: Performed by: HOSPITALIST

## 2023-06-13 PROCEDURE — 36415 COLL VENOUS BLD VENIPUNCTURE: CPT | Performed by: INTERNAL MEDICINE

## 2023-06-13 PROCEDURE — 99232 SBSQ HOSP IP/OBS MODERATE 35: CPT | Performed by: HOSPITALIST

## 2023-06-13 PROCEDURE — A9537 TC99M MEBROFENIN: HCPCS | Performed by: INTERNAL MEDICINE

## 2023-06-13 PROCEDURE — 250N000011 HC RX IP 250 OP 636: Performed by: INTERNAL MEDICINE

## 2023-06-13 PROCEDURE — 78227 HEPATOBIL SYST IMAGE W/DRUG: CPT

## 2023-06-13 PROCEDURE — 250N000013 HC RX MED GY IP 250 OP 250 PS 637: Performed by: HOSPITALIST

## 2023-06-13 RX ORDER — FUROSEMIDE 10 MG/ML
40 INJECTION INTRAMUSCULAR; INTRAVENOUS ONCE
Status: COMPLETED | OUTPATIENT
Start: 2023-06-13 | End: 2023-06-13

## 2023-06-13 RX ORDER — KIT FOR THE PREPARATION OF TECHNETIUM TC 99M MEBROFENIN 45 MG/10ML
6 INJECTION, POWDER, LYOPHILIZED, FOR SOLUTION INTRAVENOUS ONCE
Status: COMPLETED | OUTPATIENT
Start: 2023-06-13 | End: 2023-06-13

## 2023-06-13 RX ADMIN — LEVOTHYROXINE SODIUM 50 MCG: 50 TABLET ORAL at 09:37

## 2023-06-13 RX ADMIN — FUROSEMIDE 40 MG: 10 INJECTION, SOLUTION INTRAMUSCULAR; INTRAVENOUS at 16:31

## 2023-06-13 RX ADMIN — CARVEDILOL 12.5 MG: 12.5 TABLET, FILM COATED ORAL at 09:38

## 2023-06-13 RX ADMIN — LOSARTAN POTASSIUM 50 MG: 50 TABLET, FILM COATED ORAL at 20:58

## 2023-06-13 RX ADMIN — Medication 3 MG: at 21:30

## 2023-06-13 RX ADMIN — DICYCLOMINE HYDROCHLORIDE 20 MG: 20 TABLET ORAL at 09:37

## 2023-06-13 RX ADMIN — PANTOPRAZOLE SODIUM 40 MG: 40 TABLET, DELAYED RELEASE ORAL at 09:37

## 2023-06-13 RX ADMIN — SUCRALFATE ORAL 1 G: 1 SUSPENSION ORAL at 09:35

## 2023-06-13 RX ADMIN — HYDROMORPHONE HYDROCHLORIDE 1 MG: 2 TABLET ORAL at 13:21

## 2023-06-13 RX ADMIN — HYDROXYZINE HYDROCHLORIDE 50 MG: 50 TABLET, FILM COATED ORAL at 04:57

## 2023-06-13 RX ADMIN — HYDROMORPHONE HYDROCHLORIDE 1 MG: 2 TABLET ORAL at 04:57

## 2023-06-13 RX ADMIN — SINCALIDE 1.7 MCG: 5 INJECTION, POWDER, LYOPHILIZED, FOR SOLUTION INTRAVENOUS at 12:38

## 2023-06-13 RX ADMIN — MEBROFENIN 6 MILLICURIE: 45 INJECTION, POWDER, LYOPHILIZED, FOR SOLUTION INTRAVENOUS at 11:49

## 2023-06-13 RX ADMIN — THERA TABS 1 TABLET: TAB at 09:37

## 2023-06-13 RX ADMIN — LOSARTAN POTASSIUM 50 MG: 50 TABLET, FILM COATED ORAL at 09:37

## 2023-06-13 RX ADMIN — CARVEDILOL 12.5 MG: 12.5 TABLET, FILM COATED ORAL at 18:09

## 2023-06-13 RX ADMIN — ALPRAZOLAM 0.5 MG: 0.5 TABLET ORAL at 21:30

## 2023-06-13 ASSESSMENT — ACTIVITIES OF DAILY LIVING (ADL)
ADLS_ACUITY_SCORE: 33
ADLS_ACUITY_SCORE: 32
ADLS_ACUITY_SCORE: 32
ADLS_ACUITY_SCORE: 33
ADLS_ACUITY_SCORE: 32
ADLS_ACUITY_SCORE: 32
ADLS_ACUITY_SCORE: 33
ADLS_ACUITY_SCORE: 32
ADLS_ACUITY_SCORE: 33
ADLS_ACUITY_SCORE: 33

## 2023-06-13 NOTE — PLAN OF CARE
"/83   Pulse 72   Temp (!) 96.7  F (35.9  C)   Resp 20   Ht 1.549 m (5' 1\")   Wt 86.5 kg (190 lb 11.2 oz)   LMP  (LMP Unknown)   SpO2 91%   BMI 36.03 kg/m      Vitals stable. Son interpreting at bedside. Pt alert and oriented x4, confused at times. Ax2 due to patient refusing the walker and gait belt. Pt continues to report 7/10 abdominal pain. Son reports \"she always reports a 7 pain\". Bowel sounds hyperactive. No BMs this shift. Son reports she looks \"so much better today\". Edema present in BLE (+2-3) and LUE. HIDA scan complete. Cardiology following-plan for outpatient stress test. Pt resting comfortably. Will continue to provide supportive cares.   "

## 2023-06-13 NOTE — PROGRESS NOTES
[facility]    Medicine Progress Note - Hospitalist Service    Date of Admission:  6/2/2023    Assessment & Plan    88-year-old female with a past medical history significant for hypertension, hyperlipidemia, remote history of CAD status post PCI (approximately 20 years ago), persistent atrial fibrillation 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 (10/31/2022, with redo AV node ablation 11/1/2022), hypothyroidism who was admitted on 6/2/2023 with abdominal pain and diarrhea x 3 weeks.   Has had imaging, EGD/colon   Has fluid overload  Echo that showed possible wma  Now abnormal HIDA, surgery consulted    Abdominal pain/Diarrhea    - unclear cause    - work-up including CT abdomen and colonoscopy were unrevealing    - stool studies negative (including O&P, fecal fat)    - biopsies from colonoscopy (6/6):colitis    - no herbal use (except occ clove)    - EGD (6/9): mild erythema of gastric mucosa    - on PPI    - still has not tolerated PO    - HIDA today shows low EF/biliary dyskinesia    - consulted surgery for possible lap tim    Hyponatremia    - chronic    - likely related to GI losses    - monitor    Hepatitis    - noticed previous labs from ED showed mild elevation of LFTs    - no work-up was done    - checking acute hepatitis panel: negative    - holding statin    - CT/US were unrevealing    - HIDA, as above    Dyspnea  Elevated Troponin  BLE Edema    - ECHO showed possible wma    - seen by cards: outpt stress test    - received lasix 20mg x a on 6/11    - likely iatrogenic fluid overload from IVF    Bicytopenia (Thrombocytopenia, Normocytic Anemia)  Elevated Free Kappa Light Chains  - Unclear etiology.  Possibly secondary to liver issue  - SPEP negative for monoclonal protein  - Serum immunofixation still pending  - Peripheral smear revealed benign findings  - Appreciate Hematology recommendations  - Daily CBC    Hypocalcemia and  hypomagnesemia    - resolved with replacement      Generalized muscle weakness    - due to acute illness and ongoing GI losses.     - PT consulted and saw patient on 6/3, recommending use of walker which patient is currently refusing even after being educated with family present    - family comfortable with her mobility     Elevated proBNP    - initial proBNP on 5/31 elevated at 14,188 with repeat of 12,008    - prior to this he proBNP was normal at 1,711 in 10/2022    - most recent echocardiogram on 4/10/2023 showed EF of 55-60%    - CT of chest did small bilateral pleural effusions but on exam patient does not appear fluid overloaded    - does not appear to be in heart failure clinically     Recently diagnosed CAP    - diagnosed in ED on 5/31 but with repeat CXR on 6/2 which was negative    - does have a recent dry cough and previously complained of shortness of breath that has resolved    - afebrile and no leukocytosis.     - antibiotics were not continued on admission, holding off since admission since this initially worsened diarrhea, continue to reassess for changes       Mild thrombocytopenia    - platelet count of 147 on 6/3 with slight decrease to 143 on 6/4    - no evidence of bleeding     Tachy-uzair syndrome s/p PPM (10/2022) and redo AV node ablation (11/2022)   A-fib    - rate controlled    - continue PTA Carvedilol     - resume xarelto     HTN    - stable, continue PTA Losartan and Carvedilol      Hypothyroidism    - continue PTA Levothyroxine     - I see she had abnormal thyroid labs in the past (April 2023), will check      Family in room. Updated     Diet: Snacks/Supplements Adult: Ensure Clear; Between Meals  Snacks/Supplements Adult: Ensure Enlive; Between Meals  NPO per Anesthesia Guidelines for Procedure/Surgery Except for: Meds    DVT Prophylaxis: DOAC  Ramos Catheter: Not present  Lines: None     Cardiac Monitoring: None  Code Status: Full Code      Clinically Significant Risk Factors     "    # Hyperkalemia: Highest K = 5.5 mmol/L in last 2 days, will monitor as appropriate  # Hyponatremia: Lowest Na = 126 mmol/L in last 2 days, will monitor as appropriate    # Hypomagnesemia: Lowest Mg = 1.6 mg/dL in last 2 days, will replace as needed   # Hypoalbuminemia: Lowest albumin = 3.1 g/dL at 6/10/2023 10:51 AM, will monitor as appropriate  # Coagulation Defect: INR = 3.53 (Ref range: 0.85 - 1.15) and/or PTT = N/A, will monitor for bleeding  # Thrombocytopenia: Lowest platelets = 107 in last 2 days, will monitor for bleeding   # Hypertension: Noted on problem list        # Obesity: Estimated body mass index is 36.03 kg/m  as calculated from the following:    Height as of this encounter: 1.549 m (5' 1\").    Weight as of this encounter: 86.5 kg (190 lb 11.2 oz).           Disposition Plan      Expected Discharge Date: 06/14/2023,  3:00 PM                Darryl Mcdaniel MD  Hospitalist Service  [facility]  Securely message with [employer] (more info)  Text page via [employer] Paging/Directory   ______________________________________________________________________    Interval History   Patient returned from [facility]. Tired. Still not tolerating much PO due to \"bloating\" and pain. No other new complaints    Physical Exam   Vital Signs: Temp: (!) 96.7  F (35.9  C) Temp src: Axillary BP: 136/83 Pulse: 72   Resp: 20 SpO2: 91 % O2 Device: None (Room air)    Weight: 190 lbs 11.17 oz    Constitutional: awake, alert, cooperative, no apparent distress, and appears stated age  Eyes: Lids and lashes normal, pupils equal, round and reactive to light, extra ocular muscles intact, sclera clear, conjunctiva normal  ENT: Normocephalic, without obvious abnormality, atraumatic, sinuses nontender on palpation, external ears without lesions, oral pharynx with moist mucous membranes, tonsils without erythema or exudates, gums normal and good dentition.  Respiratory: No increased work of breathing, good air exchange, " clear to auscultation bilaterally, no crackles or wheezing  Cardiovascular: Normal apical impulse, regular rate and rhythm, normal S1 and S2, no S3 or S4, and no murmur noted  GI: soft. Epigastric tenderness  Has bilateral edema    Medical Decision Making       30 MINUTES SPENT BY ME on the date of service doing chart review, history, exam, documentation & further activities per the note.      Data     I have personally reviewed the following data over the past 24 hrs:    5.6  \   10.8 (L)   / 107 (L)     126 (L) 93 (L) 24.9 (H) /  99   5.5 (H) 24 0.90 \       ALT: N/A AST: N/A AP: N/A TBILI: 0.7   ALB: 3.2 (L) TOT PROTEIN: 6.1 (L) LIPASE: N/A       Imaging results reviewed over the past 24 hrs:   Recent Results (from the past 24 hour(s))   NM Hepatobiliary Scan w GB EF    Narrative    NUCLEAR MEDICINE HEPATOBILIARY SCAN WITH GALLBLADDER EJECTION FRACTION   6/13/2023 1:20 PM.    INDICATION:  Abdominal pain.    COMPARISON: CT abdomen and pelvis on 6/10/2023.    TECHNIQUE: The patient received 6 mCi of Tc-99m Mebrofenin  intravenously and 1.7 cm microgram of CCK intravenously. Initially  mebrofenin was extravasated and a subsequent injection of mebrofenin  was administered.    FINDINGS: At the time of imaging radiotracer is seen within the  gallbladder. After CCK administration the gallbladder ejection  fraction measured at 21%. Normal range is more than 33%.      Impression    IMPRESSION:  1. No evidence of acute cholecystitis.  2. Low gallbladder ejection fraction, which can be seen with biliary  dyskinesia.    CRISTOPHER TIDWELL MD         SYSTEM ID:  [id number]

## 2023-06-13 NOTE — CONSULTS
[facility]    Cardiology Consultation     Date of Admission:  6/2/2023    Assessment & Plan   Jeannie Chu is a 88 year old female who was admitted on 6/2/2023.    1.  Septal wall hypokinesia.  On personal review of images this may be new compared to previous echocardiogram although somewhat complicated also by the abnormal septal motion due to underlying ventricular pacing.  Clinically not having any anginal symptoms or acute coronary syndrome.  Overall LVEF is preserved.  Discussed with patient and her son.  Recommend stress testing for further restratification and evaluation but at this time due to ongoing current illnesses and other comorbidities patient and her son wants to defer this test which is reasonable and this can be done as an outpatient.  2.  Known history of CAD with history of LAD PCI more than 20 years ago.  No chest discomfort.  On Xarelto.  Was on statin but this has been withheld due to abnormal liver enzymes  3.  Paroxysmal atrial fibrillation with tachybradycardia syndrome status post AV node ablation pacemaker implantation.  On Xarelto.  4.  Admitted with the abdominal discomfort, diarrhea, gastritis, hepatitis.  Patient received IV fluid resuscitation and does appear to have extravascular volume overload    Recommendations  Lexiscan nuclear stress test, patient and her son would prefer this to be done in future, given stable cardiac status is reasonable to do it as an outpatient through patient's primary cardiology team.  Consider diuresis as patient appears to have extravascular volume overload likely iatrogenic because of IV fluid resuscitation.    Thank you for involving the care of Ms. Chu.  Cardiology will sign off.  Please feel free to call with any questions      Moderate complexity     Ariel Schwarz MD, MD    Primary Care Physician   Diana Hilario    Reason for Consult   Reason for consult: I was asked by Dr Ruvalcaba to evaluate this patient  for abnormal septal wall motion echocardiogram.    History of Present Illness   Jeannie Chu is a 88 year old female who presents with abdominal discomfort, diarrhea.  Patient has history of CAD with history of remote PCI 20 years ago and LAD, atrial fibrillation tachybradycardia syndrome status post AV node ablation pacemaker implantation.  Cardiology now consulted because echocardiogram yesterday showed distal septal wall hypokinesia.  Overall LVEF is preserved at 55 to 60% 2+ mitral regurgitation 2-3+ tricuspid regurgitation dilated IVC and pulmonary hypertension.  Echocardiogram in April 2023 showed abnormal septal wall motion consistent with pacemaker implantation.  On direct comparison to previous echocardiogram septal wall hypokinesia appears to be new but somewhat complicated by the fact that there is underlying abnormal septal wall motion due to ventricular pacing.  Patient denies any chest discomfort.  She did receive IV fluid resuscitation has gained weight and has edema over the legs and arms.  EKG showed ventricular paced rhythm.  Troponin is very mildly elevated at 26, 20, 15.  Statin has been withheld due to abnormal liver enzymes.  She is on Xarelto.  Prophylaxis.    Past Medical History   Past Medical History:   Diagnosis Date     Acquired hypothyroidism 05/27/2021     Atrial fibrillation      Benign essential hypertension      Coronary artery disease 05/26/2021     Hyperlipidemia          Past Surgical History   Past Surgical History:   Procedure Laterality Date     COLONOSCOPY N/A 6/6/2023    Procedure: COLONOSCOPY, BIOPSIES;  Surgeon: Germán Manning MD;  Location: [facility] OR     Coronary angiogram with stent placement       EP ABLATION AV NODE N/A 10/31/2022    Procedure: Ablation Atrioventricular Node;  Surgeon: Rogelio Whitt MD;  Location: [facility] CARDIAC CATH LAB     EP ABLATION AV NODE N/A 11/01/2022    Procedure: EP Ablation AV Node;  Surgeon: Jd Baker MD;  Location: [facility]  CARDIAC CATH LAB     EP PACEMAKER DEVICE & LEAD IMPLANT- RIGHT VENTRICULAR N/A 10/31/2022    Procedure: Pacemaker Device & Lead Implant- Right ventricular;  Surgeon: Rogelio Whitt MD;  Location: [facility] CARDIAC CATH LAB     ESOPHAGOSCOPY, GASTROSCOPY, DUODENOSCOPY (EGD), COMBINED N/A 6/9/2023    Procedure: ESOPHAGOGASTRODUODENOSCOPY;  Surgeon: Germán Manning MD;  Location:  OR         Prior to Admission Medications   Prior to Admission Medications   Prescriptions Last Dose Informant Patient Reported? Taking?   ALPRAZolam (XANAX) 0.5 MG tablet   Yes Yes   Sig: Take 0.5 mg by mouth daily as needed for anxiety   IRON (FERROUS GLUCONATE) 325 MG OR TABS 6/2/2023 at AM Self Yes Yes   Sig: Take 1 tablet by mouth daily    Multiple Vitamins-Minerals (ICAPS AREDS FORMULA PO) 6/2/2023 at AM Self Yes Yes   Sig: Take 2 tablets by mouth daily    amoxicillin-clavulanate (AUGMENTIN) 875-125 MG tablet 6/2/2023 at AM Self No Yes   Sig: Take 1 tablet by mouth 2 times daily for 7 days   atorvastatin (LIPITOR) 10 MG tablet 6/2/2023 at PM Self No Yes   Sig: Take 1 tablet (10 mg) by mouth daily   azithromycin (ZITHROMAX) 250 MG tablet 6/2/2023 at AM Self No Yes   Sig: Take 1 tablet (250 mg) by mouth daily for 4 days   carvedilol (COREG) 12.5 MG tablet 6/2/2023 at AM Self No Yes   Sig: Take 1 tablet (12.5 mg) by mouth 2 times daily (with meals)   levothyroxine (SYNTHROID/LEVOTHROID) 88 MCG tablet 6/2/2023 at AM Self No Yes   Sig: Take 1 tablet (88 mcg) by mouth daily   losartan (COZAAR) 50 MG tablet 6/2/2023 at AM Self No Yes   Sig: Take 1 tablet (50 mg) by mouth 2 times daily   niacin 500 MG tablet 6/2/2023 Self Yes Yes   Sig: Take by mouth daily (with breakfast)   nitroGLYcerin (NITROSTAT) 0.4 MG sublingual tablet PRN at PRN Self No Yes   Sig: For chest pain place 1 tablet under the tongue every 5 minutes for 3 doses. If symptoms persist 5 minutes after 1st dose call 911.   rivaroxaban ANTICOAGULANT (XARELTO) 20 MG TABS tablet  "6/2/2023 at PM Self No Yes   Sig: Take 1 tablet (20 mg) by mouth daily (with dinner)      Facility-Administered Medications: None     Current Facility-Administered Medications   Medication Dose Route Frequency     carvedilol  12.5 mg Oral BID w/meals     dicyclomine  20 mg Oral 4x Daily AC & HS     levothyroxine  50 mcg Oral QAM AC     losartan  50 mg Oral BID     multivitamin, therapeutic  1 tablet Oral Daily     pantoprazole  40 mg Oral QAM AC     polyethylene glycol  17 g Oral Daily     rivaroxaban ANTICOAGULANT  20 mg Oral Daily with supper     sucralfate  1 g Oral 4x Daily AC & HS     Current Facility-Administered Medications   Medication Last Rate     Allergies   Allergies   Allergen Reactions     Amlodipine Swelling     BLE edema     Ibuprofen Swelling       Social History    reports that she has never smoked. She has never used smokeless tobacco. She reports that she does not drink alcohol and does not use drugs.      Family History   I have reviewed this patient's family history and updated it with pertinent information if needed.  Family History   Problem Relation Age of Onset     Family History Negative Other           Review of Systems   A comprehensive review of system was performed and is negative other than that noted in the HPI or here.     Physical Exam   Vital Signs with Ranges  Temp:  [96.7  F (35.9  C)-97.5  F (36.4  C)] 96.7  F (35.9  C)  Pulse:  [66-86] 67  Resp:  [18-22] 20  BP: (109-145)/(65-89) 109/65  SpO2:  [91 %-100 %] 91 %  Wt Readings from Last 4 Encounters:   06/12/23 86.5 kg (190 lb 11.2 oz)   05/31/23 70.3 kg (155 lb)   05/25/23 69.9 kg (154 lb)   04/20/23 70.5 kg (155 lb 6.4 oz)     No intake/output data recorded.      Vitals: /65 (BP Location: Right arm)   Pulse 67   Temp (!) 96.7  F (35.9  C)   Resp 20   Ht 1.549 m (5' 1\")   Wt 86.5 kg (190 lb 11.2 oz)   LMP  (LMP Unknown)   SpO2 91%   BMI 36.03 kg/m      Physical Exam:   General - Alert and oriented to time place " and person in no acute distress  Eyes - No scleral icterus  HEENT - Neck supple, moist mucous membranes  Cardiovascular -grade 2 x 6 systolic murmur at the apex and left lower sternal border, no S3-S4 rub or gallop  Extremities - There is 1-2+ pitting pedal edema lower extremity edema  Respiratory -clear to auscultation  Skin - No pallor or cyanosis  Gastrointestinal - Non tender and non distended without rebound or guarding  Psych - Appropriate affect   Neurological - No gross motor neurological focal deficits    No lab results found in last 7 days.    Invalid input(s): TROPONINIES    Recent Labs   Lab 06/13/23  0636 06/12/23  0709 06/11/23  1122 06/11/23  0656   WBC 5.6 6.9  --  6.4   HGB 10.8* 11.0*  --  10.9*   MCV 95 95  --  94   * 109*  --  101*   INR  --   --   --  3.53*   * 127* 130*  --    POTASSIUM 5.5* 4.8 5.3  --    CHLORIDE 93* 94* 97*  --    CO2 24 25 24  --    BUN 24.9* 18.4 17.0  --    CR 0.90 0.90 0.88  --    GFRESTIMATED 62 62 63  --    ANIONGAP 9 8 9  --    LYLE 8.8 8.9 9.0  --    GLC 99 100* 100*  --    ALBUMIN 3.2* 3.4* 3.5  --    PROTTOTAL 6.1* 6.3* 6.4  --    BILITOTAL 0.7 0.8 0.7  --    ALKPHOS  --  147* 167*  --    ALT  --  91* 108*  --    AST  --  65* 96*  --      Recent Labs   Lab Test 03/02/23  0723 03/11/22  1505   CHOL 147 132   HDL 73 81   LDL 60 24   TRIG 71 136     Recent Labs   Lab 06/13/23  0636 06/12/23  0709 06/11/23  0656   WBC 5.6 6.9 6.4   HGB 10.8* 11.0* 10.9*   HCT 32.9* 34.1* 33.1*   MCV 95 95 94   * 109* 101*   RETICABSCT  --  0.132*  --    RETP  --  3.7*  --      No results for input(s): PH, PHV, PO2, PO2V, SAT, PCO2, PCO2V, HCO3, HCO3V in the last 168 hours.  No results for input(s): NTBNPI, NTBNP in the last 168 hours.  No results for input(s): DD in the last 168 hours.  Recent Labs   Lab 06/12/23  0709   SED 10     Recent Labs   Lab 06/13/23  0636 06/12/23  0709 06/11/23  0656   * 109* 101*     Recent Labs   Lab 06/07/23  0600   TSH 4.37*      No results for input(s): COLOR, APPEARANCE, URINEGLC, URINEBILI, URINEKETONE, SG, UBLD, URINEPH, PROTEIN, UROBILINOGEN, NITRITE, LEUKEST, RBCU, WBCU in the last 168 hours.    Imaging:  Recent Results (from the past 48 hour(s))   Echocardiogram Complete   Result Value    LVEF  55-60%    [facility]    [id number]  [id number]  [id number]  2011^YOCASTA^SUHA^HARESH     [facility]  Echocardiography Laboratory  [address] [place] [address]     Name: CORA WAY  MRN: [id number]  : 1935  Study Date: 2023 08:10 AM  Age: 88 yrs  Gender: Female  Patient Location: [facility]  Reason For Study: SOB  Ordering Physician: SUHA RUST  Performed By: Juliane Schrader     BSA: 1.7 m2  Height: 61 in  Weight: 164 lb  HR: 65  BP: 141/78 mmHg  ______________________________________________________________________________  Procedure  Complete Portable Echo Adult. Optison (NDC #[id number]) given intravenously.  ______________________________________________________________________________  Interpretation Summary     There is severe distal septal hypokinesis.  The visual ejection fraction is 55-60%.  Mildly decreased right ventricular systolic function  There is mod-severe biatrial enlargement.  There is moderate (2+) mitral regurgitation.  There is moderate to mod-severe (2-3+) tricuspid regurgitation.  Dilation of the inferior vena cava is present with abnormal respiratory  variation in diameter.  Moderate (46-55mmHg) pulmonary hypertension is present.  There is mild (1+) aortic regurgitation.  There is a catheter/pacemaker lead seen in the right ventricle.  Contrast was used without apparent complications. Compared to prior study,  changes are noted.  ______________________________________________________________________________  Left Ventricle  The left ventricle is normal in size. There is normal left ventricular wall  thickness. Left ventricular diastolic function is abnormal. The  visual  ejection fraction is 55-60%. There is severe septal hypokinesis. Septal wall  motion abnormality may reflect pacemaker activation.     Right Ventricle  There is a catheter/pacemaker lead seen in the right ventricle. The right  ventricle is normal size. Mildly decreased right ventricular systolic  function.     Atria  There is mod-severe biatrial enlargement.     Mitral Valve  The mitral valve leaflets appear thickened, but open well. There is mild  mitral annular calcification. There is moderate (2+) mitral regurgitation.     Tricuspid Valve  The tricuspid valve is not well visualized, but is grossly normal. There is  moderate to mod-severe (2-3+) tricuspid regurgitation. Moderate (46-55mmHg)  pulmonary hypertension is present.     Aortic Valve  There is mild trileaflet aortic sclerosis. There is mild (1+) aortic  regurgitation.     Pulmonic Valve  The pulmonic valve is not well visualized.     Vessels  The aortic root is normal size. Dilation of the inferior vena cava is present  with abnormal respiratory variation in diameter.     Pericardium  There is no pericardial effusion.     Rhythm  The rhythm was atrial fibrillation.  ______________________________________________________________________________  MMode/2D Measurements & Calculations     IVSd: 0.80 cm  LVIDd: 3.6 cm  LVIDs: 2.6 cm  LVPWd: 0.84 cm  IVC diam: 2.4 cm  FS: 26.2 %  LV mass(C)d: 80.1 grams  LV mass(C)dI: 46.1 grams/m2  Ao root diam: 2.8 cm  LA dimension: 4.2 cm  LA/Ao: 1.5  LVOT diam: 1.8 cm  LVOT area: 2.4 cm2  LA Volume (BP): 76.4 ml  LA Volume Index (BP): 43.9 ml/m2  RV Base: 3.5 cm  RWT: 0.47     TAPSE: 1.2 cm     Doppler Measurements & Calculations  MV E max danielle: 114.0 cm/sec  MV max P.8 mmHg  MV mean P.8 mmHg  MV V2 VTI: 23.6 cm  MVA(VTI): 1.4 cm2  MV P1/2t max danielle: 129.9 cm/sec  MV P1/2t: 59.5 msec  MVA(P1/2t): 3.7 cm2  MV dec slope: 639.4 cm/sec2  MV dec time: 0.17 sec  Ao V2 max: 116.1 cm/sec  Ao max P.0 mmHg  Ao V2  "mean: 79.7 cm/sec  Ao mean P.9 mmHg  Ao V2 VTI: 24.5 cm  AMMY(I,D): 1.4 cm2  AMMY(V,D): 1.3 cm2  AI P1/2t: 623.4 msec  LV V1 max P.5 mmHg  LV V1 max: 60.8 cm/sec  LV V1 VTI: 13.8 cm  MR PISA: 2.1 cm2  MR ERO: 0.16 cm2  MR volume: 27.1 ml  SV(LVOT): 33.6 ml  SI(LVOT): 19.3 ml/m2  PA V2 max: 79.8 cm/sec  PA max P.5 mmHg  PA mean P.5 mmHg  PA V2 VTI: 16.2 cm  PA acc time: 0.12 sec  TR max rg: 270.3 cm/sec  TR max P.4 mmHg  AV Rg Ratio (DI): 0.52  AMMY Index (cm2/m2): 0.79  E/E' av.1     Lateral E/e': 10.8  Medial E/e': 17.5  RV S Rg: 8.9 cm/sec     ______________________________________________________________________________  Report approved by: Radha Overton 2023 02:39 PM             Echo:  No results found for this or any previous visit (from the past 4320 hour(s)).    Clinically Significant Risk Factors        # Hyperkalemia: Highest K = 5.5 mmol/L in last 2 days, will monitor as appropriate  # Hyponatremia: Lowest Na = 126 mmol/L in last 2 days, will monitor as appropriate    # Hypomagnesemia: Lowest Mg = 1.6 mg/dL in last 2 days, will replace as needed   # Hypoalbuminemia: Lowest albumin = 3.1 g/dL at 6/10/2023 10:51 AM, will monitor as appropriate  # Coagulation Defect: INR = 3.53 (Ref range: 0.85 - 1.15) and/or PTT = N/A, will monitor for bleeding  # Thrombocytopenia: Lowest platelets = 107 in last 2 days, will monitor for bleeding   # Hypertension: Noted on problem list        # Obesity: Estimated body mass index is 36.03 kg/m  as calculated from the following:    Height as of this encounter: 1.549 m (5' 1\").    Weight as of this encounter: 86.5 kg (190 lb 11.2 oz).                     "

## 2023-06-13 NOTE — PROGRESS NOTES
Hematology/Oncology:      CHART CHECK:     Today's Date: 06/13/23  Date of Admission: 06/2/23  Reason for Consult: Elevated kappa free light chains    -Kappa free light chains minimally elevated  -SPEP negative for monoclonal protein  -Serum immunofixation still pending  -Peripheral smear revealed benign findings  -We will continue to follow labs      Eli Good PA-C  Hematology/Oncology  [facility]

## 2023-06-13 NOTE — PLAN OF CARE
Inpatient: [id number]    Dx: Acute intractable abdominal pain & diarrhea - mild gastritis. Dyspnea and BLE.       Orientation: Patient alert and confused and is disoriented to time.   Pain: Reporting a cramping type diffuse pain rated 6/10.   Activity: Patient is a full assist of 2 with walker and gait belt. Mobility moderately impaired.   LDA: Left PIV SL.   Diet: Patient on regular diet and encouraged to drink Ensure between meals.   Neuro: Disoriented to time. Per family at bedside - they sometimes cannot make out what patient is trying to say (secondary language) - they feel she can be disoriented and confused at times. Pupils seemed fixed at 4 mm - Slow to react to light.   Cardio: Patient declines chest pain. Apical pulse regular and pulses +1. Patient has significant diffuse swelling. Abdomen is swollen - BLE edema at +2. Patients left extremity is swollen compared to the right.    Resp: Dyspnea and labored breathing upon exertion. Patient has infrequent nonproductive cough. All lung sounds were diminished.   MS: Mobility is moderately impaired with generalized weakness.   GI: Abdomen distended and swollen - patient continues to have loose stools. Abdomen feels tender. Describes pain as cramping in nature with pain rated 6/10.   : Incontinent possibly due to mobility issues.   Derm: Skin pale and mucous membrans dry. Slight tenting visualized. Scattered bruising noted.   Plan: Awaiting cardiology consultation to follow up with echocardiogram performed 6/12/23.     Patient needs Tamil  - son able to help with communication as he is heavily involved in her care and sitting at bedside.     Son is concerned of decline in patients health - disoriented, incoherent at times, and confusion ongoing. Patient's swelling in BLE, BUE (L>R), and abdomen not improving. Patient gets severely dyspneic and SOB upon exertion with breathing becoming labored.     Continued to have 3 episodes of loose stools on my  "shift - treated with PRN loperamide. Pain being intervened with PRN hydromorphone PO.     Patient was able to drink a serving of Ensure at 1910 and tolerated well. Fluids and food encourage - overall intake has been poor over course of admission.     At shift handoff - was told that nuclear tech accidentally cancelled order for HIDA scan in the AM of 6/13/23. Attempted to page GI to notify them 4x throughout shift and no return.     Patient on Mg, K, and P standard protocols - all ordered for AM draw.     BP (!) 145/89 (BP Location: Left arm, Patient Position: Sitting, Cuff Size: Adult Large)   Pulse 72   Temp 97.5  F (36.4  C) (Oral)   Resp 22   Ht 1.549 m (5' 1\")   Wt 86.5 kg (190 lb 11.2 oz)   LMP  (LMP Unknown)   SpO2 98%   BMI 36.03 kg/m       "

## 2023-06-13 NOTE — PROGRESS NOTES
"GASTROENTEROLOGY PROGRESS NOTE       SUBJECTIVE:  Diarrhea has resolved. Big issue is that she feels as if there is a baby in her abdomen that prevents her from eating. Today had a HIDA that showed a low EF. Symptoms were not reproduced by the CCK infusion. Minimal po intake.     OBJECTIVE:  /83   Pulse 72   Temp (!) 96.7  F (35.9  C)   Resp 20   Ht 1.549 m (5' 1\")   Wt 86.5 kg (190 lb 11.2 oz)   LMP  (LMP Unknown)   SpO2 91%   BMI 36.03 kg/m    Temp (24hrs), Av.7  F (35.9  C), Min:96.7  F (35.9  C), Max:96.7  F (35.9  C)    Patient Vitals for the past 72 hrs:   Weight   23 1135 86.5 kg (190 lb 11.2 oz)     No intake or output data in the 24 hours ending 23 1538     PHYSICAL EXAM     Cardiovascular: Normal  Respiratory: Normal  Gastrointestinal: Active BS, soft, reports discomfort with palpation, but has no facial changes suggesting pain  JOINT/EXTREMITIES: Edematous legs      Recent Labs   Lab Test 23  0636 23  0709 23  0656 23  2347 22  1648   WBC 5.6 6.9 6.4   < > 9.0   HGB 10.8* 11.0* 10.9*   < > 12.1   MCV 95 95 94   < > 94   * 109* 101*   < > 257   INR  --   --  3.53*  --  0.99    < > = values in this interval not displayed.     Recent Labs   Lab Test 23  0636 23  0709 23  1122   POTASSIUM 5.5* 4.8 5.3   CHLORIDE 93* 94* 97*   CO2 24 25 24   BUN 24.9* 18.4 17.0   ANIONGAP 9 8 9     Recent Labs   Lab Test 23  0636 23  0709 23  1122 06/10/23  1051 23  0548 23  1550 22  0846 22  1305 02/15/22  0921   ALBUMIN 3.2* 3.4* 3.5 3.1*   < >  --    < >  --   --    BILITOTAL 0.7 0.8 0.7 1.1   < >  --    < >  --   --    ALT  --  91* 108* 72*   < >  --    < >  --   --    AST  --  65* 96* 56*   < >  --    < >  --   --    PROTEIN  --   --   --   --   --  10*  --  Negative Negative    < > = values in this interval not displayed.           Principal Problem:    Diarrhea, unspecified type    Assessment: " Resolved. Not clear how the initial diarrhea is related to current symptoms.  Active Problems:    Abdominal pain/bloating    Assessment: No clear reason. Has fluid overload now and mesenteric edema. Likely did not have fluid overload on admission, but had similar symptoms. Would diurese now, given peripheral edema, increased weight, but not sure how this will change symptoms. Has a low gallbladder EF. Her symptoms are not classic for gallbladder and not sure how the initial diarrhea would be related. Surgery will see.        Robert Davenport MD  [facility]  Office:  [phone]

## 2023-06-13 NOTE — PLAN OF CARE
Goal Outcome Evaluation:      Plan of Care Reviewed With: patient, child      Pt A&O 3, Disoriented to time.  Speech is illogical at times.  VSS. Assist x 1 with gait belt. Refusing to use walker. On room air. RIOS. LS diminished. BS+, Abdomen soft, tender and distended. Was agitated over night, Ativan was given and was effective. Ativan discontinued today by MD. K+ 4.8, Mg 1.8 and Phos 4.7 no replacement and recheck in the morning. Echo done today. EF 55-60%. NPO after mid night. No narcotic after 0300 due to HIDA scan that is scheduled for tomorrow at 0700, Has very poor appetite. C/o abdominal pain, and abdominal bloating.    Son at bedside

## 2023-06-13 NOTE — PLAN OF CARE
INPATIENT NOTE FROM: 1500 - 1900     PRIMARY PROBLEM: Diarrhea & Wheezing     Vital Signs: Stable     Orientation: A/O x 4, son in room interpreting  Neuro: Intact  Pain status: Mild  Resp: Lung sounds, CTA  Cardiac: WDL  GI: Bowel sounds active. Last BM 6/13  : Continent, ambulated twice to bathroom  Skin: Intact  LDA: Peripheral IV SL  Pertinent Labs/Imaging: HIDA negative, EGD, normal  Diet: Regular  Activity: Assist x 2, pt refusing W/G  Alarms/Safety: All alarms on and audible   Consults: cardiology, GI  Discharge Plan: Pain control  Discharge Date: TBD     Will continue to monitor and provide cares.     Ria Senior RN

## 2023-06-13 NOTE — CONSULTS
Chief complaint:  Diarrhea and abdominal pain    HPI:  This patient is a 88 year old female who presents with diarrhea and abdominal pain over the last 3 weeks.  She came into the emergency room the day prior to admission and was diagnosed with pneumonia.  She was given antibiotics but returned to the emergency room the following day.  She was then admitted on 6/2/2023.  Since that time, she has undergone a fairly extensive work-up.  Neither CT nor ultrasound showed any acute abdominal issues.  She had a HIDA scan performed today which revealed no evidence of acute cholecystitis, but did show a mildly decreased gallbladder ejection fraction.  I was asked to comment on this finding.    Past Medical History:   has a past medical history of Acquired hypothyroidism (05/27/2021), Atrial fibrillation, Benign essential hypertension, Coronary artery disease (05/26/2021), and Hyperlipidemia.    Past Surgical History:  Past Surgical History:   Procedure Laterality Date     COLONOSCOPY N/A 6/6/2023    Procedure: COLONOSCOPY, BIOPSIES;  Surgeon: Germán Manning MD;  Location: [facility] OR     Coronary angiogram with stent placement       EP ABLATION AV NODE N/A 10/31/2022    Procedure: Ablation Atrioventricular Node;  Surgeon: Rogelio Whitt MD;  Location: [facility] CARDIAC CATH LAB     EP ABLATION AV NODE N/A 11/01/2022    Procedure: EP Ablation AV Node;  Surgeon: Jd Baker MD;  Location: [facility] CARDIAC CATH LAB     EP PACEMAKER DEVICE & LEAD IMPLANT- RIGHT VENTRICULAR N/A 10/31/2022    Procedure: Pacemaker Device & Lead Implant- Right ventricular;  Surgeon: Rogelio Whitt MD;  Location: [facility] CARDIAC CATH LAB     ESOPHAGOSCOPY, GASTROSCOPY, DUODENOSCOPY (EGD), COMBINED N/A 6/9/2023    Procedure: ESOPHAGOGASTRODUODENOSCOPY;  Surgeon: Germán Manning MD;  Location:  OR        Social History:  Social History     Socioeconomic History     Marital status:      Spouse name: Not on file     Number of children: Not  on file     Years of education: Not on file     Highest education level: Not on file   Occupational History     Not on file   Tobacco Use     Smoking status: Never     Smokeless tobacco: Never   Vaping Use     Vaping status: Never Used   Substance and Sexual Activity     Alcohol use: No     Drug use: No     Sexual activity: Not Currently   Other Topics Concern     Not on file   Social History Narrative     Not on file     Social Determinants of Health     Financial Resource Strain: Not on file   Food Insecurity: Not on file   Transportation Needs: Not on file   Physical Activity: Not on file   Stress: Not on file   Social Connections: Not on file   Intimate Partner Violence: Not on file   Housing Stability: Not on file        Family History:  Family History   Problem Relation Age of Onset     Family History Negative Other        Review of Systems:  The 10 point Review of Systems is negative other than noted in the HPI and above.    Physical Exam:  General - This is a well developed, well nourished female in no apparent distress.  HEENT - Normocephalic, atraumatic.  No scleral icterus.  Neck - supple without masses  Lungs - clear to ascultation.    Heart - Regular rate & rhythm without murmur  Abdomen:   soft, non-distended with vague central abdominal tenderness.    Extremities - warm without edema  Neurologic - nonfocal    Relevant labs:  White blood cell count is 5600.  Hemoglobin is 10.8.  Liver function tests yesterday revealed an alkaline phosphatase of 147, and ALT of 91 and an AST of 65.    Imaging:  Ultrasound shows no hepatobiliary abnormalities.  CT scan shows no acute abnormality within the abdomen.  There is notable subcutaneous edema, however.  HIDA scan today reveals a gallbladder ejection fraction of 22%, which is moderately decreased.  The gallbladder did not fill, however, suggesting that there is no acute cholecystitis.    Assessment and Plan:  This is a patient with fairly vague constitutional  symptoms of uncertain etiology.  Her work-up has thus far been fairly unremarkable relating to her abdomen, but a HIDA scan today did show a slightly decreased gallbladder ejection fraction at 22%.  It seems unlikely that the patient's symptoms are due to her gallbladder.  At this point, I would certainly be hesitant to offer cholecystectomy.  I discussed the case with Dr. Mcdaniel.  She is planning on trying to get some of the extra fluid off of the patient, in the hopes that that will help with her symptoms.  We will follow the patient with you.  If her symptoms begin pointing more towards the gallbladder, we certainly could consider cholecystectomy at some time.      Lavelle Wilkes MD  [facility]

## 2023-06-13 NOTE — PLAN OF CARE
"[id number]    Inpatient Progress Note:  A & O with forgetfulness and confusion. Lung sound diminished. Dyspnea with activity. Bowel sound present all quadrant and hyperactive. Admitting DX: Diarrhea. A X 2 with ambulation . Peripheral IV saline lock. Patient on magnesium ,phosphorus, potassium RN managed protocol. CMP, CBC, electrolyte lab draw this morning. Pain is managed with Tylenol, Dilaudid, and Atarax. C/O pain rating pain at 7. Pr, Dilaudid, and Atarax given once. Prn Xanax given for anxiety. GI/Hematology & Oncology, Cardiology following.    /80 (BP Location: Right arm)   Pulse 66   Temp (!) 96.7  F (35.9  C)   Resp 18   Ht 1.549 m (5' 1\")   Wt 86.5 kg (190 lb 11.2 oz)   LMP  (LMP Unknown)   SpO2 98%   BMI 36.03 kg/m     Will continue to  provide cares.   Ayden Reynaga RN        "

## 2023-06-14 LAB
ANION GAP SERPL CALCULATED.3IONS-SCNC: 10 MMOL/L (ref 7–15)
BASOPHILS # BLD AUTO: 0 10E3/UL (ref 0–0.2)
BASOPHILS NFR BLD AUTO: 0 %
BUN SERPL-MCNC: 34 MG/DL (ref 8–23)
CALCIUM SERPL-MCNC: 8.9 MG/DL (ref 8.8–10.2)
CHLORIDE SERPL-SCNC: 91 MMOL/L (ref 98–107)
CREAT SERPL-MCNC: 0.99 MG/DL (ref 0.51–0.95)
DEPRECATED HCO3 PLAS-SCNC: 21 MMOL/L (ref 22–29)
EOSINOPHIL # BLD AUTO: 0.2 10E3/UL (ref 0–0.7)
EOSINOPHIL NFR BLD AUTO: 2 %
ERYTHROCYTE [DISTWIDTH] IN BLOOD BY AUTOMATED COUNT: 15 % (ref 10–15)
GFR SERPL CREATININE-BSD FRML MDRD: 55 ML/MIN/1.73M2
GLUCOSE SERPL-MCNC: 125 MG/DL (ref 70–99)
HCT VFR BLD AUTO: 34.2 % (ref 35–47)
HGB BLD-MCNC: 11 G/DL (ref 11.7–15.7)
IMM GRANULOCYTES # BLD: 0 10E3/UL
IMM GRANULOCYTES NFR BLD: 1 %
LYMPHOCYTES # BLD AUTO: 0.9 10E3/UL (ref 0.8–5.3)
LYMPHOCYTES NFR BLD AUTO: 14 %
MCH RBC QN AUTO: 30.8 PG (ref 26.5–33)
MCHC RBC AUTO-ENTMCNC: 32.2 G/DL (ref 31.5–36.5)
MCV RBC AUTO: 96 FL (ref 78–100)
MONOCYTES # BLD AUTO: 0.9 10E3/UL (ref 0–1.3)
MONOCYTES NFR BLD AUTO: 13 %
NEUTROPHILS # BLD AUTO: 4.8 10E3/UL (ref 1.6–8.3)
NEUTROPHILS NFR BLD AUTO: 70 %
NRBC # BLD AUTO: 0 10E3/UL
NRBC BLD AUTO-RTO: 0 /100
PLATELET # BLD AUTO: 103 10E3/UL (ref 150–450)
POTASSIUM SERPL-SCNC: 4.6 MMOL/L (ref 3.4–5.3)
RBC # BLD AUTO: 3.57 10E6/UL (ref 3.8–5.2)
SODIUM SERPL-SCNC: 122 MMOL/L (ref 136–145)
WBC # BLD AUTO: 6.8 10E3/UL (ref 4–11)

## 2023-06-14 PROCEDURE — 250N000013 HC RX MED GY IP 250 OP 250 PS 637: Performed by: INTERNAL MEDICINE

## 2023-06-14 PROCEDURE — 250N000013 HC RX MED GY IP 250 OP 250 PS 637: Performed by: PHYSICIAN ASSISTANT

## 2023-06-14 PROCEDURE — 250N000013 HC RX MED GY IP 250 OP 250 PS 637: Performed by: HOSPITALIST

## 2023-06-14 PROCEDURE — 80048 BASIC METABOLIC PNL TOTAL CA: CPT | Performed by: HOSPITALIST

## 2023-06-14 PROCEDURE — 99232 SBSQ HOSP IP/OBS MODERATE 35: CPT | Performed by: HOSPITALIST

## 2023-06-14 PROCEDURE — 250N000013 HC RX MED GY IP 250 OP 250 PS 637: Performed by: NURSE PRACTITIONER

## 2023-06-14 PROCEDURE — 250N000011 HC RX IP 250 OP 636: Performed by: HOSPITALIST

## 2023-06-14 PROCEDURE — 85025 COMPLETE CBC W/AUTO DIFF WBC: CPT | Performed by: HOSPITALIST

## 2023-06-14 PROCEDURE — 120N000001 HC R&B MED SURG/OB

## 2023-06-14 PROCEDURE — 36415 COLL VENOUS BLD VENIPUNCTURE: CPT | Performed by: HOSPITALIST

## 2023-06-14 RX ORDER — LOSARTAN POTASSIUM 25 MG/1
25 TABLET ORAL 2 TIMES DAILY
Status: DISCONTINUED | OUTPATIENT
Start: 2023-06-14 | End: 2023-06-15

## 2023-06-14 RX ORDER — CARVEDILOL 6.25 MG/1
6.25 TABLET ORAL 2 TIMES DAILY WITH MEALS
Status: DISCONTINUED | OUTPATIENT
Start: 2023-06-14 | End: 2023-06-21 | Stop reason: HOSPADM

## 2023-06-14 RX ORDER — SODIUM CHLORIDE 1 G/1
1 TABLET ORAL
Status: DISCONTINUED | OUTPATIENT
Start: 2023-06-14 | End: 2023-06-21 | Stop reason: HOSPADM

## 2023-06-14 RX ORDER — FUROSEMIDE 10 MG/ML
40 INJECTION INTRAMUSCULAR; INTRAVENOUS ONCE
Status: COMPLETED | OUTPATIENT
Start: 2023-06-14 | End: 2023-06-14

## 2023-06-14 RX ADMIN — SODIUM CHLORIDE 1 G: 1 TABLET ORAL at 11:51

## 2023-06-14 RX ADMIN — HYDROMORPHONE HYDROCHLORIDE 1 MG: 2 TABLET ORAL at 22:49

## 2023-06-14 RX ADMIN — LOSARTAN POTASSIUM 50 MG: 50 TABLET, FILM COATED ORAL at 08:52

## 2023-06-14 RX ADMIN — DIPHENHYDRAMINE HYDROCHLORIDE 25 MG: 50 INJECTION INTRAMUSCULAR; INTRAVENOUS at 00:27

## 2023-06-14 RX ADMIN — ONDANSETRON 4 MG: 4 TABLET, ORALLY DISINTEGRATING ORAL at 02:08

## 2023-06-14 RX ADMIN — PANTOPRAZOLE SODIUM 40 MG: 40 TABLET, DELAYED RELEASE ORAL at 08:52

## 2023-06-14 RX ADMIN — LEVOTHYROXINE SODIUM 50 MCG: 50 TABLET ORAL at 08:50

## 2023-06-14 RX ADMIN — SODIUM CHLORIDE 1 G: 1 TABLET ORAL at 17:32

## 2023-06-14 RX ADMIN — ACETAMINOPHEN 650 MG: 325 TABLET, FILM COATED ORAL at 02:09

## 2023-06-14 RX ADMIN — ACETAMINOPHEN 650 MG: 325 TABLET, FILM COATED ORAL at 08:49

## 2023-06-14 RX ADMIN — CARVEDILOL 12.5 MG: 12.5 TABLET, FILM COATED ORAL at 08:52

## 2023-06-14 RX ADMIN — HYDROXYZINE HYDROCHLORIDE 50 MG: 50 TABLET, FILM COATED ORAL at 02:09

## 2023-06-14 RX ADMIN — HYDROMORPHONE HYDROCHLORIDE 1 MG: 2 TABLET ORAL at 06:16

## 2023-06-14 RX ADMIN — ALPRAZOLAM 0.5 MG: 0.5 TABLET ORAL at 23:56

## 2023-06-14 RX ADMIN — HYDROXYZINE HYDROCHLORIDE 25 MG: 50 TABLET, FILM COATED ORAL at 22:50

## 2023-06-14 RX ADMIN — RIVAROXABAN 20 MG: 20 TABLET, FILM COATED ORAL at 17:32

## 2023-06-14 RX ADMIN — Medication 3 MG: at 23:56

## 2023-06-14 RX ADMIN — THERA TABS 1 TABLET: TAB at 08:52

## 2023-06-14 ASSESSMENT — ACTIVITIES OF DAILY LIVING (ADL)
ADLS_ACUITY_SCORE: 33
ADLS_ACUITY_SCORE: 32
ADLS_ACUITY_SCORE: 30
ADLS_ACUITY_SCORE: 33
ADLS_ACUITY_SCORE: 32
ADLS_ACUITY_SCORE: 30
ADLS_ACUITY_SCORE: 33
ADLS_ACUITY_SCORE: 33
ADLS_ACUITY_SCORE: 32
ADLS_ACUITY_SCORE: 33

## 2023-06-14 NOTE — PLAN OF CARE
"/71   Pulse 75   Temp 97.2  F (36.2  C)   Resp 16   Ht 1.549 m (5' 1\")   Wt 86.1 kg (189 lb 13.1 oz)   LMP  (LMP Unknown)   SpO2 100%   BMI 35.87 kg/m      Son interpreting at bedside. Vitals stable, BP soft. Pt alert and oriented x4, confused at times. Bed alarm on for increased safety. Ax2 due to patient refusing gait belt and walker. Per family, patient sleeps all day and is awake all night baseline. He would like us to get patient up to chair overnight, avoid sleeping medication, and give her meals throughout the night to encourage her normal routine. Patient slept most of shift. When awake, patient reports 7/10 abdominal pain, oral pain medication given. Continues to have BLE edema. Due to BP's being low, IV lasix was held. Provider aware. 2 loose stools this shift, stool softeners held this am. Patient tolerating regular diet, still has decreased appetite. Encouraging PO. Plan for strict I & O's and daily weights. Pt resting comfortably. Will continue to provide supportive cares.   "

## 2023-06-14 NOTE — PROGRESS NOTES
Hematology/Oncology:      CHART CHECK:     Today's Date: 06/14/23  Date of Admission: 06/2/23  Reason for Consult: Elevated kappa free light chains     -Kappa free light chains minimally elevated  -SPEP negative for monoclonal protein  -Serum immunofixation negative  -Peripheral smear revealed benign findings  -no new recommendations  -we will sign off, but please call with any questions        Eli Good PA-C  Hematology/Oncology  [facility]

## 2023-06-14 NOTE — PROGRESS NOTES
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   Has had imaging, EGD/colon   Has fluid overload  Echo that showed possible wma  Now abnormal HIDA, surgery consulted    Abdominal pain/Diarrhea    - unclear cause    - work-up including CT abdomen and colonoscopy were unrevealing    - stool studies negative (including O&P, fecal fat)    - biopsies from colonoscopy (6/6):colitis    - no herbal use (except occ clove)    - EGD (6/9): mild erythema of gastric mucosa    - on PPI    - still has not tolerated PO    - HIDA today shows low EF/biliary dyskinesia    - consulted surgery for possible lap tim    - spoke with GI and surgery: not convincing her gallbladder is the issue. Patient describes \"bloating\". She has edema due to IVF. We are trying to diurese her. Did not receive her lasix this am due to soft BPs. At this point I have cut her carvedilol in half and am holding her losartan    - she is tolerating some PO intake    - leaning toward no surgery, so will resume her xarelto for now    Hyponatremia    - chronic    - likely related to GI losses    - will obtain urine studies (Na, Osm)    - started salt tabs    Hepatitis    - noticed previous labs from ED showed mild elevation of LFTs    - no work-up was done    - checking acute hepatitis panel: negative    - holding statin    - CT/US were unrevealing    - HIDA, as above    Dyspnea  Elevated Troponin  BLE Edema    - ECHO showed possible wma    - seen by cards: " outpt stress test    - received lasix 20mg x a on 6/11    - received lasix 40mg x a on 6/13    - has a pending dose for today    - likely iatrogenic fluid overload from IVF    Bicytopenia (Thrombocytopenia, Normocytic Anemia)  Elevated Free Kappa Light Chains    - Unclear etiology.  Possibly secondary to liver issue    - SPEP negative for monoclonal protein    - Serum immunofixation negative    - Peripheral smear revealed benign findings    - heme has signed off    Hypocalcemia and hypomagnesemia    - resolved with replacement      Generalized muscle weakness    - due to acute illness and ongoing GI losses.     - PT consulted and saw patient on 6/3, recommending use of walker which patient is currently refusing even after being educated with family present    - family comfortable with her mobility     Elevated proBNP    - initial proBNP on 5/31 elevated at 14,188 with repeat of 12,008    - prior to this he proBNP was normal at 1,711 in 10/2022    - most recent echocardiogram on 4/10/2023 showed EF of 55-60%    - CT of chest did small bilateral pleural effusions but on exam patient does not appear fluid overloaded    - does not appear to be in heart failure clinically     Recently diagnosed CAP    - diagnosed in ED on 5/31 but with repeat CXR on 6/2 which was negative    - does have a recent dry cough and previously complained of shortness of breath that has resolved    - afebrile and no leukocytosis.     - antibiotics were not continued on admission, holding off since admission since this initially worsened diarrhea, continue to reassess for changes       Mild thrombocytopenia    - platelet count of 147 on 6/3 with slight decrease to 143 on 6/4    - no evidence of bleeding     Tachy-uzair syndrome s/p PPM (10/2022) and redo AV node ablation (11/2022)   A-fib    - rate controlled    - continue PTA Carvedilol     - resume xarelto     HTN    - low today    - carvedilol cut in half    - holding  "losartan     Hypothyroidism    - continue PTA Levothyroxine     - I see she had abnormal thyroid labs in the past (April 2023), will check      Family in room. Updated  Called their GI friend and had to leave a message ([phone])     Diet: Snacks/Supplements Adult: Ensure Clear; Between Meals  Snacks/Supplements Adult: Ensure Enlive; Between Meals  Regular Diet Adult    DVT Prophylaxis: DOAC  Ramos Catheter: Not present  Lines: None     Cardiac Monitoring: None  Code Status: Full Code      Clinically Significant Risk Factors        # Hyperkalemia: Highest K = 5.5 mmol/L in last 2 days, will monitor as appropriate  # Hyponatremia: Lowest Na = 122 mmol/L in last 2 days, will monitor as appropriate    # Hypomagnesemia: Lowest Mg = 1.6 mg/dL in last 2 days, will replace as needed   # Hypoalbuminemia: Lowest albumin = 3.1 g/dL at 6/10/2023 10:51 AM, will monitor as appropriate   # Thrombocytopenia: Lowest platelets = 103 in last 2 days, will monitor for bleeding   # Hypertension: Noted on problem list        # Obesity: Estimated body mass index is 35.87 kg/m  as calculated from the following:    Height as of this encounter: 1.549 m (5' 1\").    Weight as of this encounter: 86.1 kg (189 lb 13.1 oz).           Disposition Plan      Expected Discharge Date: 06/16/2023                  Darryl Mcdaniel MD  Hospitalist Service  [facility]  Securely message with [employer] (more info)  Text page via [employer] Paging/Directory   ______________________________________________________________________    Interval History   Patient sleeps during the day and is up at night at baseline. Sleeping. Arouses to voice. C/o all over abdominal pain/fullness. No nausea or vomiting. No diarrhea.    Physical Exam   Vital Signs: Temp: 97.2  F (36.2  C) Temp src: Oral BP: 104/71 Pulse: 75   Resp: 16 SpO2: 100 % O2 Device: None (Room air)    Weight: 189 lbs 13.06 oz    Constitutional: cooperative, no apparent distress, and " appears stated age  Eyes: Lids and lashes normal, pupils equal, round and reactive to light, extra ocular muscles intact, sclera clear, conjunctiva normal  ENT: Normocephalic, without obvious abnormality, atraumatic, sinuses nontender on palpation, external ears without lesions, oral pharynx with moist mucous membranes, tonsils without erythema or exudates, gums normal and good dentition.  Respiratory: No increased work of breathing, good air exchange, clear to auscultation bilaterally, no crackles or wheezing  Cardiovascular: Normal apical impulse, regular rate and rhythm, normal S1 and S2, no S3 or S4, and no murmur noted  GI: soft.+BS, diffuse tenderness  Has bilateral edema, some improvement    Medical Decision Making       30 MINUTES SPENT BY ME on the date of service doing chart review, history, exam, documentation & further activities per the note.      Data     I have personally reviewed the following data over the past 24 hrs:    6.8  \   11.0 (L)   / 103 (L)     122 (L) 91 (L) 34.0 (H) /  125 (H)   4.6 21 (L) 0.99 (H) \       Imaging results reviewed over the past 24 hrs:   No results found for this or any previous visit (from the past 24 hour(s)).

## 2023-06-14 NOTE — PROGRESS NOTES
"GASTROENTEROLOGY PROGRESS NOTE        SUBJECTIVE:  No further diarrhea, reports abdominal bloating ongoing since admission. Little interest in eating.      OBJECTIVE:    /71   Pulse 75   Temp 97.2  F (36.2  C)   Resp 16   Ht 1.549 m (5' 1\")   Wt 86.1 kg (189 lb 13.1 oz)   LMP  (LMP Unknown)   SpO2 100%   BMI 35.87 kg/m    Temp (24hrs), Av.6  F (36.4  C), Min:97.2  F (36.2  C), Max:98.2  F (36.8  C)    Patient Vitals for the past 72 hrs:   Weight   23 0452 86.1 kg (189 lb 13.1 oz)   23 1135 86.5 kg (190 lb 11.2 oz)       Intake/Output Summary (Last 24 hours) at 2023 1548  Last data filed at 2023 1149  Gross per 24 hour   Intake 600 ml   Output 150 ml   Net 450 ml        PHYSICAL EXAM     Constitutional: NAD    Abdomen: soft, NTTP         Additional Comments:  ROS, FH, SH: See initial GI consult for details.     I have reviewed the patient's new clinical lab results:     Recent Labs   Lab Test 23  0851 23  0636 23  0709 23  0656 23  2347 22  1648   WBC 6.8 5.6 6.9 6.4   < > 9.0   HGB 11.0* 10.8* 11.0* 10.9*   < > 12.1   MCV 96 95 95 94   < > 94   * 107* 109* 101*   < > 257   INR  --   --   --  3.53*  --  0.99    < > = values in this interval not displayed.     Recent Labs   Lab Test 23  0851 23  0636 23  0709   POTASSIUM 4.6 5.5* 4.8   CHLORIDE 91* 93* 94*   CO2 21* 24 25   BUN 34.0* 24.9* 18.4   ANIONGAP 10 9 8     Recent Labs   Lab Test 23  0636 23  0709 23  1122 06/10/23  1051 23  0548 23  1550 22  0846 22  1305 02/15/22  0921   ALBUMIN 3.2* 3.4* 3.5 3.1*   < >  --    < >  --   --    BILITOTAL 0.7 0.8 0.7 1.1   < >  --    < >  --   --    ALT  --  91* 108* 72*   < >  --    < >  --   --    AST  --  65* 96* 56*   < >  --    < >  --   --    PROTEIN  --   --   --   --   --  10*  --  Negative Negative    < > = values in this interval not displayed.       ASSESSMENT/ PLAN  Jeannie " Kimberley is an 87 yo female with a past medical history significant for hypertension, hyperlipidemia, CAD, persistent atrial fibrillationhypothyroidism who was admitted on 6/2/2023 with abdominal pain and diarrhea x 3 weeks.     1. Abdominal pain and diarrhea:     -- Diarrhea is resolved, unclear how the initial diarrhea is related to current symptoms.  -- Stool studies negative (including O&P, fecal fat)  -- Abdominal pain/bloating: No clear reason. Has fluid overload now and mesenteric edema. Likely did not have fluid overload on admission, but had similar symptoms. Would diurese now, given peripheral edema, increased weight, but not sure how this will change symptoms. Has a low gallbladder EF. Her symptoms are not classic for gallbladder and not sure how the initial diarrhea would be related.  -- CT abd/ pelvis and colonoscopy unrevealing as to cause. Random colon biopsy with focal active colitis.   -- EGD: mild erythema of gastric mucosa    Medicine team working with patient and son to determine if cholecystectomy is a path they would like to go down.       Discussed with Dr. Davenport.  Evelin Hopkins PA-C  [facility] ( [facility])

## 2023-06-14 NOTE — PLAN OF CARE
"INPATIENT NOTE: [id number]     PRIMARY PROBLEM: Abdominal pain, Diarrhea     BP (!) 144/84 (BP Location: Left arm)   Pulse 73   Temp 97.4  F (36.3  C) (Oral)   Resp 20   Ht 1.549 m (5' 1\")   Wt 86.1 kg (189 lb 13.1 oz)   LMP  (LMP Unknown)   SpO2 98%   BMI 35.87 kg/m         Pt was alert overnight but occasionally confused, Son was at bedside interpreting until about 10pm. Pt was Ax2 refusing to use the walker or gait belt. Pt reported some pain and nausea overnight and did receive some PRN Zofran, tylenol and Dilaudid. Pt did request help to go to the bathroom a couple times overnight and did have 1 bowel movement. Pt is still have BLE edema at +2. HIDA scan was complete and Negative. Pt is on regular diet. Bed alarm is on, Cardiology and GI are following. Discharge plan still TBD. Will continue plan of care.     "

## 2023-06-14 NOTE — PLAN OF CARE
INPATIENT NOTE FROM: 1500 - 2300     PRIMARY PROBLEM: Diarrhea & Wheezing     Vital Signs: Stable     Orientation: A/O x 4, son in room interpreting    Nuro: Intact    Pain status: Abdominal bloating since admission, per son bloating getting bettter    Resp: Lung sounds, CTA    Cardiac: WDL    GI: Bowel sounds active. Last BM 6/14, soft a little formed    : Continent, ambulated twice to bathroom, voiding adequately    Skin: Intact    LDA: Peripheral IV SL    Pertinent Labs/Imaging: Mag, Phos & K+ protocol    Diet: Regular    Activity: Assist x 2, pt refusing W/G, education provided    Alarms/Safety: All alarms on and audible     Consults: GI, Surgery & Hem/Onc    Discharge Plan: Pain/Abd Bloating control, Nacl med and     Lasix IV administrations. No surgery plans for now    Discharge Date: TBD     Will continue to monitor and provide cares.     Ria Senior RN

## 2023-06-14 NOTE — PROGRESS NOTES
"[facility]   General Surgery Progress Note          Assessment and Plan:   Assessment:   Abdominal pain and diarrhea   HIDA showing EF of 21%, possible biliary dyskinesia, no evidence of acute cholecystitis      Plan:   -Diet as tolerated  -GI following  -From a surgical standpoint it seems unlikely that the patient's symptoms are due to her gallbladder. No surgical plans currently. If her symptoms begin pointing more towards the gallbladder we could consider cholecystectomy at some time.   Staff addendum  Pt up drinking ensure on my visit. Complains of bloating and generalized pain all over her abdomen. Discussed with her son symptoms would be atypical for biliary dyskinesia. Will hold off on surgery at this time and monitor  Ghazal Ford MD          Interval History:   Resting in bed. Son at bedside. Still has abdominal pain. Continues to have loose stool. Not able to eat much. Had some nausea.         Physical Exam:   Blood pressure 117/68, pulse 72, temperature 98.2  F (36.8  C), temperature source Oral, resp. rate 16, height 1.549 m (5' 1\"), weight 86.1 kg (189 lb 13.1 oz), SpO2 95 %, not currently breastfeeding.    I/O last 3 completed shifts:  In: 600 [P.O.:600]  Out: -     General: awake and alert, no apparent distress  Abdomen: soft, mildly distended, +mild diffuse tenderness across the abdomen              Data:     Recent Labs   Lab Test 06/13/23  0636 06/12/23  0709 06/11/23  0656   HGB 10.8* 11.0* 10.9*   WBC 5.6 6.9 6.4     Recent Labs   Lab 06/13/23  0636 06/12/23  0709 06/11/23  1122 06/10/23  1051   AST  --  65* 96* 56*   ALT  --  91* 108* 72*   ALKPHOS  --  147* 167* 119*   BILITOTAL 0.7 0.8 0.7 1.1        Bowen Jalloh PA-C  Text page [phone] 8am-4pm  After 4pm call [phone]     "

## 2023-06-15 LAB
ANION GAP SERPL CALCULATED.3IONS-SCNC: 8 MMOL/L (ref 7–15)
BASOPHILS # BLD AUTO: 0 10E3/UL (ref 0–0.2)
BASOPHILS NFR BLD AUTO: 0 %
BUN SERPL-MCNC: 41.6 MG/DL (ref 8–23)
CALCIUM SERPL-MCNC: 9.1 MG/DL (ref 8.8–10.2)
CHLORIDE SERPL-SCNC: 93 MMOL/L (ref 98–107)
CREAT SERPL-MCNC: 1.12 MG/DL (ref 0.51–0.95)
DEPRECATED HCO3 PLAS-SCNC: 25 MMOL/L (ref 22–29)
EOSINOPHIL # BLD AUTO: 0.2 10E3/UL (ref 0–0.7)
EOSINOPHIL NFR BLD AUTO: 2 %
ERYTHROCYTE [DISTWIDTH] IN BLOOD BY AUTOMATED COUNT: 14.8 % (ref 10–15)
GFR SERPL CREATININE-BSD FRML MDRD: 47 ML/MIN/1.73M2
GLUCOSE SERPL-MCNC: 100 MG/DL (ref 70–99)
HCT VFR BLD AUTO: 34.1 % (ref 35–47)
HGB BLD-MCNC: 11.1 G/DL (ref 11.7–15.7)
IMM GRANULOCYTES # BLD: 0 10E3/UL
IMM GRANULOCYTES NFR BLD: 0 %
LYMPHOCYTES # BLD AUTO: 0.9 10E3/UL (ref 0.8–5.3)
LYMPHOCYTES NFR BLD AUTO: 14 %
MAGNESIUM SERPL-MCNC: 1.7 MG/DL (ref 1.7–2.3)
MCH RBC QN AUTO: 30.5 PG (ref 26.5–33)
MCHC RBC AUTO-ENTMCNC: 32.6 G/DL (ref 31.5–36.5)
MCV RBC AUTO: 94 FL (ref 78–100)
MONOCYTES # BLD AUTO: 1.2 10E3/UL (ref 0–1.3)
MONOCYTES NFR BLD AUTO: 18 %
NEUTROPHILS # BLD AUTO: 4.4 10E3/UL (ref 1.6–8.3)
NEUTROPHILS NFR BLD AUTO: 66 %
NRBC # BLD AUTO: 0 10E3/UL
NRBC BLD AUTO-RTO: 0 /100
OSMOLALITY UR: 476 MMOL/KG (ref 100–1200)
PLATELET # BLD AUTO: 109 10E3/UL (ref 150–450)
POTASSIUM SERPL-SCNC: 5 MMOL/L (ref 3.4–5.3)
RBC # BLD AUTO: 3.64 10E6/UL (ref 3.8–5.2)
SODIUM SERPL-SCNC: 126 MMOL/L (ref 136–145)
SODIUM UR-SCNC: 20 MMOL/L
WBC # BLD AUTO: 6.7 10E3/UL (ref 4–11)

## 2023-06-15 PROCEDURE — 250N000013 HC RX MED GY IP 250 OP 250 PS 637: Performed by: NURSE PRACTITIONER

## 2023-06-15 PROCEDURE — 99231 SBSQ HOSP IP/OBS SF/LOW 25: CPT | Performed by: PHYSICIAN ASSISTANT

## 2023-06-15 PROCEDURE — 99232 SBSQ HOSP IP/OBS MODERATE 35: CPT | Performed by: HOSPITALIST

## 2023-06-15 PROCEDURE — 85025 COMPLETE CBC W/AUTO DIFF WBC: CPT | Performed by: HOSPITALIST

## 2023-06-15 PROCEDURE — 250N000013 HC RX MED GY IP 250 OP 250 PS 637: Performed by: HOSPITALIST

## 2023-06-15 PROCEDURE — 83935 ASSAY OF URINE OSMOLALITY: CPT | Performed by: HOSPITALIST

## 2023-06-15 PROCEDURE — 83735 ASSAY OF MAGNESIUM: CPT | Performed by: SURGERY

## 2023-06-15 PROCEDURE — 99231 SBSQ HOSP IP/OBS SF/LOW 25: CPT | Performed by: SURGERY

## 2023-06-15 PROCEDURE — 36415 COLL VENOUS BLD VENIPUNCTURE: CPT | Performed by: HOSPITALIST

## 2023-06-15 PROCEDURE — 120N000001 HC R&B MED SURG/OB

## 2023-06-15 PROCEDURE — 82310 ASSAY OF CALCIUM: CPT | Performed by: HOSPITALIST

## 2023-06-15 PROCEDURE — 250N000013 HC RX MED GY IP 250 OP 250 PS 637: Performed by: PHYSICIAN ASSISTANT

## 2023-06-15 PROCEDURE — 84300 ASSAY OF URINE SODIUM: CPT | Performed by: HOSPITALIST

## 2023-06-15 PROCEDURE — 250N000013 HC RX MED GY IP 250 OP 250 PS 637: Performed by: INTERNAL MEDICINE

## 2023-06-15 PROCEDURE — 83550 IRON BINDING TEST: CPT | Performed by: NURSE PRACTITIONER

## 2023-06-15 RX ORDER — PROMETHAZINE HYDROCHLORIDE 25 MG/1
12.5 TABLET ORAL EVERY 6 HOURS
Status: DISCONTINUED | OUTPATIENT
Start: 2023-06-15 | End: 2023-06-15

## 2023-06-15 RX ORDER — PROMETHAZINE HYDROCHLORIDE 25 MG/1
12.5 TABLET ORAL EVERY 6 HOURS
Status: DISCONTINUED | OUTPATIENT
Start: 2023-06-15 | End: 2023-06-16

## 2023-06-15 RX ORDER — PROMETHAZINE HYDROCHLORIDE 25 MG/1
12.5 TABLET ORAL EVERY 6 HOURS PRN
Status: DISCONTINUED | OUTPATIENT
Start: 2023-06-15 | End: 2023-06-15

## 2023-06-15 RX ADMIN — THERA TABS 1 TABLET: TAB at 08:15

## 2023-06-15 RX ADMIN — PROMETHAZINE HYDROCHLORIDE 12.5 MG: 25 TABLET ORAL at 11:55

## 2023-06-15 RX ADMIN — SODIUM CHLORIDE 1 G: 1 TABLET ORAL at 08:15

## 2023-06-15 RX ADMIN — LEVOTHYROXINE SODIUM 50 MCG: 50 TABLET ORAL at 06:40

## 2023-06-15 RX ADMIN — RIVAROXABAN 15 MG: 15 TABLET, FILM COATED ORAL at 18:42

## 2023-06-15 RX ADMIN — SODIUM CHLORIDE 1 G: 1 TABLET ORAL at 11:55

## 2023-06-15 RX ADMIN — PROMETHAZINE HYDROCHLORIDE 12.5 MG: 25 TABLET ORAL at 21:01

## 2023-06-15 RX ADMIN — PANTOPRAZOLE SODIUM 40 MG: 40 TABLET, DELAYED RELEASE ORAL at 06:40

## 2023-06-15 RX ADMIN — HYDROMORPHONE HYDROCHLORIDE 1 MG: 2 TABLET ORAL at 22:19

## 2023-06-15 RX ADMIN — PROMETHAZINE HYDROCHLORIDE 12.5 MG: 25 TABLET ORAL at 16:15

## 2023-06-15 RX ADMIN — CARVEDILOL 6.25 MG: 6.25 TABLET, FILM COATED ORAL at 08:15

## 2023-06-15 RX ADMIN — HYDROXYZINE HYDROCHLORIDE 25 MG: 50 TABLET, FILM COATED ORAL at 22:19

## 2023-06-15 RX ADMIN — SODIUM CHLORIDE 1 G: 1 TABLET ORAL at 18:41

## 2023-06-15 RX ADMIN — CARVEDILOL 6.25 MG: 6.25 TABLET, FILM COATED ORAL at 18:41

## 2023-06-15 ASSESSMENT — ACTIVITIES OF DAILY LIVING (ADL)
ADLS_ACUITY_SCORE: 26
ADLS_ACUITY_SCORE: 30
ADLS_ACUITY_SCORE: 27
ADLS_ACUITY_SCORE: 30
ADLS_ACUITY_SCORE: 30
ADLS_ACUITY_SCORE: 27
ADLS_ACUITY_SCORE: 27
ADLS_ACUITY_SCORE: 30
ADLS_ACUITY_SCORE: 27
ADLS_ACUITY_SCORE: 30

## 2023-06-15 NOTE — PLAN OF CARE
PRIMARY DIAGNOSIS: ABDOMINAL PAIN / DIARRHEA  OUTPATIENT/OBSERVATION GOALS TO BE MET BEFORE DISCHARGE:  1. Pain Status: Improved-controlled with oral pain medications.    2. Return to near baseline physical activity: Yes    3. Cleared for discharge by consultants (if involved): No    Discharge Planner Nurse   Safe discharge environment identified: Yes  Barriers to discharge: Yes       Entered by: Nohemy Draper RN 06/15/2023 5:36 AM     Patient is Aox4, VSS, assist of 1 while holding on to the wall and furniture, refuses to use the gait belt and walker, tolerating regular diet and oral medications but has a very poor appetite, PIV saline locked, on room air, no pain noted at this time, bilateral LE edema, speaks Tamil, strict I&O's, DW, Xanax and Melatonin administered for sleep, it was noted that patient did sleep most of the day, but has been sleeping on and off throughout the night, able to make needs known, will continue to monitor and prove cares.    Please review provider order for any additional goals.   Nurse to notify provider when observation goals have been met and patient is ready for discharge.

## 2023-06-15 NOTE — PLAN OF CARE
Problem: Oral Intake Inadequate  Goal: Improved Oral Intake  Outcome: Not Progressing     Goal Outcome Evaluation:      Plan of Care Reviewed With: child    Overall Patient Progress: decliningOverall Patient Progress: declining    Outcome Evaluation: Pt still not meeting 50% estimated energy needs. Ensure clear building up in room, discontinued for now. Adjusted Ensure enlive to Nepro as pt has elevated phos lab.

## 2023-06-15 NOTE — PROGRESS NOTES
"[facility]   General Surgery Progress Note          Assessment and Plan:   Assessment:   Abdominal pain and diarrhea   HIDA showing EF of 21%, possible biliary dyskinesia, no evidence of acute cholecystitis      Plan:   -Diet as tolerated  -GI following  -From a surgical standpoint it seems unlikely that the patient's symptoms are due to her gallbladder. No surgical plans currently. If her symptoms begin pointing more towards the gallbladder we could consider cholecystectomy at some time.  -Will follow peripherally, please contact us if we can be of further assistance.           Interval History:   Resting in bed. Son at bedside. Son says she is improving and has less abdominal pain. She is restless this afternoon and wants to sleep. She had a soft BM today. Eating some food but not much of an appetite.          Physical Exam:   Blood pressure 111/64, pulse 70, temperature 97.4  F (36.3  C), temperature source Axillary, resp. rate 20, height 1.549 m (5' 1\"), weight 87 kg (191 lb 14.4 oz), SpO2 95 %, not currently breastfeeding.    I/O last 3 completed shifts:  In: -   Out: 350 [Urine:350]    General: awake and alert, mildly agitated   Abdomen: soft, mildly distended, +mild diffuse tenderness across the abdomen              Data:     Recent Labs   Lab Test 06/13/23  0636 06/12/23  0709 06/11/23  0656   HGB 10.8* 11.0* 10.9*   WBC 5.6 6.9 6.4     Recent Labs   Lab 06/13/23  0636 06/12/23  0709 06/11/23  1122 06/10/23  1051   AST  --  65* 96* 56*   ALT  --  91* 108* 72*   ALKPHOS  --  147* 167* 119*   BILITOTAL 0.7 0.8 0.7 1.1        Bowen Jalloh PA-C  Text page [phone] 8am-4pm  After 4pm call [phone]     Seen and agree,    Lavelle Wilkes MD  [facility]    "

## 2023-06-15 NOTE — PROGRESS NOTES
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   Has had imaging, EGD/colon   Has fluid overload  Echo that showed possible wma  Now abnormal HIDA, surgery consulted. No plans for surgery  Advancing diet  May need TCU    Abdominal pain/Diarrhea    - unclear cause    - work-up including CT abdomen and colonoscopy were unrevealing    - stool studies negative (including O&P, fecal fat)    - biopsies from colonoscopy (6/6):colitis    - no herbal use (except occ clove)    - EGD (6/9): mild erythema of gastric mucosa    - on PPI    - still has not tolerated PO    - HIDA today shows low EF/biliary dyskinesia    - consulted surgery for possible lap tim    - spoke with GI and surgery: not convincing her gallbladder is the issue. Patient describes \"bloating\". She has edema due to IVF. We are trying to diurese her. Did not receive her lasix this am due to soft BPs. At this point I have cut her carvedilol in half and am holding her losartan. I am not able to give her any more diuretics    - she is tolerating some PO intake, son is increasing this    - no surgery, resumed xarelto    Hyponatremia    - chronic    - likely related to GI losses    - will obtain urine studies (Na, Osm)    - started salt tabs    - improved today    Hepatitis    - noticed previous labs from ED showed mild elevation of LFTs    - no work-up was done    - checking acute hepatitis panel: negative    - holding statin    - CT/US were unrevealing    " "[facility]   [phone]    Post Pacemaker / Defibrillator Implant Instructions      1. Resume aspirin/Plaxiv/Coumadin on __________________________________.    2. The dressing may be removed the next day.    3. If steri-strips were used, they should not be removed. Allow them to \"fall off\".    4. You may shower after the dressing is removed. Do not allow shower water to hit directly on incision.    5. No lotion/powder/ointment/cream on incision until it is healed.    6. Gently wash incision daily with antibacterial soap and water and pat dry.    7. You may reapply a dressing if there is drainage, otherwise leave your incision open to air. If you reapply a dressing, please notify the pacemaker clinic.    8. No heavy lifting, pulling, or pushing.    9. Do not raise the affected arm over your head for a minimum of 1 month.    10. The pacemaker clinic will contact you (usually within 1 business day) to schedule a pacemaker/incision check. The check is usually done 7-10 days post-implant. If you have not heard from the pacemaker clinic within 3 days, please call the office.    11. Please call the office if you experience any of the following:   bleeding or drainage from your incision   swelling, redness, or opening of your incision   fever or chills   pain not relieved with medication   chest pain or difficulty breathing   lightheadness    12. For defibrillator patients only: If you receive a shock from your device, please call the office. If you receive 2 or more shocks within a 24 hour period OR if you receive 1 shock and feel poorly, you should be evaluated in the emergency room. Please DO NOT DRIVE if you have received a shock until your device has been checked          .Moderate Conscious Sedation, Adult, Care After  Refer to this sheet in the next few weeks. These instructions provide you with information on caring for yourself after your procedure. Your health care provider may also give you " - HIDA, as above    Dyspnea  Elevated Troponin  BLE Edema    - ECHO showed possible wma    - seen by cards: outpt stress test    - received lasix 20mg x a on 6/11    - received lasix 40mg x a on 6/13    - has a pending dose for today    - likely iatrogenic fluid overload from IVF    Bicytopenia (Thrombocytopenia, Normocytic Anemia)  Elevated Free Kappa Light Chains    - Unclear etiology.  Possibly secondary to liver issue    - SPEP negative for monoclonal protein    - Serum immunofixation negative    - Peripheral smear revealed benign findings    - heme has signed off    Hypocalcemia and hypomagnesemia    - resolved with replacement      Generalized muscle weakness    - due to acute illness and ongoing GI losses.     - PT consulted and saw patient on 6/3, recommending use of walker which patient is currently refusing even after being educated with family present    - family comfortable with her mobility     Elevated proBNP    - initial proBNP on 5/31 elevated at 14,188 with repeat of 12,008    - prior to this he proBNP was normal at 1,711 in 10/2022    - most recent echocardiogram on 4/10/2023 showed EF of 55-60%    - CT of chest did small bilateral pleural effusions but on exam patient does not appear fluid overloaded    - does not appear to be in heart failure clinically     Recently diagnosed CAP    - diagnosed in ED on 5/31 but with repeat CXR on 6/2 which was negative    - does have a recent dry cough and previously complained of shortness of breath that has resolved    - afebrile and no leukocytosis.     - antibiotics were not continued on admission, holding off since admission since this initially worsened diarrhea, continue to reassess for changes       Mild thrombocytopenia    - platelet count of 147 on 6/3 with slight decrease to 143 on 6/4    - no evidence of bleeding     Tachy-uzair syndrome s/p PPM (10/2022) and redo AV node ablation (11/2022)   A-fib    - rate controlled    - continue PTA Carvedilol     more specific instructions. Your treatment has been planned according to current medical practices, but problems sometimes occur. Call your health care provider if you have any problems or questions after your procedure.  WHAT TO EXPECT AFTER THE PROCEDURE   After your procedure:  · You may feel sleepy, clumsy, and have poor balance for several hours.  · Vomiting may occur if you eat too soon after the procedure.  HOME CARE INSTRUCTIONS  · Do not participate in any activities where you could become injured for at least 24 hours. Do not:    Drive.    Swim.    Ride a bicycle.    Operate heavy machinery.    Cook.    Use power tools.    Climb ladders.    Work from a high place.  · Do not make important decisions or sign legal documents until you are improved.  · If you vomit, drink water, juice, or soup when you can drink without vomiting. Make sure you have little or no nausea before eating solid foods.  · Only take over-the-counter or prescription medicines for pain, discomfort, or fever as directed by your health care provider.  · Make sure you and your family fully understand everything about the medicines given to you, including what side effects may occur.  · You should not drink alcohol, take sleeping pills, or take medicines that cause drowsiness for at least 24 hours.  · If you smoke, do not smoke without supervision.  · If you are feeling better, you may resume normal activities 24 hours after you were sedated.  · Keep all appointments with your health care provider.  · You should have a responsible adult stay with you for the first 24 hours post procedure.  SEEK MEDICAL CARE IF:  · Your skin is pale or bluish in color.  · You continue to feel nauseous or vomit.  · Your pain is getting worse and is not helped by medicine.  · You have bleeding or swelling.  · You are still sleepy or feeling clumsy after 24 hours.  SEEK IMMEDIATE MEDICAL CARE IF:  · You develop a rash.  · You have difficulty breathing.  · You  " - resume xarelto     HTN    - low today    - carvedilol cut in half    - holding losartan     Hypothyroidism    - continue PTA Levothyroxine     - I see she had abnormal thyroid labs in the past (April 2023), will check      Family in room. Updated  Called their GI friend and had to leave a message ([phone])     Diet: Snacks/Supplements Adult: Ensure Clear; Between Meals  Snacks/Supplements Adult: Ensure Enlive; Between Meals  Regular Diet Adult    DVT Prophylaxis: DOAC  Ramos Catheter: Not present  Lines: None     Cardiac Monitoring: None  Code Status: Full Code      Clinically Significant Risk Factors         # Hyponatremia: Lowest Na = 122 mmol/L in last 2 days, will monitor as appropriate      # Hypoalbuminemia: Lowest albumin = 3.1 g/dL at 6/10/2023 10:51 AM, will monitor as appropriate   # Thrombocytopenia: Lowest platelets = 103 in last 2 days, will monitor for bleeding   # Hypertension: Noted on problem list        # Obesity: Estimated body mass index is 36.26 kg/m  as calculated from the following:    Height as of this encounter: 1.549 m (5' 1\").    Weight as of this encounter: 87 kg (191 lb 14.4 oz).           Disposition Plan      Expected Discharge Date: 06/16/2023                  Darryl Mcdaniel MD  Hospitalist Service  [facility]  Securely message with [employer] (more info)  Text page via [employer] Paging/Directory   ______________________________________________________________________    Interval History   Patient sleeps during the day and is up at night at baseline. No abdo pain today. States the bed is uncomfortable    Physical Exam   Vital Signs: Temp: 97.4  F (36.3  C) Temp src: Axillary BP: 111/64 Pulse: 70   Resp: 20 SpO2: 95 % O2 Device: None (Room air)    Weight: 191 lbs 14.4 oz    Constitutional: cooperative, no apparent distress, and appears stated age  Eyes: Lids and lashes normal, pupils equal, round and reactive to light, extra ocular muscles intact, sclera clear, " develop any type of allergic problem.  · You have a fever.  MAKE SURE YOU:  · Understand these instructions.  · Will watch your condition.  · Will get help right away if you are not doing well or get worse.     This information is not intended to replace advice given to you by your health care provider. Make sure you discuss any questions you have with your health care provider.     Document Released: 10/08/2014 Document Revised: 01/08/2016 Document Reviewed: 10/08/2014  [employer] Interactive Patient Education ©2016 [employer]       conjunctiva normal  ENT: Normocephalic, without obvious abnormality, atraumatic, sinuses nontender on palpation, external ears without lesions, oral pharynx with moist mucous membranes, tonsils without erythema or exudates, gums normal and good dentition.  Respiratory: No increased work of breathing, good air exchange, clear to auscultation bilaterally, no crackles or wheezing  Cardiovascular: Normal apical impulse, regular rate and rhythm, normal S1 and S2, no S3 or S4, and no murmur noted  GI: soft.+BS, diffuse tenderness  Has bilateral edema, some improvement    Medical Decision Making       30 MINUTES SPENT BY ME on the date of service doing chart review, history, exam, documentation & further activities per the note.      Data     I have personally reviewed the following data over the past 24 hrs:    6.7  \   11.1 (L)   / 109 (L)     126 (L) 93 (L) 41.6 (H) /  100 (H)   5.0 25 1.12 (H) \       Imaging results reviewed over the past 24 hrs:   No results found for this or any previous visit (from the past 24 hour(s)).

## 2023-06-15 NOTE — PROGRESS NOTES
"GASTROENTEROLOGY PROGRESS NOTE        SUBJECTIVE:  She reports abdominal fullness. Not able to eat much. Had small, soft stool today. Feels weak and tired.      OBJECTIVE:    BP (!) 145/84   Pulse 73   Temp 97.4  F (36.3  C) (Axillary)   Resp 16   Ht 1.549 m (5' 1\")   Wt 87 kg (191 lb 14.4 oz)   LMP  (LMP Unknown)   SpO2 94%   BMI 36.26 kg/m    Temp (24hrs), Av.6  F (36.4  C), Min:97.2  F (36.2  C), Max:98.2  F (36.8  C)    Patient Vitals for the past 72 hrs:   Weight   06/15/23 0800 87 kg (191 lb 14.4 oz)   23 0452 86.1 kg (189 lb 13.1 oz)   23 1135 86.5 kg (190 lb 11.2 oz)       Intake/Output Summary (Last 24 hours) at 2023 1548  Last data filed at 2023 1149  Gross per 24 hour   Intake 600 ml   Output 150 ml   Net 450 ml        PHYSICAL EXAM     Constitutional: Weak and tired    Abdomen:Generalized tenderness on palpation.          Additional Comments:  ROS, FH, SH: See initial GI consult for details.     I have reviewed the patient's new clinical lab results:     Recent Labs   Lab Test 06/15/23  0642 23  0851 23  0636 23  0709 23  0656 23  2347 22  1648   WBC 6.7 6.8 5.6   < > 6.4   < > 9.0   HGB 11.1* 11.0* 10.8*   < > 10.9*   < > 12.1   MCV 94 96 95   < > 94   < > 94   * 103* 107*   < > 101*   < > 257   INR  --   --   --   --  3.53*  --  0.99    < > = values in this interval not displayed.     Recent Labs   Lab Test 06/15/23  0642 23  0851 23  0636   POTASSIUM 5.0 4.6 5.5*   CHLORIDE 93* 91* 93*   CO2 25 21* 24   BUN 41.6* 34.0* 24.9*   ANIONGAP 8 10 9     Recent Labs   Lab Test 23  0636 23  0709 23  1122 06/10/23  1051 23  0548 23  1550 22  0846 22  1305 02/15/22  0921   ALBUMIN 3.2* 3.4* 3.5 3.1*   < >  --    < >  --   --    BILITOTAL 0.7 0.8 0.7 1.1   < >  --    < >  --   --    ALT  --  91* 108* 72*   < >  --    < >  --   --    AST  --  65* 96* 56*   < >  --    < >  --   --  "   PROTEIN  --   --   --   --   --  10*  --  Negative Negative    < > = values in this interval not displayed.       ASSESSMENT/ PLAN  This is a 87 yo female with a past medical history significant for hypertension, hyperlipidemia, CAD with stent several years ago, persistent atrial fibrillation, hypothyroidism who was admitted on 6/2/2023 with abdominal pain and diarrhea x 3 weeks.     1. Abdominal pain and diarrhea:     -- Diarrhea is resolved, unclear how the initial diarrhea is related to current symptoms.  -- Stool studies negative (including O&P, fecal fat)  -- Abdominal pain/bloating: No clear reason. Has fluid overload now and mesenteric edema. Likely did not have fluid overload on admission, but had similar symptoms. Would diurese now, given peripheral edema, increased weight, but not sure how this will change symptoms. Has a low gallbladder EF. Her symptoms are not classic for gallbladder and not sure how the initial diarrhea would be related.  -- CT abd/ pelvis,and colonoscopy unrevealing but the random colon biopsy with focal active colitis.  -- EGD: mild erythema of gastric mucosa  - Son is requesting GES study. Four hour GES could be considered as an outpatient  - Starting her on promethazine 12.5 every 6 hours to help with abdominal fullness.    - Surgery was consulted for mildly biliary dyskinesia. Not sure if this is contributing to abdominal pain.  - Her ongoing symptoms are most likely irritable bowel syndrome.     Spent 25 minutes in direct patient care.     Jennifer Soto CNP   [facility] ([facility])  [phone]

## 2023-06-15 NOTE — PLAN OF CARE
"/64 (BP Location: Right arm)   Pulse 70   Temp 97.4  F (36.3  C) (Axillary)   Resp 20   Ht 1.549 m (5' 1\")   Wt 87 kg (191 lb 14.4 oz)   LMP  (LMP Unknown)   SpO2 95%   BMI 36.26 kg/m      Son interpreting at bedside. Vitals stable, BP continues to be soft. Alert and oriented x4, intermittent confusion. Bed alarm on for increased safety. Ax1-2 due to patient refusing to use a walker or gait belt. No complaints of abdominal pain today. No loose stools this shift. Pt tolerating regular diet, poor PO intake- encouraging PO. Up to the chair this shift, elevating legs. +2/+3 BLE edema. Continuing strict I&O's and daily weights. Pt resting comfortably. Will continue to provide supportive cares.   "

## 2023-06-15 NOTE — PROGRESS NOTES
"CLINICAL NUTRITION SERVICES - REASSESSMENT NOTE     Nutrition Prescription    RECOMMENDATIONS FOR MDs/PROVIDERS TO ORDER:  Consider discussion regarding nutrition support if within pt's GOC.     Malnutrition Status:    Patient does not meet criteria for malnutrition    Recommendations already ordered by Registered Dietitian (RD):  Discontinued Ensure clear as they are building up in room  Adjusted Ensure to Nepro dt elevated phos    Future/Additional Recommendations:  Encourage PO intake  Adjust supplements pending PO intake/patient preference       EVALUATION OF THE PROGRESS TOWARD GOALS   Diet: Regular  Nutrition Support: none, son is not yet ready to consider this  Intake: Per son pt had 1/2 Ensure and 1 slice of bread today and has been sleeping most of the day.        NEW FINDINGS   Yesterday, pt had 2 full Vanilla Ensure and bites of Gelatein. Per chart, pt has little interest in food, very poor diet, yet is eating small amounts. Ensures building up in room.     06/15/23 0800 87 kg (191 lb 14.4 oz)   06/14/23 0452 86.1 kg (189 lb 13.1 oz)   06/12/23 1135 86.5 kg (190 lb 11.2 oz)   06/04/23 0731 74.5 kg (164 lb 3.9 oz)   06/03/23 0218 74.5 kg (164 lb 3.9 oz)     Net fluid up 4.1L (9lbs) since admit per I/Os    ANTHROPOMETRICS  Height: 154.9 cm (5' 1\")  Most Recent Weight: 87 kg (191 lb 14.4 oz)    IBW: 47.8 kg  BMI: Obesity Grade II BMI 35-39.9  Weight History:   Wt Readings from Last 30 Encounters:   06/15/23 87 kg (191 lb 14.4 oz)   05/31/23 70.3 kg (155 lb)   05/25/23 69.9 kg (154 lb)   04/20/23 70.5 kg (155 lb 6.4 oz)   04/03/23 70.5 kg (155 lb 8 oz)   03/31/23 70.5 kg (155 lb 8 oz)   02/09/23 69.4 kg (153 lb)   01/17/23 68 kg (150 lb)   11/10/22 69.4 kg (153 lb)   11/02/22 66.7 kg (147 lb 0.8 oz)   10/14/22 68.5 kg (151 lb)   10/13/22 70.8 kg (156 lb)   07/07/22 69.9 kg (154 lb)   05/27/22 70 kg (154 lb 4.8 oz)   03/16/22 71.9 kg (158 lb 8 oz)   03/11/22 71.7 kg (158 lb)   03/02/22 71.6 kg (157 lb 12.8 oz) "   02/16/22 72 kg (158 lb 11.2 oz)   11/29/21 72.5 kg (159 lb 14.4 oz)   08/11/21 72.6 kg (160 lb)   07/27/21 72.8 kg (160 lb 8 oz)   07/25/21 73.1 kg (161 lb 1.6 oz)   06/28/21 72.1 kg (159 lb)   05/26/21 72.1 kg (159 lb)   04/30/21 72.1 kg (159 lb)   07/17/20 72.6 kg (160 lb)   07/02/20 72.6 kg (160 lb)   02/27/20 70.8 kg (156 lb)   01/13/20 69.4 kg (153 lb)   05/28/15 73 kg (161 lb)         PHYSICAL FINDINGS  See malnutrition section below.  Scattered bruising  Poor skin turgor     GI CONCERNS  Bloating and Pt declined abdominal pain to son yet stated that she was still having abdominal discomfort. LBM 6/15/23    LABS  Reviewed: Na 126, UN 41.6, Cr 1.12, GFR 47, Phos 4.7, alb 3.2, tot pro 6.1, alk phos 147, alt 91, ast 65    MEDICATIONS  Reviewed: synthroid/levothroid, thera-vit, protonix, melatonin     Dosing Weight: 86.5 kg and 57.5kg ABW for protein and fluid     ASSESSED NUTRITION NEEDS  Estimated Energy Needs: 1493 kcals/day ([place] Jeor)  Justification: Obese  Estimated Protein Needs: 57-69 grams protein/day (1 - 1.2 grams of pro/kg)  Justification: Maintenance  Estimated Fluid Needs: 5884-8613 mL/day (25 - 30 mL/kg)   Justification: Maintenance    MALNUTRITION:  % Weight Loss:  None noted  % Intake:  Decreased intake does not meet criteria for malnutrition as pt has been meeting < 75% estimated needs for 5 days.  Subcutaneous Fat Loss:  Upper arm region moderate depletion  Muscle Loss:  Temporal region mild depletion  Fluid Retention:  Moderate 2+    Malnutrition Diagnosis: Patient does not meet two of the above criteria necessary for diagnosing malnutrition  In Context of:  Acute illness or injury    Previous Goals   Patient to consume % of nutritionally adequate meals three times per day, or the equivalent with supplements/snacks.  Total avg nutritional intake to meet a minimum of 1493 kcal/kg and 57 g PRO/kg daily (per dosing wt 86.5 kg and 57.5 kg).  Evaluation: Not met    Previous Nutrition  Diagnosis  Inadequate protein-energy intake related to hepatitis, gastritis, colitis as evidenced by meeting 65% estimated energy needs and 79% estimated protein needs since diet advanced to regular  Evaluation: Declining    CURRENT NUTRITION DIAGNOSIS  nadequate protein-energy intake related to hepatitis, gastritis, colitis as evidenced by meeting 47% estimated energy needs and 71% estimated protein needs since diet advanced to regular    INTERVENTIONS  Implementation  Collaboration with other providers    Goals  Patient to consume % of nutritionally adequate meals three times per day, or the equivalent with supplements/snacks.  Total avg nutritional intake to meet a minimum of 1493 kcal/kg and 57 g PRO/kg daily (per dosing wt 86.5 kg and 57.5 kg).    Monitoring/Evaluation  Progress toward goals will be monitored and evaluated per protocol. PO intake, supplement tolerance, wt, labs

## 2023-06-16 ENCOUNTER — APPOINTMENT (OUTPATIENT)
Dept: PHYSICAL THERAPY | Facility: CLINIC | Age: 88
End: 2023-06-16
Attending: HOSPITALIST
Payer: COMMERCIAL

## 2023-06-16 ENCOUNTER — APPOINTMENT (OUTPATIENT)
Dept: ULTRASOUND IMAGING | Facility: CLINIC | Age: 88
End: 2023-06-16
Attending: NURSE PRACTITIONER
Payer: COMMERCIAL

## 2023-06-16 LAB
ANION GAP SERPL CALCULATED.3IONS-SCNC: 9 MMOL/L (ref 7–15)
BUN SERPL-MCNC: 41.4 MG/DL (ref 8–23)
CALCIUM SERPL-MCNC: 9.1 MG/DL (ref 8.8–10.2)
CHLORIDE SERPL-SCNC: 93 MMOL/L (ref 98–107)
CREAT SERPL-MCNC: 0.93 MG/DL (ref 0.51–0.95)
DEPRECATED HCO3 PLAS-SCNC: 23 MMOL/L (ref 22–29)
FERRITIN SERPL-MCNC: NORMAL NG/ML
GFR SERPL CREATININE-BSD FRML MDRD: 59 ML/MIN/1.73M2
GLUCOSE SERPL-MCNC: 114 MG/DL (ref 70–99)
HOLD SPECIMEN: NORMAL
HOLD SPECIMEN: NORMAL
IRON BINDING CAPACITY (ROCHE): 293 UG/DL (ref 240–430)
IRON SATN MFR SERPL: 18 % (ref 15–46)
IRON SERPL-MCNC: 52 UG/DL (ref 37–145)
MAGNESIUM SERPL-MCNC: 1.8 MG/DL (ref 1.7–2.3)
PHOSPHATE SERPL-MCNC: 4.4 MG/DL (ref 2.5–4.5)
POTASSIUM SERPL-SCNC: 5.6 MMOL/L (ref 3.4–5.3)
POTASSIUM SERPL-SCNC: 6.9 MMOL/L (ref 3.4–5.3)
SODIUM SERPL-SCNC: 125 MMOL/L (ref 136–145)

## 2023-06-16 PROCEDURE — 36415 COLL VENOUS BLD VENIPUNCTURE: CPT | Performed by: HOSPITALIST

## 2023-06-16 PROCEDURE — 250N000013 HC RX MED GY IP 250 OP 250 PS 637: Performed by: PHYSICIAN ASSISTANT

## 2023-06-16 PROCEDURE — 250N000013 HC RX MED GY IP 250 OP 250 PS 637: Performed by: HOSPITALIST

## 2023-06-16 PROCEDURE — 250N000013 HC RX MED GY IP 250 OP 250 PS 637: Performed by: INTERNAL MEDICINE

## 2023-06-16 PROCEDURE — 82728 ASSAY OF FERRITIN: CPT | Performed by: NURSE PRACTITIONER

## 2023-06-16 PROCEDURE — 80048 BASIC METABOLIC PNL TOTAL CA: CPT | Performed by: HOSPITALIST

## 2023-06-16 PROCEDURE — 97530 THERAPEUTIC ACTIVITIES: CPT | Mod: GP

## 2023-06-16 PROCEDURE — 99233 SBSQ HOSP IP/OBS HIGH 50: CPT | Performed by: HOSPITALIST

## 2023-06-16 PROCEDURE — 86381 MITOCHONDRIAL ANTIBODY EACH: CPT | Performed by: NURSE PRACTITIONER

## 2023-06-16 PROCEDURE — 250N000011 HC RX IP 250 OP 636: Performed by: HOSPITALIST

## 2023-06-16 PROCEDURE — 83735 ASSAY OF MAGNESIUM: CPT | Performed by: HOSPITALIST

## 2023-06-16 PROCEDURE — 84100 ASSAY OF PHOSPHORUS: CPT | Performed by: HOSPITALIST

## 2023-06-16 PROCEDURE — 97164 PT RE-EVAL EST PLAN CARE: CPT | Mod: GP

## 2023-06-16 PROCEDURE — 84132 ASSAY OF SERUM POTASSIUM: CPT | Performed by: HOSPITALIST

## 2023-06-16 PROCEDURE — 93976 VASCULAR STUDY: CPT

## 2023-06-16 PROCEDURE — 120N000001 HC R&B MED SURG/OB

## 2023-06-16 RX ORDER — FUROSEMIDE 10 MG/ML
40 INJECTION INTRAMUSCULAR; INTRAVENOUS ONCE
Status: COMPLETED | OUTPATIENT
Start: 2023-06-16 | End: 2023-06-16

## 2023-06-16 RX ORDER — POLYETHYLENE GLYCOL 3350 17 G/17G
17 POWDER, FOR SOLUTION ORAL DAILY PRN
Status: DISCONTINUED | OUTPATIENT
Start: 2023-06-16 | End: 2023-06-21 | Stop reason: HOSPADM

## 2023-06-16 RX ADMIN — SODIUM CHLORIDE 1 G: 1 TABLET ORAL at 08:55

## 2023-06-16 RX ADMIN — THERA TABS 1 TABLET: TAB at 08:55

## 2023-06-16 RX ADMIN — CARVEDILOL 6.25 MG: 6.25 TABLET, FILM COATED ORAL at 18:04

## 2023-06-16 RX ADMIN — LEVOTHYROXINE SODIUM 50 MCG: 50 TABLET ORAL at 08:55

## 2023-06-16 RX ADMIN — CARVEDILOL 6.25 MG: 6.25 TABLET, FILM COATED ORAL at 08:55

## 2023-06-16 RX ADMIN — SODIUM CHLORIDE 1 G: 1 TABLET ORAL at 14:39

## 2023-06-16 RX ADMIN — PANTOPRAZOLE SODIUM 40 MG: 40 TABLET, DELAYED RELEASE ORAL at 08:55

## 2023-06-16 RX ADMIN — FUROSEMIDE 40 MG: 10 INJECTION, SOLUTION INTRAVENOUS at 10:31

## 2023-06-16 RX ADMIN — PROMETHAZINE HYDROCHLORIDE 12.5 MG: 25 TABLET ORAL at 02:55

## 2023-06-16 RX ADMIN — PROMETHAZINE HYDROCHLORIDE 12.5 MG: 25 TABLET ORAL at 08:55

## 2023-06-16 RX ADMIN — PROMETHAZINE HYDROCHLORIDE 12.5 MG: 25 TABLET ORAL at 14:39

## 2023-06-16 RX ADMIN — SODIUM CHLORIDE 1 G: 1 TABLET ORAL at 17:57

## 2023-06-16 RX ADMIN — ACETAMINOPHEN 650 MG: 325 TABLET, FILM COATED ORAL at 17:55

## 2023-06-16 RX ADMIN — HYDROMORPHONE HYDROCHLORIDE 1 MG: 2 TABLET ORAL at 10:55

## 2023-06-16 RX ADMIN — RIVAROXABAN 15 MG: 15 TABLET, FILM COATED ORAL at 17:57

## 2023-06-16 ASSESSMENT — ACTIVITIES OF DAILY LIVING (ADL)
ADLS_ACUITY_SCORE: 28
ADLS_ACUITY_SCORE: 26
ADLS_ACUITY_SCORE: 32
ADLS_ACUITY_SCORE: 31
ADLS_ACUITY_SCORE: 29
ADLS_ACUITY_SCORE: 26
ADLS_ACUITY_SCORE: 29
ADLS_ACUITY_SCORE: 26

## 2023-06-16 NOTE — PLAN OF CARE
"Care from [id number]    Inpatient Progress Note:  For complete assessment see flow sheet documentation.    BP (!) 143/90 (BP Location: Left arm)   Pulse 69   Temp 97.4  F (36.3  C) (Oral)   Resp 22   Ht 1.549 m (5' 1\")   Wt 87 kg (191 lb 14.4 oz)   LMP  (LMP Unknown)   SpO2 97%   BMI 36.26 kg/m         Orientation: A&OX4, forgetful at times, minimal English spoken  Pain status: Moderate abdominal discomfort  Activity: Ax2 with walker, pt refuse gait belt  Peripheral edema: Generalized Mild to moderate edema  Resp: LS diminished at bases, SOB with exertion  Cardiac: Permanent pacemaker  GI: abdomen round, nontender and distended, pt report abdominal discomfort, bowel sounds active all quadrants, hemorrhoid noted. LBM 6/14  : WNL    LDA: PIV SL  Diet: Regular  Discharge Plan: TBD    Will continue to monitor and provide cares.     Myesha Carrillo RN    "

## 2023-06-16 NOTE — PLAN OF CARE
INPATIENT NOTE FROM: 1500 - 2300     PRIMARY PROBLEM: Diarrhea & Wheezing     Vital Signs: Stable     Orientation: A/O x 4, son in room interpreting    Nuro: Intact    Pain status: Atarax and Dilaudid given once @ 2220    Resp: Lung sounds, CTA    Cardiac: WDL    GI: Bowel sounds active. Last BM 6/14    : Continent, been ambulating to bathroom, voiding adequately    Skin: Bilateral + 1 edema to BLE    LDA: Peripheral IV SL    Pertinent Labs/Imaging: Mag, Phos & K+ protocol    Diet: Regular    Activity: Assist x 2, pt refusing W/G, education provided    Alarms/Safety: All alarms on and audible     Consults: GI, Surgery & Hem/Onc    Discharge Plan: Pain/Abd Bloating control. Family yet to decide if pt should go to rehab. No surgery plans for now    Discharge Date: TBD     Will continue to monitor and provide cares.     Ria Senior RN

## 2023-06-16 NOTE — PROGRESS NOTES
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   Has had imaging, EGD/colon   Has fluid overload  Echo that showed possible wma, normal mg  Abnormal HIDA, surgery consulted. No plans for surgery  Advancing diet  May need TCU  Has remained in the hospital due to inability to maintain adequate oral intake and her hyponatremia (both are improving)  Possible discharge to TCU/home on Monday    Abdominal pain/Diarrhea    - unclear cause    - work-up including CT abdomen and colonoscopy were unrevealing    - stool studies negative (including O&P, fecal fat)    - biopsies from colonoscopy (6/6):colitis    - no herbal use (except occ clove)    - EGD (6/9): mild erythema of gastric mucosa    - on PPI    - still has not tolerated PO    - HIDA showed low EF/biliary dyskinesia    - seen by surgery who did not feel her symptoms were due to her gallbladder (signed off)    - GI following: on 6/15 ordered scheduled compazine (now stopped as I do not think this is doing anything. Ordered more labs  (autoimmune markers on 6/16. I think these will be low yield)    - at this point it seems like her original issues (abdominal pain, diarrhea, n/v) have resolved    - I will clean up her meds (discontinue loperamide, scheduled bowel meds)    - had two normal stools this am (6/16)    - work on increasing her PO intake (will need encouragement by staff)     Hyponatremia    - some chroninicity    - likely related to GI losses (at  "least initially)    - obtained urine studies (Na= 20, Tms=165)    - started salt tabs (on 6/14)    - likely hypovolemic hyponatremia (now with 3rd space losses)    - at 125 today    Edema    - has edema all over likely due to fluid resuscitation when she was admitted with diarrhea and then for her bump in Cr    - has been improving with diuresis    - admission weight 6/3 was 164 on standing scale >> 191 on 6/15 standing scale    - the other recorded weights are bed weights and not trustworthy    - daily standing weights    - her edema may be contributing to some of her discomfort/feeling of \"fullness\"    Hyperkalemia    - specimen was hemolyzed    - repeat 5.6    - recheck in am    Hepatitis    - noticed previous labs from ED showed mild elevation of LFTs    - no work-up was done initially    - acute hepatitis panel: negative    - holding statin    - CT/US were unrevealing    - HIDA, as above    - LFTs had been mildly elevated and stable, so stopped checking    - I can see an order from GI to re-check today (this is pending)    Dyspnea  Elevated Troponin  BLE Edema    - ECHO showed possible wma    - seen by cards: outpt stress test    - received lasix 20mg x a on 6/11    - received lasix 40mg x a on 6/13    - was not able to dose lasix on 6/14 or 6/15 due to bump in Cr and low BPs    - BP and Cr improved today (6/16) so received 40mg IV x 1    - likely iatrogenic fluid overload from IVF    - in am: if Cr stable and BP ok, would give another dose of lasix 40mg IV    Bicytopenia (Thrombocytopenia, Normocytic Anemia)  Elevated Free Kappa Light Chains    - Unclear etiology.  Possibly secondary to liver issue    - SPEP negative for monoclonal protein    - Serum immunofixation negative    - Peripheral smear revealed benign findings    - heme has signed off    Hypocalcemia and hypomagnesemia    - resolved with replacement      Generalized muscle weakness    - due to acute illness and ongoing GI losses.     - PT consulted " and saw patient on 6/3, recommending use of walker which patient is currently refusing even after being educated with family present    - on 6/15 family wanted patient re-assessed for TCU    - PT saw patient today (6/16): rec TCU    - spoke with MARTHA (will not have bed over the weekend)     Elevated proBNP    - initial proBNP on 5/31 elevated at 14,188 with repeat of 12,008    - prior to this he proBNP was normal at 1,711 in 10/2022    - most recent echocardiogram on 4/10/2023 showed EF of 55-60%    - CT of chest did small bilateral pleural effusions but on exam patient does not appear fluid overloaded    - does not appear to be in heart failure clinically     Recently diagnosed CAP    - diagnosed in ED on 5/31 but with repeat CXR on 6/2 which was negative    - does have a recent dry cough and previously complained of shortness of breath that has resolved    - afebrile and no leukocytosis.     - antibiotics were not continued on admission, holding off since admission since this initially worsened diarrhea, continue to reassess for changes       Mild thrombocytopenia    - platelet count of 147 on 6/3 with slight decrease to 143 on 6/4    - no evidence of bleeding     Tachy-uzair syndrome s/p PPM (10/2022) and redo AV node ablation (11/2022)   A-fib    - rate controlled    - continue PTA Carvedilol     - resume xarelto     HTN    - low today    - carvedilol cut in half    - holding losartan     Hypothyroidism    - continue PTA Levothyroxine     - I see she had abnormal thyroid labs in the past (April 2023), will check     Family in room (son). Updated. He has to fly back to [place] for his daughter's graduation. The patient's daughter will be stopping in over the weekend. Son will be back on Monday.    Called their GI friend and had to leave a message ([phone])- on 6/14, again on 6/16     Diet: Regular Diet Adult  Snacks/Supplements Adult: Nepro Oral Supplement; Between Meals    DVT Prophylaxis: DOAC  Rachel  "Catheter: Not present  Lines: None     Cardiac Monitoring: None  Code Status: Full Code      Clinically Significant Risk Factors        # Hyperkalemia: Highest K = 6.9 mmol/L in last 2 days, will monitor as appropriate  # Hyponatremia: Lowest Na = 125 mmol/L in last 2 days, will monitor as appropriate      # Hypoalbuminemia: Lowest albumin = 3.1 g/dL at 6/10/2023 10:51 AM, will monitor as appropriate   # Thrombocytopenia: Lowest platelets = 109 in last 2 days, will monitor for bleeding   # Hypertension: Noted on problem list        # Obesity: Estimated body mass index is 36.32 kg/m  as calculated from the following:    Height as of this encounter: 1.549 m (5' 1\").    Weight as of this encounter: 87.2 kg (192 lb 3.9 oz).           Disposition Plan      Expected Discharge Date: 06/17/2023                  Darryl Mcdaniel MD  Hospitalist Service  [facility]  Securely message with [employer] (more info)  Text page via [employer] Paging/Directory   ______________________________________________________________________    Interval History   Patient on edge of bed. No pain today. SHe complains that the bed is uncomfortable. 2 normal stools this am. One loose stool later in the day. Tolerating ensure and jello. Son to bring food in from home for her to try. Son states she is doing much better.    Physical Exam   Vital Signs: Temp: 96.8  F (36  C) Temp src: Axillary BP: 124/69 Pulse: 77   Resp: 18 SpO2: 92 % O2 Device: None (Room air)    Weight: 192 lbs 3.86 oz    Constitutional: cooperative, no apparent distress, and appears stated age  Eyes: Lids and lashes normal, pupils equal, round and reactive to light, extra ocular muscles intact, sclera clear, conjunctiva normal  ENT: Normocephalic, without obvious abnormality, atraumatic, sinuses nontender on palpation, external ears without lesions, oral pharynx with moist mucous membranes, tonsils without erythema or exudates, gums normal and good " dentition.  Respiratory: No increased work of breathing, good air exchange, clear to auscultation bilaterally, no crackles or wheezing  Cardiovascular: Normal apical impulse, regular rate and rhythm, normal S1 and S2, no S3 or S4, and no murmur noted  GI: soft.+BS, diffuse tenderness  Has bilateral edema, some improvement  Total body edema: improved. Much of her edema is on the left side of her body because she lies on her left side    Medical Decision Making       30 MINUTES SPENT BY ME on the date of service doing chart review, history, exam, documentation & further activities per the note.      Data     I have personally reviewed the following data over the past 24 hrs:    N/A  \   N/A   / N/A     125 (L) 93 (L) 41.4 (H) /  114 (H)   5.6 (H) 23 0.93 \       Imaging results reviewed over the past 24 hrs:   No results found for this or any previous visit (from the past 24 hour(s)).

## 2023-06-16 NOTE — PROGRESS NOTES
"GASTROENTEROLOGY PROGRESS NOTE        SUBJECTIVE: Patient is sleeping.     Patient's son said that the patient had large formed to stool today.     OBJECTIVE:    BP (!) 135/95 (BP Location: Left arm)   Pulse 71   Temp 98.6  F (37  C) (Axillary)   Resp 16   Ht 1.549 m (5' 1\")   Wt 87.2 kg (192 lb 3.9 oz)   LMP  (LMP Unknown)   SpO2 94%   BMI 36.32 kg/m    Temp (24hrs), Av.6  F (36.4  C), Min:97.2  F (36.2  C), Max:98.2  F (36.8  C)    Patient Vitals for the past 72 hrs:   Weight   23 0627 87.2 kg (192 lb 3.9 oz)   06/15/23 0800 87 kg (191 lb 14.4 oz)   23 0452 86.1 kg (189 lb 13.1 oz)       Intake/Output Summary (Last 24 hours) at 2023 1548  Last data filed at 2023 1149  Gross per 24 hour   Intake 600 ml   Output 150 ml   Net 450 ml        PHYSICAL EXAM     She is sleeping.      Work of breathing noted.        Additional Comments:  ROS, FH, SH: See initial GI consult for details.     I have reviewed the patient's new clinical lab results:     Recent Labs   Lab Test 06/15/23  0642 23  0851 23  0636 23  0709 23  0656 23  2347 22  1648   WBC 6.7 6.8 5.6   < > 6.4   < > 9.0   HGB 11.1* 11.0* 10.8*   < > 10.9*   < > 12.1   MCV 94 96 95   < > 94   < > 94   * 103* 107*   < > 101*   < > 257   INR  --   --   --   --  3.53*  --  0.99    < > = values in this interval not displayed.     Recent Labs   Lab Test 23  0844 06/15/23  0642 23  0851   POTASSIUM 6.9* 5.0 4.6   CHLORIDE 93* 93* 91*   CO2 23 25 21*   BUN 41.4* 41.6* 34.0*   ANIONGAP 9 8 10     Recent Labs   Lab Test 23  0636 23  0709 23  1122 06/10/23  1051 23  0548 23  1550 22  0846 22  1305 02/15/22  0921   ALBUMIN 3.2* 3.4* 3.5 3.1*   < >  --    < >  --   --    BILITOTAL 0.7 0.8 0.7 1.1   < >  --    < >  --   --    ALT  --  91* 108* 72*   < >  --    < >  --   --    AST  --  65* 96* 56*   < >  --    < >  --   --    PROTEIN  --   --   --   --  "  --  10*  --  Negative Negative    < > = values in this interval not displayed.       ASSESSMENT/ PLAN  This is a 89 yo female with a past medical history significant for hypertension, hyperlipidemia, CAD with stent several years ago, persistent atrial fibrillation, hypothyroidism who was admitted on 6/2/2023 with abdominal pain and diarrhea x 3 weeks.     1. Abdominal pain and diarrhea:     -- Diarrhea is resolved, unclear how the initial diarrhea is related to current symptoms.  -- Stool studies negative (including O&P, fecal fat)  -- Abdominal pain/bloating: No clear reason. Has fluid overload now and mesenteric edema. Likely did not have fluid overload on admission, but had similar symptoms. Would diurese now, given peripheral edema, increased weight, but not sure how this will change symptoms. Has a low gallbladder EF. Her symptoms are not classic for gallbladder and not sure how the initial diarrhea would be related.  -- CT abd/ pelvis,and colonoscopy unrevealing but the random colon biopsy with focal active colitis.  -- EGD: mild erythema of gastric mucosa  - Ultrasound doppler to assess portal and mesenteric vein   - Continue promethazine 12.5 every 6 hours to help with abdominal fullness.    - Surgery was consulted for mildly biliary dyskinesia. Not sure if this is contributing to abdominal pain.  - Her ongoing symptoms are most likely irritable bowel syndrome  - GES as an outpatient   - Patient has gained significant amount of weight since admission. Consider diuresis as tolerated.   - Liver enzymes are elevated most likely from RAMIREZ. I will order autoimmune markers and iron studies.   - GI will follow her peripherally over the weekend.     Spent 25 minutes in direct patient care.     Jennifer Soto, CNP   [facility] ([facility])  [phone]

## 2023-06-16 NOTE — PROGRESS NOTES
Care Management Follow Up    Length of Stay (days): 12    Expected Discharge Date: 06/17/2023     Concerns to be Addressed:  Discharge planning    Patient plan of care discussed at interdisciplinary rounds: Yes    Anticipated Discharge Disposition: Transitional Care     Patient/family educated on Medicare website which has current facility and service quality ratings: yes  Education Provided on the Discharge Plan: Yes  Patient/Family in Agreement with the Plan:  Yes    Referrals Placed by CM/SW:  TCU  Private pay costs discussed: Not applicable    Additional Information:  Therapy is now recommending TCU. Met with son to discuss TCU options. Referrals sent to [facility], [facility], [facility], [facility]. Son would prefer pt discharge home. Informed son that home care referrals were previously sent and were unable to secure an agency. Did discuss private duty services and son was interested in resources. Provided list of various private duty agencies to research. Son is willing to pay for private pay services. Son will be out of town for the weekend and will return Sunday evening and will be available on Monday for follow up.  Will continue to follow for discharge planning.    Bryanna Monroe RN   Inpatient Care Coordination  [facility]   Phone: [phone]

## 2023-06-16 NOTE — PROGRESS NOTES
06/16/23 1117   Appointment Info   Signing Clinician's Name / Credentials (PT) Stephany Dillard DPT       Present yes   Language Tamil  (Pt's son interpreting)   Living Environment   People in Home child(osman), adult   Current Living Arrangements house   Transportation Anticipated family or friend will provide   Self-Care   Usual Activity Tolerance moderate   Current Activity Tolerance poor   Equipment Currently Used at Home none   Fall history within last six months no   Activity/Exercise/Self-Care Comment Per son, pt IND at baseline, furniture surfs   General Information   Onset of Illness/Injury or Date of Surgery 06/02/23   Referring Physician Darryl Mcdaniel MD   Patient/Family Therapy Goals Statement (PT) Return home   Pertinent History of Current Problem (include personal factors and/or comorbidities that impact the POC) Per chart: 88-year-old female with a past medical history significant for hypertension, hyperlipidemia, remote history of CAD status post PCI (approximately 20 years ago), persistent atrial fibrillation 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 (10/31/2022, with redo AV node ablation 11/1/2022), hypothyroidism who was admitted on 6/2/2023 with abdominal pain and diarrhea x 3 weeks   Existing Precautions/Restrictions fall   Cognition   Affect/Mental Status (Cognition) WNL   Follows Commands (Cognition) WFL   Integumentary/Edema   Integumentary/Edema Comments Swelling in B UEs and LEs   Posture    Posture Forward head position;Protracted shoulders   Range of Motion (ROM)   Range of Motion ROM is WFL   Strength (Manual Muscle Testing)   Strength (Manual Muscle Testing) Deficits observed during functional mobility   Bed Mobility   Comment, (Bed Mobility) Supine to sit Min A   Transfers   Comment, (Transfers) Sit to stand Min A   Gait/Stairs (Locomotion)   Comment, (Gait/Stairs) Pt amb w/ no AD and Min A   Balance   Balance  Comments Fair seated and poor standing   Sensory Examination   Sensory Perception patient reports no sensory changes   Clinical Impression   Criteria for Skilled Therapeutic Intervention Yes, treatment indicated   PT Diagnosis (PT) Impaired functional mobility and gait   Influenced by the following impairments Pain, weakness, decreased activity tolerance, impaired balance   Functional limitations due to impairments Limited functional mobility requiring assist and AD   Clinical Presentation (PT Evaluation Complexity) Stable/Uncomplicated   Clinical Presentation Rationale Based on PMH, current status, and social support   Clinical Decision Making (Complexity) low complexity   Planned Therapy Interventions (PT) bed mobility training;balance training;gait training;home exercise program;strengthening;progressive activity/exercise;risk factor education;home program guidelines   Anticipated Equipment Needs at Discharge (PT) walker, standard   Risk & Benefits of therapy have been explained evaluation/treatment results reviewed;care plan/treatment goals reviewed;risks/benefits reviewed;current/potential barriers reviewed;participants voiced agreement with care plan;participants included;patient;son   PT Total Evaluation Time   PT Eval, Re-eval Minutes (08378) 10   Plan of Care Review   Plan of Care Reviewed With patient;son   Physical Therapy Goals   PT Frequency 5x/week   PT Predicted Duration/Target Date for Goal Attainment 06/23/23   PT Goals Bed Mobility;Transfers;Gait;Stairs   PT: Bed Mobility Supervision/stand-by assist;Supine to/from sit   PT: Transfers Supervision/stand-by assist;Sit to/from stand;Assistive device   PT: Gait Supervision/stand-by assist;Assistive device;50 feet   PT: Stairs Minimal assist   Therapeutic Activity   Therapeutic Activities: dynamic activities to improve functional performance Minutes (73254) 20   Symptoms Noted During/After Treatment Fatigue;Increased pain;Dizziness   Treatment  Detail/Skilled Intervention Pt reporting fatigue throughout session, moaning. Pt required extensive encouragement from son and therapist. Pt refusing walker despite education and encouragement. Pt dismissive of all therapist suggestions for mobility and safety. Pt performed sit <> stand from toilet w/ Min A. Time needed for clean up after BM. Pt total assist for pericares and brief change. Pt amb 5' back to bed w/ Min A, continuing to reach for furniture but refusing walker. Pt sat EOB, able to boost up and return to supine w/ Mod A for LEs. Pt sidelying in bed at end of session. Therapist providing education throughout session for son and pt on safe mobility. Bed alarm on, all needs w/in reach, son at side, RN updated and in room.   PT Discharge Planning   PT Plan Attempt to reinforce suggestion of walker, progress transfers and amb   PT Discharge Recommendation (DC Rec) Transitional Care Facility   PT Rationale for DC Rec Pt below baseline, currently Ax1. Pt refusing walker and needing Min A for all mobilization due to unsafe furniture walking. Recommend TCU to increase strength and functional mobility.   PT Brief overview of current status Ax1   Total Session Time   Timed Code Treatment Minutes 20   Total Session Time (sum of timed and untimed services) 30

## 2023-06-16 NOTE — PROVIDER NOTIFICATION
DATE/TIME OF CALL RECEIVED FROM LAB:  06/16/23 at 9:35 AM   LAB TEST:  K+- 6.9  LAB VALUE:  6.9  PROVIDER NOTIFIED?: Yes  PROVIDER NAME: Dr Orosco  DATE/TIME LAB VALUE REPORTED TO PROVIDER: 0936  MECHANISM OF PROVIDER NOTIFICATION: Face-To-Face  PROVIDER RESPONSE: Re-check K+, specimen was moderately hemolyzed.

## 2023-06-16 NOTE — PROGRESS NOTES
Pt's son delivered concern about pt gaining 35lbs since admission (27 lbs difference on file). Pt wanted RN to leave a note for MD to start Diuretics again in AM. Will pass it on to next shift.

## 2023-06-17 LAB
ALBUMIN SERPL BCG-MCNC: 3.4 G/DL (ref 3.5–5.2)
ALP SERPL-CCNC: 252 U/L (ref 35–104)
ALT SERPL W P-5'-P-CCNC: 112 U/L (ref 0–50)
ANION GAP SERPL CALCULATED.3IONS-SCNC: 9 MMOL/L (ref 7–15)
AST SERPL W P-5'-P-CCNC: 98 U/L (ref 0–45)
BILIRUB DIRECT SERPL-MCNC: 0.3 MG/DL (ref 0–0.3)
BILIRUB SERPL-MCNC: 0.8 MG/DL
BUN SERPL-MCNC: 44.3 MG/DL (ref 8–23)
CALCIUM SERPL-MCNC: 9 MG/DL (ref 8.8–10.2)
CHLORIDE SERPL-SCNC: 93 MMOL/L (ref 98–107)
CREAT SERPL-MCNC: 1.02 MG/DL (ref 0.51–0.95)
DEPRECATED HCO3 PLAS-SCNC: 26 MMOL/L (ref 22–29)
GFR SERPL CREATININE-BSD FRML MDRD: 53 ML/MIN/1.73M2
GLUCOSE SERPL-MCNC: 98 MG/DL (ref 70–99)
LIPASE SERPL-CCNC: 32 U/L (ref 13–60)
MAGNESIUM SERPL-MCNC: 1.7 MG/DL (ref 1.7–2.3)
PHOSPHATE SERPL-MCNC: 4.1 MG/DL (ref 2.5–4.5)
POTASSIUM SERPL-SCNC: 4.8 MMOL/L (ref 3.4–5.3)
PROT SERPL-MCNC: 6 G/DL (ref 6.4–8.3)
SODIUM SERPL-SCNC: 128 MMOL/L (ref 136–145)

## 2023-06-17 PROCEDURE — 86015 ACTIN ANTIBODY EACH: CPT | Performed by: NURSE PRACTITIONER

## 2023-06-17 PROCEDURE — 80053 COMPREHEN METABOLIC PANEL: CPT | Performed by: HOSPITALIST

## 2023-06-17 PROCEDURE — 250N000013 HC RX MED GY IP 250 OP 250 PS 637: Performed by: HOSPITALIST

## 2023-06-17 PROCEDURE — 250N000013 HC RX MED GY IP 250 OP 250 PS 637: Performed by: STUDENT IN AN ORGANIZED HEALTH CARE EDUCATION/TRAINING PROGRAM

## 2023-06-17 PROCEDURE — 83690 ASSAY OF LIPASE: CPT | Performed by: STUDENT IN AN ORGANIZED HEALTH CARE EDUCATION/TRAINING PROGRAM

## 2023-06-17 PROCEDURE — 250N000013 HC RX MED GY IP 250 OP 250 PS 637: Performed by: INTERNAL MEDICINE

## 2023-06-17 PROCEDURE — 36415 COLL VENOUS BLD VENIPUNCTURE: CPT | Performed by: STUDENT IN AN ORGANIZED HEALTH CARE EDUCATION/TRAINING PROGRAM

## 2023-06-17 PROCEDURE — 82248 BILIRUBIN DIRECT: CPT | Performed by: NURSE PRACTITIONER

## 2023-06-17 PROCEDURE — 120N000001 HC R&B MED SURG/OB

## 2023-06-17 PROCEDURE — 84100 ASSAY OF PHOSPHORUS: CPT | Performed by: STUDENT IN AN ORGANIZED HEALTH CARE EDUCATION/TRAINING PROGRAM

## 2023-06-17 PROCEDURE — 83735 ASSAY OF MAGNESIUM: CPT | Performed by: STUDENT IN AN ORGANIZED HEALTH CARE EDUCATION/TRAINING PROGRAM

## 2023-06-17 PROCEDURE — 99233 SBSQ HOSP IP/OBS HIGH 50: CPT | Performed by: STUDENT IN AN ORGANIZED HEALTH CARE EDUCATION/TRAINING PROGRAM

## 2023-06-17 PROCEDURE — 250N000011 HC RX IP 250 OP 636: Performed by: HOSPITALIST

## 2023-06-17 PROCEDURE — 86038 ANTINUCLEAR ANTIBODIES: CPT | Performed by: NURSE PRACTITIONER

## 2023-06-17 PROCEDURE — 250N000011 HC RX IP 250 OP 636: Performed by: STUDENT IN AN ORGANIZED HEALTH CARE EDUCATION/TRAINING PROGRAM

## 2023-06-17 PROCEDURE — 36415 COLL VENOUS BLD VENIPUNCTURE: CPT | Performed by: HOSPITALIST

## 2023-06-17 RX ORDER — HYDROMORPHONE HYDROCHLORIDE 2 MG/1
2 TABLET ORAL ONCE
Status: DISCONTINUED | OUTPATIENT
Start: 2023-06-17 | End: 2023-06-17

## 2023-06-17 RX ORDER — FUROSEMIDE 10 MG/ML
40 INJECTION INTRAMUSCULAR; INTRAVENOUS ONCE
Status: COMPLETED | OUTPATIENT
Start: 2023-06-17 | End: 2023-06-17

## 2023-06-17 RX ADMIN — FUROSEMIDE 40 MG: 10 INJECTION, SOLUTION INTRAVENOUS at 17:57

## 2023-06-17 RX ADMIN — THERA TABS 1 TABLET: TAB at 09:14

## 2023-06-17 RX ADMIN — HYDROMORPHONE HYDROCHLORIDE 1 MG: 2 TABLET ORAL at 09:14

## 2023-06-17 RX ADMIN — FUROSEMIDE 40 MG: 10 INJECTION, SOLUTION INTRAVENOUS at 13:33

## 2023-06-17 RX ADMIN — CARVEDILOL 6.25 MG: 6.25 TABLET, FILM COATED ORAL at 09:14

## 2023-06-17 RX ADMIN — HYDROXYZINE HYDROCHLORIDE 25 MG: 50 TABLET, FILM COATED ORAL at 00:43

## 2023-06-17 RX ADMIN — ACETAMINOPHEN 650 MG: 325 TABLET, FILM COATED ORAL at 00:43

## 2023-06-17 RX ADMIN — ACETAMINOPHEN 650 MG: 325 TABLET, FILM COATED ORAL at 06:42

## 2023-06-17 RX ADMIN — CARVEDILOL 6.25 MG: 6.25 TABLET, FILM COATED ORAL at 17:58

## 2023-06-17 RX ADMIN — SODIUM CHLORIDE 1 G: 1 TABLET ORAL at 13:33

## 2023-06-17 RX ADMIN — RIVAROXABAN 15 MG: 15 TABLET, FILM COATED ORAL at 17:57

## 2023-06-17 RX ADMIN — HYDROXYZINE HYDROCHLORIDE 50 MG: 50 TABLET, FILM COATED ORAL at 06:42

## 2023-06-17 RX ADMIN — ONDANSETRON 4 MG: 4 TABLET, ORALLY DISINTEGRATING ORAL at 13:33

## 2023-06-17 RX ADMIN — LEVOTHYROXINE SODIUM 50 MCG: 50 TABLET ORAL at 06:42

## 2023-06-17 RX ADMIN — SODIUM CHLORIDE 1 G: 1 TABLET ORAL at 17:57

## 2023-06-17 RX ADMIN — SODIUM CHLORIDE 1 G: 1 TABLET ORAL at 09:14

## 2023-06-17 RX ADMIN — PANTOPRAZOLE SODIUM 40 MG: 40 TABLET, DELAYED RELEASE ORAL at 06:42

## 2023-06-17 ASSESSMENT — ACTIVITIES OF DAILY LIVING (ADL)
ADLS_ACUITY_SCORE: 32

## 2023-06-17 NOTE — PLAN OF CARE
Alert-oriented x4, forgetful and intermittently confused. Tolerated tomato soup/crackers/and some ensure today-poor appetite with some nausea- PRN zofran helped. Pain in abd this am-screaming out, gave one tiime dose dilaudid and pt appeared comfortable throughout the rest of the shift. 3 small formed BMs today.Generalized edema slightly improved-IV lasix 40mg x2 this shift. Voiding adequately- bladder scan max 279 cc. PT following- possible TCU. Continue to monitor I/Os and trend labs.

## 2023-06-17 NOTE — PLAN OF CARE
Goal Outcome Evaluation:  Vitals stable and afebrile. Son left for [place] this evening. Tylenol given for abd discomfort. Up in chair for ensure and jello snack late afternoon. Napping between cares. Son states left side swelling is significantly decreased from this morning.   Daughter came about 2030 this evening and brought rice porridge from home. Then pt had 2 loose stools maroon colored. Asking for immodium pills but they were discontinued this evening.

## 2023-06-17 NOTE — PLAN OF CARE
"Care from [id number]    Inpatient Progress Note:  For complete assessment see flow sheet documentation.    /74 (BP Location: Left arm)   Pulse 71   Temp (!) 96.7  F (35.9  C) (Axillary)   Resp 18   Ht 1.549 m (5' 1\")   Wt 86.3 kg (190 lb 3.2 oz)   LMP  (LMP Unknown)   SpO2 97%   BMI 35.94 kg/m      Pt c/o abdominal pain. Given Tylenol and Atarax with some relief. Pt did not void  and was bladder scanned at 0600 with a 200 volume. Pt has edema on left arm and BLE, quite anxious and has dyspnea with exertion. No loose stool overnight.       Will continue to provide supportive cares.    Alee Ramos RN   "

## 2023-06-17 NOTE — PROGRESS NOTES
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     Has had imaging, EGD/colon without clear etiology.  Echo that showed possible wma, normal mg.  Abnormal HIDA, surgery consulted. No plans for surgery.  Has remained in the hospital due to inability to maintain adequate oral intake and her hyponatremia (both are improving).    Possible discharge to TCU on Monday.       Abdominal pain/Diarrhea  Unclear cause.  Work-up including CT abdomen and colonoscopy were unrevealing.  Stool studies negative (including O&P, fecal fat).  Biopsies from colonoscopy (6/6):colitis.  No herbal use (except occ clove).  EGD (6/9): mild erythema of gastric mucosa.  HIDA showed low EF/biliary dyskinesia.  At this point it seems like her original issues (abdominal pain, diarrhea, n/v) have resolved.  Stooling normally.  -Continue PPI  -ADAT  -Seen by surgery who did not feel her symptoms were due to her gallbladder (signed off)  -GI following: Ordered more labs (autoimmune markers on 6/16. I think these will be low yield)  -Work on increasing her PO intake (will need encouragement by staff)  -Straight cath protocol     Hyponatremia, improving  Some chroninicity.  Likely related to GI losses (at least initially).  Obtained urine studies (Na= 20, Anc=945)  -Started salt tabs (on 6/14)  -Likely hypovolemic hyponatremia (now with 3rd space losses)     Edema:  Has edema all over likely due to fluid resuscitation when she was admitted  "with diarrhea and then for her bump in Cr.  Has been improving with diuresis.  Admission weight 6/3 was 164 on standing scale >> 191 on 6/15 standing scale.  -Lasix IV 40 today  -Follow BMP closely  -Her edema may be contributing to some of her discomfort/feeling of \"fullness\"     Hyperkalemia  Specimen was hemolyzed.  Repeat 5.6.  Recheck WNL.     Hepatitis  Noticed previous labs from ED showed mild elevation of LFTs.  Acute hepatitis panel: negative.  CT/US/HIDA were unrevealing  -Holding statin  -LFTs had been mildly elevated and stable, so stopped checking     Dyspnea  Elevated Troponin  BLE Edema  ECHO showed possible wma.  Seen by cards: outpt stress test.  -Likely iatrogenic fluid overload from IVF     Bicytopenia (Thrombocytopenia, Normocytic Anemia)  Elevated Free Kappa Light Chains  Unclear etiology.  Possibly secondary to liver issue.  SPEP negative for monoclonal protein.  Serum immunofixation negative.  Peripheral smear revealed benign findings  -Heme has signed off     Hypocalcemia and hypomagnesemia  -Resolved with replacement      Generalized muscle weakness  Due to acute illness and ongoing GI losses.   -PT consulted and saw patient on 6/3, recommending use of walker which patient is currently refusing even after being educated with family present.  On 6/15 family wanted patient re-assessed for TCU.  PT saw patient & rec TCU.  -SW (will not have bed over the weekend)     Elevated proBNP  Initial proBNP on 5/31 elevated at 14,188 with repeat of 12,008.  Prior to this he proBNP was normal at 1,711 in 10/2022.  Most recent echocardiogram on 4/10/2023 showed EF of 55-60%.  CT of chest did small bilateral pleural effusions but on exam patient does not appear fluid overloaded.  -Does not appear to be in heart failure clinically     Recently diagnosed CAP  Diagnosed in ED on 5/31 but with repeat CXR on 6/2 which was negative.  Does have a recent dry cough and previously complained of shortness of breath " "that has resolved.  Afebrile and no leukocytosis.  Antibiotics were not continued on admission, holding off since admission since this initially worsened diarrhea, continue to reassess for changes       Mild thrombocytopenia  Platelet count of 147 on 6/3 with slight decrease to 143 on 6/4  -No evidence of bleeding     Tachy-uzair syndrome s/p PPM (10/2022) and redo AV node ablation (11/2022)   A-fib  Rate controlled  -Continue PTA Carvedilol   -Resume xarelto     HTN  -Continue carvedilol   -Holding losartan     Hypothyroidism  -Continue PTA Levothyroxine        Diet: Regular Diet Adult  Snacks/Supplements Adult: Nepro Oral Supplement; Between Meals    DVT Prophylaxis: DOAC  Ramos Catheter: Not present  Lines: None     Cardiac Monitoring: None  Code Status: Full Code      Clinically Significant Risk Factors        # Hyperkalemia: Highest K = 6.9 mmol/L in last 2 days, will monitor as appropriate  # Hyponatremia: Lowest Na = 125 mmol/L in last 2 days, will monitor as appropriate      # Hypoalbuminemia: Lowest albumin = 3.1 g/dL at 6/10/2023 10:51 AM, will monitor as appropriate     # Hypertension: Noted on problem list        # Obesity: Estimated body mass index is 35.94 kg/m  as calculated from the following:    Height as of this encounter: 1.549 m (5' 1\").    Weight as of this encounter: 86.3 kg (190 lb 3.2 oz).           Disposition Plan     Expected Discharge Date: 06/17/2023                  Gabriel Fitch DO  Hospitalist Service  [facility]  Securely message with [employer] (more info)  Text page via [email or web address] Paging/Directory   ______________________________________________________________________    Interval History   Patient continues to complaint of GI upset.  She reports shortness of breath related to breath splinting in the setting of abdominal pain.  She seems to have been quite sedentary.      Physical Exam   Vital Signs: Temp: (!) 96.7  F (35.9  C) Temp src: Axillary BP: 120/74 Pulse: " 71   Resp: 18 SpO2: 97 % O2 Device: None (Room air)    Weight: 190 lbs 3.2 oz    General Appearance: Sleeping.  Awoke easily.  No apparent distress.  Hard of hearing.   Respiratory: Rales diffusely.  No wheezing.  Mild increase WOB.  Cardiovascular: RRR, no mrg  GI: Soft, obese.  Mild TTP in the LUQ & epigastric region.  No guarding or rebound tenderness.  Skin: No rashes or lesions on exposed skin.   Other: Appropriate affect.      Medical Decision Making       60 MINUTES SPENT BY ME on the date of service doing chart review, history, exam, documentation & further activities per the note.      Data   ------------------------- PAST 24 HR DATA REVIEWED -----------------------------------------------    I have personally reviewed the following data over the past 24 hrs:    N/A  \   N/A   / N/A     128 (L) 93 (L) 44.3 (H) /  98   4.8 26 1.02 (H) \       ALT: 112 (H) AST: 98 (H) AP: 252 (H) TBILI: 0.8   ALB: 3.4 (L) TOT PROTEIN: 6.0 (L) LIPASE: 32       Imaging results reviewed over the past 24 hrs:   Recent Results (from the past 24 hour(s))   US Abdomen or Pelvis Doppler Limited    Narrative    US ABDOMEN OR PELVIS DOPPLER LIMITED   6/16/2023 2:40 PM    HISTORY: 88-year-old patient with abdominal discomfort, request made  for evaluation of portal or mesenteric vein thrombus.    COMPARISON: None.    TECHNIQUE: Color Doppler and spectral waveform analysis performed  throughout the hepatic vasculature.    FINDINGS: The visible IVC is patent at the level of the liver. The  right hepatic, mid hepatic, and left hepatic veins are patent with  normal directional blood flow away from the liver. Velocities range  from 15-37 cm/second.    The main portal vein is 28 cm/second, right portal vein 18 cm/second,  and left portal vein 27 cm/second. Portal blood flow is directional  toward the liver. The main hepatic artery is 34/10 cm/second and the  right hepatic artery is 24/8 cm/second.    The splenic vein at the level of the  pancreas is patent, difficult to  assess directional blood flow. Unable to visualize superior mesenteric  or inferior mesenteric veins.      Impression    IMPRESSION: Patent hepatic vasculature with normal directional blood  flow. Of note, the superior mesenteric and inferior mesenteric veins  are not visible with ultrasound. May consider CT exam and portal  venous phase for the best opportunity for visualization of the  mesenteric vasculature, if able.      MARY LEYVA MD         SYSTEM ID:  [id number]

## 2023-06-18 LAB
ANION GAP SERPL CALCULATED.3IONS-SCNC: 10 MMOL/L (ref 7–15)
BUN SERPL-MCNC: 44.5 MG/DL (ref 8–23)
CALCIUM SERPL-MCNC: 8.9 MG/DL (ref 8.8–10.2)
CHLORIDE SERPL-SCNC: 93 MMOL/L (ref 98–107)
CREAT SERPL-MCNC: 1.03 MG/DL (ref 0.51–0.95)
DEPRECATED HCO3 PLAS-SCNC: 29 MMOL/L (ref 22–29)
ERYTHROCYTE [DISTWIDTH] IN BLOOD BY AUTOMATED COUNT: 15 % (ref 10–15)
GFR SERPL CREATININE-BSD FRML MDRD: 52 ML/MIN/1.73M2
GLUCOSE SERPL-MCNC: 95 MG/DL (ref 70–99)
HCT VFR BLD AUTO: 32.4 % (ref 35–47)
HGB BLD-MCNC: 10.5 G/DL (ref 11.7–15.7)
MAGNESIUM SERPL-MCNC: 1.6 MG/DL (ref 1.7–2.3)
MCH RBC QN AUTO: 30.3 PG (ref 26.5–33)
MCHC RBC AUTO-ENTMCNC: 32.4 G/DL (ref 31.5–36.5)
MCV RBC AUTO: 93 FL (ref 78–100)
PHOSPHATE SERPL-MCNC: 4.4 MG/DL (ref 2.5–4.5)
PLATELET # BLD AUTO: 98 10E3/UL (ref 150–450)
POTASSIUM SERPL-SCNC: 4.2 MMOL/L (ref 3.4–5.3)
RBC # BLD AUTO: 3.47 10E6/UL (ref 3.8–5.2)
SMA IGG SER-ACNC: 5 UNITS
SODIUM SERPL-SCNC: 132 MMOL/L (ref 136–145)
WBC # BLD AUTO: 7.6 10E3/UL (ref 4–11)

## 2023-06-18 PROCEDURE — 83735 ASSAY OF MAGNESIUM: CPT | Performed by: STUDENT IN AN ORGANIZED HEALTH CARE EDUCATION/TRAINING PROGRAM

## 2023-06-18 PROCEDURE — 120N000001 HC R&B MED SURG/OB

## 2023-06-18 PROCEDURE — 85027 COMPLETE CBC AUTOMATED: CPT | Performed by: STUDENT IN AN ORGANIZED HEALTH CARE EDUCATION/TRAINING PROGRAM

## 2023-06-18 PROCEDURE — 84100 ASSAY OF PHOSPHORUS: CPT | Performed by: STUDENT IN AN ORGANIZED HEALTH CARE EDUCATION/TRAINING PROGRAM

## 2023-06-18 PROCEDURE — 250N000011 HC RX IP 250 OP 636: Performed by: STUDENT IN AN ORGANIZED HEALTH CARE EDUCATION/TRAINING PROGRAM

## 2023-06-18 PROCEDURE — 82310 ASSAY OF CALCIUM: CPT | Performed by: STUDENT IN AN ORGANIZED HEALTH CARE EDUCATION/TRAINING PROGRAM

## 2023-06-18 PROCEDURE — 250N000013 HC RX MED GY IP 250 OP 250 PS 637: Performed by: INTERNAL MEDICINE

## 2023-06-18 PROCEDURE — 250N000013 HC RX MED GY IP 250 OP 250 PS 637: Performed by: HOSPITALIST

## 2023-06-18 PROCEDURE — 99233 SBSQ HOSP IP/OBS HIGH 50: CPT | Performed by: STUDENT IN AN ORGANIZED HEALTH CARE EDUCATION/TRAINING PROGRAM

## 2023-06-18 PROCEDURE — 36415 COLL VENOUS BLD VENIPUNCTURE: CPT | Performed by: STUDENT IN AN ORGANIZED HEALTH CARE EDUCATION/TRAINING PROGRAM

## 2023-06-18 RX ORDER — FUROSEMIDE 10 MG/ML
40 INJECTION INTRAMUSCULAR; INTRAVENOUS ONCE
Status: COMPLETED | OUTPATIENT
Start: 2023-06-18 | End: 2023-06-18

## 2023-06-18 RX ADMIN — CARVEDILOL 6.25 MG: 6.25 TABLET, FILM COATED ORAL at 10:17

## 2023-06-18 RX ADMIN — HYDROXYZINE HYDROCHLORIDE 50 MG: 50 TABLET, FILM COATED ORAL at 10:17

## 2023-06-18 RX ADMIN — THERA TABS 1 TABLET: TAB at 10:17

## 2023-06-18 RX ADMIN — RIVAROXABAN 15 MG: 15 TABLET, FILM COATED ORAL at 18:30

## 2023-06-18 RX ADMIN — FUROSEMIDE 40 MG: 10 INJECTION, SOLUTION INTRAMUSCULAR; INTRAVENOUS at 13:49

## 2023-06-18 RX ADMIN — PANTOPRAZOLE SODIUM 40 MG: 40 TABLET, DELAYED RELEASE ORAL at 06:36

## 2023-06-18 RX ADMIN — FUROSEMIDE 40 MG: 10 INJECTION, SOLUTION INTRAMUSCULAR; INTRAVENOUS at 18:34

## 2023-06-18 RX ADMIN — SODIUM CHLORIDE 1 G: 1 TABLET ORAL at 13:49

## 2023-06-18 RX ADMIN — ACETAMINOPHEN 650 MG: 325 TABLET, FILM COATED ORAL at 06:36

## 2023-06-18 RX ADMIN — CARVEDILOL 6.25 MG: 6.25 TABLET, FILM COATED ORAL at 18:30

## 2023-06-18 RX ADMIN — LEVOTHYROXINE SODIUM 50 MCG: 50 TABLET ORAL at 06:36

## 2023-06-18 RX ADMIN — SODIUM CHLORIDE 1 G: 1 TABLET ORAL at 18:30

## 2023-06-18 RX ADMIN — SODIUM CHLORIDE 1 G: 1 TABLET ORAL at 10:17

## 2023-06-18 ASSESSMENT — ACTIVITIES OF DAILY LIVING (ADL)
ADLS_ACUITY_SCORE: 32
ADLS_ACUITY_SCORE: 30
ADLS_ACUITY_SCORE: 32

## 2023-06-18 NOTE — PROGRESS NOTES
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     Has had imaging, EGD/colon without clear etiology.  Echo that showed possible wma, normal mg.  Abnormal HIDA, surgery consulted. No plans for surgery.  Has remained in the hospital due to inability to maintain adequate oral intake and her hyponatremia (both are improving).    Possible discharge to TCU on Monday.       Abdominal pain/Diarrhea  Unclear cause.  Work-up including CT abdomen and colonoscopy were unrevealing.  Stool studies negative (including O&P, fecal fat).  Biopsies from colonoscopy (6/6):colitis.  No herbal use (except occ clove).  EGD (6/9): mild erythema of gastric mucosa.  HIDA showed low EF/biliary dyskinesia.  At this point it seems like her original issues (abdominal pain, diarrhea, n/v) have resolved.  Stooling normally.  -Continue PPI  -ADAT  -Seen by surgery who did not feel her symptoms were due to her gallbladder (signed off)  -GI following: Ordered more labs (autoimmune markers on 6/16. I think these will be low yield)  -Work on increasing her PO intake (will need encouragement by staff)  -Straight cath protocol     Hyponatremia, improving  Some chroninicity.  Likely related to GI losses (at least initially).  Initial urine studies (Na= 20, Biu=881)  -Started salt tabs (on 6/14)  -Now suspect more hypervolemic hyponatremia and so we are diuresing with improvement in sodium     Edema:  Has edema all over likely due to fluid  "resuscitation when she was admitted with diarrhea and then for her bump in Cr.  Has been improving with diuresis.  Admission weight 6/3 was 164 on standing scale >> 191 on 6/15 standing scale.  -Lasix IV 40 BID  -Follow BMP closely  -Her edema may be contributing to some of her discomfort/feeling of \"fullness\"     Hyperkalemia  Specimen was hemolyzed.  Repeat 5.6.  Recheck WNL.     Hepatitis  Noticed previous labs from ED showed mild elevation of LFTs.  Acute hepatitis panel: negative.  CT/US/HIDA were unrevealing  -Holding statin  -LFTs had been mildly elevated and stable, so stopped checking     Dyspnea  Elevated Troponin  BLE Edema  ECHO showed possible wma.  Seen by cards: outpt stress test.  -Likely iatrogenic fluid overload from IVF     Bicytopenia (Thrombocytopenia, Normocytic Anemia)  Elevated Free Kappa Light Chains  Unclear etiology.  Possibly secondary to liver issue.  SPEP negative for monoclonal protein.  Serum immunofixation negative.  Peripheral smear revealed benign findings  -Heme has signed off     Hypocalcemia and hypomagnesemia  -Resolved with replacement      Generalized muscle weakness  Due to acute illness and ongoing GI losses.   -PT consulted and saw patient on 6/3, recommending use of walker which patient is currently refusing even after being educated with family present.  On 6/15 family wanted patient re-assessed for TCU.  PT saw patient & rec TCU.  -SW (will not have bed over the weekend)     Elevated proBNP  Initial proBNP on 5/31 elevated at 14,188 with repeat of 12,008.  Prior to this he proBNP was normal at 1,711 in 10/2022.  Most recent echocardiogram on 4/10/2023 showed EF of 55-60%.  CT of chest did small bilateral pleural effusions but on exam patient does not appear fluid overloaded.  -Does not appear to be in heart failure clinically     Recently diagnosed CAP  Diagnosed in ED on 5/31 but with repeat CXR on 6/2 which was negative.  Does have a recent dry cough and previously " "complained of shortness of breath that has resolved.  Afebrile and no leukocytosis.  Antibiotics were not continued on admission, holding off since admission since this initially worsened diarrhea, continue to reassess for changes       Mild thrombocytopenia  Platelet count of 147 on 6/3 with slight decrease to 143 on 6/4  -No evidence of bleeding     Tachy-uzair syndrome s/p PPM (10/2022) and redo AV node ablation (11/2022)   A-fib  Rate controlled  -Continue PTA Carvedilol   -Resume xarelto     HTN  -Continue carvedilol   -Holding losartan     Hypothyroidism  -Continue PTA Levothyroxine        Diet: Regular Diet Adult  Snacks/Supplements Adult: Nepro Oral Supplement; Between Meals    DVT Prophylaxis: DOAC  Ramos Catheter: Not present  Lines: None     Cardiac Monitoring: None  Code Status: Full Code      Clinically Significant Risk Factors         # Hyponatremia: Lowest Na = 128 mmol/L in last 2 days, will monitor as appropriate    # Hypomagnesemia: Lowest Mg = 1.6 mg/dL in last 2 days, will replace as needed   # Hypoalbuminemia: Lowest albumin = 3.1 g/dL at 6/10/2023 10:51 AM, will monitor as appropriate   # Thrombocytopenia: Lowest platelets = 98 in last 2 days, will monitor for bleeding   # Hypertension: Noted on problem list        # Obesity: Estimated body mass index is 35.22 kg/m  as calculated from the following:    Height as of this encounter: 1.549 m (5' 1\").    Weight as of this encounter: 84.6 kg (186 lb 6.4 oz).           Disposition Plan     Expected Discharge Date: 06/19/2023                  Gabriel Fitch DO  Hospitalist Service  [facility]  Securely message with [employer] (more info)  Text page via [facility] Paging/Directory   ______________________________________________________________________    Interval History   Patient is more comfortable today.  She is sleeping upon my exam but when I awaken her she complains of abdominal pain.  She reports that Tylenol takes the edge off " of this pain.  She reports poor p.o. intake.  I encouraged that she drink Ensure if she is not feeling well enough to eat as this is a high-protein high-calorie drink.  Talked about ongoing water pill to remove fluid off her body.  She was agreeable with this plan.  I discussed case over the phone with her daughter and answered her questions.    Physical Exam   Vital Signs: Temp: 97.9  F (36.6  C) Temp src: Oral BP: 132/75 Pulse: 69   Resp: 18 SpO2: 96 % O2 Device: None (Room air)    Weight: 186 lbs 6.4 oz    General Appearance: Sleeping.  Awoke easily.  No apparent distress.  Hard of hearing.   Respiratory: Mild rales in the dependent lungs.  No wheezing.  Normal WOB.  Cardiovascular: RRR, no mrg  GI: Soft, obese.  Mild TTP in the LUQ & epigastric region.  No guarding or rebound tenderness.  Skin: No rashes or lesions on exposed skin.   Other: Appropriate affect.  1-2+ pitting edema of the BLE    Medical Decision Making       60 MINUTES SPENT BY ME on the date of service doing chart review, history, exam, documentation & further activities per the note.      Data   ------------------------- PAST 24 HR DATA REVIEWED -----------------------------------------------    I have personally reviewed the following data over the past 24 hrs:    7.6  \   10.5 (L)   / 98 (L)     132 (L) 93 (L) 44.5 (H) /  95   4.2 29 1.03 (H) \       Imaging results reviewed over the past 24 hrs:   No results found for this or any previous visit (from the past 24 hour(s)).

## 2023-06-18 NOTE — PLAN OF CARE
"Care from [id number]    Inpatient Progress Note:  For complete assessment see flow sheet documentation.    /70 (BP Location: Right arm)   Pulse 69   Temp 97  F (36.1  C) (Oral)   Resp 20   Ht 1.549 m (5' 1\")   Wt 86.3 kg (190 lb 3.2 oz)   LMP  (LMP Unknown)   SpO2 97%   BMI 35.94 kg/m      Pt noted to have edema on both upper and lower extremities. Abdomen is quite distended and causing discomfort. Pt reports tenderness and discomfort on Left breast as well. She voided 1800ml this shift.  Woke up at 0630 complaining of pain and was given Tylenol. No loose stool overnight.        Will continue to provide supportive cares.    Alee Ramos RN   "

## 2023-06-18 NOTE — PLAN OF CARE
Alert-oriented x4, forgetful and intermittently confused. Tolerated soup/crackers/and some ensure today-poor appetite with some nausea. Pain in abd-PRN atarax given x1 and pt appeared comfortable throughout the rest of the shift. No BMs today.Generalized edema slightly improved-IV lasix 40mg x2 this shift. Voiding adequately. PT following- possible TCU. Continue to monitor I/Os and trend labs.

## 2023-06-19 LAB
ALBUMIN UR-MCNC: 100 MG/DL
ANA SER QL IF: NEGATIVE
ANION GAP SERPL CALCULATED.3IONS-SCNC: 11 MMOL/L (ref 7–15)
APPEARANCE UR: ABNORMAL
BILIRUB UR QL STRIP: NEGATIVE
BUN SERPL-MCNC: 40.4 MG/DL (ref 8–23)
CALCIUM SERPL-MCNC: 8.7 MG/DL (ref 8.8–10.2)
CHLORIDE SERPL-SCNC: 91 MMOL/L (ref 98–107)
COLOR UR AUTO: ABNORMAL
CREAT SERPL-MCNC: 0.96 MG/DL (ref 0.51–0.95)
DEPRECATED HCO3 PLAS-SCNC: 31 MMOL/L (ref 22–29)
ERYTHROCYTE [DISTWIDTH] IN BLOOD BY AUTOMATED COUNT: 15.1 % (ref 10–15)
GFR SERPL CREATININE-BSD FRML MDRD: 57 ML/MIN/1.73M2
GLUCOSE SERPL-MCNC: 94 MG/DL (ref 70–99)
GLUCOSE UR STRIP-MCNC: NEGATIVE MG/DL
HCT VFR BLD AUTO: 31.3 % (ref 35–47)
HGB BLD-MCNC: 10.6 G/DL (ref 11.7–15.7)
HGB UR QL STRIP: ABNORMAL
HYALINE CASTS: 22 /LPF
KETONES UR STRIP-MCNC: NEGATIVE MG/DL
LEUKOCYTE ESTERASE UR QL STRIP: ABNORMAL
MAGNESIUM SERPL-MCNC: 1.6 MG/DL (ref 1.7–2.3)
MCH RBC QN AUTO: 30.9 PG (ref 26.5–33)
MCHC RBC AUTO-ENTMCNC: 33.9 G/DL (ref 31.5–36.5)
MCV RBC AUTO: 91 FL (ref 78–100)
NITRATE UR QL: NEGATIVE
PH UR STRIP: 6.5 [PH] (ref 5–7)
PHOSPHATE SERPL-MCNC: 3.9 MG/DL (ref 2.5–4.5)
PLATELET # BLD AUTO: 97 10E3/UL (ref 150–450)
POTASSIUM SERPL-SCNC: 3.5 MMOL/L (ref 3.4–5.3)
RBC # BLD AUTO: 3.43 10E6/UL (ref 3.8–5.2)
RBC URINE: >182 /HPF
SODIUM SERPL-SCNC: 133 MMOL/L (ref 136–145)
SP GR UR STRIP: 1.01 (ref 1–1.03)
UROBILINOGEN UR STRIP-MCNC: NORMAL MG/DL
WBC # BLD AUTO: 7.3 10E3/UL (ref 4–11)
WBC CLUMPS #/AREA URNS HPF: PRESENT /HPF
WBC URINE: >182 /HPF

## 2023-06-19 PROCEDURE — 99233 SBSQ HOSP IP/OBS HIGH 50: CPT | Performed by: STUDENT IN AN ORGANIZED HEALTH CARE EDUCATION/TRAINING PROGRAM

## 2023-06-19 PROCEDURE — 250N000013 HC RX MED GY IP 250 OP 250 PS 637: Performed by: INTERNAL MEDICINE

## 2023-06-19 PROCEDURE — 81001 URINALYSIS AUTO W/SCOPE: CPT | Performed by: STUDENT IN AN ORGANIZED HEALTH CARE EDUCATION/TRAINING PROGRAM

## 2023-06-19 PROCEDURE — 120N000001 HC R&B MED SURG/OB

## 2023-06-19 PROCEDURE — 250N000013 HC RX MED GY IP 250 OP 250 PS 637: Performed by: PHYSICIAN ASSISTANT

## 2023-06-19 PROCEDURE — 80048 BASIC METABOLIC PNL TOTAL CA: CPT | Performed by: STUDENT IN AN ORGANIZED HEALTH CARE EDUCATION/TRAINING PROGRAM

## 2023-06-19 PROCEDURE — 36415 COLL VENOUS BLD VENIPUNCTURE: CPT | Performed by: STUDENT IN AN ORGANIZED HEALTH CARE EDUCATION/TRAINING PROGRAM

## 2023-06-19 PROCEDURE — 84100 ASSAY OF PHOSPHORUS: CPT | Performed by: STUDENT IN AN ORGANIZED HEALTH CARE EDUCATION/TRAINING PROGRAM

## 2023-06-19 PROCEDURE — 83735 ASSAY OF MAGNESIUM: CPT | Performed by: STUDENT IN AN ORGANIZED HEALTH CARE EDUCATION/TRAINING PROGRAM

## 2023-06-19 PROCEDURE — 87086 URINE CULTURE/COLONY COUNT: CPT | Performed by: STUDENT IN AN ORGANIZED HEALTH CARE EDUCATION/TRAINING PROGRAM

## 2023-06-19 PROCEDURE — 250N000013 HC RX MED GY IP 250 OP 250 PS 637: Performed by: STUDENT IN AN ORGANIZED HEALTH CARE EDUCATION/TRAINING PROGRAM

## 2023-06-19 PROCEDURE — 250N000013 HC RX MED GY IP 250 OP 250 PS 637: Performed by: HOSPITALIST

## 2023-06-19 PROCEDURE — 250N000011 HC RX IP 250 OP 636: Performed by: STUDENT IN AN ORGANIZED HEALTH CARE EDUCATION/TRAINING PROGRAM

## 2023-06-19 PROCEDURE — 85014 HEMATOCRIT: CPT | Performed by: STUDENT IN AN ORGANIZED HEALTH CARE EDUCATION/TRAINING PROGRAM

## 2023-06-19 RX ORDER — ACETAMINOPHEN 325 MG/1
975 TABLET ORAL EVERY 8 HOURS
Status: DISCONTINUED | OUTPATIENT
Start: 2023-06-19 | End: 2023-06-21 | Stop reason: HOSPADM

## 2023-06-19 RX ORDER — MAGNESIUM SULFATE HEPTAHYDRATE 40 MG/ML
2 INJECTION, SOLUTION INTRAVENOUS ONCE
Status: COMPLETED | OUTPATIENT
Start: 2023-06-19 | End: 2023-06-19

## 2023-06-19 RX ORDER — POTASSIUM CHLORIDE 1500 MG/1
40 TABLET, EXTENDED RELEASE ORAL ONCE
Status: COMPLETED | OUTPATIENT
Start: 2023-06-19 | End: 2023-06-19

## 2023-06-19 RX ORDER — CEFTRIAXONE 1 G/1
1 INJECTION, POWDER, FOR SOLUTION INTRAMUSCULAR; INTRAVENOUS EVERY 24 HOURS
Status: DISCONTINUED | OUTPATIENT
Start: 2023-06-19 | End: 2023-06-21

## 2023-06-19 RX ADMIN — PANTOPRAZOLE SODIUM 40 MG: 40 TABLET, DELAYED RELEASE ORAL at 10:29

## 2023-06-19 RX ADMIN — CARVEDILOL 6.25 MG: 6.25 TABLET, FILM COATED ORAL at 19:34

## 2023-06-19 RX ADMIN — CEFTRIAXONE 1 G: 1 INJECTION, POWDER, FOR SOLUTION INTRAMUSCULAR; INTRAVENOUS at 13:51

## 2023-06-19 RX ADMIN — CARVEDILOL 6.25 MG: 6.25 TABLET, FILM COATED ORAL at 10:29

## 2023-06-19 RX ADMIN — LEVOTHYROXINE SODIUM 50 MCG: 50 TABLET ORAL at 10:29

## 2023-06-19 RX ADMIN — POTASSIUM CHLORIDE 40 MEQ: 1500 TABLET, EXTENDED RELEASE ORAL at 10:28

## 2023-06-19 RX ADMIN — MAGNESIUM SULFATE HEPTAHYDRATE 2 G: 2 INJECTION, SOLUTION INTRAVENOUS at 10:28

## 2023-06-19 RX ADMIN — THERA TABS 1 TABLET: TAB at 10:29

## 2023-06-19 RX ADMIN — SODIUM CHLORIDE 1 G: 1 TABLET ORAL at 19:34

## 2023-06-19 RX ADMIN — SODIUM CHLORIDE 1 G: 1 TABLET ORAL at 10:28

## 2023-06-19 RX ADMIN — Medication 3 MG: at 22:07

## 2023-06-19 RX ADMIN — ACETAMINOPHEN 975 MG: 325 TABLET, FILM COATED ORAL at 13:52

## 2023-06-19 ASSESSMENT — ACTIVITIES OF DAILY LIVING (ADL)
ADLS_ACUITY_SCORE: 30
ADLS_ACUITY_SCORE: 31
ADLS_ACUITY_SCORE: 30
ADLS_ACUITY_SCORE: 30
ADLS_ACUITY_SCORE: 34
ADLS_ACUITY_SCORE: 34
ADLS_ACUITY_SCORE: 31
ADLS_ACUITY_SCORE: 30
ADLS_ACUITY_SCORE: 34
ADLS_ACUITY_SCORE: 31
ADLS_ACUITY_SCORE: 30
ADLS_ACUITY_SCORE: 34

## 2023-06-19 NOTE — PROGRESS NOTES
GI Chart Check    Nausea, vomiting, diarrhea resolved. Has intermittent abdominal fullness. Currently experiencing concerns for suicidal ideation. Psych consult pending.     No objective findings in workup other than focal colitis on random colon biopsy and elevated liver enzymes.      Does reports ongoing abdominal fullness despite Protonix. Could consider Gaviscon although may cause diarrhea as side effect. Antiemetics prn.     Liver enzymes noted to be elevated but biochemical workup negative thus far. CT shows no focal liver masses or evidence of hepatic steatosis .  Doppler US with patent vasculature. Hepatitis A/B/C negative, F actin smooth muscle brianna negative. ARELIS pending. Iron levels normal.     Will arrange outpatient liver clinic follow up for elevated liver enzymes. Gastric emptying study can be discussed at that time pending symptoms persist after discharge.     GI will sign off, please call with questions.     FALGUNI Levy  [facility] ([facility])

## 2023-06-19 NOTE — PLAN OF CARE
Alert-oriented x4, forgetful and intermittently confused. Tolerated diet- poor appetite, needs encouragement. Pain in abd-tylenol now scheduled.-pt appeared comfortable throughout the shift. .Generalized edema slightly improved. Frequency urinating and hematuria with burning- urine collect, UA- pos, UC pending- one dose IV rocephin given. Bladder scan and straight cath completed per verbal from provider.  PT following- possible TCU. Continue to monitor I/Os and trend labs.

## 2023-06-19 NOTE — PROGRESS NOTES
Cross cover notified of patient with suicidal thoughts.  I spoke to the patient's nurse who said that she was endorsing wanting to kill herself.  She wanted to grab a knife and slit her wrists.  She was stating that she is tired of being sick and tired of being in the hospital  I suspect her thoughts are more statements of frustration and despair regarding her current and chronic illnesses, however given the description of a plan of what she would do was using isolated wrist for patient's safety I have ordered suicide precautions for now and a psychiatry consult.

## 2023-06-19 NOTE — PROGRESS NOTES
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     Has had imaging, EGD/colon without clear etiology.  Echo that showed possible wma, normal mg.  Abnormal HIDA, surgery consulted. No plans for surgery.  Has remained in the hospital due to inability to maintain adequate oral intake and her hyponatremia (both are improving).       Abdominal pain/Diarrhea  Unclear cause.  Work-up including CT abdomen and colonoscopy were unrevealing.  Stool studies negative (including O&P, fecal fat).  Biopsies from colonoscopy (6/6):colitis.  No herbal use (except occ clove).  EGD (6/9): mild erythema of gastric mucosa.  HIDA showed low EF/biliary dyskinesia.  At this point it seems like her original issues (abdominal pain, diarrhea, n/v) have resolved.  Stooling normally.  Newly worsened abdominal pain on 6/19 in the setting of new hematuria- see below.  -Continue PPI  -ADAT  -Seen by surgery who did not feel her symptoms were due to her gallbladder (signed off)  -GI following: Ordered more labs (autoimmune markers on 6/16. I think these will be low yield)  -Work on increasing her PO intake (will need encouragement by staff)  -Straight cath protocol    Gross hematuria  Possible UTI:  Patient had a episode of acutely worsened abdominal pain while trying to urinate on the night of 6/18.  Following this she had new gross hematuria.  There are no obvious clots.  She does take DOAC at baseline for afib and so this may be  contributing.  Urinalysis shows leukocyte esterase and so this may be indicative of infection, especially given burning.    -Ongoing bladder scans  -Will trial straight cath x1 today following bladder scan to see if these amounts are accurate  -Initiate ceftriaxone   -F/u urine cultures  -Hold DOAC  -Consider urology consultation if hematuria continues     Suicidal comments:  Patient made suicidal comments in the middle of night on 6/18.  In discussing this with the patient, she reports that she said this because of the severe pain that she was having in her abdomen and the sensation that she wasn't able to urinate.  She denies that she meant anything more serious.   -Will have psych see patient to comment  -Work on symptom management  -Suicidal precautions for now     Hyponatremia, improving  Some chroninicity.  Likely related to GI losses (at least initially).  Initial urine studies (Na= 20, Afq=868)  -Started salt tabs (on 6/14)  -Now suspect more hypervolemic hyponatremia and so we are diuresing with improvement in sodium     Edema:  Has edema all over likely due to fluid resuscitation when she was admitted with diarrhea and then for her bump in Cr.  Has been improving with diuresis.  Admission weight 6/3 was 164 on standing scale >> 191 on 6/15 standing scale.  She has been diuresed quite aggressively for the past 2-3 days with good response.  She is still quite above baseline weight.  Renal function improving with diuresis.  -Will given lasix 40 IV x1 today, reassess need daily  -Follow BMP closely     Hyperkalemia  Specimen was hemolyzed.  Repeat 5.6.  Recheck WNL.     Hepatitis  Noticed previous labs from ED showed mild elevation of LFTs.  Acute hepatitis panel: negative.  CT/US/HIDA were unrevealing  -Holding statin  -LFTs had been mildly elevated and stable, so stopped checking     Dyspnea  Elevated Troponin  BLE Edema  ECHO showed possible wma.  Seen by cards: outpt stress test.  -Likely iatrogenic fluid  overload from IVF     Bicytopenia (Thrombocytopenia, Normocytic Anemia)  Elevated Free Kappa Light Chains  Unclear etiology.  Possibly secondary to liver issue.  SPEP negative for monoclonal protein.  Serum immunofixation negative.  Peripheral smear revealed benign findings  -Heme has signed off     Hypocalcemia and hypomagnesemia  -Resolved with replacement      Generalized muscle weakness  Due to acute illness and ongoing GI losses.   -PT consulted and saw patient on 6/3, recommending use of walker which patient is currently refusing even after being educated with family present.  On 6/15 family wanted patient re-assessed for TCU.  PT saw patient & rec TCU.  -SW     Elevated proBNP  Initial proBNP on 5/31 elevated at 14,188 with repeat of 12,008.  Prior to this he proBNP was normal at 1,711 in 10/2022.  Most recent echocardiogram on 4/10/2023 showed EF of 55-60%.  CT of chest did small bilateral pleural effusions but on exam patient does not appear fluid overloaded.   -Diuresis as above     Recently diagnosed CAP  Diagnosed in ED on 5/31 but with repeat CXR on 6/2 which was negative.  Does have a recent dry cough and previously complained of shortness of breath that has resolved.  Afebrile and no leukocytosis.  Antibiotics were not continued on admission, holding off since admission since this initially worsened diarrhea, continue to reassess for changes       Mild thrombocytopenia  Platelet count of 147 on 6/3 with slight decrease to 143 on 6/4  -No evidence of bleeding     Tachy-uzair syndrome s/p PPM (10/2022) and redo AV node ablation (11/2022)   A-fib  Rate controlled  -Continue PTA Carvedilol   -Resume xarelto     HTN  -Continue carvedilol   -Holding losartan     Hypothyroidism  -Continue PTA Levothyroxine        Diet: Regular Diet Adult  Snacks/Supplements Adult: Nepro Oral Supplement; Between Meals    DVT Prophylaxis: DOAC  Ramos Catheter: Not present  Lines: None     Cardiac Monitoring: None  Code Status:  "Full Code      Clinically Significant Risk Factors            # Hypomagnesemia: Lowest Mg = 1.6 mg/dL in last 2 days, will replace as needed   # Hypoalbuminemia: Lowest albumin = 3.1 g/dL at 6/10/2023 10:51 AM, will monitor as appropriate   # Thrombocytopenia: Lowest platelets = 97 in last 2 days, will monitor for bleeding   # Hypertension: Noted on problem list        # Obesity: Estimated body mass index is 34.26 kg/m  as calculated from the following:    Height as of this encounter: 1.549 m (5' 1\").    Weight as of this encounter: 82.2 kg (181 lb 4.8 oz).           Disposition Plan      Expected Discharge Date: 06/19/2023                  Gabriel Fitch DO  Hospitalist Service  [facility]  Securely message with [email or web address] (more info)  Text page via [facility] Paging/Directory   ______________________________________________________________________    Interval History   Patient tearful today.  Had an episode last night of severe abdominal pain in the setting of being unable to urinate.  Since then, she has been having gross hematuria without clots.  She reports significant burning when trying to urinate.     In the setting of this severe pain the patient made suicidal comments.  To me, she reports that she said that only because of the severe pain she was experiencing and not for any other reason.     Physical Exam   Vital Signs: Temp: 97.7  F (36.5  C) Temp src: Oral BP: (!) 143/78 Pulse: 60   Resp: 20 SpO2: 97 % O2 Device: None (Room air)    Weight: 181 lbs 4.8 oz    General Appearance: Sleeping.  Awoke easily.  No apparent distress.  Hard of hearing.   Respiratory: Mild rales in the dependent lungs.  No wheezing.  Normal WOB.  Cardiovascular: RRR, no mrg  GI: Soft, obese.  Mild TTP in the suprapubic and epigastric region.  No guarding or rebound tenderness.  Skin: No rashes or lesions on exposed skin.   Other: Appropriate affect.  1-2+ pitting edema of the BLE    Medical Decision Making       60 " MINUTES SPENT BY ME on the date of service doing chart review, history, exam, documentation & further activities per the note.      Data   ------------------------- PAST 24 HR DATA REVIEWED -----------------------------------------------    I have personally reviewed the following data over the past 24 hrs:    7.3  \   10.6 (L)   / 97 (L)     133 (L) 91 (L) 40.4 (H) /  94   3.5 31 (H) 0.96 (H) \       Imaging results reviewed over the past 24 hrs:   No results found for this or any previous visit (from the past 24 hour(s)).

## 2023-06-19 NOTE — PLAN OF CARE
"END OF SHIFT SUMMARY    1900 - 0700    /58 (BP Location: Right arm)   Pulse 70   Temp 98.1  F (36.7  C) (Oral)   Resp 18   Ht 1.549 m (5' 1\")   Wt 82.2 kg (181 lb 4.8 oz)   LMP  (LMP Unknown)   SpO2 95%   BMI 34.26 kg/m       Pt alert, disoriented to time and situation. Reports abdominal fullness. Short of breath and dyspnea on exertion noted. Sat. 96%. On RA.  Voiding adequately. I/Os. Up to bedside commode with 1 assist. Refusing gait belt and walker. PIV saline locked. Up in chair. Pt verbalized frustration and suicidal thoughts, \"I want a knife to kill myself\" pt pointed her chest and wrist when asking for a knife. Suicidal precautions initiated. Bedside attendant in room.  Psych consult pending.   "

## 2023-06-19 NOTE — PLAN OF CARE
Temp: 98.2  F (36.8  C) Temp src: Oral BP: (!) 141/74 Pulse: 69   Resp: 18 SpO2: 97 % O2 Device: None (Room air)       A&Ox4. Up Ax1 to bedside commode- refusing gait belt/walker. Output tea colored. Son at bedside and supportive. Generalized body swelling. Francois/sob slowly getting better. Mild abdominal pain, pt declined needing prn pain meds. Plan- daily weight, strict I&O's, psych consult- continuing suicide precautions, magnesium/potassium/phosphorus protocol- all scheduled for recheck in am, urine culture pending- possible urology consult if hematuria continues-holding xarelto, GI signed off.

## 2023-06-20 ENCOUNTER — APPOINTMENT (OUTPATIENT)
Dept: PHYSICAL THERAPY | Facility: CLINIC | Age: 88
End: 2023-06-20
Payer: COMMERCIAL

## 2023-06-20 LAB
ANION GAP SERPL CALCULATED.3IONS-SCNC: 6 MMOL/L (ref 7–15)
BUN SERPL-MCNC: 29.2 MG/DL (ref 8–23)
CALCIUM SERPL-MCNC: 8.5 MG/DL (ref 8.8–10.2)
CHLORIDE SERPL-SCNC: 92 MMOL/L (ref 98–107)
CREAT SERPL-MCNC: 0.89 MG/DL (ref 0.51–0.95)
DEPRECATED HCO3 PLAS-SCNC: 33 MMOL/L (ref 22–29)
GFR SERPL CREATININE-BSD FRML MDRD: 62 ML/MIN/1.73M2
GLUCOSE SERPL-MCNC: 106 MG/DL (ref 70–99)
HOLD SPECIMEN: NORMAL
MAGNESIUM SERPL-MCNC: 1.9 MG/DL (ref 1.7–2.3)
MITOCHONDRIA M2 IGG SER-ACNC: 1 U/ML
PHOSPHATE SERPL-MCNC: 3.3 MG/DL (ref 2.5–4.5)
POTASSIUM SERPL-SCNC: 4.1 MMOL/L (ref 3.4–5.3)
POTASSIUM SERPL-SCNC: 4.3 MMOL/L (ref 3.4–5.3)
SODIUM SERPL-SCNC: 131 MMOL/L (ref 136–145)

## 2023-06-20 PROCEDURE — 84100 ASSAY OF PHOSPHORUS: CPT | Performed by: STUDENT IN AN ORGANIZED HEALTH CARE EDUCATION/TRAINING PROGRAM

## 2023-06-20 PROCEDURE — 120N000001 HC R&B MED SURG/OB

## 2023-06-20 PROCEDURE — 36415 COLL VENOUS BLD VENIPUNCTURE: CPT | Performed by: HOSPITALIST

## 2023-06-20 PROCEDURE — 84132 ASSAY OF SERUM POTASSIUM: CPT | Performed by: STUDENT IN AN ORGANIZED HEALTH CARE EDUCATION/TRAINING PROGRAM

## 2023-06-20 PROCEDURE — 99233 SBSQ HOSP IP/OBS HIGH 50: CPT | Performed by: HOSPITALIST

## 2023-06-20 PROCEDURE — 250N000013 HC RX MED GY IP 250 OP 250 PS 637: Performed by: INTERNAL MEDICINE

## 2023-06-20 PROCEDURE — 250N000011 HC RX IP 250 OP 636: Performed by: HOSPITALIST

## 2023-06-20 PROCEDURE — 250N000011 HC RX IP 250 OP 636: Performed by: STUDENT IN AN ORGANIZED HEALTH CARE EDUCATION/TRAINING PROGRAM

## 2023-06-20 PROCEDURE — 250N000013 HC RX MED GY IP 250 OP 250 PS 637: Performed by: HOSPITALIST

## 2023-06-20 PROCEDURE — 250N000013 HC RX MED GY IP 250 OP 250 PS 637: Performed by: STUDENT IN AN ORGANIZED HEALTH CARE EDUCATION/TRAINING PROGRAM

## 2023-06-20 PROCEDURE — 97530 THERAPEUTIC ACTIVITIES: CPT | Mod: GP | Performed by: PHYSICAL THERAPIST

## 2023-06-20 PROCEDURE — 83735 ASSAY OF MAGNESIUM: CPT | Performed by: STUDENT IN AN ORGANIZED HEALTH CARE EDUCATION/TRAINING PROGRAM

## 2023-06-20 PROCEDURE — 250N000013 HC RX MED GY IP 250 OP 250 PS 637: Performed by: PHYSICIAN ASSISTANT

## 2023-06-20 PROCEDURE — 84132 ASSAY OF SERUM POTASSIUM: CPT | Performed by: HOSPITALIST

## 2023-06-20 PROCEDURE — 36415 COLL VENOUS BLD VENIPUNCTURE: CPT | Performed by: STUDENT IN AN ORGANIZED HEALTH CARE EDUCATION/TRAINING PROGRAM

## 2023-06-20 RX ORDER — FUROSEMIDE 10 MG/ML
60 INJECTION INTRAMUSCULAR; INTRAVENOUS ONCE
Status: COMPLETED | OUTPATIENT
Start: 2023-06-20 | End: 2023-06-20

## 2023-06-20 RX ORDER — FUROSEMIDE 10 MG/ML
40 INJECTION INTRAMUSCULAR; INTRAVENOUS ONCE
Status: DISCONTINUED | OUTPATIENT
Start: 2023-06-20 | End: 2023-06-20

## 2023-06-20 RX ADMIN — POLYETHYLENE GLYCOL 3350 17 G: 17 POWDER, FOR SOLUTION ORAL at 07:57

## 2023-06-20 RX ADMIN — ACETAMINOPHEN 975 MG: 325 TABLET, FILM COATED ORAL at 11:46

## 2023-06-20 RX ADMIN — CEFTRIAXONE 1 G: 1 INJECTION, POWDER, FOR SOLUTION INTRAMUSCULAR; INTRAVENOUS at 11:45

## 2023-06-20 RX ADMIN — CARVEDILOL 6.25 MG: 6.25 TABLET, FILM COATED ORAL at 07:57

## 2023-06-20 RX ADMIN — THERA TABS 1 TABLET: TAB at 07:58

## 2023-06-20 RX ADMIN — LEVOTHYROXINE SODIUM 50 MCG: 50 TABLET ORAL at 07:58

## 2023-06-20 RX ADMIN — SODIUM CHLORIDE 1 G: 1 TABLET ORAL at 17:44

## 2023-06-20 RX ADMIN — CARVEDILOL 6.25 MG: 6.25 TABLET, FILM COATED ORAL at 17:43

## 2023-06-20 RX ADMIN — SODIUM CHLORIDE 1 G: 1 TABLET ORAL at 07:58

## 2023-06-20 RX ADMIN — RIVAROXABAN 15 MG: 15 TABLET, FILM COATED ORAL at 17:44

## 2023-06-20 RX ADMIN — Medication 3 MG: at 20:55

## 2023-06-20 RX ADMIN — ONDANSETRON 4 MG: 4 TABLET, ORALLY DISINTEGRATING ORAL at 21:03

## 2023-06-20 RX ADMIN — SODIUM CHLORIDE 1 G: 1 TABLET ORAL at 13:43

## 2023-06-20 RX ADMIN — PANTOPRAZOLE SODIUM 40 MG: 40 TABLET, DELAYED RELEASE ORAL at 08:04

## 2023-06-20 RX ADMIN — FUROSEMIDE 60 MG: 10 INJECTION, SOLUTION INTRAMUSCULAR; INTRAVENOUS at 13:43

## 2023-06-20 RX ADMIN — HYDROXYZINE HYDROCHLORIDE 50 MG: 50 TABLET, FILM COATED ORAL at 01:42

## 2023-06-20 ASSESSMENT — COLUMBIA-SUICIDE SEVERITY RATING SCALE - C-SSRS
6. HAVE YOU EVER DONE ANYTHING, STARTED TO DO ANYTHING, OR PREPARED TO DO ANYTHING TO END YOUR LIFE?: NO
TOTAL  NUMBER OF INTERRUPTED ATTEMPTS LIFETIME: NO
1. HAVE YOU WISHED YOU WERE DEAD OR WISHED YOU COULD GO TO SLEEP AND NOT WAKE UP?: NO
2. HAVE YOU ACTUALLY HAD ANY THOUGHTS OF KILLING YOURSELF?: NO
TOTAL  NUMBER OF ABORTED OR SELF INTERRUPTED ATTEMPTS LIFETIME: NO
ATTEMPT LIFETIME: NO

## 2023-06-20 ASSESSMENT — ACTIVITIES OF DAILY LIVING (ADL)
ADLS_ACUITY_SCORE: 31
ADLS_ACUITY_SCORE: 30
ADLS_ACUITY_SCORE: 31
ADLS_ACUITY_SCORE: 30
ADLS_ACUITY_SCORE: 31
ADLS_ACUITY_SCORE: 31

## 2023-06-20 NOTE — PROGRESS NOTES
"SPIRITUAL HEALTH SERVICES Progress Note  [facility] Observation Unit    Saw pt Jeannie per her length of stay.  Her son Quinton was present.  After introducing myself, Quinton declined SHS and stated, \"we will take care of that.\"    Plan: SHS remain available per pt/family request.    Diego Carpenter M.Div., [facility]  Staff     SHS routine referrals *[id number]  [facility] available 24/7 for emergent requests/referrals, either by paging the on-call  or by entering an ASAP/STAT consult in Epic (this will also page the on-call ).    "

## 2023-06-20 NOTE — PLAN OF CARE
Problem: Oral Intake Inadequate  Goal: Improved Oral Intake  Outcome: Progressing   Goal Outcome Evaluation:       Intakes improving, drinking Ensure supplements and son bringing in food from home. Continue to monitor intakes and wt trends.    Toya Mckinnon  Clinical Dietitian - [facility]

## 2023-06-20 NOTE — PLAN OF CARE
Inpatient note [id number]    Vital Signs: VSS, pt is on RA.   Pain/Comfort: states headache, but declines intervention, wants to sleep, atarax given, but pt remains restless.   Diet/po intake: pt has been eating *& drinking better, encourage po intake.   Output: up to BR voiding  Activity/Ambulation: up SBA, pt is steady on her feet.   Pertinent assessments: gen edema, +2, L) hand +3  Major Shift Events: n/a  Plan (Upcoming Events): daily weight, strict I&O, needs to be seen by psych for suicidal ideations, sitter at bedside. Mg, K, phos protocols, rechecks due this am. +UTI, no blood noted in urine tonight. xarelto is on hold. Dispo: unknown at this time.     Will continue with POC.

## 2023-06-20 NOTE — PLAN OF CARE
"       Inpatient Progress Note: 0700-1900    Primary Problem: Abdominal pain, diarrhea     Vitals: /64 (BP Location: Left arm)   Pulse 69   Temp 98.2  F (36.8  C) (Oral)   Resp 18   Ht 1.549 m (5' 1\")   Wt 83.3 kg (183 lb 9.6 oz)   LMP  (LMP Unknown)   SpO2 99%   BMI 34.69 kg/m      Neuro: A&O x 4.  Card:WDL  Resp: dyspnea on exertion   GI: Pt reported constipation early morning, miralax was given and pt had few BM  :Voiding adequately   Skin: Generalized bruising   Activity: Up with one assist   Diet: Regular,  Poor PO intake. Pt had  Ensure and banana   PIV: SL  Pain: Back   Plan: 1:1 sitter discontinued by Mental Health. Plan to discharge TCU. PT revaluated today. IV Ceftriaxone for UTI. IV  Lasix given. Strict I& Os. Son bedside. Care ongoing.         Goal Outcome Evaluation:                        "

## 2023-06-20 NOTE — PROGRESS NOTES
"CLINICAL NUTRITION SERVICES - REASSESSMENT NOTE    Recommendations Ordered by Registered Dietitian (RD):   Ordered vanilla Ensure at 10am and 2pm   Malnutrition:   % Weight Loss:  > 2% in 1 week (severe malnutrition)  % Intake:  Decreased intake does not meet criteria for malnutrition  Subcutaneous Fat Loss:  Upper arm region moderate depletion  Muscle Loss:  Temporal region mild depletion  Fluid Retention: Mild-Moderate edema    Malnutrition Diagnosis: Moderate malnutrition  In Context of:  Acute illness or injury     EVALUATION OF PROGRESS TOWARD GOALS   Diet:  Regular  Nepro vanilla at 10am and 2pm    Intake/Tolerance:    - % intakes per flowsheet  - Ordering 1-2 meals per day per [facility] (also getting food from home)  - Per chat review ongoing poor appetite with some nausea. Per MD note 6/18, \"She reports poor p.o. intake. I encouraged that she drink Ensure if she is not feeling well enough to eat as this is a high-protein high-calorie drink\"  - Spoke with son at bedside. He said her appetite has been getting better. He has been bringing in food from home like soup and buttermilk. He said she started to have a lot of bowel movements today so she's been eating gelatein and drinking Ensure all morning. At time of visit he was feeding her a banana in which that was her first solid food for the day. He also mentioned she likes the vanilla Ensure and requests we start sending that again over the Nepro.    NEW FINDINGS:   Newly worsened abdominal pain starting 6/19 per MD note  Labs: Na 131 (L), BUN 29.2 (H)  Meds: lasix, levothyroxine, MVI, protonix  Weight: 3.7% wt loss in 1 week. Wt's trending down, however pt has been diuresing so potentially fluid losses.  06/20/23 0723 83.3 kg (183 lb 9.6 oz) Standing scale   06/19/23 0454 82.2 kg (181 lb 4.8 oz) Standing scale   06/18/23 0600 84.6 kg (186 lb 6.4 oz) Standing scale   06/17/23 0400 86.3 kg (190 lb 3.2 oz) Standing scale   06/16/23 0627 87.2 kg (192 lb " 3.9 oz) Bed scale   06/15/23 0800 87 kg (191 lb 14.4 oz) Standing scale   06/14/23 0452 86.1 kg (189 lb 13.1 oz) Bed scale   06/12/23 1135 86.5 kg (190 lb 11.2 oz) Bed scale     Previous Goals:   Patient to consume % of nutritionally adequate meals three times per day, or the equivalent with supplements/snacks. - Not Met  Total avg nutritional intake to meet a minimum of 1493 kcal/kg and 57 g PRO/kg daily (per dosing wt 86.5 kg and 57.5 kg). - Not Met    Previous Nutrition Diagnosis:   Inadequate protein-energy intake related to hepatitis, gastritis, colitis as evidenced by meeting 47% estimated energy needs and 71% estimated protein needs since diet advanced to regular  Evaluation: Improving, updated below    CURRENT NUTRITION DIAGNOSIS  Inadequate oral intake related to poor appetite, hepatitis, gastritis, colitis as evidenced by pt note of poor po intakes d/t nausea and > 2% wt loss in 1 week.    INTERVENTIONS  Recommendations / Nutrition Prescription  Ordered Ensure Enlive vanilla BID as lytes are WNL    Implementation  Medical Food Supplement - modified    Goals  Patient will consume >75% of TID meals per day    MONITORING AND EVALUATION:  Progress towards goals will be monitored and evaluated per protocol and Practice Guidelines    Toya Mckinnon  Clinical Dietitian - [facility]

## 2023-06-20 NOTE — PROGRESS NOTES
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   Has had imaging, EGD/colon   Has fluid overload  Echo that showed possible wma, normal mg  Abnormal HIDA, surgery consulted. No plans for surgery  Advancing diet  May need TCU  Has remained in the hospital due to inability to maintain adequate oral intake and her hyponatremia (both are improving)  Weekend: hematuria, UTI, possible suicidal ideation  Waiting for clearance from Psych and possible TCU    Suicidal ideation    - patient apparently voiced thoughts of wanting to hurt herself to staff on 6/19    - to be seen by psych    - on sitter    - likely she does not truly want to hurt herself but was voicing frustration    Hematuria  UTI    - resolved    - has UTI, pending culture (gram - bacilli)    - on ceftriaxone    - resume DOAC    Abdominal pain/Diarrhea    - unclear cause    - work-up including CT abdomen and colonoscopy were unrevealing    - stool studies negative (including O&P, fecal fat)    - biopsies from colonoscopy (6/6):colitis    - no herbal use (except occ clove)    - EGD (6/9): mild erythema of gastric mucosa    - on PPI    - still has not tolerated PO    - HIDA showed low EF/biliary dyskinesia    - seen by surgery who did not feel her symptoms were due to her gallbladder (signed off)    - GI following: on 6/15 ordered scheduled compazine (now stopped as I do not think this is doing anything. Ordered more labs  (autoimmune markers on 6/16. I  "think these will be low yield)    - at this point it seems like her original issues (abdominal pain, diarrhea, n/v) have resolved    - I will clean up her meds (discontinue loperamide, scheduled bowel meds)    - having normal stools/increasing PO intake     Hyponatremia    - some chroninicity    - likely related to GI losses (at least initially)    - obtained urine studies (Na= 20, Qll=407)    - started salt tabs (on 6/14)    - likely hypovolemic hyponatremia (now with 3rd space losses)    - improved    Edema    - has edema all over likely due to fluid resuscitation when she was admitted with diarrhea and then for her bump in Cr    - has been improving with diuresis    - admission weight 6/3 was 164 on standing scale >> 191 on 6/15 standing scale>> 183    - the other recorded weights are bed weights and not trustworthy    - daily standing weights    - her edema may be contributing to some of her discomfort/feeling of \"fullness\"    - lasix 60mg x 1 today    Hyperkalemia    -resolved    Hepatitis    - noticed previous labs from ED showed mild elevation of LFTs    - no work-up was done initially    - acute hepatitis panel: negative    - holding statin    - CT/US were unrevealing    - HIDA, as above    - LFTs had been mildly elevated and stable, so stopped checking    - I can see an order from GI to re-check today (this is pending)    Dyspnea  Elevated Troponin  BLE Edema    - ECHO showed possible wma    - seen by cards: outpt stress test    - received lasix 20mg x a on 6/11    - received lasix 40mg x a on 6/13    - was not able to dose lasix on 6/14 or 6/15 due to bump in Cr and low BPs    - BP and Cr improved today (6/16) so received 40mg IV x 1, then on 6/17, 18 but not 6/19    - likely iatrogenic fluid overload from IVF    - lasix again today (6/20)    Bicytopenia (Thrombocytopenia, Normocytic Anemia)  Elevated Free Kappa Light Chains    - Unclear etiology.  Possibly secondary to liver issue    - SPEP negative for " monoclonal protein    - Serum immunofixation negative    - Peripheral smear revealed benign findings    - heme has signed off    Hypocalcemia and hypomagnesemia    - resolved with replacement      Generalized muscle weakness    - due to acute illness and ongoing GI losses.     - PT consulted and saw patient on 6/3, recommending use of walker which patient is currently refusing even after being educated with family present    - on 6/15 family wanted patient re-assessed for TCU    - PT saw patient today (6/16): rec TCU    - spoke with SW (will not have bed over the weekend)     Elevated proBNP    - initial proBNP on 5/31 elevated at 14,188 with repeat of 12,008    - prior to this he proBNP was normal at 1,711 in 10/2022    - most recent echocardiogram on 4/10/2023 showed EF of 55-60%    - CT of chest did small bilateral pleural effusions but on exam patient does not appear fluid overloaded    - does not appear to be in heart failure clinically     Recently diagnosed CAP    - diagnosed in ED on 5/31 but with repeat CXR on 6/2 which was negative    - does have a recent dry cough and previously complained of shortness of breath that has resolved    - afebrile and no leukocytosis.     - antibiotics were not continued on admission, holding off since admission since this initially worsened diarrhea, continue to reassess for changes       Mild thrombocytopenia    - platelet count of 147 on 6/3 with slight decrease to 143 on 6/4    - no evidence of bleeding     Tachy-uzair syndrome s/p PPM (10/2022) and redo AV node ablation (11/2022)   A-fib    - rate controlled    - continue PTA Carvedilol     - resume xarelto     HTN    - low today    - carvedilol cut in half    - holding losartan     Hypothyroidism    - continue PTA Levothyroxine     - I see she had abnormal thyroid labs in the past (April 2023), will check     Family in room (son).   Called their GI friend and had to leave a message ([phone])- on 6/14, again on  "6/16     Diet: Regular Diet Adult  Snacks/Supplements Adult: Nepro Oral Supplement; Between Meals    DVT Prophylaxis: DOAC  Ramos Catheter: Not present  Lines: None     Cardiac Monitoring: None  Code Status: Full Code      Clinically Significant Risk Factors            # Hypomagnesemia: Lowest Mg = 1.6 mg/dL in last 2 days, will replace as needed   # Hypoalbuminemia: Lowest albumin = 3.1 g/dL at 6/10/2023 10:51 AM, will monitor as appropriate   # Thrombocytopenia: Lowest platelets = 97 in last 2 days, will monitor for bleeding   # Hypertension: Noted on problem list        # Obesity: Estimated body mass index is 34.69 kg/m  as calculated from the following:    Height as of this encounter: 1.549 m (5' 1\").    Weight as of this encounter: 83.3 kg (183 lb 9.6 oz).           Disposition Plan     Expected Discharge Date: 06/20/2023,  3:00 PM                Darryl Mcdaniel MD  Hospitalist Service  [facility]  Securely message with [employer] (more info)  Text page via [email or web address] Paging/Directory   ______________________________________________________________________    Interval History   Patient in bed. Doing better. Pain was better after BM today. +urinating. Tolerating PO  Physical Exam   Vital Signs: Temp: 98.2  F (36.8  C) Temp src: Oral BP: (!) 141/81 Pulse: 73   Resp: 18 SpO2: 96 % O2 Device: None (Room air)    Weight: 183 lbs 9.6 oz    Constitutional: cooperative, no apparent distress, and appears stated age  Eyes: Lids and lashes normal, pupils equal, round and reactive to light, extra ocular muscles intact, sclera clear, conjunctiva normal  ENT: Normocephalic, without obvious abnormality, atraumatic, sinuses nontender on palpation, external ears without lesions, oral pharynx with moist mucous membranes, tonsils without erythema or exudates, gums normal and good dentition.  Respiratory: No increased work of breathing, good air exchange, clear to auscultation bilaterally, no crackles or " wheezing  Cardiovascular: Normal apical impulse, regular rate and rhythm, normal S1 and S2, no S3 or S4, and no murmur noted  GI: soft.+BS, diffuse tenderness  Has bilateral edema, some improvement  Total body edema: improved. Much of her edema is on the left side of her body because she lies on her left side    Medical Decision Making       30 MINUTES SPENT BY ME on the date of service doing chart review, history, exam, documentation & further activities per the note.      Data     I have personally reviewed the following data over the past 24 hrs:    N/A  \   N/A   / N/A     131 (L) 92 (L) 29.2 (H) /  106 (H)   4.1 33 (H) 0.89 \       Imaging results reviewed over the past 24 hrs:   No results found for this or any previous visit (from the past 24 hour(s)).

## 2023-06-20 NOTE — CONSULTS
Triage and Transition- Consult and Liaison     Jeannie Chu  June 20, 2023      Psychiatry consult completed.     Dictation to follow.     Plan/Recommendations if agreed upon by hospitalist and care team:   -discontinue 1:1 sitter  -discontinue SP if ordered  -Pt denies suicidal ideations or intent to attempt. No risk on [place] Screening tool.   -Pt does not want medications for mental health, however open to temporary OP psychotherapy services.   -Psychiatrically stable for discharge once medically cleared.      Referral:    A referral for outpatient therapy has been made for you through [facility]. They will contact you within 72 hours to schedule an appointment with you. If you have questions or concerns, please contact them at : [phone]    ALBER DE LA VEGA MSW, [facility]  Triage and Transition - Consult and Liaison   [phone]

## 2023-06-20 NOTE — DISCHARGE INSTRUCTIONS
Referral:    A referral for outpatient therapy has been made for you through [facility]. They will contact you within 72 hours to schedule an appointment with you. If you have questions or concerns, please contact them at : [phone]

## 2023-06-20 NOTE — PROGRESS NOTES
Care Management Follow Up    Length of Stay (days): 16    Expected Discharge Date: 06/21/2023     Concerns to be Addressed:   Discharge planning    Patient plan of care discussed at interdisciplinary rounds: Yes    Anticipated Discharge Disposition: Transitional Care vs Home     Anticipated Discharge Services:  Inpatient rehab services.  Anticipated Discharge DME:      Patient/family educated on Medicare website which has current facility and service quality ratings: yes  Education Provided on the Discharge Plan: Yes  Patient/Family in Agreement with the Plan:  yes    Referrals Placed by CM/SW:    Private pay costs discussed: private room/amenity fees, Private room is an additional fee. Son agreeable and would like a private room.     Additional Information:  CM following for discharge planning. Referrals were sent to TCU facilities. No accepting TCU at this time. Patient will need to be able to participate in therapy for TCU placement. Patient also has a pending psych consult.     Met with son and update given on discharge plan. Son would prefer patient discharge to TCU. States [facility] is family 1st choice. Son states patient willing to participate in therapy when more awake. CM left vocera msg and updated therapy team.     TCU referrals pending. Patient will need to demonstrate ability to do PT/OT for TCU. Son aware. He states family would prefer TCU then transition home with daughter and home private pay home care.     Will continue to follow for discharge planning.     Ferdinand Fong RN Case Manager  Inpatient Care Coordination   [facility]   [phone]           Ferdinand Fong RN

## 2023-06-21 VITALS
OXYGEN SATURATION: 95 % | SYSTOLIC BLOOD PRESSURE: 129 MMHG | HEIGHT: 61 IN | DIASTOLIC BLOOD PRESSURE: 69 MMHG | BODY MASS INDEX: 35.1 KG/M2 | RESPIRATION RATE: 18 BRPM | TEMPERATURE: 98.2 F | HEART RATE: 85 BPM | WEIGHT: 185.9 LBS

## 2023-06-21 LAB
ANION GAP SERPL CALCULATED.3IONS-SCNC: 7 MMOL/L (ref 7–15)
BACTERIA UR CULT: ABNORMAL
BACTERIA UR CULT: ABNORMAL
BUN SERPL-MCNC: 24.2 MG/DL (ref 8–23)
CALCIUM SERPL-MCNC: 8.7 MG/DL (ref 8.8–10.2)
CHLORIDE SERPL-SCNC: 86 MMOL/L (ref 98–107)
CREAT SERPL-MCNC: 0.92 MG/DL (ref 0.51–0.95)
DEPRECATED HCO3 PLAS-SCNC: 31 MMOL/L (ref 22–29)
GFR SERPL CREATININE-BSD FRML MDRD: 60 ML/MIN/1.73M2
GLUCOSE BLDC GLUCOMTR-MCNC: 126 MG/DL (ref 70–99)
GLUCOSE SERPL-MCNC: 96 MG/DL (ref 70–99)
HOLD SPECIMEN: NORMAL
POTASSIUM SERPL-SCNC: 4.3 MMOL/L (ref 3.4–5.3)
SODIUM SERPL-SCNC: 124 MMOL/L (ref 136–145)

## 2023-06-21 PROCEDURE — 250N000013 HC RX MED GY IP 250 OP 250 PS 637: Performed by: HOSPITALIST

## 2023-06-21 PROCEDURE — 250N000013 HC RX MED GY IP 250 OP 250 PS 637: Performed by: INTERNAL MEDICINE

## 2023-06-21 PROCEDURE — 99239 HOSP IP/OBS DSCHRG MGMT >30: CPT | Performed by: HOSPITALIST

## 2023-06-21 PROCEDURE — 36415 COLL VENOUS BLD VENIPUNCTURE: CPT | Performed by: HOSPITALIST

## 2023-06-21 PROCEDURE — 250N000013 HC RX MED GY IP 250 OP 250 PS 637: Performed by: STUDENT IN AN ORGANIZED HEALTH CARE EDUCATION/TRAINING PROGRAM

## 2023-06-21 PROCEDURE — 82435 ASSAY OF BLOOD CHLORIDE: CPT | Performed by: HOSPITALIST

## 2023-06-21 PROCEDURE — 82374 ASSAY BLOOD CARBON DIOXIDE: CPT | Performed by: HOSPITALIST

## 2023-06-21 RX ORDER — PANTOPRAZOLE SODIUM 40 MG/1
40 TABLET, DELAYED RELEASE ORAL
Qty: 30 TABLET | Refills: 1 | Status: SHIPPED | OUTPATIENT
Start: 2023-06-22 | End: 2023-07-22

## 2023-06-21 RX ORDER — FUROSEMIDE 40 MG
40 TABLET ORAL DAILY
Status: DISCONTINUED | OUTPATIENT
Start: 2023-06-21 | End: 2023-06-21 | Stop reason: HOSPADM

## 2023-06-21 RX ORDER — CEFTRIAXONE 1 G/1
1 INJECTION, POWDER, FOR SOLUTION INTRAMUSCULAR; INTRAVENOUS ONCE
Status: DISCONTINUED | OUTPATIENT
Start: 2023-06-21 | End: 2023-06-21

## 2023-06-21 RX ORDER — FUROSEMIDE 40 MG
40 TABLET ORAL DAILY
Qty: 2 TABLET | Refills: 0 | Status: SHIPPED | OUTPATIENT
Start: 2023-06-22 | End: 2023-07-13

## 2023-06-21 RX ORDER — FUROSEMIDE 10 MG/ML
40 INJECTION INTRAMUSCULAR; INTRAVENOUS ONCE
Status: DISCONTINUED | OUTPATIENT
Start: 2023-06-21 | End: 2023-06-21

## 2023-06-21 RX ORDER — CEFDINIR 300 MG/1
300 CAPSULE ORAL EVERY 12 HOURS
Qty: 6 CAPSULE | Refills: 0 | Status: SHIPPED | OUTPATIENT
Start: 2023-06-21 | End: 2023-06-24

## 2023-06-21 RX ORDER — ONDANSETRON 4 MG/1
4 TABLET, ORALLY DISINTEGRATING ORAL EVERY 6 HOURS PRN
Qty: 30 TABLET | Refills: 0 | Status: SHIPPED | OUTPATIENT
Start: 2023-06-21

## 2023-06-21 RX ORDER — SODIUM CHLORIDE 1 G/1
1 TABLET ORAL
Qty: 90 TABLET | Refills: 1 | Status: SHIPPED | OUTPATIENT
Start: 2023-06-21 | End: 2023-07-13

## 2023-06-21 RX ORDER — CARVEDILOL 12.5 MG/1
6.25 TABLET ORAL 2 TIMES DAILY WITH MEALS
Qty: 180 TABLET | Refills: 3 | COMMUNITY
Start: 2023-06-21 | End: 2023-07-13

## 2023-06-21 RX ORDER — LEVOTHYROXINE SODIUM 50 UG/1
50 TABLET ORAL
Qty: 30 TABLET | Refills: 1 | Status: SHIPPED | OUTPATIENT
Start: 2023-06-22 | End: 2023-07-22

## 2023-06-21 RX ORDER — CEFDINIR 300 MG/1
300 CAPSULE ORAL EVERY 12 HOURS SCHEDULED
Status: DISCONTINUED | OUTPATIENT
Start: 2023-06-21 | End: 2023-06-21 | Stop reason: HOSPADM

## 2023-06-21 RX ADMIN — LEVOTHYROXINE SODIUM 50 MCG: 50 TABLET ORAL at 09:00

## 2023-06-21 RX ADMIN — CEFDINIR 300 MG: 300 CAPSULE ORAL at 11:18

## 2023-06-21 RX ADMIN — CARVEDILOL 6.25 MG: 6.25 TABLET, FILM COATED ORAL at 09:00

## 2023-06-21 RX ADMIN — THERA TABS 1 TABLET: TAB at 08:59

## 2023-06-21 RX ADMIN — ACETAMINOPHEN 975 MG: 325 TABLET, FILM COATED ORAL at 03:52

## 2023-06-21 RX ADMIN — FUROSEMIDE 40 MG: 40 TABLET ORAL at 11:18

## 2023-06-21 RX ADMIN — PANTOPRAZOLE SODIUM 40 MG: 40 TABLET, DELAYED RELEASE ORAL at 09:00

## 2023-06-21 RX ADMIN — SODIUM CHLORIDE 1 G: 1 TABLET ORAL at 08:59

## 2023-06-21 ASSESSMENT — ACTIVITIES OF DAILY LIVING (ADL)
ADLS_ACUITY_SCORE: 34
ADLS_ACUITY_SCORE: 30
ADLS_ACUITY_SCORE: 31
ADLS_ACUITY_SCORE: 31
ADLS_ACUITY_SCORE: 30

## 2023-06-21 NOTE — PLAN OF CARE
Temp: 98.2  F (36.8  C) Temp src: Oral BP: (!) 112/95 Pulse: 69   Resp: 20 SpO2: 96 % O2 Device: None (Room air)        A&Ox4. Up Ax1 to bedside commode- refusing gait belt/walker. Output yellow today. Son at bedside and supportive. Generalized body swelling. Francois/sob slowly getting better. Mild abdominal pain, pt declined needing prn pain meds. Pt requested melatonin for bed and zofran as she felt nauseous but no vomiting, prn melatonin and zofran given. Plan- daily weight, strict I&O's, psych signed off- suicide precautions discontinued, magnesium/potassium/phosphorus protocol- all scheduled for recheck in am, urine culture pending, GI signed off, likely discharge tomorrow 6/21.

## 2023-06-21 NOTE — DISCHARGE SUMMARY
"[facility]  Hospitalist Discharge Summary      Date of Admission:  6/2/2023  Date of Discharge:  6/21/2023  Discharging Provider: Darryl Mcdaniel MD  Discharge Service: Hospitalist Service    Discharge Diagnoses   Abdominal pain, diarrhea  Hyponatremia  Urinary tract infection  Fluid overload/edema  Transaminitis  Hypocalcemia  Hypomagnesemia  Thrombocytopenia  Suicidal ideation    Clinically Significant Risk Factors     # Obesity: Estimated body mass index is 34.69 kg/m  as calculated from the following:    Height as of this encounter: 1.549 m (5' 1\").    Weight as of this encounter: 83.3 kg (183 lb 9.6 oz).       Follow-ups Needed After Discharge   Follow-up Appointments     Follow-up and recommended labs and tests       Follow up with primary care provider, Diana Hilario, within 7   days for hospital follow- up.  The following labs/tests are recommended:   chem 7 to check sodium level, check liver function tests (yours were   slightly elevated, so you cholesterol medication has been stopped). Repeat   thyroid studies (TSH/free T4) in 4-6 weeks            Unresulted Labs Ordered in the Past 30 Days of this Admission     No orders found from 5/3/2023 to 6/3/2023.      These results will be followed up by NA    Discharge Disposition   Discharged to home  Condition at discharge: Stable    Hospital Course   Jeannie Chu is a 88 year old female past medical history of atrial fibrillation, hypertension, coronary artery disease, hypothyroidism presented to the ER with worsening diarrhea associated with abdominal pain.  This has been going on for 3 weeks.  Yesterday patient was in the ER with shortness of breath and patient was diagnosed with pneumonia and at that time her BNP was elevated.  Daughter is at bedside and she mentioned her diarrhea got worse yesterday after taking antibiotics and she noticed some blood this morning.  Patient is having pain all over the abdomen during " the diarrhea.  Patient denies any chest pain, shortness of breath now.  She has nausea but denies any vomitings.  In ER patient vitals were stable.  Her blood work showed low sodium at 125, magnesium at 1.6, calcium and 8.6.  Her BNP is 12,000 but it is much better than yesterday which was 14,000.  Patient had CT chest abdomen pelvis with contrast yesterday which did not show any acute pathology.  Patient is getting admitted for observation for diarrhea -unclear etiology.    Suicidal ideation    - patient apparently voiced thoughts of wanting to hurt herself to staff on 6/19    - to be seen by psych    - on sitter    - likely she does not truly want to hurt herself but was voicing frustration     Hematuria  UTI    - resolved    - has UTI, pending culture (gram - bacilli)- Klebsiella    - on ceftriaxone    - resume DOAC     Abdominal pain/Diarrhea    - unclear cause    - work-up including CT abdomen and colonoscopy were unrevealing    - stool studies negative (including O&P, fecal fat)    - biopsies from colonoscopy (6/6):colitis    - no herbal use (except occ clove)    - EGD (6/9): mild erythema of gastric mucosa    - on PPI    - still has not tolerated PO    - HIDA showed low EF/biliary dyskinesia    - seen by surgery who did not feel her symptoms were due to her gallbladder (signed off)    - GI following: on 6/15 ordered scheduled compazine (now stopped as I do not think this is doing anything. Ordered more labs  (autoimmune markers on 6/16. I think these will be low yield)    - at this point it seems like her original issues (abdominal pain, diarrhea, n/v) have resolved    - I will clean up her meds (discontinue loperamide, scheduled bowel meds)    - having normal stools/increasing PO intake     Hyponatremia    - some chroninicity    - likely related to GI losses (at least initially)    - obtained urine studies (Na= 20, Gzn=225)    - started salt tabs (on 6/14)    - likely hypovolemic hyponatremia (now with 3rd  "space losses)    - improved     Edema    - has edema all over likely due to fluid resuscitation when she was admitted with diarrhea and then for her bump in Cr    - has been improving with diuresis    - admission weight 6/3 was 164 on standing scale >> 191 on 6/15 standing scale>> 183    - the other recorded weights are bed weights and not trustworthy    - daily standing weights    - her edema may be contributing to some of her discomfort/feeling of \"fullness\"    - lasix 60mg x 1 today     Hyperkalemia    -resolved     Hepatitis    - noticed previous labs from ED showed mild elevation of LFTs    - no work-up was done initially    - acute hepatitis panel: negative    - holding statin    - CT/US were unrevealing    - HIDA, as above    - LFTs had been mildly elevated and stable, so stopped checking    - I can see an order from GI to re-check today (this is pending)     Dyspnea  Elevated Troponin  BLE Edema    - ECHO showed possible wma    - seen by cards: outpt stress test    - received lasix 20mg x a on 6/11    - received lasix 40mg x a on 6/13    - was not able to dose lasix on 6/14 or 6/15 due to bump in Cr and low BPs    - BP and Cr improved today (6/16) so received 40mg IV x 1, then on 6/17, 18 but not 6/19    - likely iatrogenic fluid overload from IVF    - lasix again today (6/20)     Bicytopenia (Thrombocytopenia, Normocytic Anemia)  Elevated Free Kappa Light Chains    - Unclear etiology.  Possibly secondary to liver issue    - SPEP negative for monoclonal protein    - Serum immunofixation negative    - Peripheral smear revealed benign findings    - heme has signed off     Hypocalcemia and hypomagnesemia    - resolved with replacement      Generalized muscle weakness    - due to acute illness and ongoing GI losses.     - PT consulted and saw patient on 6/3, recommending use of walker which patient is currently refusing even after being educated with family present    - on 6/15 family wanted patient re-assessed " for TCU    - PT saw patient today (6/16): rec TCU    - spoke with SW (will not have bed over the weekend)     Elevated proBNP    - initial proBNP on 5/31 elevated at 14,188 with repeat of 12,008    - prior to this he proBNP was normal at 1,711 in 10/2022    - most recent echocardiogram on 4/10/2023 showed EF of 55-60%    - CT of chest did small bilateral pleural effusions but on exam patient does not appear fluid overloaded    - does not appear to be in heart failure clinically     Recently diagnosed CAP    - diagnosed in ED on 5/31 but with repeat CXR on 6/2 which was negative    - does have a recent dry cough and previously complained of shortness of breath that has resolved    - afebrile and no leukocytosis.     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  She has had a normal bowel movement.  She has increased her fluid intake.  She drinks 2-3 ensures a day.  She drinks water and juices.  She has been eating some porridge and yogurt given to her by her son.  At this point she is able to discharge home.  Her son has hired a nurse to take care of her 24/7.  She will complete a course of antibiotics for her UTI.  I will give her 1 more dose of Lasix orally here.  She will discharge with 2 more days of Lasix for her fluid overload/edema.  She will be sent with sodium tablets.  She should have labs repeated with her primary care doctor.  Her carvedilol has been cut in half.  Her losartan was stopped.  Her statin was stopped as well  because of mildly elevated LFTs.  Patient will discharge home.    Consultations This Hospital Stay   PHYSICAL THERAPY ADULT IP CONSULT  GASTROENTEROLOGY IP CONSULT  CARE MANAGEMENT / SOCIAL WORK IP CONSULT  HEMATOLOGY & ONCOLOGY IP CONSULT  CARDIOLOGY IP CONSULT  SURGERY GENERAL IP CONSULT  PHYSICAL THERAPY ADULT IP CONSULT  PSYCHIATRY IP CONSULT    Code Status   Full Code    Time Spent on this Encounter   I, Darryl Mcdaniel MD, personally saw the patient today and spent greater than 30 minutes discharging this patient.       Darryl Mcdaniel MD  [facility] OBSERVATION DEPT  [address] [place] [address]  Phone: [phone]  ______________________________________________________________________    Physical Exam   Vital Signs: Temp: 98.2  F (36.8  C) Temp src: Oral BP: 129/69 Pulse: 85   Resp: 18 SpO2: 95 % O2 Device: None (Room air)    Weight: 183 lbs 9.6 oz  Constitutional: asleep. Arouses to voice  Eyes: Lids and lashes normal, pupils equal, round and reactive to light, extra ocular muscles intact, sclera clear, conjunctiva normal  ENT: Normocephalic, without obvious abnormality, atraumatic, sinuses nontender on palpation, external ears without lesions, oral pharynx with moist mucous membranes, tonsils without erythema or exudates, gums normal and good dentition.  Respiratory: No increased work of breathing, good air exchange, clear to auscultation bilaterally, no crackles or wheezing  Cardiovascular: Normal apical impulse, regular rate and rhythm, normal S1 and S2, no S3 or S4, and no murmur noted  GI: No scars, normal bowel sounds, soft, non-distended, non-tender, no masses palpated, no hepatosplenomegally       Primary Care Physician   Diana Hilario    Discharge Orders      Primary Care - Care Coordination Referral      Reason for your hospital stay    Abdominal pain, diarrhea  Urinary tract infection  Fluid overload/edema  Hyponatremia     Activity    Your activity  upon discharge: activity as tolerated     Follow-up and recommended labs and tests     Follow up with primary care provider, Diana Hilario, within 7 days for hospital follow- up.  The following labs/tests are recommended: chem 7 to check sodium level, check liver function tests (yours were slightly elevated, so you cholesterol medication has been stopped). Repeat thyroid studies (TSH/free T4) in 4-6 weeks     Walker Order for DME - ONLY FOR DME    I, the undersigned, certify that the above prescribed supplies are medically necessary for this patient and is both reasonable and necessary in reference to accepted standards of medical and necessary in reference to accepted standards of medical practice in the treatment of this patient's condition and is not prescribed as a convenience.      Diet    Follow this diet upon discharge: Orders Placed This Encounter      Snacks/Supplements Adult: Ensure Enlive; Between Meals      Regular Diet Adult       Significant Results and Procedures   Most Recent 3 CBC's:Recent Labs   Lab Test 06/19/23  0646 06/18/23  0558 06/15/23  0642   WBC 7.3 7.6 6.7   HGB 10.6* 10.5* 11.1*   MCV 91 93 94   PLT 97* 98* 109*     Most Recent 3 BMP's:Recent Labs   Lab Test 06/21/23  0724 06/21/23  0614 06/20/23  1013 06/20/23  0617 06/19/23  0646   NA  --  124* 131*  --  133*   POTASSIUM  --  4.3 4.1 4.3 3.5   CHLORIDE  --  86* 92*  --  91*   CO2  --  31* 33*  --  31*   BUN  --  24.2* 29.2*  --  40.4*   CR  --  0.92 0.89  --  0.96*   ANIONGAP  --  7 6*  --  11   LYLE  --  8.7* 8.5*  --  8.7*   * 96 106*  --  94     Most Recent 2 LFT's:Recent Labs   Lab Test 06/17/23  0743 06/13/23  0636 06/12/23  0709   AST 98*  --  65*   *  --  91*   ALKPHOS 252*  --  147*   BILITOTAL 0.8 0.7 0.8   ,   Results for orders placed or performed during the hospital encounter of 06/02/23   Chest XR,  PA & LAT    Narrative    EXAM: XR CHEST 2 VIEWS  LOCATION: [facility]  HOSPITAL  DATE/TIME: 6/2/2023 7:53 PM CDT    INDICATION: dyspnea  COMPARISON: 02/20/2023      Impression    IMPRESSION: Heart size is enlarged. Cardiac pacemaker is noted in place. No evidence of central pulmonary vasculature without overt failure, lobar consolidation, or significant effusions. No pneumothorax. No acute bony abnormality.   XR Chest Port 1 View    Narrative    EXAM: XR CHEST PORT 1 VIEW  LOCATION: [facility] [place]  DATE/TIME: 6/10/2023 12:30 PM CDT    INDICATION: Shortness of breath.  COMPARISON: 06/02/2023.      Impression    IMPRESSION: Hypoexpanded lungs. Minimal basilar opacities, probably atelectasis. Remaining lungs are clear. No obvious pulmonary edema or pleural effusion. Stable enlarged cardiac silhouette. Dual-chamber pacemaker. No pneumothorax.     CT Abdomen Pelvis w Contrast    Narrative    EXAM: CT ABDOMEN PELVIS W CONTRAST  LOCATION: [facility] [place]  DATE/TIME: 6/10/2023 9:30 PM CDT    INDICATION: now with severe LLQ pain, after EGD yesterday, r o perf, new acue process. Also previously had diarrhea   acute colitis. No BMs today. Please comment on stool burden.  COMPARISON: US 06/01/2023  TECHNIQUE: CT scan of the abdomen and pelvis was performed following injection of IV contrast. Multiplanar reformats were obtained. Dose reduction techniques were used.  CONTRAST: 82mL Isovue 370    FINDINGS:   LOWER CHEST: Small bilateral pleural effusions. Cardiomegaly. Cardiac pacer lead in place.    HEPATOBILIARY: Normal.    PANCREAS: Normal.    SPLEEN: Normal.    ADRENAL GLANDS: Normal.    KIDNEYS/BLADDER: No significant mass, stones, or hydronephrosis. There are simple or benign cysts. No follow up is needed.    BOWEL: No obstruction or inflammatory change. Moderate stool burden. Mesenteric edema. Small free fluid.    LYMPH NODES: Normal.    VASCULATURE: Atherosclerosis.    PELVIC ORGANS: Normal.    MUSCULOSKELETAL: Degenerative changes. T2 intraosseous  hemangioma. Compression deformity superior endplate T1 appears chronic. Chronic healed fracture deformity left inferior pubic ramus. Subcutaneous soft tissue edema.      Impression    IMPRESSION:   1.  No acute process.   NM Hepatobiliary Scan w GB EF    Narrative    NUCLEAR MEDICINE HEPATOBILIARY SCAN WITH GALLBLADDER EJECTION FRACTION   6/13/2023 1:20 PM.    INDICATION:  Abdominal pain.    COMPARISON: CT abdomen and pelvis on 6/10/2023.    TECHNIQUE: The patient received 6 mCi of Tc-99m Mebrofenin  intravenously and 1.7 cm microgram of CCK intravenously. Initially  mebrofenin was extravasated and a subsequent injection of mebrofenin  was administered.    FINDINGS: At the time of imaging radiotracer is seen within the  gallbladder. After CCK administration the gallbladder ejection  fraction measured at 21%. Normal range is more than 33%.      Impression    IMPRESSION:  1. No evidence of acute cholecystitis.  2. Low gallbladder ejection fraction, which can be seen with biliary  dyskinesia.    CRISTOPHER TIDWELL MD         SYSTEM ID:  [id number]   US Abdomen or Pelvis Doppler Limited    Narrative    US ABDOMEN OR PELVIS DOPPLER LIMITED   6/16/2023 2:40 PM    HISTORY: 88-year-old patient with abdominal discomfort, request made  for evaluation of portal or mesenteric vein thrombus.    COMPARISON: None.    TECHNIQUE: Color Doppler and spectral waveform analysis performed  throughout the hepatic vasculature.    FINDINGS: The visible IVC is patent at the level of the liver. The  right hepatic, mid hepatic, and left hepatic veins are patent with  normal directional blood flow away from the liver. Velocities range  from 15-37 cm/second.    The main portal vein is 28 cm/second, right portal vein 18 cm/second,  and left portal vein 27 cm/second. Portal blood flow is directional  toward the liver. The main hepatic artery is 34/10 cm/second and the  right hepatic artery is 24/8 cm/second.    The splenic vein at the level of the  pancreas is patent, difficult to  assess directional blood flow. Unable to visualize superior mesenteric  or inferior mesenteric veins.      Impression    IMPRESSION: Patent hepatic vasculature with normal directional blood  flow. Of note, the superior mesenteric and inferior mesenteric veins  are not visible with ultrasound. May consider CT exam and portal  venous phase for the best opportunity for visualization of the  mesenteric vasculature, if able.      MARY LEYVA MD         SYSTEM ID:  [id number]   Echocardiogram Complete     Value    LVEF  55-60%    Narrative    [id number]  [id number]  [id number]  [id number]^YOCASTA^SUHA^HARESH     [facility]  Echocardiography Laboratory  [address] [place] [address]     Name: CORA WAY  MRN: [id number]  : 1935  Study Date: 2023 08:10 AM  Age: 88 yrs  Gender: Female  Patient Location: [facility]  Reason For Study: SOB  Ordering Physician: SUHA RUST  Performed By: Juliane Schrader     BSA: 1.7 m2  Height: 61 in  Weight: 164 lb  HR: 65  BP: 141/78 mmHg  ______________________________________________________________________________  Procedure  Complete Portable Echo Adult. Optison (NDC #[id number]) given intravenously.  ______________________________________________________________________________  Interpretation Summary     There is severe distal septal hypokinesis.  The visual ejection fraction is 55-60%.  Mildly decreased right ventricular systolic function  There is mod-severe biatrial enlargement.  There is moderate (2+) mitral regurgitation.  There is moderate to mod-severe (2-3+) tricuspid regurgitation.  Dilation of the inferior vena cava is present with abnormal respiratory  variation in diameter.  Moderate (46-55mmHg) pulmonary hypertension is present.  There is mild (1+) aortic regurgitation.  There is a catheter/pacemaker lead seen in the right ventricle.  Contrast was used without apparent complications. Compared  to prior study,  changes are noted.  ______________________________________________________________________________  Left Ventricle  The left ventricle is normal in size. There is normal left ventricular wall  thickness. Left ventricular diastolic function is abnormal. The visual  ejection fraction is 55-60%. There is severe septal hypokinesis. Septal wall  motion abnormality may reflect pacemaker activation.     Right Ventricle  There is a catheter/pacemaker lead seen in the right ventricle. The right  ventricle is normal size. Mildly decreased right ventricular systolic  function.     Atria  There is mod-severe biatrial enlargement.     Mitral Valve  The mitral valve leaflets appear thickened, but open well. There is mild  mitral annular calcification. There is moderate (2+) mitral regurgitation.     Tricuspid Valve  The tricuspid valve is not well visualized, but is grossly normal. There is  moderate to mod-severe (2-3+) tricuspid regurgitation. Moderate (46-55mmHg)  pulmonary hypertension is present.     Aortic Valve  There is mild trileaflet aortic sclerosis. There is mild (1+) aortic  regurgitation.     Pulmonic Valve  The pulmonic valve is not well visualized.     Vessels  The aortic root is normal size. Dilation of the inferior vena cava is present  with abnormal respiratory variation in diameter.     Pericardium  There is no pericardial effusion.     Rhythm  The rhythm was atrial fibrillation.  ______________________________________________________________________________  MMode/2D Measurements & Calculations     IVSd: 0.80 cm  LVIDd: 3.6 cm  LVIDs: 2.6 cm  LVPWd: 0.84 cm  IVC diam: 2.4 cm  FS: 26.2 %  LV mass(C)d: 80.1 grams  LV mass(C)dI: 46.1 grams/m2  Ao root diam: 2.8 cm  LA dimension: 4.2 cm  LA/Ao: 1.5  LVOT diam: 1.8 cm  LVOT area: 2.4 cm2  LA Volume (BP): 76.4 ml  LA Volume Index (BP): 43.9 ml/m2  RV Base: 3.5 cm  RWT: 0.47     TAPSE: 1.2 cm     Doppler Measurements & Calculations  MV E max danielle:  114.0 cm/sec  MV max P.8 mmHg  MV mean P.8 mmHg  MV V2 VTI: 23.6 cm  MVA(VTI): 1.4 cm2  MV P1/2t max rg: 129.9 cm/sec  MV P1/2t: 59.5 msec  MVA(P1/2t): 3.7 cm2  MV dec slope: 639.4 cm/sec2  MV dec time: 0.17 sec  Ao V2 max: 116.1 cm/sec  Ao max P.0 mmHg  Ao V2 mean: 79.7 cm/sec  Ao mean P.9 mmHg  Ao V2 VTI: 24.5 cm  AMMY(I,D): 1.4 cm2  AMMY(V,D): 1.3 cm2  AI P1/2t: 623.4 msec  LV V1 max P.5 mmHg  LV V1 max: 60.8 cm/sec  LV V1 VTI: 13.8 cm  MR PISA: 2.1 cm2  MR ERO: 0.16 cm2  MR volume: 27.1 ml  SV(LVOT): 33.6 ml  SI(LVOT): 19.3 ml/m2  PA V2 max: 79.8 cm/sec  PA max P.5 mmHg  PA mean P.5 mmHg  PA V2 VTI: 16.2 cm  PA acc time: 0.12 sec  TR max rg: 270.3 cm/sec  TR max P.4 mmHg  AV Rg Ratio (DI): 0.52  AMMY Index (cm2/m2): 0.79  E/E' av.1     Lateral E/e': 10.8  Medial E/e': 17.5  RV S Rg: 8.9 cm/sec     ______________________________________________________________________________  Report approved by: Radha Overton 2023 02:39 PM               Discharge Medications   Current Discharge Medication List      START taking these medications    Details   cefdinir (OMNICEF) 300 MG capsule Take 1 capsule (300 mg) by mouth every 12 hours for 3 days  Qty: 6 capsule, Refills: 0    Associated Diagnoses: Urinary tract infection without hematuria, site unspecified      furosemide (LASIX) 40 MG tablet Take 1 tablet (40 mg) by mouth daily for 2 days  Qty: 2 tablet, Refills: 0    Associated Diagnoses: Hypervolemia, unspecified hypervolemia type; Edema, unspecified type      pantoprazole (PROTONIX) 40 MG EC tablet Take 1 tablet (40 mg) by mouth every morning (before breakfast)  Qty: 30 tablet, Refills: 1    Associated Diagnoses: Abdominal pain, epigastric      sodium chloride 1 GM tablet Take 1 tablet (1 g) by mouth 3 times daily (with meals)  Qty: 90 tablet, Refills: 1    Associated Diagnoses: Hyponatremia         CONTINUE these medications which have CHANGED    Details   carvedilol (COREG)  12.5 MG tablet Take 0.5 tablets (6.25 mg) by mouth 2 times daily (with meals)  Qty: 180 tablet, Refills: 3    Associated Diagnoses: Essential hypertension      levothyroxine (SYNTHROID/LEVOTHROID) 50 MCG tablet Take 1 tablet (50 mcg) by mouth every morning (before breakfast)  Qty: 30 tablet, Refills: 1    Associated Diagnoses: Acquired hypothyroidism         CONTINUE these medications which have NOT CHANGED    Details   ALPRAZolam (XANAX) 0.5 MG tablet Take 0.5 mg by mouth daily as needed for anxiety      IRON (FERROUS GLUCONATE) 325 MG OR TABS Take 1 tablet by mouth daily   Qty: 30, Refills: 0      Multiple Vitamins-Minerals (ICAPS AREDS FORMULA PO) Take 2 tablets by mouth daily       niacin 500 MG tablet Take by mouth daily (with breakfast)      nitroGLYcerin (NITROSTAT) 0.4 MG sublingual tablet For chest pain place 1 tablet under the tongue every 5 minutes for 3 doses. If symptoms persist 5 minutes after 1st dose call 911.  Qty: 30 tablet, Refills: 0    Associated Diagnoses: Atrial flutter with rapid ventricular response (H)      rivaroxaban ANTICOAGULANT (XARELTO) 20 MG TABS tablet Take 1 tablet (20 mg) by mouth daily (with dinner)  Qty: 90 tablet, Refills: 3    Associated Diagnoses: Paroxysmal atrial fibrillation (H)         STOP taking these medications       amoxicillin-clavulanate (AUGMENTIN) 875-125 MG tablet Comments:   Reason for Stopping:         atorvastatin (LIPITOR) 10 MG tablet Comments:   Reason for Stopping:         azithromycin (ZITHROMAX) 250 MG tablet Comments:   Reason for Stopping:         losartan (COZAAR) 50 MG tablet Comments:   Reason for Stopping:             Allergies   Allergies   Allergen Reactions     Amlodipine Swelling     BLE edema     Ibuprofen Swelling

## 2023-06-21 NOTE — CONSULTS
Triage and Transition - Consult and Liaison     Jeannie Chu  June 20, 2023    Session start: 1330  Session end: 1400  Session duration in minutes: 30 minutes  CPT utilized: 68887 - Brief diagnostic assessment (modifier 52)  Patient was seen in-person.    Diagnosis:   Adjustment Disorders  309.28 (F43.23) With mixed anxiety and depressed mood, present;    Plan/Recommendations:     Continue care coordination with care team.     Maintain current transition plan.     discontinue 1:1 sitter    discontinue SP if ordered    Pt denies suicidal ideations or intent to attempt. No risk on [place] Screening tool.     Pt does not want medications for mental health, however open to temporary OP psychotherapy services.     Psychiatrically stable for discharge once medically cleared.      Please enter another psychiatry if further visits are needed. Patients are not followed by Psychiatry C&L Service unless otherwise indicated.     Reason for consult: Psychiatry consult was requested due to concern for statements of wanting to die and emotional distress. Patient was seen by Triage and Transition Consult & Liaison team.     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     Brief Psychosocial History  Ms. Jeannie Chu is currently residing independently with her daughter. Prior to admission she was independent in her ADLS and IADLs per son's report.  She is from [facility] and has been living in the [place] since a few months before the COVID pandemic.  She has a strong desire to return to [place] in the future if  possible. Her children are supportive.       Summary of Patient Situation  Writer met with Ms. Jeannie Chu and her son in person. She notes she is struggling having incontinence of bowel and bladder associated with medications and reports that her dignity was challenged.  She reports that she does not want to die or end her life. No report of HI, NSSIB, alec and psychosis per family, staff and patient report.  She reports a strong desire to go home and to get out of the hospital.  She has an understanding that she will likely will need to go to a TCU rehab facility.  She does not wish to start taking any mental health medications and is agreeable to OP therapy services for ongoing support.     [place] Suicide Severity Rating Scale Full Clinical Version:  Suicidal Ideation  1. Wish to be Dead (Lifetime): No  2. Non-Specific Active Suicidal Thoughts (Lifetime): No     Suicidal Behavior  Actual Attempt (Lifetime): No  Has subject engaged in non-suicidal self-injurious behavior? (Lifetime): No  Interrupted Attempts (Lifetime): No  Aborted or Self-Interrupted Attempt (Lifetime): No  Preparatory Acts or Behavior (Lifetime): No  C-SSRS Risk (Lifetime/Recent)  Calculated C-SSRS Risk Score (Lifetime/Recent): No Risk Indicated    Validity of evaluation is impacted by presenting factors during interview medical complexities and vulnerability.   Comments regarding subjective versus objective responses to [place] tool: affect is congruent with reporting  Environmental or Psychosocial Events: other life stressors, new diagnosis of major illness and worsening chronic illness  Chronic Risk Factors: chronic health problems   Warning Signs: pain (new or worsening)  Protective Factors: strong bond to family unit, community support, or employment, responsibilities and duties to others, including pets and children, lives in a responsibly safe and stable environment, good treatment engagement, sense of importance of health and  wellness, able to access care without barriers, sense of belonging, cultural, spiritual , or Latter day beliefs associated with meaning and value in life, optimistic outlook - identification of future goals, constructive use of leisure time, enjoyable activities, resilience and reality testing ability  Interpretation of Risk Scoring, Risk Mitigation Interventions and Safety Plan:  Ms. Chu reports she does not have a desire to end her life and that she made a statement of wanting to die. Reports feeling better that she is much better since her son is present as he was gone over the weekend. She reports that she is challenged as feeling some nurses are nicer than others and she was struggling being in a vulnerable place.      Significant Clinical History  Ms. Jeannie Chu denies a history of mental health or chemical health diagnoses.      Current Providers  Primary Care Provider:   Primary Physician: Diana Hilario Primary Address: [address], [place] [address]   Primary Phone: [phone] Primary Fax: [phone]      Psychiatrist: No   Therapist: No   : No   [facility]: No   ACT Team: No   Other: No    Collateral information:   Reviewed chart and coordinated with nursing and son.    Mental Status Exam   Affect: Appropriate  Appearance: Appropriate   Attention Span/Concentration: Attentive    Eye Contact: Engaged  Fund of Knowledge: Appropriate   Language /Speech Content: Fluent  Language /Speech Volume: Normal   Language /Speech Rate/Productions: Normal   Recent Memory: Intact  Remote Memory: Intact  Mood: Sad   Orientation:   Person: Yes   Place: Yes  Time of Day: Yes   Date: Yes   Situation (Do they understand why they are here?): Yes   Psychomotor Behavior: Normal   Thought Content: Clear  Thought Form: Intact    Current medications:   Current Facility-Administered Medications   Medication     acetaminophen (TYLENOL) tablet 975 mg     carvedilol (COREG) tablet 6.25 mg      cefTRIAXone (ROCEPHIN) 1 g vial to attach to  mL bag for ADULTS or NS 50 mL bag for PEDS     hydrOXYzine (ATARAX) tablet 25 mg    Or     hydrOXYzine (ATARAX) tablet 50 mg     levothyroxine (SYNTHROID/LEVOTHROID) tablet 50 mcg     melatonin tablet 3 mg     multivitamin, therapeutic (THERA-VIT) tablet 1 tablet     naloxone (NARCAN) injection 0.2 mg     ondansetron (ZOFRAN ODT) ODT tab 4 mg    Or     ondansetron (ZOFRAN) injection 4 mg     pantoprazole (PROTONIX) EC tablet 40 mg     polyethylene glycol (MIRALAX) Packet 17 g     rivaroxaban ANTICOAGULANT (XARELTO) tablet 15 mg     sodium chloride tablet 1 g        Therapeutic intervention and progress:  Therapeutic intervention consisted of building therapeutic rapport and active listening. Patient is making progress towards treatment goals as evidenced by participation in assessment and discussion about options and recommendations.      ALBER DE LA VEGA   Triage and Transition - Consult and Liaison   [phone]

## 2023-06-21 NOTE — PROGRESS NOTES
Care Management Discharge Note    Discharge Date: 06/21/2023       Discharge Disposition: Home with private pay caregiver     Discharge Services:  Private pay caregiving     Discharge DME:  [facility]     Discharge Transportation: [facility] wheelchair transport    Private pay costs discussed: transportation costs    Education Provided on the Discharge Plan: Yes  Persons Notified of Discharge Plans: son   Patient/Family in Agreement with the Plan:  yes    Handoff Referral Completed: Yes    Additional Information:  CM following for discharge planning. Patient planning to discharge to home today with private pay care giving service hired by family - [facility]. [facility] (ph:[phone]) was notified that patient plans to discharge today and we requested bedside RN to call and give report to [facility]. Patient will use wheelchair transport to return to daughter's home in [place], reviewed costs with son. Transport was notified that there are 2 stairs to enter home and 12-14 stairs within the home. Son was updated at the bedside and is in agreement with the plan.      Peggy Whittaker RN, BSN  Inpatient Care Coordination  [facility]  [phone]

## 2023-06-21 NOTE — PLAN OF CARE
Physical Therapy Discharge Summary    Reason for therapy discharge:    Discharged to home.    Progress towards therapy goal(s). See goals on Care Plan in [facility] electronic health record for goal details.  Goals partially met.  Barriers to achieving goals:   discharge from facility.    Therapy recommendation(s):    Continued therapy is recommended.  Rationale/Recommendations:  Pt able to ambulate ~ 120 ft, will need to trial stairs but pt son reports with how patient is mobilizing they will be able to provide support she needs to return home. If patient unable to trial stairs, son reports they are willing to pay for stretcher transport for patient to navigate steps.

## 2023-06-21 NOTE — PLAN OF CARE
Patient's After Visit Summary was reviewed with patient and/or advocate.   Patient verbalized understanding of After Visit Summary, recommended follow up and was given an opportunity to ask questions.   Discharge medications sent home with patient/family: YES   Discharged with transport tech and son    Goal Outcome Evaluation:

## 2023-06-22 ENCOUNTER — MYC MEDICAL ADVICE (OUTPATIENT)
Dept: INTERNAL MEDICINE | Facility: CLINIC | Age: 88
End: 2023-06-22
Payer: COMMERCIAL

## 2023-06-22 ENCOUNTER — TELEPHONE (OUTPATIENT)
Dept: BEHAVIORAL HEALTH | Facility: CLINIC | Age: 88
End: 2023-06-22
Payer: COMMERCIAL

## 2023-06-22 ENCOUNTER — MYC MEDICAL ADVICE (OUTPATIENT)
Dept: CARDIOLOGY | Facility: CLINIC | Age: 88
End: 2023-06-22
Payer: COMMERCIAL

## 2023-06-22 ENCOUNTER — PATIENT OUTREACH (OUTPATIENT)
Dept: CARE COORDINATION | Facility: CLINIC | Age: 88
End: 2023-06-22
Payer: COMMERCIAL

## 2023-06-22 NOTE — TELEPHONE ENCOUNTER
Writer spoke with Pt's son in law on Pt's cell phone who reports he was going to get his wife on the phone, however the phone battery is low. States he will have her call back.  Writer will postpone for tomorrow.     Lanette Dixon, Lead   6/22/23  1:06p      ----- Message from Shy Hatch sent at 6/20/2023  3:11 PM CDT -----  Regarding: Therapy Referral  Transition Clinic Referral  [place] (Limited)            Please Check Type of Referral Requested:          __x__THERAPY: The Transition clinic is able to schedule patients without current medical insurance; these patient will be referred to our Social Work Care Coordinator for Medical Insurance              Assistance. We are open for referral for psychotherapy. Patient is referred from:  Consult & Liaison          ____MEDICATION:  Referrals for Medication are ONLY accepted from the following areas (select): Other..... STANDARD PRIORITY                                       Suboxone and Opioid Management Referrals are automatically denied. TC Psychiatry cannot see patient without active medical insurance.            Referring Provider Contact Name: Jose L Julian MD; Phone Number: [phone]        Reason for Transition Clinic Referral: OP therapy need        Next Level of Care Patient Will Be Transitioned To: na  Provider(s)na  Location na  Date/Time na        What Would Be Helpful from the Transition Clinic: OP Therapy services with          Needs: YES: Sharlene        Does Patient Have Access to Technology: Yes        Patient E-mail Address: [email or web address]        Current Patient Phone Number: [phone] / [phone]        Clinician Gender Preference (if applicable): YES: Female

## 2023-06-22 NOTE — TELEPHONE ENCOUNTER
My Chart message  Reply sent back to Patient.     Hospitalized 6-2-23 to 6-21-23   Discharge Diagnoses  Abdominal pain, diarrhea  Hyponatremia  Urinary tract infection  Fluid overload/edema  Transaminitis  Hypocalcemia  Hypomagnesemia  Thrombocytopenia  Suicidal ideation  Discharge instructions  Follow-ups Needed After Discharge  Follow-up Appointments     Follow-up and recommended labs and tests       Follow up with primary care provider, Diana Hilario, within 7   days for hospital follow- up.  The following labs/tests are recommended:   chem 7 to check sodium level, check liver function tests (yours were   slightly elevated, so you cholesterol medication has been stopped). Repeat   thyroid studies (TSH/free T4) in 4-6 weeks

## 2023-06-22 NOTE — PROGRESS NOTES
"Clinic Care Coordination Contact  [facility]: Post-Discharge Note  SITUATION                                                      Admission:    Admission Date: 06/02/23   Reason for Admission: Abdominal pain and Shortness of Breath  Discharge:   Discharge Date: 06/21/23  Discharge Diagnosis: Abdominal pain, diarrhea    BACKGROUND                                                      Per hospital discharge summary and inpatient provider notes:hypertension, coronary artery disease, hypothyroidism presented to the ER with worsening diarrhea associated with abdominal pain.  This has been going on for 3 weeks.  Yesterday patient was in the ER with shortness of breath and patient was diagnosed with pneumonia and at that time her BNP was elevated.  Daughter is at bedside and she mentioned her diarrhea got worse yesterday after taking antibiotics and she noticed some blood this morning.  Patient is having pain all over the abdomen during the diarrhea.  Patient denies any chest pain, shortness of breath now.  She has nausea but denies any vomitings.  In ER patient vitals were stable.  Her blood work showed low sodium at 125, magnesium at 1.6, calcium and 8.6.  Her BNP is 12,000 but it is much better than yesterday which was 14,000.  Patient had CT chest abdomen pelvis with contrast yesterday which did not show any acute pathology.  Patient is getting admitted for observation for diarrhea -unclear etiology.      ASSESSMENT           Discharge Assessment  How are you doing now that you are home?: \" Had a rough night but doing well today \"  How are your symptoms? (Red Flag symptoms escalate to triage hotline per guidelines): Improved  Do you feel your condition is stable enough to be safe at home until your provider visit?: Yes  Does the patient have their discharge instructions? : Yes  Does the patient have questions regarding their discharge instructions? : No  Were you started on any new medications or were there " changes to any of your previous medications? : Yes  Does the patient have all of their medications?: Yes  Do you have questions regarding any of your medications? : No  Do you have all of your needed medical supplies or equipment (DME)?  (i.e. oxygen tank, CPAP, cane, etc.): Yes  Discharge follow-up appointment scheduled within 14 calendar days? : No    Post-op ([facility] CTA Only)  If the patient had a surgery or procedure, do they have any questions for a nurse?: No             PLAN                                                      Outpatient Plan: Follow-up Appointments     Follow-up and recommended labs and tests       Follow up with primary care provider, Diana Hilario, within 7   days for hospital follow- up.  The following labs/tests are recommended:   chem 7 to check sodium level, check liver function tests (yours were   slightly elevated, so you cholesterol medication has been stopped). Repeat   thyroid studies (TSH/free T4) in 4-6 weeks       Future Appointments   Date Time Provider Department Center   7/17/2023 12:00 AM [address] CLIN         For any urgent concerns, please contact our 24 hour nurse triage line: [phone] ([phone])         Padmini Miller

## 2023-06-23 ENCOUNTER — TELEPHONE (OUTPATIENT)
Dept: BEHAVIORAL HEALTH | Facility: CLINIC | Age: 88
End: 2023-06-23
Payer: COMMERCIAL

## 2023-06-23 NOTE — TELEPHONE ENCOUNTER
No openings with me in the next few wks. , please schedule with any provider with openings, pl advise pt

## 2023-06-23 NOTE — TELEPHONE ENCOUNTER
Call to son. Offered several appointment times with Dr. Pierre on 6/27/23 or a SDA with Gagan on 6/28. Son will need to speak with his sister and call back.

## 2023-06-23 NOTE — TELEPHONE ENCOUNTER
Second attempt to contact pt. Writer left a VM with TC contact info and encouraged a phone call back to schedule initial therapy appointment. Coordinator will randall referral as complete.Tracker completed.     Lanette Dixon, Lead    6/23/23  11:05am      ----- Message from Shy Hatch sent at 6/20/2023  3:11 PM CDT -----  Regarding: Therapy Referral  Transition Clinic Referral  [place] (Limited)            Please Check Type of Referral Requested:          __x__THERAPY: The Transition clinic is able to schedule patients without current medical insurance; these patient will be referred to our Social Work Care Coordinator for Medical Insurance              Assistance. We are open for referral for psychotherapy. Patient is referred from:  Consult & Liaison          ____MEDICATION:  Referrals for Medication are ONLY accepted from the following areas (select): Other..... STANDARD PRIORITY                                       Suboxone and Opioid Management Referrals are automatically denied. TC Psychiatry cannot see patient without active medical insurance.            Referring Provider Contact Name: Jose L Julian MD; Phone Number: [phone]        Reason for Transition Clinic Referral: OP therapy need        Next Level of Care Patient Will Be Transitioned To: na  Provider(s)na  Location na  Date/Time na        What Would Be Helpful from the Transition Clinic: OP Therapy services with          Needs: YES: Sharlene        Does Patient Have Access to Technology: Yes        Patient E-mail Address: [email or web address]        Current Patient Phone Number: [phone] / [phone]        Clinician Gender Preference (if applicable): YES: Female

## 2023-06-25 ENCOUNTER — NURSE TRIAGE (OUTPATIENT)
Dept: NURSING | Facility: CLINIC | Age: 88
End: 2023-06-25
Payer: COMMERCIAL

## 2023-06-25 NOTE — TELEPHONE ENCOUNTER
"Nurse Triage SBAR    Situation:   -abdominal pian    Background:   -daughter calling, It is okay to leave a detailed message at this number.   -consent to communicate on file    Assessment:   -discharged from hospital on thursday  -bloating (ongoing for 3 weeks -no coming down)  -has gained 35 lbs (started in the hospital, is not getting better)  -was having issues urinating, was last able to void 30 min ago  -[facility] 8/10 pain now    Recommendation:   Go to ED Now  -the will discuss first and then decide what to do    PURNIMA ZARATE RN on 6/25/2023 at 4:57 PM      Reason for Disposition    [1] SEVERE pain AND [2] age > 60 years    Additional Information    Negative: Chest pain    Negative: Pain is mainly in upper abdomen  (if needed ask: \"is it mainly above the belly button?\")    Negative: Followed an abdomen (stomach) injury    Negative: [1] Abdominal pain AND [2] pregnant < 20 weeks    Negative: [1] Abdominal pain AND [2] pregnant 20 or more weeks    Negative: [1] Abdominal pain AND [2] postpartum (from 0 to 6 weeks after delivery)    Negative: Shock suspected (e.g., cold/pale/clammy skin, too weak to stand, low BP, rapid pulse)    Negative: Sounds like a life-threatening emergency to the triager    Negative: Difficult to awaken or acting confused (e.g., disoriented, slurred speech)    Negative: Passed out (i.e., lost consciousness, collapsed and was not responding)    Negative: [1] SEVERE pain (e.g., excruciating) AND [2] present > 1 hour    Protocols used: ABDOMINAL PAIN - FEMALE-A-AH      "

## 2023-06-28 ENCOUNTER — TELEPHONE (OUTPATIENT)
Dept: CARDIOLOGY | Facility: CLINIC | Age: 88
End: 2023-06-28

## 2023-06-28 ENCOUNTER — TELEPHONE (OUTPATIENT)
Dept: INTERNAL MEDICINE | Facility: CLINIC | Age: 88
End: 2023-06-28

## 2023-06-28 NOTE — TELEPHONE ENCOUNTER
Pateint missed PCP OV today as she was is in the hospital and being discharged again today. .  Son will call scheduling for next avaiable cardiologist or MEREDITH OV post hospital F/U and will also call PCP to reschedule missed OV from today.   Son agrees with plan.     Spoke with Son who states Patient went  to [facility] on Sunday 6-24-23 and hospitalized-  being discharged today. Echo and labs were done.   - Son states Patient was retaining fluid and short of breath.    Patient hospitalized  6-2-23  To 6-21-23.   Hospitalized at [facility] -   Discharge Diagnoses  Abdominal pain, diarrhea, Hyponatremia, Urinary tract infection, Fluid overload/edema  Transaminitis, Hypocalcemia, Hypomagnesemia,Thrombocytopenia, Suicidal ideation  Follow-ups Needed After Discharge  Follow-up Appointments     Follow-up and recommended labs and tests       Follow up with primary care provider, Diana Hilario, within 7   days for hospital follow- up.  The following labs/tests are recommended:   chem 7 to check sodium level, check liver function tests (yours were   slightly elevated, so you cholesterol medication has been stopped). Repeat   thyroid studies (TSH/free T4) in 4-6 weeks      Last Dr. Shin OV 4-20-23  At  for HTN.   Assessment and Plan/Recommendations:     # HTN.  Well controlled.  Discussed that given her advanced age, would favor blood pressures to be slightly higher than lower due to fall risk.  # Persistent atrial fibrillation complicated by tachybradycardia syndrome, now status post AV node ablation and pacemaker (11/1/2022), [id number], on rivaroxaban.    # Mild discomfort at pacemaker site, does not appear infected, no overlying hematoma, minimally tender to palpation over lateral aspect which is slightly more superficial than medial aspect.  No systemic signs suggestive of infection.  # Hypothyroidism.  On levothyroxine.  # HL, on statin  # CAD s/p PCI (remote history, proximal LAD 20 years  ago).  Patient denies symptoms concerning for angina.  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 in September, she would like to check in and we would be glad to see her back prior to her departure

## 2023-06-28 NOTE — TELEPHONE ENCOUNTER
I do not have any openings next week and I am on vacation until July 24, please schedule with any provider with openings

## 2023-06-28 NOTE — TELEPHONE ENCOUNTER
Health Call Center    Phone Message    May a detailed message be left on voicemail: yes     Reason for Call: Other: Patient's son was calling to schedule an appointment with  for the patient within the next 10 days due to the patient needing a hospital follow up from being in the hospital for awhile. The next available opening was in September. Please review and call them back at [phone] to find something sooner, thank you!     Action Taken: Message routed to:  Other: Cardiology    Travel Screening: Not Applicable

## 2023-06-28 NOTE — TELEPHONE ENCOUNTER
[facility] is calling to set up a hospital follow up appointment. Are you able to fit this patient in ?  We should call the patient and or her contacts.  She will discharge today.

## 2023-07-05 ENCOUNTER — MYC MEDICAL ADVICE (OUTPATIENT)
Dept: CARDIOLOGY | Facility: CLINIC | Age: 88
End: 2023-07-05
Payer: COMMERCIAL

## 2023-07-05 DIAGNOSIS — I25.10 CORONARY ARTERY DISEASE INVOLVING NATIVE CORONARY ARTERY OF NATIVE HEART WITHOUT ANGINA PECTORIS: Primary | ICD-10-CM

## 2023-07-05 DIAGNOSIS — I48.92 ATRIAL FLUTTER WITH RAPID VENTRICULAR RESPONSE (H): ICD-10-CM

## 2023-07-05 RX ORDER — NITROGLYCERIN 0.4 MG/1
TABLET SUBLINGUAL
Qty: 25 TABLET | Refills: 0 | Status: SHIPPED | OUTPATIENT
Start: 2023-07-05

## 2023-07-05 NOTE — TELEPHONE ENCOUNTER
Nitroglycerine refill request.   Refill e scribed.     Last Dr. Shin OV 4-20-23.   Assessment and Plan/Recommendations:   # HTN.  Well controlled.  Discussed that given her advanced age, would favor blood pressures to be slightly higher than lower due to fall risk.  # Persistent atrial fibrillation complicated by tachybradycardia syndrome, now status post AV node ablation and pacemaker (11/1/2022), [id number], on rivaroxaban.    # Mild discomfort at pacemaker site, does not appear infected, no overlying hematoma, minimally tender to palpation over lateral aspect which is slightly more superficial than medial aspect.  No systemic signs suggestive of infection.  # Hypothyroidism.  On levothyroxine.  # HL, on statin  # CAD s/p PCI (remote history, proximal LAD 20 years ago).  Patient denies symptoms concerning for angina.  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

## 2023-07-11 ENCOUNTER — TELEPHONE (OUTPATIENT)
Dept: INTERNAL MEDICINE | Facility: CLINIC | Age: 88
End: 2023-07-11
Payer: COMMERCIAL

## 2023-07-11 NOTE — TELEPHONE ENCOUNTER
Irma from [facility] ([phone]) calls for verbal orders for PT 2xwk for 3wks, 1xwk for 2wks. OK to leave a detailed message.

## 2023-07-13 ENCOUNTER — TELEPHONE (OUTPATIENT)
Dept: INTERNAL MEDICINE | Facility: CLINIC | Age: 88
End: 2023-07-13

## 2023-07-13 ENCOUNTER — OFFICE VISIT (OUTPATIENT)
Dept: CARDIOLOGY | Facility: CLINIC | Age: 88
End: 2023-07-13
Payer: COMMERCIAL

## 2023-07-13 VITALS
OXYGEN SATURATION: 99 % | BODY MASS INDEX: 27.28 KG/M2 | SYSTOLIC BLOOD PRESSURE: 110 MMHG | HEIGHT: 61 IN | WEIGHT: 144.5 LBS | HEART RATE: 71 BPM | DIASTOLIC BLOOD PRESSURE: 68 MMHG

## 2023-07-13 DIAGNOSIS — I50.30 HEART FAILURE WITH PRESERVED EJECTION FRACTION, NYHA CLASS II (H): Primary | ICD-10-CM

## 2023-07-13 PROCEDURE — 99215 OFFICE O/P EST HI 40 MIN: CPT | Performed by: INTERNAL MEDICINE

## 2023-07-13 RX ORDER — METOPROLOL SUCCINATE 50 MG/1
1 TABLET, EXTENDED RELEASE ORAL DAILY
COMMUNITY
Start: 2023-07-12

## 2023-07-13 RX ORDER — POLYETHYLENE GLYCOL 3350
POWDER (GRAM) MISCELLANEOUS DAILY PRN
COMMUNITY
Start: 2023-07-02

## 2023-07-13 RX ORDER — SENNOSIDES A AND B 8.6 MG/1
8.6 TABLET, FILM COATED ORAL DAILY PRN
COMMUNITY
Start: 2023-07-02

## 2023-07-13 RX ORDER — PROCHLORPERAZINE MALEATE 5 MG
5 TABLET ORAL EVERY 6 HOURS PRN
COMMUNITY
Start: 2023-07-02

## 2023-07-13 RX ORDER — DICYCLOMINE HYDROCHLORIDE 10 MG/1
10 CAPSULE ORAL 4 TIMES DAILY PRN
COMMUNITY
Start: 2023-07-03

## 2023-07-13 RX ORDER — TORSEMIDE 20 MG/1
1 TABLET ORAL
COMMUNITY
Start: 2023-07-12

## 2023-07-13 RX ORDER — LOSARTAN POTASSIUM 25 MG/1
25 TABLET ORAL
COMMUNITY
Start: 2023-07-12

## 2023-07-13 NOTE — LETTER
7/13/2023    Diana Hilario MD  [address] [facility] [address]    RE: Jeannie Chu       Dear Colleague,     I had the [facility] of [facility].      Cardiology Clinic Progress Note:    July 13, 2023   Patient Name: Jeannie Chu  Patient MRN: [id number]     Consult indication: HFpEF    HPI:    I had the opportunity to see patient Jeannie Chu in cardiology clinic for a follow up visit. Patient is followed by our colleague Diana Hilario MD with Primary Care.     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    I had been following patient closely in clinic for the past couple of years, primarily for hypertension.  Patient also has a history of atrial fibrillation complicated by tacky bradycardia syndrome, for which she ultimately underwent AV node ablation with pacemaker placement.  She did require redo AV node ablation 11/1/2022.    At our last visit 4/20/2023, patient had been doing well, without symptoms concerning for angina or decompensated heart failure.  Weight at that time was around 155 pounds.  Shortly thereafter, unfortunately she developed severe diarrhea, abdominal discomfort, resulting in a protracted hospitalization at [facility].  She was evaluated extensively for her GI symptoms, also received IV fluid for dehydration, and was started on salt tabs due to chronic hyponatremia.  Troponin was mildly elevated, echocardiogram demonstrated mild septal hypokinesis, as well as pulmonary hypertension and new moderate to  moderate severe tricuspid regurgitation.  She was seen by my colleague Dr. Schwarz in consultation.  She was noted to be hypervolemic, so diuresis was recommended.  She was discharged 6/21/2023, and over the next few days, had significant weight gain, lower extremity swelling, and severe dyspnea.  She presented to [facility] in acute hypoxic respiratory failure 6/25/2023, initially requiring nitroglycerin GTT and BiPAP therapy, in addition to aggressive diuresis.  She was seen in consultation by cardiology while in hospital, and was managed with diuretics, carvedilol was switched to metoprolol.  Rivaroxaban dosage was decreased due to renal dysfunction.  TTE at [facility] 6/20/2023 demonstrated LVEF 56%, with mid to distal anteroseptal, inferoseptal, apical hypokinesis, normal RV function, mild to moderate MR, moderate TR, estimated RVSP 57 mmHg.  It was recommended to defer further ischemic evaluation at that time.    She was seen by Dr. Nolasco, a heart failure specialist with [facility] just yesterday 7/12/2023.  Labs were notable for creatinine 1.54, up from 1.08 6/25/2023.  It was recommended that she decrease torsemide 30 mg daily to 20 mg daily.  It was recommended she follow-up in heart failure clinic in 3 weeks, before returning home to [facility].    Patient is accompanied today by her 2 sons, 1 of whom flew in from [place].  They are serving as language interpreters per patient request.  I had actually met the one son before, his daughter is in medical school in [place].  Overall patient reports that she feels markedly improved from her hospitalization, though still has generalized weakness.  She had difficulty climbing the stairs yesterday, but was able to reach her bedroom at the top of the stairs with assistance from EMS.  She denies any dyspnea, chest pain.  No abnormal lower extremity swelling.  Her appetite is improving.    Assessment and Plan/Recommendations:    # HFpEF, NYHA II. Weight 144 lb. Mildly  dehydrated on exam and by clinical history.   # Mild troponin elevation during hospitalization with decompensated HF and severe diarrhea, mild wall motion abnormalities but overall normal LVEF, Mild AI, mild-mod MR, mod TR  # HTN, well controlled  # Persistent atrial fibrillation complicated by tachybradycardia syndrome, now status post AV node ablation and pacemaker (11/1/2022), [id number], on rivaroxaban.    # Hypothyroidism.  On levothyroxine.  # HL, on statin  # CAD s/p PCI (remote history, proximal LAD 20 years ago).  Patient denies symptoms concerning for angina.    - Reviewed prior hospitalizations in detail with patient and her 2 sons.  Reviewed prior clinic note from heart failure specialist Dr. Nolasco with [facility].  - Patient still appears mildly dehydrated. Of note, patient is still taking salt tabs twice daily.  Patient's sons state that they did not communicate this to Dr. Nolasco yesterday.  As such, recommend stopping the salt tablets, and decreasing torsemide further to 10 mg daily, with an extra 10 mg as needed for weight gain of 3 pounds in a day, or 5 pounds or more in a week, or if signs/symptoms of fluid overload.  Patient is already scheduled for a BMP next week through his office, encouraged that they continue to make the appointment with him prior to leaving to [facility].    - Agree with continuing current regimen otherwise of metoprolol succinate, losartan, rivaroxaban  - Discussed options for further evaluation/management of the mildly elevated troponin levels and wall motion abnormalities on the echocardiogram.  Since patient denies symptoms concerning for angina, would favor conservative management, especially in consideration of her advanced age, comorbidities.  Overall clinical presentation seems most consistent with demand ischemia.  Patient and son are in agreement, will hold off on ischemic evaluation at this time.  - I am glad to hear that she is receiving home PT care,  unsurprisingly she has lost strength due to her protracted hospitalizations  - Patient inquires about whether or not she should follow-up with me before going back to [place] as well, certainly I would be glad to see her and her family again, however since she is seeing a heart failure specialist, it would also be reasonable for me to see her when she returns from [facility] next time.  Encouraged patient to establish care with cardiology in [facility], and of course we would be glad to communicate any records to her team there as needed.      Thank you for allowing our team to participate in the care of Jeannie Chu.  Please do not hesitate to call or page me with any questions or concerns.    Sincerely,     Germán Shin MD, [facility]  Cardiology  Text Page   July 13, 2023    Voice recognition software utilized.     Total time spent on this encounter today: 43 minutes, providing care in this encounter including, but not limited to, reviewing prior medical records, laboratory data, imaging studies, diagnostic studies, procedure notes, formulating an assessment and plan, recommendations, discussion and counseling with patient face to face, dictation.    Past Medical History:   The ASCVD Risk score (Skippack DK, et al., 2019) failed to calculate for the following reasons:    The 2019 ASCVD risk score is only valid for ages 40 to 79  Past Medical History:   Diagnosis Date    Acquired hypothyroidism 05/27/2021    Atrial fibrillation     Benign essential hypertension     Coronary artery disease 05/26/2021    Hyperlipidemia         Past Surgical History:   Past Surgical History:   Procedure Laterality Date    COLONOSCOPY N/A 6/6/2023    Procedure: COLONOSCOPY, BIOPSIES;  Surgeon: Germán Manning MD;  Location:  OR    Coronary angiogram with stent placement      EP ABLATION AV NODE N/A 10/31/2022    Procedure: Ablation Atrioventricular Node;  Surgeon: Rogelio Whitt MD;  Location:  HEART CARDIAC CATH LAB    EP  ABLATION AV NODE N/A 11/01/2022    Procedure: EP Ablation AV Node;  Surgeon: Jd Baker MD;  Location:  HEART CARDIAC CATH LAB    EP PACEMAKER DEVICE & LEAD IMPLANT- RIGHT VENTRICULAR N/A 10/31/2022    Procedure: Pacemaker Device & Lead Implant- Right ventricular;  Surgeon: Rogelio Whitt MD;  Location:  HEART CARDIAC CATH LAB    ESOPHAGOSCOPY, GASTROSCOPY, DUODENOSCOPY (EGD), COMBINED N/A 6/9/2023    Procedure: ESOPHAGOGASTRODUODENOSCOPY;  Surgeon: Germán Manning MD;  Location:  OR       Medications (outpatient):  Current Outpatient Medications   Medication Sig Dispense Refill    ALPRAZolam (XANAX) 0.5 MG tablet Take 0.5 mg by mouth daily as needed for anxiety      dicyclomine (BENTYL) 10 MG capsule Take 10 mg by mouth 4 times daily as needed      IRON (FERROUS GLUCONATE) 325 MG OR TABS Take 1 tablet by mouth daily  30 0    levothyroxine (SYNTHROID/LEVOTHROID) 50 MCG tablet Take 1 tablet (50 mcg) by mouth every morning (before breakfast) 30 tablet 1    losartan (COZAAR) 25 MG tablet Take 25 mg by mouth      melatonin 5 MG tablet Take 5 mg by mouth nightly as needed      metoprolol succinate ER (TOPROL XL) 50 MG 24 hr tablet Take 1 tablet by mouth daily      Multiple Vitamins-Minerals (ICAPS AREDS FORMULA PO) Take 2 tablets by mouth daily       nitroGLYcerin (NITROSTAT) 0.4 MG sublingual tablet For chest pain place 1 tablet under the tongue every 5 minutes for 3 doses. If symptoms persist 5 minutes after 1st dose call 911. 25 tablet 0    ondansetron (ZOFRAN ODT) 4 MG ODT tab Take 1 tablet (4 mg) by mouth every 6 hours as needed for nausea or vomiting 30 tablet 0    pantoprazole (PROTONIX) 40 MG EC tablet Take 1 tablet (40 mg) by mouth every morning (before breakfast) 30 tablet 1    polyethylene glycol 3350 POWD daily as needed      prochlorperazine (COMPAZINE) 5 MG tablet Take 5 mg by mouth every 6 hours as needed      rivaroxaban ANTICOAGULANT (XARELTO) 20 MG TABS tablet Take 1 tablet (20 mg) by  "mouth daily (with dinner) 90 tablet 3    senna (SENOKOT) 8.6 MG tablet Take 8.6 mg by mouth daily as needed      sodium chloride 1 GM tablet Take 1 tablet (1 g) by mouth 3 times daily (with meals) 90 tablet 1    torsemide (DEMADEX) 20 MG tablet Take 1 tablet by mouth daily at 2 pm         Allergies:  Allergies   Allergen Reactions    Amlodipine Swelling     BLE edema    Ibuprofen Swelling       Social History:   History   Drug Use No      History   Smoking Status    Never   Smokeless Tobacco    Never     Social History    Substance and Sexual Activity      Alcohol use: No       Family History:  Family History   Problem Relation Age of Onset    Family History Negative Other        Review of Systems:   A complete review of systems was negative except as mentioned in the History of Present Illness.     Objective & Physical Exam:  /68 (BP Location: Right arm, Patient Position: Sitting, Cuff Size: Adult Regular)   Pulse 71   Ht 1.549 m (5' 1\")   Wt 65.5 kg (144 lb 8 oz)   LMP  (LMP Unknown)   SpO2 99%   BMI 27.30 kg/m    Wt Readings from Last 2 Encounters:   07/13/23 65.5 kg (144 lb 8 oz)   06/21/23 84.3 kg (185 lb 14.4 oz)     Body mass index is 27.3 kg/m .   Body surface area is 1.68 meters squared.    Constitutional: appears stated age, in no apparent distress, appears to be well nourished  Head: normocephalic, atraumatic  Neck: supple, trachea midline   Pulmonary: clear to auscultation bilaterally  Cardiovascular: JVP normal, regular rate, regular rhythm, 1/6 RICKEY at the RUSB, no lower extremity edema  Gastrointestinal: no guarding, non-rigid   Neurologic: awake, alert, moves all extremities  Skin: no jaundice, warm on limited exam  Psychiatric: affect is normal, answers questions appropriately, oriented to self and place    Data reviewed:  Lab Results   Component Value Date    WBC 7.3 06/19/2023    WBC 10.0 05/26/2021    RBC 3.43 (L) 06/19/2023    RBC 4.30 05/26/2021    HGB 10.6 (L) 06/19/2023    HGB 12.9 " 05/26/2021    HCT 31.3 (L) 06/19/2023    HCT 40.3 05/26/2021    MCV 91 06/19/2023    MCV 94 05/26/2021    MCH 30.9 06/19/2023    MCH 30.0 05/26/2021    MCHC 33.9 06/19/2023    MCHC 32.0 05/26/2021    RDW 15.1 (H) 06/19/2023    RDW 13.7 05/26/2021    PLT 97 (L) 06/19/2023     05/26/2021     Sodium   Date Value Ref Range Status   06/21/2023 124 (L) 136 - 145 mmol/L Final   04/30/2021 135 133 - 144 mmol/L Final     Potassium   Date Value Ref Range Status   06/21/2023 4.3 3.4 - 5.3 mmol/L Final     Comment:     Specimen slightly hemolyzed, potassium may be falsely elevated.   11/01/2022 3.7 3.4 - 5.3 mmol/L Final   04/30/2021 4.4 3.4 - 5.3 mmol/L Final     Chloride   Date Value Ref Range Status   06/21/2023 86 (L) 98 - 107 mmol/L Final   11/01/2022 100 94 - 109 mmol/L Final   04/30/2021 102 94 - 109 mmol/L Final     Carbon Dioxide   Date Value Ref Range Status   04/30/2021 30 20 - 32 mmol/L Final     Carbon Dioxide (CO2)   Date Value Ref Range Status   06/21/2023 31 (H) 22 - 29 mmol/L Final   11/01/2022 28 20 - 32 mmol/L Final     Anion Gap   Date Value Ref Range Status   06/21/2023 7 7 - 15 mmol/L Final   11/01/2022 4 3 - 14 mmol/L Final   04/30/2021 3 3 - 14 mmol/L Final     Glucose   Date Value Ref Range Status   06/21/2023 96 70 - 99 mg/dL Final   11/01/2022 82 70 - 99 mg/dL Final   04/30/2021 89 70 - 99 mg/dL Final     GLUCOSE BY METER POCT   Date Value Ref Range Status   06/21/2023 126 (H) 70 - 99 mg/dL Final     Urea Nitrogen   Date Value Ref Range Status   06/21/2023 24.2 (H) 8.0 - 23.0 mg/dL Final   11/01/2022 16 7 - 30 mg/dL Final   04/30/2021 18 7 - 30 mg/dL Final     Creatinine   Date Value Ref Range Status   06/21/2023 0.92 0.51 - 0.95 mg/dL Final   04/30/2021 0.77 0.52 - 1.04 mg/dL Final     GFR Estimate   Date Value Ref Range Status   06/21/2023 60 (L) >60 mL/min/1.73m2 Final   04/30/2021 70 >60 mL/min/[1.73_m2] Final     Comment:     Non  GFR Calc  Starting 12/18/2018, serum  creatinine based estimated GFR (eGFR) will be   calculated using the Chronic Kidney Disease Epidemiology Collaboration   (CKD-EPI) equation.       GFR, ESTIMATED POCT   Date Value Ref Range Status   07/24/2021 >60 >60 mL/min/1.73m2 Final     Calcium   Date Value Ref Range Status   06/21/2023 8.7 (L) 8.8 - 10.2 mg/dL Final   04/30/2021 9.3 8.5 - 10.1 mg/dL Final     Bilirubin Total   Date Value Ref Range Status   06/17/2023 0.8 <=1.2 mg/dL Final   04/30/2021 1.1 0.2 - 1.3 mg/dL Final     Alkaline Phosphatase   Date Value Ref Range Status   06/17/2023 252 (H) 35 - 104 U/L Final   04/30/2021 70 40 - 150 U/L Final     ALT   Date Value Ref Range Status   06/17/2023 112 (H) 0 - 50 U/L Final     Comment:     Reference intervals for this test were updated on 6/12/2023 to more accurately reflect our healthy population. There may be differences in the flagging of prior results with similar values performed with this method. Interpretation of those prior results can be made in the context of the updated reference intervals.     04/30/2021 27 0 - 50 U/L Final     AST   Date Value Ref Range Status   06/17/2023 98 (H) 0 - 45 U/L Final     Comment:     Reference intervals for this test were updated on 6/12/2023 to more accurately reflect our healthy population. There may be differences in the flagging of prior results with similar values performed with this method. Interpretation of those prior results can be made in the context of the updated reference intervals.   04/30/2021 27 0 - 45 U/L Final     Recent Labs   Lab Test 03/02/23  0723 03/11/22  1505   CHOL 147 132   HDL 73 81   LDL 60 24   TRIG 71 136      Lab Results   Component Value Date    A1C 5.4 10/13/2022        Recent Results (from the past 4320 hour(s))   Echocardiogram Complete   Result Value    LVEF  55-60%    Narrative    [id number]  [id number]  [id number]  [id number]^YOCASTA^SUHA^HARESH     [facility]  Echocardiography Laboratory  [address]  [address]  [place] [address]     Name: CORA WAY  MRN: [id number]  : 1935  Study Date: 2023 08:10 AM  Age: 88 yrs  Gender: Female  Patient Location: [facility]  Reason For Study: SOB  Ordering Physician: SUHA RUST  Performed By: Juliane Schrader     BSA: 1.7 m2  Height: 61 in  Weight: 164 lb  HR: 65  BP: 141/78 mmHg  ______________________________________________________________________________  Procedure  Complete Portable Echo Adult. Optison (NDC #[id number]) given intravenously.  ______________________________________________________________________________  Interpretation Summary     There is severe distal septal hypokinesis.  The visual ejection fraction is 55-60%.  Mildly decreased right ventricular systolic function  There is mod-severe biatrial enlargement.  There is moderate (2+) mitral regurgitation.  There is moderate to mod-severe (2-3+) tricuspid regurgitation.  Dilation of the inferior vena cava is present with abnormal respiratory  variation in diameter.  Moderate (46-55mmHg) pulmonary hypertension is present.  There is mild (1+) aortic regurgitation.  There is a catheter/pacemaker lead seen in the right ventricle.  Contrast was used without apparent complications. Compared to prior study,  changes are noted.  ______________________________________________________________________________  Left Ventricle  The left ventricle is normal in size. There is normal left ventricular wall  thickness. Left ventricular diastolic function is abnormal. The visual  ejection fraction is 55-60%. There is severe septal hypokinesis. Septal wall  motion abnormality may reflect pacemaker activation.     Right Ventricle  There is a catheter/pacemaker lead seen in the right ventricle. The right  ventricle is normal size. Mildly decreased right ventricular systolic  function.     Atria  There is mod-severe biatrial enlargement.     Mitral Valve  The mitral valve leaflets appear thickened, but open  well. There is mild  mitral annular calcification. There is moderate (2+) mitral regurgitation.     Tricuspid Valve  The tricuspid valve is not well visualized, but is grossly normal. There is  moderate to mod-severe (2-3+) tricuspid regurgitation. Moderate (46-55mmHg)  pulmonary hypertension is present.     Aortic Valve  There is mild trileaflet aortic sclerosis. There is mild (1+) aortic  regurgitation.     Pulmonic Valve  The pulmonic valve is not well visualized.     Vessels  The aortic root is normal size. Dilation of the inferior vena cava is present  with abnormal respiratory variation in diameter.     Pericardium  There is no pericardial effusion.     Rhythm  The rhythm was atrial fibrillation.  ______________________________________________________________________________  MMode/2D Measurements & Calculations     IVSd: 0.80 cm  LVIDd: 3.6 cm  LVIDs: 2.6 cm  LVPWd: 0.84 cm  IVC diam: 2.4 cm  FS: 26.2 %  LV mass(C)d: 80.1 grams  LV mass(C)dI: 46.1 grams/m2  Ao root diam: 2.8 cm  LA dimension: 4.2 cm  LA/Ao: 1.5  LVOT diam: 1.8 cm  LVOT area: 2.4 cm2  LA Volume (BP): 76.4 ml  LA Volume Index (BP): 43.9 ml/m2  RV Base: 3.5 cm  RWT: 0.47     TAPSE: 1.2 cm     Doppler Measurements & Calculations  MV E max danielle: 114.0 cm/sec  MV max P.8 mmHg  MV mean P.8 mmHg  MV V2 VTI: 23.6 cm  MVA(VTI): 1.4 cm2  MV P1/2t max danielle: 129.9 cm/sec  MV P1/2t: 59.5 msec  MVA(P1/2t): 3.7 cm2  MV dec slope: 639.4 cm/sec2  MV dec time: 0.17 sec  Ao V2 max: 116.1 cm/sec  Ao max P.0 mmHg  Ao V2 mean: 79.7 cm/sec  Ao mean P.9 mmHg  Ao V2 VTI: 24.5 cm  AMMY(I,D): 1.4 cm2  AMMY(V,D): 1.3 cm2  AI P1/2t: 623.4 msec  LV V1 max P.5 mmHg  LV V1 max: 60.8 cm/sec  LV V1 VTI: 13.8 cm  MR PISA: 2.1 cm2  MR ERO: 0.16 cm2  MR volume: 27.1 ml  SV(LVOT): 33.6 ml  SI(LVOT): 19.3 ml/m2  PA V2 max: 79.8 cm/sec  PA max P.5 mmHg  PA mean P.5 mmHg  PA V2 VTI: 16.2 cm  PA acc time: 0.12 sec  TR max danielle: 270.3 cm/sec  TR max P.4  mmHg  AV Rg Ratio (DI): 0.52  AMMY Index (cm2/m2): 0.79  E/E' av.1     Lateral E/e': 10.8  Medial E/e': 17.5  RV S Rg: 8.9 cm/sec     ______________________________________________________________________________  Report approved by: Radha Overton 2023 02:39 PM         Echocardiogram Complete   Result Value    LVEF  55-60%    Narrative    [id number]  [id number]  [id number]  [id number]^ENRIQUE^HENRY^BRYAN     [facility]  Echocardiography Laboratory  [address] [place] [address]     Name: CORA WAY  MRN: [id number]  : 1935  Study Date: 04/10/2023 10:45 AM  Age: 88 yrs  Gender: Female  Patient Location: [facility]  Reason For Study: Nonrheumatic mitral valve regurgitation  Ordering Physician: HENRY NUNEZ  Referring Physician: HENRY NUNEZ  Performed By: OUMOU Nicole     BSA: 1.7 m2  Height: 61 in  Weight: 155 lb  BP: 148/87 mmHg  ______________________________________________________________________________  Procedure  Complete Echo Adult. Optison (NDC #[id number]) given intravenously.  ______________________________________________________________________________  Interpretation Summary     1. The left ventricle is normal in size. There is normal left ventricular wall  thickness. Left ventricular systolic function is normal. The visual ejection  fraction is 55-60%. Diastolic Doppler findings (E/E' ratio and/or other  parameters) suggest left ventricular filling pressures are increased. Septal  wall motion abnormality may reflect pacemaker activation. There is no thrombus  seen in the left ventricle.  2. The right ventricle is normal size. There is a catheter/pacemaker lead seen  in the right ventricle. The right ventricular systolic function is normal.  3.There is moderate biatrial enlargement. There is no color Doppler evidence  of an atrial shunt.  4. There is moderate mitral annular calcification. There is mild to moderate  (1-2+) mitral regurgitation.  5. Mild aortic  regurgitation.  6. In direct comparison to the previous study dated 03/22/2022, the findings  are similar.  ______________________________________________________________________________  Left Ventricle  The left ventricle is normal in size. There is normal left ventricular wall  thickness. Left ventricular systolic function is normal. The visual ejection  fraction is 55-60%. Diastolic Doppler findings (E/E' ratio and/or other  parameters) suggest left ventricular filling pressures are increased. Septal  wall motion abnormality may reflect pacemaker activation. There is no thrombus  seen in the left ventricle.     Right Ventricle  The right ventricle is normal size. There is a catheter/pacemaker lead seen in  the right ventricle. The right ventricular systolic function is normal.     Atria  There is moderate biatrial enlargement. There is no color Doppler evidence of  an atrial shunt.     Mitral Valve  There is moderate mitral annular calcification. There is mild to moderate (1-  2+) mitral regurgitation.     Tricuspid Valve  There is trace tricuspid regurgitation. Right ventricular systolic pressure  could not be approximated due to inadequate tricuspid regurgitation.     Aortic Valve  There is mild trileaflet aortic sclerosis. There is mild (1+) aortic  regurgitation. No aortic stenosis is present.     Pulmonic Valve  There is no pulmonic valvular stenosis.     Vessels  The aortic root is normal size. Normal size ascending aorta. The inferior vena  cava is normal.     Pericardium  There is no pericardial effusion.     Rhythm  Sinus rhythm was noted.  ______________________________________________________________________________  MMode/2D Measurements & Calculations     IVSd: 0.97 cm  LVIDd: 3.8 cm  LVIDs: 2.3 cm  LVPWd: 0.92 cm  FS: 39.4 %  LV mass(C)d: 110.1 grams  LV mass(C)dI: 64.9 grams/m2  Ao root diam: 2.5 cm  asc Aorta Diam: 3.0 cm  LVOT diam: 1.8 cm  LVOT area: 2.6 cm2  LA Volume (BP): 67.1 ml  LA Volume  Index (BP): 39.5 ml/m2  RV Base: 2.8 cm     RWT: 0.48  TAPSE: 2.0 cm     Doppler Measurements & Calculations  MV E max rg: 105.0 cm/sec  MV A max rg: 92.2 cm/sec  MV E/A: 1.1  MV max P.0 mmHg  MV mean P.7 mmHg  MV V2 VTI: 24.9 cm  MV dec time: 0.19 sec  AI P1/2t: 412.2 msec  PA V2 max: 90.6 cm/sec  PA max PG: 3.3 mmHg  PA acc time: 0.07 sec  E/E' av.3  Lateral E/e': 12.1  Medial E/e': 22.4  RV S Rg: 11.4 cm/sec     ______________________________________________________________________________  Report approved by: Radha Chau 04/10/2023 12:01 PM                  Thank you for allowing me to participate in the care of your patient.      Sincerely,     Germán Shin MD     [facility]  cc:   No referring provider defined for this encounter.

## 2023-07-13 NOTE — TELEPHONE ENCOUNTER
Call received from Marilyn  [facility] stating patient continues to have abdominal tenderness since hospitalization. Patient was discharged 6/3/23. Patient denies nausea, vomiting and diarrhea.    Also reporting that patient stopped taking Miralax and Senna because she was having diarrhea but she is now having regular bowel movements. This is scheduled twice daily. Please advise if this should be changed. Verbal order is OK.    Marilyn has advised patient and family to schedule an appointment and they agreed.

## 2023-07-13 NOTE — PROGRESS NOTES
Cardiology Clinic Progress Note:    July 13, 2023   Patient Name: Jeannie Chu  Patient MRN: [id number]     Consult indication: HFpEF    HPI:    I had the opportunity to see patient Jeannie Chu in cardiology clinic for a follow up visit. Patient is followed by our colleague Diana Hilario MD with Primary Care.     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    I had been following patient closely in clinic for the past couple of years, primarily for hypertension.  Patient also has a history of atrial fibrillation complicated by tacky bradycardia syndrome, for which she ultimately underwent AV node ablation with pacemaker placement.  She did require redo AV node ablation 11/1/2022.    At our last visit 4/20/2023, patient had been doing well, without symptoms concerning for angina or decompensated heart failure.  Weight at that time was around 155 pounds.  Shortly thereafter, unfortunately she developed severe diarrhea, abdominal discomfort, resulting in a protracted hospitalization at [facility].  She was evaluated extensively for her GI symptoms, also received IV fluid for dehydration, and was started on salt tabs due to chronic hyponatremia.  Troponin was mildly elevated, echocardiogram demonstrated mild septal hypokinesis, as well as pulmonary hypertension and new moderate to moderate severe tricuspid regurgitation.  She was seen by my colleague Dr. Schwarz in consultation.  She was noted to be hypervolemic, so diuresis was recommended.  She was discharged 6/21/2023, and over the next few days, had  significant weight gain, lower extremity swelling, and severe dyspnea.  She presented to [facility] in acute hypoxic respiratory failure 6/25/2023, initially requiring nitroglycerin GTT and BiPAP therapy, in addition to aggressive diuresis.  She was seen in consultation by cardiology while in hospital, and was managed with diuretics, carvedilol was switched to metoprolol.  Rivaroxaban dosage was decreased due to renal dysfunction.  TTE at [facility] 6/20/2023 demonstrated LVEF 56%, with mid to distal anteroseptal, inferoseptal, apical hypokinesis, normal RV function, mild to moderate MR, moderate TR, estimated RVSP 57 mmHg.  It was recommended to defer further ischemic evaluation at that time.    She was seen by Dr. Nolasco, a heart failure specialist with [facility] just yesterday 7/12/2023.  Labs were notable for creatinine 1.54, up from 1.08 6/25/2023.  It was recommended that she decrease torsemide 30 mg daily to 20 mg daily.  It was recommended she follow-up in heart failure clinic in 3 weeks, before returning home to [facility].    Patient is accompanied today by her 2 sons, 1 of whom flew in from [place].  They are serving as language interpreters per patient request.  I had actually met the one son before, his daughter is in medical school in [place].  Overall patient reports that she feels markedly improved from her hospitalization, though still has generalized weakness.  She had difficulty climbing the stairs yesterday, but was able to reach her bedroom at the top of the stairs with assistance from EMS.  She denies any dyspnea, chest pain.  No abnormal lower extremity swelling.  Her appetite is improving.    Assessment and Plan/Recommendations:    # HFpEF, NYHA II. Weight 144 lb. Mildly dehydrated on exam and by clinical history.   # Mild troponin elevation during hospitalization with decompensated HF and severe diarrhea, mild wall motion abnormalities but overall normal LVEF, Mild AI, mild-mod MR, mod TR  #  HTN, well controlled  # Persistent atrial fibrillation complicated by tachybradycardia syndrome, now status post AV node ablation and pacemaker (11/1/2022), [id number], on rivaroxaban.    # Hypothyroidism.  On levothyroxine.  # HL, on statin  # CAD s/p PCI (remote history, proximal LAD 20 years ago).  Patient denies symptoms concerning for angina.    - Reviewed prior hospitalizations in detail with patient and her 2 sons.  Reviewed prior clinic note from heart failure specialist Dr. Nolsaco with [facility].  - Patient still appears mildly dehydrated. Of note, patient is still taking salt tabs twice daily.  Patient's sons state that they did not communicate this to Dr. Nolasco yesterday.  As such, recommend stopping the salt tablets, and decreasing torsemide further to 10 mg daily, with an extra 10 mg as needed for weight gain of 3 pounds in a day, or 5 pounds or more in a week, or if signs/symptoms of fluid overload.  Patient is already scheduled for a BMP next week through his office, encouraged that they continue to make the appointment with him prior to leaving to [facility].    - Agree with continuing current regimen otherwise of metoprolol succinate, losartan, rivaroxaban  - Discussed options for further evaluation/management of the mildly elevated troponin levels and wall motion abnormalities on the echocardiogram.  Since patient denies symptoms concerning for angina, would favor conservative management, especially in consideration of her advanced age, comorbidities.  Overall clinical presentation seems most consistent with demand ischemia.  Patient and son are in agreement, will hold off on ischemic evaluation at this time.  - I am glad to hear that she is receiving home PT care, unsurprisingly she has lost strength due to her protracted hospitalizations  - Patient inquires about whether or not she should follow-up with me before going back to [facility] as well, certainly I would be glad to see her and her family  again, however since she is seeing a heart failure specialist, it would also be reasonable for me to see her when she returns from [facility] next time.  Encouraged patient to establish care with cardiology in [facility], and of course we would be glad to communicate any records to her team there as needed.      Thank you for allowing our team to participate in the care of Jeannie Chu.  Please do not hesitate to call or page me with any questions or concerns.    Sincerely,     Germán Shin MD, [facility]  Cardiology  Text Page   July 13, 2023    Voice recognition software utilized.     Total time spent on this encounter today: 43 minutes, providing care in this encounter including, but not limited to, reviewing prior medical records, laboratory data, imaging studies, diagnostic studies, procedure notes, formulating an assessment and plan, recommendations, discussion and counseling with patient face to face, dictation.    Past Medical History:   The ASCVD Risk score (Angel Luis DK, et al., 2019) failed to calculate for the following reasons:    The 2019 ASCVD risk score is only valid for ages 40 to 79  Past Medical History:   Diagnosis Date     Acquired hypothyroidism 05/27/2021     Atrial fibrillation      Benign essential hypertension      Coronary artery disease 05/26/2021     Hyperlipidemia         Past Surgical History:   Past Surgical History:   Procedure Laterality Date     COLONOSCOPY N/A 6/6/2023    Procedure: COLONOSCOPY, BIOPSIES;  Surgeon: Germán Manning MD;  Location: [facility] OR     Coronary angiogram with stent placement       EP ABLATION AV NODE N/A 10/31/2022    Procedure: Ablation Atrioventricular Node;  Surgeon: Rogelio Whitt MD;  Location: [facility] CARDIAC CATH LAB     EP ABLATION AV NODE N/A 11/01/2022    Procedure: EP Ablation AV Node;  Surgeon: Jd Baker MD;  Location: [facility] CARDIAC CATH LAB     EP PACEMAKER DEVICE & LEAD IMPLANT- RIGHT VENTRICULAR N/A 10/31/2022    Procedure:  Pacemaker Device & Lead Implant- Right ventricular;  Surgeon: Rogelio Whitt MD;  Location:  HEART CARDIAC CATH LAB     ESOPHAGOSCOPY, GASTROSCOPY, DUODENOSCOPY (EGD), COMBINED N/A 6/9/2023    Procedure: ESOPHAGOGASTRODUODENOSCOPY;  Surgeon: Germán Manning MD;  Location:  OR       Medications (outpatient):  Current Outpatient Medications   Medication Sig Dispense Refill     ALPRAZolam (XANAX) 0.5 MG tablet Take 0.5 mg by mouth daily as needed for anxiety       dicyclomine (BENTYL) 10 MG capsule Take 10 mg by mouth 4 times daily as needed       IRON (FERROUS GLUCONATE) 325 MG OR TABS Take 1 tablet by mouth daily  30 0     levothyroxine (SYNTHROID/LEVOTHROID) 50 MCG tablet Take 1 tablet (50 mcg) by mouth every morning (before breakfast) 30 tablet 1     losartan (COZAAR) 25 MG tablet Take 25 mg by mouth       melatonin 5 MG tablet Take 5 mg by mouth nightly as needed       metoprolol succinate ER (TOPROL XL) 50 MG 24 hr tablet Take 1 tablet by mouth daily       Multiple Vitamins-Minerals (ICAPS AREDS FORMULA PO) Take 2 tablets by mouth daily        nitroGLYcerin (NITROSTAT) 0.4 MG sublingual tablet For chest pain place 1 tablet under the tongue every 5 minutes for 3 doses. If symptoms persist 5 minutes after 1st dose call 911. 25 tablet 0     ondansetron (ZOFRAN ODT) 4 MG ODT tab Take 1 tablet (4 mg) by mouth every 6 hours as needed for nausea or vomiting 30 tablet 0     pantoprazole (PROTONIX) 40 MG EC tablet Take 1 tablet (40 mg) by mouth every morning (before breakfast) 30 tablet 1     polyethylene glycol 3350 POWD daily as needed       prochlorperazine (COMPAZINE) 5 MG tablet Take 5 mg by mouth every 6 hours as needed       rivaroxaban ANTICOAGULANT (XARELTO) 20 MG TABS tablet Take 1 tablet (20 mg) by mouth daily (with dinner) 90 tablet 3     senna (SENOKOT) 8.6 MG tablet Take 8.6 mg by mouth daily as needed       sodium chloride 1 GM tablet Take 1 tablet (1 g) by mouth 3 times daily (with meals) 90 tablet 1  "    torsemide (DEMADEX) 20 MG tablet Take 1 tablet by mouth daily at 2 pm         Allergies:  Allergies   Allergen Reactions     Amlodipine Swelling     BLE edema     Ibuprofen Swelling       Social History:   History   Drug Use No      History   Smoking Status     Never   Smokeless Tobacco     Never     Social History    Substance and Sexual Activity      Alcohol use: No       Family History:  Family History   Problem Relation Age of Onset     Family History Negative Other        Review of Systems:   A complete review of systems was negative except as mentioned in the History of Present Illness.     Objective & Physical Exam:  /68 (BP Location: Right arm, Patient Position: Sitting, Cuff Size: Adult Regular)   Pulse 71   Ht 1.549 m (5' 1\")   Wt 65.5 kg (144 lb 8 oz)   LMP  (LMP Unknown)   SpO2 99%   BMI 27.30 kg/m    Wt Readings from Last 2 Encounters:   07/13/23 65.5 kg (144 lb 8 oz)   06/21/23 84.3 kg (185 lb 14.4 oz)     Body mass index is 27.3 kg/m .   Body surface area is 1.68 meters squared.    Constitutional: appears stated age, in no apparent distress, appears to be well nourished  Head: normocephalic, atraumatic  Neck: supple, trachea midline   Pulmonary: clear to auscultation bilaterally  Cardiovascular: JVP normal, regular rate, regular rhythm, 1/6 RICKEY at the RUSB, no lower extremity edema  Gastrointestinal: no guarding, non-rigid   Neurologic: awake, alert, moves all extremities  Skin: no jaundice, warm on limited exam  Psychiatric: affect is normal, answers questions appropriately, oriented to self and place    Data reviewed:  Lab Results   Component Value Date    WBC 7.3 06/19/2023    WBC 10.0 05/26/2021    RBC 3.43 (L) 06/19/2023    RBC 4.30 05/26/2021    HGB 10.6 (L) 06/19/2023    HGB 12.9 05/26/2021    HCT 31.3 (L) 06/19/2023    HCT 40.3 05/26/2021    MCV 91 06/19/2023    MCV 94 05/26/2021    MCH 30.9 06/19/2023    MCH 30.0 05/26/2021    MCHC 33.9 06/19/2023    MCHC 32.0 05/26/2021    " RDW 15.1 (H) 06/19/2023    RDW 13.7 05/26/2021    PLT 97 (L) 06/19/2023     05/26/2021     Sodium   Date Value Ref Range Status   06/21/2023 124 (L) 136 - 145 mmol/L Final   04/30/2021 135 133 - 144 mmol/L Final     Potassium   Date Value Ref Range Status   06/21/2023 4.3 3.4 - 5.3 mmol/L Final     Comment:     Specimen slightly hemolyzed, potassium may be falsely elevated.   11/01/2022 3.7 3.4 - 5.3 mmol/L Final   04/30/2021 4.4 3.4 - 5.3 mmol/L Final     Chloride   Date Value Ref Range Status   06/21/2023 86 (L) 98 - 107 mmol/L Final   11/01/2022 100 94 - 109 mmol/L Final   04/30/2021 102 94 - 109 mmol/L Final     Carbon Dioxide   Date Value Ref Range Status   04/30/2021 30 20 - 32 mmol/L Final     Carbon Dioxide (CO2)   Date Value Ref Range Status   06/21/2023 31 (H) 22 - 29 mmol/L Final   11/01/2022 28 20 - 32 mmol/L Final     Anion Gap   Date Value Ref Range Status   06/21/2023 7 7 - 15 mmol/L Final   11/01/2022 4 3 - 14 mmol/L Final   04/30/2021 3 3 - 14 mmol/L Final     Glucose   Date Value Ref Range Status   06/21/2023 96 70 - 99 mg/dL Final   11/01/2022 82 70 - 99 mg/dL Final   04/30/2021 89 70 - 99 mg/dL Final     GLUCOSE BY METER POCT   Date Value Ref Range Status   06/21/2023 126 (H) 70 - 99 mg/dL Final     Urea Nitrogen   Date Value Ref Range Status   06/21/2023 24.2 (H) 8.0 - 23.0 mg/dL Final   11/01/2022 16 7 - 30 mg/dL Final   04/30/2021 18 7 - 30 mg/dL Final     Creatinine   Date Value Ref Range Status   06/21/2023 0.92 0.51 - 0.95 mg/dL Final   04/30/2021 0.77 0.52 - 1.04 mg/dL Final     GFR Estimate   Date Value Ref Range Status   06/21/2023 60 (L) >60 mL/min/1.73m2 Final   04/30/2021 70 >60 mL/min/[1.73_m2] Final     Comment:     Non  GFR Calc  Starting 12/18/2018, serum creatinine based estimated GFR (eGFR) will be   calculated using the Chronic Kidney Disease Epidemiology Collaboration   (CKD-EPI) equation.       GFR, ESTIMATED POCT   Date Value Ref Range Status    2021 >60 >60 mL/min/1.73m2 Final     Calcium   Date Value Ref Range Status   2023 8.7 (L) 8.8 - 10.2 mg/dL Final   2021 9.3 8.5 - 10.1 mg/dL Final     Bilirubin Total   Date Value Ref Range Status   2023 0.8 <=1.2 mg/dL Final   2021 1.1 0.2 - 1.3 mg/dL Final     Alkaline Phosphatase   Date Value Ref Range Status   2023 252 (H) 35 - 104 U/L Final   2021 70 40 - 150 U/L Final     ALT   Date Value Ref Range Status   2023 112 (H) 0 - 50 U/L Final     Comment:     Reference intervals for this test were updated on 2023 to more accurately reflect our healthy population. There may be differences in the flagging of prior results with similar values performed with this method. Interpretation of those prior results can be made in the context of the updated reference intervals.     2021 27 0 - 50 U/L Final     AST   Date Value Ref Range Status   2023 98 (H) 0 - 45 U/L Final     Comment:     Reference intervals for this test were updated on 2023 to more accurately reflect our healthy population. There may be differences in the flagging of prior results with similar values performed with this method. Interpretation of those prior results can be made in the context of the updated reference intervals.   2021 27 0 - 45 U/L Final     Recent Labs   Lab Test 23  0723 22  1505   CHOL 147 132   HDL 73 81   LDL 60 24   TRIG 71 136      Lab Results   Component Value Date    A1C 5.4 10/13/2022        Recent Results (from the past 4320 hour(s))   Echocardiogram Complete   Result Value    LVEF  55-60%    Narrative    [id number]  [facility]  [id number]  2011^YOCASTA^SUHA^[facility]  Echocardiography Laboratory  [address] [place] [address]     Name: CORA WAY  MRN: [id number]  : 1935  Study Date: 2023 08:10 AM  Age: 88 yrs  Gender: Female  Patient Location: [facility]  Reason For Study: SOB  Ordering  Physician: SUHA RUST  Performed By: Juliane Schrader     BSA: 1.7 m2  Height: 61 in  Weight: 164 lb  HR: 65  BP: 141/78 mmHg  ______________________________________________________________________________  Procedure  Complete Portable Echo Adult. Optison (NDC #[id number]) given intravenously.  ______________________________________________________________________________  Interpretation Summary     There is severe distal septal hypokinesis.  The visual ejection fraction is 55-60%.  Mildly decreased right ventricular systolic function  There is mod-severe biatrial enlargement.  There is moderate (2+) mitral regurgitation.  There is moderate to mod-severe (2-3+) tricuspid regurgitation.  Dilation of the inferior vena cava is present with abnormal respiratory  variation in diameter.  Moderate (46-55mmHg) pulmonary hypertension is present.  There is mild (1+) aortic regurgitation.  There is a catheter/pacemaker lead seen in the right ventricle.  Contrast was used without apparent complications. Compared to prior study,  changes are noted.  ______________________________________________________________________________  Left Ventricle  The left ventricle is normal in size. There is normal left ventricular wall  thickness. Left ventricular diastolic function is abnormal. The visual  ejection fraction is 55-60%. There is severe septal hypokinesis. Septal wall  motion abnormality may reflect pacemaker activation.     Right Ventricle  There is a catheter/pacemaker lead seen in the right ventricle. The right  ventricle is normal size. Mildly decreased right ventricular systolic  function.     Atria  There is mod-severe biatrial enlargement.     Mitral Valve  The mitral valve leaflets appear thickened, but open well. There is mild  mitral annular calcification. There is moderate (2+) mitral regurgitation.     Tricuspid Valve  The tricuspid valve is not well visualized, but is grossly normal. There is  moderate to  mod-severe (2-3+) tricuspid regurgitation. Moderate (46-55mmHg)  pulmonary hypertension is present.     Aortic Valve  There is mild trileaflet aortic sclerosis. There is mild (1+) aortic  regurgitation.     Pulmonic Valve  The pulmonic valve is not well visualized.     Vessels  The aortic root is normal size. Dilation of the inferior vena cava is present  with abnormal respiratory variation in diameter.     Pericardium  There is no pericardial effusion.     Rhythm  The rhythm was atrial fibrillation.  ______________________________________________________________________________  MMode/2D Measurements & Calculations     IVSd: 0.80 cm  LVIDd: 3.6 cm  LVIDs: 2.6 cm  LVPWd: 0.84 cm  IVC diam: 2.4 cm  FS: 26.2 %  LV mass(C)d: 80.1 grams  LV mass(C)dI: 46.1 grams/m2  Ao root diam: 2.8 cm  LA dimension: 4.2 cm  LA/Ao: 1.5  LVOT diam: 1.8 cm  LVOT area: 2.4 cm2  LA Volume (BP): 76.4 ml  LA Volume Index (BP): 43.9 ml/m2  RV Base: 3.5 cm  RWT: 0.47     TAPSE: 1.2 cm     Doppler Measurements & Calculations  MV E max rg: 114.0 cm/sec  MV max P.8 mmHg  MV mean P.8 mmHg  MV V2 VTI: 23.6 cm  MVA(VTI): 1.4 cm2  MV P1/2t max rg: 129.9 cm/sec  MV P1/2t: 59.5 msec  MVA(P1/2t): 3.7 cm2  MV dec slope: 639.4 cm/sec2  MV dec time: 0.17 sec  Ao V2 max: 116.1 cm/sec  Ao max P.0 mmHg  Ao V2 mean: 79.7 cm/sec  Ao mean P.9 mmHg  Ao V2 VTI: 24.5 cm  AMMY(I,D): 1.4 cm2  AMMY(V,D): 1.3 cm2  AI P1/2t: 623.4 msec  LV V1 max P.5 mmHg  LV V1 max: 60.8 cm/sec  LV V1 VTI: 13.8 cm  MR PISA: 2.1 cm2  MR ERO: 0.16 cm2  MR volume: 27.1 ml  SV(LVOT): 33.6 ml  SI(LVOT): 19.3 ml/m2  PA V2 max: 79.8 cm/sec  PA max P.5 mmHg  PA mean P.5 mmHg  PA V2 VTI: 16.2 cm  PA acc time: 0.12 sec  TR max rg: 270.3 cm/sec  TR max P.4 mmHg  AV Rg Ratio (DI): 0.52  AMMY Index (cm2/m2): 0.79  E/E' av.1     Lateral E/e': 10.8  Medial E/e': 17.5  RV S Rg: 8.9 cm/sec      ______________________________________________________________________________  Report approved by: Radha Overton 2023 02:39 PM         Echocardiogram Complete   Result Value    LVEF  55-60%    [facility]    [id number]  [facility]  [id number]  [id number]^ENRIQUE^HENRY^BRYAN     [facility]  Echocardiography Laboratory  [address] [place] [address]     Name: CORA WAY  MRN: [id number]  : 1935  Study Date: 04/10/2023 10:45 AM  Age: 88 yrs  Gender: Female  Patient Location: [facility]  Reason For Study: Nonrheumatic mitral valve regurgitation  Ordering Physician: HENRY NUNEZ  Referring Physician: HENRY NUNEZ  Performed By: OUMOU Nicole     BSA: 1.7 m2  Height: 61 in  Weight: 155 lb  BP: 148/87 mmHg  ______________________________________________________________________________  Procedure  Complete Echo Adult. Optison (NDC #[id number]) given intravenously.  ______________________________________________________________________________  Interpretation Summary     1. The left ventricle is normal in size. There is normal left ventricular wall  thickness. Left ventricular systolic function is normal. The visual ejection  fraction is 55-60%. Diastolic Doppler findings (E/E' ratio and/or other  parameters) suggest left ventricular filling pressures are increased. Septal  wall motion abnormality may reflect pacemaker activation. There is no thrombus  seen in the left ventricle.  2. The right ventricle is normal size. There is a catheter/pacemaker lead seen  in the right ventricle. The right ventricular systolic function is normal.  3.There is moderate biatrial enlargement. There is no color Doppler evidence  of an atrial shunt.  4. There is moderate mitral annular calcification. There is mild to moderate  (1-2+) mitral regurgitation.  5. Mild aortic regurgitation.  6. In direct comparison to the previous study dated 2022, the findings  are  similar.  ______________________________________________________________________________  Left Ventricle  The left ventricle is normal in size. There is normal left ventricular wall  thickness. Left ventricular systolic function is normal. The visual ejection  fraction is 55-60%. Diastolic Doppler findings (E/E' ratio and/or other  parameters) suggest left ventricular filling pressures are increased. Septal  wall motion abnormality may reflect pacemaker activation. There is no thrombus  seen in the left ventricle.     Right Ventricle  The right ventricle is normal size. There is a catheter/pacemaker lead seen in  the right ventricle. The right ventricular systolic function is normal.     Atria  There is moderate biatrial enlargement. There is no color Doppler evidence of  an atrial shunt.     Mitral Valve  There is moderate mitral annular calcification. There is mild to moderate (1-  2+) mitral regurgitation.     Tricuspid Valve  There is trace tricuspid regurgitation. Right ventricular systolic pressure  could not be approximated due to inadequate tricuspid regurgitation.     Aortic Valve  There is mild trileaflet aortic sclerosis. There is mild (1+) aortic  regurgitation. No aortic stenosis is present.     Pulmonic Valve  There is no pulmonic valvular stenosis.     Vessels  The aortic root is normal size. Normal size ascending aorta. The inferior vena  cava is normal.     Pericardium  There is no pericardial effusion.     Rhythm  Sinus rhythm was noted.  ______________________________________________________________________________  MMode/2D Measurements & Calculations     IVSd: 0.97 cm  LVIDd: 3.8 cm  LVIDs: 2.3 cm  LVPWd: 0.92 cm  FS: 39.4 %  LV mass(C)d: 110.1 grams  LV mass(C)dI: 64.9 grams/m2  Ao root diam: 2.5 cm  asc Aorta Diam: 3.0 cm  LVOT diam: 1.8 cm  LVOT area: 2.6 cm2  LA Volume (BP): 67.1 ml  LA Volume Index (BP): 39.5 ml/m2  RV Base: 2.8 cm     RWT: 0.48  TAPSE: 2.0 cm     Doppler Measurements &  Calculations  MV E max rg: 105.0 cm/sec  MV A max rg: 92.2 cm/sec  MV E/A: 1.1  MV max P.0 mmHg  MV mean P.7 mmHg  MV V2 VTI: 24.9 cm  MV dec time: 0.19 sec  AI P1/2t: 412.2 msec  PA V2 max: 90.6 cm/sec  PA max PG: 3.3 mmHg  PA acc time: 0.07 sec  E/E' av.3  Lateral E/e': 12.1  Medial E/e': 22.4  RV S Rg: 11.4 cm/sec     ______________________________________________________________________________  Report approved by: Radha Chau 04/10/2023 12:01 PM

## 2023-07-13 NOTE — PATIENT INSTRUCTIONS
July 13, 2023    Thank you for allowing our Cardiology team to participate in your care.     Please note the following changes to your heart treatment plan:     Medication changes:   - stop salt tablet  - decrease torsemide 20mg daily to 10mg daily, with an extra 10mg once a day as needed for weight gain of 3lb/day or 5lb/week or signs of fluid overload     Tests to be done:  - labs next week as scheduled    Follow up:  - Follow up with Dr. Nolasco as scheduled      For scheduling, please call [phone].      Please contact our team through [employer] or our Nurse Team Voicemail service [phone], or the General Clinic [phone] for any questions or concerns.     If you are having a medical emergency, please call 161.     Sincerely,    Germán Shin MD, [facility]  Cardiology    [facility] - [facility] - [facility] - [place]

## 2023-07-14 ENCOUNTER — TELEPHONE (OUTPATIENT)
Dept: CARDIOLOGY | Facility: CLINIC | Age: 88
End: 2023-07-14
Payer: COMMERCIAL

## 2023-07-14 ENCOUNTER — MEDICAL CORRESPONDENCE (OUTPATIENT)
Dept: HEALTH INFORMATION MANAGEMENT | Facility: CLINIC | Age: 88
End: 2023-07-14

## 2023-07-14 ENCOUNTER — TRANSFERRED RECORDS (OUTPATIENT)
Dept: HEALTH INFORMATION MANAGEMENT | Facility: CLINIC | Age: 88
End: 2023-07-14
Payer: COMMERCIAL

## 2023-07-14 NOTE — TELEPHONE ENCOUNTER
Pts son called wanting to set up an in-clinic device check prior to leaving.  Previous appointment made with scheduling. Pt is going to be traveling to [place] soon.  Will try to arrange for [employer] rep to bring an international remote for pt so this can be paired with device on in-clinic check.

## 2023-07-17 ENCOUNTER — TELEPHONE (OUTPATIENT)
Dept: ORTHOPEDICS | Facility: CLINIC | Age: 88
End: 2023-07-17

## 2023-07-17 NOTE — TELEPHONE ENCOUNTER
[facility] Call Center    Phone Message    May a detailed message be left on voicemail: yes     Reason for Call: Order(s): Other:   Reason for requested: MRI for lumbar spine     Date needed: 07/18/2023    Provider name: Dr. Jose L Parsons      Action Taken: Other: Dr Jose L Parsons -     Travel Screening: Not Applicable                        Pt missed the appointment for MRI for lumbar spine due to being hospitalized.  Pt asking to have order for MRI reinstated.  Pts rodolfo Alcantara asking for call back to get scheduled at   # [phone]

## 2023-07-18 ENCOUNTER — DOCUMENTATION ONLY (OUTPATIENT)
Dept: CARDIOLOGY | Facility: CLINIC | Age: 88
End: 2023-07-18

## 2023-07-18 ENCOUNTER — ANCILLARY PROCEDURE (OUTPATIENT)
Dept: CARDIOLOGY | Facility: CLINIC | Age: 88
End: 2023-07-18
Attending: INTERNAL MEDICINE
Payer: COMMERCIAL

## 2023-07-18 DIAGNOSIS — I49.5 SICK SINUS SYNDROME (H): ICD-10-CM

## 2023-07-18 DIAGNOSIS — Z95.0 CARDIAC PACEMAKER IN SITU: ICD-10-CM

## 2023-07-18 DIAGNOSIS — I48.0 PAROXYSMAL ATRIAL FIBRILLATION (H): ICD-10-CM

## 2023-07-18 PROCEDURE — 93280 PM DEVICE PROGR EVAL DUAL: CPT | Performed by: INTERNAL MEDICINE

## 2023-07-18 NOTE — TELEPHONE ENCOUNTER
Called and spoke with Sarai Sanders [facility] to relay Dr. Owens's message. No further action needed.    Thank you,  Doc Pinedo, Triage RN [place]  11:32 AM 7/18/2023

## 2023-07-18 NOTE — TELEPHONE ENCOUNTER
Marilyn calls to check on status of message. Informed her we have not received update from covering provider. Marilyn hoping to see patient tomorrow for next visit, would like response today if possible.     Nohemy Lau RN  [facility]

## 2023-07-19 LAB
MDC_IDC_LEAD_IMPLANT_DT: NORMAL
MDC_IDC_LEAD_IMPLANT_DT: NORMAL
MDC_IDC_LEAD_LOCATION: NORMAL
MDC_IDC_LEAD_LOCATION: NORMAL
MDC_IDC_LEAD_LOCATION_DETAIL_1: NORMAL
MDC_IDC_LEAD_LOCATION_DETAIL_1: NORMAL
MDC_IDC_LEAD_MFG: NORMAL
MDC_IDC_LEAD_MFG: NORMAL
MDC_IDC_LEAD_MODEL: NORMAL
MDC_IDC_LEAD_MODEL: NORMAL
MDC_IDC_LEAD_POLARITY_TYPE: NORMAL
MDC_IDC_LEAD_POLARITY_TYPE: NORMAL
MDC_IDC_LEAD_SERIAL: NORMAL
MDC_IDC_LEAD_SERIAL: NORMAL
MDC_IDC_MSMT_BATTERY_REMAINING_LONGEVITY: 90 MO
MDC_IDC_MSMT_BATTERY_STATUS: NORMAL
MDC_IDC_MSMT_LEADCHNL_RA_IMPEDANCE_VALUE: 468 OHM
MDC_IDC_MSMT_LEADCHNL_RA_SENSING_INTR_AMPL: 2.8 MV
MDC_IDC_MSMT_LEADCHNL_RA_SENSING_INTR_AMPL: 3.5 MV
MDC_IDC_MSMT_LEADCHNL_RV_IMPEDANCE_VALUE: 565 OHM
MDC_IDC_MSMT_LEADCHNL_RV_PACING_THRESHOLD_AMPLITUDE: 0.7 V
MDC_IDC_MSMT_LEADCHNL_RV_PACING_THRESHOLD_AMPLITUDE: 0.7 V
MDC_IDC_MSMT_LEADCHNL_RV_PACING_THRESHOLD_PULSEWIDTH: 0.4 MS
MDC_IDC_MSMT_LEADCHNL_RV_PACING_THRESHOLD_PULSEWIDTH: 0.4 MS
MDC_IDC_MSMT_LEADCHNL_RV_SENSING_INTR_AMPL: 8.4 MV
MDC_IDC_PG_IMPLANT_DTM: NORMAL
MDC_IDC_PG_MFG: NORMAL
MDC_IDC_PG_MODEL: NORMAL
MDC_IDC_PG_SERIAL: NORMAL
MDC_IDC_PG_TYPE: NORMAL
MDC_IDC_SESS_CLINIC_NAME: NORMAL
MDC_IDC_SESS_DTM: NORMAL
MDC_IDC_SESS_REPROGRAMMED: NORMAL
MDC_IDC_SESS_TYPE: NORMAL
MDC_IDC_SET_BRADY_AT_MODE_SWITCH_MODE: NORMAL
MDC_IDC_SET_BRADY_AT_MODE_SWITCH_RATE: 180 {BEATS}/MIN
MDC_IDC_SET_BRADY_HYSTRATE: 60 {BEATS}/MIN
MDC_IDC_SET_BRADY_LOWRATE: 60 {BEATS}/MIN
MDC_IDC_SET_BRADY_MAX_SENSOR_RATE: 120 {BEATS}/MIN
MDC_IDC_SET_BRADY_MAX_TRACKING_RATE: 130 {BEATS}/MIN
MDC_IDC_SET_BRADY_MODE: NORMAL
MDC_IDC_SET_BRADY_NIGHT_RATE: 60 {BEATS}/MIN
MDC_IDC_SET_BRADY_PAV_DELAY_HIGH: 160 MS
MDC_IDC_SET_BRADY_PAV_DELAY_LOW: 200 MS
MDC_IDC_SET_BRADY_SAV_DELAY_HIGH: 130 MS
MDC_IDC_SET_BRADY_SAV_DELAY_LOW: 170 MS
MDC_IDC_SET_CRT_PACED_CHAMBERS: NORMAL
MDC_IDC_SET_LEADCHNL_LV_PACING_CATHODE_ELECTRODE_1: NORMAL
MDC_IDC_SET_LEADCHNL_LV_PACING_CATHODE_LOCATION_1: NORMAL
MDC_IDC_SET_LEADCHNL_LV_PACING_POLARITY: NORMAL
MDC_IDC_SET_LEADCHNL_LV_SENSING_CATHODE_ELECTRODE_1: NORMAL
MDC_IDC_SET_LEADCHNL_LV_SENSING_CATHODE_LOCATION_1: NORMAL
MDC_IDC_SET_LEADCHNL_LV_SENSING_POLARITY: NORMAL
MDC_IDC_SET_LEADCHNL_RA_PACING_AMPLITUDE: 3 V
MDC_IDC_SET_LEADCHNL_RA_PACING_POLARITY: NORMAL
MDC_IDC_SET_LEADCHNL_RA_PACING_PULSEWIDTH: 0.4 MS
MDC_IDC_SET_LEADCHNL_RA_SENSING_POLARITY: NORMAL
MDC_IDC_SET_LEADCHNL_RA_SENSING_SENSITIVITY: 0.5 MV
MDC_IDC_SET_LEADCHNL_RV_PACING_AMPLITUDE: 3 V
MDC_IDC_SET_LEADCHNL_RV_PACING_POLARITY: NORMAL
MDC_IDC_SET_LEADCHNL_RV_PACING_PULSEWIDTH: 0.4 MS
MDC_IDC_SET_LEADCHNL_RV_SENSING_ADAPTATION_MODE: NORMAL
MDC_IDC_SET_LEADCHNL_RV_SENSING_POLARITY: NORMAL
MDC_IDC_SET_LEADCHNL_RV_SENSING_SENSITIVITY: NORMAL
MDC_IDC_STAT_BRADY_RA_PERCENT_PACED: 19 %
MDC_IDC_STAT_BRADY_RV_PERCENT_PACED: 100 %

## 2023-07-25 ENCOUNTER — TRANSFERRED RECORDS (OUTPATIENT)
Dept: HEALTH INFORMATION MANAGEMENT | Facility: CLINIC | Age: 88
End: 2023-07-25
Payer: COMMERCIAL

## 2023-07-25 NOTE — TELEPHONE ENCOUNTER
Patient had thyroid labs completed on 2/15/22 from Dr. Ryan.  Please see results and refill medication if appropriate.  Thanks     Tylenol or ibuprofen as needed. Follow-up with PMD as needed. Return to the ED immediately if increasing irritability, lethargy, repetitive vomiting, change in behavior, or any other concerns.

## 2023-07-26 ENCOUNTER — TELEPHONE (OUTPATIENT)
Dept: INTERNAL MEDICINE | Facility: CLINIC | Age: 88
End: 2023-07-26
Payer: COMMERCIAL

## 2023-07-27 DIAGNOSIS — E03.9 ACQUIRED HYPOTHYROIDISM: ICD-10-CM

## 2023-07-28 DIAGNOSIS — E03.9 ACQUIRED HYPOTHYROIDISM: ICD-10-CM

## 2023-07-28 RX ORDER — LEVOTHYROXINE SODIUM 75 UG/1
TABLET ORAL
Qty: 90 TABLET | Refills: 0 | OUTPATIENT
Start: 2023-07-28

## 2023-07-28 RX ORDER — LEVOTHYROXINE SODIUM 50 UG/1
50 TABLET ORAL
Qty: 90 TABLET | Refills: 0 | OUTPATIENT
Start: 2023-07-28

## 2023-07-28 NOTE — TELEPHONE ENCOUNTER
Called and spoke with [facility] to see if they could reach out to [facility] to get medication sent over as medication was just prescribed on 07/22/2023.    Will deny refill request due to already having active medication request.    Thank you,  Doc Pinedo, Triage RN [facility]  3:26 PM 7/28/2023

## 2023-07-31 ENCOUNTER — TELEPHONE (OUTPATIENT)
Dept: INTERNAL MEDICINE | Facility: CLINIC | Age: 88
End: 2023-07-31

## 2023-08-02 NOTE — TELEPHONE ENCOUNTER
Left message for patient to call back   Dr Hilario would like patient to have a virtual appointment.

## 2023-08-03 ENCOUNTER — TELEPHONE (OUTPATIENT)
Dept: INTERNAL MEDICINE | Facility: CLINIC | Age: 88
End: 2023-08-03
Payer: COMMERCIAL

## 2023-08-03 NOTE — TELEPHONE ENCOUNTER
[facility] 07/25/23 for review and signature.  Put in Dr. Hilario's in basket.

## 2023-08-03 NOTE — TELEPHONE ENCOUNTER
Patient needs a F2F visit in order to complete the forms for a Transport Chair.  Sent patient a [employer] message message to advise.  CForms are under the wire basket on my desk.

## 2023-08-10 NOTE — TELEPHONE ENCOUNTER
Spoke to patient's daughter, Shivani, per consent and she states we can discard the forms because patient no longer needs a wheelchair/transport chair. Forms discarded.

## 2023-08-14 ENCOUNTER — TELEPHONE (OUTPATIENT)
Dept: INTERNAL MEDICINE | Facility: CLINIC | Age: 88
End: 2023-08-14
Payer: COMMERCIAL

## 2023-08-14 NOTE — TELEPHONE ENCOUNTER
Fax received from [facility] - Pt request for a transport Chair 07/19/23 for review and signature.  Put in Dr. Hilario's in basket.

## 2023-08-22 ENCOUNTER — TELEPHONE (OUTPATIENT)
Dept: CARDIOLOGY | Facility: CLINIC | Age: 88
End: 2023-08-22
Payer: COMMERCIAL

## 2023-08-22 NOTE — TELEPHONE ENCOUNTER
Received a message from unknown caller regarding patient's pacemaker. No name was provided and number listed is not listed in patient's CTC. No further details given.    Called patient's daughter Shivani to determine if she knew what the call was about/who it was that called. No answer. LVM letting her know that she can call back to the Device RN line (# provided) if there are any questions or concerns.    COLLINS RN

## 2023-08-23 ENCOUNTER — MYC MEDICAL ADVICE (OUTPATIENT)
Dept: CARDIOLOGY | Facility: CLINIC | Age: 88
End: 2023-08-23
Payer: COMMERCIAL

## 2023-08-23 NOTE — TELEPHONE ENCOUNTER
Received a VM from pt's son Kimberley asking for a call back.     Called Kimberley back. He said pt is in [place] right now. He asked if we have received any alerts from her remote monitor, I told him no. He said that pt saw a cardiologist in [place] and they checked her pacemaker and said something happened on 8/2 and he wondered what this was. I looked on [employer] remote monitoring website, and pt had Afib/Aflutter that started on 8/2.     Génesiselysia asked for more information on the afib and what that means. He also asked for me to write it out clearly in this note, so he can re-read it in [employer] later to help him explain to other family members. We discussed the following information:  - I explained that in Afib/Aflutter, the top chambers of the heart beat very fast, and as a result sometimes the bottom chambers also beat very fast. The bottom chambers are the main pumping chambers of the heart, so when they go very fast, people often feel poorly.   - But, when pt had her pacemaker implanted she also had an AV Node Ablation (or AVNA). Because she had this ablation, her heart rate in the bottom chambers will no longer go too fast, instead the heart rate in the bottom chambers is controlled by the pacemaker  - The 2 biggest concerns when people have Afib/Aflutter are fast heart rate and taking a blood thinner. Patient no longer has the problem with the fast heart rate (because she had AVNA) and she is already on a blood thinner (Xarelto), so there are no concerns when she has Afib/Aflutter.     Kimberley asked for the primary number in patient's chart to be changed to his number, I made this change in demographics.     Kimberley asked another question about when pt was hospitalized at [facility] and had a problem with her tricuspid valve. I looked in Care Everywhere and was able to see the echo from 6/28/2023 at [facility] - it shows moderate tricuspid regurgitation. Kimberley asked if the tricuspid regurgitation could be  caused by the pacemaker. I said this is a possibility because the lead in the bottom chamber of the heart goes through the tricuspid valve. I also explained that nothing would need to be done at this point because it is only moderate, not severe, and we will probably just monitor on future echos. Kimberley states understanding.

## 2023-08-24 NOTE — TELEPHONE ENCOUNTER
Steve's son Kimberley sent a [employer] message saying he cannot see my note in the chart that I wrote yesterday. He had wanted a detailed summary of what we talked about yesterday to help him explain it to his other family members.     I will copy information from my note yesterday into a [employer] message today so Kimberley can see it.

## 2023-10-16 ENCOUNTER — ANCILLARY PROCEDURE (OUTPATIENT)
Dept: CARDIOLOGY | Facility: CLINIC | Age: 88
End: 2023-10-16
Attending: INTERNAL MEDICINE
Payer: COMMERCIAL

## 2023-10-16 DIAGNOSIS — I49.5 SICK SINUS SYNDROME (H): ICD-10-CM

## 2023-10-16 DIAGNOSIS — Z95.0 CARDIAC PACEMAKER IN SITU: ICD-10-CM

## 2023-10-16 PROCEDURE — 93296 REM INTERROG EVL PM/IDS: CPT | Performed by: INTERNAL MEDICINE

## 2023-10-16 PROCEDURE — 93294 REM INTERROG EVL PM/LDLS PM: CPT | Performed by: INTERNAL MEDICINE

## 2023-10-17 LAB
MDC_IDC_LEAD_CONNECTION_STATUS: NORMAL
MDC_IDC_LEAD_CONNECTION_STATUS: NORMAL
MDC_IDC_LEAD_IMPLANT_DT: NORMAL
MDC_IDC_LEAD_IMPLANT_DT: NORMAL
MDC_IDC_LEAD_LOCATION: NORMAL
MDC_IDC_LEAD_LOCATION: NORMAL
MDC_IDC_LEAD_LOCATION_DETAIL_1: NORMAL
MDC_IDC_LEAD_LOCATION_DETAIL_1: NORMAL
MDC_IDC_LEAD_MFG: NORMAL
MDC_IDC_LEAD_MFG: NORMAL
MDC_IDC_LEAD_MODEL: NORMAL
MDC_IDC_LEAD_MODEL: NORMAL
MDC_IDC_LEAD_POLARITY_TYPE: NORMAL
MDC_IDC_LEAD_POLARITY_TYPE: NORMAL
MDC_IDC_LEAD_SERIAL: NORMAL
MDC_IDC_LEAD_SERIAL: NORMAL
MDC_IDC_MSMT_BATTERY_REMAINING_PERCENTAGE: 90 %
MDC_IDC_MSMT_BATTERY_STATUS: NORMAL
MDC_IDC_MSMT_LEADCHNL_RA_IMPEDANCE_VALUE: 456 OHM
MDC_IDC_MSMT_LEADCHNL_RA_LEAD_CHANNEL_STATUS: NORMAL
MDC_IDC_MSMT_LEADCHNL_RA_PACING_THRESHOLD_AMPLITUDE: 0.7 V
MDC_IDC_MSMT_LEADCHNL_RA_PACING_THRESHOLD_PULSEWIDTH: 0.4 MS
MDC_IDC_MSMT_LEADCHNL_RV_IMPEDANCE_VALUE: 598 OHM
MDC_IDC_MSMT_LEADCHNL_RV_LEAD_CHANNEL_STATUS: NORMAL
MDC_IDC_MSMT_LEADCHNL_RV_PACING_THRESHOLD_AMPLITUDE: 0.8 V
MDC_IDC_MSMT_LEADCHNL_RV_PACING_THRESHOLD_PULSEWIDTH: 0.4 MS
MDC_IDC_PG_IMPLANT_DTM: NORMAL
MDC_IDC_PG_MFG: NORMAL
MDC_IDC_PG_MODEL: NORMAL
MDC_IDC_PG_SERIAL: NORMAL
MDC_IDC_PG_TYPE: NORMAL
MDC_IDC_SESS_CLINIC_NAME: NORMAL
MDC_IDC_SESS_DTM: NORMAL
MDC_IDC_SESS_REPROGRAMMED: NO
MDC_IDC_SESS_TYPE: NORMAL
MDC_IDC_SET_BRADY_AT_MODE_SWITCH_MODE: NORMAL
MDC_IDC_SET_BRADY_AT_MODE_SWITCH_RATE: 180 {BEATS}/MIN
MDC_IDC_SET_BRADY_LOWRATE: 60 {BEATS}/MIN
MDC_IDC_SET_BRADY_MAX_SENSOR_RATE: 120 {BEATS}/MIN
MDC_IDC_SET_BRADY_MAX_TRACKING_RATE: 130 {BEATS}/MIN
MDC_IDC_SET_BRADY_MODE: NORMAL
MDC_IDC_SET_BRADY_PAV_DELAY_HIGH: 160 MS
MDC_IDC_SET_BRADY_PAV_DELAY_LOW: 200 MS
MDC_IDC_SET_BRADY_SAV_DELAY_HIGH: 130 MS
MDC_IDC_SET_BRADY_SAV_DELAY_LOW: 170 MS
MDC_IDC_SET_LEADCHNL_RA_PACING_AMPLITUDE: 1.7 V
MDC_IDC_SET_LEADCHNL_RA_PACING_POLARITY: NORMAL
MDC_IDC_SET_LEADCHNL_RA_PACING_PULSEWIDTH: 0.4 MS
MDC_IDC_SET_LEADCHNL_RA_SENSING_ADAPTATION_MODE: NORMAL
MDC_IDC_SET_LEADCHNL_RA_SENSING_POLARITY: NORMAL
MDC_IDC_SET_LEADCHNL_RA_SENSING_SENSITIVITY: 0.5 MV
MDC_IDC_SET_LEADCHNL_RV_PACING_AMPLITUDE: 1.3 V
MDC_IDC_SET_LEADCHNL_RV_PACING_POLARITY: NORMAL
MDC_IDC_SET_LEADCHNL_RV_PACING_PULSEWIDTH: 0.4 MS
MDC_IDC_SET_LEADCHNL_RV_SENSING_ADAPTATION_MODE: NORMAL
MDC_IDC_SET_LEADCHNL_RV_SENSING_POLARITY: NORMAL
MDC_IDC_STAT_AT_BURDEN_PERCENT: 22 %
MDC_IDC_STAT_AT_DTM_END: NORMAL
MDC_IDC_STAT_AT_DTM_START: NORMAL
MDC_IDC_STAT_AT_MODE_SW_COUNT_PER_DAY: 44
MDC_IDC_STAT_AT_MODE_SW_PERCENT_TIME_PER_DAY: 22 %
MDC_IDC_STAT_BRADY_AP_VP_PERCENT: 70 %
MDC_IDC_STAT_BRADY_AP_VS_PERCENT: 0 %
MDC_IDC_STAT_BRADY_AS_VP_PERCENT: 30 %
MDC_IDC_STAT_BRADY_AS_VS_PERCENT: 0 %
MDC_IDC_STAT_BRADY_DTM_END: NORMAL
MDC_IDC_STAT_BRADY_DTM_START: NORMAL
MDC_IDC_STAT_BRADY_RA_PERCENT_PACED: 35 %
MDC_IDC_STAT_BRADY_RV_PERCENT_PACED: 100 %
MDC_IDC_STAT_CRT_DTM_END: NORMAL
MDC_IDC_STAT_CRT_DTM_START: NORMAL

## 2024-01-18 ENCOUNTER — ANCILLARY PROCEDURE (OUTPATIENT)
Dept: CARDIOLOGY | Facility: CLINIC | Age: 89
End: 2024-01-18
Attending: INTERNAL MEDICINE
Payer: COMMERCIAL

## 2024-01-18 DIAGNOSIS — Z95.0 CARDIAC PACEMAKER IN SITU: ICD-10-CM

## 2024-01-18 DIAGNOSIS — I49.5 SICK SINUS SYNDROME (H): ICD-10-CM

## 2024-01-18 PROCEDURE — 93296 REM INTERROG EVL PM/IDS: CPT | Performed by: INTERNAL MEDICINE

## 2024-01-18 PROCEDURE — 93294 REM INTERROG EVL PM/LDLS PM: CPT | Performed by: INTERNAL MEDICINE

## 2024-01-19 LAB
MDC_IDC_EPISODE_DTM: NORMAL
MDC_IDC_EPISODE_DURATION: 48 S
MDC_IDC_EPISODE_DURATION: NORMAL S
MDC_IDC_EPISODE_ID: 16
MDC_IDC_EPISODE_ID: 17
MDC_IDC_EPISODE_ID: 18
MDC_IDC_EPISODE_TYPE: NORMAL
MDC_IDC_EPISODE_TYPE_INDUCED: NO
MDC_IDC_EPISODE_VENDOR_TYPE: NORMAL
MDC_IDC_LEAD_CONNECTION_STATUS: NORMAL
MDC_IDC_LEAD_CONNECTION_STATUS: NORMAL
MDC_IDC_LEAD_IMPLANT_DT: NORMAL
MDC_IDC_LEAD_IMPLANT_DT: NORMAL
MDC_IDC_LEAD_LOCATION: NORMAL
MDC_IDC_LEAD_LOCATION: NORMAL
MDC_IDC_LEAD_LOCATION_DETAIL_1: NORMAL
MDC_IDC_LEAD_LOCATION_DETAIL_1: NORMAL
MDC_IDC_LEAD_MFG: NORMAL
MDC_IDC_LEAD_MFG: NORMAL
MDC_IDC_LEAD_MODEL: NORMAL
MDC_IDC_LEAD_MODEL: NORMAL
MDC_IDC_LEAD_POLARITY_TYPE: NORMAL
MDC_IDC_LEAD_POLARITY_TYPE: NORMAL
MDC_IDC_LEAD_SERIAL: NORMAL
MDC_IDC_LEAD_SERIAL: NORMAL
MDC_IDC_MSMT_BATTERY_DTM: NORMAL
MDC_IDC_MSMT_BATTERY_REMAINING_PERCENTAGE: 85 %
MDC_IDC_MSMT_BATTERY_STATUS: NORMAL
MDC_IDC_MSMT_LEADCHNL_RA_IMPEDANCE_VALUE: 507 OHM
MDC_IDC_MSMT_LEADCHNL_RA_LEAD_CHANNEL_STATUS: NORMAL
MDC_IDC_MSMT_LEADCHNL_RA_PACING_THRESHOLD_AMPLITUDE: 0.7 V
MDC_IDC_MSMT_LEADCHNL_RA_PACING_THRESHOLD_PULSEWIDTH: 0.4 MS
MDC_IDC_MSMT_LEADCHNL_RV_IMPEDANCE_VALUE: 683 OHM
MDC_IDC_MSMT_LEADCHNL_RV_LEAD_CHANNEL_STATUS: NORMAL
MDC_IDC_MSMT_LEADCHNL_RV_PACING_THRESHOLD_AMPLITUDE: 0.7 V
MDC_IDC_MSMT_LEADCHNL_RV_PACING_THRESHOLD_PULSEWIDTH: 0.4 MS
MDC_IDC_PG_IMPLANT_DTM: NORMAL
MDC_IDC_PG_MFG: NORMAL
MDC_IDC_PG_MODEL: NORMAL
MDC_IDC_PG_SERIAL: NORMAL
MDC_IDC_PG_TYPE: NORMAL
MDC_IDC_SESS_CLINIC_NAME: NORMAL
MDC_IDC_SESS_DTM: NORMAL
MDC_IDC_SESS_REPROGRAMMED: NO
MDC_IDC_SESS_TYPE: NORMAL
MDC_IDC_SET_BRADY_AT_MODE_SWITCH_MODE: NORMAL
MDC_IDC_SET_BRADY_AT_MODE_SWITCH_RATE: 180 {BEATS}/MIN
MDC_IDC_SET_BRADY_LOWRATE: 60 {BEATS}/MIN
MDC_IDC_SET_BRADY_MAX_SENSOR_RATE: 120 {BEATS}/MIN
MDC_IDC_SET_BRADY_MAX_TRACKING_RATE: 130 {BEATS}/MIN
MDC_IDC_SET_BRADY_MODE: NORMAL
MDC_IDC_SET_BRADY_PAV_DELAY_HIGH: 160 MS
MDC_IDC_SET_BRADY_PAV_DELAY_LOW: 200 MS
MDC_IDC_SET_BRADY_SAV_DELAY_HIGH: 130 MS
MDC_IDC_SET_BRADY_SAV_DELAY_LOW: 170 MS
MDC_IDC_SET_LEADCHNL_RA_PACING_AMPLITUDE: 1.7 V
MDC_IDC_SET_LEADCHNL_RA_PACING_ANODE_ELECTRODE_1: NORMAL
MDC_IDC_SET_LEADCHNL_RA_PACING_ANODE_LOCATION_1: NORMAL
MDC_IDC_SET_LEADCHNL_RA_PACING_CAPTURE_MODE: NORMAL
MDC_IDC_SET_LEADCHNL_RA_PACING_CATHODE_ELECTRODE_1: NORMAL
MDC_IDC_SET_LEADCHNL_RA_PACING_CATHODE_LOCATION_1: NORMAL
MDC_IDC_SET_LEADCHNL_RA_PACING_POLARITY: NORMAL
MDC_IDC_SET_LEADCHNL_RA_PACING_PULSEWIDTH: 0.4 MS
MDC_IDC_SET_LEADCHNL_RA_SENSING_ADAPTATION_MODE: NORMAL
MDC_IDC_SET_LEADCHNL_RA_SENSING_ANODE_ELECTRODE_1: NORMAL
MDC_IDC_SET_LEADCHNL_RA_SENSING_ANODE_LOCATION_1: NORMAL
MDC_IDC_SET_LEADCHNL_RA_SENSING_CATHODE_ELECTRODE_1: NORMAL
MDC_IDC_SET_LEADCHNL_RA_SENSING_CATHODE_LOCATION_1: NORMAL
MDC_IDC_SET_LEADCHNL_RA_SENSING_POLARITY: NORMAL
MDC_IDC_SET_LEADCHNL_RA_SENSING_SENSITIVITY: 0.5 MV
MDC_IDC_SET_LEADCHNL_RV_PACING_AMPLITUDE: 1.2 V
MDC_IDC_SET_LEADCHNL_RV_PACING_ANODE_ELECTRODE_1: NORMAL
MDC_IDC_SET_LEADCHNL_RV_PACING_ANODE_LOCATION_1: NORMAL
MDC_IDC_SET_LEADCHNL_RV_PACING_CAPTURE_MODE: NORMAL
MDC_IDC_SET_LEADCHNL_RV_PACING_CATHODE_ELECTRODE_1: NORMAL
MDC_IDC_SET_LEADCHNL_RV_PACING_CATHODE_LOCATION_1: NORMAL
MDC_IDC_SET_LEADCHNL_RV_PACING_POLARITY: NORMAL
MDC_IDC_SET_LEADCHNL_RV_PACING_PULSEWIDTH: 0.4 MS
MDC_IDC_SET_LEADCHNL_RV_SENSING_ADAPTATION_MODE: NORMAL
MDC_IDC_SET_LEADCHNL_RV_SENSING_ANODE_ELECTRODE_1: NORMAL
MDC_IDC_SET_LEADCHNL_RV_SENSING_ANODE_LOCATION_1: NORMAL
MDC_IDC_SET_LEADCHNL_RV_SENSING_CATHODE_ELECTRODE_1: NORMAL
MDC_IDC_SET_LEADCHNL_RV_SENSING_CATHODE_LOCATION_1: NORMAL
MDC_IDC_SET_LEADCHNL_RV_SENSING_POLARITY: NORMAL
MDC_IDC_STAT_AT_BURDEN_PERCENT: 1 %
MDC_IDC_STAT_AT_DTM_END: NORMAL
MDC_IDC_STAT_AT_DTM_START: NORMAL
MDC_IDC_STAT_AT_MODE_SW_COUNT_PER_DAY: 3
MDC_IDC_STAT_AT_MODE_SW_PERCENT_TIME_PER_DAY: 1 %
MDC_IDC_STAT_BRADY_AP_VP_PERCENT: 74 %
MDC_IDC_STAT_BRADY_AP_VS_PERCENT: 0 %
MDC_IDC_STAT_BRADY_AS_VP_PERCENT: 26 %
MDC_IDC_STAT_BRADY_AS_VS_PERCENT: 0 %
MDC_IDC_STAT_BRADY_DTM_END: NORMAL
MDC_IDC_STAT_BRADY_DTM_START: NORMAL
MDC_IDC_STAT_BRADY_RA_PERCENT_PACED: 73 %
MDC_IDC_STAT_BRADY_RV_PERCENT_PACED: 100 %
MDC_IDC_STAT_HEART_RATE_ATRIAL_MEAN: 71 {BEATS}/MIN
MDC_IDC_STAT_HEART_RATE_DTM_END: NORMAL
MDC_IDC_STAT_HEART_RATE_DTM_START: NORMAL
MDC_IDC_STAT_HEART_RATE_VENTRICULAR_MEAN: 70 {BEATS}/MIN

## 2024-04-20 NOTE — TELEPHONE ENCOUNTER
Patient's daughter is returning the call, she would like a call back [phone] or [phone].   71y M with PMHx of methadone use (for unknown pain disorder) and PSHx of R inguinal hernia repair open (20+yrs ago) and R upper arm shrapnel removal (40yrs ago) presented to [facility] with 5d hx of worsening abdominal pain, 1 episode of bloody BM at onset of symptoms, and constipation. Pt admitted to surgery for further management of cecal volvulus     #cecal volvulus   #Small bowel obstruction   #post op state   #sepsis on admission   s/p exlap, R-hemicolectomy, side to side stapled anastomosis postop abdominal XR shows dilated loops of bowel consistent with ileus  s/p PICC on 4/5  and started on TPN  s/p abd xray 4/8 which shows improvement    Diet advancement per primary team - now with puree and tpn     #methadone dependance   Restarted on home dose, monitor QTc, maintain mag 2  Patient denies pain, pain management per primary team     #hypomagnesemia   Resolved     #HCV  is HCV positive (both RNA and antigen)  CT scan showed small amount of perihepatic ascites, but no comment on cirrhosis.    should have outpatient follow up    #thrombocytopenia   Monitor PLTs     #Severe protein calorie malnutrition  - appreciate input from nutrition  - plan as above     #Hypertension  - has been having intermittently elevated systolic blood pressures  - was started on amlodipine o this admission     #acute blood loss anemia   Hgb downtrended from time of admission  Partially attributable to operative blood losses   Hemoglobin: 11.7 on admission -- hgb 10  4/9-- recommend repeating labs  weekly while inpt     #dvt ppx: per primary team, monitor PLT   Discussed with primary team

## 2024-05-02 ENCOUNTER — ANCILLARY PROCEDURE (OUTPATIENT)
Dept: CARDIOLOGY | Facility: CLINIC | Age: 89
End: 2024-05-02
Attending: INTERNAL MEDICINE
Payer: COMMERCIAL

## 2024-05-02 DIAGNOSIS — Z95.0 CARDIAC PACEMAKER IN SITU: ICD-10-CM

## 2024-05-02 DIAGNOSIS — I49.5 SICK SINUS SYNDROME (H): ICD-10-CM

## 2024-05-02 PROCEDURE — 93294 REM INTERROG EVL PM/LDLS PM: CPT | Performed by: INTERNAL MEDICINE

## 2024-05-02 PROCEDURE — 93296 REM INTERROG EVL PM/IDS: CPT | Performed by: INTERNAL MEDICINE

## 2024-05-04 LAB
MDC_IDC_EPISODE_DTM: NORMAL
MDC_IDC_EPISODE_ID: 19
MDC_IDC_EPISODE_TYPE: NORMAL
MDC_IDC_EPISODE_TYPE_INDUCED: NO
MDC_IDC_EPISODE_VENDOR_TYPE: NORMAL
MDC_IDC_LEAD_CONNECTION_STATUS: NORMAL
MDC_IDC_LEAD_CONNECTION_STATUS: NORMAL
MDC_IDC_LEAD_IMPLANT_DT: NORMAL
MDC_IDC_LEAD_IMPLANT_DT: NORMAL
MDC_IDC_LEAD_LOCATION: NORMAL
MDC_IDC_LEAD_LOCATION: NORMAL
MDC_IDC_LEAD_LOCATION_DETAIL_1: NORMAL
MDC_IDC_LEAD_LOCATION_DETAIL_1: NORMAL
MDC_IDC_LEAD_MFG: NORMAL
MDC_IDC_LEAD_MFG: NORMAL
MDC_IDC_LEAD_MODEL: NORMAL
MDC_IDC_LEAD_MODEL: NORMAL
MDC_IDC_LEAD_POLARITY_TYPE: NORMAL
MDC_IDC_LEAD_POLARITY_TYPE: NORMAL
MDC_IDC_LEAD_SERIAL: NORMAL
MDC_IDC_LEAD_SERIAL: NORMAL
MDC_IDC_MSMT_BATTERY_DTM: NORMAL
MDC_IDC_MSMT_BATTERY_REMAINING_PERCENTAGE: 85 %
MDC_IDC_MSMT_BATTERY_STATUS: NORMAL
MDC_IDC_MSMT_LEADCHNL_RA_IMPEDANCE_VALUE: 410 OHM
MDC_IDC_MSMT_LEADCHNL_RA_LEAD_CHANNEL_STATUS: NORMAL
MDC_IDC_MSMT_LEADCHNL_RA_PACING_THRESHOLD_AMPLITUDE: 0.7 V
MDC_IDC_MSMT_LEADCHNL_RA_PACING_THRESHOLD_PULSEWIDTH: 0.4 MS
MDC_IDC_MSMT_LEADCHNL_RV_IMPEDANCE_VALUE: 624 OHM
MDC_IDC_MSMT_LEADCHNL_RV_LEAD_CHANNEL_STATUS: NORMAL
MDC_IDC_MSMT_LEADCHNL_RV_PACING_THRESHOLD_AMPLITUDE: 0.6 V
MDC_IDC_MSMT_LEADCHNL_RV_PACING_THRESHOLD_PULSEWIDTH: 0.4 MS
MDC_IDC_PG_IMPLANT_DTM: NORMAL
MDC_IDC_PG_MFG: NORMAL
MDC_IDC_PG_MODEL: NORMAL
MDC_IDC_PG_SERIAL: NORMAL
MDC_IDC_PG_TYPE: NORMAL
MDC_IDC_SESS_CLINIC_NAME: NORMAL
MDC_IDC_SESS_DTM: NORMAL
MDC_IDC_SESS_REPROGRAMMED: NO
MDC_IDC_SESS_TYPE: NORMAL
MDC_IDC_SET_BRADY_AT_MODE_SWITCH_MODE: NORMAL
MDC_IDC_SET_BRADY_AT_MODE_SWITCH_RATE: 180 {BEATS}/MIN
MDC_IDC_SET_BRADY_LOWRATE: 60 {BEATS}/MIN
MDC_IDC_SET_BRADY_MAX_SENSOR_RATE: 120 {BEATS}/MIN
MDC_IDC_SET_BRADY_MAX_TRACKING_RATE: 130 {BEATS}/MIN
MDC_IDC_SET_BRADY_MODE: NORMAL
MDC_IDC_SET_BRADY_PAV_DELAY_HIGH: 160 MS
MDC_IDC_SET_BRADY_PAV_DELAY_LOW: 200 MS
MDC_IDC_SET_BRADY_SAV_DELAY_HIGH: 130 MS
MDC_IDC_SET_BRADY_SAV_DELAY_LOW: 170 MS
MDC_IDC_SET_LEADCHNL_RA_PACING_AMPLITUDE: 1.7 V
MDC_IDC_SET_LEADCHNL_RA_PACING_ANODE_ELECTRODE_1: NORMAL
MDC_IDC_SET_LEADCHNL_RA_PACING_ANODE_LOCATION_1: NORMAL
MDC_IDC_SET_LEADCHNL_RA_PACING_CAPTURE_MODE: NORMAL
MDC_IDC_SET_LEADCHNL_RA_PACING_CATHODE_ELECTRODE_1: NORMAL
MDC_IDC_SET_LEADCHNL_RA_PACING_CATHODE_LOCATION_1: NORMAL
MDC_IDC_SET_LEADCHNL_RA_PACING_POLARITY: NORMAL
MDC_IDC_SET_LEADCHNL_RA_PACING_PULSEWIDTH: 0.4 MS
MDC_IDC_SET_LEADCHNL_RA_SENSING_ADAPTATION_MODE: NORMAL
MDC_IDC_SET_LEADCHNL_RA_SENSING_ANODE_ELECTRODE_1: NORMAL
MDC_IDC_SET_LEADCHNL_RA_SENSING_ANODE_LOCATION_1: NORMAL
MDC_IDC_SET_LEADCHNL_RA_SENSING_CATHODE_ELECTRODE_1: NORMAL
MDC_IDC_SET_LEADCHNL_RA_SENSING_CATHODE_LOCATION_1: NORMAL
MDC_IDC_SET_LEADCHNL_RA_SENSING_POLARITY: NORMAL
MDC_IDC_SET_LEADCHNL_RA_SENSING_SENSITIVITY: 0.5 MV
MDC_IDC_SET_LEADCHNL_RV_PACING_AMPLITUDE: 1.1 V
MDC_IDC_SET_LEADCHNL_RV_PACING_ANODE_ELECTRODE_1: NORMAL
MDC_IDC_SET_LEADCHNL_RV_PACING_ANODE_LOCATION_1: NORMAL
MDC_IDC_SET_LEADCHNL_RV_PACING_CAPTURE_MODE: NORMAL
MDC_IDC_SET_LEADCHNL_RV_PACING_CATHODE_ELECTRODE_1: NORMAL
MDC_IDC_SET_LEADCHNL_RV_PACING_CATHODE_LOCATION_1: NORMAL
MDC_IDC_SET_LEADCHNL_RV_PACING_POLARITY: NORMAL
MDC_IDC_SET_LEADCHNL_RV_PACING_PULSEWIDTH: 0.4 MS
MDC_IDC_SET_LEADCHNL_RV_SENSING_ADAPTATION_MODE: NORMAL
MDC_IDC_SET_LEADCHNL_RV_SENSING_ANODE_ELECTRODE_1: NORMAL
MDC_IDC_SET_LEADCHNL_RV_SENSING_ANODE_LOCATION_1: NORMAL
MDC_IDC_SET_LEADCHNL_RV_SENSING_CATHODE_ELECTRODE_1: NORMAL
MDC_IDC_SET_LEADCHNL_RV_SENSING_CATHODE_LOCATION_1: NORMAL
MDC_IDC_SET_LEADCHNL_RV_SENSING_POLARITY: NORMAL
MDC_IDC_STAT_AT_BURDEN_PERCENT: 0 %
MDC_IDC_STAT_AT_DTM_END: NORMAL
MDC_IDC_STAT_AT_DTM_START: NORMAL
MDC_IDC_STAT_AT_MODE_SW_COUNT_PER_DAY: 2
MDC_IDC_STAT_AT_MODE_SW_PERCENT_TIME_PER_DAY: 0 %
MDC_IDC_STAT_BRADY_AP_VP_PERCENT: 72 %
MDC_IDC_STAT_BRADY_AP_VS_PERCENT: 0 %
MDC_IDC_STAT_BRADY_AS_VP_PERCENT: 28 %
MDC_IDC_STAT_BRADY_AS_VS_PERCENT: 0 %
MDC_IDC_STAT_BRADY_DTM_END: NORMAL
MDC_IDC_STAT_BRADY_DTM_START: NORMAL
MDC_IDC_STAT_BRADY_RA_PERCENT_PACED: 72 %
MDC_IDC_STAT_BRADY_RV_PERCENT_PACED: 100 %
MDC_IDC_STAT_HEART_RATE_ATRIAL_MEAN: 71 {BEATS}/MIN
MDC_IDC_STAT_HEART_RATE_DTM_END: NORMAL
MDC_IDC_STAT_HEART_RATE_DTM_START: NORMAL
MDC_IDC_STAT_HEART_RATE_VENTRICULAR_MEAN: 71 {BEATS}/MIN

## 2024-06-23 ENCOUNTER — HEALTH MAINTENANCE LETTER (OUTPATIENT)
Age: 89
End: 2024-06-23

## 2024-07-31 ENCOUNTER — ANCILLARY PROCEDURE (OUTPATIENT)
Dept: CARDIOLOGY | Facility: CLINIC | Age: 89
End: 2024-07-31
Attending: INTERNAL MEDICINE
Payer: COMMERCIAL

## 2024-07-31 DIAGNOSIS — Z95.0 CARDIAC PACEMAKER IN SITU: ICD-10-CM

## 2024-07-31 DIAGNOSIS — I49.5 SICK SINUS SYNDROME (H): ICD-10-CM

## 2024-07-31 PROCEDURE — 99207 CARDIAC DEVICE CHECK - REMOTE: CPT | Performed by: INTERNAL MEDICINE

## 2024-08-14 LAB
MDC_IDC_EPISODE_DTM: NORMAL
MDC_IDC_EPISODE_ID: 20
MDC_IDC_EPISODE_TYPE: NORMAL
MDC_IDC_EPISODE_TYPE_INDUCED: NO
MDC_IDC_EPISODE_VENDOR_TYPE: NORMAL
MDC_IDC_LEAD_CONNECTION_STATUS: NORMAL
MDC_IDC_LEAD_CONNECTION_STATUS: NORMAL
MDC_IDC_LEAD_IMPLANT_DT: NORMAL
MDC_IDC_LEAD_IMPLANT_DT: NORMAL
MDC_IDC_LEAD_LOCATION: NORMAL
MDC_IDC_LEAD_LOCATION: NORMAL
MDC_IDC_LEAD_LOCATION_DETAIL_1: NORMAL
MDC_IDC_LEAD_LOCATION_DETAIL_1: NORMAL
MDC_IDC_LEAD_MFG: NORMAL
MDC_IDC_LEAD_MFG: NORMAL
MDC_IDC_LEAD_MODEL: NORMAL
MDC_IDC_LEAD_MODEL: NORMAL
MDC_IDC_LEAD_POLARITY_TYPE: NORMAL
MDC_IDC_LEAD_POLARITY_TYPE: NORMAL
MDC_IDC_LEAD_SERIAL: NORMAL
MDC_IDC_LEAD_SERIAL: NORMAL
MDC_IDC_MSMT_BATTERY_DTM: NORMAL
MDC_IDC_MSMT_BATTERY_REMAINING_PERCENTAGE: 85 %
MDC_IDC_MSMT_BATTERY_STATUS: NORMAL
MDC_IDC_MSMT_LEADCHNL_RA_IMPEDANCE_VALUE: 429 OHM
MDC_IDC_MSMT_LEADCHNL_RA_LEAD_CHANNEL_STATUS: NORMAL
MDC_IDC_MSMT_LEADCHNL_RA_PACING_THRESHOLD_AMPLITUDE: 0.7 V
MDC_IDC_MSMT_LEADCHNL_RA_PACING_THRESHOLD_PULSEWIDTH: 0.4 MS
MDC_IDC_MSMT_LEADCHNL_RV_IMPEDANCE_VALUE: 585 OHM
MDC_IDC_MSMT_LEADCHNL_RV_LEAD_CHANNEL_STATUS: NORMAL
MDC_IDC_MSMT_LEADCHNL_RV_PACING_THRESHOLD_AMPLITUDE: 0.8 V
MDC_IDC_MSMT_LEADCHNL_RV_PACING_THRESHOLD_PULSEWIDTH: 0.4 MS
MDC_IDC_PG_IMPLANT_DTM: NORMAL
MDC_IDC_PG_MFG: NORMAL
MDC_IDC_PG_MODEL: NORMAL
MDC_IDC_PG_SERIAL: NORMAL
MDC_IDC_PG_TYPE: NORMAL
MDC_IDC_SESS_CLINIC_NAME: NORMAL
MDC_IDC_SESS_DTM: NORMAL
MDC_IDC_SESS_REPROGRAMMED: NO
MDC_IDC_SESS_TYPE: NORMAL
MDC_IDC_SET_BRADY_AT_MODE_SWITCH_MODE: NORMAL
MDC_IDC_SET_BRADY_AT_MODE_SWITCH_RATE: 180 {BEATS}/MIN
MDC_IDC_SET_BRADY_LOWRATE: 60 {BEATS}/MIN
MDC_IDC_SET_BRADY_MAX_SENSOR_RATE: 120 {BEATS}/MIN
MDC_IDC_SET_BRADY_MAX_TRACKING_RATE: 130 {BEATS}/MIN
MDC_IDC_SET_BRADY_MODE: NORMAL
MDC_IDC_SET_BRADY_PAV_DELAY_HIGH: 160 MS
MDC_IDC_SET_BRADY_PAV_DELAY_LOW: 200 MS
MDC_IDC_SET_BRADY_SAV_DELAY_HIGH: 130 MS
MDC_IDC_SET_BRADY_SAV_DELAY_LOW: 170 MS
MDC_IDC_SET_LEADCHNL_RA_PACING_AMPLITUDE: 1.7 V
MDC_IDC_SET_LEADCHNL_RA_PACING_ANODE_ELECTRODE_1: NORMAL
MDC_IDC_SET_LEADCHNL_RA_PACING_ANODE_LOCATION_1: NORMAL
MDC_IDC_SET_LEADCHNL_RA_PACING_CAPTURE_MODE: NORMAL
MDC_IDC_SET_LEADCHNL_RA_PACING_CATHODE_ELECTRODE_1: NORMAL
MDC_IDC_SET_LEADCHNL_RA_PACING_CATHODE_LOCATION_1: NORMAL
MDC_IDC_SET_LEADCHNL_RA_PACING_POLARITY: NORMAL
MDC_IDC_SET_LEADCHNL_RA_PACING_PULSEWIDTH: 0.4 MS
MDC_IDC_SET_LEADCHNL_RA_SENSING_ADAPTATION_MODE: NORMAL
MDC_IDC_SET_LEADCHNL_RA_SENSING_ANODE_ELECTRODE_1: NORMAL
MDC_IDC_SET_LEADCHNL_RA_SENSING_ANODE_LOCATION_1: NORMAL
MDC_IDC_SET_LEADCHNL_RA_SENSING_CATHODE_ELECTRODE_1: NORMAL
MDC_IDC_SET_LEADCHNL_RA_SENSING_CATHODE_LOCATION_1: NORMAL
MDC_IDC_SET_LEADCHNL_RA_SENSING_POLARITY: NORMAL
MDC_IDC_SET_LEADCHNL_RA_SENSING_SENSITIVITY: 0.5 MV
MDC_IDC_SET_LEADCHNL_RV_PACING_AMPLITUDE: 1.3 V
MDC_IDC_SET_LEADCHNL_RV_PACING_ANODE_ELECTRODE_1: NORMAL
MDC_IDC_SET_LEADCHNL_RV_PACING_ANODE_LOCATION_1: NORMAL
MDC_IDC_SET_LEADCHNL_RV_PACING_CAPTURE_MODE: NORMAL
MDC_IDC_SET_LEADCHNL_RV_PACING_CATHODE_ELECTRODE_1: NORMAL
MDC_IDC_SET_LEADCHNL_RV_PACING_CATHODE_LOCATION_1: NORMAL
MDC_IDC_SET_LEADCHNL_RV_PACING_POLARITY: NORMAL
MDC_IDC_SET_LEADCHNL_RV_PACING_PULSEWIDTH: 0.4 MS
MDC_IDC_SET_LEADCHNL_RV_SENSING_ADAPTATION_MODE: NORMAL
MDC_IDC_SET_LEADCHNL_RV_SENSING_ANODE_ELECTRODE_1: NORMAL
MDC_IDC_SET_LEADCHNL_RV_SENSING_ANODE_LOCATION_1: NORMAL
MDC_IDC_SET_LEADCHNL_RV_SENSING_CATHODE_ELECTRODE_1: NORMAL
MDC_IDC_SET_LEADCHNL_RV_SENSING_CATHODE_LOCATION_1: NORMAL
MDC_IDC_SET_LEADCHNL_RV_SENSING_POLARITY: NORMAL
MDC_IDC_STAT_AT_BURDEN_PERCENT: 1 %
MDC_IDC_STAT_AT_DTM_END: NORMAL
MDC_IDC_STAT_AT_DTM_START: NORMAL
MDC_IDC_STAT_AT_MODE_SW_COUNT_PER_DAY: 4
MDC_IDC_STAT_AT_MODE_SW_PERCENT_TIME_PER_DAY: 1 %
MDC_IDC_STAT_BRADY_AP_VP_PERCENT: 72 %
MDC_IDC_STAT_BRADY_AP_VS_PERCENT: 0 %
MDC_IDC_STAT_BRADY_AS_VP_PERCENT: 27 %
MDC_IDC_STAT_BRADY_AS_VS_PERCENT: 0 %
MDC_IDC_STAT_BRADY_DTM_END: NORMAL
MDC_IDC_STAT_BRADY_DTM_START: NORMAL
MDC_IDC_STAT_BRADY_RA_PERCENT_PACED: 72 %
MDC_IDC_STAT_BRADY_RV_PERCENT_PACED: 100 %
MDC_IDC_STAT_HEART_RATE_ATRIAL_MEAN: 72 {BEATS}/MIN
MDC_IDC_STAT_HEART_RATE_DTM_END: NORMAL
MDC_IDC_STAT_HEART_RATE_DTM_START: NORMAL
MDC_IDC_STAT_HEART_RATE_VENTRICULAR_MEAN: 71 {BEATS}/MIN

## 2025-01-29 ENCOUNTER — ANCILLARY PROCEDURE (OUTPATIENT)
Dept: CARDIOLOGY | Facility: CLINIC | Age: 89
End: 2025-01-29
Attending: INTERNAL MEDICINE
Payer: COMMERCIAL

## 2025-01-29 DIAGNOSIS — I49.5 SICK SINUS SYNDROME (H): ICD-10-CM

## 2025-01-29 DIAGNOSIS — Z95.0 CARDIAC PACEMAKER IN SITU: ICD-10-CM

## 2025-01-29 PROCEDURE — 93294 REM INTERROG EVL PM/LDLS PM: CPT | Performed by: INTERNAL MEDICINE

## 2025-01-29 PROCEDURE — 93296 REM INTERROG EVL PM/IDS: CPT | Performed by: INTERNAL MEDICINE

## 2025-01-30 LAB
MDC_IDC_EPISODE_DTM: NORMAL
MDC_IDC_EPISODE_DURATION: 22 S
MDC_IDC_EPISODE_DURATION: 28 S
MDC_IDC_EPISODE_DURATION: 36 S
MDC_IDC_EPISODE_ID: 22
MDC_IDC_EPISODE_ID: 23
MDC_IDC_EPISODE_ID: 24
MDC_IDC_EPISODE_ID: 25
MDC_IDC_EPISODE_TYPE: NORMAL
MDC_IDC_EPISODE_TYPE_INDUCED: NO
MDC_IDC_EPISODE_VENDOR_TYPE: NORMAL
MDC_IDC_LEAD_CONNECTION_STATUS: NORMAL
MDC_IDC_LEAD_CONNECTION_STATUS: NORMAL
MDC_IDC_LEAD_IMPLANT_DT: NORMAL
MDC_IDC_LEAD_IMPLANT_DT: NORMAL
MDC_IDC_LEAD_LOCATION: NORMAL
MDC_IDC_LEAD_LOCATION: NORMAL
MDC_IDC_LEAD_LOCATION_DETAIL_1: NORMAL
MDC_IDC_LEAD_LOCATION_DETAIL_1: NORMAL
MDC_IDC_LEAD_MFG: NORMAL
MDC_IDC_LEAD_MFG: NORMAL
MDC_IDC_LEAD_MODEL: NORMAL
MDC_IDC_LEAD_MODEL: NORMAL
MDC_IDC_LEAD_POLARITY_TYPE: NORMAL
MDC_IDC_LEAD_POLARITY_TYPE: NORMAL
MDC_IDC_LEAD_SERIAL: NORMAL
MDC_IDC_LEAD_SERIAL: NORMAL
MDC_IDC_MSMT_BATTERY_DTM: NORMAL
MDC_IDC_MSMT_BATTERY_REMAINING_PERCENTAGE: 80 %
MDC_IDC_MSMT_BATTERY_STATUS: NORMAL
MDC_IDC_MSMT_LEADCHNL_RA_IMPEDANCE_VALUE: 429 OHM
MDC_IDC_MSMT_LEADCHNL_RA_LEAD_CHANNEL_STATUS: NORMAL
MDC_IDC_MSMT_LEADCHNL_RA_PACING_THRESHOLD_AMPLITUDE: 0.7 V
MDC_IDC_MSMT_LEADCHNL_RA_PACING_THRESHOLD_PULSEWIDTH: 0.4 MS
MDC_IDC_MSMT_LEADCHNL_RV_IMPEDANCE_VALUE: 605 OHM
MDC_IDC_MSMT_LEADCHNL_RV_LEAD_CHANNEL_STATUS: NORMAL
MDC_IDC_MSMT_LEADCHNL_RV_PACING_THRESHOLD_AMPLITUDE: 0.5 V
MDC_IDC_MSMT_LEADCHNL_RV_PACING_THRESHOLD_PULSEWIDTH: 0.4 MS
MDC_IDC_PG_IMPLANT_DTM: NORMAL
MDC_IDC_PG_MFG: NORMAL
MDC_IDC_PG_MODEL: NORMAL
MDC_IDC_PG_SERIAL: NORMAL
MDC_IDC_PG_TYPE: NORMAL
MDC_IDC_SESS_CLINIC_NAME: NORMAL
MDC_IDC_SESS_DTM: NORMAL
MDC_IDC_SESS_REPROGRAMMED: NO
MDC_IDC_SESS_TYPE: NORMAL
MDC_IDC_SET_BRADY_AT_MODE_SWITCH_MODE: NORMAL
MDC_IDC_SET_BRADY_AT_MODE_SWITCH_RATE: 180 {BEATS}/MIN
MDC_IDC_SET_BRADY_LOWRATE: 60 {BEATS}/MIN
MDC_IDC_SET_BRADY_MAX_SENSOR_RATE: 120 {BEATS}/MIN
MDC_IDC_SET_BRADY_MAX_TRACKING_RATE: 130 {BEATS}/MIN
MDC_IDC_SET_BRADY_MODE: NORMAL
MDC_IDC_SET_BRADY_PAV_DELAY_HIGH: 160 MS
MDC_IDC_SET_BRADY_PAV_DELAY_LOW: 200 MS
MDC_IDC_SET_BRADY_SAV_DELAY_HIGH: 130 MS
MDC_IDC_SET_BRADY_SAV_DELAY_LOW: 170 MS
MDC_IDC_SET_LEADCHNL_RA_PACING_AMPLITUDE: 1.7 V
MDC_IDC_SET_LEADCHNL_RA_PACING_ANODE_ELECTRODE_1: NORMAL
MDC_IDC_SET_LEADCHNL_RA_PACING_ANODE_LOCATION_1: NORMAL
MDC_IDC_SET_LEADCHNL_RA_PACING_CAPTURE_MODE: NORMAL
MDC_IDC_SET_LEADCHNL_RA_PACING_CATHODE_ELECTRODE_1: NORMAL
MDC_IDC_SET_LEADCHNL_RA_PACING_CATHODE_LOCATION_1: NORMAL
MDC_IDC_SET_LEADCHNL_RA_PACING_POLARITY: NORMAL
MDC_IDC_SET_LEADCHNL_RA_PACING_PULSEWIDTH: 0.4 MS
MDC_IDC_SET_LEADCHNL_RA_SENSING_ADAPTATION_MODE: NORMAL
MDC_IDC_SET_LEADCHNL_RA_SENSING_ANODE_ELECTRODE_1: NORMAL
MDC_IDC_SET_LEADCHNL_RA_SENSING_ANODE_LOCATION_1: NORMAL
MDC_IDC_SET_LEADCHNL_RA_SENSING_CATHODE_ELECTRODE_1: NORMAL
MDC_IDC_SET_LEADCHNL_RA_SENSING_CATHODE_LOCATION_1: NORMAL
MDC_IDC_SET_LEADCHNL_RA_SENSING_POLARITY: NORMAL
MDC_IDC_SET_LEADCHNL_RA_SENSING_SENSITIVITY: 0.5 MV
MDC_IDC_SET_LEADCHNL_RV_PACING_AMPLITUDE: 1 V
MDC_IDC_SET_LEADCHNL_RV_PACING_ANODE_ELECTRODE_1: NORMAL
MDC_IDC_SET_LEADCHNL_RV_PACING_ANODE_LOCATION_1: NORMAL
MDC_IDC_SET_LEADCHNL_RV_PACING_CAPTURE_MODE: NORMAL
MDC_IDC_SET_LEADCHNL_RV_PACING_CATHODE_ELECTRODE_1: NORMAL
MDC_IDC_SET_LEADCHNL_RV_PACING_CATHODE_LOCATION_1: NORMAL
MDC_IDC_SET_LEADCHNL_RV_PACING_POLARITY: NORMAL
MDC_IDC_SET_LEADCHNL_RV_PACING_PULSEWIDTH: 0.4 MS
MDC_IDC_SET_LEADCHNL_RV_SENSING_ADAPTATION_MODE: NORMAL
MDC_IDC_SET_LEADCHNL_RV_SENSING_ANODE_ELECTRODE_1: NORMAL
MDC_IDC_SET_LEADCHNL_RV_SENSING_ANODE_LOCATION_1: NORMAL
MDC_IDC_SET_LEADCHNL_RV_SENSING_CATHODE_ELECTRODE_1: NORMAL
MDC_IDC_SET_LEADCHNL_RV_SENSING_CATHODE_LOCATION_1: NORMAL
MDC_IDC_SET_LEADCHNL_RV_SENSING_POLARITY: NORMAL
MDC_IDC_STAT_AT_BURDEN_PERCENT: 0 %
MDC_IDC_STAT_AT_DTM_END: NORMAL
MDC_IDC_STAT_AT_DTM_START: NORMAL
MDC_IDC_STAT_AT_MODE_SW_COUNT_PER_DAY: 0
MDC_IDC_STAT_AT_MODE_SW_PERCENT_TIME_PER_DAY: 0 %
MDC_IDC_STAT_BRADY_AP_VP_PERCENT: 77 %
MDC_IDC_STAT_BRADY_AP_VS_PERCENT: 0 %
MDC_IDC_STAT_BRADY_AS_VP_PERCENT: 23 %
MDC_IDC_STAT_BRADY_AS_VS_PERCENT: 0 %
MDC_IDC_STAT_BRADY_DTM_END: NORMAL
MDC_IDC_STAT_BRADY_DTM_START: NORMAL
MDC_IDC_STAT_BRADY_RA_PERCENT_PACED: 77 %
MDC_IDC_STAT_BRADY_RV_PERCENT_PACED: 100 %
MDC_IDC_STAT_HEART_RATE_ATRIAL_MEAN: 70 {BEATS}/MIN
MDC_IDC_STAT_HEART_RATE_DTM_END: NORMAL
MDC_IDC_STAT_HEART_RATE_DTM_START: NORMAL
MDC_IDC_STAT_HEART_RATE_VENTRICULAR_MEAN: 70 {BEATS}/MIN

## 2025-05-01 ENCOUNTER — ANCILLARY PROCEDURE (OUTPATIENT)
Dept: CARDIOLOGY | Facility: CLINIC | Age: 89
End: 2025-05-01
Attending: INTERNAL MEDICINE
Payer: COMMERCIAL

## 2025-05-01 DIAGNOSIS — I49.5 SICK SINUS SYNDROME (H): ICD-10-CM

## 2025-05-01 DIAGNOSIS — Z95.0 CARDIAC PACEMAKER IN SITU: ICD-10-CM

## 2025-05-01 LAB
MDC_IDC_EPISODE_DTM: NORMAL
MDC_IDC_EPISODE_DURATION: 852 S
MDC_IDC_EPISODE_ID: 26
MDC_IDC_EPISODE_ID: 27
MDC_IDC_EPISODE_ID: 28
MDC_IDC_EPISODE_TYPE: NORMAL
MDC_IDC_EPISODE_TYPE_INDUCED: NO
MDC_IDC_EPISODE_VENDOR_TYPE: NORMAL
MDC_IDC_LEAD_CONNECTION_STATUS: NORMAL
MDC_IDC_LEAD_CONNECTION_STATUS: NORMAL
MDC_IDC_LEAD_IMPLANT_DT: NORMAL
MDC_IDC_LEAD_IMPLANT_DT: NORMAL
MDC_IDC_LEAD_LOCATION: NORMAL
MDC_IDC_LEAD_LOCATION: NORMAL
MDC_IDC_LEAD_LOCATION_DETAIL_1: NORMAL
MDC_IDC_LEAD_LOCATION_DETAIL_1: NORMAL
MDC_IDC_LEAD_MFG: NORMAL
MDC_IDC_LEAD_MFG: NORMAL
MDC_IDC_LEAD_MODEL: NORMAL
MDC_IDC_LEAD_MODEL: NORMAL
MDC_IDC_LEAD_POLARITY_TYPE: NORMAL
MDC_IDC_LEAD_POLARITY_TYPE: NORMAL
MDC_IDC_LEAD_SERIAL: NORMAL
MDC_IDC_LEAD_SERIAL: NORMAL
MDC_IDC_MSMT_BATTERY_DTM: NORMAL
MDC_IDC_MSMT_BATTERY_REMAINING_PERCENTAGE: 75 %
MDC_IDC_MSMT_BATTERY_STATUS: NORMAL
MDC_IDC_MSMT_LEADCHNL_RA_IMPEDANCE_VALUE: 429 OHM
MDC_IDC_MSMT_LEADCHNL_RA_LEAD_CHANNEL_STATUS: NORMAL
MDC_IDC_MSMT_LEADCHNL_RA_PACING_THRESHOLD_AMPLITUDE: 0.7 V
MDC_IDC_MSMT_LEADCHNL_RA_PACING_THRESHOLD_PULSEWIDTH: 0.4 MS
MDC_IDC_MSMT_LEADCHNL_RV_IMPEDANCE_VALUE: 624 OHM
MDC_IDC_MSMT_LEADCHNL_RV_LEAD_CHANNEL_STATUS: NORMAL
MDC_IDC_MSMT_LEADCHNL_RV_PACING_THRESHOLD_AMPLITUDE: 0.6 V
MDC_IDC_MSMT_LEADCHNL_RV_PACING_THRESHOLD_PULSEWIDTH: 0.4 MS
MDC_IDC_PG_IMPLANT_DTM: NORMAL
MDC_IDC_PG_MFG: NORMAL
MDC_IDC_PG_MODEL: NORMAL
MDC_IDC_PG_SERIAL: NORMAL
MDC_IDC_PG_TYPE: NORMAL
MDC_IDC_SESS_CLINIC_NAME: NORMAL
MDC_IDC_SESS_DTM: NORMAL
MDC_IDC_SESS_REPROGRAMMED: NO
MDC_IDC_SESS_TYPE: NORMAL
MDC_IDC_SET_BRADY_AT_MODE_SWITCH_MODE: NORMAL
MDC_IDC_SET_BRADY_AT_MODE_SWITCH_RATE: 180 {BEATS}/MIN
MDC_IDC_SET_BRADY_LOWRATE: 60 {BEATS}/MIN
MDC_IDC_SET_BRADY_MAX_SENSOR_RATE: 120 {BEATS}/MIN
MDC_IDC_SET_BRADY_MAX_TRACKING_RATE: 130 {BEATS}/MIN
MDC_IDC_SET_BRADY_MODE: NORMAL
MDC_IDC_SET_BRADY_PAV_DELAY_HIGH: 160 MS
MDC_IDC_SET_BRADY_PAV_DELAY_LOW: 200 MS
MDC_IDC_SET_BRADY_SAV_DELAY_HIGH: 130 MS
MDC_IDC_SET_BRADY_SAV_DELAY_LOW: 170 MS
MDC_IDC_SET_LEADCHNL_RA_PACING_AMPLITUDE: 1.7 V
MDC_IDC_SET_LEADCHNL_RA_PACING_ANODE_ELECTRODE_1: NORMAL
MDC_IDC_SET_LEADCHNL_RA_PACING_ANODE_LOCATION_1: NORMAL
MDC_IDC_SET_LEADCHNL_RA_PACING_CAPTURE_MODE: NORMAL
MDC_IDC_SET_LEADCHNL_RA_PACING_CATHODE_ELECTRODE_1: NORMAL
MDC_IDC_SET_LEADCHNL_RA_PACING_CATHODE_LOCATION_1: NORMAL
MDC_IDC_SET_LEADCHNL_RA_PACING_POLARITY: NORMAL
MDC_IDC_SET_LEADCHNL_RA_PACING_PULSEWIDTH: 0.4 MS
MDC_IDC_SET_LEADCHNL_RA_SENSING_ADAPTATION_MODE: NORMAL
MDC_IDC_SET_LEADCHNL_RA_SENSING_ANODE_ELECTRODE_1: NORMAL
MDC_IDC_SET_LEADCHNL_RA_SENSING_ANODE_LOCATION_1: NORMAL
MDC_IDC_SET_LEADCHNL_RA_SENSING_CATHODE_ELECTRODE_1: NORMAL
MDC_IDC_SET_LEADCHNL_RA_SENSING_CATHODE_LOCATION_1: NORMAL
MDC_IDC_SET_LEADCHNL_RA_SENSING_POLARITY: NORMAL
MDC_IDC_SET_LEADCHNL_RA_SENSING_SENSITIVITY: 0.5 MV
MDC_IDC_SET_LEADCHNL_RV_PACING_AMPLITUDE: 1.1 V
MDC_IDC_SET_LEADCHNL_RV_PACING_ANODE_ELECTRODE_1: NORMAL
MDC_IDC_SET_LEADCHNL_RV_PACING_ANODE_LOCATION_1: NORMAL
MDC_IDC_SET_LEADCHNL_RV_PACING_CAPTURE_MODE: NORMAL
MDC_IDC_SET_LEADCHNL_RV_PACING_CATHODE_ELECTRODE_1: NORMAL
MDC_IDC_SET_LEADCHNL_RV_PACING_CATHODE_LOCATION_1: NORMAL
MDC_IDC_SET_LEADCHNL_RV_PACING_POLARITY: NORMAL
MDC_IDC_SET_LEADCHNL_RV_PACING_PULSEWIDTH: 0.4 MS
MDC_IDC_SET_LEADCHNL_RV_SENSING_ADAPTATION_MODE: NORMAL
MDC_IDC_SET_LEADCHNL_RV_SENSING_ANODE_ELECTRODE_1: NORMAL
MDC_IDC_SET_LEADCHNL_RV_SENSING_ANODE_LOCATION_1: NORMAL
MDC_IDC_SET_LEADCHNL_RV_SENSING_CATHODE_ELECTRODE_1: NORMAL
MDC_IDC_SET_LEADCHNL_RV_SENSING_CATHODE_LOCATION_1: NORMAL
MDC_IDC_SET_LEADCHNL_RV_SENSING_POLARITY: NORMAL
MDC_IDC_STAT_AT_BURDEN_PERCENT: 2 %
MDC_IDC_STAT_AT_DTM_END: NORMAL
MDC_IDC_STAT_AT_DTM_START: NORMAL
MDC_IDC_STAT_AT_MODE_SW_COUNT_PER_DAY: 15
MDC_IDC_STAT_AT_MODE_SW_PERCENT_TIME_PER_DAY: 2 %
MDC_IDC_STAT_BRADY_AP_VP_PERCENT: 77 %
MDC_IDC_STAT_BRADY_AP_VS_PERCENT: 0 %
MDC_IDC_STAT_BRADY_AS_VP_PERCENT: 23 %
MDC_IDC_STAT_BRADY_AS_VS_PERCENT: 0 %
MDC_IDC_STAT_BRADY_DTM_END: NORMAL
MDC_IDC_STAT_BRADY_DTM_START: NORMAL
MDC_IDC_STAT_BRADY_RA_PERCENT_PACED: 74 %
MDC_IDC_STAT_BRADY_RV_PERCENT_PACED: 100 %
MDC_IDC_STAT_HEART_RATE_ATRIAL_MEAN: 75 {BEATS}/MIN
MDC_IDC_STAT_HEART_RATE_DTM_END: NORMAL
MDC_IDC_STAT_HEART_RATE_DTM_START: NORMAL
MDC_IDC_STAT_HEART_RATE_VENTRICULAR_MEAN: 70 {BEATS}/MIN

## 2025-05-01 PROCEDURE — 93296 REM INTERROG EVL PM/IDS: CPT | Performed by: INTERNAL MEDICINE

## 2025-05-01 PROCEDURE — 93294 REM INTERROG EVL PM/LDLS PM: CPT | Performed by: INTERNAL MEDICINE

## 2025-06-24 NOTE — TELEPHONE ENCOUNTER
[facility] ENCOUN[facility]  PULMONARY OFFICE VISIT    IMPRESSION:     Moderate persistent asthma   COVID 2/2024   Hypersomnia  Moderate sleep apnea / not using CPAP / refused dental device as well   BMI 34  Chronic pain  anxiety and depression   Non smoker          PLAN:     Doing ok  Continue Symbicort  Continue Albuterol   Weight loss  Check CXR  Year follow up        CHIEF COMPLAINT:  Moderate persistent asthma     SUBJECTIVE:  Jennifer Junior returns for follow up. She is doing ok, breathing at baseline     Her  was just moved to assisted living which will help her a lot     She have minimal cough, some sputum production, more of post nasal drip    She uses Symbicort daily . She rarely need her Albuterol     She denies chest pain, orthopnea or PND    She denies any wheezing     She denies any new medical problems    She denies weight changes   HISTORIES:    Past Medical History:   Diagnosis Date    Allergic rhinitis, cause unspecified     Dr. Brumfield - testing showed only allergic to cats, (Dr. Kleiner, ENT).  Sxs from spring to fall    Allergy About 2003    Anemia January 2020    Arthritis 2019    Closed fracture of cuboid bone 10/09/2007    Dr. Israel Gillespie; fx R lateral calcaneous and cuboid    Closed fracture of lumbar vertebra without mention of spinal cord injury age 37    fell; might have actually been T12    Controlled type 2 diabetes mellitus without complication, without long-term current use of insulin (CMD) 05/08/2018    COVID-19 virus infection 03/05/2023    treated w Paxlovid    Dysfunction of eustachian tube 03/27/2006    Dr. Burnett placed ear tubes - good result    Esophageal reflux 1998    Dr. Geller;and small reducible sliding hiatal hernia, per UGI; H. pylori negative    Essential hypertension with goal blood pressure less than 140/90 05/16/2016    h/o fibrocystic breast disease     h/o pain in toes with no dx after extensive w/u 10/17/1996    numbness in toes - eval w nl LE  Last seen 4/23, please check what is transport chair, is it a wheelchair?  Please advise virtual visit to discuss? transport chair/wheelchair-also the form states that patient needs face-to-face visit for transport chair, please schedule virtual visit   exercise Doppler; see 11/20/00 note    Heart murmur 05/03/2017    Resting echo on 05/11/2017;EF 69,normal Ao valve,mildly thickened mitral valve;grade 1 diast dysfxn with hyperdynamic LV sys fxn, mod increased LV wall thickness, but normal LV size and no wall motion abnormalities.    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 Coffman; Surg Dr. Jeanna Pinzon    Hyperlipidemia LDL goal <100 05/08/2018    Irritable bowel syndrome     Dr. Geller; Eats about 1/2 cup of [employer] brand fiber/day.    Left wrist fracture 11/2016    sec to a fall    Major depressive disorder, recurrent, mild (CMD) 05/11/2010    Malignant neoplasm  (CMD) 2010    Mild intermittent asthma, uncomplicated (CMD)     Mixed incontinence urge and stress (male)(female)     states Dr. Will Dan advised pelvic rehab, Dr. Valdes has recommend Vagifren and Detrol, and past evaluation by Dr. Kerr showed \"70 percent bladder capacity\".    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 Griffiths treating with IV Boniva; did not tolerate Fosamax    Osteoporosis, unspecified 07/16/2007    Dr. Griffiths;treated w Boniva IV at [facility] q 3 mos.    Overactive bladder 02/28/2008    Dr. Will Dan    PAST MEDICAL HISTORY     'About 1980 sec fall had compression fx'; level of L1 per 3/3/04 x-ray    PONV (postoperative nausea and vomiting)     Right wrist fracture 03/07/2012    dorsally impacted, comminuted, intra-articular distal radial and ulnar styloid (minimally distracted fracture)      Past Surgical History:   Procedure Laterality Date    Breast surgery  2010    Colonoscopy  04/25/2018    Dr. Edmond; no polyps, and no further screening needed per patient    Colonoscopy diagnostic  01/30/2008    minimal diverticulosis, Dr. Geller    Colonoscopy diagnostic  01/30/2008    Dr. Geller; sec increased severity of  rectal spasms and pain;  mimial early tics; repeat in 5 yrs sec FH    Colonoscopy w/ biopsies and polypectomy  02/13/2013    Dr. Wayne Geller 2 polyps;repeat in 5 years    Dexa bone density axial skeleton  8/02;8/04    T -2.7/ -2.0; -3.0/ -1.7    Dexa bone density axial skeleton  09/11/2006    T -2.8/ -1.9    Dexa bone density axial skeleton  09/26/2007    T -2.6/ -1.7    Dexa bone density axial skeleton  09/30/2008    T -2.4/ -1.7    Dexa bone density axial skeleton  10/01/2009    T -2.2/ -1.7    Dexa bone density axial skeleton  10/09/2012    T -2.3 / -1.4    Dexa bone density axial skeleton  10/09/2014    T -2.6 /-1.3 / -1.7    Dexa bone density axial skeleton  10/11/2016    T -2.4 / -1.1 / -1.5    Esophagogastroduodenoscopy  04/25/2018    Dr. Edmond; hiatial hernia per patient; done to eval GERD    Esophagogastroduodenoscopy transoral flex w/bx single or mult  01/23/2003    minimal gross esophagitis, nonerosive. mild incidental peptic stricturing at the gastroesophageal junction. No dysplasia, neg H. pylori    Flexible sigmoidoscopy diagnostic include specimens  1991    WNL    Flexible sigmoidoscopy diagnostic include specimens  07/1998    Normal to 60 cm and barium enema;  irritable colon, external hemorrhoid tags    Fracture surgery  2012    Joint replacement  2019    Laparoscopy,tubal cautery  1980    Tubal Ligation    Mastec,mod radical Right 06/11/2010    Right breast cancer;Mastectomy w sentinel node mapping    Mastectomy, radical Left 08/03/2016    Dr. Jeanna Pinzon; no cancer, and done per patient request;L simple(total) mastectomy    Misc supply  03/2008    left ear    Myringoplasty  03/27/2006    Bilateral myringotomy and tube placement; Dr. Burnett    Orif wrist fracture Right 03/14/2012    distal radius; Dr. Luis Eduardo Isaac    Orif wrist fracture Left 12/09/2016    Dr. Cuellar;radius and ulna    Pap smear, routine (inc [id number])  11/2002    Dr. O'Jorge Brown/aspir breast cyst  [id number]     benign    Total knee replacement Left 12/30/2019    Dr. Dante Sanchez    Umbilical hernia repair  11/2022    Us gallbladder  12/08/1994    Nl    Vaginal hysterectomy  1988    Total Vag Hyst with Cvaries intact; sec bleeding      ALLERGIES:   Allergen Reactions    Ampicillin HIVES     Polycillin - hives    Astelin Nasal Spray [Astepro]      Too drying    Atrovent [Ipratropium Bromide]      Nasal spray is too drying    Bactrim Ds NAUSEA and Other (See Comments)     And lethargic    Bupropion GI UPSET     aggravated irritable bowel    Evista [Raloxifene] Other (See Comments)     Night sweats, hot flashes, increased depression    Gabapentin Other (See Comments)     200 mg dose; too sleepy    Levaquin INSOMNIA     But might have been from Tamiflu    Metformin Other (See Comments)     Stomach pain, gassy, exhausted    Zelnorm [Tegaserod Maleate] SWELLING     swelling throat      Diltiazem Other (See Comments)     Flushing and fatigue    Biaxin [Clarithromycin] NAUSEA     Stomach burning and upset    Celebrex [Celecoxib] GI UPSET    Ra Garlic Nausea & Vomiting    Alendronate Sodium GI UPSET     Fosamax - severe heartburn    Motrin Ib GI UPSET    Risedronate GI UPSET     Actonel - gas      Social History     Tobacco Use    Smoking status: Never     Passive exposure: Never    Smokeless tobacco: Never    Tobacco comments:     no smokers in home   Substance Use Topics    Alcohol use: Never     Alcohol/week: 0.0 standard drinks of alcohol      Family History   Problem Relation Age of Onset    Congestive Heart Failure Father         dec age 85 sec CHF and CRF, no h/o MI    Diabetes Father         Type 1;since his 20s; complication of leg amputation 10/00, no h/o HTN    Hypothyroid Father     Stroke Mother         hemorrhagic, age 88 with NO HTN    Arrhythmia Mother         Arrthymia (A fib?); on Coumadin    Cancer, Skin Mother         BCC on cheek    Dementia/Alzheimers Mother         dxed age 88    Colon Polyps Mother          precancerous colon polyps    Other Mother         dec age 99 sec old age    NEGATIVE FAMILY HX OF Other         ovarian or colon cancer    Cancer, Skin Melanoma Brother         and has recurred    Cancer, Skin Melanoma Sister         1 of 2, melanoma    Patient is unaware of any medical problems Son     Asthma Maternal Grandfather     Cancer, Colon Maternal Grandfather     Hypertension Maternal Grandfather     Myocardial Infarction Maternal Grandfather         dec sec age 70s    Asthma Maternal Uncle     Asthma Grandchild         granddaughter    Patient is unaware of any medical problems Son     Congestive Heart Failure Maternal Grandmother         dec age 86 suddenly    Hypertension Maternal Grandmother     Hypertension Paternal Grandmother         dec age 90 sec ?    Stroke Paternal Grandmother         years before she     Cancer Paternal Grandfather         dec sec stomach / colon CA age early 70s    Hypertension Paternal Grandfather     Myocardial Infarction Paternal Grandfather     Arthritis Father     Cancer Maternal Grandfather     Heart disease Paternal Grandfather     Cancer Brother     Cancer Sister     Heart disease Maternal Grandmother     High blood pressure Paternal Grandmother            Current Outpatient Medications   Medication Sig Dispense Refill    traZODone (DESYREL) 50 MG tablet Take 1-1/2 tabs (75 mgs) every bedtimee 135 tablet 1    atorvastatin (LIPITOR) 40 MG tablet Take 1 tablet by mouth daily. Keep on file til patient requests refill 90 tablet 2    carvedilol (COREG) 12.5 MG tablet Take 1 tab 2x/day with breakfast and supper; Keep on file til patient requests refill 180 tablet 2    montelukast (SINGULAIR) 10 MG tablet Take 1 tablet by mouth every evening. Keep on file til patient requests refill 90 tablet 2    venlafaxine XR (EFFEXOR XR) 150 MG 24 hr capsule Plus also takes 75 mg capsule for 225 mgs DOSE every morning; Keep on file til patient requests refill 90 capsule 2     venlafaxine XR (EFFEXOR XR) 75 MG 24 hr capsule Plus also takes 150 mg capsule for 225 mgs DOSE every morning; Keep on file [facility] patient requests refill 90 capsule 2    budesonide-formoterol (Symbicort) 160-4.5 MCG/ACT inhaler Inhale 2 puffs into the lungs in the morning and 2 puffs in the evening. 30.6 g 3    albuterol 108 (90 Base) MCG/ACT inhaler Inhale 2 puffs into the lungs 4 times daily as needed (asthma; including cough, wheezing, shortness of breath or chest tightness). Keep on file [facility] patient requests refill 18 g 3    benzonatate (TESSALON PERLES) 200 MG capsule Take 1 capsule by mouth 3 times daily as needed for Cough. Keep on file [facility] patient requests refill 30 capsule 2    Vitamin D, Ergocalciferol, 70744 units Cap VITAMIN D 37495 UNIT CAPS      pseudoephedrine (Sudafed) 30 MG tablet SUDAFED 30 MG TABS      senna (SENOKOT) 8.6 MG tablet       tirzepatide (MOUNJARO) 2.5 MG/0.5ML Solution Auto-injector Inject 2.5 mg into the skin every 7 days. Indications: Type 2 Diabetes 2 mL 11    ondansetron (ZOFRAN) 8 MG tablet Take 1 tablet by mouth 2 times daily as needed for Nausea. Keep on file [facility] patient requests refill 30 tablet 2    dicyclomine (BENTYL) 10 MG capsule Take 1 capsule by mouth 3 times daily as needed (for abdominal pain/ cramps). Keep on file [facility] patient requests refill 30 capsule 5    polyethylene glycol (MIRALAX) 17 g packet Take 17 g by mouth in the morning and 17 g in the evening. Stir and dissolve powder in any 4 to 8 ounces of beverage, then drink. Continue until having regular bowel movements. 1 packet 0    Cholecalciferol (Vitamin D3) 50 mcg (2,000 units) capsule Take 50 mcg by mouth daily.      Wheat Dextrin (BENEFIBER DRINK MIX PO)       propylene glycol (Systane Balance) 0.6 % Solution       esomeprazole (NEXIUM) 20 MG capsule Takes 1-2 capsules every morning      acetaminophen (TYLENOL 8 HOUR) 650 MG CR tablet Take 2 tablets by mouth 3 times daily as needed for Pain.       guaiFENesin-DM (MUCINEX DM)  MG per 12 hr tablet Take 1-2 tabs 2x/day, morning and night for at least 7 days, and until mucus from,nose throat or cough; and all cough even a dry cough are gone      Loratadine 10 MG Cap 1 tab daily as needed for allergy eyes      budesonide (RINOCORT AQUA) 32 MCG/ACT nasal spray Spray 1 spray in each nostril daily. OTC      Multiple Vitamins-Minerals (MULTI-VITAMIN GUMMIES PO)       Simethicone 125 MG TABS Take 1 tablet by mouth 4 times daily as needed (Gas-XZ; for bloating/ gas).      Lactobacillus (ACIDOPHILUS) OR TABS Probiotic 13.3 mg, 1 tab daily 0 0    FIBERCON 625 MG PO TABS 1 tab daily and up to 4x/day 0 0    DISPENSE Dan's bran fiber, 1/2 cup daily 0 0    BRIONNA 128 5 % OP OINT bid ou prn 1 0     No current facility-administered medications for this visit.       REVIEW OF SYSTEMS:    All systems reviewed and are negative with the exception of the findings noted in the History of Present Illness.    OBJECTIVE:  Vital Signs:Visit Vitals  /68   Pulse (!) 56   Resp 16   Ht 5' 7\" (1.702 m)   Wt 95 kg (209 lb 5.2 oz)   SpO2 95% Comment: Resting RA   BMI 32.79 kg/m²       PHYSICAL EXAM:  Constitutional:  Well developed, well nourished, no acute distress, non-toxic appearance.  HEENT:  Atraumatic, PERRL, conjunctivae normal, external ears normal, nose normal, oropharynx moist, no pharyngeal exudates. Neck- Normal range of motion, no tenderness, supple, no JVD or adenopathy. No accessory muscle use. Trachea midline.  Respiratory: Clear to auscultation, no wheezing, rhonchi or rub. No dullness to percussion or decreased tactile fremitus.  Cardiovascular:  Normal rate, normal rhythm, no murmurs, no gallops, no rubs.  Gastrointestinal:  Soft, nondistended, normal bowel sounds, nontender,  Musculoskeletal:  No edema. No cyanosis or clubbing         Return in about 1 year (around 6/24/2026).    Instructions provided as documented in the After Visit Summary.      The patient  indicated understanding of the diagnosis and agreed with the plan of care.      Roman Villalpando M.D, [facility]

## 2025-07-12 ENCOUNTER — HEALTH MAINTENANCE LETTER (OUTPATIENT)
Age: 89
End: 2025-07-12

## (undated) DEVICE — TUBING SUCTION MEDI-VAC SOFT 3/16"X20' N520A

## (undated) DEVICE — CATH ABLTN 7FR 1-7-4MM SPACE 115 CML 4MML STRL LF BD7TCFJ4L

## (undated) DEVICE — ENDO FORCEP ENDOJAW BIOPSY 2.8MMX160CM FB-220K

## (undated) DEVICE — DEFIB PRO-PADZ LVP LQD GEL ADULT 8900-2105-01

## (undated) DEVICE — PACK PCMKR PERM SRG PROC LF SAN32PC573

## (undated) DEVICE — SHEATH PRELUDE SNAP 13CM 6FR

## (undated) DEVICE — INTRO SHEATH 7FRX10CM PINNACLE RSS702

## (undated) DEVICE — PAD CHUX UNDERPAD 30X36" P3036C

## (undated) DEVICE — KIT PROCEDURE W/CLEAN-A-SCOPE LINERS V2 200800

## (undated) DEVICE — SUCTION CANISTER MEDIVAC LINER 3000ML W/LID 65651-530

## (undated) DEVICE — SHEATH PRELUDE SNAP 7FRX13CM PLS-1007

## (undated) DEVICE — BAG RED BIOHAZARD 37X50" 40GAL A7450PR

## (undated) DEVICE — PACK EP SRG PROC LF DISP SAN32EPFSR

## (undated) DEVICE — ENDO FORCEP ENDOJAW BIOPSY 2.8MMX230CM FB-220U

## (undated) DEVICE — BAG CLEAR TRASH 1.3M 39X33" P4040C

## (undated) DEVICE — GOWN XLG DISP 9545

## (undated) DEVICE — SOL WATER IRRIG 1000ML BOTTLE 2F7114

## (undated) DEVICE — CABLE PACING ALLIGATOR CLIP 301-CG

## (undated) DEVICE — SUCTION MANIFOLD NEPTUNE 2 SYS 4 PORT 0702-020-000

## (undated) DEVICE — RAD INTRODUCER KIT MICRO 5FRX10CM .018 NITINOL G/W

## (undated) DEVICE — SYR 50ML SLIP TIP W/O NDL 309654

## (undated) DEVICE — INTRO SHEALTH 8.5FRX63CM SRO 406853

## (undated) RX ORDER — EPHEDRINE SULFATE 50 MG/ML
INJECTION, SOLUTION INTRAMUSCULAR; INTRAVENOUS; SUBCUTANEOUS
Status: DISPENSED
Start: 2023-06-09

## (undated) RX ORDER — FENTANYL CITRATE-0.9 % NACL/PF 10 MCG/ML
PLASTIC BAG, INJECTION (ML) INTRAVENOUS
Status: DISPENSED
Start: 2023-06-09

## (undated) RX ORDER — HEPARIN SODIUM 1000 [USP'U]/ML
INJECTION, SOLUTION INTRAVENOUS; SUBCUTANEOUS
Status: DISPENSED
Start: 2022-10-31

## (undated) RX ORDER — IPRATROPIUM BROMIDE AND ALBUTEROL SULFATE 2.5; .5 MG/3ML; MG/3ML
SOLUTION RESPIRATORY (INHALATION)
Status: DISPENSED
Start: 2023-06-09

## (undated) RX ORDER — IPRATROPIUM BROMIDE AND ALBUTEROL SULFATE 2.5; .5 MG/3ML; MG/3ML
SOLUTION RESPIRATORY (INHALATION)
Status: DISPENSED
Start: 2023-06-06

## (undated) RX ORDER — FENTANYL CITRATE 50 UG/ML
INJECTION, SOLUTION INTRAMUSCULAR; INTRAVENOUS
Status: DISPENSED
Start: 2022-10-31

## (undated) RX ORDER — LIDOCAINE HYDROCHLORIDE 10 MG/ML
INJECTION, SOLUTION EPIDURAL; INFILTRATION; INTRACAUDAL; PERINEURAL
Status: DISPENSED
Start: 2023-06-06

## (undated) RX ORDER — CEFAZOLIN SODIUM 2 G/100ML
INJECTION, SOLUTION INTRAVENOUS
Status: DISPENSED
Start: 2022-10-31

## (undated) RX ORDER — LIDOCAINE HYDROCHLORIDE 10 MG/ML
INJECTION, SOLUTION EPIDURAL; INFILTRATION; INTRACAUDAL; PERINEURAL
Status: DISPENSED
Start: 2022-10-31

## (undated) RX ORDER — BUPIVACAINE HYDROCHLORIDE 2.5 MG/ML
INJECTION, SOLUTION EPIDURAL; INFILTRATION; INTRACAUDAL
Status: DISPENSED
Start: 2022-10-31

## (undated) RX ORDER — LIDOCAINE HYDROCHLORIDE 10 MG/ML
INJECTION, SOLUTION EPIDURAL; INFILTRATION; INTRACAUDAL; PERINEURAL
Status: DISPENSED
Start: 2023-06-09